# Patient Record
Sex: MALE | Race: WHITE | Employment: OTHER | URBAN - METROPOLITAN AREA
[De-identification: names, ages, dates, MRNs, and addresses within clinical notes are randomized per-mention and may not be internally consistent; named-entity substitution may affect disease eponyms.]

---

## 2017-01-04 DIAGNOSIS — M19.039 WRIST ARTHRITIS: Primary | ICD-10-CM

## 2017-01-04 DIAGNOSIS — F34.1 DYSTHYMIA: ICD-10-CM

## 2017-01-09 DIAGNOSIS — J30.1 NON-SEASONAL ALLERGIC RHINITIS DUE TO POLLEN: Primary | ICD-10-CM

## 2017-01-09 RX ORDER — MONTELUKAST SODIUM 10 MG/1
10 TABLET ORAL NIGHTLY
Qty: 90 TABLET | Refills: 1 | Status: SHIPPED | OUTPATIENT
Start: 2017-01-09 | End: 2017-07-08 | Stop reason: SDUPTHER

## 2017-01-09 RX ORDER — MELOXICAM 15 MG/1
15 TABLET ORAL DAILY
Qty: 90 TABLET | Refills: 1 | Status: SHIPPED | OUTPATIENT
Start: 2017-01-09 | End: 2017-02-08 | Stop reason: SDUPTHER

## 2017-01-09 RX ORDER — ESCITALOPRAM OXALATE 10 MG/1
10 TABLET ORAL DAILY
Qty: 30 TABLET | Refills: 3 | Status: SHIPPED | OUTPATIENT
Start: 2017-01-09 | End: 2018-01-15 | Stop reason: SDUPTHER

## 2017-02-01 DIAGNOSIS — Z12.5 SCREENING FOR PROSTATE CANCER: ICD-10-CM

## 2017-02-01 DIAGNOSIS — E78.2 MIXED HYPERLIPIDEMIA: ICD-10-CM

## 2017-02-01 LAB
A/G RATIO: 2 (ref 1.1–2.2)
ALBUMIN SERPL-MCNC: 4.7 G/DL (ref 3.4–5)
ALP BLD-CCNC: 61 U/L (ref 40–129)
ALT SERPL-CCNC: 25 U/L (ref 10–40)
ANION GAP SERPL CALCULATED.3IONS-SCNC: 15 MMOL/L (ref 3–16)
AST SERPL-CCNC: 22 U/L (ref 15–37)
BILIRUB SERPL-MCNC: 0.7 MG/DL (ref 0–1)
BUN BLDV-MCNC: 18 MG/DL (ref 7–20)
CALCIUM SERPL-MCNC: 9.5 MG/DL (ref 8.3–10.6)
CHLORIDE BLD-SCNC: 100 MMOL/L (ref 99–110)
CHOLESTEROL, TOTAL: 184 MG/DL (ref 0–199)
CO2: 24 MMOL/L (ref 21–32)
CREAT SERPL-MCNC: 0.9 MG/DL (ref 0.8–1.3)
GFR AFRICAN AMERICAN: >60
GFR NON-AFRICAN AMERICAN: >60
GLOBULIN: 2.4 G/DL
GLUCOSE BLD-MCNC: 94 MG/DL (ref 70–99)
HDLC SERPL-MCNC: 54 MG/DL (ref 40–60)
LDL CHOLESTEROL CALCULATED: 101 MG/DL
POTASSIUM SERPL-SCNC: 4.6 MMOL/L (ref 3.5–5.1)
PROSTATE SPECIFIC ANTIGEN: 1.32 NG/ML (ref 0–4)
SODIUM BLD-SCNC: 139 MMOL/L (ref 136–145)
TOTAL PROTEIN: 7.1 G/DL (ref 6.4–8.2)
TRIGL SERPL-MCNC: 146 MG/DL (ref 0–150)
VLDLC SERPL CALC-MCNC: 29 MG/DL

## 2017-02-02 ENCOUNTER — OFFICE VISIT (OUTPATIENT)
Dept: FAMILY MEDICINE CLINIC | Age: 67
End: 2017-02-02

## 2017-02-02 VITALS
SYSTOLIC BLOOD PRESSURE: 106 MMHG | DIASTOLIC BLOOD PRESSURE: 70 MMHG | TEMPERATURE: 97.2 F | WEIGHT: 255.6 LBS | BODY MASS INDEX: 32.8 KG/M2 | HEIGHT: 74 IN | OXYGEN SATURATION: 96 % | HEART RATE: 78 BPM

## 2017-02-02 DIAGNOSIS — F34.1 DYSTHYMIA: Primary | ICD-10-CM

## 2017-02-02 DIAGNOSIS — M19.041 PRIMARY OSTEOARTHRITIS OF BOTH HANDS: ICD-10-CM

## 2017-02-02 DIAGNOSIS — M19.042 PRIMARY OSTEOARTHRITIS OF BOTH HANDS: ICD-10-CM

## 2017-02-02 DIAGNOSIS — I10 ESSENTIAL HYPERTENSION: ICD-10-CM

## 2017-02-02 DIAGNOSIS — M47.816 OSTEOARTHRITIS OF LUMBAR SPINE, UNSPECIFIED SPINAL OSTEOARTHRITIS COMPLICATION STATUS: ICD-10-CM

## 2017-02-02 DIAGNOSIS — E78.2 MIXED HYPERLIPIDEMIA: ICD-10-CM

## 2017-02-02 PROCEDURE — 1036F TOBACCO NON-USER: CPT | Performed by: FAMILY MEDICINE

## 2017-02-02 PROCEDURE — G8427 DOCREV CUR MEDS BY ELIG CLIN: HCPCS | Performed by: FAMILY MEDICINE

## 2017-02-02 PROCEDURE — 99214 OFFICE O/P EST MOD 30 MIN: CPT | Performed by: FAMILY MEDICINE

## 2017-02-02 PROCEDURE — 3017F COLORECTAL CA SCREEN DOC REV: CPT | Performed by: FAMILY MEDICINE

## 2017-02-02 PROCEDURE — G8419 CALC BMI OUT NRM PARAM NOF/U: HCPCS | Performed by: FAMILY MEDICINE

## 2017-02-02 PROCEDURE — 1123F ACP DISCUSS/DSCN MKR DOCD: CPT | Performed by: FAMILY MEDICINE

## 2017-02-02 PROCEDURE — G8484 FLU IMMUNIZE NO ADMIN: HCPCS | Performed by: FAMILY MEDICINE

## 2017-02-02 PROCEDURE — 4040F PNEUMOC VAC/ADMIN/RCVD: CPT | Performed by: FAMILY MEDICINE

## 2017-02-02 ASSESSMENT — ENCOUNTER SYMPTOMS
EYES NEGATIVE: 1
GASTROINTESTINAL NEGATIVE: 1
RESPIRATORY NEGATIVE: 1

## 2017-02-08 DIAGNOSIS — M19.039 WRIST ARTHRITIS: ICD-10-CM

## 2017-02-09 RX ORDER — MELOXICAM 15 MG/1
15 TABLET ORAL DAILY
Qty: 90 TABLET | Refills: 1 | Status: SHIPPED | OUTPATIENT
Start: 2017-02-09 | End: 2017-07-29 | Stop reason: SDUPTHER

## 2017-02-21 DIAGNOSIS — E78.2 MIXED HYPERLIPIDEMIA: ICD-10-CM

## 2017-02-21 DIAGNOSIS — I10 ESSENTIAL HYPERTENSION: ICD-10-CM

## 2017-02-21 RX ORDER — TELMISARTAN 40 MG/1
40 TABLET ORAL DAILY
Qty: 90 TABLET | Refills: 1 | Status: SHIPPED | OUTPATIENT
Start: 2017-02-21 | End: 2017-08-20 | Stop reason: SDUPTHER

## 2017-02-21 RX ORDER — ATORVASTATIN CALCIUM 10 MG/1
10 TABLET, FILM COATED ORAL DAILY
Qty: 90 TABLET | Refills: 1 | Status: SHIPPED | OUTPATIENT
Start: 2017-02-21 | End: 2017-08-20 | Stop reason: SDUPTHER

## 2017-05-05 DIAGNOSIS — E78.2 MIXED HYPERLIPIDEMIA: ICD-10-CM

## 2017-05-05 DIAGNOSIS — I10 ESSENTIAL HYPERTENSION: ICD-10-CM

## 2017-05-05 LAB
A/G RATIO: 1.8 (ref 1.1–2.2)
ALBUMIN SERPL-MCNC: 4.4 G/DL (ref 3.4–5)
ALBUMIN SERPL-MCNC: 4.6 G/DL (ref 3.4–5)
ALP BLD-CCNC: 70 U/L (ref 40–129)
ALT SERPL-CCNC: 28 U/L (ref 10–40)
ANION GAP SERPL CALCULATED.3IONS-SCNC: 14 MMOL/L (ref 3–16)
ANION GAP SERPL CALCULATED.3IONS-SCNC: 17 MMOL/L (ref 3–16)
AST SERPL-CCNC: 24 U/L (ref 15–37)
BILIRUB SERPL-MCNC: 0.6 MG/DL (ref 0–1)
BUN BLDV-MCNC: 19 MG/DL (ref 7–20)
BUN BLDV-MCNC: 20 MG/DL (ref 7–20)
CALCIUM SERPL-MCNC: 9.4 MG/DL (ref 8.3–10.6)
CALCIUM SERPL-MCNC: 9.5 MG/DL (ref 8.3–10.6)
CHLORIDE BLD-SCNC: 100 MMOL/L (ref 99–110)
CHLORIDE BLD-SCNC: 101 MMOL/L (ref 99–110)
CHOLESTEROL, TOTAL: 183 MG/DL (ref 0–199)
CO2: 22 MMOL/L (ref 21–32)
CO2: 24 MMOL/L (ref 21–32)
CREAT SERPL-MCNC: 0.9 MG/DL (ref 0.8–1.3)
CREAT SERPL-MCNC: 0.9 MG/DL (ref 0.8–1.3)
GFR AFRICAN AMERICAN: >60
GFR AFRICAN AMERICAN: >60
GFR NON-AFRICAN AMERICAN: >60
GFR NON-AFRICAN AMERICAN: >60
GLOBULIN: 2.6 G/DL
GLUCOSE BLD-MCNC: 90 MG/DL (ref 70–99)
GLUCOSE BLD-MCNC: 93 MG/DL (ref 70–99)
HDLC SERPL-MCNC: 53 MG/DL (ref 40–60)
LDL CHOLESTEROL CALCULATED: 97 MG/DL
PHOSPHORUS: 3.5 MG/DL (ref 2.5–4.9)
POTASSIUM SERPL-SCNC: 4.6 MMOL/L (ref 3.5–5.1)
POTASSIUM SERPL-SCNC: 4.7 MMOL/L (ref 3.5–5.1)
SODIUM BLD-SCNC: 139 MMOL/L (ref 136–145)
SODIUM BLD-SCNC: 139 MMOL/L (ref 136–145)
TOTAL PROTEIN: 7.2 G/DL (ref 6.4–8.2)
TRIGL SERPL-MCNC: 165 MG/DL (ref 0–150)
VLDLC SERPL CALC-MCNC: 33 MG/DL

## 2017-05-08 ENCOUNTER — OFFICE VISIT (OUTPATIENT)
Dept: FAMILY MEDICINE CLINIC | Age: 67
End: 2017-05-08

## 2017-05-08 VITALS
OXYGEN SATURATION: 95 % | HEIGHT: 74 IN | BODY MASS INDEX: 33.8 KG/M2 | TEMPERATURE: 97.5 F | WEIGHT: 263.4 LBS | SYSTOLIC BLOOD PRESSURE: 110 MMHG | DIASTOLIC BLOOD PRESSURE: 78 MMHG | HEART RATE: 89 BPM

## 2017-05-08 DIAGNOSIS — E78.2 MIXED HYPERLIPIDEMIA: Primary | ICD-10-CM

## 2017-05-08 DIAGNOSIS — I10 ESSENTIAL HYPERTENSION: ICD-10-CM

## 2017-05-08 DIAGNOSIS — J30.1 NON-SEASONAL ALLERGIC RHINITIS DUE TO POLLEN: ICD-10-CM

## 2017-05-08 PROCEDURE — G8417 CALC BMI ABV UP PARAM F/U: HCPCS | Performed by: NURSE PRACTITIONER

## 2017-05-08 PROCEDURE — 99214 OFFICE O/P EST MOD 30 MIN: CPT | Performed by: NURSE PRACTITIONER

## 2017-05-08 PROCEDURE — 1036F TOBACCO NON-USER: CPT | Performed by: NURSE PRACTITIONER

## 2017-05-08 PROCEDURE — 3017F COLORECTAL CA SCREEN DOC REV: CPT | Performed by: NURSE PRACTITIONER

## 2017-05-08 PROCEDURE — 4040F PNEUMOC VAC/ADMIN/RCVD: CPT | Performed by: NURSE PRACTITIONER

## 2017-05-08 PROCEDURE — G8427 DOCREV CUR MEDS BY ELIG CLIN: HCPCS | Performed by: NURSE PRACTITIONER

## 2017-05-08 PROCEDURE — 1123F ACP DISCUSS/DSCN MKR DOCD: CPT | Performed by: NURSE PRACTITIONER

## 2017-05-08 ASSESSMENT — ENCOUNTER SYMPTOMS
RESPIRATORY NEGATIVE: 1
EYES NEGATIVE: 1
ORTHOPNEA: 0
SHORTNESS OF BREATH: 0
BLURRED VISION: 0
GASTROINTESTINAL NEGATIVE: 1
ALLERGIC/IMMUNOLOGIC NEGATIVE: 1

## 2017-05-12 ENCOUNTER — TELEPHONE (OUTPATIENT)
Dept: FAMILY MEDICINE CLINIC | Age: 67
End: 2017-05-12

## 2017-05-12 RX ORDER — AMOXICILLIN 500 MG/1
500 CAPSULE ORAL 3 TIMES DAILY
Qty: 30 CAPSULE | Refills: 0 | Status: SHIPPED | OUTPATIENT
Start: 2017-05-12 | End: 2017-05-22

## 2017-07-08 DIAGNOSIS — J30.1 NON-SEASONAL ALLERGIC RHINITIS DUE TO POLLEN: ICD-10-CM

## 2017-07-09 RX ORDER — MONTELUKAST SODIUM 10 MG/1
10 TABLET ORAL NIGHTLY
Qty: 90 TABLET | Refills: 1 | Status: SHIPPED | OUTPATIENT
Start: 2017-07-09 | End: 2018-01-05 | Stop reason: SDUPTHER

## 2017-07-29 DIAGNOSIS — M19.039 WRIST ARTHRITIS: ICD-10-CM

## 2017-07-29 RX ORDER — MELOXICAM 15 MG/1
15 TABLET ORAL DAILY PRN
Qty: 90 TABLET | Refills: 1 | Status: SHIPPED | OUTPATIENT
Start: 2017-07-29 | End: 2018-01-25 | Stop reason: SDUPTHER

## 2017-07-29 RX ORDER — CETIRIZINE HYDROCHLORIDE 10 MG/1
10 TABLET ORAL DAILY
Qty: 90 TABLET | Refills: 3 | Status: SHIPPED | OUTPATIENT
Start: 2017-07-29 | End: 2018-08-19 | Stop reason: SDUPTHER

## 2017-08-08 DIAGNOSIS — E78.2 MIXED HYPERLIPIDEMIA: ICD-10-CM

## 2017-08-08 LAB
A/G RATIO: 1.9 (ref 1.1–2.2)
ALBUMIN SERPL-MCNC: 4.6 G/DL (ref 3.4–5)
ALP BLD-CCNC: 62 U/L (ref 40–129)
ALT SERPL-CCNC: 21 U/L (ref 10–40)
ANION GAP SERPL CALCULATED.3IONS-SCNC: 17 MMOL/L (ref 3–16)
AST SERPL-CCNC: 20 U/L (ref 15–37)
BILIRUB SERPL-MCNC: 0.6 MG/DL (ref 0–1)
BUN BLDV-MCNC: 15 MG/DL (ref 7–20)
CALCIUM SERPL-MCNC: 9.5 MG/DL (ref 8.3–10.6)
CHLORIDE BLD-SCNC: 96 MMOL/L (ref 99–110)
CHOLESTEROL, TOTAL: 168 MG/DL (ref 0–199)
CO2: 22 MMOL/L (ref 21–32)
CREAT SERPL-MCNC: 0.9 MG/DL (ref 0.8–1.3)
GFR AFRICAN AMERICAN: >60
GFR NON-AFRICAN AMERICAN: >60
GLOBULIN: 2.4 G/DL
GLUCOSE BLD-MCNC: 94 MG/DL (ref 70–99)
HDLC SERPL-MCNC: 50 MG/DL (ref 40–60)
LDL CHOLESTEROL CALCULATED: 87 MG/DL
POTASSIUM SERPL-SCNC: 4.6 MMOL/L (ref 3.5–5.1)
SODIUM BLD-SCNC: 135 MMOL/L (ref 136–145)
TOTAL PROTEIN: 7 G/DL (ref 6.4–8.2)
TRIGL SERPL-MCNC: 155 MG/DL (ref 0–150)
VLDLC SERPL CALC-MCNC: 31 MG/DL

## 2017-08-09 ENCOUNTER — OFFICE VISIT (OUTPATIENT)
Dept: FAMILY MEDICINE CLINIC | Age: 67
End: 2017-08-09

## 2017-08-09 VITALS
DIASTOLIC BLOOD PRESSURE: 70 MMHG | HEIGHT: 74 IN | SYSTOLIC BLOOD PRESSURE: 104 MMHG | TEMPERATURE: 98.1 F | WEIGHT: 254.4 LBS | OXYGEN SATURATION: 95 % | HEART RATE: 88 BPM | BODY MASS INDEX: 32.65 KG/M2

## 2017-08-09 DIAGNOSIS — E78.2 MIXED HYPERLIPIDEMIA: ICD-10-CM

## 2017-08-09 DIAGNOSIS — R33.9 URINARY RETENTION: ICD-10-CM

## 2017-08-09 DIAGNOSIS — I10 ESSENTIAL HYPERTENSION: ICD-10-CM

## 2017-08-09 DIAGNOSIS — N40.1 BPH (BENIGN PROSTATIC HYPERTROPHY) WITH URINARY RETENTION: Primary | ICD-10-CM

## 2017-08-09 DIAGNOSIS — Z12.5 SCREENING FOR PROSTATE CANCER: ICD-10-CM

## 2017-08-09 DIAGNOSIS — Z87.891 FORMER SMOKER: ICD-10-CM

## 2017-08-09 DIAGNOSIS — Z23 NEED FOR PNEUMOCOCCAL VACCINATION: ICD-10-CM

## 2017-08-09 DIAGNOSIS — R33.8 BPH (BENIGN PROSTATIC HYPERTROPHY) WITH URINARY RETENTION: Primary | ICD-10-CM

## 2017-08-09 PROCEDURE — 1036F TOBACCO NON-USER: CPT | Performed by: NURSE PRACTITIONER

## 2017-08-09 PROCEDURE — G0009 ADMIN PNEUMOCOCCAL VACCINE: HCPCS | Performed by: NURSE PRACTITIONER

## 2017-08-09 PROCEDURE — G8427 DOCREV CUR MEDS BY ELIG CLIN: HCPCS | Performed by: NURSE PRACTITIONER

## 2017-08-09 PROCEDURE — 1123F ACP DISCUSS/DSCN MKR DOCD: CPT | Performed by: NURSE PRACTITIONER

## 2017-08-09 PROCEDURE — 90732 PPSV23 VACC 2 YRS+ SUBQ/IM: CPT | Performed by: NURSE PRACTITIONER

## 2017-08-09 PROCEDURE — G8417 CALC BMI ABV UP PARAM F/U: HCPCS | Performed by: NURSE PRACTITIONER

## 2017-08-09 PROCEDURE — 3017F COLORECTAL CA SCREEN DOC REV: CPT | Performed by: NURSE PRACTITIONER

## 2017-08-09 PROCEDURE — 99214 OFFICE O/P EST MOD 30 MIN: CPT | Performed by: NURSE PRACTITIONER

## 2017-08-09 PROCEDURE — 4040F PNEUMOC VAC/ADMIN/RCVD: CPT | Performed by: NURSE PRACTITIONER

## 2017-08-09 ASSESSMENT — ENCOUNTER SYMPTOMS
SHORTNESS OF BREATH: 0
BLURRED VISION: 0
RESPIRATORY NEGATIVE: 1
VOMITING: 1
ALLERGIC/IMMUNOLOGIC NEGATIVE: 1
EYES NEGATIVE: 1
ORTHOPNEA: 0
NAUSEA: 0

## 2017-08-20 DIAGNOSIS — I10 ESSENTIAL HYPERTENSION: ICD-10-CM

## 2017-08-20 DIAGNOSIS — E78.2 MIXED HYPERLIPIDEMIA: ICD-10-CM

## 2017-08-20 RX ORDER — TELMISARTAN 40 MG/1
40 TABLET ORAL DAILY
Qty: 90 TABLET | Refills: 1 | Status: SHIPPED | OUTPATIENT
Start: 2017-08-20 | End: 2018-02-16 | Stop reason: SDUPTHER

## 2017-08-20 RX ORDER — ATORVASTATIN CALCIUM 10 MG/1
10 TABLET, FILM COATED ORAL DAILY
Qty: 90 TABLET | Refills: 1 | Status: SHIPPED | OUTPATIENT
Start: 2017-08-20 | End: 2018-02-16 | Stop reason: SDUPTHER

## 2017-10-04 ENCOUNTER — TELEPHONE (OUTPATIENT)
Dept: FAMILY MEDICINE CLINIC | Age: 67
End: 2017-10-04

## 2017-10-04 ENCOUNTER — OFFICE VISIT (OUTPATIENT)
Dept: FAMILY MEDICINE CLINIC | Age: 67
End: 2017-10-04

## 2017-10-04 VITALS
TEMPERATURE: 97.6 F | BODY MASS INDEX: 32.66 KG/M2 | DIASTOLIC BLOOD PRESSURE: 76 MMHG | SYSTOLIC BLOOD PRESSURE: 124 MMHG | WEIGHT: 254.4 LBS

## 2017-10-04 DIAGNOSIS — T15.90XA EYE FOREIGN BODIES, UNSPECIFIED LATERALITY, INITIAL ENCOUNTER: Primary | ICD-10-CM

## 2017-10-04 DIAGNOSIS — Z23 NEED FOR INFLUENZA VACCINATION: ICD-10-CM

## 2017-10-04 DIAGNOSIS — G47.33 OBSTRUCTIVE SLEEP APNEA: ICD-10-CM

## 2017-10-04 DIAGNOSIS — F34.1 DYSTHYMIA: ICD-10-CM

## 2017-10-04 DIAGNOSIS — G31.84 MILD COGNITIVE IMPAIRMENT: Primary | ICD-10-CM

## 2017-10-04 DIAGNOSIS — F34.1 PERSISTENT DEPRESSIVE DISORDER: ICD-10-CM

## 2017-10-04 DIAGNOSIS — G31.84 MILD COGNITIVE IMPAIRMENT: ICD-10-CM

## 2017-10-04 LAB
A/G RATIO: 1.7 (ref 1.1–2.2)
ALBUMIN SERPL-MCNC: 4.5 G/DL (ref 3.4–5)
ALP BLD-CCNC: 66 U/L (ref 40–129)
ALT SERPL-CCNC: 22 U/L (ref 10–40)
ANION GAP SERPL CALCULATED.3IONS-SCNC: 12 MMOL/L (ref 3–16)
AST SERPL-CCNC: 21 U/L (ref 15–37)
BASOPHILS ABSOLUTE: 0 K/UL (ref 0–0.2)
BASOPHILS RELATIVE PERCENT: 0.6 %
BILIRUB SERPL-MCNC: 0.6 MG/DL (ref 0–1)
BUN BLDV-MCNC: 19 MG/DL (ref 7–20)
CALCIUM SERPL-MCNC: 9.6 MG/DL (ref 8.3–10.6)
CHLORIDE BLD-SCNC: 102 MMOL/L (ref 99–110)
CO2: 24 MMOL/L (ref 21–32)
CREAT SERPL-MCNC: 0.8 MG/DL (ref 0.8–1.3)
EOSINOPHILS ABSOLUTE: 0.3 K/UL (ref 0–0.6)
EOSINOPHILS RELATIVE PERCENT: 4.9 %
FOLATE: >20 NG/ML (ref 4.78–24.2)
GFR AFRICAN AMERICAN: >60
GFR NON-AFRICAN AMERICAN: >60
GLOBULIN: 2.6 G/DL
GLUCOSE BLD-MCNC: 79 MG/DL (ref 70–99)
HCT VFR BLD CALC: 41.3 % (ref 40.5–52.5)
HEMOGLOBIN: 13.9 G/DL (ref 13.5–17.5)
LYMPHOCYTES ABSOLUTE: 1.5 K/UL (ref 1–5.1)
LYMPHOCYTES RELATIVE PERCENT: 24.9 %
MCH RBC QN AUTO: 29.2 PG (ref 26–34)
MCHC RBC AUTO-ENTMCNC: 33.8 G/DL (ref 31–36)
MCV RBC AUTO: 86.4 FL (ref 80–100)
MONOCYTES ABSOLUTE: 0.7 K/UL (ref 0–1.3)
MONOCYTES RELATIVE PERCENT: 11.7 %
NEUTROPHILS ABSOLUTE: 3.5 K/UL (ref 1.7–7.7)
NEUTROPHILS RELATIVE PERCENT: 57.9 %
PDW BLD-RTO: 14.1 % (ref 12.4–15.4)
PLATELET # BLD: 224 K/UL (ref 135–450)
PMV BLD AUTO: 6.9 FL (ref 5–10.5)
POTASSIUM SERPL-SCNC: 4.5 MMOL/L (ref 3.5–5.1)
RBC # BLD: 4.78 M/UL (ref 4.2–5.9)
SODIUM BLD-SCNC: 138 MMOL/L (ref 136–145)
T4 FREE: 1.1 NG/DL (ref 0.9–1.8)
TOTAL PROTEIN: 7.1 G/DL (ref 6.4–8.2)
TSH SERPL DL<=0.05 MIU/L-ACNC: 3.24 UIU/ML (ref 0.27–4.2)
VITAMIN B-12: 469 PG/ML (ref 211–911)
WBC # BLD: 6 K/UL (ref 4–11)

## 2017-10-04 PROCEDURE — 1123F ACP DISCUSS/DSCN MKR DOCD: CPT | Performed by: FAMILY MEDICINE

## 2017-10-04 PROCEDURE — G8427 DOCREV CUR MEDS BY ELIG CLIN: HCPCS | Performed by: FAMILY MEDICINE

## 2017-10-04 PROCEDURE — G0008 ADMIN INFLUENZA VIRUS VAC: HCPCS | Performed by: FAMILY MEDICINE

## 2017-10-04 PROCEDURE — 1036F TOBACCO NON-USER: CPT | Performed by: FAMILY MEDICINE

## 2017-10-04 PROCEDURE — 99214 OFFICE O/P EST MOD 30 MIN: CPT | Performed by: FAMILY MEDICINE

## 2017-10-04 PROCEDURE — G8417 CALC BMI ABV UP PARAM F/U: HCPCS | Performed by: FAMILY MEDICINE

## 2017-10-04 PROCEDURE — 4040F PNEUMOC VAC/ADMIN/RCVD: CPT | Performed by: FAMILY MEDICINE

## 2017-10-04 PROCEDURE — G8484 FLU IMMUNIZE NO ADMIN: HCPCS | Performed by: FAMILY MEDICINE

## 2017-10-04 PROCEDURE — 3017F COLORECTAL CA SCREEN DOC REV: CPT | Performed by: FAMILY MEDICINE

## 2017-10-04 PROCEDURE — 90662 IIV NO PRSV INCREASED AG IM: CPT | Performed by: FAMILY MEDICINE

## 2017-10-04 ASSESSMENT — ENCOUNTER SYMPTOMS
EYES NEGATIVE: 1
GASTROINTESTINAL NEGATIVE: 1
RESPIRATORY NEGATIVE: 1

## 2017-10-04 NOTE — PROGRESS NOTES
Vaccine Information Sheet, \"Influenza - Inactivated\"  given to Nitza Silverman., or parent/legal guardian of  Nitza Silverman. and verbalized understanding. Patient responses:    Have you ever had a reaction to a flu vaccine? No  Are you able to eat eggs without adverse effects? No  Do you have any current illness? No  Have you ever had [place] Syndrome? No    Flu vaccine given per order. Please see immunization tab.
are normal.   Eyes: Conjunctivae and EOM are normal. Pupils are equal, round, and reactive to light. Right eye exhibits no discharge. Left eye exhibits no discharge. No scleral icterus. Neck: Trachea normal and normal range of motion. Neck supple. No JVD present. No tracheal deviation present. No thyromegaly present. Cardiovascular: Normal rate, regular rhythm, normal heart sounds and intact distal pulses. Exam reveals no gallop and no friction rub. No murmur heard. Pulses:       Dorsalis pedis pulses are 2+ on the right side, and 2+ on the left side. Posterior tibial pulses are 2+ on the right side, and 2+ on the left side. Pulmonary/Chest: Effort normal and breath sounds normal. No stridor. No respiratory distress. He has no decreased breath sounds. He has no wheezes. He has no rhonchi. He has no rales. He exhibits no tenderness. Abdominal: Soft. Bowel sounds are normal. He exhibits no distension and no mass. There is no hepatosplenomegaly. There is no tenderness. There is no rebound and no guarding. No hernia. Lymphadenopathy:     He has no cervical adenopathy. Skin: He is not diaphoretic. Psychiatric: He has a normal mood and affect. His behavior is normal. Judgment and thought content normal.   mmse missed one mild depression on testing          1. Mild cognitive impairment   The condition is deteriorating, will change treatment, investigate cause and make further recommendations when data back. CBC Auto Differential    Comprehensive Metabolic Panel    Folate    TSH without Reflex    T4, Free    Vitamin B12    RPR Reflex to Titer and TPPA    MRI Brain WO Contrast   2. Persistent depressive disorder  The condition is deteriorating, will change treatment, investigate cause and make further recommendations when data back. 3. Dysthymia     4.  Obstructive sleep apnea  The condition is deteriorating, will change treatment, investigate cause and make further recommendations when data

## 2017-10-04 NOTE — MR AVS SNAPSHOT
After Visit Summary             Jolene Chanel   10/4/2017 9:30 AM   Office Visit    Description:  Male : 1950   Provider:  Micheal Mercedes MD   Department:  [address] [facility] [address]              Your Follow-Up and Future Appointments         Below is a list of your follow-up and future appointments. This may not be a complete list as you may have made appointments directly with providers that we are not aware of or your providers may have made some for you. Please call your providers to confirm appointments. It is important to keep your appointments. Please bring your current insurance card, photo ID, co-pay, and all medication bottles to your appointment. If self-pay, payment is expected at the time of service. Your To-Do List     Future Orders Complete By Expires    CBC Auto Differential [OXT6633 Custom]  10/4/2017 10/4/2018    Comprehensive Metabolic Panel [IHT17 Custom]  10/4/2017 10/4/2018    Folate [LAB69 Custom]  10/4/2017 10/4/2018    MRI Brain WO Contrast [78412 Custom]  10/4/2017 10/4/2018    RPR Reflex to Titer and TPPA [XEI220 Custom]  10/4/2017 10/5/2018    T4, Free [NJO736 Custom]  10/4/2017 10/4/2018    TSH without Reflex [TNM590 Custom]  10/4/2017 10/4/2018    Vitamin B12 [LAB67 Custom]  10/4/2017 10/4/2018         Information from Your Visit        Department     Name Address Phone Fax    [address] [facility] [address] [facility] [phone]      You Were Seen for:         Comments    Mild cognitive impairment   [[id number]]         Vital Signs     Blood Pressure Temperature Weight Body Mass Index Smoking Status       124/76 97.6 °F (36.4 °C) (Temporal) 254 lb 6.4 oz (115.4 kg) 32.66 kg/m2 Former Smoker       Additional Information about your Body Mass Index (BMI)           Your BMI as listed above is considered obese (30 or more). BMI is an estimate of body fat, calculated from your height and weight.   The higher Essential hypertension    Vitamin D deficiency    Wrist arthritis    Sprain of lumbar region    Hypertrophy of prostate without urinary obstruction and other lower urinary tract symptoms (LUTS)    Cardiac dysrhythmia    Onychomycosis    Obstructive sleep apnea    Personal history of other diseases of digestive system    Erectile dysfunction      Your Goals as of 10/4/2017 at 10:49 AM                 Weight    Weight < 200 lb (90.719 kg)     Notes    The patient has a goal of losing five pounds in the next three months. He/she feels they have an 8/10 chance of doing this. They will do this through lifestyle changes including less caloric intake and increased exercise. Immunizations as of 10/4/2017     Name Date    Influenza, High Dose 10/20/2016    Pneumococcal 13-valent Conjugate (Mzcosbp12) 5/23/2016    Pneumococcal Polysaccharide (Wjxlvrnlu18) 8/9/2017    Tdap (Boostrix, Adacel) 9/9/2013    Zoster 3/5/2013      Preventive Care        Date Due    One-time abdominal aortic aneurism (AAA) screening is recommended for all men between the age of 73-68 who have ever smoked 1950    Yearly Flu Vaccine (1) 9/1/2017    Cholesterol Screening 8/8/2022    Tetanus Combination Vaccine (2 - Td) 9/9/2023    Colonoscopy 5/1/2024            [employer] Signup           Our records indicate that you have an active [employer] account. You can view your After Visit Summary by going to [email or web address] and logging in with your [employer] username and password. If you don't have a [employer] username and password but a parent or guardian has access to your record, the parent or guardian should login with their own [employer] username and password and access your record to view the After Visit Summary. Additional Information  If you have questions, please contact the physician practice where you receive care. Remember, [employer] is NOT to be used for urgent needs.  For medical emergencies, dial 911. For questions regarding your [employer] account call [phone]. If you have a clinical question, please call your doctor's office.

## 2017-10-04 NOTE — TELEPHONE ENCOUNTER
Scheduling called stating pt needs an order for an Orbital Xray of both eyes due to a previous eye injury  Please place order into Epic   Pts appt in tomorrow

## 2017-10-05 ENCOUNTER — HOSPITAL ENCOUNTER (OUTPATIENT)
Dept: GENERAL RADIOLOGY | Age: 67
Discharge: HOME | End: 2017-10-05

## 2017-10-05 DIAGNOSIS — G31.84 MILD COGNITIVE IMPAIRMENT: ICD-10-CM

## 2017-10-05 DIAGNOSIS — T15.90XA EYE FOREIGN BODIES, UNSPECIFIED LATERALITY, INITIAL ENCOUNTER: ICD-10-CM

## 2017-10-05 LAB — RPR: NORMAL

## 2017-10-06 ENCOUNTER — PATIENT MESSAGE (OUTPATIENT)
Dept: FAMILY MEDICINE CLINIC | Age: 67
End: 2017-10-06

## 2017-10-10 RX ORDER — BUPROPION HYDROCHLORIDE 150 MG/1
150 TABLET ORAL EVERY MORNING
Qty: 30 TABLET | Refills: 2 | Status: SHIPPED | OUTPATIENT
Start: 2017-10-10 | End: 2017-12-06 | Stop reason: SDUPTHER

## 2017-11-15 ENCOUNTER — TELEPHONE (OUTPATIENT)
Dept: OTHER | Facility: CLINIC | Age: 67
End: 2017-11-15

## 2017-11-20 LAB
ALBUMIN SERPL-MCNC: 4.7 G/DL (ref 3.4–5)
ANION GAP SERPL CALCULATED.3IONS-SCNC: 14 MMOL/L (ref 3–16)
BUN BLDV-MCNC: 18 MG/DL (ref 7–20)
CALCIUM SERPL-MCNC: 9.7 MG/DL (ref 8.3–10.6)
CHLORIDE BLD-SCNC: 101 MMOL/L (ref 99–110)
CHOLESTEROL, TOTAL: 170 MG/DL (ref 0–199)
CO2: 24 MMOL/L (ref 21–32)
CREAT SERPL-MCNC: 0.9 MG/DL (ref 0.8–1.3)
GFR AFRICAN AMERICAN: >60
GFR NON-AFRICAN AMERICAN: >60
GLUCOSE BLD-MCNC: 88 MG/DL (ref 70–99)
HDLC SERPL-MCNC: 53 MG/DL (ref 40–60)
LDL CHOLESTEROL CALCULATED: 85 MG/DL
PHOSPHORUS: 3.7 MG/DL (ref 2.5–4.9)
POTASSIUM SERPL-SCNC: 4.5 MMOL/L (ref 3.5–5.1)
SODIUM BLD-SCNC: 139 MMOL/L (ref 136–145)
TRIGL SERPL-MCNC: 162 MG/DL (ref 0–150)
VLDLC SERPL CALC-MCNC: 32 MG/DL

## 2017-12-06 RX ORDER — BUPROPION HYDROCHLORIDE 150 MG/1
150 TABLET ORAL EVERY MORNING
Qty: 90 TABLET | Refills: 1 | Status: SHIPPED | OUTPATIENT
Start: 2017-12-06 | End: 2018-03-22 | Stop reason: SDUPTHER

## 2018-01-05 DIAGNOSIS — J30.1 NON-SEASONAL ALLERGIC RHINITIS DUE TO POLLEN: ICD-10-CM

## 2018-01-05 RX ORDER — MONTELUKAST SODIUM 10 MG/1
TABLET ORAL
Qty: 90 TABLET | Refills: 1 | Status: SHIPPED | OUTPATIENT
Start: 2018-01-05 | End: 2018-03-22 | Stop reason: SDUPTHER

## 2018-01-15 DIAGNOSIS — F34.1 DYSTHYMIA: ICD-10-CM

## 2018-01-15 RX ORDER — ESCITALOPRAM OXALATE 10 MG/1
10 TABLET ORAL DAILY
Qty: 90 TABLET | Refills: 1 | Status: SHIPPED | OUTPATIENT
Start: 2018-01-15 | End: 2018-02-23 | Stop reason: ALTCHOICE

## 2018-01-25 DIAGNOSIS — M19.039 WRIST ARTHRITIS: ICD-10-CM

## 2018-01-25 RX ORDER — MELOXICAM 15 MG/1
15 TABLET ORAL DAILY PRN
Qty: 90 TABLET | Refills: 1 | Status: SHIPPED | OUTPATIENT
Start: 2018-01-25 | End: 2018-03-22 | Stop reason: SDUPTHER

## 2018-02-16 ENCOUNTER — TELEPHONE (OUTPATIENT)
Dept: FAMILY MEDICINE CLINIC | Age: 68
End: 2018-02-16

## 2018-02-16 DIAGNOSIS — E78.2 MIXED HYPERLIPIDEMIA: ICD-10-CM

## 2018-02-16 DIAGNOSIS — I10 ESSENTIAL HYPERTENSION: ICD-10-CM

## 2018-02-16 RX ORDER — TELMISARTAN 40 MG/1
40 TABLET ORAL DAILY
Qty: 90 TABLET | Refills: 1 | Status: SHIPPED | OUTPATIENT
Start: 2018-02-16 | End: 2018-03-22 | Stop reason: SDUPTHER

## 2018-02-16 RX ORDER — ATORVASTATIN CALCIUM 10 MG/1
10 TABLET, FILM COATED ORAL DAILY
Qty: 90 TABLET | Refills: 1 | Status: SHIPPED | OUTPATIENT
Start: 2018-02-16 | End: 2018-03-22 | Stop reason: SDUPTHER

## 2018-02-16 NOTE — TELEPHONE ENCOUNTER
I sent in THREE month supply of your medication. Please schedule a follow up appointment with me or Javed Foot.

## 2018-02-23 ENCOUNTER — OFFICE VISIT (OUTPATIENT)
Dept: FAMILY MEDICINE CLINIC | Age: 68
End: 2018-02-23

## 2018-02-23 VITALS
SYSTOLIC BLOOD PRESSURE: 114 MMHG | DIASTOLIC BLOOD PRESSURE: 80 MMHG | BODY MASS INDEX: 32.69 KG/M2 | WEIGHT: 254.6 LBS | TEMPERATURE: 97.8 F

## 2018-02-23 DIAGNOSIS — E78.2 MIXED HYPERLIPIDEMIA: ICD-10-CM

## 2018-02-23 DIAGNOSIS — M47.896 OTHER OSTEOARTHRITIS OF SPINE, LUMBAR REGION: Primary | ICD-10-CM

## 2018-02-23 DIAGNOSIS — I10 ESSENTIAL HYPERTENSION: ICD-10-CM

## 2018-02-23 DIAGNOSIS — R91.1 PULMONARY NODULE: ICD-10-CM

## 2018-02-23 PROCEDURE — 1036F TOBACCO NON-USER: CPT | Performed by: FAMILY MEDICINE

## 2018-02-23 PROCEDURE — G8484 FLU IMMUNIZE NO ADMIN: HCPCS | Performed by: FAMILY MEDICINE

## 2018-02-23 PROCEDURE — 1123F ACP DISCUSS/DSCN MKR DOCD: CPT | Performed by: FAMILY MEDICINE

## 2018-02-23 PROCEDURE — 3017F COLORECTAL CA SCREEN DOC REV: CPT | Performed by: FAMILY MEDICINE

## 2018-02-23 PROCEDURE — G8417 CALC BMI ABV UP PARAM F/U: HCPCS | Performed by: FAMILY MEDICINE

## 2018-02-23 PROCEDURE — 4040F PNEUMOC VAC/ADMIN/RCVD: CPT | Performed by: FAMILY MEDICINE

## 2018-02-23 PROCEDURE — 99214 OFFICE O/P EST MOD 30 MIN: CPT | Performed by: FAMILY MEDICINE

## 2018-02-23 PROCEDURE — G8427 DOCREV CUR MEDS BY ELIG CLIN: HCPCS | Performed by: FAMILY MEDICINE

## 2018-02-23 RX ORDER — TAMSULOSIN HYDROCHLORIDE 0.4 MG/1
0.8 CAPSULE ORAL DAILY
COMMUNITY
End: 2018-03-22 | Stop reason: SDUPTHER

## 2018-02-25 PROBLEM — R91.1 PULMONARY NODULE: Status: ACTIVE | Noted: 2018-02-25

## 2018-02-25 ASSESSMENT — ENCOUNTER SYMPTOMS
GASTROINTESTINAL NEGATIVE: 1
EYES NEGATIVE: 1
RESPIRATORY NEGATIVE: 1

## 2018-02-25 NOTE — PROGRESS NOTES
instructed them to bring it with them to the next appointment. Discussed use, benefit, and side effects of prescribed medications. Barriers to medication compliance addressed. All patient questions answered. Pt voiced understanding. Lipid Panel    Comprehensive Metabolic Panel   4. Mixed hyperlipidemia  Condition stable continue the medications, treatments, will check labs as appropriate       The patient received counseling on the following healthy behaviors: nutrition, exercise, medication adherence and decrease in alcohol consumption    Patient given educational materials on Nutrition, Exercise and Hypertension as appropriate. I have instructed the patient to complete a self tracking handout on Blood Pressures  and instructed them to bring it with them to the next appointment. Discussed use, benefit, and side effects of prescribed medications. Barriers to medication compliance addressed. All patient questions answered. Pt voiced understanding. Lipid Panel    Comprehensive Metabolic Panel       . olivier Martinez MD

## 2018-02-26 DIAGNOSIS — I10 ESSENTIAL HYPERTENSION: ICD-10-CM

## 2018-02-26 DIAGNOSIS — E78.2 MIXED HYPERLIPIDEMIA: ICD-10-CM

## 2018-02-26 LAB
A/G RATIO: 2 (ref 1.1–2.2)
ALBUMIN SERPL-MCNC: 4.9 G/DL (ref 3.4–5)
ALP BLD-CCNC: 63 U/L (ref 40–129)
ALT SERPL-CCNC: 24 U/L (ref 10–40)
ANION GAP SERPL CALCULATED.3IONS-SCNC: 13 MMOL/L (ref 3–16)
AST SERPL-CCNC: 21 U/L (ref 15–37)
BILIRUB SERPL-MCNC: 0.7 MG/DL (ref 0–1)
BUN BLDV-MCNC: 17 MG/DL (ref 7–20)
CALCIUM SERPL-MCNC: 9.5 MG/DL (ref 8.3–10.6)
CHLORIDE BLD-SCNC: 102 MMOL/L (ref 99–110)
CHOLESTEROL, TOTAL: 161 MG/DL (ref 0–199)
CO2: 24 MMOL/L (ref 21–32)
CREAT SERPL-MCNC: 0.9 MG/DL (ref 0.8–1.3)
GFR AFRICAN AMERICAN: >60
GFR NON-AFRICAN AMERICAN: >60
GLOBULIN: 2.5 G/DL
GLUCOSE BLD-MCNC: 100 MG/DL (ref 70–99)
HDLC SERPL-MCNC: 57 MG/DL (ref 40–60)
LDL CHOLESTEROL CALCULATED: 76 MG/DL
POTASSIUM SERPL-SCNC: 4.6 MMOL/L (ref 3.5–5.1)
SODIUM BLD-SCNC: 139 MMOL/L (ref 136–145)
TOTAL PROTEIN: 7.4 G/DL (ref 6.4–8.2)
TRIGL SERPL-MCNC: 142 MG/DL (ref 0–150)
VLDLC SERPL CALC-MCNC: 28 MG/DL

## 2018-03-16 ENCOUNTER — OFFICE VISIT (OUTPATIENT)
Dept: FAMILY MEDICINE CLINIC | Age: 68
End: 2018-03-16

## 2018-03-16 VITALS
DIASTOLIC BLOOD PRESSURE: 70 MMHG | TEMPERATURE: 98 F | SYSTOLIC BLOOD PRESSURE: 110 MMHG | BODY MASS INDEX: 32.26 KG/M2 | HEIGHT: 74 IN | OXYGEN SATURATION: 96 % | WEIGHT: 251.4 LBS | HEART RATE: 96 BPM

## 2018-03-16 DIAGNOSIS — J06.9 URI, ACUTE: Primary | ICD-10-CM

## 2018-03-16 RX ORDER — AMOXICILLIN AND CLAVULANATE POTASSIUM 875; 125 MG/1; MG/1
1 TABLET, FILM COATED ORAL 2 TIMES DAILY
Qty: 20 TABLET | Refills: 0 | Status: SHIPPED | OUTPATIENT
Start: 2018-03-16 | End: 2018-03-26

## 2018-03-16 RX ORDER — BROMPHENIRAMINE MALEATE, PSEUDOEPHEDRINE HYDROCHLORIDE, AND DEXTROMETHORPHAN HYDROBROMIDE 2; 30; 10 MG/5ML; MG/5ML; MG/5ML
5 SYRUP ORAL 4 TIMES DAILY PRN
Qty: 200 ML | Refills: 0 | Status: SHIPPED | OUTPATIENT
Start: 2018-03-16 | End: 2018-07-05 | Stop reason: ALTCHOICE

## 2018-03-16 RX ORDER — ALBUTEROL SULFATE 90 UG/1
2 AEROSOL, METERED RESPIRATORY (INHALATION) EVERY 6 HOURS PRN
Qty: 1 INHALER | Refills: 3 | Status: SHIPPED | OUTPATIENT
Start: 2018-03-16 | End: 2018-06-25

## 2018-03-19 ASSESSMENT — ENCOUNTER SYMPTOMS
EYES NEGATIVE: 1
ABDOMINAL PAIN: 0
ALLERGIC/IMMUNOLOGIC NEGATIVE: 1
VOMITING: 0
SINUS PAIN: 1
COUGH: 1
NAUSEA: 0
SINUS PRESSURE: 1
WHEEZING: 0
DIARRHEA: 0
SORE THROAT: 0
RHINORRHEA: 1
SWOLLEN GLANDS: 0
CHEST TIGHTNESS: 1
GASTROINTESTINAL NEGATIVE: 1

## 2018-03-19 NOTE — PROGRESS NOTES
tablet by mouth as needed for Erectile Dysfunction 5 tablet 5    Omega-3 Fatty Acids (FISH OIL) 1000 MG CAPS Take 1,000 mg by mouth daily.  Flaxseed, Linseed, (FLAX SEED OIL) 1000 MG CAPS Take 1,000 mg by mouth daily.  Cholecalciferol (VITAMIN D) 2000 UNITS CAPS capsule Take 1 capsule by mouth daily.  Ascorbic Acid (VITAMIN C) 1000 MG tablet Take 1,000 mg by mouth daily.  multivitamin (THERAGRAN) per tablet Take 1 tablet by mouth daily. Past Medical History:   Diagnosis Date    Allergic rhinitis     Erectile dysfunction     Hyperlipidemia     Hypertension     Unspecified sleep apnea      No past surgical history on file. Family History   Problem Relation Age of Onset    Cancer Father      prostate     Social History     Social History    Marital status:      Spouse name: N/A    Number of children: N/A    Years of education: N/A     Occupational History    Not on file. Social History Main Topics    Smoking status: Former Smoker    Smokeless tobacco: Former User    Alcohol use 2.4 oz/week     2 Shots of liquor, 2 Standard drinks or equivalent per week      Comment: once every couple of weeks     Drug use: No    Sexual activity: Not on file     Other Topics Concern    Not on file     Social History Narrative    No narrative on file         Any recent diagnostic tests, hospital reports, office notes or consultation letters were reviewed prior to and during the visit. URI    This is a new problem. The current episode started in the past 7 days. The problem has been gradually worsening. Associated symptoms include congestion, coughing, headaches, rhinorrhea and sinus pain. Pertinent negatives include no abdominal pain, chest pain, diarrhea, dysuria, ear pain, joint pain, joint swelling, nausea, neck pain, plugged ear sensation, rash, sneezing, sore throat, swollen glands, vomiting or wheezing. He has tried decongestant for the symptoms.  The treatment (VENTOLIN HFA) 108 (90 Base) MCG/ACT inhaler    brompheniramine-pseudoephedrine-DM 30-2-10 MG/5ML syrup             Dev Bassett NP

## 2018-03-22 DIAGNOSIS — M19.039 WRIST ARTHRITIS: ICD-10-CM

## 2018-03-22 DIAGNOSIS — I10 ESSENTIAL HYPERTENSION: ICD-10-CM

## 2018-03-22 DIAGNOSIS — J30.89 CHRONIC NONSEASONAL ALLERGIC RHINITIS DUE TO POLLEN: ICD-10-CM

## 2018-03-22 DIAGNOSIS — E78.2 MIXED HYPERLIPIDEMIA: ICD-10-CM

## 2018-03-22 RX ORDER — MONTELUKAST SODIUM 10 MG/1
10 TABLET ORAL NIGHTLY
Qty: 90 TABLET | Refills: 1 | Status: SHIPPED | OUTPATIENT
Start: 2018-03-22 | End: 2020-06-03 | Stop reason: SDUPTHER

## 2018-03-22 RX ORDER — TERBINAFINE HYDROCHLORIDE 250 MG/1
250 TABLET ORAL DAILY
Qty: 90 TABLET | Refills: 1 | Status: SHIPPED | OUTPATIENT
Start: 2018-03-22 | End: 2018-10-11 | Stop reason: ALTCHOICE

## 2018-03-22 RX ORDER — TAMSULOSIN HYDROCHLORIDE 0.4 MG/1
0.8 CAPSULE ORAL DAILY
Qty: 90 CAPSULE | Refills: 1 | Status: SHIPPED | OUTPATIENT
Start: 2018-03-22

## 2018-03-22 RX ORDER — TELMISARTAN 40 MG/1
40 TABLET ORAL DAILY
Qty: 90 TABLET | Refills: 1 | Status: ON HOLD | OUTPATIENT
Start: 2018-03-22 | End: 2018-07-26 | Stop reason: HOSPADM

## 2018-03-22 RX ORDER — ATORVASTATIN CALCIUM 10 MG/1
10 TABLET, FILM COATED ORAL DAILY
Qty: 90 TABLET | Refills: 1 | Status: ON HOLD | OUTPATIENT
Start: 2018-03-22 | End: 2018-07-05 | Stop reason: HOSPADM

## 2018-03-22 RX ORDER — MELOXICAM 15 MG/1
15 TABLET ORAL DAILY PRN
Qty: 90 TABLET | Refills: 1 | Status: ON HOLD | OUTPATIENT
Start: 2018-03-22 | End: 2018-07-05 | Stop reason: HOSPADM

## 2018-03-22 RX ORDER — BUPROPION HYDROCHLORIDE 150 MG/1
150 TABLET ORAL EVERY MORNING
Qty: 90 TABLET | Refills: 1 | Status: SHIPPED | OUTPATIENT
Start: 2018-03-22 | End: 2018-11-26

## 2018-05-17 ENCOUNTER — PATIENT MESSAGE (OUTPATIENT)
Dept: FAMILY MEDICINE CLINIC | Age: 68
End: 2018-05-17

## 2018-05-24 ENCOUNTER — TELEPHONE (OUTPATIENT)
Dept: FAMILY MEDICINE CLINIC | Age: 68
End: 2018-05-24

## 2018-05-24 DIAGNOSIS — E78.2 MIXED HYPERLIPIDEMIA: ICD-10-CM

## 2018-05-24 DIAGNOSIS — I10 ESSENTIAL HYPERTENSION: ICD-10-CM

## 2018-06-05 ENCOUNTER — PATIENT MESSAGE (OUTPATIENT)
Dept: FAMILY MEDICINE CLINIC | Age: 68
End: 2018-06-05

## 2018-06-05 RX ORDER — FINASTERIDE 5 MG/1
5 TABLET, FILM COATED ORAL DAILY
Qty: 90 TABLET | Refills: 1 | Status: SHIPPED | OUTPATIENT
Start: 2018-06-05 | End: 2019-01-21 | Stop reason: SDUPTHER

## 2018-06-25 ENCOUNTER — OFFICE VISIT (OUTPATIENT)
Dept: FAMILY MEDICINE CLINIC | Age: 68
End: 2018-06-25

## 2018-06-25 VITALS
SYSTOLIC BLOOD PRESSURE: 110 MMHG | HEIGHT: 74 IN | OXYGEN SATURATION: 97 % | WEIGHT: 252 LBS | TEMPERATURE: 98 F | DIASTOLIC BLOOD PRESSURE: 72 MMHG | BODY MASS INDEX: 32.34 KG/M2 | HEART RATE: 114 BPM

## 2018-06-25 DIAGNOSIS — E78.2 MIXED HYPERLIPIDEMIA: ICD-10-CM

## 2018-06-25 DIAGNOSIS — I10 ESSENTIAL HYPERTENSION: ICD-10-CM

## 2018-06-25 DIAGNOSIS — G62.9 PERIPHERAL POLYNEUROPATHY: Primary | ICD-10-CM

## 2018-06-25 LAB
A/G RATIO: 1.8 (ref 1.1–2.2)
ALBUMIN SERPL-MCNC: 4.8 G/DL (ref 3.4–5)
ALP BLD-CCNC: 72 U/L (ref 40–129)
ALT SERPL-CCNC: 35 U/L (ref 10–40)
ANION GAP SERPL CALCULATED.3IONS-SCNC: 17 MMOL/L (ref 3–16)
AST SERPL-CCNC: 24 U/L (ref 15–37)
BILIRUB SERPL-MCNC: 0.5 MG/DL (ref 0–1)
BUN BLDV-MCNC: 21 MG/DL (ref 7–20)
CALCIUM SERPL-MCNC: 9.7 MG/DL (ref 8.3–10.6)
CHLORIDE BLD-SCNC: 97 MMOL/L (ref 99–110)
CHOLESTEROL, TOTAL: 172 MG/DL (ref 0–199)
CO2: 25 MMOL/L (ref 21–32)
CREAT SERPL-MCNC: 0.9 MG/DL (ref 0.8–1.3)
GFR AFRICAN AMERICAN: >60
GFR NON-AFRICAN AMERICAN: >60
GLOBULIN: 2.6 G/DL
GLUCOSE BLD-MCNC: 89 MG/DL (ref 70–99)
HDLC SERPL-MCNC: 57 MG/DL (ref 40–60)
LDL CHOLESTEROL CALCULATED: 82 MG/DL
POTASSIUM SERPL-SCNC: 4.9 MMOL/L (ref 3.5–5.1)
SODIUM BLD-SCNC: 139 MMOL/L (ref 136–145)
TOTAL PROTEIN: 7.4 G/DL (ref 6.4–8.2)
TRIGL SERPL-MCNC: 164 MG/DL (ref 0–150)
VLDLC SERPL CALC-MCNC: 33 MG/DL

## 2018-06-25 RX ORDER — GABAPENTIN 100 MG/1
100 CAPSULE ORAL 3 TIMES DAILY
Qty: 90 CAPSULE | Refills: 3 | Status: ON HOLD | OUTPATIENT
Start: 2018-06-25 | End: 2018-07-26 | Stop reason: HOSPADM

## 2018-06-25 ASSESSMENT — ENCOUNTER SYMPTOMS
SHORTNESS OF BREATH: 0
BLURRED VISION: 0
ORTHOPNEA: 0
EYES NEGATIVE: 1
ALLERGIC/IMMUNOLOGIC NEGATIVE: 1
GASTROINTESTINAL NEGATIVE: 1
RESPIRATORY NEGATIVE: 1

## 2018-07-02 PROBLEM — M62.82 ACUTE RENAL FAILURE DUE TO RHABDOMYOLYSIS (HCC): Status: ACTIVE | Noted: 2018-07-02

## 2018-07-02 PROBLEM — N17.9 AKI (ACUTE KIDNEY INJURY) (HCC): Status: ACTIVE | Noted: 2018-07-02

## 2018-07-02 PROBLEM — T79.6XXA TRAUMATIC RHABDOMYOLYSIS (HCC): Status: ACTIVE | Noted: 2018-07-02

## 2018-07-02 PROBLEM — N17.9 ACUTE RENAL FAILURE DUE TO RHABDOMYOLYSIS (HCC): Status: ACTIVE | Noted: 2018-07-02

## 2018-07-05 ENCOUNTER — OFFICE VISIT (OUTPATIENT)
Dept: FAMILY MEDICINE CLINIC | Age: 68
End: 2018-07-05

## 2018-07-05 VITALS
BODY MASS INDEX: 30.4 KG/M2 | SYSTOLIC BLOOD PRESSURE: 128 MMHG | WEIGHT: 236.8 LBS | DIASTOLIC BLOOD PRESSURE: 86 MMHG | TEMPERATURE: 98.3 F

## 2018-07-05 DIAGNOSIS — T79.6XXA TRAUMATIC RHABDOMYOLYSIS, INITIAL ENCOUNTER (HCC): ICD-10-CM

## 2018-07-05 DIAGNOSIS — N17.9 AKI (ACUTE KIDNEY INJURY) (HCC): Primary | ICD-10-CM

## 2018-07-05 DIAGNOSIS — R29.6 FREQUENT FALLS: ICD-10-CM

## 2018-07-05 DIAGNOSIS — R26.89 FUNCTIONAL GAIT ABNORMALITY: ICD-10-CM

## 2018-07-05 DIAGNOSIS — S50.319A ABRASION, ELBOW W/O INFECTION: ICD-10-CM

## 2018-07-05 DIAGNOSIS — S80.219A ABRASION OF KNEE, UNSPECIFIED LATERALITY, INITIAL ENCOUNTER: ICD-10-CM

## 2018-07-05 PROCEDURE — 1111F DSCHRG MED/CURRENT MED MERGE: CPT | Performed by: FAMILY MEDICINE

## 2018-07-05 PROCEDURE — 99214 OFFICE O/P EST MOD 30 MIN: CPT | Performed by: FAMILY MEDICINE

## 2018-07-05 PROCEDURE — G8417 CALC BMI ABV UP PARAM F/U: HCPCS | Performed by: FAMILY MEDICINE

## 2018-07-05 PROCEDURE — 1036F TOBACCO NON-USER: CPT | Performed by: FAMILY MEDICINE

## 2018-07-05 PROCEDURE — 4040F PNEUMOC VAC/ADMIN/RCVD: CPT | Performed by: FAMILY MEDICINE

## 2018-07-05 PROCEDURE — 1123F ACP DISCUSS/DSCN MKR DOCD: CPT | Performed by: FAMILY MEDICINE

## 2018-07-05 PROCEDURE — G8427 DOCREV CUR MEDS BY ELIG CLIN: HCPCS | Performed by: FAMILY MEDICINE

## 2018-07-05 PROCEDURE — 3017F COLORECTAL CA SCREEN DOC REV: CPT | Performed by: FAMILY MEDICINE

## 2018-07-07 LAB
ALBUMIN SERPL-MCNC: 4.8 G/DL (ref 3.4–5)
ANION GAP SERPL CALCULATED.3IONS-SCNC: 15 MMOL/L (ref 3–16)
BASOPHILS ABSOLUTE: 0 K/UL (ref 0–0.2)
BASOPHILS RELATIVE PERCENT: 0.7 %
BUN BLDV-MCNC: 18 MG/DL (ref 7–20)
CALCIUM SERPL-MCNC: 9.9 MG/DL (ref 8.3–10.6)
CHLORIDE BLD-SCNC: 102 MMOL/L (ref 99–110)
CO2: 24 MMOL/L (ref 21–32)
CREAT SERPL-MCNC: 0.9 MG/DL (ref 0.8–1.3)
EOSINOPHILS ABSOLUTE: 0.3 K/UL (ref 0–0.6)
EOSINOPHILS RELATIVE PERCENT: 5.9 %
GFR AFRICAN AMERICAN: >60
GFR NON-AFRICAN AMERICAN: >60
GLUCOSE BLD-MCNC: 56 MG/DL (ref 70–99)
HCT VFR BLD CALC: 38.3 % (ref 40.5–52.5)
HEMOGLOBIN: 13.8 G/DL (ref 13.5–17.5)
LYMPHOCYTES ABSOLUTE: 1.2 K/UL (ref 1–5.1)
LYMPHOCYTES RELATIVE PERCENT: 20.7 %
MCH RBC QN AUTO: 30.5 PG (ref 26–34)
MCHC RBC AUTO-ENTMCNC: 36 G/DL (ref 31–36)
MCV RBC AUTO: 84.7 FL (ref 80–100)
MONOCYTES ABSOLUTE: 0.6 K/UL (ref 0–1.3)
MONOCYTES RELATIVE PERCENT: 11.4 %
NEUTROPHILS ABSOLUTE: 3.4 K/UL (ref 1.7–7.7)
NEUTROPHILS RELATIVE PERCENT: 61.3 %
PDW BLD-RTO: 14.1 % (ref 12.4–15.4)
PHOSPHORUS: 3.8 MG/DL (ref 2.5–4.9)
PLATELET # BLD: 253 K/UL (ref 135–450)
PMV BLD AUTO: 6.6 FL (ref 5–10.5)
POTASSIUM SERPL-SCNC: 4.3 MMOL/L (ref 3.5–5.1)
RBC # BLD: 4.52 M/UL (ref 4.2–5.9)
SODIUM BLD-SCNC: 141 MMOL/L (ref 136–145)
WBC # BLD: 5.6 K/UL (ref 4–11)

## 2018-07-08 ASSESSMENT — ENCOUNTER SYMPTOMS
RESPIRATORY NEGATIVE: 1
GASTROINTESTINAL NEGATIVE: 1
EYES NEGATIVE: 1

## 2018-07-08 NOTE — PROGRESS NOTES
not present. No tracheal deviation present. No thyromegaly present. Cardiovascular: Normal rate, regular rhythm, S2 normal, normal heart sounds and intact distal pulses. PMI is not displaced. Exam reveals no gallop and no friction rub. No murmur heard. Pulses:       Carotid pulses are 2+ on the right side, and 2+ on the left side. Dorsalis pedis pulses are 2+ on the right side, and 2+ on the left side. Posterior tibial pulses are 2+ on the right side, and 2+ on the left side. Pulmonary/Chest: Effort normal and breath sounds normal. No stridor. No respiratory distress. He has no wheezes. He has no rales. He exhibits no tenderness. Abdominal: Soft. Bowel sounds are normal. He exhibits no distension and no mass. There is no tenderness. There is no rebound and no guarding. Musculoskeletal:        Right ankle: He exhibits no swelling. Left ankle: He exhibits no swelling. Lymphadenopathy:     He has no cervical adenopathy. Neurological: Gait abnormal.   Skin: He is not diaphoretic. Psychiatric: He has a normal mood and affect. His behavior is normal. Judgment and thought content normal.          Diagnosis Orders   1. JULIOCESAR (acute kidney injury) ([address]   The condition is improving. The patient is tolerating the treatment and meds. Continue all these and check labs as appropriate      2. Traumatic rhabdomyolysis, initial encounter [facility])  The condition is improving. The patient is tolerating the treatment and meds. Continue all these and check labs as appropriate      3. Abrasion, elbow w/o infection  Condition stable continue the medications, treatments, will check labs as appropriate  Local care     4. Abrasion of knee, unspecified laterality, initial encounter Condition stable continue the medications, treatments, will check labs as appropriate       5.  Frequent falls The condition is deteriorating, will change treatment, investigate cause and make further recommendations when

## 2018-07-13 ENCOUNTER — HOSPITAL ENCOUNTER (INPATIENT)
Dept: INPATIENT UNIT | Age: 68
LOS: 7 days | Discharge: NURSING, REHAB OR LONG-TERM CARE | DRG: 031 | End: 2018-07-20
Attending: EMERGENCY MEDICINE | Admitting: FAMILY MEDICINE
Payer: MEDICARE

## 2018-07-13 DIAGNOSIS — G91.9 HYDROCEPHALUS (HCC): Primary | ICD-10-CM

## 2018-07-13 DIAGNOSIS — R27.0 ATAXIA: ICD-10-CM

## 2018-07-13 DIAGNOSIS — R32 URINARY INCONTINENCE, UNSPECIFIED TYPE: ICD-10-CM

## 2018-07-13 PROBLEM — M54.9 BACK PAIN: Status: ACTIVE | Noted: 2018-07-13

## 2018-07-13 LAB
ANION GAP SERPL CALCULATED.3IONS-SCNC: 15 MMOL/L (ref 3–16)
BASOPHILS ABSOLUTE: 0 K/UL (ref 0–0.2)
BASOPHILS RELATIVE PERCENT: 0.3 %
BILIRUBIN URINE: NEGATIVE MG/DL
BLOOD, URINE: NEGATIVE
BUN BLDV-MCNC: 17 MG/DL (ref 7–20)
CALCIUM SERPL-MCNC: 10.2 MG/DL (ref 8.3–10.6)
CHLORIDE BLD-SCNC: 97 MMOL/L (ref 99–110)
CLARITY: NORMAL
CO2: 23 MMOL/L (ref 21–32)
COLOR: NORMAL
CREAT SERPL-MCNC: 1 MG/DL (ref 0.8–1.3)
EOSINOPHILS ABSOLUTE: 0.1 K/UL (ref 0–0.6)
EOSINOPHILS RELATIVE PERCENT: 2.1 %
GFR AFRICAN AMERICAN: >60
GFR NON-AFRICAN AMERICAN: >60
GLUCOSE BLD-MCNC: 91 MG/DL (ref 70–99)
GLUCOSE URINE: NEGATIVE MG/DL
HCT VFR BLD CALC: 44.6 % (ref 40.5–52.5)
HEMOGLOBIN: 15 G/DL (ref 13.5–17.5)
INR BLD: 1.07 (ref 0.86–1.14)
KETONES, URINE: NEGATIVE MG/DL
LEUKOCYTE ESTERASE, URINE: NEGATIVE
LYMPHOCYTES ABSOLUTE: 0.6 K/UL (ref 1–5.1)
LYMPHOCYTES RELATIVE PERCENT: 10.5 %
MCH RBC QN AUTO: 29.9 PG (ref 26–34)
MCHC RBC AUTO-ENTMCNC: 33.7 G/DL (ref 31–36)
MCV RBC AUTO: 88.7 FL (ref 80–100)
MICROSCOPIC EXAMINATION: NORMAL
MONOCYTES ABSOLUTE: 0.5 K/UL (ref 0–1.3)
MONOCYTES RELATIVE PERCENT: 9.1 %
NEUTROPHILS ABSOLUTE: 4.4 K/UL (ref 1.7–7.7)
NEUTROPHILS RELATIVE PERCENT: 78 %
NITRITE, URINE: NEGATIVE
PDW BLD-RTO: 14 % (ref 12.4–15.4)
PH UA: 7
PLATELET # BLD: 220 K/UL (ref 135–450)
PMV BLD AUTO: 6.2 FL (ref 5–10.5)
POTASSIUM REFLEX MAGNESIUM: 5.5 MMOL/L (ref 3.5–5.1)
PROTEIN UA: NEGATIVE MG/DL
PROTHROMBIN TIME: 12.2 SEC (ref 9.8–13)
RBC # BLD: 5.03 M/UL (ref 4.2–5.9)
SEDIMENTATION RATE, ERYTHROCYTE: 35 MM/HR (ref 0–20)
SODIUM BLD-SCNC: 135 MMOL/L (ref 136–145)
SPECIFIC GRAVITY UA: 1.01
UROBILINOGEN, URINE: 0.2 E.U./DL
WBC # BLD: 5.6 K/UL (ref 4–11)

## 2018-07-13 PROCEDURE — 80048 BASIC METABOLIC PNL TOTAL CA: CPT

## 2018-07-13 PROCEDURE — 84443 ASSAY THYROID STIM HORMONE: CPT

## 2018-07-13 PROCEDURE — 82607 VITAMIN B-12: CPT

## 2018-07-13 PROCEDURE — 85025 COMPLETE CBC W/AUTO DIFF WBC: CPT

## 2018-07-13 PROCEDURE — 99285 EMERGENCY DEPT VISIT HI MDM: CPT

## 2018-07-13 PROCEDURE — 70450 CT HEAD/BRAIN W/O DYE: CPT

## 2018-07-13 PROCEDURE — 85610 PROTHROMBIN TIME: CPT

## 2018-07-13 PROCEDURE — 36592 COLLECT BLOOD FROM PICC: CPT

## 2018-07-13 PROCEDURE — 36591 DRAW BLOOD OFF VENOUS DEVICE: CPT

## 2018-07-13 PROCEDURE — 85652 RBC SED RATE AUTOMATED: CPT

## 2018-07-13 PROCEDURE — 36415 COLL VENOUS BLD VENIPUNCTURE: CPT

## 2018-07-13 PROCEDURE — 9990 CHARGE CONVERSION

## 2018-07-13 PROCEDURE — 81003 URINALYSIS AUTO W/O SCOPE: CPT

## 2018-07-13 RX ORDER — MONTELUKAST SODIUM 10 MG/1
10 TABLET ORAL NIGHTLY
Status: DISCONTINUED | OUTPATIENT
Start: 2018-07-13 | End: 2018-07-20 | Stop reason: HOSPADM

## 2018-07-13 RX ORDER — FINASTERIDE 5 MG/1
5 TABLET, FILM COATED ORAL DAILY
Status: DISCONTINUED | OUTPATIENT
Start: 2018-07-14 | End: 2018-07-20 | Stop reason: HOSPADM

## 2018-07-13 RX ORDER — BUPROPION HYDROCHLORIDE 150 MG/1
150 TABLET ORAL EVERY MORNING
Status: DISCONTINUED | OUTPATIENT
Start: 2018-07-14 | End: 2018-07-20 | Stop reason: HOSPADM

## 2018-07-13 RX ORDER — ASCORBIC ACID 500 MG
1000 TABLET ORAL DAILY
Status: DISCONTINUED | OUTPATIENT
Start: 2018-07-14 | End: 2018-07-20 | Stop reason: HOSPADM

## 2018-07-13 RX ORDER — LOSARTAN POTASSIUM 25 MG/1
50 TABLET ORAL DAILY
Status: DISCONTINUED | OUTPATIENT
Start: 2018-07-14 | End: 2018-07-15

## 2018-07-13 RX ORDER — DIMENHYDRINATE 50 MG
1000 TABLET ORAL DAILY
Status: DISCONTINUED | OUTPATIENT
Start: 2018-07-13 | End: 2018-07-13 | Stop reason: CLARIF

## 2018-07-13 RX ORDER — CHLORAL HYDRATE 500 MG
1000 CAPSULE ORAL DAILY
Status: DISCONTINUED | OUTPATIENT
Start: 2018-07-13 | End: 2018-07-13 | Stop reason: CLARIF

## 2018-07-13 RX ORDER — SODIUM CHLORIDE 0.9 % (FLUSH) 0.9 %
10 SYRINGE (ML) INJECTION PRN
Status: DISCONTINUED | OUTPATIENT
Start: 2018-07-13 | End: 2018-07-18 | Stop reason: SDUPTHER

## 2018-07-13 RX ORDER — CETIRIZINE HYDROCHLORIDE 10 MG/1
10 TABLET ORAL DAILY
Status: DISCONTINUED | OUTPATIENT
Start: 2018-07-14 | End: 2018-07-20 | Stop reason: HOSPADM

## 2018-07-13 RX ORDER — GABAPENTIN 100 MG/1
100 CAPSULE ORAL 3 TIMES DAILY
Status: DISCONTINUED | OUTPATIENT
Start: 2018-07-13 | End: 2018-07-20 | Stop reason: HOSPADM

## 2018-07-13 RX ORDER — SODIUM CHLORIDE 0.9 % (FLUSH) 0.9 %
10 SYRINGE (ML) INJECTION EVERY 12 HOURS SCHEDULED
Status: DISCONTINUED | OUTPATIENT
Start: 2018-07-13 | End: 2018-07-18 | Stop reason: SDUPTHER

## 2018-07-13 RX ORDER — ONDANSETRON 2 MG/ML
4 INJECTION INTRAMUSCULAR; INTRAVENOUS EVERY 6 HOURS PRN
Status: DISCONTINUED | OUTPATIENT
Start: 2018-07-13 | End: 2018-07-18 | Stop reason: SDUPTHER

## 2018-07-13 RX ORDER — MULTIVITAMIN WITH FOLIC ACID 400 MCG
1 TABLET ORAL DAILY
Status: DISCONTINUED | OUTPATIENT
Start: 2018-07-14 | End: 2018-07-20 | Stop reason: HOSPADM

## 2018-07-13 RX ORDER — TAMSULOSIN HYDROCHLORIDE 0.4 MG/1
0.8 CAPSULE ORAL DAILY
Status: DISCONTINUED | OUTPATIENT
Start: 2018-07-14 | End: 2018-07-20 | Stop reason: HOSPADM

## 2018-07-13 RX ORDER — ACETAMINOPHEN 325 MG/1
650 TABLET ORAL EVERY 4 HOURS PRN
Status: DISCONTINUED | OUTPATIENT
Start: 2018-07-13 | End: 2018-07-15

## 2018-07-13 RX ORDER — TERBINAFINE HYDROCHLORIDE 250 MG/1
250 TABLET ORAL DAILY
Status: DISCONTINUED | OUTPATIENT
Start: 2018-07-14 | End: 2018-07-20 | Stop reason: HOSPADM

## 2018-07-13 RX ORDER — ACETAMINOPHEN 160 MG/5ML
650 SOLUTION ORAL EVERY 6 HOURS PRN
Status: DISCONTINUED | OUTPATIENT
Start: 2018-07-13 | End: 2018-07-13

## 2018-07-13 RX ADMIN — Medication 10 ML: at 23:10

## 2018-07-13 RX ADMIN — ACETAMINOPHEN 650 MG: 325 TABLET ORAL at 22:57

## 2018-07-13 RX ADMIN — ENOXAPARIN SODIUM 40 MG: 40 INJECTION SUBCUTANEOUS at 22:12

## 2018-07-13 RX ADMIN — GABAPENTIN 100 MG: 100 CAPSULE ORAL at 22:12

## 2018-07-13 RX ADMIN — MONTELUKAST SODIUM 10 MG: 10 TABLET, FILM COATED ORAL at 22:12

## 2018-07-13 ASSESSMENT — PAIN DESCRIPTION - PAIN TYPE: TYPE: CHRONIC PAIN

## 2018-07-13 ASSESSMENT — ENCOUNTER SYMPTOMS
SHORTNESS OF BREATH: 0
GASTROINTESTINAL NEGATIVE: 1
DIARRHEA: 0
COUGH: 0
EYES NEGATIVE: 1
RESPIRATORY NEGATIVE: 1
ALLERGIC/IMMUNOLOGIC NEGATIVE: 1
CHEST TIGHTNESS: 0
CONSTIPATION: 0
NAUSEA: 0
ABDOMINAL PAIN: 0
VOMITING: 0

## 2018-07-13 ASSESSMENT — PAIN DESCRIPTION - DESCRIPTORS: DESCRIPTORS: ACHING

## 2018-07-13 ASSESSMENT — PAIN DESCRIPTION - LOCATION: LOCATION: BACK

## 2018-07-13 ASSESSMENT — PAIN SCALES - GENERAL
PAINLEVEL_OUTOF10: 1
PAINLEVEL_OUTOF10: 5

## 2018-07-13 ASSESSMENT — PAIN DESCRIPTION - FREQUENCY: FREQUENCY: CONTINUOUS

## 2018-07-13 ASSESSMENT — PAIN DESCRIPTION - ORIENTATION: ORIENTATION: LOWER

## 2018-07-13 NOTE — H&P
acute medical process is resolved       Page Quigley MD    Thank you Annabelle Martínez MD for the opportunity to be involved in this patient's care.  If you have any questions or concerns please feel free to contact me at [phone] (pager)

## 2018-07-13 NOTE — ED NOTES
Pt  Incontinent of urine pt was changed and dried. Skin intact. Call light in reach will continue to monitor.       James John RN  07/13/18 0876

## 2018-07-13 NOTE — ED TRIAGE NOTES
Pt to ED with c/o issues with chronic back pain. sts that he is usually able to get around with a cane but today sts increased weakness, has to use walker this morning. Also sts loss of bladder control. Has been incontinent of urine because he is unable to hold it.

## 2018-07-13 NOTE — PAYOR INFORMATION
Patient Kan Burgess:  [de-identified]  Primary AUTH/CERT:    [address] Name:   [address] [place] [address]  Primary Insurance Plan Name:  MEDICARE INPT/OUTPT  Primary Insurance Group Number:    Primary Insurance Plan Type: [facility]  Primary Insurance Policy Number:  [id number]    Secondary AUTH/CERT:    [address] [facility] Name:   Segun Bowen  Secondary Insurance Plan Name:   M/CARE COMP  Secondary Insurance Group Number:    Secondary Insurance Plan Type: S  Secondary Insurance Policy Number:  [id number]

## 2018-07-13 NOTE — ED NOTES
Pt given water. Pt and family updated on POC. Will continue to monitor.       Lo Bob RN  07/13/18 0161

## 2018-07-13 NOTE — ED PROVIDER NOTES
CREATININE 1.0 0.8 - 1.3 mg/dL    GFR Non-African American >60 >60    GFR African American >60 >60    Calcium 10.2 8.3 - 10.6 mg/dL   POC URINE with Microscopic   Result Value Ref Range    Color, UA Not Entered Straw/Yellow    Clarity, UA Not Entered Clear    Glucose, Ur Negative Negative mg/dL    Bilirubin Urine Negative Negative mg/dL    Ketones, Urine Negative Negative mg/dL    Specific Gravity, UA 1.010 1.005 - 1.030    Blood, Urine Negative Negative    pH, UA 7.0 5.0 - 8.0    Protein, UA Negative Negative mg/dL    Urobilinogen, Urine 0.2 <2.0 E.U./dL    Nitrite, Urine Negative Negative    Leukocyte Esterase, Urine Negative Negative    Microscopic Examination SEE BELOW          RECENT VITALS:  BP: (!) 139/94, Temp: 98.5 °F (36.9 °C), Pulse: 113, Resp: 18, SpO2: 97 %     Procedures       ED Course     Nursing Notes, Past Medical Hx, Past Surgical Hx, Social Hx, Allergies, and Family Hx were reviewed. The patient was given the following medications:  No orders of the defined types were placed in this encounter. CONSULTS:  Santiago Santiago TO HOSPITALIST  IP CONSULT TO [facility] / ASSESSMENT / Guy Donis. is a 80 y.o. male who presents with some subacute symptoms of memory difficulties and ataxia, much worse today, with notable urinary urge incontinence. It appears that the patient has had a spinal workup, although he does not have any decreased sensation or alen weakness in his lower extremities, no saddle anesthesia, and essentially no back pain. My concern is less for a spinal etiology of his symptoms then for a brain etiology, particularly as the patient has a fairly classic comminution of symptoms for normal pressure hydrocephalus. His head CT does show increasing prominence of the lateral ventricles, out of proportion for age, and together with his symptoms this is concerning for normal pressure hydrocephalus.   His workup in the emergency department is otherwise
16

## 2018-07-14 ENCOUNTER — APPOINTMENT (OUTPATIENT)
Dept: MRI IMAGING | Age: 68
DRG: 031 | End: 2018-07-14
Payer: MEDICARE

## 2018-07-14 LAB
A/G RATIO: 1.3 (ref 1.1–2.2)
ALBUMIN SERPL-MCNC: 4.5 G/DL (ref 3.4–5)
ALP BLD-CCNC: 71 U/L (ref 40–129)
ALT SERPL-CCNC: 59 U/L (ref 10–40)
ANION GAP SERPL CALCULATED.3IONS-SCNC: 17 MMOL/L (ref 3–16)
AST SERPL-CCNC: 43 U/L (ref 15–37)
BASOPHILS ABSOLUTE: 0 K/UL (ref 0–0.2)
BASOPHILS RELATIVE PERCENT: 0.3 %
BILIRUB SERPL-MCNC: 0.8 MG/DL (ref 0–1)
BILIRUBIN DIRECT: <0.2 MG/DL (ref 0–0.3)
BILIRUBIN, INDIRECT: ABNORMAL MG/DL (ref 0–1)
BUN BLDV-MCNC: 16 MG/DL (ref 7–20)
CALCIUM SERPL-MCNC: 9.9 MG/DL (ref 8.3–10.6)
CHLORIDE BLD-SCNC: 94 MMOL/L (ref 99–110)
CO2: 20 MMOL/L (ref 21–32)
CREAT SERPL-MCNC: 1 MG/DL (ref 0.8–1.3)
EOSINOPHILS ABSOLUTE: 0 K/UL (ref 0–0.6)
EOSINOPHILS RELATIVE PERCENT: 0.1 %
GFR AFRICAN AMERICAN: >60
GFR NON-AFRICAN AMERICAN: >60
GLOBULIN: 3.6 G/DL
GLUCOSE BLD-MCNC: 128 MG/DL (ref 70–99)
HCT VFR BLD CALC: 42.8 % (ref 40.5–52.5)
HEMOGLOBIN: 14.8 G/DL (ref 13.5–17.5)
INR BLD: 1.22 (ref 0.86–1.14)
LYMPHOCYTES ABSOLUTE: 0.5 K/UL (ref 1–5.1)
LYMPHOCYTES RELATIVE PERCENT: 5.7 %
MAGNESIUM: 1.8 MG/DL (ref 1.8–2.4)
MCH RBC QN AUTO: 29.6 PG (ref 26–34)
MCHC RBC AUTO-ENTMCNC: 34.4 G/DL (ref 31–36)
MCV RBC AUTO: 86 FL (ref 80–100)
MONOCYTES ABSOLUTE: 0.8 K/UL (ref 0–1.3)
MONOCYTES RELATIVE PERCENT: 8.7 %
NEUTROPHILS ABSOLUTE: 7.6 K/UL (ref 1.7–7.7)
NEUTROPHILS RELATIVE PERCENT: 85.2 %
PDW BLD-RTO: 14.5 % (ref 12.4–15.4)
PLATELET # BLD: 202 K/UL (ref 135–450)
PMV BLD AUTO: 6.5 FL (ref 5–10.5)
POTASSIUM REFLEX MAGNESIUM: 4.1 MMOL/L (ref 3.5–5.1)
PROTHROMBIN TIME: 13.9 SEC (ref 9.8–13)
RBC # BLD: 4.98 M/UL (ref 4.2–5.9)
SODIUM BLD-SCNC: 131 MMOL/L (ref 136–145)
TOTAL PROTEIN: 8.1 G/DL (ref 6.4–8.2)
TSH REFLEX: 2.69 UIU/ML (ref 0.27–4.2)
WBC # BLD: 8.9 K/UL (ref 4–11)

## 2018-07-14 PROCEDURE — 97530 THERAPEUTIC ACTIVITIES: CPT

## 2018-07-14 PROCEDURE — 97163 PT EVAL HIGH COMPLEX 45 MIN: CPT

## 2018-07-14 PROCEDURE — G8997 SWALLOW GOAL STATUS: HCPCS

## 2018-07-14 PROCEDURE — 97116 GAIT TRAINING THERAPY: CPT

## 2018-07-14 PROCEDURE — 6360000002 HC RX W HCPCS: Performed by: INTERNAL MEDICINE

## 2018-07-14 PROCEDURE — G8996 SWALLOW CURRENT STATUS: HCPCS

## 2018-07-14 PROCEDURE — 6370000000 HC RX 637 (ALT 250 FOR IP): Performed by: INTERNAL MEDICINE

## 2018-07-14 PROCEDURE — 94664 DEMO&/EVAL PT USE INHALER: CPT

## 2018-07-14 PROCEDURE — 70551 MRI BRAIN STEM W/O DYE: CPT

## 2018-07-14 PROCEDURE — 94761 N-INVAS EAR/PLS OXIMETRY MLT: CPT

## 2018-07-14 PROCEDURE — 85610 PROTHROMBIN TIME: CPT

## 2018-07-14 PROCEDURE — 85025 COMPLETE CBC W/AUTO DIFF WBC: CPT

## 2018-07-14 PROCEDURE — 2580000003 HC RX 258: Performed by: INTERNAL MEDICINE

## 2018-07-14 PROCEDURE — 92526 ORAL FUNCTION THERAPY: CPT

## 2018-07-14 PROCEDURE — 80053 COMPREHEN METABOLIC PANEL: CPT

## 2018-07-14 PROCEDURE — 9990 CHARGE CONVERSION

## 2018-07-14 PROCEDURE — 94660 CPAP INITIATION&MGMT: CPT

## 2018-07-14 PROCEDURE — 92610 EVALUATE SWALLOWING FUNCTION: CPT

## 2018-07-14 PROCEDURE — 1200000000 HC SEMI PRIVATE

## 2018-07-14 PROCEDURE — 83735 ASSAY OF MAGNESIUM: CPT

## 2018-07-14 PROCEDURE — 36415 COLL VENOUS BLD VENIPUNCTURE: CPT

## 2018-07-14 RX ADMIN — TERBINAFINE 250 MG: 250 TABLET ORAL at 11:46

## 2018-07-14 RX ADMIN — OXYCODONE HYDROCHLORIDE AND ACETAMINOPHEN 1000 MG: 500 TABLET ORAL at 08:53

## 2018-07-14 RX ADMIN — THERA TABS 1 TABLET: TAB at 08:53

## 2018-07-14 RX ADMIN — GABAPENTIN 100 MG: 100 CAPSULE ORAL at 21:00

## 2018-07-14 RX ADMIN — TAMSULOSIN HYDROCHLORIDE 0.8 MG: 0.4 CAPSULE ORAL at 08:53

## 2018-07-14 RX ADMIN — BUPROPION HYDROCHLORIDE 150 MG: 150 TABLET, FILM COATED, EXTENDED RELEASE ORAL at 08:53

## 2018-07-14 RX ADMIN — CETIRIZINE HYDROCHLORIDE 10 MG: 10 TABLET, FILM COATED ORAL at 08:54

## 2018-07-14 RX ADMIN — Medication 10 ML: at 08:55

## 2018-07-14 RX ADMIN — ACETAMINOPHEN 650 MG: 325 TABLET ORAL at 08:53

## 2018-07-14 RX ADMIN — ACETAMINOPHEN 650 MG: 325 TABLET ORAL at 18:45

## 2018-07-14 RX ADMIN — LOSARTAN POTASSIUM 50 MG: 25 TABLET ORAL at 08:53

## 2018-07-14 RX ADMIN — CHOLECALCIFEROL TAB 25 MCG (1000 UNIT) 2000 UNITS: 25 TAB at 08:54

## 2018-07-14 RX ADMIN — MONTELUKAST SODIUM 10 MG: 10 TABLET, FILM COATED ORAL at 21:00

## 2018-07-14 RX ADMIN — ACETAMINOPHEN 650 MG: 325 TABLET ORAL at 04:33

## 2018-07-14 RX ADMIN — ACETAMINOPHEN 650 MG: 325 TABLET ORAL at 14:15

## 2018-07-14 RX ADMIN — ENOXAPARIN SODIUM 40 MG: 40 INJECTION SUBCUTANEOUS at 21:00

## 2018-07-14 RX ADMIN — FINASTERIDE 5 MG: 5 TABLET, FILM COATED ORAL at 08:53

## 2018-07-14 RX ADMIN — GABAPENTIN 100 MG: 100 CAPSULE ORAL at 08:53

## 2018-07-14 RX ADMIN — GABAPENTIN 100 MG: 100 CAPSULE ORAL at 14:15

## 2018-07-14 RX ADMIN — Medication 10 ML: at 21:00

## 2018-07-14 ASSESSMENT — PAIN SCALES - GENERAL
PAINLEVEL_OUTOF10: 0
PAINLEVEL_OUTOF10: 4

## 2018-07-14 NOTE — PROGRESS NOTES
with rails (pt couldn't recall if there are rails or not by these stairs)  Entrance Stairs - Number of Steps: 3 NILE  Bathroom Shower/Tub: Tub/Shower unit  Bathroom Toilet: Standard  Bathroom Equipment: Shower chair (owns shower chair in the past for other sx's, could use again if needed)  Home Equipment: Cane, Rolling walker, Crutches  Receives Help From: Home health (began HHPT 2 weeks ago priot to admission)  ADL Assistance: [address]: Independent  Homemaking Responsibilities: No  Ambulation Assistance: Independent  Transfer Assistance: Independent  Active : Yes  Mode of Transportation: Car  Occupation: Retired  Type of occupation: Maintenance department worker   Leisure & Hobbies: fishing, camping   Additional Comments: pt struggling on several occassions with memory, wife arrived midway through subjective and aided in answers. PT came to home 2x in the past 2 weeks and recommended us of cane in home and walker for community amb. Pt reporting 2 falls in the past 6 months. One on 7/2 and 3 weeks prior to those with several near falls since. Objective          AROM RLE (degrees)  RLE General AROM: adequate for functional mobility   AROM LLE (degrees)  LLE General AROM: adequate for functional mobility   Strength RLE  Strength RLE: WFL  Comment: Globally 5/5  Strength LLE  Strength LLE: WFL  Comment: Globally 5/5  Sensation  Overall Sensation Status: Impaired (pt reporting numbness in feet )  Bed mobility  Comment: pt found up in chair upon arrival and left up in chair at end of session   Transfers  Sit to Stand: Moderate Assistance (to RW x3 trials)  Stand to sit: Moderate Assistance (to chair x 3 trials)  Comment: pt exhibiting 5/5 LE strength globally but difficulty utilizing it for functional mobility. sit<--> stand awkward, unsteady and effortful. Upon standing pt tending to lean right to the point of requiring min-mod A to prevent LOB.  This slightly improved with VC's and

## 2018-07-14 NOTE — PROGRESS NOTES
Neuro status remained unchanged throughout the day. Patient denies pain. Afebrile throughout the day, still tachycardic. Patient spent part of day up in the chair, now back in bed. Family at bedside. Patient has good appetite, ate about 50% of all meals. Will introduce incentive spirometer. Patient has no further needs at this time. Will continue to monitor and reassess.

## 2018-07-14 NOTE — CONSULTS
NEUROLOGY CONSULT  NOTE :    Chief Complaint :  Evaluate for NPH     History of Present Illness:  Bhakti Salazar is a 80 y.o. male who is being seen in consultation at the request of Dr Elbert Valeznuela MD for evaluation of abnormal CT head concerning for normal pressure hydrocephalus. History was obtained from his wife and children and according to them, he has short term memory problems for the past 2 years with occasional tremors in the upper extremity with balance issues and woke up yesterday morning with urinary incontinence and hesitancy. Denies any headaches, vision changes, weakness, tingling, numbness, dizziness, strokes or mini strokes in the past. Admitted  Recently after a fall with rhabdomyolysis. Medical History:  Past Medical History:   Diagnosis Date    Allergic rhinitis     Erectile dysfunction     Hyperlipidemia     Hypertension     Screening for abdominal aortic aneurysm (AAA) [facility] 03/02/2018    [facility]    Unspecified sleep apnea      History reviewed. No pertinent surgical history. Prescriptions Prior to Admission: gabapentin (NEURONTIN) 100 MG capsule, Take 1 capsule by mouth 3 times daily for 30 days. .  finasteride (PROSCAR) 5 MG tablet, Take 1 tablet by mouth daily  telmisartan (MICARDIS) 40 MG tablet, Take 1 tablet by mouth daily  tamsulosin (FLOMAX) 0.4 MG capsule, Take 2 capsules by mouth daily  montelukast (SINGULAIR) 10 MG tablet, Take 1 tablet by mouth nightly  buPROPion (WELLBUTRIN XL) 150 MG extended release tablet, Take 1 tablet by mouth every morning  terbinafine (LAMISIL) 250 MG tablet, Take 1 tablet by mouth daily  cetirizine (ZYRTEC) 10 MG tablet, Take 1 tablet by mouth daily  Omega-3 Fatty Acids (FISH OIL) 1000 MG CAPS, Take 1,000 mg by mouth daily. Flaxseed, Linseed, (FLAX SEED OIL) 1000 MG CAPS, Take 1,000 mg by mouth daily. Cholecalciferol (VITAMIN D) 2000 UNITS CAPS capsule, Take 1 capsule by mouth daily.   Ascorbic Acid

## 2018-07-14 NOTE — PROGRESS NOTES
Speech Language Pathology  Facility/Department: [facility] 5T ORTHO/NEURO   BEDSIDE SWALLOW EVALUATION / TREATMENT    NAME: Maira Esquivel : 1950  MRN: [id number]    ADMISSION DATE: 2018  ADMITTING DIAGNOSIS: has Wrist arthritis; Sprain of lumbar region; Hypertrophy of prostate without urinary obstruction and other lower urinary tract symptoms (LUTS); Cardiac dysrhythmia; Onychomycosis; Obstructive sleep apnea; Personal history of other diseases of digestive system; Erectile dysfunction; Vitamin D deficiency; Essential hypertension; Mixed hyperlipidemia; Persistent depressive disorder; Dysthymia; Non-seasonal allergic rhinitis due to pollen; Pulmonary nodule; JULIOCESAR (acute kidney injury) ([facility] [address] Traumatic rhabdomyolysis ([facility] [address] Acute renal failure due to rhabdomyolysis ([facility] [address] Frequent falls; Functional gait abnormality; and Back pain on his problem list.    ONSET DATE: 18    Recent Chest Xray: (18)  1. No acute disease. Date of Eval: 2018  Evaluating Therapist: Lucien Pineda    Current Diet level:  Current Diet : Regular  Current Liquid Diet : Thin    Primary Complaint  Patient Complaint: none    Pain:  Pain Assessment  Patient Currently in Pain: Denies  Pain Assessment: 0-10  Pain Level: 0  Pain Type: Chronic pain  Pain Location: Back  Pain Orientation: Lower  Pain Descriptors: Aching  Pain Frequency: Continuous    Reason for Referral  Maira Esquivel was admitted with alance issues, urinary difficulties, recent memory loss, suspected NPH. He was referred for a bedside swallow evaluation to assess the efficiency of his swallow function, identify signs and symptoms of aspiration and make recommendations regarding safe dietary consistencies, effective compensatory strategies, and safe eating environment. Impression  Dysphagia Diagnosis: Moderate pharyngeal stage dysphagia; Suspected needs further assessment  Dysphagia Impression : Oral phase appears adequate although .   Moderate pharyngeal stage dysphagia characterized by clinical s/s aspiration with thin liquids; however No clinical s/s aspiration noted with nectar conistency liquids. Clinical s/s aspiration also reported by RN. Given family reports of ongoing swallowing deficits, further clinical assessment is indicated, with instrumental assessment if indicated. Dysphagia Outcome Severity Scale: Level 4: Mild moderate dysphagia- Intermittent supervision/cueing. One - two diet consistencies restricted     Treatment Plan  Requires SLP Intervention: Yes  Duration/Frequency of Treatment: 3-5x/week  D/C Recommendations: To be determined    Recommended Diet and Intervention  Diet Solids Recommendation: Regular  Liquid Consistency Recommendation: Nectar  Recommended Form of Meds: PO  Therapeutic Interventions: Patient/Family education;Pharyngeal exercises; Laryngeal exercises;Diet tolerance monitoring    Compensatory Swallowing Strategies  Compensatory Swallowing Strategies:   Upright as possible for all oral intake  Small bites/sips  Eat/Feed slowly  Alternate solids and liquids (family reports that pt has likely been implementing this at home)    Treatment/Goals  Patient Goals:  Pt treatment goal: pt unable to state  Pt d/c goal: pt unable to state    Dysphagia Goals:   1. The patient will tolerate recommended diet with no clinical s/s aspiration. 2.  The patient will tolerate instrumental swallowing procedure, if indicated. 3.  The patient/caregiver will demonstrate understanding of recommendations for improved swallowing safety. 7/14/18:  SLP educated pt and son re: role of SLP, assessment rationale and results, anatomy and physiology of swallow, s/s aspiration, risks of aspiration, recommendation for nectar consistency liquids at this time, rationale and process for thickened liquids, recommendation for MBS procedure. Son verbalized comprehension; however pt unable to indicate comprehension.   Continue goal.       General  Chart Reviewed: Yes  Behavior/Cognition: Cooperative;Confused; Requires cueing  Temperature Spikes Noted: Yes (102.7, per flowsheet)  Respiratory Status: Room air  Breath Sounds: Clear (per flowsheet)  O2 Device: None (Room air)  Communication Observation: Functional  Follows Directions: Simple  Dentition: Adequate  Patient Positioning: Upright in chair  Baseline Vocal Quality: Normal  Volitional Cough: Strong  Prior Dysphagia History: family reports some dificulty swallow for a few years  Consistencies Administered: Ice Chips; Thin - cup;Puree; Soft solid; Nectar - cup    Vision/Hearing  Vision  Vision: Impaired  Vision Exceptions: Wears glasses at all times  Hearing  Hearing: Exceptions to [facility]  Hearing Exceptions: Bilateral hearing aid    Oral Motor Deficits  Oral/Motor  Oral Motor: Within functional limits    Oral Phase Dysfunction  Oral Phase  Oral Phase: WFL  Oral Phase  Oral Phase - Comment: . Indicators of Pharyngeal Phase Dysfunction   Pharyngeal Phase  Pharyngeal Phase: Exceptions  Indicators of Pharyngeal Phase Dysfunction  Cough - Immediate: Thin - cup  Pharyngeal Phase   Pharyngeal: Clinical s/s aspiration with thin liquids at this time. Clinical s/s aspiration also reported by RN. No clinical s/s aspiration with nectar conistency liquids. Prognosis  Prognosis  Prognosis for safe diet advancement: fair  Individuals consulted  Consulted and agree with results and recommendations: Patient; Family member  Family member consulted: Mercy Rodriguez    Education  Patient Education: SLP educated pt and son re: role of SLP, assessment rationale and results. Patient Education Response: Unable to indicate comprehension; Needs reinforcement    G-Code  SLP G-Codes  Functional Assessment Tool Used: NOMS  Score: 5  Functional Limitations: Swallowing  Swallow Current Status (): At least 20 percent but less than 40 percent impaired, limited or restricted  Swallow Goal Status ():  At least 1 percent but less than 20 percent

## 2018-07-14 NOTE — PROGRESS NOTES
On call neurology group was paged and ordered MRI, but later learned that pt can't have MRI because of a metal to his head. Neurologist was made aware and will see the pt in am. This nurse is trying to reach the neurologist on call to be aware of pt having temperature overnight. Awaiting a call back for now. Pt has more incontinence with bladder this shift. Pt is kept dry and ON special mattress and long bed is placed.

## 2018-07-15 ENCOUNTER — APPOINTMENT (OUTPATIENT)
Dept: GENERAL RADIOLOGY | Age: 68
DRG: 031 | End: 2018-07-15
Payer: MEDICARE

## 2018-07-15 LAB
ANION GAP SERPL CALCULATED.3IONS-SCNC: 14 MMOL/L (ref 3–16)
BACTERIA: ABNORMAL /HPF
BASOPHILS ABSOLUTE: 0 K/UL (ref 0–0.2)
BASOPHILS RELATIVE PERCENT: 0.3 %
BILIRUBIN URINE: ABNORMAL
BLOOD, URINE: NEGATIVE
BUN BLDV-MCNC: 37 MG/DL (ref 7–20)
CALCIUM SERPL-MCNC: 9.4 MG/DL (ref 8.3–10.6)
CASTS: ABNORMAL /LPF
CHLORIDE BLD-SCNC: 95 MMOL/L (ref 99–110)
CHLORIDE URINE RANDOM: <20 MMOL/L
CLARITY: CLEAR
CO2: 22 MMOL/L (ref 21–32)
COLOR: YELLOW
COMMENT UA: ABNORMAL
CREAT SERPL-MCNC: 2 MG/DL (ref 0.8–1.3)
CREATININE URINE: 162.5 MG/DL (ref 39–259)
EOSINOPHILS ABSOLUTE: 0 K/UL (ref 0–0.6)
EOSINOPHILS RELATIVE PERCENT: 0.2 %
EPITHELIAL CELLS, UA: ABNORMAL /HPF
GFR AFRICAN AMERICAN: 40
GFR NON-AFRICAN AMERICAN: 33
GLUCOSE BLD-MCNC: 137 MG/DL (ref 70–99)
GLUCOSE URINE: NEGATIVE MG/DL
HCT VFR BLD CALC: 39.4 % (ref 40.5–52.5)
HEMOGLOBIN: 13.7 G/DL (ref 13.5–17.5)
KETONES, URINE: NEGATIVE MG/DL
LACTIC ACID: 2.2 MMOL/L (ref 0.4–2)
LEUKOCYTE ESTERASE, URINE: NEGATIVE
LYMPHOCYTES ABSOLUTE: 0.5 K/UL (ref 1–5.1)
LYMPHOCYTES RELATIVE PERCENT: 5.6 %
MCH RBC QN AUTO: 29.7 PG (ref 26–34)
MCHC RBC AUTO-ENTMCNC: 34.7 G/DL (ref 31–36)
MCV RBC AUTO: 85.6 FL (ref 80–100)
MICROSCOPIC EXAMINATION: YES
MONOCYTES ABSOLUTE: 0.8 K/UL (ref 0–1.3)
MONOCYTES RELATIVE PERCENT: 8.9 %
MUCUS: ABNORMAL /LPF
NEUTROPHILS ABSOLUTE: 7.5 K/UL (ref 1.7–7.7)
NEUTROPHILS RELATIVE PERCENT: 85 %
NITRITE, URINE: NEGATIVE
PDW BLD-RTO: 14.2 % (ref 12.4–15.4)
PH UA: 5.5
PLATELET # BLD: 189 K/UL (ref 135–450)
PMV BLD AUTO: 6.6 FL (ref 5–10.5)
POTASSIUM SERPL-SCNC: 4.2 MMOL/L (ref 3.5–5.1)
PROTEIN PROTEIN: 20.5 MG/DL
PROTEIN UA: ABNORMAL MG/DL
RBC # BLD: 4.61 M/UL (ref 4.2–5.9)
RBC UA: ABNORMAL /HPF (ref 0–2)
SODIUM BLD-SCNC: 131 MMOL/L (ref 136–145)
SODIUM URINE: <20 MMOL/L
SPECIFIC GRAVITY UA: >=1.03
URINE REFLEX TO CULTURE: YES
URINE TYPE: ABNORMAL
UROBILINOGEN, URINE: 0.2 E.U./DL
VITAMIN B-12: 499 PG/ML (ref 211–911)
WBC # BLD: 8.9 K/UL (ref 4–11)
WBC UA: ABNORMAL /HPF (ref 0–5)

## 2018-07-15 PROCEDURE — 92526 ORAL FUNCTION THERAPY: CPT

## 2018-07-15 PROCEDURE — 2580000003 HC RX 258: Performed by: INTERNAL MEDICINE

## 2018-07-15 PROCEDURE — G8979 MOBILITY GOAL STATUS: HCPCS

## 2018-07-15 PROCEDURE — 97530 THERAPEUTIC ACTIVITIES: CPT

## 2018-07-15 PROCEDURE — 6370000000 HC RX 637 (ALT 250 FOR IP): Performed by: STUDENT IN AN ORGANIZED HEALTH CARE EDUCATION/TRAINING PROGRAM

## 2018-07-15 PROCEDURE — 83605 ASSAY OF LACTIC ACID: CPT

## 2018-07-15 PROCEDURE — 97166 OT EVAL MOD COMPLEX 45 MIN: CPT

## 2018-07-15 PROCEDURE — 82570 ASSAY OF URINE CREATININE: CPT

## 2018-07-15 PROCEDURE — 81001 URINALYSIS AUTO W/SCOPE: CPT

## 2018-07-15 PROCEDURE — 87040 BLOOD CULTURE FOR BACTERIA: CPT

## 2018-07-15 PROCEDURE — 84156 ASSAY OF PROTEIN URINE: CPT

## 2018-07-15 PROCEDURE — 94761 N-INVAS EAR/PLS OXIMETRY MLT: CPT

## 2018-07-15 PROCEDURE — G8987 SELF CARE CURRENT STATUS: HCPCS

## 2018-07-15 PROCEDURE — 97535 SELF CARE MNGMENT TRAINING: CPT

## 2018-07-15 PROCEDURE — 84300 ASSAY OF URINE SODIUM: CPT

## 2018-07-15 PROCEDURE — 85025 COMPLETE CBC W/AUTO DIFF WBC: CPT

## 2018-07-15 PROCEDURE — 6360000002 HC RX W HCPCS: Performed by: INTERNAL MEDICINE

## 2018-07-15 PROCEDURE — 36415 COLL VENOUS BLD VENIPUNCTURE: CPT

## 2018-07-15 PROCEDURE — 82436 ASSAY OF URINE CHLORIDE: CPT

## 2018-07-15 PROCEDURE — G8988 SELF CARE GOAL STATUS: HCPCS

## 2018-07-15 PROCEDURE — 80048 BASIC METABOLIC PNL TOTAL CA: CPT

## 2018-07-15 PROCEDURE — 71046 X-RAY EXAM CHEST 2 VIEWS: CPT

## 2018-07-15 PROCEDURE — 6370000000 HC RX 637 (ALT 250 FOR IP): Performed by: INTERNAL MEDICINE

## 2018-07-15 PROCEDURE — 51798 US URINE CAPACITY MEASURE: CPT

## 2018-07-15 PROCEDURE — G8978 MOBILITY CURRENT STATUS: HCPCS

## 2018-07-15 PROCEDURE — 87086 URINE CULTURE/COLONY COUNT: CPT

## 2018-07-15 PROCEDURE — 97116 GAIT TRAINING THERAPY: CPT

## 2018-07-15 PROCEDURE — 94660 CPAP INITIATION&MGMT: CPT

## 2018-07-15 PROCEDURE — 1200000000 HC SEMI PRIVATE

## 2018-07-15 RX ORDER — 0.9 % SODIUM CHLORIDE 0.9 %
1000 INTRAVENOUS SOLUTION INTRAVENOUS ONCE
Status: COMPLETED | OUTPATIENT
Start: 2018-07-15 | End: 2018-07-15

## 2018-07-15 RX ORDER — ACETAMINOPHEN 325 MG/1
325 TABLET ORAL ONCE
Status: COMPLETED | OUTPATIENT
Start: 2018-07-15 | End: 2018-07-15

## 2018-07-15 RX ORDER — 0.9 % SODIUM CHLORIDE 0.9 %
500 INTRAVENOUS SOLUTION INTRAVENOUS ONCE
Status: COMPLETED | OUTPATIENT
Start: 2018-07-15 | End: 2018-07-15

## 2018-07-15 RX ORDER — SODIUM CHLORIDE 9 MG/ML
INJECTION, SOLUTION INTRAVENOUS CONTINUOUS
Status: DISCONTINUED | OUTPATIENT
Start: 2018-07-15 | End: 2018-07-17

## 2018-07-15 RX ORDER — ACETAMINOPHEN 500 MG
1000 TABLET ORAL EVERY 6 HOURS PRN
Status: DISCONTINUED | OUTPATIENT
Start: 2018-07-15 | End: 2018-07-20 | Stop reason: HOSPADM

## 2018-07-15 RX ADMIN — TAMSULOSIN HYDROCHLORIDE 0.8 MG: 0.4 CAPSULE ORAL at 08:44

## 2018-07-15 RX ADMIN — OXYCODONE HYDROCHLORIDE AND ACETAMINOPHEN 1000 MG: 500 TABLET ORAL at 08:44

## 2018-07-15 RX ADMIN — LOSARTAN POTASSIUM 50 MG: 25 TABLET ORAL at 08:45

## 2018-07-15 RX ADMIN — THERA TABS 1 TABLET: TAB at 08:45

## 2018-07-15 RX ADMIN — MONTELUKAST SODIUM 10 MG: 10 TABLET, FILM COATED ORAL at 22:12

## 2018-07-15 RX ADMIN — GABAPENTIN 100 MG: 100 CAPSULE ORAL at 22:12

## 2018-07-15 RX ADMIN — GABAPENTIN 100 MG: 100 CAPSULE ORAL at 14:19

## 2018-07-15 RX ADMIN — ACETAMINOPHEN 650 MG: 325 TABLET ORAL at 02:37

## 2018-07-15 RX ADMIN — FINASTERIDE 5 MG: 5 TABLET, FILM COATED ORAL at 08:45

## 2018-07-15 RX ADMIN — CETIRIZINE HYDROCHLORIDE 10 MG: 10 TABLET, FILM COATED ORAL at 08:45

## 2018-07-15 RX ADMIN — VANCOMYCIN HYDROCHLORIDE 1500 MG: 10 INJECTION, POWDER, LYOPHILIZED, FOR SOLUTION INTRAVENOUS at 14:18

## 2018-07-15 RX ADMIN — ACETAMINOPHEN 650 MG: 325 TABLET ORAL at 18:28

## 2018-07-15 RX ADMIN — GABAPENTIN 100 MG: 100 CAPSULE ORAL at 08:44

## 2018-07-15 RX ADMIN — SODIUM CHLORIDE: 9 INJECTION, SOLUTION INTRAVENOUS at 22:12

## 2018-07-15 RX ADMIN — PIPERACILLIN SODIUM AND TAZOBACTAM SODIUM 3.38 G: 3; .375 INJECTION, POWDER, LYOPHILIZED, FOR SOLUTION INTRAVENOUS at 16:42

## 2018-07-15 RX ADMIN — CHOLECALCIFEROL TAB 25 MCG (1000 UNIT) 2000 UNITS: 25 TAB at 08:44

## 2018-07-15 RX ADMIN — Medication 10 ML: at 08:45

## 2018-07-15 RX ADMIN — TERBINAFINE 250 MG: 250 TABLET ORAL at 12:25

## 2018-07-15 RX ADMIN — ACETAMINOPHEN 325 MG: 325 TABLET ORAL at 19:05

## 2018-07-15 RX ADMIN — SODIUM CHLORIDE 1000 ML: 9 INJECTION, SOLUTION INTRAVENOUS at 14:45

## 2018-07-15 RX ADMIN — SODIUM CHLORIDE: 9 INJECTION, SOLUTION INTRAVENOUS at 16:49

## 2018-07-15 RX ADMIN — ENOXAPARIN SODIUM 40 MG: 40 INJECTION SUBCUTANEOUS at 22:12

## 2018-07-15 RX ADMIN — SODIUM CHLORIDE 1000 ML: 9 INJECTION, SOLUTION INTRAVENOUS at 12:25

## 2018-07-15 RX ADMIN — BUPROPION HYDROCHLORIDE 150 MG: 150 TABLET, FILM COATED, EXTENDED RELEASE ORAL at 08:45

## 2018-07-15 ASSESSMENT — PAIN SCALES - GENERAL
PAINLEVEL_OUTOF10: 0
PAINLEVEL_OUTOF10: 2

## 2018-07-15 ASSESSMENT — PAIN DESCRIPTION - FREQUENCY: FREQUENCY: INTERMITTENT

## 2018-07-15 ASSESSMENT — PAIN DESCRIPTION - PAIN TYPE: TYPE: ACUTE PAIN

## 2018-07-15 ASSESSMENT — PAIN DESCRIPTION - ONSET: ONSET: AWAKENED FROM SLEEP

## 2018-07-15 ASSESSMENT — PAIN DESCRIPTION - PROGRESSION: CLINICAL_PROGRESSION: GRADUALLY WORSENING

## 2018-07-15 ASSESSMENT — PAIN DESCRIPTION - DESCRIPTORS: DESCRIPTORS: ACHING

## 2018-07-15 ASSESSMENT — PAIN DESCRIPTION - LOCATION: LOCATION: GENERALIZED

## 2018-07-15 NOTE — PLAN OF CARE
Problem: Falls - Risk of:  Goal: Will remain free from falls  Will remain free from falls   Outcome: Ongoing  Patient will remain free of falls throughout the duration of the shift. Bed locked in lowest position. Non skid socks on. Bed and chair alarm activated. Call light and belongings within reach. Patient calls out approprietly. ADAM stedy x2 for transfers. Will continue to monitor. Problem: Pain:  Goal: Control of acute pain  Control of acute pain   Outcome: Ongoing  Patient denies being in any pain, declines the need for any pain medication. Patient states he is stiff from not moving around as frequently as usual. Will continue to monitor and reassess. Problem: Mobility - Impaired:  Goal: Able to ambulate with minimal assistance  Able to ambulate with minimal assistance   Patient was up in the chair for most of the morning and wanted to get back in bed in the early afternoon. Patient's mobility/strength has increasingly improved throughout the day. Patient did work with PT today. ADAM stedy x2 for transfers at this time, stand at bedside to urinate. Goal will be to not need the ADAM stedy by tomorrow if strength continues to improve. Ataxia also appears to be gradually improving as well. Will continue to monitor. Problem: Nutrition Deficit - Risk of:  Goal: Ability to achieve adequate nutritional intake will improve  Ability to achieve adequate nutritional intake will improve   Outcome: Ongoing  Patient has good appetite. Patient eats % of all meals. No nausea or vomiting. Will continue to monitor. Problem: Swallowing - Impaired:  Goal: Able to swallow without choking  Able to swallow without choking   Outcome: Ongoing  Recommendations of SLP continue to be followed for nectar thick liquids. Patient's family is compliant with this when helping feed the patient. No signs of aspiration with recommended diet.  Will continue to monitor.        Problem: Urinary Elimination - Impaired:  Goal:

## 2018-07-15 NOTE — CONSULTS
Reflexes are intact and symmetrical bilaterally  Gait deferred    DATA:  Radiology Review:  MRI brain with HCP - I have personally reviewed and agree with report      1. No acute intracranial abnormality. No evidence of acute   infarction       2. Mild burden of scattered T2 hyperintense white matter disease   which are nonspecific, may be attributed to chronic microvascular   ischemia.       3. Mild disproportionate dilatation of the lateral/third   ventricles in comparison to the cerebral sulci which relate to   normal pressure hydrocephalus or central volume loss. Recommended   clinical correlation. No significant change since 10/5/2017. IMPRESSION/RECOMMENDATIONS:      65year old male with increasing confusion, gait instabulity and urinary incontinence    Concern for NPH on MRI     Recommend  1. High volume LP - if obvious improvement - consider shunt  2. If LP non-diagnostic - recommend LD trial  3. Recent fevers - medical management and work up as no treatment for NPH if infection  4.  Discussed with family

## 2018-07-15 NOTE — PROGRESS NOTES
Hospitalist Progress Note      PCP: Rogelio Rosenbaum MD    Date of Admission: 7/13/2018    Chief Complaint: Difficulty with balance    Hospital Course:   Patient having fever since yesterday  Denies any chest pain shortness of breath  No abdominal pain nausea vomiting  Denies any back pain  Complains of issues with balance however denies any lower extremity weakness  Was able to pass urine however was incontinent previously  No oxygen requirement  No diarrhea  Had a good bowel movement        Medications:  Reviewed      Exam:    BP 93/61   Pulse 102   Temp 99.1 °F (37.3 °C) (Oral)   Resp 18   Ht 6' 2\" (1.88 m)   Wt 245 lb (111.1 kg)   SpO2 96%   BMI 31.46 kg/m²     General appearance: No apparent distress, appears stated age and cooperative. HEENT: Pupils equal, round, and reactive to light. Conjunctivae/corneas clear. Neck: Supple, with full range of motion. No jugular venous distention. Trachea midline. Respiratory:  Normal respiratory effort. Clear to auscultation, bilaterally without RALES/WHEEZES/Rhonchi. Cardiovascular: Regular rate and rhythm with normal S1/S2 without MURMURS, rubs or gallops. Abdomen: Soft, non-tender, non-distended with normal bowel sounds. Musculoskeletal: No clubbing, cyanosis or EDEMA bilaterally. Full range of motion without deformity. Skin: Skin color, texture, turgor normal.  No rashes or lesions. Neurologic:  Neurovascularly intact without any focal sensory/motor deficits.  Cranial nerves: II-XII intact, grossly non-focal.  Power in both upper and lower extremities is 5/5, plantars are both downgoing, touch and pinprick are normal both upper and lower extremities    Psychiatric: Alert and oriented, thought content appropriate, normal insight  Capillary Refill: Brisk,< 3 seconds   Peripheral Pulses: +2 palpable, equal bilaterally       Labs:   Recent Labs      07/13/18   1214  07/14/18   0450  07/15/18   1110   WBC  5.6  8.9  8.9   HGB  15.0  14.8  13.7   HCT  44.6

## 2018-07-15 NOTE — PROGRESS NOTES
Speech Language Pathology  Late Entry  Attempted to see pt this am.  Spoke w/ pt and wife but then pt was taken from room. Will attempt to see pt later as schedule allows.   Electronically signed by:  Jennifer Young MA, CCC/SLP Lic #: [id number]  [facility] Pathologist  Pager #[phone]

## 2018-07-15 NOTE — PROGRESS NOTES
Patients mentation has progressively been improving throughout the day. Patient is becoming more alert and receptive to questions and commands and beginning to spontaneously interact with others. Speech is not as delayed as before, patient starting to make jokes which is apparently more like himself. No extremity weakness is noted however patient is still exhibiting some gait impairment, ADAM glenn is being used for tranfers. Patient incontinence is improving but still present. Becoming more aware of the need to void. Patient given 2 fluid bolus today and started at IV antibiotics per orders. Patient's last temp 99.2F. HR has been dropping below 100. BP still on softer side. No chills present, RR 20 or below. Patient had one large formed BM this morning and is tolerating diet well, eats % of every meal. SLP recommendations followed, family compliant as well. Bed alarm is on and call light is within reach. Will continue to monitor.

## 2018-07-15 NOTE — PROGRESS NOTES
Occupational Therapy   Occupational Therapy Initial Assessment/treatment  Date: 7/15/2018   Patient Name: Tessa Albert. MRN: [id number]     : 1950    Date of Service: 7/15/2018    Discharge Recommendations:    Tessa Albert. scored a 20/24 on the AM-PAC ADL Inpatient form. Current research shows that an AM-PAC score of 18 or greater is typically associated with a discharge to the patient's home setting. Based on the patients AM-PAC score and their current ADL deficits, it is recommended that the patient have 2-3 sessions per week of Occupational Therapy at d/c to increase the patients independence. OT Equipment Recommendations  Equipment Needed: No  Other: defer      Treatment Diagnosis: Impaired ADL and functional mobility      Restrictions  Position Activity Restriction  Other position/activity restrictions: Up as tolerated    Subjective   General  Chart Reviewed: Yes  Patient assessed for rehabilitation services?: Yes  Additional Pertinent Hx: Pt admitted 18 with LE weakness, decreased balance and difficulty urinating. Head CT=No acute intracranial abnormality. MRI Brain= No acute intracranial abnormality. No evidence of acute infarction. PMH includes: HTN, sleep apnea  Family / Caregiver Present: Yes  Referring Practitioner: Arthur  Diagnosis: Back pain  Subjective  Subjective: Pt in bed upon entry. Pt agreeable to OOB activity, but wanted to go back to bed at end of session.   Pain Assessment  Patient Currently in Pain: Denies  Social/Functional History  Social/Functional History  Lives With: Spouse  Type of Home: House  Home Layout: One level, Laundry in basement, Able to Live on Main level with bedroom/bathroom (with basement)  Home Access: Stairs to enter with rails (pt couldn't recall if there are rails or not by these stairs)  Entrance Stairs - Number of Steps: 3 NILE  Bathroom Shower/Tub: Tub/Shower unit  Bathroom Toilet: Standard  Bathroom Equipment: Shower chair, Hand-held shower (owns shower chair in the past for other sx's, could use again if needed)  Home Equipment: Cane, Rolling walker, Long-handled shoehorn  Receives Help From: Home health (began HHPT 2 weeks ago priot to admission)  ADL Assistance: [address]: Independent  Homemaking Responsibilities: No  Ambulation Assistance: Independent  Transfer Assistance: Independent  Active : Yes  Mode of Transportation: Car  Occupation: Retired  Type of occupation: Maintenance department worker   Leisure & Hobbies: fishing, camping   Additional Comments: pt struggling on several occassions with memory, wife arrived midway through subjective and aided in answers. PT came to home 2x in the past 2 weeks and recommended us of cane in home and walker for community amb. Pt reporting 2 falls in the past 6 months. One on 7/2 and 3 weeks prior to those with several near falls since. Objective   Vision: Impaired  Vision Exceptions: Wears glasses at all times  Hearing: Exceptions to [facility]  Hearing Exceptions: Bilateral hearing aid          Standing Balance  Sit to stand: Dependent/Total (mod assist of 1-2)  Stand to sit: Moderate assistance  Toilet Transfers  Toilet - Technique: Ambulating  Equipment Used: Standard toilet (grab bar)  Toilet Transfer: Moderate assistance     Tone RUE  RUE Tone: Normotonic  Tone LUE  LUE Tone: Normotonic  Coordination  Movements Are Fluid And Coordinated: Yes     Bed mobility  Supine to Sit: Moderate assistance (HOB elevated and use of rail)  Sit to Supine: Minimal assistance  Transfers  Sit to stand: Dependent/Total (mod assist of 1-2)  Stand to sit: Moderate assistance     Cognition  Overall Cognitive Status: WFL                 LUE AROM (degrees)  LUE AROM : WFL  RUE AROM (degrees)  RUE AROM : WFL  LUE Strength  Gross LUE Strength: WFL  RUE Strength  Gross RUE Strength: WFL           Treatment included ADL and transfer training.        Assessment   Performance

## 2018-07-15 NOTE — PROGRESS NOTES
Urine samples sent. Patient was continent and able to void 175mL without difficulty, RN bladder scanned patient for post void residual and 193mL remained. Urine was lewis in color. Saline bolus started. Blood cultures being drawn currently. Dr. Trell Karimi notified of this information. Patient is sitting up in bed at this time comfortably, family at bedside. Will continue to monitor.

## 2018-07-15 NOTE — PROGRESS NOTES
MD    magnesium hydroxide (MILK OF MAGNESIA) 400 MG/5ML suspension 30 mL, 30 mL, Oral, Daily PRN, Ester Oneal MD    ondansetron [facility]) injection 4 mg, 4 mg, Intravenous, Q6H PRN, Ester Oneal MD    enoxaparin (LOVENOX) injection 40 mg, 40 mg, Subcutaneous, Nightly, Ester Oneal MD, 40 mg at 07/14/18 2100    acetaminophen (TYLENOL) tablet 650 mg, 650 mg, Oral, Q4H PRN, Pam Scanlon MD, 650 mg at 07/15/18 0237    ROS:  Constitutional- No weight loss or fevers  Eyes- No diplopia. No photophobia. Ears/nose/throat- No dysphagia. No Dysarthria  Cardiovascular- No palpitations. No chest pain  Respiratory- No dyspnea. No Cough  Gastrointestinal- No Abdominal pain. No Vomiting. Genitourinary- No incontinence. No urinary retention  Musculoskeletal- No myalgia. No arthralgia  Skin- No rash. No easy bruising. Psychiatric- No depression. No anxiety  Endocrine- No diabetes. No thyroid issues. Hematologic- No bleeding difficulty. No fatigue  Neurologic- No weakness. No Headache. General Examination :  Blood pressure 98/63, pulse 99, temperature 99.2 °F (37.3 °C), temperature source Oral, resp. rate 20, height 6' 2\" (1.88 m), weight 245 lb (111.1 kg), SpO2 97 %. Constitutional    Vital signs: BP, HR, and RR reviewed   General Alert, no distress, well-nourished  Eyes: anicteric  Cardiovascular: pulses symmetric in all 4 extremities. No peripheral edema. Psychiatric: cooperative with examination, no psychotic behavior noted. Neurologic Examination :  Mental status: Orientation to person, place and time. Comprehension, naming and repetition intact.  Attention intact as able to attend well to the exam.   Cranial nerves:   CN2: Visual Fields full w/o extinction on confrontational testing,   CN 3,4,6: Extraocular muscles intact,  CN5: Facial sensation symmetric   CN7: Face symmetric without dysarthria,   CN8: Hearing grossly intact   CN9: Palate elevated symmetrically  CN11: Full strength on shoulder volume. Correlate for symptoms of hydrocephalus. IR LUMBAR PUNCTURE FOR DIAGNOSIS    (Results Pending)   MRI LUMBAR SPINE WO CONTRAST    (Results Pending)       Assessment:  Tessa Parker is a 80 y.o. male who presented with acute urinary incontinence and hesitancy and memory issues and balance problems for the past 2 years and on exam, had subtle resting tremor with mild cogwheel rigidity in the left upper extremity. Plan :  1. 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 Appreciate Neurosurgery recommendations and planning for high volume LP tomorrow. 3. Awaiting bladder scan to be done and getting workup for fever and may consider MRI Lumbar spine in future for worsening of bladder issues and balance problems. 4. Will follow the patient during the hospitalization. Thank you for allowing me to participate in the care of the patient. Please feel free to call for any questions at your convenience.     Your's Sincerely,    Christina Boles M.D  Board Certified Neurologist  [address] [place] [address] [id number]  [phone]

## 2018-07-15 NOTE — PROGRESS NOTES
Speech Language Pathology  Facility/Department: [facility] 5T ORTHO/NEURO  Dysphagia Daily Treatment Note    NAME: Lynette Jerome. : 1950  MRN: [id number]    Patient Diagnosis(es):   Patient Active Problem List    Diagnosis Date Noted    Back pain 2018    Frequent falls 2018    Functional gait abnormality 2018    JULIOCESAR (acute kidney injury) ([facility] [address] 2018    Traumatic rhabdomyolysis ([facility] [address] 2018    Acute renal failure due to rhabdomyolysis ([facility] [address] 2018    Pulmonary nodule 2018    Non-seasonal allergic rhinitis due to pollen 2017    Dysthymia 2016    Persistent depressive disorder 2016    Mixed hyperlipidemia 2016    Essential hypertension 2015    Vitamin D deficiency 2011    Wrist arthritis 2010    Sprain of lumbar region 2010    Hypertrophy of prostate without urinary obstruction and other lower urinary tract symptoms (LUTS) 2010    Cardiac dysrhythmia 2010    Onychomycosis 2010    Obstructive sleep apnea 2010    Personal history of other diseases of digestive system 2010    Erectile dysfunction 2010     Allergies: Allergies   Allergen Reactions    Lisinopril Other (See Comments)     cough         CXR (date)-    Previous MBS (date)    Chart reviewed. Medical Diagnosis:Back Pain  Treatment Diagnosis:Dysphagia    BSE Impression (date)-7/15/18: Oral phase appears adequate although . Moderate pharyngeal stage dysphagia characterized by clinical s/s aspiration with thin liquids; however No clinical s/s aspiration noted with nectar conistency liquids. Clinical s/s aspiration also reported by RN. Given family reports of ongoing swallowing deficits, further clinical assessment is indicated, with instrumental assessment if indicated.     MBS results (date)- n/A    Pain:Denies     Current Diet : Regular, Nectar liquids    Treatment:  Pt seen bedside to address the

## 2018-07-15 NOTE — PROGRESS NOTES
Physical Therapy    Daily Treatment Note          Discharge Recommendations:  Fredyrad Diego. scored a 11/24 on the AM-PAC short mobility form. Current research shows that an AM-PAC score of 17 or less is typically not associated with a discharge to the patient's home setting. Based on the patients AM-PAC score and their current functional mobility deficits, it is recommended that the patient have 5-7 sessions per week of Physical Therapy at d/c to increase the patients independence. Equipment Needs:  Defer    Chart Reviewed: Yes   Other position/activity restrictions: up as tolerated   Additional Pertinent Hx: 76-year-old gentleman admitted on 7/13/18 with difficulty with balance and urge incontinence. CT Head: No acute intracranial abnormality. Diagnosis: Back Pain    Treatment Diagnosis: decreased balance, decreased cognition       Subjective:  Pt found supine in bed with family in room. Pt agreeable for PT. \"I still have my nights and days confused. \"    Pain:  Low back pain, 1/10. RN aware. Objective:    Bed mobility  Supine to sit:  Mod A with HOB raised and pt using rail  Sit to Supine:  Min A with directional cues, flat bed    Transfers  Sit to stand:  Dependent (Mod A x2 from EOB, cues for hand placement)  Mod A from toilet using grab bar  Stand to sit:  Mod A with cues for safety, assist for hand placement and for controlled descent    Ambulation  Assistance Level: Mod A   Assistive device:  Rolling walker  Distance:  10ft x2 and 15ft x1  Quality of gait:  Pushes walker out too far, decreased step length, cues to keep body within walker, shuffled steps, assist for safe walker management especially with turns, flexed posture  Comment:  Seated rest between each ambulation    Neuro/balance  Sitting balance:  Pt needing varying assist CGA-Mod A on side of bed. Posterior loss of balance, unable to correct self back to midline.    Static stance:  Min A at walker, cues for posture  Dynamic stance: Mod A with use of walker for ambulation/mobility    Patient Education  DC recommendations. Pt and family verbalized understanding. Safety  Pt left with needs in reach, supine in bed with alarm in place, RN aware. Family in room. Assessment:  Poor sitting balance. Increased gait distance today, but continues to need Mod A overall for mobility. Pt is at high risk for falls. Pt is well below functional baseline of independent. Pt appears very motivated for PT. Feel pt would be a good candidate for continued aggressive PT at d/c to increase transfers and gait back to independent. AM-PAC score  AM-PAC Inpatient Mobility Raw Score : 11  AM-PAC Inpatient T-Scale Score : 33.86  Mobility Inpatient CMS 0-100% Score: 72.57  Mobility Inpatient CMS G-Code Modifier : CL    Goals:  Goals not met this treatment, continue with current goals. Time Frame for Short term goals: d/c  Short term goal 1: pt will be able to participate in bed mobility assessment   Short term goal 2: pt will be able to perform transfers with CGA to RW   Short term goal 3: pt will be able to ambulate 13' with RW and CGA  Short term goal 4: pt will be able to ascend/descend 3 stairs with/without railing and Mod A    Plan:  Times per week: 5-7; Times per day: Daily  Current Treatment Recommendations: Strengthening, ROM, Balance Training, Transfer Training, Functional Mobility Training, Gait Training, Safety Education & Training, Neuromuscular Re-education, Home Exercise Program, Stair training, Endurance Training    Therapy Time    Individual  Concurrent  Group  Co-treatment    Time In  0847            Time Out  0925            Minutes  38              Timed Code Treatment Minutes:  38  Total Treatment Minutes:  38      If patient is discharged prior to next treatment, this note will serve as the discharge summary.     Gonzalo Ashley PT

## 2018-07-16 ENCOUNTER — APPOINTMENT (OUTPATIENT)
Dept: ULTRASOUND IMAGING | Age: 68
DRG: 031 | End: 2018-07-16
Payer: MEDICARE

## 2018-07-16 ENCOUNTER — APPOINTMENT (OUTPATIENT)
Dept: GENERAL RADIOLOGY | Age: 68
DRG: 031 | End: 2018-07-16
Payer: MEDICARE

## 2018-07-16 ENCOUNTER — APPOINTMENT (OUTPATIENT)
Dept: MRI IMAGING | Age: 68
DRG: 031 | End: 2018-07-16
Payer: MEDICARE

## 2018-07-16 PROBLEM — G91.2 NORMAL PRESSURE HYDROCEPHALUS (HCC): Status: ACTIVE | Noted: 2018-07-16

## 2018-07-16 PROBLEM — E87.1 HYPONATREMIA: Status: ACTIVE | Noted: 2018-07-16

## 2018-07-16 LAB
ANION GAP SERPL CALCULATED.3IONS-SCNC: 13 MMOL/L (ref 3–16)
APPEARANCE CSF: CLEAR
BASOPHILS ABSOLUTE: 0 K/UL (ref 0–0.2)
BASOPHILS RELATIVE PERCENT: 0.2 %
BUN BLDV-MCNC: 40 MG/DL (ref 7–20)
CALCIUM SERPL-MCNC: 8.4 MG/DL (ref 8.3–10.6)
CHLORIDE BLD-SCNC: 100 MMOL/L (ref 99–110)
CLOT EVALUATION CSF: NORMAL
CO2: 21 MMOL/L (ref 21–32)
COLOR CSF: COLORLESS
CREAT SERPL-MCNC: 1.9 MG/DL (ref 0.8–1.3)
EOSINOPHILS ABSOLUTE: 0 K/UL (ref 0–0.6)
EOSINOPHILS RELATIVE PERCENT: 0.6 %
GFR AFRICAN AMERICAN: 43
GFR NON-AFRICAN AMERICAN: 36
GLUCOSE BLD-MCNC: 137 MG/DL (ref 70–99)
GLUCOSE, CSF: 74 MG/DL (ref 40–80)
HCT VFR BLD CALC: 36.3 % (ref 40.5–52.5)
HEMOGLOBIN: 12.3 G/DL (ref 13.5–17.5)
LYMPHOCYTES ABSOLUTE: 0.6 K/UL (ref 1–5.1)
LYMPHOCYTES RELATIVE PERCENT: 8.4 %
MAGNESIUM: 2.2 MG/DL (ref 1.8–2.4)
MCH RBC QN AUTO: 29.3 PG (ref 26–34)
MCHC RBC AUTO-ENTMCNC: 33.8 G/DL (ref 31–36)
MCV RBC AUTO: 86.7 FL (ref 80–100)
MONOCYTES ABSOLUTE: 0.9 K/UL (ref 0–1.3)
MONOCYTES RELATIVE PERCENT: 11.9 %
NEUTROPHILS ABSOLUTE: 6.1 K/UL (ref 1.7–7.7)
NEUTROPHILS RELATIVE PERCENT: 78.9 %
NO DIFFERENTIAL CSF: NORMAL
PDW BLD-RTO: 14 % (ref 12.4–15.4)
PLATELET # BLD: 173 K/UL (ref 135–450)
PMV BLD AUTO: 6.7 FL (ref 5–10.5)
POTASSIUM SERPL-SCNC: 4.5 MMOL/L (ref 3.5–5.1)
PROTEIN CSF: 34 MG/DL (ref 15–45)
RBC # BLD: 4.19 M/UL (ref 4.2–5.9)
RBC CSF: 0 /CUMM
SODIUM BLD-SCNC: 134 MMOL/L (ref 136–145)
TUBE NUMBER CSF: NORMAL
VANCOMYCIN RANDOM: 7.4 UG/ML
WBC # BLD: 7.7 K/UL (ref 4–11)
WBC CSF: 1 /CUMM (ref 0–5)

## 2018-07-16 PROCEDURE — 82784 ASSAY IGA/IGD/IGG/IGM EACH: CPT

## 2018-07-16 PROCEDURE — 2580000003 HC RX 258: Performed by: INTERNAL MEDICINE

## 2018-07-16 PROCEDURE — 82040 ASSAY OF SERUM ALBUMIN: CPT

## 2018-07-16 PROCEDURE — 80048 BASIC METABOLIC PNL TOTAL CA: CPT

## 2018-07-16 PROCEDURE — 87205 SMEAR GRAM STAIN: CPT

## 2018-07-16 PROCEDURE — 89050 BODY FLUID CELL COUNT: CPT

## 2018-07-16 PROCEDURE — 94660 CPAP INITIATION&MGMT: CPT

## 2018-07-16 PROCEDURE — 83735 ASSAY OF MAGNESIUM: CPT

## 2018-07-16 PROCEDURE — 83916 OLIGOCLONAL BANDS: CPT

## 2018-07-16 PROCEDURE — 2709999900 FL LUMBAR PUNCTURE DIAG

## 2018-07-16 PROCEDURE — 82042 OTHER SOURCE ALBUMIN QUAN EA: CPT

## 2018-07-16 PROCEDURE — 76770 US EXAM ABDO BACK WALL COMP: CPT

## 2018-07-16 PROCEDURE — 009U3ZX DRAINAGE OF SPINAL CANAL, PERCUTANEOUS APPROACH, DIAGNOSTIC: ICD-10-PCS | Performed by: RADIOLOGY

## 2018-07-16 PROCEDURE — 77003 FLUOROGUIDE FOR SPINE INJECT: CPT | Performed by: INTERNAL MEDICINE

## 2018-07-16 PROCEDURE — 84157 ASSAY OF PROTEIN OTHER: CPT

## 2018-07-16 PROCEDURE — 36415 COLL VENOUS BLD VENIPUNCTURE: CPT

## 2018-07-16 PROCEDURE — 6360000002 HC RX W HCPCS: Performed by: INTERNAL MEDICINE

## 2018-07-16 PROCEDURE — 2500000003 HC RX 250 WO HCPCS: Performed by: NEUROLOGICAL SURGERY

## 2018-07-16 PROCEDURE — 87070 CULTURE OTHR SPECIMN AEROBIC: CPT

## 2018-07-16 PROCEDURE — 6370000000 HC RX 637 (ALT 250 FOR IP): Performed by: INTERNAL MEDICINE

## 2018-07-16 PROCEDURE — 80202 ASSAY OF VANCOMYCIN: CPT

## 2018-07-16 PROCEDURE — 72148 MRI LUMBAR SPINE W/O DYE: CPT

## 2018-07-16 PROCEDURE — 1200000000 HC SEMI PRIVATE

## 2018-07-16 PROCEDURE — 82945 GLUCOSE OTHER FLUID: CPT

## 2018-07-16 PROCEDURE — 94761 N-INVAS EAR/PLS OXIMETRY MLT: CPT

## 2018-07-16 PROCEDURE — 88112 CYTOPATH CELL ENHANCE TECH: CPT

## 2018-07-16 PROCEDURE — 85025 COMPLETE CBC W/AUTO DIFF WBC: CPT

## 2018-07-16 RX ORDER — LIDOCAINE HYDROCHLORIDE 10 MG/ML
5 INJECTION, SOLUTION INFILTRATION; PERINEURAL ONCE
Status: COMPLETED | OUTPATIENT
Start: 2018-07-16 | End: 2018-07-16

## 2018-07-16 RX ORDER — 0.9 % SODIUM CHLORIDE 0.9 %
1000 INTRAVENOUS SOLUTION INTRAVENOUS ONCE
Status: COMPLETED | OUTPATIENT
Start: 2018-07-16 | End: 2018-07-16

## 2018-07-16 RX ADMIN — SODIUM CHLORIDE: 9 INJECTION, SOLUTION INTRAVENOUS at 08:07

## 2018-07-16 RX ADMIN — CETIRIZINE HYDROCHLORIDE 10 MG: 10 TABLET, FILM COATED ORAL at 08:14

## 2018-07-16 RX ADMIN — GABAPENTIN 100 MG: 100 CAPSULE ORAL at 08:14

## 2018-07-16 RX ADMIN — PIPERACILLIN SODIUM AND TAZOBACTAM SODIUM 3.38 G: 3; .375 INJECTION, POWDER, LYOPHILIZED, FOR SOLUTION INTRAVENOUS at 01:00

## 2018-07-16 RX ADMIN — BUPROPION HYDROCHLORIDE 150 MG: 150 TABLET, FILM COATED, EXTENDED RELEASE ORAL at 08:13

## 2018-07-16 RX ADMIN — PIPERACILLIN SODIUM AND TAZOBACTAM SODIUM 3.38 G: 3; .375 INJECTION, POWDER, LYOPHILIZED, FOR SOLUTION INTRAVENOUS at 08:07

## 2018-07-16 RX ADMIN — MONTELUKAST SODIUM 10 MG: 10 TABLET, FILM COATED ORAL at 20:21

## 2018-07-16 RX ADMIN — FINASTERIDE 5 MG: 5 TABLET, FILM COATED ORAL at 08:14

## 2018-07-16 RX ADMIN — GABAPENTIN 100 MG: 100 CAPSULE ORAL at 15:58

## 2018-07-16 RX ADMIN — OXYCODONE HYDROCHLORIDE AND ACETAMINOPHEN 1000 MG: 500 TABLET ORAL at 08:13

## 2018-07-16 RX ADMIN — Medication 10 ML: at 20:21

## 2018-07-16 RX ADMIN — THERA TABS 1 TABLET: TAB at 08:14

## 2018-07-16 RX ADMIN — PIPERACILLIN SODIUM AND TAZOBACTAM SODIUM 3.38 G: 3; .375 INJECTION, POWDER, LYOPHILIZED, FOR SOLUTION INTRAVENOUS at 15:58

## 2018-07-16 RX ADMIN — SODIUM CHLORIDE 1000 ML: 9 INJECTION, SOLUTION INTRAVENOUS at 11:23

## 2018-07-16 RX ADMIN — TERBINAFINE 250 MG: 250 TABLET ORAL at 08:15

## 2018-07-16 RX ADMIN — TAMSULOSIN HYDROCHLORIDE 0.8 MG: 0.4 CAPSULE ORAL at 08:13

## 2018-07-16 RX ADMIN — CHOLECALCIFEROL TAB 25 MCG (1000 UNIT) 2000 UNITS: 25 TAB at 08:13

## 2018-07-16 RX ADMIN — GABAPENTIN 100 MG: 100 CAPSULE ORAL at 20:21

## 2018-07-16 RX ADMIN — LIDOCAINE HYDROCHLORIDE 5 ML: 10 INJECTION, SOLUTION EPIDURAL; INFILTRATION; INTRACAUDAL; PERINEURAL at 14:56

## 2018-07-16 ASSESSMENT — PAIN SCALES - GENERAL: PAINLEVEL_OUTOF10: 5

## 2018-07-16 NOTE — PROGRESS NOTES
128*  137*  137*     Hepatic: Recent Labs      07/14/18   0450   AST  43*   ALT  59*   BILITOT  0.8   ALKPHOS  71     UA neg LE, neg nit on 2 samples. Urine culture negative  Blood cultures pending    CXR no acute    MRI brain  1. No acute intracranial abnormality. No evidence of acute   infarction       2. Mild burden of scattered T2 hyperintense white matter disease   which are nonspecific, may be attributed to chronic microvascular   ischemia.       3. Mild disproportionate dilatation of the lateral/third   ventricles in comparison to the cerebral sulci which relate to   normal pressure hydrocephalus or central volume loss. Recommended   clinical correlation. No significant change since 10/5/2017. Assessment & Plan:   Principal Problem:    Suspected Normal pressure hydrocephalus  Active Problems:    JULIOCESAR (acute kidney injury) ([facility] [address]    Back pain    Hyponatremia  Fever    Discussed with Dr. Vipul Cota. Continue zosyn, although source of infection not identified. Await LP. Neurosurgery and Neurology input appreciated.     Diet: DIET GENERAL;  Code:Full Code  DVT PPX Lovenox    Selwyn Cope MD   7/16/2018 12:59 PM

## 2018-07-16 NOTE — PROGRESS NOTES
Occupational Therapy/Physical Therapy  Hold    Attempted to see pt this am. Per RN, transport is on way to take pt to get an LP. Will hold therapy at this time and follow up later this date as time permits and per plans of care. RN aware.      Collette Dingwall, OTR/L, [id number]    Dolores Ho PT, DPT, [id number]

## 2018-07-16 NOTE — PROGRESS NOTES
Speech Language Pathology  Dysphagia - hold    Chart reviewed, d/w RN. Pt off floor for LP at this time. Will follow up later this date if pt able and schedule allows. Tram Tolliver M.S./CCC-SLP #[id number]  Pg. # [id number]      Addendum:  SAVI/don RN, pt actually had not gone for LP yet, but is now in ultra sound. Therefore, will follow up next session, due to pt off floor and pending LP as well. Tram Tolliver M.S./CCC-SLP #[id number]  Pg.  # [phone] 2:00 PM

## 2018-07-16 NOTE — PROGRESS NOTES
Pt back from LP and ultrasound at 1530. Pt transferred back to bed with staff help. Vital signs checked, and WDL. Fluids back on. No complaints of pain at this time. Neuro checks WDL. Will cont to monitor.

## 2018-07-16 NOTE — PLAN OF CARE
Problem: Falls - Risk of:  Goal: Will remain free from falls  Will remain free from falls   Pt has been free of falls, using call light appropriately, not trying to get out of bed, call light within reach, bed alarm on. Problem: Pain:  Goal: Pain level will decrease  Pain level will decrease   No complaints of pain at this time. Will cont to monitor. Problem: Mobility - Impaired:  Goal: Able to ambulate independently  Able to ambulate independently   Decreased mobility d/t disease process. Pt has some weakness when getting out of bed. Using 2 assist and jennifer stedy. Follows command well. Problem: Swallowing - Impaired:  Goal: Able to swallow without choking  Able to swallow without choking   Pt has been taking pills in applesauce or pudding, tolerating well w/o difficulty. Pt states he normally takes all pills together, and whole with water. On nectar thick liquids, tolerating well w/o difficulties.

## 2018-07-17 ENCOUNTER — ANESTHESIA EVENT (OUTPATIENT)
Dept: OPERATING ROOM | Age: 68
DRG: 031 | End: 2018-07-17
Payer: MEDICARE

## 2018-07-17 LAB
A/G RATIO: 1.1 (ref 1.1–2.2)
ALBUMIN CSF: 22.4 MG/DL (ref 10–30)
ALBUMIN SERPL-MCNC: 3.9 G/DL (ref 3.4–5)
ALBUMIN SERPL-MCNC: 4279 MG/DL (ref 3400–5000)
ALP BLD-CCNC: 86 U/L (ref 40–129)
ALT SERPL-CCNC: 120 U/L (ref 10–40)
ANION GAP SERPL CALCULATED.3IONS-SCNC: 13 MMOL/L (ref 3–16)
AST SERPL-CCNC: 83 U/L (ref 15–37)
BASOPHILS ABSOLUTE: 0 K/UL (ref 0–0.2)
BASOPHILS RELATIVE PERCENT: 0.6 %
BILIRUB SERPL-MCNC: 0.5 MG/DL (ref 0–1)
BUN BLDV-MCNC: 22 MG/DL (ref 7–20)
CALCIUM SERPL-MCNC: 9.4 MG/DL (ref 8.3–10.6)
CHLORIDE BLD-SCNC: 104 MMOL/L (ref 99–110)
CO2: 23 MMOL/L (ref 21–32)
CREAT SERPL-MCNC: 1.1 MG/DL (ref 0.8–1.3)
EOSINOPHILS ABSOLUTE: 0.3 K/UL (ref 0–0.6)
EOSINOPHILS RELATIVE PERCENT: 8 %
GFR AFRICAN AMERICAN: >60
GFR NON-AFRICAN AMERICAN: >60
GLOBULIN: 3.4 G/DL
GLUCOSE BLD-MCNC: 120 MG/DL (ref 70–99)
HCT VFR BLD CALC: 35.9 % (ref 40.5–52.5)
HEMOGLOBIN: 12.3 G/DL (ref 13.5–17.5)
IGG CSF: 2.7 MG/DL (ref 0–6)
IGG INDEX: 0.54 (ref 0.2–0.7)
IGG SYNTHESIS RATE CSF: -1.2 MG/DAY (ref -9.9–3.3)
IGG: 950 MG/DL (ref 700–1600)
LYMPHOCYTES ABSOLUTE: 0.6 K/UL (ref 1–5.1)
LYMPHOCYTES RELATIVE PERCENT: 14.9 %
MCH RBC QN AUTO: 29.2 PG (ref 26–34)
MCHC RBC AUTO-ENTMCNC: 34.2 G/DL (ref 31–36)
MCV RBC AUTO: 85.5 FL (ref 80–100)
MONOCYTES ABSOLUTE: 0.6 K/UL (ref 0–1.3)
MONOCYTES RELATIVE PERCENT: 14.5 %
NEUTROPHILS ABSOLUTE: 2.7 K/UL (ref 1.7–7.7)
NEUTROPHILS RELATIVE PERCENT: 62 %
PDW BLD-RTO: 14.1 % (ref 12.4–15.4)
PLATELET # BLD: 220 K/UL (ref 135–450)
PMV BLD AUTO: 6.6 FL (ref 5–10.5)
POTASSIUM SERPL-SCNC: 4 MMOL/L (ref 3.5–5.1)
RBC # BLD: 4.2 M/UL (ref 4.2–5.9)
SODIUM BLD-SCNC: 140 MMOL/L (ref 136–145)
TOTAL PROTEIN: 7.3 G/DL (ref 6.4–8.2)
URINE CULTURE, ROUTINE: NORMAL
WBC # BLD: 4.3 K/UL (ref 4–11)

## 2018-07-17 PROCEDURE — 94150 VITAL CAPACITY TEST: CPT

## 2018-07-17 PROCEDURE — 97110 THERAPEUTIC EXERCISES: CPT

## 2018-07-17 PROCEDURE — 2580000003 HC RX 258: Performed by: INTERNAL MEDICINE

## 2018-07-17 PROCEDURE — 94761 N-INVAS EAR/PLS OXIMETRY MLT: CPT

## 2018-07-17 PROCEDURE — 85025 COMPLETE CBC W/AUTO DIFF WBC: CPT

## 2018-07-17 PROCEDURE — 6370000000 HC RX 637 (ALT 250 FOR IP): Performed by: INTERNAL MEDICINE

## 2018-07-17 PROCEDURE — 94660 CPAP INITIATION&MGMT: CPT

## 2018-07-17 PROCEDURE — 80053 COMPREHEN METABOLIC PANEL: CPT

## 2018-07-17 PROCEDURE — 97116 GAIT TRAINING THERAPY: CPT

## 2018-07-17 PROCEDURE — 99223 1ST HOSP IP/OBS HIGH 75: CPT | Performed by: SURGERY

## 2018-07-17 PROCEDURE — 94664 DEMO&/EVAL PT USE INHALER: CPT

## 2018-07-17 PROCEDURE — 1200000000 HC SEMI PRIVATE

## 2018-07-17 PROCEDURE — 92526 ORAL FUNCTION THERAPY: CPT

## 2018-07-17 PROCEDURE — 97535 SELF CARE MNGMENT TRAINING: CPT

## 2018-07-17 PROCEDURE — 97530 THERAPEUTIC ACTIVITIES: CPT

## 2018-07-17 PROCEDURE — 6360000002 HC RX W HCPCS: Performed by: INTERNAL MEDICINE

## 2018-07-17 PROCEDURE — 94799 UNLISTED PULMONARY SVC/PX: CPT

## 2018-07-17 RX ORDER — SODIUM CHLORIDE 9 MG/ML
INJECTION, SOLUTION INTRAVENOUS CONTINUOUS
Status: DISCONTINUED | OUTPATIENT
Start: 2018-07-18 | End: 2018-07-18

## 2018-07-17 RX ADMIN — CETIRIZINE HYDROCHLORIDE 10 MG: 10 TABLET, FILM COATED ORAL at 08:27

## 2018-07-17 RX ADMIN — Medication 10 ML: at 08:28

## 2018-07-17 RX ADMIN — GABAPENTIN 100 MG: 100 CAPSULE ORAL at 21:21

## 2018-07-17 RX ADMIN — PIPERACILLIN SODIUM AND TAZOBACTAM SODIUM 3.38 G: 3; .375 INJECTION, POWDER, LYOPHILIZED, FOR SOLUTION INTRAVENOUS at 02:52

## 2018-07-17 RX ADMIN — BUPROPION HYDROCHLORIDE 150 MG: 150 TABLET, FILM COATED, EXTENDED RELEASE ORAL at 08:27

## 2018-07-17 RX ADMIN — GABAPENTIN 100 MG: 100 CAPSULE ORAL at 08:27

## 2018-07-17 RX ADMIN — OXYCODONE HYDROCHLORIDE AND ACETAMINOPHEN 1000 MG: 500 TABLET ORAL at 08:27

## 2018-07-17 RX ADMIN — MONTELUKAST SODIUM 10 MG: 10 TABLET, FILM COATED ORAL at 21:21

## 2018-07-17 RX ADMIN — TERBINAFINE 250 MG: 250 TABLET ORAL at 08:27

## 2018-07-17 RX ADMIN — Medication 10 ML: at 21:21

## 2018-07-17 RX ADMIN — SODIUM CHLORIDE: 9 INJECTION, SOLUTION INTRAVENOUS at 09:48

## 2018-07-17 RX ADMIN — GABAPENTIN 100 MG: 100 CAPSULE ORAL at 14:25

## 2018-07-17 RX ADMIN — PIPERACILLIN SODIUM AND TAZOBACTAM SODIUM 3.38 G: 3; .375 INJECTION, POWDER, LYOPHILIZED, FOR SOLUTION INTRAVENOUS at 08:26

## 2018-07-17 RX ADMIN — THERA TABS 1 TABLET: TAB at 08:27

## 2018-07-17 RX ADMIN — PIPERACILLIN SODIUM AND TAZOBACTAM SODIUM 3.38 G: 3; .375 INJECTION, POWDER, LYOPHILIZED, FOR SOLUTION INTRAVENOUS at 16:48

## 2018-07-17 RX ADMIN — CHOLECALCIFEROL TAB 25 MCG (1000 UNIT) 2000 UNITS: 25 TAB at 08:27

## 2018-07-17 RX ADMIN — FINASTERIDE 5 MG: 5 TABLET, FILM COATED ORAL at 08:27

## 2018-07-17 RX ADMIN — TAMSULOSIN HYDROCHLORIDE 0.8 MG: 0.4 CAPSULE ORAL at 08:27

## 2018-07-17 NOTE — PROGRESS NOTES
Speech Language Pathology  Facility/Department: [facility] 5T ORTHO/NEURO  Dysphagia Daily Treatment Note    NAME: Govind Ralph : 1950  MRN: [id number]    Patient Diagnosis(es):   Patient Active Problem List    Diagnosis Date Noted    Normal pressure hydrocephalus 2018    Hyponatremia 2018    Back pain 2018    Frequent falls 2018    Functional gait abnormality 2018    JULIOCESAR (acute kidney injury) ([facility] [address] 2018    Traumatic rhabdomyolysis ([facility] [address] 2018    Acute renal failure due to rhabdomyolysis ([facility] [address] 2018    Pulmonary nodule 2018    Non-seasonal allergic rhinitis due to pollen 2017    Dysthymia 2016    Persistent depressive disorder 2016    Mixed hyperlipidemia 2016    Essential hypertension 2015    Vitamin D deficiency 2011    Wrist arthritis 2010    Sprain of lumbar region 2010    Hypertrophy of prostate without urinary obstruction and other lower urinary tract symptoms (LUTS) 2010    Cardiac dysrhythmia 2010    Onychomycosis 2010    Obstructive sleep apnea 2010    Personal history of other diseases of digestive system 2010    Erectile dysfunction 2010     Allergies: Allergies   Allergen Reactions    Lisinopril Other (See Comments)     cough         Recent Chest Xray: (18)  1. No acute disease. Previous MBS - n/a    Chart reviewed. Medical Diagnosis:Back Pain  Treatment Diagnosis:Dysphagia    BSE Impression (date)-7/15/18: Oral phase appears adequate although . Moderate pharyngeal stage dysphagia characterized by clinical s/s aspiration with thin liquids; however No clinical s/s aspiration noted with nectar conistency liquids. Clinical s/s aspiration also reported by RN. Given family reports of ongoing swallowing deficits, further clinical assessment is indicated, with instrumental assessment if indicated.     MBS results- not warranted gunjan swallowing mechanism w/ explanation. Pt and wife appreciated explanation and are agreeable. Cont goal.  7/17- discon't goal- not warranted at this time    New goal-   3- The pt/family will demonstrate understanding of swallowing recommendations and concerns. 7/17-  The pt and family were educated to purpose of the visit, swallowing strategies and was encouraged to alert MD and/or RN if s/s aspiration emerge. All stated comprehension. con't goal      Patient/Family/Caregiver Education:  . See above    Compensatory Strategies:  Compensatory Swallowing Strategies:   Upright as possible for all oral intake  Small bites/sips  Eat/Feed slowly  Alternate solids and liquids (family reports that pt has likely been implementing this at home)     Plan:  Continued daily Dysphagia treatment with goals per  plan of care. Diet recommendations:Regular (moist foods),recommend upgrading liquids to thin  DC recommendation: to be determined  Treatment: 15  D/W nursing: Pam   Needs met prior to leaving room, call button in reach.     Negin Samuel MA, Christiano Wasserman  Speech-Language Pathologist     Pager #[phone]      If patient is discharged prior to next treatment, this note will serve as the discharge summary

## 2018-07-17 NOTE — PROGRESS NOTES
Nephrology Progress Note     Doing better   Fever better     Past Medical History:   Diagnosis Date    Allergic rhinitis     Erectile dysfunction     Hyperlipidemia     Hypertension     Screening for abdominal aortic aneurysm (AAA) performed 03/02/2018    [facility]    Unspecified sleep apnea        History reviewed. No pertinent surgical history. Family History   Problem Relation Age of Onset    Cancer Father         prostate        reports that he has quit smoking. He has quit using smokeless tobacco. He reports that he drinks about 2.4 oz of alcohol per week . He reports that he does not use drugs.     Allergies:  Lisinopril    Current Medications:      enoxaparin (LOVENOX) injection 40 mg Nightly   piperacillin-tazobactam (ZOSYN) 3.375 g in dextrose 5 % 100 mL IVPB extended infusion (mini-bag) Q8H   acetaminophen (TYLENOL) tablet 1,000 mg Q6H PRN   vitamin C (ASCORBIC ACID) tablet 1,000 mg Daily   buPROPion (WELLBUTRIN XL) extended release tablet 150 mg QAM   cetirizine (ZYRTEC) tablet 10 mg Daily   vitamin D (CHOLECALCIFEROL) tablet 2,000 Units Daily   finasteride (PROSCAR) tablet 5 mg Daily   gabapentin (NEURONTIN) capsule 100 mg TID   montelukast (SINGULAIR) tablet 10 mg Nightly   multivitamin 1 tablet Daily   tamsulosin (FLOMAX) capsule 0.8 mg Daily   terbinafine (LAMISIL) tablet 250 mg Daily   sodium chloride flush 0.9 % injection 10 mL 2 times per day   sodium chloride flush 0.9 % injection 10 mL PRN   magnesium hydroxide (MILK OF MAGNESIA) 400 MG/5ML suspension 30 mL Daily PRN   ondansetron (ZOFRAN) injection 4 mg Q6H PRN         Physical exam:     Vitals:  /70   Pulse 87   Temp 97.8 °F (36.6 °C) (Oral)   Resp 18   Ht 6' 2\" (1.88 m)   Wt 245 lb (111.1 kg)   SpO2 95%   BMI 31.46 kg/m²   Constitutional:  OAA X3 NAD  Skin: no rash, turgor wnl  Heent:  eomi, mmm  Neck: no bruits or jvd noted  Cardiovascular:  S1, S2 without m/r/g  Respiratory: CTA B without w/r/r  Abdomen:  +bs, soft, nt, nd  Ext: no lower extremity edema  Psychiatric: mood and affect appropriate  Musculoskeletal:  Rom, muscular strength intact    Data:   Labs:  CBC:   Recent Labs      07/15/18   1110  07/16/18   0523  07/17/18   0920   WBC  8.9  7.7  4.3   HGB  13.7  12.3*  12.3*   PLT  189  173  220     BMP:    Recent Labs      07/15/18   1110  07/16/18   0523  07/17/18   0920   NA  131*  134*  140   K  4.2  4.5  4.0   CL  95*  100  104   CO2  22  21  23   BUN  37*  40*  22*   CREATININE  2.0*  1.9*  1.1   GLUCOSE  137*  137*  120*     Ca/Mg/Phos:   Recent Labs      07/15/18   1110  07/16/18   0523  07/17/18   0920   CALCIUM  9.4  8.4  9.4   MG   --   2.20   --      Hepatic:   Recent Labs      07/17/18   0920   AST  83*   ALT  120*   BILITOT  0.5   ALKPHOS  86     Troponin: No results for input(s): TROPONINI in the last 72 hours. BNP: No results for input(s): BNP in the last 72 hours. Lipids: No results for input(s): CHOL, TRIG, HDL, LDLCALC, LABVLDL in the last 72 hours. ABGs: No results for input(s): PHART, PO2ART, JQR4NQP in the last 72 hours. INR:   No results for input(s): INR in the last 72 hours. UA:  Recent Labs      07/15/18   1200   COLORU  Yellow   CLARITYU  Clear   GLUCOSEU  Negative   BILIRUBINUR  SMALL*   KETUA  Negative   SPECGRAV  >=1.030   BLOODU  Negative   PHUR  5.5   PROTEINU  TRACE*   UROBILINOGEN  0.2   NITRU  Negative   LEUKOCYTESUR  Negative   LABMICR  YES   URINETYPE  not specified      Urine Microscopic:   Recent Labs      07/15/18   1200   LABCAST  5-10 Hyaline*   BACTERIA  3+*   COMU  see below   WBCUA  6-10*   RBCUA  3-5*   EPIU  3-5     Urine Culture:   Recent Labs      07/15/18   1900   LABURIN  No growth at 18-36 hours     Urine Chemistry:   Recent Labs      07/15/18   1630   CLUR  <20   LABCREA  162.5   PROTEINUR  20.50*   NAUR  <20             IMAGING:  MRI LUMBAR SPINE WO CONTRAST   Final Result      1.  Lumbar spondylosis resulting in up to severe left L4-5 and   L5-S1 neural foraminal participate in care of Lukasz Pollack.        Arabella Sanabria  Feel free to contact me   [facility] [address]  Office : [phone]  Fax :[phone]

## 2018-07-17 NOTE — PROGRESS NOTES
PROGRESS NOTE    7/17/2018 12:44 PM                               Danelle Narvaez. LOS: 4 days     Subjective:  No acute events overnight. Patient has no specific complaints this am.         Physical Exam:    Vitals:    07/17/18 1100   BP: 108/70   Pulse: 87   Resp: 18   Temp: 97.8 °F (36.6 °C)   SpO2: 95%     Patient seen and examined   General: Well developed, awake and alert  HENT: atraumatic, neck supple  Eyes: Optic discs: Not tested  Pulmonary: unlabored respiratory effort  Cardiovascular:  Warm well perfused. No peripheral edema  Gastrointestinal: abdomen soft, NT, ND  Incision:CDI    Neurological:  Mental Status: Alert and oriented x 3  Attention: Intact  Language: No aphasia or dysarthria noted  Sensation: Intact to all extremities to light touch  Coordination: Intact  DTRs:    Right  Left    biceps  2 2   brachioradialis  2 2   Patella  2 2   ankle clonus  Neg Neg   toes (babinski)  Down Down     Cranial Nerves:  Cranial Nerves:  II: Visual acuity not tested, denies new visual changes / diplopia  III, IV, VI: PERRL, 3 mm bilaterally, EOMI, no nystagmus noted  V: Facial sensation intact bilaterally to touch  VII: Face symmetric  VIII: Hearing intact bilaterally to spoken voice  IX: Palate movement equal bilaterally  XI: Shoulder shrug equal bilaterally  XII: Tongue midline    Musculoskeletal:   Gait: Not tested   Assist devices: None   Tone: Normal  Motor strength:    Right  Left    Right  Left    Deltoid  5 5  Hip Flex  5 5   Biceps  5 5  Knee Extensors  5 5   Triceps  5 5  Knee Flexors  5 5   Wrist Ext  5 5  Ankle Dorsiflex. 5 5   Wrist Flex  5 5  Ankle Plantarflex. 5 5   Handgrip  5 5  Ext Domingo Longus  5 5   Thumb Ext  5 5         Radiological Findings:  Ct Head Wo Contrast  Result Date: 7/13/2018  1. No acute intracranial abnormality. 2. There is chronic prominence of the lateral ventricular system.  This is a little more than expected for a patient of this age, although it could be compatible with degree of generalized cerebral atrophy. The third and fourth ventricles are essentially normal in volume. Correlate for symptoms of hydrocephalus. Mri Brain Wo Contrast  Result Date: 7/14/2018  1. No acute intracranial abnormality. No evidence of acute infarction  2. Mild burden of scattered T2 hyperintense white matter disease which are nonspecific, may be attributed to chronic microvascular ischemia. 3. Mild disproportionate dilatation of the lateral/third ventricles in comparison to the cerebral sulci which relate to normal pressure hydrocephalus or central volume loss. Recommended clinical correlation. No significant change since 10/5/2017. Mri Lumbar Spine Wo Contrast  Result Date: 7/16/2018  1. Lumbar spondylosis resulting in up to severe left L4-5 and L5-S1 neural foraminal stenosis as described above. Fl Lumbar Puncture Diag  Result Date: 7/16/2018  Impression: Successful lumbar puncture utilizing fluoroscopic guidance. After 34 mL of clear CSF was removed, the pressure dropped from 24 cm H2O to 15 cm H2O. Labs:  Recent Labs      07/17/18   0920   WBC  4.3   HGB  12.3*   HCT  35.9*   PLT  220       Recent Labs      07/16/18   0523  07/17/18   0920   NA  134*  140   K  4.5  4.0   CL  100  104   CO2  21  23   BUN  40*  22*   CREATININE  1.9*  1.1   GLUCOSE  137*  120*   CALCIUM  8.4  9.4   MG  2.20   --        No results for input(s): PROTIME, INR, APTT in the last 72 hours.     Patient Active Problem List    Diagnosis Date Noted    Normal pressure hydrocephalus 07/16/2018    Hyponatremia 07/16/2018    Back pain 07/13/2018    Frequent falls 07/05/2018    Functional gait abnormality 07/05/2018    JULIOCESAR (acute kidney injury) ([address] 07/02/2018    Traumatic rhabdomyolysis ([address] 07/02/2018    Acute renal failure due to rhabdomyolysis ([address] 07/02/2018    Pulmonary nodule 02/25/2018    Non-seasonal allergic rhinitis due to pollen 01/09/2017    Dysthymia 07/27/2016    JAMIN Barnes, APRN-CNP, 7/17/2018 12:44 PM  [phone]

## 2018-07-17 NOTE — PROGRESS NOTES
[facility]    7/17/2018 9:03 AM        Name: Anum Razo. .              Admitted: 7/13/2018  Primary Care Provider: Paty Reed MD (Tel: [phone])      Subjective:    Patient admitted with progressive difficulty with his balance and with emptying his bladder. Noted to have concerns for Normal Pressure Hydrocephalus based on MRi brain. Having fevers to 100.1 l      No fevers last 24 hours, no chills. No cough, no sputum production. No diarrhea. Had LP yesterday. Benjamin like he stood and transferred better today, some headache when he first stood up. Patient states he was told at [facility] that he had a spot on his kidney in the past, unsure what other workup was done. Reviewed interval ancillary notes    Objective:  /78   Pulse 82   Temp 97.9 °F (36.6 °C) (Oral)   Resp 16   Ht 6' 2\" (1.88 m)   Wt 245 lb (111.1 kg)   SpO2 97%   BMI 31.46 kg/m²     Intake/Output Summary (Last 24 hours) at 07/17/18 0903  Last data filed at 07/17/18 0851   Gross per 24 hour   Intake              360 ml   Output             3245 ml   Net            -2885 ml           General appearance:  Appears comfortable, no distress  Head:  Atraumatic normocephalic  Neck: supple  Cardiovascular: Regular rhythm, normal S1, S2. No murmur. No edema in lower extremities  Respiratory: Not using accessory muscles. Good inspiratory effort. Clear to auscultation bilaterally, no wheeze or crackles.    GI: Abdomen soft, no tenderness, not distended, normal bowel sounds  Musculoskeletal: strength symmetric  Neurology: awake and alert, cooperative      Labs and Tests:  CBC:   Recent Labs      07/15/18   1110  07/16/18   0523   WBC  8.9  7.7   HGB  13.7  12.3*   PLT  189  173     BMP:    Recent Labs      07/15/18   1110  07/16/18   0523   NA  131*  134*   K  4.2  4.5   CL  95*  100   CO2  22  21   BUN  37*  40*   CREATININE  2.0*
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
## 2018-07-17 NOTE — CONSULTS
atrophy. The third and fourth ventricles are essentially   normal in volume. Correlate for symptoms of hydrocephalus. Assessment/Plan: This is a 80 y.o. male presented wit haMS and weakness, found to have NPH on MRI, resolution of his symptoms with lumbar puncture.  Neurosurgery consulting general surgery for placement of  shunt tomorrow.     - NPO at midngiht  - IVF at midnight  - no other labs needed, discussed with Dr. Chrissy Biggs  - plan for  shunt tomorrow at 3pm family and patient in accordance  - will discuss with staff     Ambika Cardona MD  07/17/18  4:37 PM  [phone]

## 2018-07-17 NOTE — PROGRESS NOTES
Occupational Therapy  Facility/Department: [facility] 5T ORTHO/NEURO  Daily Treatment Note-late entry from [id number]  NAME: Danelle Narvaez. : 1950  MRN: [id number]    Date of Service: 2018    Discharge Recommendations: Danelle Narvaez. scored a 21/24 on the AM-PAC ADL Inpatient form. Current research shows that an AM-PAC score of 18 or greater is typically associated with a discharge to the patient's home setting. Based on the patients AM-PAC score and their current ADL deficits, it is recommended that the patient have 2-3 sessions per week of Occupational Therapy at d/c to increase the patients independence. HOME HEALTH CARE: LEVEL 1 STANDARD    - Initial home health evaluation to occur within 24-48 hours, in patient home   - Therapy to evaluate with goal of regaining prior level of functioning   - Therapy to evaluate if patient has [address] needs for personal care       OT Equipment Recommendations  Equipment Needed: No    Patient Diagnosis(es): The primary encounter diagnosis was Hydrocephalus. Diagnoses of Ataxia and Urinary incontinence, unspecified type were also pertinent to this visit. has a past medical history of Allergic rhinitis; Erectile dysfunction; Hyperlipidemia; Hypertension; Screening for abdominal aortic aneurysm (AAA) performed; and Unspecified sleep apnea. has no past surgical history on file. Restrictions  Position Activity Restriction  Other position/activity restrictions: Up as tolerated  Subjective   General  Chart Reviewed: Yes  Patient assessed for rehabilitation services?: Yes  Additional Pertinent Hx: Pt admitted 18 with LE weakness, decreased balance and difficulty urinating. Head CT=No acute intracranial abnormality. MRI Brain= No acute intracranial abnormality. No evidence of acute infarction.      PMH includes: HTN, sleep apnea  Family / Caregiver Present: Yes (wife)  Referring Practitioner: Daren Fournier  Diagnosis: Back pain  Subjective  Subjective: Score: 21  AM-PAC Inpatient ADL T-Scale Score : 44.27  ADL Inpatient CMS 0-100% Score: 32.79  ADL Inpatient CMS G-Code Modifier : CJ    Goals  Short term goals  Time Frame for Short term goals: Discharge  Short term goal 1: Transfer to/from toilet with CG-7/17 goal not addressed  Short term goal 2: Perform lower body dressing with min assist-7/17 goal met, updated goal:  pt to retrieve clothes from closet and dress lower body indep  Short term goal 3: Stance wirh SBA x4 min while engaging in ADL/functional mobiity-7/17 goal not met  Patient Goals   Patient goals : Return home. Be independent.        Therapy Time   Individual Concurrent Group Co-treatment   Time In 1005         Time Out 1044         Minutes 39              Timed Code Treatment Minutes:  39    Total Treatment Minutes:  39    If patient is d/c prior to next treatment session, this note will serve as the discharge summary  Constantino Espino, [address] [facility]

## 2018-07-17 NOTE — PROGRESS NOTES
Physical Therapy  Daily Treatment Note    Discharge Recommendations: Niki Elam scored a 17/24 on the AM-PAC short mobility form. Current research shows that an AM-PAC score of 17 or less is typically not associated with a discharge to the patient's home setting. Based on the patients AM-PAC score and their current functional mobility deficits, it is recommended that the patient have 3-5 sessions per week of Physical Therapy at d/c to increase the patients independence. **Pt hopes to go home. Anticipate AM-PAC score will improve in the next 1-2 days. HOME HEALTH CARE: LEVEL 3 SAFETY  - Initial home health evaluation to occur within 24-48 hours, in patient home   - Therapy evaluations in home within 24-48 hours of discharge; including DME and home safety   - Frontload therapy 5 days, then 3x a week   - Therapy to evaluate if patient has [address] needs for personal care   -  evaluation within 24-48 hours, includes evaluation of resources and insurance to determine [place], [place], LTC, and Medicaid options     Equipment Needs: No new needs anticipated    Chart Reviewed: Yes     Other position/activity restrictions: Up as tolerated   Additional Pertinent Hx: 60-year-old gentleman admitted on 7/13/18 with difficulty with balance and urge incontinence. CT Head: No acute intracranial abnormality. Diagnosis: Back Pain    Treatment Diagnosis: decreased balance, decreased cognition     Subjective: Pt in chair initially. Wife present. Pt feels he is moving better. Hopes to go home at D/C. Has 3 steps into house with railing. Not sure when he's being D/Nael. \"I feel better walking when I have shoes on. \"    Pain: Denies    Objective:    Transfers  Sit to stand: Min assist x 1 for first trial from chair; CGA from chair for 2nd and 3rd trial. Min cues for hand placement. Stand to sit: CGA into chair; Decreased eccentric control noted. Min cues for hand placement.      Ambulation  Assistance Level: CGA  Assistive device: Wheeled walker  Distance: 25 ft in room, 65 ft, 75 ft in santos  Quality of gait: Slow, but fairly steady with walker. Cues for upright posture and forward gaze. Good placement of walker. Other: Seated rest breaks between walks. Exercises  15 reps B seated heel/toe raises, LAQ, hip abd/add, hip flexion in sitting independently. Occasional rest breaks between. Balance  Static stance with walker CGA  Ambulation with wheeled walker CGA    Patient Education  Calling for assist with needs. Expressed understanding. Safety Devices  Pt left with needs in reach. In chair with chair alarm on. Wife and OT present. RN updated. Assessment:  Mobility improved today. Needing CGA to min assist x 1 for transfers/ambulation. Pt hopeful he will be able to go home at D/C with assist of wife. Pt would benefit from continued skilled PT to maximize independence.      AM-PAC score  AM-PAC Inpatient Mobility Raw Score : 17  AM-PAC Inpatient T-Scale Score : 42.13  Mobility Inpatient CMS 0-100% Score: 50.57  Mobility Inpatient CMS G-Code Modifier : CK    Goals: (as determined and assessed by primary PT)  Time Frame for Short term goals: d/c  Short term goal 1: pt will be able to participate in bed mobility assessment   Short term goal 2: pt will be able to perform transfers with CGA to RW   Short term goal 3: pt will be able to ambulate 13' with RW and CGA  Short term goal 4: pt will be able to ascend/descend 3 stairs with/without railing and Mod A     Plan:  Times per week: 5-7; Times per day: Daily  Current Treatment Recommendations: Strengthening, ROM, Balance Training, Transfer Training, Functional Mobility Training, Gait Training, Safety Education & Training, Neuromuscular Re-education, Home Exercise Program, Stair training, Endurance Training    Therapy Time    Individual  Concurrent  Group  Co-treatment    Time In  1002            Time Out  1030            Minutes  28              Timed Code

## 2018-07-17 NOTE — PLAN OF CARE
Problem: Falls - Risk of:  Goal: Will remain free from falls  Will remain free from falls   Outcome: Ongoing  Patient calls out appropriately. Non-skid socks on. Bed locked in lowest position. Bed alarm on. Near RN station. Will continue to monitor. Problem: Pain:  Goal: Control of acute pain  Control of acute pain   Outcome: Ongoing  No complaints of pain. Will continue to monitor.

## 2018-07-17 NOTE — PROGRESS NOTES
Neurosurgery Update:    Plan for  shunt placement tomorrow at 3pm.   Will place pre-op orders. NPO after MN  Cleared for OR from Medicine standpoint per Dr. Lb Ferrara.      Yuliet Salas  4:37 PM  07/17/18

## 2018-07-18 ENCOUNTER — APPOINTMENT (OUTPATIENT)
Dept: CT IMAGING | Age: 68
DRG: 031 | End: 2018-07-18
Payer: MEDICARE

## 2018-07-18 ENCOUNTER — APPOINTMENT (OUTPATIENT)
Dept: GENERAL RADIOLOGY | Age: 68
DRG: 031 | End: 2018-07-18
Payer: MEDICARE

## 2018-07-18 ENCOUNTER — ANESTHESIA (OUTPATIENT)
Dept: OPERATING ROOM | Age: 68
DRG: 031 | End: 2018-07-18
Payer: MEDICARE

## 2018-07-18 VITALS — TEMPERATURE: 95.9 F | DIASTOLIC BLOOD PRESSURE: 74 MMHG | OXYGEN SATURATION: 98 % | SYSTOLIC BLOOD PRESSURE: 123 MMHG

## 2018-07-18 LAB
ABO/RH: NORMAL
ANION GAP SERPL CALCULATED.3IONS-SCNC: 14 MMOL/L (ref 3–16)
ANTIBODY SCREEN: NORMAL
BUN BLDV-MCNC: 22 MG/DL (ref 7–20)
CALCIUM SERPL-MCNC: 9.4 MG/DL (ref 8.3–10.6)
CHLORIDE BLD-SCNC: 102 MMOL/L (ref 99–110)
CO2: 21 MMOL/L (ref 21–32)
CREAT SERPL-MCNC: 1 MG/DL (ref 0.8–1.3)
GFR AFRICAN AMERICAN: >60
GFR NON-AFRICAN AMERICAN: >60
GLUCOSE BLD-MCNC: 102 MG/DL (ref 70–99)
HCT VFR BLD CALC: 35.8 % (ref 40.5–52.5)
HEMOGLOBIN: 12.3 G/DL (ref 13.5–17.5)
INR BLD: 1.02 (ref 0.86–1.14)
MCH RBC QN AUTO: 29.2 PG (ref 26–34)
MCHC RBC AUTO-ENTMCNC: 34.2 G/DL (ref 31–36)
MCV RBC AUTO: 85.4 FL (ref 80–100)
PDW BLD-RTO: 14 % (ref 12.4–15.4)
PLATELET # BLD: 254 K/UL (ref 135–450)
PMV BLD AUTO: 6.7 FL (ref 5–10.5)
POTASSIUM SERPL-SCNC: 4.2 MMOL/L (ref 3.5–5.1)
PROTHROMBIN TIME: 11.6 SEC (ref 9.8–13)
RBC # BLD: 4.2 M/UL (ref 4.2–5.9)
SODIUM BLD-SCNC: 137 MMOL/L (ref 136–145)
WBC # BLD: 5.2 K/UL (ref 4–11)

## 2018-07-18 PROCEDURE — 49653 PR LAP, VENTRAL HERNIA REPAIR,INCARCERATED: CPT | Performed by: SURGERY

## 2018-07-18 PROCEDURE — 88302 TISSUE EXAM BY PATHOLOGIST: CPT

## 2018-07-18 PROCEDURE — 3700000000 HC ANESTHESIA ATTENDED CARE: Performed by: NEUROLOGICAL SURGERY

## 2018-07-18 PROCEDURE — 85610 PROTHROMBIN TIME: CPT

## 2018-07-18 PROCEDURE — 86900 BLOOD TYPING SEROLOGIC ABO: CPT

## 2018-07-18 PROCEDURE — 86850 RBC ANTIBODY SCREEN: CPT

## 2018-07-18 PROCEDURE — 85027 COMPLETE CBC AUTOMATED: CPT

## 2018-07-18 PROCEDURE — 97116 GAIT TRAINING THERAPY: CPT

## 2018-07-18 PROCEDURE — C1768 GRAFT, VASCULAR: HCPCS | Performed by: NEUROLOGICAL SURGERY

## 2018-07-18 PROCEDURE — 73140 X-RAY EXAM OF FINGER(S): CPT

## 2018-07-18 PROCEDURE — 2580000003 HC RX 258: Performed by: INTERNAL MEDICINE

## 2018-07-18 PROCEDURE — 3600000004 HC SURGERY LEVEL 4 BASE: Performed by: NEUROLOGICAL SURGERY

## 2018-07-18 PROCEDURE — 97530 THERAPEUTIC ACTIVITIES: CPT

## 2018-07-18 PROCEDURE — 6360000002 HC RX W HCPCS: Performed by: NURSE ANESTHETIST, CERTIFIED REGISTERED

## 2018-07-18 PROCEDURE — 0WJG4ZZ INSPECTION OF PERITONEAL CAVITY, PERCUTANEOUS ENDOSCOPIC APPROACH: ICD-10-PCS | Performed by: NEUROLOGICAL SURGERY

## 2018-07-18 PROCEDURE — 80048 BASIC METABOLIC PNL TOTAL CA: CPT

## 2018-07-18 PROCEDURE — 2720000010 HC SURG SUPPLY STERILE: Performed by: NEUROLOGICAL SURGERY

## 2018-07-18 PROCEDURE — 6370000000 HC RX 637 (ALT 250 FOR IP): Performed by: NEUROLOGICAL SURGERY

## 2018-07-18 PROCEDURE — 94660 CPAP INITIATION&MGMT: CPT

## 2018-07-18 PROCEDURE — 70450 CT HEAD/BRAIN W/O DYE: CPT

## 2018-07-18 PROCEDURE — 6360000002 HC RX W HCPCS: Performed by: INTERNAL MEDICINE

## 2018-07-18 PROCEDURE — 6360000002 HC RX W HCPCS

## 2018-07-18 PROCEDURE — 0WQF4ZZ REPAIR ABDOMINAL WALL, PERCUTANEOUS ENDOSCOPIC APPROACH: ICD-10-PCS | Performed by: SURGERY

## 2018-07-18 PROCEDURE — 2500000003 HC RX 250 WO HCPCS: Performed by: NEUROLOGICAL SURGERY

## 2018-07-18 PROCEDURE — 3600000014 HC SURGERY LEVEL 4 ADDTL 15MIN: Performed by: NEUROLOGICAL SURGERY

## 2018-07-18 PROCEDURE — 3700000001 HC ADD 15 MINUTES (ANESTHESIA): Performed by: NEUROLOGICAL SURGERY

## 2018-07-18 PROCEDURE — 36415 COLL VENOUS BLD VENIPUNCTURE: CPT

## 2018-07-18 PROCEDURE — 2580000003 HC RX 258: Performed by: NURSE ANESTHETIST, CERTIFIED REGISTERED

## 2018-07-18 PROCEDURE — 2709999900 HC NON-CHARGEABLE SUPPLY: Performed by: NEUROLOGICAL SURGERY

## 2018-07-18 PROCEDURE — 97535 SELF CARE MNGMENT TRAINING: CPT

## 2018-07-18 PROCEDURE — 2780000010 HC IMPLANT OTHER: Performed by: NEUROLOGICAL SURGERY

## 2018-07-18 PROCEDURE — 94761 N-INVAS EAR/PLS OXIMETRY MLT: CPT

## 2018-07-18 PROCEDURE — 2580000003 HC RX 258: Performed by: NURSE PRACTITIONER

## 2018-07-18 PROCEDURE — 6370000000 HC RX 637 (ALT 250 FOR IP): Performed by: INTERNAL MEDICINE

## 2018-07-18 PROCEDURE — 1200000000 HC SEMI PRIVATE

## 2018-07-18 PROCEDURE — 97110 THERAPEUTIC EXERCISES: CPT

## 2018-07-18 PROCEDURE — 00164J6 BYPASS CEREBRAL VENTRICLE TO PERITONEAL CAVITY WITH SYNTHETIC SUBSTITUTE, PERCUTANEOUS ENDOSCOPIC APPROACH: ICD-10-PCS | Performed by: NEUROLOGICAL SURGERY

## 2018-07-18 PROCEDURE — 86901 BLOOD TYPING SEROLOGIC RH(D): CPT

## 2018-07-18 PROCEDURE — 2500000003 HC RX 250 WO HCPCS: Performed by: NURSE ANESTHETIST, CERTIFIED REGISTERED

## 2018-07-18 PROCEDURE — 2580000003 HC RX 258: Performed by: NEUROLOGICAL SURGERY

## 2018-07-18 PROCEDURE — 62223 ESTABLISH BRAIN CAVITY SHUNT: CPT | Performed by: SURGERY

## 2018-07-18 PROCEDURE — 7100000000 HC PACU RECOVERY - FIRST 15 MIN: Performed by: NEUROLOGICAL SURGERY

## 2018-07-18 PROCEDURE — 7100000001 HC PACU RECOVERY - ADDTL 15 MIN: Performed by: NEUROLOGICAL SURGERY

## 2018-07-18 RX ORDER — HYDROMORPHONE HCL 110MG/55ML
PATIENT CONTROLLED ANALGESIA SYRINGE INTRAVENOUS PRN
Status: DISCONTINUED | OUTPATIENT
Start: 2018-07-18 | End: 2018-07-18 | Stop reason: SDUPTHER

## 2018-07-18 RX ORDER — OXYCODONE HYDROCHLORIDE AND ACETAMINOPHEN 5; 325 MG/1; MG/1
1 TABLET ORAL ONCE
Status: DISCONTINUED | OUTPATIENT
Start: 2018-07-18 | End: 2018-07-18 | Stop reason: HOSPADM

## 2018-07-18 RX ORDER — MEPERIDINE HYDROCHLORIDE 25 MG/ML
12.5 INJECTION INTRAMUSCULAR; INTRAVENOUS; SUBCUTANEOUS EVERY 5 MIN PRN
Status: DISCONTINUED | OUTPATIENT
Start: 2018-07-18 | End: 2018-07-18 | Stop reason: HOSPADM

## 2018-07-18 RX ORDER — LABETALOL HYDROCHLORIDE 5 MG/ML
5 INJECTION, SOLUTION INTRAVENOUS EVERY 10 MIN PRN
Status: DISCONTINUED | OUTPATIENT
Start: 2018-07-18 | End: 2018-07-18 | Stop reason: HOSPADM

## 2018-07-18 RX ORDER — ROCURONIUM BROMIDE 10 MG/ML
INJECTION, SOLUTION INTRAVENOUS PRN
Status: DISCONTINUED | OUTPATIENT
Start: 2018-07-18 | End: 2018-07-18 | Stop reason: SDUPTHER

## 2018-07-18 RX ORDER — SODIUM CHLORIDE 9 MG/ML
INJECTION, SOLUTION INTRAVENOUS CONTINUOUS
Status: DISCONTINUED | OUTPATIENT
Start: 2018-07-18 | End: 2018-07-20 | Stop reason: HOSPADM

## 2018-07-18 RX ORDER — PROMETHAZINE HYDROCHLORIDE 25 MG/ML
6.25 INJECTION, SOLUTION INTRAMUSCULAR; INTRAVENOUS
Status: DISCONTINUED | OUTPATIENT
Start: 2018-07-18 | End: 2018-07-18 | Stop reason: HOSPADM

## 2018-07-18 RX ORDER — MIDAZOLAM HYDROCHLORIDE 1 MG/ML
INJECTION INTRAMUSCULAR; INTRAVENOUS PRN
Status: DISCONTINUED | OUTPATIENT
Start: 2018-07-18 | End: 2018-07-18 | Stop reason: SDUPTHER

## 2018-07-18 RX ORDER — SODIUM CHLORIDE 0.9 % (FLUSH) 0.9 %
10 SYRINGE (ML) INJECTION PRN
Status: DISCONTINUED | OUTPATIENT
Start: 2018-07-18 | End: 2018-07-20 | Stop reason: HOSPADM

## 2018-07-18 RX ORDER — METOPROLOL TARTRATE 5 MG/5ML
INJECTION INTRAVENOUS PRN
Status: DISCONTINUED | OUTPATIENT
Start: 2018-07-18 | End: 2018-07-18 | Stop reason: SDUPTHER

## 2018-07-18 RX ORDER — MORPHINE SULFATE 2 MG/ML
2 INJECTION, SOLUTION INTRAMUSCULAR; INTRAVENOUS EVERY 4 HOURS PRN
Status: DISCONTINUED | OUTPATIENT
Start: 2018-07-18 | End: 2018-07-19

## 2018-07-18 RX ORDER — HYDROCODONE BITARTRATE AND ACETAMINOPHEN 5; 325 MG/1; MG/1
1 TABLET ORAL EVERY 4 HOURS PRN
Status: DISCONTINUED | OUTPATIENT
Start: 2018-07-18 | End: 2018-07-20 | Stop reason: HOSPADM

## 2018-07-18 RX ORDER — ONDANSETRON 2 MG/ML
4 INJECTION INTRAMUSCULAR; INTRAVENOUS
Status: DISCONTINUED | OUTPATIENT
Start: 2018-07-18 | End: 2018-07-18 | Stop reason: HOSPADM

## 2018-07-18 RX ORDER — DEXAMETHASONE SODIUM PHOSPHATE 4 MG/ML
INJECTION, SOLUTION INTRA-ARTICULAR; INTRALESIONAL; INTRAMUSCULAR; INTRAVENOUS; SOFT TISSUE PRN
Status: DISCONTINUED | OUTPATIENT
Start: 2018-07-18 | End: 2018-07-18 | Stop reason: SDUPTHER

## 2018-07-18 RX ORDER — FENTANYL CITRATE 50 UG/ML
INJECTION, SOLUTION INTRAMUSCULAR; INTRAVENOUS PRN
Status: DISCONTINUED | OUTPATIENT
Start: 2018-07-18 | End: 2018-07-18 | Stop reason: SDUPTHER

## 2018-07-18 RX ORDER — SODIUM CHLORIDE, SODIUM LACTATE, POTASSIUM CHLORIDE, CALCIUM CHLORIDE 600; 310; 30; 20 MG/100ML; MG/100ML; MG/100ML; MG/100ML
INJECTION, SOLUTION INTRAVENOUS CONTINUOUS PRN
Status: DISCONTINUED | OUTPATIENT
Start: 2018-07-18 | End: 2018-07-18 | Stop reason: SDUPTHER

## 2018-07-18 RX ORDER — GLYCOPYRROLATE 0.2 MG/ML
INJECTION INTRAMUSCULAR; INTRAVENOUS PRN
Status: DISCONTINUED | OUTPATIENT
Start: 2018-07-18 | End: 2018-07-18 | Stop reason: SDUPTHER

## 2018-07-18 RX ORDER — DIPHENHYDRAMINE HYDROCHLORIDE 50 MG/ML
12.5 INJECTION INTRAMUSCULAR; INTRAVENOUS
Status: DISCONTINUED | OUTPATIENT
Start: 2018-07-18 | End: 2018-07-18 | Stop reason: HOSPADM

## 2018-07-18 RX ORDER — ONDANSETRON 2 MG/ML
INJECTION INTRAMUSCULAR; INTRAVENOUS PRN
Status: DISCONTINUED | OUTPATIENT
Start: 2018-07-18 | End: 2018-07-18 | Stop reason: SDUPTHER

## 2018-07-18 RX ORDER — HYDROCODONE BITARTRATE AND ACETAMINOPHEN 5; 325 MG/1; MG/1
2 TABLET ORAL EVERY 4 HOURS PRN
Status: DISCONTINUED | OUTPATIENT
Start: 2018-07-18 | End: 2018-07-20 | Stop reason: HOSPADM

## 2018-07-18 RX ORDER — BUPIVACAINE HYDROCHLORIDE AND EPINEPHRINE 5; 5 MG/ML; UG/ML
INJECTION, SOLUTION EPIDURAL; INTRACAUDAL; PERINEURAL PRN
Status: DISCONTINUED | OUTPATIENT
Start: 2018-07-18 | End: 2018-07-18 | Stop reason: HOSPADM

## 2018-07-18 RX ORDER — FENTANYL CITRATE 50 UG/ML
25 INJECTION, SOLUTION INTRAMUSCULAR; INTRAVENOUS EVERY 5 MIN PRN
Status: DISCONTINUED | OUTPATIENT
Start: 2018-07-18 | End: 2018-07-18 | Stop reason: HOSPADM

## 2018-07-18 RX ORDER — NALOXONE HYDROCHLORIDE 0.4 MG/ML
INJECTION, SOLUTION INTRAMUSCULAR; INTRAVENOUS; SUBCUTANEOUS PRN
Status: DISCONTINUED | OUTPATIENT
Start: 2018-07-18 | End: 2018-07-18 | Stop reason: SDUPTHER

## 2018-07-18 RX ORDER — SODIUM CHLORIDE 0.9 % (FLUSH) 0.9 %
10 SYRINGE (ML) INJECTION EVERY 12 HOURS SCHEDULED
Status: DISCONTINUED | OUTPATIENT
Start: 2018-07-18 | End: 2018-07-20 | Stop reason: HOSPADM

## 2018-07-18 RX ORDER — SODIUM CHLORIDE 9 MG/ML
INJECTION, SOLUTION INTRAVENOUS CONTINUOUS PRN
Status: COMPLETED | OUTPATIENT
Start: 2018-07-18 | End: 2018-07-18

## 2018-07-18 RX ORDER — HYDRALAZINE HYDROCHLORIDE 20 MG/ML
5 INJECTION INTRAMUSCULAR; INTRAVENOUS EVERY 10 MIN PRN
Status: DISCONTINUED | OUTPATIENT
Start: 2018-07-18 | End: 2018-07-18 | Stop reason: HOSPADM

## 2018-07-18 RX ORDER — ONDANSETRON 2 MG/ML
4 INJECTION INTRAMUSCULAR; INTRAVENOUS EVERY 6 HOURS PRN
Status: DISCONTINUED | OUTPATIENT
Start: 2018-07-18 | End: 2018-07-20 | Stop reason: HOSPADM

## 2018-07-18 RX ORDER — MAGNESIUM HYDROXIDE 1200 MG/15ML
LIQUID ORAL CONTINUOUS PRN
Status: DISCONTINUED | OUTPATIENT
Start: 2018-07-18 | End: 2018-07-18 | Stop reason: HOSPADM

## 2018-07-18 RX ORDER — PROPOFOL 10 MG/ML
INJECTION, EMULSION INTRAVENOUS PRN
Status: DISCONTINUED | OUTPATIENT
Start: 2018-07-18 | End: 2018-07-18 | Stop reason: SDUPTHER

## 2018-07-18 RX ADMIN — SODIUM CHLORIDE, SODIUM LACTATE, POTASSIUM CHLORIDE, AND CALCIUM CHLORIDE: 600; 310; 30; 20 INJECTION, SOLUTION INTRAVENOUS at 15:05

## 2018-07-18 RX ADMIN — MONTELUKAST SODIUM 10 MG: 10 TABLET, FILM COATED ORAL at 20:31

## 2018-07-18 RX ADMIN — HYDROMORPHONE HYDROCHLORIDE 1 MG: 2 INJECTION, SOLUTION INTRAMUSCULAR; INTRAVENOUS; SUBCUTANEOUS at 16:06

## 2018-07-18 RX ADMIN — FENTANYL CITRATE 50 MCG: 50 INJECTION INTRAMUSCULAR; INTRAVENOUS at 15:53

## 2018-07-18 RX ADMIN — SODIUM CHLORIDE: 9 INJECTION, SOLUTION INTRAVENOUS at 01:10

## 2018-07-18 RX ADMIN — HYDROMORPHONE HYDROCHLORIDE 1 MG: 2 INJECTION, SOLUTION INTRAMUSCULAR; INTRAVENOUS; SUBCUTANEOUS at 16:11

## 2018-07-18 RX ADMIN — FINASTERIDE 5 MG: 5 TABLET, FILM COATED ORAL at 08:50

## 2018-07-18 RX ADMIN — NALOXONE HYDROCHLORIDE 0.04 MG: 0.4 INJECTION, SOLUTION INTRAMUSCULAR; INTRAVENOUS; SUBCUTANEOUS at 17:20

## 2018-07-18 RX ADMIN — SODIUM CHLORIDE, SODIUM LACTATE, POTASSIUM CHLORIDE, AND CALCIUM CHLORIDE: 600; 310; 30; 20 INJECTION, SOLUTION INTRAVENOUS at 16:30

## 2018-07-18 RX ADMIN — CEFAZOLIN SODIUM 2 G: 2 SOLUTION INTRAVENOUS at 15:53

## 2018-07-18 RX ADMIN — CETIRIZINE HYDROCHLORIDE 10 MG: 10 TABLET, FILM COATED ORAL at 08:50

## 2018-07-18 RX ADMIN — THERA TABS 1 TABLET: TAB at 08:49

## 2018-07-18 RX ADMIN — METOPROLOL TARTRATE 1 MG: 5 INJECTION, SOLUTION INTRAVENOUS at 16:23

## 2018-07-18 RX ADMIN — ROCURONIUM BROMIDE 20 MG: 10 INJECTION, SOLUTION INTRAVENOUS at 16:15

## 2018-07-18 RX ADMIN — HYDROMORPHONE HYDROCHLORIDE 1 MG: 2 INJECTION, SOLUTION INTRAMUSCULAR; INTRAVENOUS; SUBCUTANEOUS at 16:21

## 2018-07-18 RX ADMIN — TERBINAFINE 250 MG: 250 TABLET ORAL at 08:50

## 2018-07-18 RX ADMIN — HYDROMORPHONE HYDROCHLORIDE 1 MG: 2 INJECTION, SOLUTION INTRAMUSCULAR; INTRAVENOUS; SUBCUTANEOUS at 16:15

## 2018-07-18 RX ADMIN — FENTANYL CITRATE 50 MCG: 50 INJECTION INTRAMUSCULAR; INTRAVENOUS at 15:35

## 2018-07-18 RX ADMIN — GLYCOPYRROLATE 0.2 MG: 0.2 INJECTION INTRAMUSCULAR; INTRAVENOUS at 15:28

## 2018-07-18 RX ADMIN — GLYCOPYRROLATE 0.4 MG: 0.2 INJECTION INTRAMUSCULAR; INTRAVENOUS at 17:02

## 2018-07-18 RX ADMIN — SUGAMMADEX 200 MG: 100 INJECTION, SOLUTION INTRAVENOUS at 17:14

## 2018-07-18 RX ADMIN — OXYCODONE HYDROCHLORIDE AND ACETAMINOPHEN 1000 MG: 500 TABLET ORAL at 08:49

## 2018-07-18 RX ADMIN — CHOLECALCIFEROL TAB 25 MCG (1000 UNIT) 2000 UNITS: 25 TAB at 08:49

## 2018-07-18 RX ADMIN — PIPERACILLIN SODIUM AND TAZOBACTAM SODIUM 3.38 G: 3; .375 INJECTION, POWDER, LYOPHILIZED, FOR SOLUTION INTRAVENOUS at 08:45

## 2018-07-18 RX ADMIN — MIDAZOLAM HYDROCHLORIDE 2 MG: 1 INJECTION INTRAMUSCULAR; INTRAVENOUS at 15:28

## 2018-07-18 RX ADMIN — TAMSULOSIN HYDROCHLORIDE 0.8 MG: 0.4 CAPSULE ORAL at 08:50

## 2018-07-18 RX ADMIN — PROPOFOL 150 MG: 10 INJECTION, EMULSION INTRAVENOUS at 15:35

## 2018-07-18 RX ADMIN — ONDANSETRON 4 MG: 2 INJECTION INTRAMUSCULAR; INTRAVENOUS at 16:51

## 2018-07-18 RX ADMIN — DEXAMETHASONE SODIUM PHOSPHATE 8 MG: 4 INJECTION, SOLUTION INTRAMUSCULAR; INTRAVENOUS at 15:34

## 2018-07-18 RX ADMIN — BUPROPION HYDROCHLORIDE 150 MG: 150 TABLET, FILM COATED, EXTENDED RELEASE ORAL at 08:50

## 2018-07-18 RX ADMIN — GABAPENTIN 100 MG: 100 CAPSULE ORAL at 20:31

## 2018-07-18 RX ADMIN — PIPERACILLIN SODIUM AND TAZOBACTAM SODIUM 3.38 G: 3; .375 INJECTION, POWDER, LYOPHILIZED, FOR SOLUTION INTRAVENOUS at 01:05

## 2018-07-18 RX ADMIN — GABAPENTIN 100 MG: 100 CAPSULE ORAL at 08:49

## 2018-07-18 RX ADMIN — ROCURONIUM BROMIDE 40 MG: 10 INJECTION, SOLUTION INTRAVENOUS at 15:35

## 2018-07-18 RX ADMIN — SODIUM CHLORIDE: 9 INJECTION, SOLUTION INTRAVENOUS at 20:31

## 2018-07-18 ASSESSMENT — PULMONARY FUNCTION TESTS
PIF_VALUE: 20
PIF_VALUE: 12
PIF_VALUE: 18
PIF_VALUE: 3
PIF_VALUE: 21
PIF_VALUE: 1
PIF_VALUE: 18
PIF_VALUE: 29
PIF_VALUE: 30
PIF_VALUE: 25
PIF_VALUE: 16
PIF_VALUE: 22
PIF_VALUE: 20
PIF_VALUE: 11
PIF_VALUE: 28
PIF_VALUE: 1
PIF_VALUE: 0
PIF_VALUE: 18
PIF_VALUE: 18
PIF_VALUE: 22
PIF_VALUE: 28
PIF_VALUE: 30
PIF_VALUE: 16
PIF_VALUE: 8
PIF_VALUE: 29
PIF_VALUE: 18
PIF_VALUE: 29
PIF_VALUE: 29
PIF_VALUE: 21
PIF_VALUE: 20
PIF_VALUE: 19
PIF_VALUE: 18
PIF_VALUE: 30
PIF_VALUE: 8
PIF_VALUE: 19
PIF_VALUE: 18
PIF_VALUE: 14
PIF_VALUE: 30
PIF_VALUE: 28
PIF_VALUE: 23
PIF_VALUE: 21
PIF_VALUE: 18
PIF_VALUE: 29
PIF_VALUE: 2
PIF_VALUE: 17
PIF_VALUE: 12
PIF_VALUE: 30
PIF_VALUE: 3
PIF_VALUE: 29
PIF_VALUE: 20
PIF_VALUE: 30
PIF_VALUE: 0
PIF_VALUE: 20
PIF_VALUE: 13
PIF_VALUE: 21
PIF_VALUE: 20
PIF_VALUE: 31
PIF_VALUE: 29
PIF_VALUE: 29
PIF_VALUE: 0
PIF_VALUE: 28
PIF_VALUE: 20
PIF_VALUE: 30
PIF_VALUE: 0
PIF_VALUE: 17
PIF_VALUE: 30
PIF_VALUE: 16
PIF_VALUE: 29
PIF_VALUE: 1
PIF_VALUE: 28
PIF_VALUE: 18
PIF_VALUE: 29
PIF_VALUE: 4
PIF_VALUE: 29
PIF_VALUE: 21
PIF_VALUE: 30
PIF_VALUE: 30
PIF_VALUE: 20
PIF_VALUE: 13
PIF_VALUE: 30
PIF_VALUE: 19
PIF_VALUE: 29
PIF_VALUE: 17
PIF_VALUE: 29
PIF_VALUE: 33
PIF_VALUE: 18
PIF_VALUE: 29
PIF_VALUE: 18
PIF_VALUE: 19
PIF_VALUE: 14
PIF_VALUE: 18
PIF_VALUE: 14
PIF_VALUE: 4
PIF_VALUE: 18
PIF_VALUE: 6
PIF_VALUE: 17
PIF_VALUE: 29
PIF_VALUE: 11
PIF_VALUE: 19
PIF_VALUE: 20
PIF_VALUE: 29
PIF_VALUE: 0
PIF_VALUE: 30
PIF_VALUE: 20
PIF_VALUE: 19
PIF_VALUE: 12
PIF_VALUE: 21
PIF_VALUE: 31
PIF_VALUE: 30
PIF_VALUE: 30
PIF_VALUE: 21
PIF_VALUE: 22

## 2018-07-18 ASSESSMENT — PAIN SCALES - GENERAL: PAINLEVEL_OUTOF10: 0

## 2018-07-18 NOTE — PROGRESS NOTES
Occupational Therapy  Facility/Department: [facility] 5T ORTHO/NEURO  Daily Treatment Note  NAME: Jos Robin. : 1950  MRN: [id number]    Date of Service: 2018    Discharge Recommendations: Jos Robin. scored a 21/24 on the AM-PAC ADL Inpatient form. Current research shows that an AM-PAC score of 18 or greater is typically associated with a discharge to the patient's home setting. Based on the patients AM-PAC score and their current ADL deficits, it is recommended that the patient have 2-3 sessions per week of Occupational Therapy at d/c to increase the patients independence. Patient Diagnosis(es): The primary encounter diagnosis was Hydrocephalus. Diagnoses of Ataxia and Urinary incontinence, unspecified type were also pertinent to this visit. has a past medical history of Allergic rhinitis; Erectile dysfunction; Hyperlipidemia; Hypertension; Screening for abdominal aortic aneurysm (AAA) performed; and Unspecified sleep apnea. has no past surgical history on file. Restrictions  Position Activity Restriction  Other position/activity restrictions: Up as tolerated  Subjective   General  Chart Reviewed: Yes  Patient assessed for rehabilitation services?: Yes  Additional Pertinent Hx: Pt admitted 18 with LE weakness, decreased balance and difficulty urinating. Head CT=No acute intracranial abnormality. MRI Brain= No acute intracranial abnormality. No evidence of acute infarction. PMH includes: HTN, sleep apnea  Family / Caregiver Present: Yes (family)  Referring Practitioner: Marcelo Leonard  Diagnosis: Back pain  Subjective  Subjective: Pt in chair, agreeable to work with OT.   Pain Assessment  Patient Currently in Pain: Denies  Vital Signs  Patient Currently in Pain: Denies   Orientation-WNL     Objective    ADL  Grooming: Stand by assistance (SBA while pt washed hands at sink)    Instrumental ADL's  Instrumental ADLs:  (Pt reached into upper shelf for item, straightened covers with SBA and cues for safe positioning)     Standing Balance  Time: ~3 minutes  Activity: functional tasks in room    Toilet Transfers  Toilet - Technique: Ambulating (with rolling walker to/from bathroom with SBA/CGA)  Equipment Used: Standard toilet (grab bar-has sink at home)  Toilet Transfer: Contact guard assistance (CGA/SBA)  Toilet Transfers Comments: practiced 2x     Transfers  Transfer Comments: pt transferred to sofa with rolling walker and SBA              Assessment   Performance deficits / Impairments: Decreased functional mobility ; Decreased ADL status; Decreased endurance;Decreased balance  Assessment: Pt demonstrated ability to do functional tasks in room with SBA, rolling walker and cues for positioining. Pt is motivated to increase functional indep and strength. Treatment Diagnosis: Impaired ADL and functional mobility  Prognosis: Good  Activity Tolerance  Activity Tolerance: Patient Tolerated treatment well  Safety Devices  Safety Devices in place: Yes  Type of devices: Left in chair;Nurse notified;Call light within reach; Chair alarm in place          Plan   Plan  Times per week: 5-7  Times per day: Daily  Current Treatment Recommendations: Balance Training, Functional Mobility Training, Endurance Training, Self-Care / ADL, Neuromuscular Re-education  G-Code     OutComes Score                                           AM-PAC Score        AM-[facility] Inpatient Daily Activity Raw Score: 21  AM-PAC Inpatient ADL T-Scale Score : 44.27  ADL Inpatient CMS 0-100% Score: 32.79  ADL Inpatient CMS G-Code Modifier : CJ    Goals  Short term goals  Time Frame for Short term goals: Discharge  Short term goal 1: Transfer to/from toilet with CG-7/18 goal met, updated goal:  pt to transfer to commode with SBA  Short term goal 2: Perform lower body dressing with min assist-7/17 goal met, updated goal:  pt to retrieve clothes from closet and dress lower body indep-7/18 goal not addressed  Short term goal 3: Stance wirh SBA x4 min while engaging in ADL/functional mobiity-7/18 goal not met  Patient Goals   Patient goals : Return home. Be independent.        Therapy Time   Individual Concurrent Group Co-treatment   Time In 1408         Time Out 6861         Minutes 34           Timed Code Treatment Minutes:  34    Total Treatment Minutes:  34       If patient is d/c prior to next treatment session, this note will serve as the discharge summary  Jamie Cason, [address] [facility]

## 2018-07-18 NOTE — PROGRESS NOTES
Pt left to surgery at 1506. Pt tried to void before leaving, but felt he couldn't go. No fluids hanging. Pants and glasses removed before leaving. Last food or drink at 0830 with meds.

## 2018-07-18 NOTE — ANESTHESIA POSTPROCEDURE EVALUATION
Department of Anesthesiology  Postprocedure Note    Patient: Timothy Briseno MRN: [id number]  YOB: 1950  Date of evaluation: 7/18/2018  Time:  6:10 PM     Procedure Summary     Date:  07/18/18 Room / Location:  [facility] OR [address] [place] [address] OR    Anesthesia Start:  0130 Anesthesia Stop:  6028    Procedures:       VENTRICULAR PERITONEAL SHUNT INSERTION RIGHT SIDE; (N/A )      OPENING AND CLOSING FOR VENTRICULAR PERITONEAL SHUNT (N/A ) Diagnosis:  (HYDRACEPHALUS)    Surgeon:  Soto Mai MD; Pam Chaves MD Responsible Provider:  Redd Galicia MD    Anesthesia Type:  general ASA Status:  3          Anesthesia Type: general    Kait Phase I: Kait Score: 8    Kait Phase II:      Last vitals: Reviewed and per EMR flowsheets. Anesthesia Post Evaluation    Patient location during evaluation: bedside  Patient participation: complete - patient participated  Level of consciousness: awake and alert  Pain scale: please refer to nursing notes.   Airway patency: patent  Nausea & Vomiting: no nausea and no vomiting  Complications: no  Cardiovascular status: hemodynamically unstable  Respiratory status: spontaneous ventilation  Hydration status: stable

## 2018-07-18 NOTE — ANESTHESIA PRE PROCEDURE
Oral Daily PRN Julia Ordaz MD        ondansetron [facility]) injection 4 mg  4 mg Intravenous Q6H PRN Julia Ordaz MD         Facility-Administered Medications Ordered in Other Encounters   Medication Dose Route Frequency Provider Last Rate Last Dose    lactated ringers infusion    Continuous PRN Slick Chaudhary APRN - CRNA           Allergies: Allergies   Allergen Reactions    Lisinopril Other (See Comments)     cough       Problem List:    Patient Active Problem List   Diagnosis Code    Wrist arthritis M19.039    Sprain of lumbar region S33. 5XXA    Hypertrophy of prostate without urinary obstruction and other lower urinary tract symptoms (LUTS) N40.0    Cardiac dysrhythmia I49.9    Onychomycosis B35.1    Obstructive sleep apnea G47.33    Personal history of other diseases of digestive system Z87.19    Erectile dysfunction N52.9    Vitamin D deficiency E55.9    Essential hypertension I10    Mixed hyperlipidemia E78.2    Persistent depressive disorder F34.1    Dysthymia F34.1    Non-seasonal allergic rhinitis due to pollen J30.1    Pulmonary nodule R91.1    JULIOCESAR (acute kidney injury) ([facility] [address] N17.9    Traumatic rhabdomyolysis (HCC) T79. 6XXA    Acute renal failure due to rhabdomyolysis (HCC) N17.9, D21.9    Frequent falls R29.6    Functional gait abnormality R26.89    Back pain M54.9    Normal pressure hydrocephalus G91.2    Hyponatremia E87.1    Hydrocephalus U78.8    Umbilical hernia, incarcerated K42.0       Past Medical History:        Diagnosis Date    Allergic rhinitis     Erectile dysfunction     Hyperlipidemia     Hypertension     [facility] abdominal aortic aneurysm [facility]02/[facility]    Unspecified sleep apnea        Past Surgical History:  History reviewed. No pertinent surgical history.     Social History:    Social History   Substance Use Topics    Smoking status: Former Smoker    Smokeless tobacco: Former User    Alcohol use 2.4 07/18/2018       HCG (If Applicable): No results found for: PREGTESTUR, PREGSERUM, HCG, HCGQUANT     ABGs: No results found for: PHART, PO2ART, XKA7VCT, FLK8SAW, BEART, P6TGIXFM     Type & Screen (If Applicable):  No results found for: LABABO, [address]    Anesthesia Evaluation  Patient summary reviewed no history of anesthetic complications:   Airway: Mallampati: II  TM distance: >3 FB   Neck ROM: full  Mouth opening: > = 3 FB Dental:          Pulmonary:normal exam    (+) sleep apnea:                             Cardiovascular:    (+) hypertension:, dysrhythmias:,                   Neuro/Psych:   (+) neuromuscular disease:, psychiatric history:            GI/Hepatic/Renal:             Endo/Other:                     Abdominal:           Vascular:                                        Anesthesia Plan      general     ASA 3       Induction: intravenous. Anesthetic plan and risks discussed with patient. Plan discussed with CRNA.     Attending anesthesiologist reviewed and agrees with Malcolm Rubalcava MD   7/18/2018

## 2018-07-18 NOTE — PROGRESS NOTES
Physical Therapy  Daily Treatment Note    Discharge Recommendations: Brandy Morton. scored a 18/24 on the AM-PAC short mobility form. Current research shows that an AM-PAC score of 18 or greater is typically associated with a discharge to the patient's home setting. Based on the patients AM-PAC score and their current functional mobility deficits, it is recommended that the patient have 2-3 sessions per week of Physical Therapy at d/c to increase the patients independence. HOME HEALTH CARE: LEVEL 3 SAFETY  - Initial home health evaluation to occur within 24-48 hours, in patient home   - Therapy evaluations in home within 24-48 hours of discharge; including DME and home safety   - Frontload therapy 5 days, then 3x a week   - Therapy to evaluate if patient has [address] needs for personal care   -  evaluation within 24-48 hours, includes evaluation of resources and insurance to determine AL, [place], LTC, and Medicaid options     Equipment Needs: No new needs (pt has a wheeled walker)    Chart Reviewed: Yes     Other position/activity restrictions: Up as tolerated   Additional Pertinent Hx: 79-year-old gentleman admitted on 7/13/18 with difficulty with balance and urge incontinence. CT Head: No acute intracranial abnormality. Diagnosis: Back Pain    Treatment Diagnosis: decreased balance, decreased cognition     Subjective: Pt in chair initially. Wife arrived at end of session. Pt hopes to be D/Nael home tomorrow. Agreeable to using walker at home until he is stronger. Has 3 steps into house. Rail on L, holds onto door frame on R. Feels he's getting stronger each day. Walked in halls with RN and family last night.     Pain: Denies (c/o joint stiffness, but no pain)    Objective:    Transfers  Sit to stand: SBA from chair (x 4 trials)  Stand to sit: SBA into erica  Other: Min cues only initially for hand placement with transfers    Ambulation  Assistance Level: CGA  Assistive device: Wheeled walker  Distance: 160 ft x 2. Seated rest  Between walks. Quality of gait: Decreased step height. Slow, steady with walker. Other: Pt reports being aware of decreased step height due to [facility] damage\" and is cautious because of this. Stairs  Up/down 2 steps x 2 (4 total) with B rails (to simulate rail and door frame) CGA    Exercises  20 reps B standing exercises with UE support of walker and CGA: heel raises/toe raises, marching; 10 reps B semi-squats. Balance  Static stance with walker SBA  Ambulation with wheeled walker CGA    Patient Education  Calling for assist with needs. Expressed understanding. Hand placement with transfers. Demonstrated safely after min cues only initially. Safety Devices  Pt left with needs in reach. In chair with chair alarm on. RN updated. Wife present. Assessment:  Pt with improved endurance today. SBA for transfers. CGA for ambulation and stairs. Good effort and participation. Pt seems to have good insight to his limitations and is slow and cautious with mobility. Would benefit from continued skilled PT to maximize independence.      -PAC score  AM-PAC Inpatient Mobility Raw Score : 18  -PAC Inpatient T-Scale Score : 43.63  Mobility Inpatient CMS 0-100% Score: 46.58  Mobility Inpatient CMS G-Code Modifier : CK    Goals: (as determined and assessed by primary PT)  Time Frame for Short term goals: d/c  Short term goal 1: pt will be able to participate in bed mobility assessment   Short term goal 2: pt will be able to perform transfers with CGA to RW   Short term goal 3: pt will be able to ambulate 13' with RW and CGA  Short term goal 4: pt will be able to ascend/descend 3 stairs with/without railing and Mod A     Plan:  Times per week: 5-7; Times per day: Daily  Current Treatment Recommendations: Strengthening, ROM, Balance Training, Transfer Training, Functional Mobility Training, Gait Training, Safety Education & Training, Neuromuscular Re-education, Home

## 2018-07-18 NOTE — PROGRESS NOTES
07/18/18   0753   PROTIME  11.6   INR  1.02       Patient Active Problem List    Diagnosis Date Noted    Hydrocephalus     Umbilical hernia, incarcerated     Normal pressure hydrocephalus 07/16/2018    Hyponatremia 07/16/2018    Back pain 07/13/2018    Frequent falls 07/05/2018    Functional gait abnormality 07/05/2018    JULIOCESAR (acute kidney injury) ([facility] [address] 07/02/2018    Traumatic rhabdomyolysis ([facility] [address] 07/02/2018    Acute renal failure due to rhabdomyolysis ([facility] [address] 07/02/2018    Pulmonary nodule 02/25/2018    Non-seasonal allergic rhinitis due to pollen 01/09/2017    Dysthymia 07/27/2016    Persistent depressive disorder 07/19/2016    Mixed hyperlipidemia 04/14/2016    Essential hypertension 08/17/2015    Vitamin D deficiency 05/09/2011    Wrist arthritis 07/22/2010    Sprain of lumbar region 07/22/2010    Hypertrophy of prostate without urinary obstruction and other lower urinary tract symptoms (LUTS) 07/22/2010    Cardiac dysrhythmia 07/22/2010    Onychomycosis 07/22/2010    Obstructive sleep apnea 07/22/2010    Personal history of other diseases of digestive system 07/22/2010    Erectile dysfunction 07/22/2010       Assessment:  Patient is a 80 y.o. y/o male with increasing confusion, gait instabulity and urinary incontinence. CT Head revealed possible NPH. Exam improved significantly s/p LP. Plan:  1. Neurologically stable  2. Neurologic exams frequency:  - Floor: Q4H  3. For change in exam MUST contact neurosurgery team along with critical care or primary team  4. Back Pain: MRI Lumbar- severe left L4-5 and L5-S1 neural foraminal stenosis  5. NPH:  - High volume LP- Opening pressure was 24 cm H2O and closing was 15 cm H2O   - Patient mental status back to baseline, mobility improved, and no longer having urinary incontinence  - Plan for  shunt today at 3pm  6. Fevers:  - Medical team w/u for infection  - Blood culture- NGTD  - Urine culture- NGTD  - CSF culture- pending  7.  Pain control: Managed by medical team  8. Mobility:  - Advance as tolerates  - PT/OT consulted, appreciate recs  9. Diet: Advance as tolerates  10. Will follow inpatient. Please call with any questions or decline in neurological status    DISPO:  shunt today. Plan for DC to home tomorrow. Patient was seen and examined with Dr. Lyle Cifuentes who agrees with above assessment and plan.      Electronically signed by: BRANDI Ingram, 7/18/2018 9:18 AM  [phone]

## 2018-07-18 NOTE — PLAN OF CARE
Problem: Falls - Risk of:  Goal: Will remain free from falls  Will remain free from falls   Outcome: Ongoing  Calls out appropriately. Bed/chair alarm on. Near RN station. Call light/belongings within reach. Will continue to monitor. Problem: Pain:  Goal: Pain level will decrease  Pain level will decrease   Outcome: Ongoing  Denies pain. Will continue to monitor. Problem: Mobility - Impaired:  Goal: Able to ambulate with minimal assistance  Able to ambulate with minimal assistance   Outcome: Ongoing  Tolerates ambulation well x1 assist with walker. Will continue to monitor.

## 2018-07-19 LAB
CSF INTERPRETATION: NORMAL
OLIGOCLONAL BANDS CSF: 0
OLIGOCLONAL BANDS: 0

## 2018-07-19 PROCEDURE — 94660 CPAP INITIATION&MGMT: CPT

## 2018-07-19 PROCEDURE — 6360000002 HC RX W HCPCS: Performed by: INTERNAL MEDICINE

## 2018-07-19 PROCEDURE — 94761 N-INVAS EAR/PLS OXIMETRY MLT: CPT

## 2018-07-19 PROCEDURE — 6360000002 HC RX W HCPCS: Performed by: NURSE PRACTITIONER

## 2018-07-19 PROCEDURE — 1200000000 HC SEMI PRIVATE

## 2018-07-19 PROCEDURE — 2580000003 HC RX 258: Performed by: NURSE PRACTITIONER

## 2018-07-19 PROCEDURE — 6370000000 HC RX 637 (ALT 250 FOR IP): Performed by: FAMILY MEDICINE

## 2018-07-19 PROCEDURE — 97110 THERAPEUTIC EXERCISES: CPT

## 2018-07-19 PROCEDURE — 6370000000 HC RX 637 (ALT 250 FOR IP): Performed by: STUDENT IN AN ORGANIZED HEALTH CARE EDUCATION/TRAINING PROGRAM

## 2018-07-19 PROCEDURE — 6370000000 HC RX 637 (ALT 250 FOR IP): Performed by: INTERNAL MEDICINE

## 2018-07-19 PROCEDURE — 92526 ORAL FUNCTION THERAPY: CPT

## 2018-07-19 PROCEDURE — 2580000003 HC RX 258: Performed by: INTERNAL MEDICINE

## 2018-07-19 RX ORDER — BISMUTH SUBSALICYLATE 262 MG/1
524 TABLET, CHEWABLE ORAL DAILY PRN
Status: DISCONTINUED | OUTPATIENT
Start: 2018-07-19 | End: 2018-07-20 | Stop reason: HOSPADM

## 2018-07-19 RX ADMIN — BUPROPION HYDROCHLORIDE 150 MG: 150 TABLET, FILM COATED, EXTENDED RELEASE ORAL at 08:29

## 2018-07-19 RX ADMIN — GABAPENTIN 100 MG: 100 CAPSULE ORAL at 20:42

## 2018-07-19 RX ADMIN — ACETAMINOPHEN 1000 MG: 500 TABLET, FILM COATED ORAL at 20:41

## 2018-07-19 RX ADMIN — BISMUTH SUBSALICYLATE 524 MG: 262 TABLET, CHEWABLE ORAL at 16:40

## 2018-07-19 RX ADMIN — PIPERACILLIN SODIUM AND TAZOBACTAM SODIUM 3.38 G: 3; .375 INJECTION, POWDER, LYOPHILIZED, FOR SOLUTION INTRAVENOUS at 08:28

## 2018-07-19 RX ADMIN — PIPERACILLIN SODIUM AND TAZOBACTAM SODIUM 3.38 G: 3; .375 INJECTION, POWDER, LYOPHILIZED, FOR SOLUTION INTRAVENOUS at 01:18

## 2018-07-19 RX ADMIN — TERBINAFINE 250 MG: 250 TABLET ORAL at 08:37

## 2018-07-19 RX ADMIN — Medication 10 ML: at 20:42

## 2018-07-19 RX ADMIN — CETIRIZINE HYDROCHLORIDE 10 MG: 10 TABLET, FILM COATED ORAL at 08:29

## 2018-07-19 RX ADMIN — THERA TABS 1 TABLET: TAB at 08:29

## 2018-07-19 RX ADMIN — ACETAMINOPHEN 1000 MG: 500 TABLET, FILM COATED ORAL at 10:58

## 2018-07-19 RX ADMIN — OXYCODONE HYDROCHLORIDE AND ACETAMINOPHEN 1000 MG: 500 TABLET ORAL at 08:29

## 2018-07-19 RX ADMIN — ENOXAPARIN SODIUM 40 MG: 40 INJECTION SUBCUTANEOUS at 18:22

## 2018-07-19 RX ADMIN — MONTELUKAST SODIUM 10 MG: 10 TABLET, FILM COATED ORAL at 20:42

## 2018-07-19 RX ADMIN — CHOLECALCIFEROL TAB 25 MCG (1000 UNIT) 2000 UNITS: 25 TAB at 08:29

## 2018-07-19 RX ADMIN — GABAPENTIN 100 MG: 100 CAPSULE ORAL at 08:29

## 2018-07-19 RX ADMIN — Medication 10 ML: at 08:30

## 2018-07-19 RX ADMIN — TAMSULOSIN HYDROCHLORIDE 0.8 MG: 0.4 CAPSULE ORAL at 08:29

## 2018-07-19 RX ADMIN — GABAPENTIN 100 MG: 100 CAPSULE ORAL at 14:14

## 2018-07-19 RX ADMIN — FINASTERIDE 5 MG: 5 TABLET, FILM COATED ORAL at 08:29

## 2018-07-19 ASSESSMENT — PAIN DESCRIPTION - PAIN TYPE
TYPE: ACUTE PAIN
TYPE: ACUTE PAIN;SURGICAL PAIN
TYPE: SURGICAL PAIN

## 2018-07-19 ASSESSMENT — PAIN DESCRIPTION - LOCATION
LOCATION: HEAD
LOCATION: HEAD
LOCATION: HEAD;ABDOMEN

## 2018-07-19 ASSESSMENT — PAIN DESCRIPTION - ORIENTATION: ORIENTATION: MID;LOWER;RIGHT

## 2018-07-19 ASSESSMENT — PAIN SCALES - GENERAL
PAINLEVEL_OUTOF10: 5
PAINLEVEL_OUTOF10: 0
PAINLEVEL_OUTOF10: 0
PAINLEVEL_OUTOF10: 3
PAINLEVEL_OUTOF10: 3
PAINLEVEL_OUTOF10: 0

## 2018-07-19 ASSESSMENT — PAIN DESCRIPTION - DESCRIPTORS
DESCRIPTORS: HEADACHE
DESCRIPTORS: ACHING;CONSTANT
DESCRIPTORS: ACHING;TENDER

## 2018-07-19 ASSESSMENT — PAIN DESCRIPTION - FREQUENCY
FREQUENCY: INTERMITTENT
FREQUENCY: CONTINUOUS

## 2018-07-19 ASSESSMENT — PAIN DESCRIPTION - ONSET
ONSET: GRADUAL
ONSET: ON-GOING

## 2018-07-19 ASSESSMENT — PAIN DESCRIPTION - PROGRESSION
CLINICAL_PROGRESSION: NOT CHANGED
CLINICAL_PROGRESSION: GRADUALLY WORSENING
CLINICAL_PROGRESSION: GRADUALLY WORSENING

## 2018-07-19 NOTE — PROGRESS NOTES
assessment if indicated. MBS results- not warranted at this time     Pain:Denies     Current Diet : regular with thin liquids     Treatment:  Pt seen bedside to address the following goals:  1. The patient will tolerate recommended diet with no clinical s/s aspiration. 7/15/18: Pt seen while fully upright in bed. Pt wife reports difficulty with cookier earlier (too dry?). Discussed strategies: avoid dry foods or take drink/swallow to wet mouth before taking a bite of a dry food. Pt took nectar via straw w/ no clinical s/s of aspiration. Trials with sips of water resulted in immediate throat clear on 2/2 swallows. Cont goal.  7/17- goal met-  Pt alert and oriented- no periods of confusion. Pt analyzed with lunch- pt consumed pot roast, potatoes, chocolate milk via straw and water (thin) by cup and straw with no s/s aspiration. Pt states some difficulty with dry items, but reports taking a drink to moisten and then has no problems swallowing. Pt noted to throat clear through out the meal, but pt and wife report this is his baseline. Lungs are clear per chart. RN has no concerns re: swallowing. Recommend upgrade liquids to thin- con't goal   7/19: RN with no concerns for swallowing. Pt throat clearing prior to PO trials. Pt/spouse state pt clears throat at baseline. Pt analyzed with liquids via cup and consecutive swallows by straw, occasional throat clear, no coughing, voice remained clear. Pt demonstrated adequate mastication of solids, no oral residue. Pt/spouse to monitor throat clearing more thoroughly this date and if more related to when taking PO, will notify staff or this SLP. Cont goal    2. The patient will tolerate instrumental swallowing procedure, if indicated. 7/15/18: Given history of dysphagia, recommend proceeding with MBS. Cont goal.  3.  The patient/caregiver will demonstrate understanding of recommendations for improved swallowing safety.   7/14/18:  SLP educated pt and son re:

## 2018-07-19 NOTE — PROGRESS NOTES
Physical Therapy  Facility/Department: [facility] 5T ORTHO/NEURO  Daily Treatment Note  NAME: Bhakti Lewis. : 1950  MRN: [id number]    Date of Service: 2018    Discharge Recommendations:  Patient would benefit from continued therapy after discharge   Bhakti Salazar scored a  on the AM-PAC short mobility form. Current research shows that an AM-PAC score of 18 or greater is typically associated with a discharge to the patient's home setting. Based on the patients AM-PAC score and their current functional mobility deficits, it is recommended that the patient have 2-3 sessions per week of Physical Therapy at d/c to increase the patients independence. Patient Diagnosis(es): The primary encounter diagnosis was Hydrocephalus. Diagnoses of Ataxia and Urinary incontinence, unspecified type were also pertinent to this visit. has a past medical history of Allergic rhinitis; Erectile dysfunction; Hyperlipidemia; Hypertension; Screening for abdominal aortic aneurysm (AAA) performed; and Unspecified sleep apnea. has a past surgical history that includes other surgical history (N/A, 2018). Restrictions  Position Activity Restriction  Other position/activity restrictions: Up as tolerated  Subjective   General  Chart Reviewed: Yes  Additional Pertinent Hx: 27-year-old gentleman admitted on 18 with difficulty with balance and urge incontinence. CT Head: No acute intracranial abnormality. Family / Caregiver Present: Yes (son )  Subjective  Subjective: Pt found sitting up in chair upon arrival, denying pain and agreebale to therapy.    Pain Screening  Patient Currently in Pain: Yes  Pain Assessment  Pain Assessment: 0-10  Pain Level: 5  Pain Type: Acute pain  Pain Location: Head  Pain Descriptors: Aching;Constant  Clinical Progression: Not changed  Vital Signs  Patient Currently in Pain: Yes       Orientation WFL     Objective   Bed mobility  Supine to Sit: Moderate assistance (of trunk)  Sit

## 2018-07-19 NOTE — PROGRESS NOTES
hydrocephalus  Active Problems:    JULIOCESAR (acute kidney injury) ([facility] [address] resolved    Back pain    Hyponatremia resolved    Umbilical hernia, incarcerated s/p repair     shunt placed last night. Still a little unsteady. Continue PT, OT. Home with [facility] tomorrow if continued improvement.     Diet: DIET GENERAL;  Code:Full Code  DVT PPX  Lovenox    Karthik Fortune MD   7/19/2018 12:35 PM

## 2018-07-19 NOTE — OP NOTE
incision was made and the catheter was retrieved. It was then tunneled the remainder of the distance to the cranial incision. The dura was coagulated and opened in a cruciate fashion. The underlying sherman was coagulated and incised. The ventricular catheter was then passed into the right frontal horn to the foramen of Triplett and the inner stylet was removed with immediate egress of CSF after using a guide to ensure placement. The catheter was then secured to the pericranium with 0 silk. The valve was attached to the ventricular catheter and then the peritoneal catheter, each attachment was secured with O silk ties. The valve was then placed in the subgaleal pocket and the excess catheter was pulled through the abdominal incision. The was steady CSF flow from the end of the peritoneal catheter. The peritoneal catheter was then placed in to the abdomen laparoscopically by the general surgery team, the details of this procedure will be dictated separately by Dr. Jose Caldwell. The wound was irrigated copiously with antibiotic impregnated solution. A peice of gelfoam was place epidurally over the dural opening. The galea was closed with 2-0 Vicryl sutures. The skin was closed with staples. The counter incision was closed with 3-0 Vicryl and staples. Telfa and a dry sterile dressing were then applied. The patient awoke in the operating room and was extubated. The patient was brought to the recovery room in good condition with stable vital signs. There were no complications. All needle, instrument, and sponge counts were correct. In accordance with CMS guidelines, I attest that I was present for the entire procedure from the creation of the skin incision to the closure. ESTIMATED BLOOD LOSS: minimal    COMPLICATIONS: No complications apparent. DISPOSITION: The patient was transferred to the PACU in stable condition, awake, alert, and moving all extremities on command.     Dictated by: Jelani Cornejo MD

## 2018-07-20 ENCOUNTER — HOSPITAL ENCOUNTER (INPATIENT)
Age: 68
LOS: 7 days | Discharge: HOME | DRG: 091 | End: 2018-07-27
Attending: PHYSICAL MEDICINE & REHABILITATION | Admitting: PHYSICAL MEDICINE & REHABILITATION
Payer: MEDICARE

## 2018-07-20 VITALS
OXYGEN SATURATION: 95 % | RESPIRATION RATE: 16 BRPM | WEIGHT: 245 LBS | TEMPERATURE: 99.4 F | HEIGHT: 74 IN | BODY MASS INDEX: 31.44 KG/M2 | HEART RATE: 115 BPM | SYSTOLIC BLOOD PRESSURE: 120 MMHG | DIASTOLIC BLOOD PRESSURE: 81 MMHG

## 2018-07-20 DIAGNOSIS — G91.2 NPH (NORMAL PRESSURE HYDROCEPHALUS) (HCC): Primary | ICD-10-CM

## 2018-07-20 PROBLEM — N17.9 AKI (ACUTE KIDNEY INJURY) (HCC): Status: RESOLVED | Noted: 2018-07-02 | Resolved: 2018-07-20

## 2018-07-20 LAB
BLOOD CULTURE, ROUTINE: NORMAL
CULTURE, BLOOD 2: NORMAL

## 2018-07-20 PROCEDURE — G8987 SELF CARE CURRENT STATUS: HCPCS

## 2018-07-20 PROCEDURE — 6370000000 HC RX 637 (ALT 250 FOR IP): Performed by: PHYSICAL MEDICINE & REHABILITATION

## 2018-07-20 PROCEDURE — 94010 BREATHING CAPACITY TEST: CPT

## 2018-07-20 PROCEDURE — 6370000000 HC RX 637 (ALT 250 FOR IP)

## 2018-07-20 PROCEDURE — 94761 N-INVAS EAR/PLS OXIMETRY MLT: CPT

## 2018-07-20 PROCEDURE — 6360000002 HC RX W HCPCS: Performed by: FAMILY MEDICINE

## 2018-07-20 PROCEDURE — 92526 ORAL FUNCTION THERAPY: CPT

## 2018-07-20 PROCEDURE — 94660 CPAP INITIATION&MGMT: CPT

## 2018-07-20 PROCEDURE — 6370000000 HC RX 637 (ALT 250 FOR IP): Performed by: STUDENT IN AN ORGANIZED HEALTH CARE EDUCATION/TRAINING PROGRAM

## 2018-07-20 PROCEDURE — 6370000000 HC RX 637 (ALT 250 FOR IP): Performed by: INTERNAL MEDICINE

## 2018-07-20 PROCEDURE — 2500000003 HC RX 250 WO HCPCS

## 2018-07-20 PROCEDURE — 2580000003 HC RX 258

## 2018-07-20 PROCEDURE — 6370000000 HC RX 637 (ALT 250 FOR IP): Performed by: FAMILY MEDICINE

## 2018-07-20 PROCEDURE — G8988 SELF CARE GOAL STATUS: HCPCS

## 2018-07-20 PROCEDURE — 2580000003 HC RX 258: Performed by: NURSE PRACTITIONER

## 2018-07-20 PROCEDURE — 97168 OT RE-EVAL EST PLAN CARE: CPT

## 2018-07-20 PROCEDURE — 97530 THERAPEUTIC ACTIVITIES: CPT

## 2018-07-20 PROCEDURE — 1280000000 HC REHAB R&B

## 2018-07-20 PROCEDURE — 97535 SELF CARE MNGMENT TRAINING: CPT

## 2018-07-20 RX ORDER — CETIRIZINE HYDROCHLORIDE 10 MG/1
10 TABLET ORAL DAILY
Status: DISCONTINUED | OUTPATIENT
Start: 2018-07-21 | End: 2018-07-27 | Stop reason: HOSPADM

## 2018-07-20 RX ORDER — MONTELUKAST SODIUM 10 MG/1
10 TABLET ORAL NIGHTLY
Status: CANCELLED | OUTPATIENT
Start: 2018-07-20

## 2018-07-20 RX ORDER — ASCORBIC ACID 500 MG
1000 TABLET ORAL DAILY
Status: CANCELLED | OUTPATIENT
Start: 2018-07-21

## 2018-07-20 RX ORDER — HYDROCODONE BITARTRATE AND ACETAMINOPHEN 5; 325 MG/1; MG/1
1 TABLET ORAL EVERY 4 HOURS PRN
Status: CANCELLED | OUTPATIENT
Start: 2018-07-20

## 2018-07-20 RX ORDER — SIMETHICONE 80 MG
80 TABLET,CHEWABLE ORAL 4 TIMES DAILY PRN
Status: DISCONTINUED | OUTPATIENT
Start: 2018-07-20 | End: 2018-07-20 | Stop reason: HOSPADM

## 2018-07-20 RX ORDER — TERBINAFINE HYDROCHLORIDE 250 MG/1
250 TABLET ORAL DAILY
Status: CANCELLED | OUTPATIENT
Start: 2018-07-21

## 2018-07-20 RX ORDER — MONTELUKAST SODIUM 10 MG/1
10 TABLET ORAL NIGHTLY
Status: DISCONTINUED | OUTPATIENT
Start: 2018-07-20 | End: 2018-07-27 | Stop reason: HOSPADM

## 2018-07-20 RX ORDER — FINASTERIDE 5 MG/1
5 TABLET, FILM COATED ORAL DAILY
Status: CANCELLED | OUTPATIENT
Start: 2018-07-21

## 2018-07-20 RX ORDER — BISMUTH SUBSALICYLATE 262 MG/1
524 TABLET, CHEWABLE ORAL DAILY PRN
Status: CANCELLED | OUTPATIENT
Start: 2018-07-20

## 2018-07-20 RX ORDER — HYDROCODONE BITARTRATE AND ACETAMINOPHEN 5; 325 MG/1; MG/1
2 TABLET ORAL EVERY 4 HOURS PRN
Status: CANCELLED | OUTPATIENT
Start: 2018-07-20

## 2018-07-20 RX ORDER — DOCUSATE SODIUM 100 MG/1
100 CAPSULE, LIQUID FILLED ORAL 2 TIMES DAILY
Status: DISCONTINUED | OUTPATIENT
Start: 2018-07-20 | End: 2018-07-20

## 2018-07-20 RX ORDER — ACETAMINOPHEN 500 MG
1000 TABLET ORAL EVERY 6 HOURS PRN
Status: CANCELLED | OUTPATIENT
Start: 2018-07-20

## 2018-07-20 RX ORDER — ACETAMINOPHEN 500 MG
1000 TABLET ORAL EVERY 6 HOURS PRN
Status: DISCONTINUED | OUTPATIENT
Start: 2018-07-20 | End: 2018-07-27 | Stop reason: HOSPADM

## 2018-07-20 RX ORDER — MULTIVITAMIN WITH FOLIC ACID 400 MCG
1 TABLET ORAL DAILY
Status: CANCELLED | OUTPATIENT
Start: 2018-07-21

## 2018-07-20 RX ORDER — ASCORBIC ACID 500 MG
1000 TABLET ORAL DAILY
Status: DISCONTINUED | OUTPATIENT
Start: 2018-07-21 | End: 2018-07-27 | Stop reason: HOSPADM

## 2018-07-20 RX ORDER — BUPROPION HYDROCHLORIDE 150 MG/1
150 TABLET ORAL EVERY MORNING
Status: DISCONTINUED | OUTPATIENT
Start: 2018-07-21 | End: 2018-07-27 | Stop reason: HOSPADM

## 2018-07-20 RX ORDER — ONDANSETRON 4 MG/1
8 TABLET, ORALLY DISINTEGRATING ORAL EVERY 8 HOURS PRN
Status: CANCELLED | OUTPATIENT
Start: 2018-07-20

## 2018-07-20 RX ORDER — HYDROCODONE BITARTRATE AND ACETAMINOPHEN 5; 325 MG/1; MG/1
1 TABLET ORAL EVERY 4 HOURS PRN
Status: DISCONTINUED | OUTPATIENT
Start: 2018-07-20 | End: 2018-07-27 | Stop reason: HOSPADM

## 2018-07-20 RX ORDER — SIMETHICONE 80 MG
80 TABLET,CHEWABLE ORAL 4 TIMES DAILY PRN
Status: DISCONTINUED | OUTPATIENT
Start: 2018-07-20 | End: 2018-07-27 | Stop reason: HOSPADM

## 2018-07-20 RX ORDER — FINASTERIDE 5 MG/1
5 TABLET, FILM COATED ORAL DAILY
Status: DISCONTINUED | OUTPATIENT
Start: 2018-07-21 | End: 2018-07-27 | Stop reason: HOSPADM

## 2018-07-20 RX ORDER — CETIRIZINE HYDROCHLORIDE 10 MG/1
10 TABLET ORAL DAILY
Status: CANCELLED | OUTPATIENT
Start: 2018-07-21

## 2018-07-20 RX ORDER — TAMSULOSIN HYDROCHLORIDE 0.4 MG/1
0.8 CAPSULE ORAL DAILY
Status: DISCONTINUED | OUTPATIENT
Start: 2018-07-21 | End: 2018-07-27 | Stop reason: HOSPADM

## 2018-07-20 RX ORDER — TERBINAFINE HYDROCHLORIDE 250 MG/1
250 TABLET ORAL DAILY
Status: DISCONTINUED | OUTPATIENT
Start: 2018-07-21 | End: 2018-07-27 | Stop reason: HOSPADM

## 2018-07-20 RX ORDER — MULTIVITAMIN WITH FOLIC ACID 400 MCG
1 TABLET ORAL DAILY
Status: DISCONTINUED | OUTPATIENT
Start: 2018-07-21 | End: 2018-07-27 | Stop reason: HOSPADM

## 2018-07-20 RX ORDER — GABAPENTIN 100 MG/1
100 CAPSULE ORAL 3 TIMES DAILY
Status: DISCONTINUED | OUTPATIENT
Start: 2018-07-20 | End: 2018-07-27 | Stop reason: HOSPADM

## 2018-07-20 RX ORDER — TRAZODONE HYDROCHLORIDE 50 MG/1
50 TABLET ORAL NIGHTLY PRN
Status: CANCELLED | OUTPATIENT
Start: 2018-07-20

## 2018-07-20 RX ORDER — GABAPENTIN 100 MG/1
100 CAPSULE ORAL 3 TIMES DAILY
Status: CANCELLED | OUTPATIENT
Start: 2018-07-20

## 2018-07-20 RX ORDER — SIMETHICONE 80 MG
80 TABLET,CHEWABLE ORAL 4 TIMES DAILY PRN
Status: CANCELLED | OUTPATIENT
Start: 2018-07-20

## 2018-07-20 RX ORDER — SENNA AND DOCUSATE SODIUM 50; 8.6 MG/1; MG/1
2 TABLET, FILM COATED ORAL 2 TIMES DAILY
Status: DISCONTINUED | OUTPATIENT
Start: 2018-07-20 | End: 2018-07-27 | Stop reason: HOSPADM

## 2018-07-20 RX ORDER — BUPROPION HYDROCHLORIDE 150 MG/1
150 TABLET ORAL EVERY MORNING
Status: CANCELLED | OUTPATIENT
Start: 2018-07-21

## 2018-07-20 RX ORDER — ACETAMINOPHEN 325 MG/1
650 TABLET ORAL EVERY 4 HOURS PRN
Status: DISCONTINUED | OUTPATIENT
Start: 2018-07-20 | End: 2018-07-27 | Stop reason: HOSPADM

## 2018-07-20 RX ORDER — BISMUTH SUBSALICYLATE 262 MG/1
524 TABLET, CHEWABLE ORAL DAILY PRN
Status: DISCONTINUED | OUTPATIENT
Start: 2018-07-20 | End: 2018-07-27 | Stop reason: HOSPADM

## 2018-07-20 RX ORDER — TRAZODONE HYDROCHLORIDE 50 MG/1
50 TABLET ORAL NIGHTLY PRN
Status: DISCONTINUED | OUTPATIENT
Start: 2018-07-20 | End: 2018-07-27 | Stop reason: HOSPADM

## 2018-07-20 RX ORDER — ONDANSETRON 8 MG/1
8 TABLET, ORALLY DISINTEGRATING ORAL EVERY 8 HOURS PRN
Status: DISCONTINUED | OUTPATIENT
Start: 2018-07-20 | End: 2018-07-27 | Stop reason: HOSPADM

## 2018-07-20 RX ORDER — TAMSULOSIN HYDROCHLORIDE 0.4 MG/1
0.8 CAPSULE ORAL DAILY
Status: CANCELLED | OUTPATIENT
Start: 2018-07-21

## 2018-07-20 RX ORDER — ACETAMINOPHEN 325 MG/1
650 TABLET ORAL EVERY 4 HOURS PRN
Status: CANCELLED | OUTPATIENT
Start: 2018-07-20

## 2018-07-20 RX ORDER — DOCUSATE SODIUM 100 MG/1
100 CAPSULE, LIQUID FILLED ORAL 2 TIMES DAILY
Status: CANCELLED | OUTPATIENT
Start: 2018-07-20

## 2018-07-20 RX ORDER — HYDROCODONE BITARTRATE AND ACETAMINOPHEN 5; 325 MG/1; MG/1
2 TABLET ORAL EVERY 4 HOURS PRN
Status: DISCONTINUED | OUTPATIENT
Start: 2018-07-20 | End: 2018-07-27 | Stop reason: HOSPADM

## 2018-07-20 RX ADMIN — TERBINAFINE 250 MG: 250 TABLET ORAL at 10:38

## 2018-07-20 RX ADMIN — MONTELUKAST SODIUM 10 MG: 10 TABLET, FILM COATED ORAL at 20:05

## 2018-07-20 RX ADMIN — DOCUSATE SODIUM AND SENNOSIDES 2 TABLET: 8.6; 5 TABLET, FILM COATED ORAL at 20:05

## 2018-07-20 RX ADMIN — GABAPENTIN 100 MG: 100 CAPSULE ORAL at 20:05

## 2018-07-20 RX ADMIN — ACETAMINOPHEN 1000 MG: 500 TABLET, FILM COATED ORAL at 02:52

## 2018-07-20 RX ADMIN — ENOXAPARIN SODIUM 40 MG: 40 INJECTION SUBCUTANEOUS at 18:49

## 2018-07-20 RX ADMIN — TAMSULOSIN HYDROCHLORIDE 0.8 MG: 0.4 CAPSULE ORAL at 10:30

## 2018-07-20 RX ADMIN — BUPROPION HYDROCHLORIDE 150 MG: 150 TABLET, FILM COATED, EXTENDED RELEASE ORAL at 10:30

## 2018-07-20 RX ADMIN — Medication 10 ML: at 10:32

## 2018-07-20 RX ADMIN — SIMETHICONE 80 MG: 80 TABLET, CHEWABLE ORAL at 10:30

## 2018-07-20 RX ADMIN — CETIRIZINE HYDROCHLORIDE 10 MG: 10 TABLET, FILM COATED ORAL at 10:31

## 2018-07-20 RX ADMIN — ACETAMINOPHEN 1000 MG: 500 TABLET, FILM COATED ORAL at 13:44

## 2018-07-20 RX ADMIN — CHOLECALCIFEROL TAB 25 MCG (1000 UNIT) 2000 UNITS: 25 TAB at 10:30

## 2018-07-20 RX ADMIN — FINASTERIDE 5 MG: 5 TABLET, FILM COATED ORAL at 10:30

## 2018-07-20 RX ADMIN — THERA TABS 1 TABLET: TAB at 10:30

## 2018-07-20 RX ADMIN — OXYCODONE HYDROCHLORIDE AND ACETAMINOPHEN 1000 MG: 500 TABLET ORAL at 10:30

## 2018-07-20 RX ADMIN — HYDROCODONE BITARTRATE AND ACETAMINOPHEN 1 TABLET: 5; 325 TABLET ORAL at 20:08

## 2018-07-20 RX ADMIN — GABAPENTIN 100 MG: 100 CAPSULE ORAL at 13:44

## 2018-07-20 RX ADMIN — GABAPENTIN 100 MG: 100 CAPSULE ORAL at 10:30

## 2018-07-20 ASSESSMENT — PAIN DESCRIPTION - LOCATION
LOCATION: HEAD
LOCATION: HEAD
LOCATION: ABDOMEN;HEAD

## 2018-07-20 ASSESSMENT — PAIN SCALES - GENERAL
PAINLEVEL_OUTOF10: 6
PAINLEVEL_OUTOF10: 0
PAINLEVEL_OUTOF10: 4
PAINLEVEL_OUTOF10: 3
PAINLEVEL_OUTOF10: 6
PAINLEVEL_OUTOF10: 3
PAINLEVEL_OUTOF10: 0
PAINLEVEL_OUTOF10: 3
PAINLEVEL_OUTOF10: 0

## 2018-07-20 ASSESSMENT — PAIN DESCRIPTION - DESCRIPTORS
DESCRIPTORS: ACHING
DESCRIPTORS: ACHING

## 2018-07-20 ASSESSMENT — PAIN DESCRIPTION - PROGRESSION
CLINICAL_PROGRESSION: NOT CHANGED
CLINICAL_PROGRESSION: GRADUALLY WORSENING
CLINICAL_PROGRESSION: NOT CHANGED

## 2018-07-20 ASSESSMENT — PAIN DESCRIPTION - PAIN TYPE
TYPE: ACUTE PAIN;SURGICAL PAIN
TYPE: ACUTE PAIN
TYPE: ACUTE PAIN;SURGICAL PAIN

## 2018-07-20 ASSESSMENT — PAIN DESCRIPTION - FREQUENCY
FREQUENCY: CONTINUOUS

## 2018-07-20 ASSESSMENT — PAIN DESCRIPTION - ONSET
ONSET: ON-GOING

## 2018-07-20 ASSESSMENT — PAIN DESCRIPTION - ORIENTATION
ORIENTATION: RIGHT;MID
ORIENTATION: RIGHT;MID

## 2018-07-20 NOTE — PROGRESS NOTES
Nephrology Progress Note     Doing better   Fever better     Past Medical History:   Diagnosis Date    Allergic rhinitis     Erectile dysfunction     Hyperlipidemia     Hypertension     Screening for abdominal aortic aneurysm (AAA) [facility]/02/2018    [facility]    Unspecified sleep apnea        Past Surgical History:   Procedure Laterality Date    LAPAROSCOPY N/A 7/18/2018    OPENING AND CLOSING FOR VENTRICULAR PERITONEAL SHUNT performed by Bethel Webb MD at [address] N/A 07/18/2018     VENTRICULAR PERITONEAL SHUNT INSERTION RIGHT SIDE; (N/A )    MS CREATE SHUNT:VENTRIC-PERITONEAL N/A 7/18/2018    VENTRICULAR PERITONEAL SHUNT INSERTION RIGHT SIDE; performed by Tyler Anderson MD at [facility] OR       Family History   Problem Relation Age of Onset    Cancer Father         prostate        reports that he has quit smoking. He has quit using smokeless tobacco. He reports that he drinks about 2.4 oz of alcohol per week . He reports that he does not use drugs.     Allergies:  Lisinopril    Current Medications:      bismuth subsalicylate (PEPTO BISMOL) chewable tablet 524 mg Daily PRN   sodium chloride flush 0.9 % injection 10 mL 2 times per day   sodium chloride flush 0.9 % injection 10 mL PRN   ondansetron (ZOFRAN) injection 4 mg Q6H PRN   0.9 % sodium chloride infusion Continuous   HYDROcodone-acetaminophen (NORCO) 5-325 MG per tablet 1 tablet Q4H PRN   Or    HYDROcodone-acetaminophen (NORCO) 5-325 MG per tablet 2 tablet Q4H PRN   enoxaparin (LOVENOX) injection 40 mg QPM   acetaminophen (TYLENOL) tablet 1,000 mg Q6H PRN   vitamin C (ASCORBIC ACID) tablet 1,000 mg Daily   buPROPion (WELLBUTRIN XL) extended release tablet 150 mg QAM   cetirizine (ZYRTEC) tablet 10 mg Daily   vitamin D (CHOLECALCIFEROL) tablet 2,000 Units Daily   finasteride (PROSCAR) tablet 5 mg Daily   gabapentin (NEURONTIN) capsule 100 mg TID   montelukast (SINGULAIR) tablet 10 mg Nightly   multivitamin 1 tablet Daily   tamsulosin (FLOMAX) capsule 0.8 mg Daily   terbinafine (LAMISIL) tablet 250 mg Daily   magnesium hydroxide (MILK OF MAGNESIA) 400 MG/5ML suspension 30 mL Daily PRN         Physical exam:     Vitals:  /83   Pulse 80   Temp 98.2 °F (36.8 °C) (Oral)   Resp 16   Ht 6' 2\" (1.88 m)   Wt 245 lb (111.1 kg)   SpO2 96%   BMI 31.46 kg/m²   Constitutional:  OAA X3 NAD  Skin: no rash, turgor wnl  Heent:  eomi, mmm  Neck: no bruits or jvd noted  Cardiovascular:  S1, S2 without m/r/g  Respiratory: CTA B without w/r/r  Abdomen:  +bs, soft, nt, nd  Ext: no lower extremity edema  Psychiatric: mood and affect appropriate  Musculoskeletal:  Rom, muscular strength intact    Data:   Labs:  CBC:   Recent Labs      07/17/18   0920  07/18/18   0753   WBC  4.3  5.2   HGB  12.3*  12.3*   PLT  220  254     BMP:    Recent Labs      07/17/18   0920  07/18/18   0753   NA  140  137   K  4.0  4.2   CL  104  102   CO2  23  21   BUN  22*  22*   CREATININE  1.1  1.0   GLUCOSE  120*  102*     Ca/Mg/Phos:   Recent Labs      07/17/18   0920  07/18/18   0753   CALCIUM  9.4  9.4     Hepatic:   Recent Labs      07/17/18   0920   AST  83*   ALT  120*   BILITOT  0.5   ALKPHOS  86     Troponin: No results for input(s): TROPONINI in the last 72 hours. BNP: No results for input(s): BNP in the last 72 hours. Lipids: No results for input(s): CHOL, TRIG, HDL, LDLCALC, LABVLDL in the last 72 hours. ABGs: No results for input(s): PHART, PO2ART, PJH2BCO in the last 72 hours. INR:   Recent Labs      07/18/18   0753   INR  1.02     UA:  No results for input(s): COLORU, CLARITYU, GLUCOSEU, BILIRUBINUR, KETUA, SPECGRAV, BLOODU, PHUR, PROTEINU, UROBILINOGEN, NITRU, LEUKOCYTESUR, Saida Jv in the last 72 hours. Urine Microscopic:   No results for input(s): LABCAST, BACTERIA, COMU, HYALCAST, WBCUA, RBCUA, EPIU in the last 72 hours. Urine Culture:   No results for input(s): LABURIN in the last 72 hours.   Urine Chemistry:   No results for fourth ventricles are essentially   normal in volume. Correlate for symptoms of hydrocephalus. XR FINGER LEFT (MIN 2 VIEWS)    (Results Pending)       Assessment/Plan   1. JULIOCESAR     2. Sepsis     3. UTI     4. Acid- base/ Electrolytes - hyponatremia     5. Suspected NPH   Plan     - JULIOCESAR sec to prerenal Azotemia   - cr better   - IVF d/c   - PVR normal   - TRUDI - 9mm mass - repeat scan in 1 year   - Monitor renal function   - Hold ACE/ARBS                     Thank you for allowing us to participate in care of Timothy Shin.        Jg Narayan  Feel free to contact me   [facility] [address]  Office : [phone]  Fax :[phone]

## 2018-07-20 NOTE — PROGRESS NOTES
NEUROSURGERY POST-OP PROGRESS NOTE    Patient Name: Negin Trujillo. YOB: 1950   Sex:  Male Age: 80 yrs     Medical Record Number: [id number] Acct Number: [de-identified]   Room Number: 0523/5683-37 Hospital Day: Hospital Day: 8     Interval History:  Post-operative Day#2 s/p  shunt    Subjective: No new complaints     Objective:    VITAL SIGNS   BP (!) 142/84   Pulse 85   Temp 98.9 °F (37.2 °C) (Oral)   Resp 16   Ht 6' 2\" (1.88 m)   Wt 245 lb (111.1 kg)   SpO2 97%   BMI 31.46 kg/m²    Height Height: 6' 2\" (188 cm)   Weight Weight: 245 lb (111.1 kg)        Allergies Allergies   Allergen Reactions    Lisinopril Other (See Comments)     cough      NPO Status DIET GENERAL;   Isolation No active isolations     LABS   Basic Metabolic Profile Recent Labs      07/18/18   0753   NA  137   CL  102   CO2  21   BUN  22*   CREATININE  1.0   GLUCOSE  102*      Complete Blood Count Recent Labs      07/18/18   0753   WBC  5.2   RBC  4.20      Coagulation Studies Recent Labs      07/18/18   0753   INR  1.02        MEDICATIONS   Inpatient Medications   sodium chloride flush, 10 mL, Intravenous, 2 times per day    enoxaparin, 40 mg, Subcutaneous, QPM    vitamin C, 1,000 mg, Oral, Daily    buPROPion, 150 mg, Oral, QAM    cetirizine, 10 mg, Oral, Daily    vitamin D, 2,000 Units, Oral, Daily    finasteride, 5 mg, Oral, Daily    gabapentin, 100 mg, Oral, TID    montelukast, 10 mg, Oral, Nightly    multivitamin, 1 tablet, Oral, Daily    tamsulosin, 0.8 mg, Oral, Daily    terbinafine, 250 mg, Oral, Daily   Infusions    sodium chloride 100 mL/hr at 07/18/18 2031      PRN     simethicone 80 mg Oral 4x Daily PRN   bismuth subsalicylate 530 mg Oral Daily PRN   sodium chloride flush 10 mL Intravenous PRN   ondansetron 4 mg Intravenous Q6H PRN   HYDROcodone 5 mg - acetaminophen 1 tablet Oral Q4H PRN   Or      HYDROcodone 5 mg - acetaminophen 2 tablet Oral Q4H PRN   acetaminophen 1,000 mg Oral Q6H PRN magnesium hydroxide 30 mL Oral Daily PRN      Antibiotics   Recent Abx Admin      No antibiotic orders with administrations found. Neurologic Exam:    Mental status: Awake, alert, oriented x 4, speech clear  Cranial Nerves: PERRL EOMI FS TML      Musculoskeletal:   Gait: Not tested   Tone: Normal   Sensory: Intact to light touch   Motor strength:    Right  Left    Right  Left    Deltoid  5 5  Hip Flex  5 5   Biceps  5 5  Knee Extensors  5 5   Triceps  5 5  Knee Flexors  5 5   Wrist Ext  5 5  Ankle Dorsiflex. 5 5   Wrist Flex  5 5  Ankle Plantarflex. 5 5   Handgrip  5 5  Ext Domingo Longus  5 5   Thumb Ext  5 5         Incision: Anterior / posterior right sided cranial incisions - staples, MARSHAL, clean, dry, intact   Abdominal incision x 4 w/ dermabond, MARSHAL, clean, dry, intact     Respiratory:  Unlabored respiratory pattern    Abdomen:   Soft, ND   Last BM 7/18    Cardiovascular:  Warm, well perfused    Assessment   Patient is a 79 y/o M w/ NPH, POD#2 s/p  shunt placement     Plan:  1. Neurologic exam frequency: Q4H  2. Mobility: PT/OT as tolerates  3. Diet: PO diet as tolerates   4. Antibiotics: per primary team  5. DVT Prophylaxis: SCDs & Lovenox   6. GI Prophylaxis: Not indicated   7. Pain control: PRN [place]   8. Incisional Care: Wash incisions daily w/ soap and water only. Okay for patient to shower. 9. Dispo Planning: Okay for dispo from neurosurgery standpoint. 10. Follow up w/ Dr. Soto Ayala in 2 weeks for wound check / staple removal     Patient was discussed with Dr. Soto Ayala who agrees with above assessment and plan.      Electronically signed by: Santa Gupta, 7/20/2018 9:50 AM   Neurosurgery Nurse Practitioner  [phone]

## 2018-07-20 NOTE — CONSULTS
Patient: Benjamin Medeiros  [id number]  Date: 7/20/2018      Chief Complaint: NPH    History of Present Illness/Hospital Course:  Mr. Arely Sanchez is a 80year old male admitted 7/13/2018 with difficulty ambulating; evaluation revealed NPH prompting neurosurgical consultation. On 7/18 he underwent  shunt placement with concomitant incarcerated umbilical hernia repair. He continued to have gait instability prompting referral for evaluation for inpatient rehab. He notes poor balance and would like to remain in house for more therapy. His hospital stay was also notable for JULIOCESAR and fever with negative workup.     has a past medical history of Allergic rhinitis; Erectile dysfunction; Hyperlipidemia; Hypertension; Screening for abdominal aortic aneurysm (AAA) performed; and Unspecified sleep apnea. has a past surgical history that includes other surgical history (N/A, 07/18/2018); pr create shunt:ventric-peritoneal (N/A, 7/18/2018); and laparoscopy (N/A, 7/18/2018). reports that he has quit smoking. He has quit using smokeless tobacco. He reports that he drinks about 2.4 oz of alcohol per week . He reports that he does not use drugs. family history includes Cancer in his father. REVIEW OF SYSTEMS:   CONSTITUTIONAL: negative for fevers, chills, diaphoresis, activity change, appetite change, fatigue, night sweats and unexpected weight change.    EYES: negative for blurred vision, eye discharge, visual disturbance and icterus  HEENT: negative for hearing loss, tinnitus, ear drainage, sinus pressure, nasal congestion, epistaxis and snoring  RESPIRATORY: Negative for hemoptysis, cough, sputum production  CARDIOVASCULAR: negative for chest pain, palpitations, exertional chest pressure/discomfort, edema, syncope  GASTROINTESTINAL: negative for nausea, vomiting, diarrhea, constipation, blood in stool and abdominal pain  GENITOURINARY: negative for frequency, dysuria, urinary incontinence, decreased urine volume, and hematuria  HEMATOLOGIC/LYMPHATIC: negative for easy bruising, bleeding and lymphadenopathy  ALLERGIC/IMMUNOLOGIC: negative for recurrent infections, angioedema, anaphylaxis and drug reactions  ENDOCRINE: negative for weight changes and diabetic symptoms including polyuria, polydipsia and polyphagia  MUSCULOSKELETAL: negative for pain, joint swelling, decreased range of motion and muscle weakness  NEUROLOGICAL: negative for headaches, slurred speech, unilateral weakness  PSYCHIATRIC/BEHAVIORAL: negative for hallucinations, behavioral problems, confusion and agitation. Physical Examination:  Vitals: Patient Vitals for the past 24 hrs:   BP Temp Temp src Pulse Resp SpO2   07/20/18 1025 (!) 130/90 99.4 °F (37.4 °C) Oral 106 16 96 %   07/20/18 0248 (!) 142/84 98.9 °F (37.2 °C) Oral 85 16 97 %   07/20/18 0046 - - - - 16 -   07/19/18 2242 131/83 98.2 °F (36.8 °C) Oral 80 16 96 %   07/19/18 1949 (!) 149/94 99.1 °F (37.3 °C) Oral 94 16 97 %   07/19/18 1356 121/77 97.7 °F (36.5 °C) Oral 83 16 97 %   07/19/18 1045 110/70 98.5 °F (36.9 °C) Oral 76 16 97 %     Mood: Stable  Const: No distress  ENT: Oral mucosa moist  Eyes: No discharge or injection  CV: Regular rate and rhythm, no murmur rub or gallop noted  Resp: Lungs clear to auscultation bilaterally, no rales wheezes or ronchi  GI: Soft, nontender, nondistended. Normal bowel sounds. Neuro: Alert, oriented, appropriate. No cranial nerve deficits appreciated. Skin: No lesions or rashes noted. MSK: No joint abnormalities noted.        Lab Results   Component Value Date    WBC 5.2 07/18/2018    HGB 12.3 (L) 07/18/2018    HCT 35.8 (L) 07/18/2018    MCV 85.4 07/18/2018     07/18/2018     Lab Results   Component Value Date    INR 1.02 07/18/2018    INR 1.22 (H) 07/14/2018    INR 1.07 07/13/2018    PROTIME 11.6 07/18/2018    PROTIME 13.9 (H) 07/14/2018    PROTIME 12.2 07/13/2018     Lab Results   Component Value Date    CREATININE 1.0 07/18/2018    BUN 22 (H) 07/18/2018     07/18/2018    K 4.2 07/18/2018     07/18/2018    CO2 21 07/18/2018     Lab Results   Component Value Date     (H) 07/17/2018    AST 83 (H) 07/17/2018    ALKPHOS 86 07/17/2018    BILITOT 0.5 07/17/2018     MRI LUMBAR SPINE WO CONTRAST   Final Result       1. Lumbar spondylosis resulting in up to severe left L4-5 and   L5-S1 neural foraminal stenosis as described above.       FL LUMBAR PUNCTURE DIAG   Final Result   Impression: Successful lumbar puncture utilizing fluoroscopic   guidance. After 34 mL of clear CSF was removed, the pressure   dropped from 24 cm H2O to 15 cm H2O.           US RENAL COMPLETE   Final Result       No hydronephrosis       9 mm hyperechoic area superior renal cortex on the right   indeterminate. Findings may relate to angiomyolipoma. Focal area   of fatty infiltration or benign or malignant neoplasm not   excluded. CT without contrast may be useful.       XR CHEST STANDARD (2 VW)   Final Result       No focal consolidation.                       MRI BRAIN WO CONTRAST   Final Result       1. No acute intracranial abnormality. No evidence of acute   infarction       2. Mild burden of scattered T2 hyperintense white matter disease   which are nonspecific, may be attributed to chronic microvascular   ischemia.       3. Mild disproportionate dilatation of the lateral/third   ventricles in comparison to the cerebral sulci which relate to   normal pressure hydrocephalus or central volume loss. Recommended   clinical correlation. No significant change since 10/5/2017.       CT Head WO Contrast   Final Result       1. No acute intracranial abnormality.       2. There is chronic prominence of the lateral ventricular system. This is a little more than expected for a patient of this age,   although it could be compatible with degree of generalized   cerebral atrophy. The third and fourth ventricles are essentially   normal in volume.  Correlate for symptoms of

## 2018-07-20 NOTE — PROGRESS NOTES
Physical Therapy  Facility/Department: [facility] 5T ORTHO/NEURO  Daily Treatment Note  NAME: Anum Razo. : 1950  MRN: [id number]    Date of Service: 2018    Discharge Recommendations:  Patient would benefit from continued therapy after discharge     Anum Razo. scored a  on the AM-PAC short mobility form. Current research shows that an AM-PAC score of 17 or less is typically not associated with a discharge to the patient's home setting. Based on the patients AM-PAC score and their current functional mobility deficits, it is recommended that the patient have 5-7 sessions per week of Physical Therapy at d/c to increase the patients independence. Patient Diagnosis(es): The primary encounter diagnosis was Hydrocephalus. Diagnoses of Ataxia and Urinary incontinence, unspecified type were also pertinent to this visit. has a past medical history of Allergic rhinitis; Erectile dysfunction; Hyperlipidemia; Hypertension; Screening for abdominal aortic aneurysm (AAA) performed; and Unspecified sleep apnea. has a past surgical history that includes other surgical history (N/A, 2018); pr create shunt:ventric-peritoneal (N/A, 2018); and laparoscopy (N/A, 2018). Restrictions  Position Activity Restriction  Other position/activity restrictions: Up as tolerated, 2 lateral head incisions, incontinence of bladder, slow to respond/initiate motor tasks and answer questions, at times impulsive  Subjective   General  Chart Reviewed: Yes  Additional Pertinent Hx: 72-year-old gentleman admitted on 18 with difficulty with balance and urge incontinence. CT Head: No acute intracranial abnormality. Family / Caregiver Present: Yes (Wife Hari Berger )  Subjective  Subjective: Pt and wife verbalizing concerns about going home. Pt wife not able to physically help . Open to rehab prior to returning home. Pt in bed resting upon arrival, not wearing CPAP and agreeable to therapy.  \"I was up alot last night. I wasn't able to control my bladder\"   Pain Screening  Patient Currently in Pain: Yes  Pain Assessment  Pain Assessment: 0-10  Pain Level: 6  Pain Type: Acute pain;Surgical pain  Pain Location: Head  Pain Frequency: Continuous  Pain Onset: On-going  Clinical Progression: Not changed  Vital Signs  Patient Currently in Pain: Yes       Orientation  Orientation  Overall Orientation Status:  (pleasantly confused oriented to self)  Objective   Bed mobility  Rolling to Left: Moderate assistance;Maximum assistance  Rolling to Right: Moderate assistance;Maximum assistance  Supine to Sit: Moderate assistance;Maximum assistance  Sit to Supine: Moderate assistance;Maximum assistance  Scooting: Moderate assistance;Maximal assistance  Transfers  Sit to Stand: Maximum Assistance; Moderate Assistance  Stand to sit: Moderate Assistance;Maximum Assistance  Bed to Chair: Moderate assistance;Maximum assistance  Comment: max cues for safety awareness, proper use of walker when navigating in narrow space and when transitioning from stand to sit, poor eccentric control   Ambulation  Ambulation?: Yes  Ambulation 1  Surface: level tile  Device: Rolling Walker  Assistance: Minimal assistance  Quality of Gait: shuffling gait, step to at times drags the RLE at times ( 50% of time), narrow base of support, posterior leaning and poor proprioception noted throughout the feet while walking and directional changes   Distance: 45 ft  Comments: limited by fatigue and poor balance. Stairs/Curb  Stairs?: No     Balance  Posture: Fair  Sitting - Static: Good;Fair;+  Sitting - Dynamic: Fair  Standing - Static: Poor  Standing - Dynamic: Poor     Assessment   Body structures, Functions, Activity limitations: Decreased functional mobility ; Decreased ADL status; Decreased ROM; Decreased strength;Decreased cognition;Decreased endurance;Decreased balance;Decreased safe awareness  Assessment: Patient is well below his baseline exhibiting balance

## 2018-07-20 NOTE — PROGRESS NOTES
evidence of respiratory decline per chart review. MD note 7/19: \" Respiratory: Not using accessory muscles. Good inspiratory effort. Clear to auscultation bilaterally, no wheeze or crackles. \"    Goal met      2. The patient will tolerate instrumental swallowing procedure, if indicated. 7/15/18: Given history of dysphagia, recommend proceeding with MBS. Cont goal.  3.  The patient/caregiver will demonstrate understanding of recommendations for improved swallowing safety. 7/14/18:  SLP educated pt and son re: role of SLP, assessment rationale and results, anatomy and physiology of swallow, s/s aspiration, risks of aspiration, recommendation for nectar consistency liquids at this time, rationale and process for thickened liquids, recommendation for MBS procedure. Son verbalized comprehension; however pt unable to indicate comprehension. Continue goal  7/15/18: Pt and wife verbalized understanding of recommendations re: dry foods. SLP escribed MBS procedure and gunjan swallowing mechanism w/ explanation. Pt and wife appreciated explanation and are agreeable. Cont goal.  7/17- discon't goal- not warranted at this time    New goal-   3- The pt/family will demonstrate understanding of swallowing recommendations and concerns. 7/17-  The pt and family were educated to purpose of the visit, swallowing strategies and was encouraged to alert MD and/or RN if s/s aspiration emerge. All stated comprehension. con't goal  7/19: pt/spouse educated to s/s of aspiration, concern if aspiration occurs, instruction to notify staff if s/s of aspiration emerge. They stated understanding  Cont goal  7/20: pt alert, cooperative, follows directions and conversing appropriately. pt and spouse report pt with throat clearing at baseline, monitored yesterday and state he did throat clear all day, not related to PO. Reviewed s/s of aspiration and instruction to notify staff if any signs emerge.   They stated good understanding  Goal

## 2018-07-20 NOTE — PLAN OF CARE
ARU PATIENT TREATMENT PLAN  [facility] [place]  [address] [facility], [address]  [address] [facility].     : 1950  Acct #: [de-identified]  MRN: [id number]   PHYSICIAN:  Paul Marinelli MD  Primary Problem    Patient Active Problem List   Diagnosis    Wrist arthritis    Sprain of lumbar region    Hypertrophy of prostate without urinary obstruction and other lower urinary tract symptoms (LUTS)    Cardiac dysrhythmia    Onychomycosis    Obstructive sleep apnea    Personal history of other diseases of digestive system    Erectile dysfunction    Vitamin D deficiency    Essential hypertension    Mixed hyperlipidemia    Persistent depressive disorder    Dysthymia    Non-seasonal allergic rhinitis due to pollen    Pulmonary nodule    Traumatic rhabdomyolysis (HCC)    Acute renal failure due to rhabdomyolysis (HCC)    Frequent falls    Functional gait abnormality    Back pain    Normal pressure hydrocephalus    Hyponatremia    Hydrocephalus    Umbilical hernia, incarcerated    NPH (normal pressure hydrocephalus)       Rehabilitation Diagnosis:  Brain, 2.1, Non-Traumatic - Normal Pressure Hydrocephalis  ADMIT DATE:2018    Patient Goals: \"to get back to normal;\" \"to be able to do things without being assisted\"  Admitting Impairments: NPH impacting motor function  Activity Restrictions: Reduced independence with ADL, transfers, mobility  Participation Limitations: Enjoys outdoors, fishing, camping, spouse   CARE PLAN     NURSING:  Janet Gonzalez. while on this unit will:   [x] Be continent of bowel and bladder      [] Have an adequate number of bowel movements   [x] Urinate with no urinary retention >300ml in bladder   [] Complete bladder protocol with george removal   [] Maintain O2 SATs at ___%   [x] Have pain managed while on ARU        [] Be pain free by discharge    [x] Have no skin breakdown while on ARU   [] Have improved skin integrity via GOAL: Problem Solvin   Memory: 5 - Patient requires prompting with stress/unfamiliar situations GOAL: Memory: [place]. will be seen a minimum of 3 hours of therapy per day, a minimum of 5 out of 7 days per week  (please see above for specific treatment plan per PT/OT/SLP). [] In this rare instance due to the nature of this patient's medical involvement, this patient will be seen 15 hours per week (900 minutes within a 7 day period). In addition, dietician/nutritionist may monitor calorie count as well as intake and collaboratively work with SLP on dietary upgrades. Neuropsychology/Psychology may evaluate and provide necessary support. Medical issues being managed closely and that require 24 hour availability of a physician:   [x] Swallowing Precautions  [x] Bowel/Bladder Fx  [] Weight bearing precautions   [x] Wound Care    [x] Pain Mgmt   [x] Infection Protection   [x] DVT Prophylaxis   [x] Fall Precautions  [x] Fluid/Electrolyte/Nutrition Balance   [] Voice Protection   [x] Respiratory  [] Other:    Medical Prognosis: [x] Good  [] Fair    [] Guarded   Total expected IRF days 10  Anticipated discharge destination:    [] Home Independently   [x] Home Modified Independent  [] Home with supervision    []SNF     [] Other  Mod I - I for ADL, transfers, mobility                                              Physician anticipated functional outcomes:  Home mod I    IPOC brief synthesis: 80year old male admitted to ARU s/p  shunt placement for NPH to address strengthening, endurance, balance, gait, ADLs, assistive/adaptive device needs, patient/family training, speech, language, cognition. I have reviewed this initial plan of care and agree with its contents:    Title   Name    Date    Time    Physician: Moriah Nguyen.  Kev Robert MD 2018, 2:26 PM     Case Mgmt: LEELA Darden RN-[facility]    1057    OT: [place], [place] #[id number] 2018 145    PT: Analisa Howell PT, DPT, 7/21/18, 1242    RN: Diamond Venegas RN 7/20/18 1553    ST: Annabelle Cam MA CCC-SLP; [id number] 7/21/18 @ [phone]    :  Alejandrina Dunlap 7/23/18 1213    Other:

## 2018-07-20 NOTE — PROGRESS NOTES
Nephrology Progress Note     Doing better   No fever     Past Medical History:   Diagnosis Date    Allergic rhinitis     Erectile dysfunction     Hyperlipidemia     Hypertension     Screening for abdominal aortic aneurysm (AAA) performed 03/02/2018    [facility]    Unspecified sleep apnea        Past Surgical History:   Procedure Laterality Date    LAPAROSCOPY N/A 7/18/2018    OPENING AND CLOSING FOR VENTRICULAR PERITONEAL SHUNT performed by Stormy Odell MD at [address] [place] [address] N/A 07/18/2018     VENTRICULAR PERITONEAL SHUNT INSERTION RIGHT SIDE; (N/A )    DE CREATE SHUNT:VENTRIC-PERITONEAL N/A 7/18/2018    VENTRICULAR PERITONEAL SHUNT INSERTION RIGHT SIDE; [facility]       Family History   Problem Relation Age of Onset    Cancer Father         prostate        reports that he has quit smoking. He has quit using smokeless tobacco. He reports that he drinks about 2.4 oz of alcohol per week . He reports that he does not use drugs.     Allergies:  Lisinopril    Current Medications:      simethicone (MYLICON) chewable tablet 80 mg 4x Daily PRN   bismuth subsalicylate (PEPTO BISMOL) chewable tablet 524 mg Daily PRN   sodium chloride flush 0.9 % injection 10 mL 2 times per day   sodium chloride flush 0.9 % injection 10 mL PRN   ondansetron (ZOFRAN) injection 4 mg Q6H PRN   0.9 % sodium chloride infusion Continuous   HYDROcodone-acetaminophen (NORCO) 5-325 MG per tablet 1 tablet Q4H PRN   Or    HYDROcodone-acetaminophen (NORCO) 5-325 MG per tablet 2 tablet Q4H PRN   enoxaparin (LOVENOX) injection 40 mg QPM   acetaminophen (TYLENOL) tablet 1,000 mg Q6H PRN   vitamin C (ASCORBIC ACID) tablet 1,000 mg Daily   buPROPion (WELLBUTRIN XL) extended release tablet 150 mg QAM   cetirizine (ZYRTEC) tablet 10 mg Daily   vitamin D (CHOLECALCIFEROL) tablet 2,000 Units Daily   finasteride (PROSCAR) tablet 5 mg Daily   gabapentin (NEURONTIN) capsule 100 mg TID   montelukast

## 2018-07-20 NOTE — PLAN OF CARE
and reassess. Problem: Self-Care Deficit:  Goal: Ability to perform activities of daily living will improve  Ability to perform activities of daily living will improve   Outcome: Ongoing  RN encouraging patient to be as independent as possible with ADL's such as sandra care and urinating. RN encouraging patient to use minimal amount of assist with standing and sitting. Will continue to monitor and reassess.

## 2018-07-20 NOTE — PROGRESS NOTES
4 Eyes Admission Assessment     I agree as the admission nurse that 2 RN's have performed a thorough Head to Toe Skin Assessment on the patient. ALL assessment sites listed below have been assessed on admission. Areas assessed by both nurses:   [x]   Head, Face, and Ears   [x]   Shoulders, Back, and Chest  [x]   Arms, Elbows, and Hands   [x]   Coccyx, Sacrum, and Ischum  [x]   Legs, Feet, and Heels        Does the Patient have Skin Breakdown?   Abrasion to R knee, scattered bruising, R elbow pink, surgical incisions to head and abdomen, redness (blanchable) B heels,          Ke Prevention initiated:  NA   Wound Care Orders initiated:  No      WOC nurse consulted for Pressure Injury (Stage 3,4, Unstageable, DTI, NWPT, and Complex wounds):  NA      Nurse 1 eSignature: Electronically signed by Paty Bower RN on 7/20/18 at 6:11 PM    **SHARE this note so that the co-signing nurse is able to place an eSignature**    Nurse 2 eSignature: Electronically signed by Vikash Juarez RN on 7/20/18 at 6:13 PM

## 2018-07-20 NOTE — DISCHARGE SUMMARY
Patient discharged to acute rehab unit. IV removed. Patient's belongings gathered. Patient transported down to room [id number] in wheelchair accompanied by 2 RN's and patient's wife.

## 2018-07-20 NOTE — PROGRESS NOTES
tamsulosin (FLOMAX) capsule 0.8 mg Daily   terbinafine (LAMISIL) tablet 250 mg Daily   magnesium hydroxide (MILK OF MAGNESIA) 400 MG/5ML suspension 30 mL Daily PRN         Physical exam:     Vitals:  /83   Pulse 80   Temp 98.2 °F (36.8 °C) (Oral)   Resp 16   Ht 6' 2\" (1.88 m)   Wt 245 lb (111.1 kg)   SpO2 96%   BMI 31.46 kg/m²   Constitutional:  OAA X3 NAD  Skin: no rash, turgor wnl  Heent:  eomi, mmm  Neck: no bruits or jvd noted  Cardiovascular:  S1, S2 without m/r/g  Respiratory: CTA B without w/r/r  Abdomen:  +bs, soft, nt, nd  Ext: no lower extremity edema  Psychiatric: mood and affect appropriate  Musculoskeletal:  Rom, muscular strength intact    Data:   Labs:  CBC:   Recent Labs      07/17/18   0920  07/18/18   0753   WBC  4.3  5.2   HGB  12.3*  12.3*   PLT  220  254     BMP:    Recent Labs      07/17/18   0920  07/18/18   0753   NA  140  137   K  4.0  4.2   CL  104  102   CO2  23  21   BUN  22*  22*   CREATININE  1.1  1.0   GLUCOSE  120*  102*     Ca/Mg/Phos:   Recent Labs      07/17/18   0920  07/18/18   0753   CALCIUM  9.4  9.4     Hepatic:   Recent Labs      07/17/18   0920   AST  83*   ALT  120*   BILITOT  0.5   ALKPHOS  86     Troponin: No results for input(s): TROPONINI in the last 72 hours. BNP: No results for input(s): BNP in the last 72 hours. Lipids: No results for input(s): CHOL, TRIG, HDL, LDLCALC, LABVLDL in the last 72 hours. ABGs: No results for input(s): PHART, PO2ART, FDT1XZO in the last 72 hours. INR:   Recent Labs      07/18/18   0753   INR  1.02     UA:  No results for input(s): COLORU, CLARITYU, GLUCOSEU, BILIRUBINUR, KETUA, SPECGRAV, BLOODU, PHUR, PROTEINU, UROBILINOGEN, NITRU, LEUKOCYTESUR, [employer] in the last 72 hours. Urine Microscopic:   No results for input(s): LABCAST, BACTERIA, COMU, HYALCAST, WBCUA, RBCUA, EPIU in the last 72 hours. Urine Culture:   No results for input(s): LABURIN in the last 72 hours.   Urine Chemistry:   No results for fourth ventricles are essentially   normal in volume. Correlate for symptoms of hydrocephalus. XR FINGER LEFT (MIN 2 VIEWS)    (Results Pending)       Assessment/Plan   1. JULIOCESAR     2. Sepsis     3. UTI     4. Acid- base/ Electrolytes - hyponatremia     5. Suspected NPH   Plan   -  shunt placement   - JULIOCESAR sec to prerenal Azotemia   - cr better   - IVF d/c   - PVR normal   - TRUDI - 9mm mass - repeat scan in 1 year   - Monitor renal function   - Hold ACE/ARBS                     Thank you for allowing us to participate in care of [employer].        Loc Rasmussen  Feel free to contact me   [facility] [address]  Office : [phone]  Fax :[phone]

## 2018-07-21 ENCOUNTER — CARE COORDINATION (OUTPATIENT)
Dept: CASE MANAGEMENT | Age: 68
End: 2018-07-21

## 2018-07-21 LAB
A/G RATIO: 1.2 (ref 1.1–2.2)
ALBUMIN SERPL-MCNC: 4.1 G/DL (ref 3.4–5)
ALP BLD-CCNC: 93 U/L (ref 40–129)
ALT SERPL-CCNC: 112 U/L (ref 10–40)
ANION GAP SERPL CALCULATED.3IONS-SCNC: 14 MMOL/L (ref 3–16)
AST SERPL-CCNC: 44 U/L (ref 15–37)
BILIRUB SERPL-MCNC: 0.5 MG/DL (ref 0–1)
BUN BLDV-MCNC: 18 MG/DL (ref 7–20)
CALCIUM SERPL-MCNC: 9.8 MG/DL (ref 8.3–10.6)
CHLORIDE BLD-SCNC: 98 MMOL/L (ref 99–110)
CO2: 23 MMOL/L (ref 21–32)
CREAT SERPL-MCNC: 0.9 MG/DL (ref 0.8–1.3)
GFR AFRICAN AMERICAN: >60
GFR NON-AFRICAN AMERICAN: >60
GLOBULIN: 3.5 G/DL
GLUCOSE BLD-MCNC: 114 MG/DL (ref 70–99)
HCT VFR BLD CALC: 36.2 % (ref 40.5–52.5)
HEMOGLOBIN: 12.4 G/DL (ref 13.5–17.5)
MCH RBC QN AUTO: 29.4 PG (ref 26–34)
MCHC RBC AUTO-ENTMCNC: 34.3 G/DL (ref 31–36)
MCV RBC AUTO: 85.8 FL (ref 80–100)
PDW BLD-RTO: 13.7 % (ref 12.4–15.4)
PLATELET # BLD: 415 K/UL (ref 135–450)
PMV BLD AUTO: 6.5 FL (ref 5–10.5)
POTASSIUM REFLEX MAGNESIUM: 4.4 MMOL/L (ref 3.5–5.1)
RBC # BLD: 4.22 M/UL (ref 4.2–5.9)
SODIUM BLD-SCNC: 135 MMOL/L (ref 136–145)
TOTAL PROTEIN: 7.6 G/DL (ref 6.4–8.2)
WBC # BLD: 8.8 K/UL (ref 4–11)

## 2018-07-21 PROCEDURE — 85027 COMPLETE CBC AUTOMATED: CPT

## 2018-07-21 PROCEDURE — 36415 COLL VENOUS BLD VENIPUNCTURE: CPT

## 2018-07-21 PROCEDURE — 97166 OT EVAL MOD COMPLEX 45 MIN: CPT

## 2018-07-21 PROCEDURE — 80053 COMPREHEN METABOLIC PANEL: CPT

## 2018-07-21 PROCEDURE — 6370000000 HC RX 637 (ALT 250 FOR IP): Performed by: FAMILY MEDICINE

## 2018-07-21 PROCEDURE — 97535 SELF CARE MNGMENT TRAINING: CPT

## 2018-07-21 PROCEDURE — 97161 PT EVAL LOW COMPLEX 20 MIN: CPT

## 2018-07-21 PROCEDURE — 92523 SPEECH SOUND LANG COMPREHEN: CPT

## 2018-07-21 PROCEDURE — 97116 GAIT TRAINING THERAPY: CPT

## 2018-07-21 PROCEDURE — 1280000000 HC REHAB R&B

## 2018-07-21 PROCEDURE — 6360000002 HC RX W HCPCS: Performed by: FAMILY MEDICINE

## 2018-07-21 PROCEDURE — 97530 THERAPEUTIC ACTIVITIES: CPT

## 2018-07-21 PROCEDURE — 6370000000 HC RX 637 (ALT 250 FOR IP): Performed by: PHYSICAL MEDICINE & REHABILITATION

## 2018-07-21 RX ADMIN — CHOLECALCIFEROL TAB 25 MCG (1000 UNIT) 2000 UNITS: 25 TAB at 08:51

## 2018-07-21 RX ADMIN — DOCUSATE SODIUM AND SENNOSIDES 2 TABLET: 8.6; 5 TABLET, FILM COATED ORAL at 20:09

## 2018-07-21 RX ADMIN — HYDROCODONE BITARTRATE AND ACETAMINOPHEN 1 TABLET: 5; 325 TABLET ORAL at 14:35

## 2018-07-21 RX ADMIN — BUPROPION HYDROCHLORIDE 150 MG: 150 TABLET, FILM COATED, EXTENDED RELEASE ORAL at 08:51

## 2018-07-21 RX ADMIN — ACETAMINOPHEN 650 MG: 325 TABLET, FILM COATED ORAL at 08:50

## 2018-07-21 RX ADMIN — DOCUSATE SODIUM AND SENNOSIDES 2 TABLET: 8.6; 5 TABLET, FILM COATED ORAL at 08:50

## 2018-07-21 RX ADMIN — OXYCODONE HYDROCHLORIDE AND ACETAMINOPHEN 1000 MG: 500 TABLET ORAL at 08:50

## 2018-07-21 RX ADMIN — GABAPENTIN 100 MG: 100 CAPSULE ORAL at 08:50

## 2018-07-21 RX ADMIN — HYDROCODONE BITARTRATE AND ACETAMINOPHEN 1 TABLET: 5; 325 TABLET ORAL at 20:09

## 2018-07-21 RX ADMIN — GABAPENTIN 100 MG: 100 CAPSULE ORAL at 14:35

## 2018-07-21 RX ADMIN — ENOXAPARIN SODIUM 40 MG: 40 INJECTION SUBCUTANEOUS at 18:22

## 2018-07-21 RX ADMIN — CETIRIZINE HYDROCHLORIDE 10 MG: 10 TABLET, FILM COATED ORAL at 08:50

## 2018-07-21 RX ADMIN — THERA TABS 1 TABLET: TAB at 08:50

## 2018-07-21 RX ADMIN — TERBINAFINE 250 MG: 250 TABLET ORAL at 08:52

## 2018-07-21 RX ADMIN — TAMSULOSIN HYDROCHLORIDE 0.8 MG: 0.4 CAPSULE ORAL at 08:50

## 2018-07-21 RX ADMIN — MONTELUKAST SODIUM 10 MG: 10 TABLET, FILM COATED ORAL at 20:09

## 2018-07-21 RX ADMIN — FINASTERIDE 5 MG: 5 TABLET, FILM COATED ORAL at 08:51

## 2018-07-21 RX ADMIN — GABAPENTIN 100 MG: 100 CAPSULE ORAL at 20:09

## 2018-07-21 ASSESSMENT — PAIN SCALES - GENERAL
PAINLEVEL_OUTOF10: 4
PAINLEVEL_OUTOF10: 2
PAINLEVEL_OUTOF10: 0
PAINLEVEL_OUTOF10: 2
PAINLEVEL_OUTOF10: 4
PAINLEVEL_OUTOF10: 3
PAINLEVEL_OUTOF10: 2
PAINLEVEL_OUTOF10: 0
PAINLEVEL_OUTOF10: 1
PAINLEVEL_OUTOF10: 4
PAINLEVEL_OUTOF10: 0

## 2018-07-21 ASSESSMENT — PAIN DESCRIPTION - PAIN TYPE
TYPE: ACUTE PAIN
TYPE: ACUTE PAIN

## 2018-07-21 ASSESSMENT — PAIN DESCRIPTION - FREQUENCY
FREQUENCY: CONTINUOUS
FREQUENCY: CONTINUOUS

## 2018-07-21 ASSESSMENT — PAIN DESCRIPTION - ORIENTATION
ORIENTATION: RIGHT;MID
ORIENTATION: RIGHT;MID

## 2018-07-21 ASSESSMENT — PAIN DESCRIPTION - LOCATION
LOCATION: HEAD
LOCATION: HEAD

## 2018-07-21 ASSESSMENT — PAIN DESCRIPTION - DESCRIPTORS
DESCRIPTORS: ACHING
DESCRIPTORS: ACHING

## 2018-07-21 ASSESSMENT — PAIN DESCRIPTION - PROGRESSION
CLINICAL_PROGRESSION: NOT CHANGED
CLINICAL_PROGRESSION: NOT CHANGED

## 2018-07-21 ASSESSMENT — PAIN DESCRIPTION - ONSET
ONSET: ON-GOING
ONSET: ON-GOING

## 2018-07-21 ASSESSMENT — 9 HOLE PEG TEST
TESTTIME_SECONDS: 30.1
TESTTIME_SECONDS: 28.5

## 2018-07-21 NOTE — CONSULTS
Nutrition Assessment    Type and Reason for Visit: Consult, Initial    Nutrition Recommendations:   1. PO Diet: Continue current diet general  2. Monitor, record and encourage PO intake at all meals through admission. Malnutrition Assessment:  · Malnutrition Status: No malnutrition  · Context: Acute illness or injury    Nutrition Diagnosis:   · Problem: No nutrition diagnosis at this time    Nutrition Assessment:  · Subjective Assessment: RD consult for new ARU nutr eval. admitted 7/13/2018 with difficulty ambulating; evaluation revealed NPH prompting neurosurgical consultation. On 7/18 he underwent  shunt placement with concomitant incarcerated umbilical hernia repair. Currently on general diet. PO intake noted 51-75;% of meals. RD attempted to visit pt- in restroom. Wife reports pt appetite decreased pt is consuming regular meals and eats good variety through admissions. No nutr concerns at this time. · Nutrition-Focused Physical Findings: +1 RUE pitting edema; lbm 7/18  · Wound Type: Surgical Wound  · Current Nutrition Therapies:  · Oral Diet Orders: General   · Oral Diet intake: %, 51-75%  · Oral Nutrition Supplement (ONS) Orders: None  · Anthropometric Measures:  · Ht: 6' 2\" (188 cm)   · Current Body Wt: 227 lb 1.2 oz (103 kg)  · BMI Classification: BMI 25.0 - 29.9 Overweight    Estimated Intake vs Estimated Needs: Intake Meets Needs    Nutrition Risk Level: Low    Nutrition Interventions:   Continue current diet       Nutrition Evaluation:   · Evaluation: Goals set   · Goals: pt will continue to consume greater than 75% of meals offered through admission    · Monitoring: Meal Intake    See Adult Nutrition Doc Flowsheet for more detail.      Electronically signed by Chuy Buckley RD, CLAUDIO on 7/21/18 at 2:28 PM    Contact Number: [phone]

## 2018-07-21 NOTE — PROGRESS NOTES
Physical Therapy    Facility/Department: [facility] AC[facility]  Initial Assessment/Treatment Note    NAME: Niki Elam : 1950  MRN: [id number]    Date of Service: 2018    Discharge Recommendations:  Home with assist PRN, Outpatient PT   PT Equipment Recommendations  Other: continue to assess, pt owns RW and cane    Patient Diagnosis(es): There were no encounter diagnoses. has a past medical history of Allergic rhinitis; Erectile dysfunction; Hyperlipidemia; Hypertension; Screening for abdominal aortic aneurysm (AAA) performed; and Unspecified sleep apnea. has a past surgical history that includes other surgical history (N/A, 2018); pr create shunt:ventric-peritoneal (N/A, 2018); and laparoscopy (N/A, 2018). Restrictions  Position Activity Restriction  Other position/activity restrictions: up as tolerated  Vision/Hearing  Vision: Impaired  Vision Exceptions: Wears glasses at all times  Hearing: Exceptions to [facility]  Hearing Exceptions: Bilateral hearing aid (at home)     Subjective  General  Chart Reviewed: Yes  Patient assessed for rehabilitation services?: Yes  Additional Pertinent Hx: Pt is a 80 y.o. male admitted to ARU on 18 from [facility]. Pt initially admitted to [facility] on 18 with confusion, poor balance, and urge incontinence. Found to have NPH. Shunt placed . PMH: HLD, HTN, unspecified sleep apnea. Family / Caregiver Present: Yes (Wife)  Referring Practitioner: Dr. Sonja Delcid  Diagnosis: brain dysfunction  Follows Commands: Within Functional Limits  General Comment  Comments: Pt found sitting in bedside chair upon PT arrival.  Pt agreeable to therapy session. Subjective  Subjective: \"I had a very big change overnight. \"  Pain Screening  Patient Currently in Pain: Yes  Vital Signs  Patient Currently in Pain: Yes (Pt reported 4/10 incisional/headache pain, pt stated that he does not want to ask RN for pain meds)       Orientation  Orientation  Overall Orientation Status: Within Functional Limits    Social/Functional History  Social/Functional History  Lives With: Spouse  Type of Home: House  Home Layout: One level, Laundry in basement, Able to Live on Main level with bedroom/bathroom (wife will do laundry)  Home Access: Stairs to enter with rails (half wall on L, door frame at top that pt holds on to)  Entrance Stairs - Number of Steps: 3 NILE  Entrance Stairs - Rails: Left  Bathroom Shower/Tub: Tub/Shower unit  Bathroom Toilet: Standard  Bathroom Equipment: Shower chair, Hand-held shower  Bathroom Accessibility: Walker accessible  Home Equipment: Cane, Rolling walker, Long-handled shoehorn  Receives Help From: Home health (had a home health therapy eval and was planning to continue prior to admission)  ADL Assistance: [address] [place] [address] Avenue: Independent  Homemaking Responsibilities: Yes  Bill Paying/Finance Responsibility: Secondary (wife manages bills; pt endorsed writing checks and handling cash)  Health Care Management: Primary (pt endorsed utilizing pillboxes and fills once weekly for himself and wife)  Ambulation Assistance: Independent  Transfer Assistance: Independent  Active : Yes  Mode of Transportation: Car  Education: Associate's degree in electromechanical engineering and associate's degree in general arts  Occupation: Retired (recently retired in December 2017)  Type of occupation: 20 yrs in the [employer]; worked for [employer] as  for 12 years; 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 Had PT/OT home therapy eval and approximately two weeks later returned to the hospital two weeks later for normal pressure hydrocephalus.   Pt has had one fall prior due to falling through fence taking a shortcut to his daughter's house.   Pt's wife states that she is able to provide supervision and limited physical assist at d/c.  Objective     Observation/Palpation  Observation: Incision with no drainage noted, tremors noted to BUE (R>L)    AROM RLE (degrees)  RLE AROM: WFL  AROM LLE (degrees)  LLE AROM : WFL  Strength RLE  Strength RLE: WFL  Comment: grossly >3+/5 based on functional mobility  Strength LLE  Strength LLE: WFL  Comment: grossly >3+/5 based on functional mobility        Bed mobility  Rolling to Left: Stand by assistance  Rolling to Right: Stand by assistance  Supine to Sit: Stand by assistance  Sit to Supine: Stand by assistance  Comment: HOB flat, no use of bedrails  Transfers  Sit to Stand: Contact guard assistance (from recliner, EOB, amd high chair to RW, occasional verbal cues for hand placement)  Stand to sit: Contact guard assistance (verbal cues for hand placement, poor eccentric control when sitting into lower surfaces)  Bed to Chair: Contact guard assistance (recliner to/from bed with RW)  Ambulation  Ambulation?: Yes  Ambulation 1  Surface: level tile  Device: Rolling Walker  Assistance: Contact guard assistance;Minimal assistance (Crystal for RW management during descend of ramp)  Quality of Gait: decreased anoop, forward trunk flexion with downward gaze, shuffling gait with decreased B foot clearance  Distance: 350' (including 79' ascend/70' descend ramp) + [de-identified]' (distance limited by destination)  Comments: verbal cues for upight posture and forward gaze, pt unable to increased step length/height despite verbal cues  Stairs/Curb  Stairs?: Yes  Stairs  # Steps : 12  Stairs Height: 6\"  Rails: Left ascending  Assistance: Contact guard assistance  Comment: alternating pattern to ascend, step to leading with alternating lead foot during descent, pt occasionally turning slightly sideways to clear foot off of step, occasional L foot catching on step with descend Exercises  Comments: Pt required SBA for sitting balance in recliner while doffing hospital socks and donning socks/shoes. Pt able to pick small ball off of floor with RW and CGA, verbal cues for safe technique and use of RW. Assessment   Body structures, Functions, Activity limitations: Decreased functional mobility ; Decreased balance  Assessment: Pt is a 80 y.o. male with diagnosis of brain dysfunction presenting with decreased functional mobility and balance. Pt is currently requiring CGA for all transfers, ambulation, and stair training, which is a decline from reported baseline. Pt will benefit from intensive therapy to maximize balance and functional mobility to return to previous function.   Treatment Diagnosis: decreased functional mobility due to brain dysfunction  Prognosis: Good  Decision Making: Low Complexity  Patient Education: Role of PT, goals, d/c recommendations, pt verbalized understanding  REQUIRES PT FOLLOW UP: Yes  Activity Tolerance  Activity Tolerance: Patient Tolerated treatment well         Plan   Plan  Times per week: 5x/week, 60 minutes a day  Current Treatment Recommendations: Strengthening, Balance Training, Functional Mobility Training, Transfer Training, Endurance Training, Gait Training, Stair training, Neuromuscular Re-education, Home Exercise Program, Safety Education & Training, Patient/Caregiver Education & Training  Safety Devices  Type of devices: Call light within reach, Chair alarm in place, Gait belt, Left in chair    G-Code     OutComes Score                                           AM-PAC Score             Goals  Short term goals  Time Frame for Short term goals: STG=LTG  Long term goals  Time Frame for Long term goals : approximately 1 week  Long term goal 1: Pt will perform all bed mobility with Rufus  Long term goal 2: Pt will perform sit to/from stand with or without LRAD and Rufus  Long term goal 3: Pt will ambulate 400' with or without LRAD and Rufus  Long term goal 4: Pt will ascend/descend 12 stairs with [facility] and supervision  Patient Goals   Patient goals : \"To be back on my feet as soon as possible. \"       Therapy Time   Individual Concurrent Group Co-treatment   Time In 0730         Time Out 0830         Minutes [address], PT, DPT

## 2018-07-21 NOTE — H&P
Patient: Jeyson Diane  [id number]  Date: 7/21/2018      Chief Complaint: NPH    History of Present Illness/Hospital Course:  Mr. Gordo Bell is a 80year old male admitted 7/13/2018 with difficulty ambulating; evaluation revealed NPH prompting neurosurgical consultation. On 7/18 he underwent  shunt placement with concomitant incarcerated umbilical hernia repair. He continued to have gait instability prompting referral for evaluation for inpatient rehab. His hospital stay was also notable for JULIOCESAR and fever with negative workup. He continues to endorse difficulties with balance. Prior Level of Function:  Independent    Current Level of Function:  Jag     has a past medical history of Allergic rhinitis; Erectile dysfunction; Hyperlipidemia; Hypertension; Screening for abdominal aortic aneurysm (AAA) performed; and Unspecified sleep apnea. has a past surgical history that includes other surgical history (N/A, 07/18/2018); pr create shunt:ventric-peritoneal (N/A, 7/18/2018); and laparoscopy (N/A, 7/18/2018). reports that he has quit smoking. He has quit using smokeless tobacco. He reports that he drinks about 2.4 oz of alcohol per week . He reports that he does not use drugs. family history includes Cancer in his father.     Current Facility-Administered Medications   Medication Dose Route Frequency Provider Last Rate Last Dose    acetaminophen (TYLENOL) tablet 650 mg  650 mg Oral Q4H PRN David Ureña MD        ondansetron (ZOFRAN-ODT) disintegrating tablet 8 mg  8 mg Oral Q8H PRN David Ureña MD        traZODone (DESYREL) tablet 50 mg  50 mg Oral Nightly PRN David Ureña MD        enoxaparin (LOVENOX) injection 40 mg  40 mg Subcutaneous QPM Paula Shay MD   40 mg at 07/20/18 1849    HYDROcodone-acetaminophen (NORCO) 5-325 MG per tablet 1 tablet  1 tablet Oral Q4H PRN Paula Shay MD   1 tablet at 07/20/18 2008    Or    HYDROcodone-acetaminophen (NORCO) 5-325 MG per tablet 2 tablet 2 tablet Oral Q4H PRN Randy Steen MD        magnesium hydroxide (MILK OF MAGNESIA) 400 MG/5ML suspension 30 mL  30 mL Oral Daily PRN Randy Steen MD        acetaminophen (TYLENOL) tablet 1,000 mg  1,000 mg Oral Q6H PRN Randy Steen MD        bismuth subsalicylate (PEPTO BISMOL) chewable tablet 524 mg  524 mg Oral Daily PRN Randy Steen MD        buPROPion (WELLBUTRIN XL) extended release tablet 150 mg  150 mg Oral QAM Randy Steen MD        cetirizine (ZYRTEC) tablet 10 mg  10 mg Oral Daily Randy Steen MD        finasteride (PROSCAR) tablet 5 mg  5 mg Oral Daily Randy Steen MD        gabapentin (NEURONTIN) capsule 100 mg  100 mg Oral TID Randy Steen MD   100 mg at 07/20/18 2005    montelukast (SINGULAIR) tablet 10 mg  10 mg Oral Nightly Randy Steen MD   10 mg at 07/20/18 2005    multivitamin 1 tablet  1 tablet Oral Daily Randy Steen MD        [facility]) chewable tablet 80 mg  80 mg Oral 4x Daily PRN Randy Steen MD        [facility]) capsule 0.8 mg  0.8 mg Oral Daily Randy Steen MD        terbinafine (LAMISIL) tablet 250 mg  250 mg Oral Daily Randy Steen MD        vitamin C (ASCORBIC ACID) tablet 1,000 mg  1,000 mg Oral Daily Randy Steen MD        vitamin D (CHOLECALCIFEROL) tablet 2,000 Units  2,000 Units Oral Daily Randy Steen MD        sennosides-docusate sodium (SENOKOT-S) 8.6-50 MG tablet 2 tablet  2 tablet Oral BID Isaias Alicea MD   2 tablet at 07/20/18 2005         REVIEW OF SYSTEMS:   CONSTITUTIONAL: negative for fevers, chills, diaphoresis, activity change, appetite change, fatigue, night sweats and unexpected weight change. EYES: negative for blurred vision, eye discharge, visual disturbance and icterus. HEENT: negative for hearing loss, tinnitus, ear drainage, sinus pressure, nasal congestion, epistaxis and snoring. RESPIRATORY: Negative for hemoptysis, cough, sputum production.    CARDIOVASCULAR: negative for chest pain, palpitations, exertional chest pressure/discomfort, edema, syncope. GASTROINTESTINAL: negative for nausea, vomiting, diarrhea, constipation, blood in stool and abdominal pain. GENITOURINARY: negative for frequency, dysuria, urinary incontinence, decreased urine volume, and hematuria. HEMATOLOGIC/LYMPHATIC: negative for easy bruising, bleeding and lymphadenopathy. ALLERGIC/IMMUNOLOGIC: negative for recurrent infections, angioedema, anaphylaxis and drug reactions. ENDOCRINE: negative for weight changes and diabetic symptoms including polyuria, polydipsia and polyphagia. MUSCULOSKELETAL: negative for pain, joint swelling, decreased range of motion and muscle weakness. NEUROLOGICAL: negative for headaches, slurred speech, unilateral weakness. PSYCHIATRIC/BEHAVIORAL: negative for hallucinations, behavioral problems, confusion and agitation. All pertinent positives are noted in the HPI. Physical Examination:  Vitals: Patient Vitals for the past 24 hrs:   BP Temp Temp src Pulse Resp SpO2 Height Weight   07/21/18 0430 - - - - - - 6' 2\" (1.88 m) 227 lb 1.2 oz (103 kg)   07/20/18 2308 - - - - 16 98 % - -   07/20/18 2100 - - - - 18 96 % - -   07/20/18 2000 118/83 98 °F (36.7 °C) Oral 97 18 97 % - -   07/20/18 1550 120/77 98 °F (36.7 °C) Oral 115 16 95 % - -     Psych: Stable mood, normal judgement, normal affect   Const: No distress  Eyes: Conjunctiva noninjected, no icterus noted; pupils equal, round, and reactive to light. HENT: Atraumatic, normocephalic; Oral mucosa moist  Neck: Trachea midline, neck supple. No thyromegaly noted. CV: Regular rate and rhythm, no murmur rub or gallop noted  Resp: Lungs clear to auscultation bilaterally, no rales wheezes or ronchi, no retractions. Respirations unlabored. GI: Soft, nontender, nondistended. Normal bowel sounds. No palpable masses. Neuro: Alert, oriented, appropriate. No cranial nerve deficits appreciated. Sensation intact to light touch.  Motor examination reveals normal strength in all four limbs diffusely. Ataxic gait. Skin: Normal temperature and turgor  MSK: No joint abnormalities noted. Ext: No significant edema appreciated. No varicosities. Lab Results   Component Value Date    WBC 8.8 07/21/2018    HGB 12.4 (L) 07/21/2018    HCT 36.2 (L) 07/21/2018    MCV 85.8 07/21/2018     07/21/2018     Lab Results   Component Value Date    INR 1.02 07/18/2018    INR 1.22 (H) 07/14/2018    INR 1.07 07/13/2018    PROTIME 11.6 07/18/2018    PROTIME 13.9 (H) 07/14/2018    PROTIME 12.2 07/13/2018     Lab Results   Component Value Date    CREATININE 0.9 07/21/2018    BUN 18 07/21/2018     (L) 07/21/2018    K 4.4 07/21/2018    CL 98 (L) 07/21/2018    CO2 23 07/21/2018     Lab Results   Component Value Date     (H) 07/21/2018    AST 44 (H) 07/21/2018    ALKPHOS 93 07/21/2018    BILITOT 0.5 07/21/2018     MRI LUMBAR SPINE WO CONTRAST   Final Result       1. Lumbar spondylosis resulting in up to severe left L4-5 and   L5-S1 neural foraminal stenosis as described above.       FL LUMBAR PUNCTURE DIAG   Final Result   Impression: Successful lumbar puncture utilizing fluoroscopic   guidance. After 34 mL of clear CSF was removed, the pressure   dropped from 24 cm H2O to 15 cm H2O.           US RENAL COMPLETE   Final Result       No hydronephrosis       9 mm hyperechoic area superior renal cortex on the right   indeterminate. Findings may relate to angiomyolipoma. Focal area   of fatty infiltration or benign or malignant neoplasm not   excluded. CT without contrast may be useful.       XR CHEST STANDARD (2 VW)   Final Result       No focal consolidation.                       MRI BRAIN WO CONTRAST   Final Result       1. No acute intracranial abnormality. No evidence of acute   infarction       2.  Mild burden of scattered T2 hyperintense white matter disease   which are nonspecific, may be attributed to chronic microvascular   ischemia.       3. Mild disproportionate dilatation of the lateral/third   ventricles in comparison to the cerebral sulci which relate to   normal pressure hydrocephalus or central volume loss. Recommended   clinical correlation. No significant change since 10/5/2017.       CT Head WO Contrast   Final Result       1. No acute intracranial abnormality.       2. There is chronic prominence of the lateral ventricular system. This is a little more than expected for a patient of this age,   although it could be compatible with degree of generalized   cerebral atrophy. The third and fourth ventricles are essentially   normal in volume. Correlate for symptoms of hydrocephalus.             The above laboratory data have been reviewed. The above imaging data have been reviewed. The above medical testing have been reviewed. Body mass index is 29.15 kg/m². Barriers to Discharge: Weakness, endurance, balance, gait    POST ADMISSION PHYSICIAN EVALUATION  The patient has agreed to being admitted to our comprehensive inpatient  rehabilitation facility consisting of at least 180 minutes of therapy a day,  5 out of 7 days a week. The patient/family has a good understanding of our discharge process. The  patient has potential to make improvement and is in need of at least two of  the following multidisciplinary therapies including but not limited to  physical, occupational, respiratory, and speech, nutritional services, wound care, and prosthetics and orthotics. Given the patients complex condition  and risk of further medical complications, rehabilitation services cannot be  safely provided at a lower level of care such as a skilled nursing facility. I have compared the patients medical and functional status at the time of the  preadmission screening and the same on this date, and there are no significant changes.     By signing this document, I acknowledge that I have personally performed a  full physical examination on this patient

## 2018-07-21 NOTE — PROGRESS NOTES
Intervention(s): Medication (see eMar)  Response to Pain Intervention: Patient Satisfied     Social/Functional History  Social/Functional History  Lives With: Spouse  Type of Home: House  Home Layout: One level, Laundry in basement, Able to Live on Main level with bedroom/bathroom (wife will do laundry)  Home Access: Stairs to enter with rails (half wall on L, door frame at top that pt holds on to)  Entrance Stairs - Number of Steps: 3 NILE  Entrance Stairs - Rails: Left  Bathroom Shower/Tub: Tub/Shower unit  Bathroom Toilet: Standard  Bathroom Equipment: Shower chair, Hand-held shower  Bathroom Accessibility: Walker accessible  Home Equipment: Cane, Rolling walker, Long-handled shoehorn  Receives Help From: Home health (had a home health therapy eval and was planning to continue prior to admission)  ADL Assistance: Independent (was not using shower chair at baseline)  [address]: Independent (Periodically grills/cooks but wife primary, patient typically makes the bed and light housekeeping but spouse completes other cleaning tasks, patient completes all yardwork/maintenance with push mower, periodically completes grocery shopping)  Homemaking Responsibilities: Yes  Bill Paying/Finance Responsibility: Secondary (wife manages bills; pt endorsed writing checks and handling cash)  Health Care Management: Primary (pt endorsed utilizing pillboxes and fills once weekly for himself and wife)  Ambulation Assistance: Independent (was not using an AD approx 1 month ago, but used RW when first d/c from hospital 2 weeks ago)  Transfer Assistance: Independent  Active : Yes  Mode of Transportation: Car  Education: Associate's degree in electromechanical engineering and associate's degree in general arts  Occupation: Retired  Type of occupation: 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 Had PT/OT home therapy eval and approximately two weeks later returned to the hospital two weeks later for normal pressure hydrocephalus. Pt has had one fall prior due to falling through fence taking a shortcut to his daughter's house. Pt's wife states that she is able to provide supervision and limited physical assist at d/c.       Objective   Vision Exceptions: Wears glasses at all times  Hearing Exceptions: Bilateral hearing aid (at home)    Orientation  Overall Orientation Status: Within Normal Limits  Observation/Palpation  Observation: Incision with no drainage noted, tremors noted to BUE (R>L)  Balance  Sitting Balance: Supervision  Standing Balance: Contact guard assistance  Standing Balance  Sit to stand: Minimal assistance (CGA initially, progressing to min assist as patient fatigued during session)  Stand to sit: Minimal assistance (for controlled descent with increased time)  Functional Mobility  Functional - Mobility Device: Rolling Walker  Activity: To/from bathroom (to/from gym)  Assist Level: Contact guard assistance  Functional Mobility Comments: cues for increased B foot clearance (patient reports shuffling gait at baseline particularly when fatigued)  Toilet Transfers  Toilet transfer:  Maximum assistance   Toilet Transfers Comments: completed without use of grab bar to simulate home environment, patient required min assist for both lifting and lowering from toilet  Tub Transfers  Tub - Transfer From: Walker  Tub - Transfer Type: To and From  Tub - Transfer To: Transfer tub bench  Tub - Technique: Ambulating  Tub Transfers: Moderate assistance  Tub Transfers Comments: min assist needed to enter tub in standing, CGA to exit from tub bench. Cues for safe hand placement and use of grab bar  ADL  Grooming: Supervision (with steadying assist in stance to complete oral care.  Assist required to id error demonstrated decreased hand strength in L compared to R beyond tyipcal deviation d/t hand dominance. No significant variance between coordination scores. Patient reporting minimal pain/discomfort in B hands d/t arthritis    Assessment   Performance deficits / Impairments: Decreased functional mobility ; Decreased ADL status; Decreased endurance;Decreased balance;Decreased cognition;Decreased high-level IADLs  Assessment: Patient currently demonstrating decline from functional baseline, requiring min assist for functional transfers and completion of ADL tasks. Limited by decreased endurance, delayed processing, and decreased balance in stance. Recommend d/c home with assist prn and OP OT. Treatment Diagnosis: Impaired ADL and functional mobility  Prognosis: Good  Patient Education: role of OT, rehab POC, safe technique for tub transfer - verb understanding  REQUIRES OT FOLLOW UP: Yes  Activity Tolerance  Activity Tolerance: Patient Tolerated treatment well  Safety Devices  Safety Devices in place: Yes  Type of devices: Chair alarm in place;Call light within reach; Left in chair;Nurse notified         Plan   Plan  Times per week: 5x/wk x60 min  Times per day: Daily  Current Treatment Recommendations: Balance Training, Functional Mobility Training, Endurance Training, Self-Care / ADL, Neuromuscular Re-education, Safety Education & Training, Patient/Caregiver Education & Training, Equipment Evaluation, Education, & procurement, Home Management Training      Goals  Long term goals  Time Frame for Long term goals : 7-10 days  Long term goal 1: Patient will demonstrate ability to complete tub transfer with MOD I  Long term goal 2: Patient will complete bathing with MOD I  Long term goal 3: Patient will complete grooming tasks in stance at independent level for both task completion and balance  Long term goal 4: Patient will complete UB/LB dressing in stance with MOD I  Long term goal 5: Patient will complete simple meal

## 2018-07-21 NOTE — PLAN OF CARE
Problem: Falls - Risk of:  Goal: Will remain free from falls  Will remain free from falls   Fall precautions are in place. Call light and bedside table are within reach. Patient is able to teach back to call for assistance to transfer. He has on non skid shoes.

## 2018-07-21 NOTE — PROGRESS NOTES
Assessment complete, VSS, medications taken without difficulty, Surgical sites clean dry and staples intact. Call light and over bed table placed within reach. Pt. Admitted pain. See MAR. No other care needed at this time. Will continue to monitor.

## 2018-07-21 NOTE — PROGRESS NOTES
Spouse    Education:  Patient Education: Educated pt to role of SLP, rationale for assessment, impact NPH and neurosx can have on cognition, results of session, and recommended goals and POC.   Patient Education Response: Verbalizes understanding;Needs reinforcement         Therapy Time:   Individual Concurrent Group Co-treatment   Time In 1439         Time Out 1148         Minutes 60            Timed Code Treatment Minutes: 0 Minutes  Total Treatment Time: 60     Vijaya Pickard MA CCC-SLP; [id number]  Speech-Language Pathologist

## 2018-07-21 NOTE — PLAN OF CARE
Problem: Falls - Risk of:  Goal: Will remain free from falls  Will remain free from falls   Outcome: Ongoing  Pt. Admitted to facility with impaired gait and weakness. Fall prevention measures in place to promote safety and reduce the risk of falls: Fall alarm activated, video surveillance ongoing, bed wheels locked and in lowest position, fall sign posted at door. Hourly rounding and frequent visual checks on-going. No falls reported. Will continue to monitor.

## 2018-07-22 PROCEDURE — 6370000000 HC RX 637 (ALT 250 FOR IP): Performed by: FAMILY MEDICINE

## 2018-07-22 PROCEDURE — 6370000000 HC RX 637 (ALT 250 FOR IP): Performed by: PHYSICAL MEDICINE & REHABILITATION

## 2018-07-22 PROCEDURE — 94761 N-INVAS EAR/PLS OXIMETRY MLT: CPT

## 2018-07-22 PROCEDURE — 1280000000 HC REHAB R&B

## 2018-07-22 PROCEDURE — 94660 CPAP INITIATION&MGMT: CPT

## 2018-07-22 PROCEDURE — 6360000002 HC RX W HCPCS: Performed by: FAMILY MEDICINE

## 2018-07-22 RX ADMIN — DOCUSATE SODIUM AND SENNOSIDES 2 TABLET: 8.6; 5 TABLET, FILM COATED ORAL at 20:35

## 2018-07-22 RX ADMIN — FINASTERIDE 5 MG: 5 TABLET, FILM COATED ORAL at 08:22

## 2018-07-22 RX ADMIN — THERA TABS 1 TABLET: TAB at 08:22

## 2018-07-22 RX ADMIN — TAMSULOSIN HYDROCHLORIDE 0.8 MG: 0.4 CAPSULE ORAL at 08:22

## 2018-07-22 RX ADMIN — GABAPENTIN 100 MG: 100 CAPSULE ORAL at 08:22

## 2018-07-22 RX ADMIN — HYDROCODONE BITARTRATE AND ACETAMINOPHEN 1 TABLET: 5; 325 TABLET ORAL at 12:55

## 2018-07-22 RX ADMIN — ENOXAPARIN SODIUM 40 MG: 40 INJECTION SUBCUTANEOUS at 17:39

## 2018-07-22 RX ADMIN — CHOLECALCIFEROL TAB 25 MCG (1000 UNIT) 2000 UNITS: 25 TAB at 08:21

## 2018-07-22 RX ADMIN — OXYCODONE HYDROCHLORIDE AND ACETAMINOPHEN 1000 MG: 500 TABLET ORAL at 08:21

## 2018-07-22 RX ADMIN — GABAPENTIN 100 MG: 100 CAPSULE ORAL at 13:55

## 2018-07-22 RX ADMIN — BUPROPION HYDROCHLORIDE 150 MG: 150 TABLET, FILM COATED, EXTENDED RELEASE ORAL at 08:21

## 2018-07-22 RX ADMIN — HYDROCODONE BITARTRATE AND ACETAMINOPHEN 1 TABLET: 5; 325 TABLET ORAL at 22:09

## 2018-07-22 RX ADMIN — GABAPENTIN 100 MG: 100 CAPSULE ORAL at 20:35

## 2018-07-22 RX ADMIN — TERBINAFINE 250 MG: 250 TABLET ORAL at 08:21

## 2018-07-22 RX ADMIN — MONTELUKAST SODIUM 10 MG: 10 TABLET, FILM COATED ORAL at 20:35

## 2018-07-22 RX ADMIN — CETIRIZINE HYDROCHLORIDE 10 MG: 10 TABLET, FILM COATED ORAL at 08:22

## 2018-07-22 RX ADMIN — DOCUSATE SODIUM AND SENNOSIDES 2 TABLET: 8.6; 5 TABLET, FILM COATED ORAL at 08:21

## 2018-07-22 ASSESSMENT — PAIN DESCRIPTION - PAIN TYPE
TYPE: ACUTE PAIN
TYPE: ACUTE PAIN

## 2018-07-22 ASSESSMENT — PAIN SCALES - GENERAL
PAINLEVEL_OUTOF10: 0
PAINLEVEL_OUTOF10: 0
PAINLEVEL_OUTOF10: 4
PAINLEVEL_OUTOF10: 0
PAINLEVEL_OUTOF10: 0

## 2018-07-22 ASSESSMENT — PAIN DESCRIPTION - FREQUENCY
FREQUENCY: CONTINUOUS
FREQUENCY: CONTINUOUS

## 2018-07-22 ASSESSMENT — PAIN DESCRIPTION - ONSET
ONSET: ON-GOING
ONSET: ON-GOING

## 2018-07-22 ASSESSMENT — PAIN DESCRIPTION - PROGRESSION
CLINICAL_PROGRESSION: NOT CHANGED

## 2018-07-22 ASSESSMENT — PAIN DESCRIPTION - DESCRIPTORS
DESCRIPTORS: ACHING
DESCRIPTORS: ACHING

## 2018-07-22 ASSESSMENT — ACTIVITIES OF DAILY LIVING (ADL)
EFFECT OF PAIN ON DAILY ACTIVITIES: COMFORT
EFFECT OF PAIN ON DAILY ACTIVITIES: COMFORT

## 2018-07-22 ASSESSMENT — PAIN DESCRIPTION - LOCATION
LOCATION: HEAD
LOCATION: HEAD

## 2018-07-22 ASSESSMENT — PAIN DESCRIPTION - ORIENTATION
ORIENTATION: RIGHT;MID
ORIENTATION: RIGHT;MID

## 2018-07-23 LAB
ANION GAP SERPL CALCULATED.3IONS-SCNC: 14 MMOL/L (ref 3–16)
BASOPHILS ABSOLUTE: 0.1 K/UL (ref 0–0.2)
BASOPHILS RELATIVE PERCENT: 0.8 %
BUN BLDV-MCNC: 21 MG/DL (ref 7–20)
CALCIUM SERPL-MCNC: 9.9 MG/DL (ref 8.3–10.6)
CHLORIDE BLD-SCNC: 100 MMOL/L (ref 99–110)
CO2: 24 MMOL/L (ref 21–32)
CREAT SERPL-MCNC: 0.9 MG/DL (ref 0.8–1.3)
CSF CULTURE: NORMAL
EOSINOPHILS ABSOLUTE: 0.4 K/UL (ref 0–0.6)
EOSINOPHILS RELATIVE PERCENT: 4.8 %
GFR AFRICAN AMERICAN: >60
GFR NON-AFRICAN AMERICAN: >60
GLUCOSE BLD-MCNC: 99 MG/DL (ref 70–99)
GRAM STAIN RESULT: NORMAL
HCT VFR BLD CALC: 36.9 % (ref 40.5–52.5)
HEMOGLOBIN: 12.8 G/DL (ref 13.5–17.5)
LYMPHOCYTES ABSOLUTE: 1.4 K/UL (ref 1–5.1)
LYMPHOCYTES RELATIVE PERCENT: 18.2 %
MCH RBC QN AUTO: 29.8 PG (ref 26–34)
MCHC RBC AUTO-ENTMCNC: 34.6 G/DL (ref 31–36)
MCV RBC AUTO: 86.1 FL (ref 80–100)
MONOCYTES ABSOLUTE: 0.9 K/UL (ref 0–1.3)
MONOCYTES RELATIVE PERCENT: 11.5 %
NEUTROPHILS ABSOLUTE: 5 K/UL (ref 1.7–7.7)
NEUTROPHILS RELATIVE PERCENT: 64.7 %
PDW BLD-RTO: 13.6 % (ref 12.4–15.4)
PLATELET # BLD: 447 K/UL (ref 135–450)
PMV BLD AUTO: 6.3 FL (ref 5–10.5)
POTASSIUM REFLEX MAGNESIUM: 4.6 MMOL/L (ref 3.5–5.1)
RBC # BLD: 4.29 M/UL (ref 4.2–5.9)
SODIUM BLD-SCNC: 138 MMOL/L (ref 136–145)
WBC # BLD: 7.8 K/UL (ref 4–11)

## 2018-07-23 PROCEDURE — 97116 GAIT TRAINING THERAPY: CPT | Performed by: PHYSICAL THERAPIST

## 2018-07-23 PROCEDURE — 6370000000 HC RX 637 (ALT 250 FOR IP): Performed by: FAMILY MEDICINE

## 2018-07-23 PROCEDURE — 97110 THERAPEUTIC EXERCISES: CPT | Performed by: PHYSICAL THERAPIST

## 2018-07-23 PROCEDURE — 94660 CPAP INITIATION&MGMT: CPT

## 2018-07-23 PROCEDURE — 80048 BASIC METABOLIC PNL TOTAL CA: CPT

## 2018-07-23 PROCEDURE — 97530 THERAPEUTIC ACTIVITIES: CPT | Performed by: PHYSICAL THERAPIST

## 2018-07-23 PROCEDURE — 94761 N-INVAS EAR/PLS OXIMETRY MLT: CPT

## 2018-07-23 PROCEDURE — 6370000000 HC RX 637 (ALT 250 FOR IP): Performed by: PHYSICAL MEDICINE & REHABILITATION

## 2018-07-23 PROCEDURE — 36415 COLL VENOUS BLD VENIPUNCTURE: CPT

## 2018-07-23 PROCEDURE — 97535 SELF CARE MNGMENT TRAINING: CPT

## 2018-07-23 PROCEDURE — 85025 COMPLETE CBC W/AUTO DIFF WBC: CPT

## 2018-07-23 PROCEDURE — 1280000000 HC REHAB R&B

## 2018-07-23 PROCEDURE — 97530 THERAPEUTIC ACTIVITIES: CPT

## 2018-07-23 PROCEDURE — 97110 THERAPEUTIC EXERCISES: CPT

## 2018-07-23 PROCEDURE — 6360000002 HC RX W HCPCS: Performed by: FAMILY MEDICINE

## 2018-07-23 PROCEDURE — 97127 HC SP THER IVNTJ W/FOCUS COG FUNCJ: CPT

## 2018-07-23 RX ORDER — LIDOCAINE 50 MG/G
1 PATCH TOPICAL DAILY
Status: DISCONTINUED | OUTPATIENT
Start: 2018-07-23 | End: 2018-07-27 | Stop reason: HOSPADM

## 2018-07-23 RX ADMIN — ENOXAPARIN SODIUM 40 MG: 40 INJECTION SUBCUTANEOUS at 17:06

## 2018-07-23 RX ADMIN — HYDROCODONE BITARTRATE AND ACETAMINOPHEN 1 TABLET: 5; 325 TABLET ORAL at 14:52

## 2018-07-23 RX ADMIN — GABAPENTIN 100 MG: 100 CAPSULE ORAL at 14:52

## 2018-07-23 RX ADMIN — MONTELUKAST SODIUM 10 MG: 10 TABLET, FILM COATED ORAL at 21:09

## 2018-07-23 RX ADMIN — CHOLECALCIFEROL TAB 25 MCG (1000 UNIT) 2000 UNITS: 25 TAB at 08:46

## 2018-07-23 RX ADMIN — CETIRIZINE HYDROCHLORIDE 10 MG: 10 TABLET, FILM COATED ORAL at 08:45

## 2018-07-23 RX ADMIN — GABAPENTIN 100 MG: 100 CAPSULE ORAL at 08:45

## 2018-07-23 RX ADMIN — BUPROPION HYDROCHLORIDE 150 MG: 150 TABLET, FILM COATED, EXTENDED RELEASE ORAL at 08:46

## 2018-07-23 RX ADMIN — TAMSULOSIN HYDROCHLORIDE 0.8 MG: 0.4 CAPSULE ORAL at 08:45

## 2018-07-23 RX ADMIN — TERBINAFINE 250 MG: 250 TABLET ORAL at 08:48

## 2018-07-23 RX ADMIN — FINASTERIDE 5 MG: 5 TABLET, FILM COATED ORAL at 08:45

## 2018-07-23 RX ADMIN — THERA TABS 1 TABLET: TAB at 08:45

## 2018-07-23 RX ADMIN — GABAPENTIN 100 MG: 100 CAPSULE ORAL at 21:08

## 2018-07-23 RX ADMIN — OXYCODONE HYDROCHLORIDE AND ACETAMINOPHEN 1000 MG: 500 TABLET ORAL at 08:45

## 2018-07-23 RX ADMIN — ACETAMINOPHEN 1000 MG: 500 TABLET, FILM COATED ORAL at 08:45

## 2018-07-23 RX ADMIN — ACETAMINOPHEN 650 MG: 325 TABLET, FILM COATED ORAL at 21:08

## 2018-07-23 ASSESSMENT — PAIN DESCRIPTION - LOCATION
LOCATION: BACK
LOCATION: HEAD

## 2018-07-23 ASSESSMENT — PAIN SCALES - GENERAL
PAINLEVEL_OUTOF10: 4
PAINLEVEL_OUTOF10: 3
PAINLEVEL_OUTOF10: 0
PAINLEVEL_OUTOF10: 5
PAINLEVEL_OUTOF10: 2
PAINLEVEL_OUTOF10: 3
PAINLEVEL_OUTOF10: 2
PAINLEVEL_OUTOF10: 3

## 2018-07-23 ASSESSMENT — PAIN DESCRIPTION - DESCRIPTORS
DESCRIPTORS: ACHING

## 2018-07-23 ASSESSMENT — PAIN DESCRIPTION - PAIN TYPE
TYPE: CHRONIC PAIN
TYPE: ACUTE PAIN;SURGICAL PAIN

## 2018-07-23 ASSESSMENT — PAIN DESCRIPTION - ONSET
ONSET: ON-GOING

## 2018-07-23 ASSESSMENT — PAIN DESCRIPTION - PROGRESSION
CLINICAL_PROGRESSION: NOT CHANGED

## 2018-07-23 ASSESSMENT — PAIN DESCRIPTION - FREQUENCY
FREQUENCY: INTERMITTENT
FREQUENCY: CONTINUOUS
FREQUENCY: INTERMITTENT
FREQUENCY: INTERMITTENT

## 2018-07-23 ASSESSMENT — PAIN DESCRIPTION - ORIENTATION
ORIENTATION: LOWER
ORIENTATION: RIGHT

## 2018-07-23 ASSESSMENT — ACTIVITIES OF DAILY LIVING (ADL): EFFECT OF PAIN ON DAILY ACTIVITIES: COMFORT

## 2018-07-23 NOTE — PROGRESS NOTES
Pt up in chair at this time. Reports pain to R side of head, prn tylenol given at this time. All meds taken without difficulty. Call light within reach and chair alarm engaged. For patient safety, Visual Surveillance is in place, reviewed with patient/family, verbalized understanding.

## 2018-07-23 NOTE — PLAN OF CARE
Problem: Falls - Risk of:  Goal: Will remain free from falls  Will remain free from falls   Outcome: Ongoing  Pt remains free of falls thus far this shift. All fall precautions in place and functioning properly.

## 2018-07-23 NOTE — PROGRESS NOTES
tamsulosin (FLOMAX) capsule 0.8 mg Daily   terbinafine (LAMISIL) tablet 250 mg Daily   vitamin C (ASCORBIC ACID) tablet 1,000 mg Daily   vitamin D (CHOLECALCIFEROL) tablet 2,000 Units Daily   sennosides-docusate sodium (SENOKOT-S) 8.6-50 MG tablet 2 tablet BID         Physical exam:     Vitals:  /79   Pulse 90   Temp 98.1 °F (36.7 °C) (Oral)   Resp 18   Ht 6' 2\" (1.88 m)   Wt 226 lb 10.1 oz (102.8 kg)   SpO2 97%   BMI 29.10 kg/m²   Constitutional:  OAA X3 NAD  Skin: no rash, turgor wnl  Heent:  eomi, mmm  Neck: no bruits or jvd noted  Cardiovascular:  S1, S2 without m/r/g  Respiratory: CTA B without w/r/r  Abdomen:  +bs, soft, nt, nd  Ext: no lower extremity edema  Psychiatric: mood and affect appropriate  Musculoskeletal:  Rom, muscular strength intact    Data:   Labs:  CBC:   Recent Labs      07/21/18   0623   WBC  8.8   HGB  12.4*   PLT  415     BMP:    Recent Labs      07/21/18   0623   NA  135*   K  4.4   CL  98*   CO2  23   BUN  18   CREATININE  0.9   GLUCOSE  114*     Ca/Mg/Phos:   Recent Labs      07/21/18   0623   CALCIUM  9.8     Hepatic:   Recent Labs      07/21/18   0623   AST  44*   ALT  112*   BILITOT  0.5   ALKPHOS  93     Troponin: No results for input(s): TROPONINI in the last 72 hours. BNP: No results for input(s): BNP in the last 72 hours. Lipids: No results for input(s): CHOL, TRIG, HDL, LDLCALC, LABVLDL in the last 72 hours. ABGs: No results for input(s): PHART, PO2ART, MCV3XRO in the last 72 hours. INR:   No results for input(s): INR in the last 72 hours. UA:  No results for input(s): April Smart, GLUCOSEU, BILIRUBINUR, KETUA, SPECGRAV, BLOODU, PHUR, PROTEINU, UROBILINOGEN, NITRU, LEUKOCYTESUR, Magdaline Rocher in the last 72 hours. Urine Microscopic:   No results for input(s): LABCAST, BACTERIA, COMU, HYALCAST, WBCUA, RBCUA, EPIU in the last 72 hours. Urine Culture:   No results for input(s): LABURIN in the last 72 hours.   Urine Chemistry:   No results for input(s): CLUR, LABCREA, PROTEINUR, NAUR in the last 72 hours. IMAGING:  No orders to display       Assessment/Plan   1. JULIOCESAR     2. Sepsis     3. UTI     4. Acid- base/ Electrolytes - hyponatremia     5. Suspected NPH   Plan   -  shunt placed  - JULIOCESAR sec to prerenal Azotemia   - cr better   - off IVC  - PVR normal   - TRUDI - 9mm mass - repeat scan in 1 year   - Monitor renal function   - Hold ACE/ARBS                     Thank you for allowing us to participate in care of [employer].        [place]  Feel free to contact me   [facility] [address]  Office : [phone]  Fax :[phone]

## 2018-07-23 NOTE — PLAN OF CARE
Problem: Falls - Risk of:  Goal: Will remain free from falls  Will remain free from falls   Outcome: Ongoing   Pt is a High fall risk. See Helene Flank Fall Score and ABCDS Injury Risk assessments. High Fall Risk per RIVERS/ABCDS: Explained fall risk precautions to pt and family and rationale behind their use to keep the patient safe. Pt bed is in low position, side rails up, call light and belongings are in reach. Fall wristband applied and present on pts wrist.  Bed alarm on. Pt encouraged to call for assistance. Will continue with hourly rounds for PO intake, pain needs, toileting and repositioning as needed. Problem: Skin Integrity:  Goal: Will show no infection signs and symptoms  Will show no infection signs and symptoms   Outcome: Ongoing  No s/s of infection at surgical incision sites. Will continue to monitor.

## 2018-07-23 NOTE — PROGRESS NOTES
Patient assessment is complete. Patient is A/Ox4, VSS and afebrile. Patient denies any needs at this time. Call light is within reach, bed is in the lowest position with alarm on. Will continue to monitor.

## 2018-07-23 NOTE — PROGRESS NOTES
Patient weighed in this morning at 241lbs on the standing scale. Patients weight yesterday was documented as 226 on the  standing scale. Weight was rechecked and was 243lbs with shoes on. Patients lung are clear and no complaints of shortness of breath. Edema is unchanged from previous shift.

## 2018-07-23 NOTE — PROGRESS NOTES
[facility] Acute Rehab Unit  Occupational Therapy  Daily Treatment Note  Patient Name: Lynette Jerome. MRN: [id number]    Chart Reviewed: Yes     Other position/activity restrictions: up as tolerated   Additional Pertinent Hx: Pt is a 80 y.o. male admitted to ARU on 7/20/18 from [facility]. Pt initially admitted to [facility] on 7/13/18 with confusion, poor balance, and urge incontinence. Found to have NPH. Shunt placed 7/18. PMH: HLD, HTN, unspecified sleep apnea. Diagnosis: brain dysfunction  Treatment Diagnosis: Impaired ADL and functional mobility    Subjective: Pt in chair on entry. Wife present. Pleasant and cooperative. Pain: Yes, 4/10 head and low back pain, RN student present and to inform RN    Objective:    Cognition/Orientation: WFL    Functional Mobility   Sit to Stand: SBA  Stand to Sit: SBA  Chair Transfer: SBA, walker  Other: Functional mobility to and from gym with CGA with walker, mod cues for step clearance (4 min + 4 min). Stance for ball rebounding (4 min) and basketball (4 min) activity to improve dynamic balance - CGA, no LOB. Stance at sink CGA to wash hands (1 min). ADLs   Grooming: Independent (hand hygiene)  LB dressing: Donned socks spvn (increased time and effort), donned shoes min assist using shoe horn (assist with R heel, tying both shoes)    UE Exercises - seated  Elbow flex: 4#, 2 x 12 reps  Scapular retraction: 2 x 12 reps  Alternating punches: 3#, 2 x 10 reps    Activity Tolerance: Fair, improving    Patient Education: Activity promotion, transfers, purpose of activities - verb understanding    Safety Devices in Place: left in chair in room, alarm activated, needs in reach    Assessment: Pt demonstrates good participation. He requires CGA for mobility with walker and is prone to falls due to impaired gait quality and balance. He requires min assist for lower body ADLs. Will continue per OT POC.      Discharge Recommendations: prn assist, OP OT  Equipment Needs:  No - continue to assess Goals:     Long term goals  Time Frame for Long term goals : 7-10 days  Long term goal 1: Patient will demonstrate ability to complete tub transfer with MOD I - Not met  Long term goal 2: Patient will complete bathing with MOD I - Not met  Long term goal 3: Patient will complete grooming tasks in stance at independent level for both task completion and balance - Not met  Long term goal 4: Patient will complete UB/LB dressing in stance with MOD I - Not met  Long term goal 5: Patient will complete simple meal prep with spvn - Not met      Plan:      Times per week: 5x/wk x60 min   Times per day: Daily    Therapy Time   Individual Concurrent Group Co-treatment   Time In 0730         Time Out 0830         Minutes 60           Timed Code Treatment Minutes: 60  Total Treatment Time: 60       Laura Vasquez, OT

## 2018-07-23 NOTE — PROGRESS NOTES
tamsulosin (FLOMAX) capsule 0.8 mg Daily   terbinafine (LAMISIL) tablet 250 mg Daily   vitamin C (ASCORBIC ACID) tablet 1,000 mg Daily   vitamin D (CHOLECALCIFEROL) tablet 2,000 Units Daily   sennosides-docusate sodium (SENOKOT-S) 8.6-50 MG tablet 2 tablet BID         Physical exam:     Vitals:  /80   Pulse 99   Temp 98 °F (36.7 °C) (Oral)   Resp 18   Ht 6' 2\" (1.88 m)   Wt 241 lb 13.5 oz (109.7 kg) Comment: pt weight was rechecked  SpO2 95%   BMI 31.05 kg/m²   Constitutional:  OAA X3 NAD  Skin: no rash, turgor wnl  Heent:  eomi, mmm  Neck: no bruits or jvd noted  Cardiovascular:  S1, S2 without m/r/g  Respiratory: CTA B without w/r/r  Abdomen:  +bs, soft, nt, nd  Ext: no lower extremity edema  Psychiatric: mood and affect appropriate  Musculoskeletal:  Rom, muscular strength intact    Data:   Labs:  CBC:   Recent Labs      07/21/18   0623  07/23/18   0640   WBC  8.8  7.8   HGB  12.4*  12.8*   PLT  415  447     BMP:    Recent Labs      07/21/18   0623  07/23/18   0641   NA  135*  138   K  4.4  4.6   CL  98*  100   CO2  23  24   BUN  18  21*   CREATININE  0.9  0.9   GLUCOSE  114*  99     Ca/Mg/Phos:   Recent Labs      07/21/18   0623  07/23/18   0641   CALCIUM  9.8  9.9     Hepatic:   Recent Labs      07/21/18   0623   AST  44*   ALT  112*   BILITOT  0.5   ALKPHOS  93     Troponin: No results for input(s): TROPONINI in the last 72 hours. BNP: No results for input(s): BNP in the last 72 hours. Lipids: No results for input(s): CHOL, TRIG, HDL, LDLCALC, LABVLDL in the last 72 hours. ABGs: No results for input(s): PHART, PO2ART, KIB5TVS in the last 72 hours. INR:   No results for input(s): INR in the last 72 hours. UA:  No results for input(s): Javon Alt, GLUCOSEU, BILIRUBINUR, KETUA, SPECGRAV, BLOODU, PHUR, PROTEINU, UROBILINOGEN, NITRU, LEUKOCYTESUR, Carren Dine in the last 72 hours.    Urine Microscopic:   No results for input(s): LABCAST, BACTERIA, COMU, HYALCAST, WBCUA, RBCUA, EPIU

## 2018-07-23 NOTE — PROGRESS NOTES
connected to nurse call light system  [] Bed alarm activated  [x] Other: wife and friend at side   Assessment: Cognitive-linguistic impairment. Pt with reduced cognitive flexibility and poor attention to detail at times. Some level of assistance may be required with money management tasks; pt with reduced insight into deficits. Plan: Continue as per plan of care. NEW GOAL:  Patient will complete graded executive function tasks with adequate attention to detail and self-correction of errors with >85% accuracy or min-no cues.      Interventions used this date:  [] Speech/Language Treatment  [] Instruction in HEP  [] Dysphagia Treatment [x] Cognitive Treatment   [] Other:    Discharge recommendations:  [] Home independently  [x] Home with assistance []  24 hour supervision  [] ECF [] Other  Continued Tx Upon Discharge: ? [] Yes    [] No    [x] TBD based on progress while on ARU     [] Vital Stim indicated     [] Other:   Estimated discharge date: Not yet established      Electronically signed by  Angeli Fitzgerald MA CCC-SLP; [id number]  Speech-Language Pathologist

## 2018-07-23 NOTE — PROGRESS NOTES
direction. Pt also instructed with isometrics in standing with resistance given in all directions. Assessment   Body structures, Functions, Activity limitations: Decreased functional mobility ; Decreased balance  Assessment: Pt demo improvement with balance, gt and endurance but is still below baseline. Pt would benefit from continued therapy to maximize potential and increase functional mobility towards Ind for a safer return to home. Treatment Diagnosis: decreased functional mobility due to brain dysfunction  Prognosis: Good  Patient Education: Gt training, transf training, bed mobility skills and balance retraining  REQUIRES PT FOLLOW UP: Yes     G-Code     OutComes Score                                                    AM-PAC Score             Goals  Short term goals  Time Frame for Short term goals: STG=LTG  Long term goals  Time Frame for Long term goals : approximately 1 week  Long term goal 1: Pt will perform all bed mobility with Rufus. Goal not yet achieved  Long term goal 2: Pt will perform sit to/from stand with or without LRAD and Rufus. Goal not yet achieved  Long term goal 3: Pt will ambulate 400' with or without LRAD and Rufus. Goal not yet achieved  Long term goal 4: Pt will ascend/descend 12 stairs with LHR and supervision. Goal not yet addressed on this date  Patient Goals   Patient goals : \"To be back on my feet as soon as possible. \"    Plan    Plan  Times per week: 5x/week, 60 minutes a day  Current Treatment Recommendations: Strengthening, Balance Training, Functional Mobility Training, Transfer Training, Endurance Training, Gait Training, Stair training, Neuromuscular Re-education, Home Exercise Program, Safety Education & Training, Patient/Caregiver Education & Training  Safety Devices  Type of devices: Call light within reach, Chair alarm in place, Gait belt, Left in chair     Therapy Time   Individual Concurrent Group Co-treatment   Time In 1045         Time Out 1145

## 2018-07-24 PROCEDURE — 97110 THERAPEUTIC EXERCISES: CPT

## 2018-07-24 PROCEDURE — 94761 N-INVAS EAR/PLS OXIMETRY MLT: CPT

## 2018-07-24 PROCEDURE — 6370000000 HC RX 637 (ALT 250 FOR IP): Performed by: PHYSICAL MEDICINE & REHABILITATION

## 2018-07-24 PROCEDURE — 97110 THERAPEUTIC EXERCISES: CPT | Performed by: PHYSICAL THERAPIST

## 2018-07-24 PROCEDURE — 1280000000 HC REHAB R&B

## 2018-07-24 PROCEDURE — 97530 THERAPEUTIC ACTIVITIES: CPT

## 2018-07-24 PROCEDURE — 97127 HC SP THER IVNTJ W/FOCUS COG FUNCJ: CPT

## 2018-07-24 PROCEDURE — 97116 GAIT TRAINING THERAPY: CPT | Performed by: PHYSICAL THERAPIST

## 2018-07-24 PROCEDURE — 94660 CPAP INITIATION&MGMT: CPT

## 2018-07-24 PROCEDURE — 6370000000 HC RX 637 (ALT 250 FOR IP): Performed by: FAMILY MEDICINE

## 2018-07-24 PROCEDURE — 6360000002 HC RX W HCPCS: Performed by: FAMILY MEDICINE

## 2018-07-24 RX ADMIN — DOCUSATE SODIUM AND SENNOSIDES 2 TABLET: 8.6; 5 TABLET, FILM COATED ORAL at 08:42

## 2018-07-24 RX ADMIN — HYDROCODONE BITARTRATE AND ACETAMINOPHEN 1 TABLET: 5; 325 TABLET ORAL at 16:13

## 2018-07-24 RX ADMIN — MONTELUKAST SODIUM 10 MG: 10 TABLET, FILM COATED ORAL at 21:17

## 2018-07-24 RX ADMIN — TAMSULOSIN HYDROCHLORIDE 0.8 MG: 0.4 CAPSULE ORAL at 08:42

## 2018-07-24 RX ADMIN — CETIRIZINE HYDROCHLORIDE 10 MG: 10 TABLET, FILM COATED ORAL at 08:43

## 2018-07-24 RX ADMIN — BUPROPION HYDROCHLORIDE 150 MG: 150 TABLET, FILM COATED, EXTENDED RELEASE ORAL at 08:42

## 2018-07-24 RX ADMIN — GABAPENTIN 100 MG: 100 CAPSULE ORAL at 08:42

## 2018-07-24 RX ADMIN — GABAPENTIN 100 MG: 100 CAPSULE ORAL at 21:17

## 2018-07-24 RX ADMIN — DOCUSATE SODIUM AND SENNOSIDES 2 TABLET: 8.6; 5 TABLET, FILM COATED ORAL at 00:12

## 2018-07-24 RX ADMIN — TERBINAFINE 250 MG: 250 TABLET ORAL at 08:46

## 2018-07-24 RX ADMIN — FINASTERIDE 5 MG: 5 TABLET, FILM COATED ORAL at 08:43

## 2018-07-24 RX ADMIN — THERA TABS 1 TABLET: TAB at 08:42

## 2018-07-24 RX ADMIN — CHOLECALCIFEROL TAB 25 MCG (1000 UNIT) 2000 UNITS: 25 TAB at 08:42

## 2018-07-24 RX ADMIN — OXYCODONE HYDROCHLORIDE AND ACETAMINOPHEN 1000 MG: 500 TABLET ORAL at 08:42

## 2018-07-24 RX ADMIN — HYDROCODONE BITARTRATE AND ACETAMINOPHEN 2 TABLET: 5; 325 TABLET ORAL at 21:17

## 2018-07-24 RX ADMIN — ENOXAPARIN SODIUM 40 MG: 40 INJECTION SUBCUTANEOUS at 17:57

## 2018-07-24 RX ADMIN — GABAPENTIN 100 MG: 100 CAPSULE ORAL at 14:10

## 2018-07-24 RX ADMIN — DOCUSATE SODIUM AND SENNOSIDES 2 TABLET: 8.6; 5 TABLET, FILM COATED ORAL at 21:17

## 2018-07-24 ASSESSMENT — PAIN DESCRIPTION - ORIENTATION
ORIENTATION: ANTERIOR;OTHER (COMMENT)
ORIENTATION: LOWER
ORIENTATION: LOWER

## 2018-07-24 ASSESSMENT — PAIN DESCRIPTION - PAIN TYPE
TYPE: ACUTE PAIN
TYPE: ACUTE PAIN
TYPE: ACUTE PAIN;SURGICAL PAIN
TYPE: ACUTE PAIN

## 2018-07-24 ASSESSMENT — PAIN DESCRIPTION - ONSET
ONSET: ON-GOING
ONSET: GRADUAL

## 2018-07-24 ASSESSMENT — PAIN DESCRIPTION - PROGRESSION
CLINICAL_PROGRESSION: NOT CHANGED

## 2018-07-24 ASSESSMENT — PAIN SCALES - GENERAL
PAINLEVEL_OUTOF10: 5
PAINLEVEL_OUTOF10: 0
PAINLEVEL_OUTOF10: 2
PAINLEVEL_OUTOF10: 0
PAINLEVEL_OUTOF10: 5
PAINLEVEL_OUTOF10: 4
PAINLEVEL_OUTOF10: 7

## 2018-07-24 ASSESSMENT — PAIN DESCRIPTION - DESCRIPTORS
DESCRIPTORS: ACHING

## 2018-07-24 ASSESSMENT — PAIN DESCRIPTION - FREQUENCY
FREQUENCY: INTERMITTENT

## 2018-07-24 ASSESSMENT — PAIN DESCRIPTION - LOCATION
LOCATION: HEAD
LOCATION: BACK
LOCATION: HEAD

## 2018-07-24 NOTE — PROGRESS NOTES
Physical Therapy  Facility/Department: [facility] ACUTE REHAB UNIT  Daily Treatment Note  NAME: Brittany Childers. : 1950  MRN: [id number]    Date of Service: 2018    Discharge Recommendations:  Home with assist PRN, Outpatient PT   PT Equipment Recommendations  Other: continue to assess, pt owns RW and cane    Patient Diagnosis(es): There were no encounter diagnoses. has a past medical history of Allergic rhinitis; Erectile dysfunction; Hyperlipidemia; Hypertension; Screening for abdominal aortic aneurysm (AAA) performed; and Unspecified sleep apnea. has a past surgical history that includes other surgical history (N/A, 2018); pr create shunt:ventric-peritoneal (N/A, 2018); and laparoscopy (N/A, 2018). Restrictions  Position Activity Restriction  Other position/activity restrictions: up as tolerated  Subjective   General  Chart Reviewed: Yes  Additional Pertinent Hx: Pt is a 80 y.o. male admitted to ARU on 18 from [facility]. Pt initially admitted to [facility] on 18 with confusion, poor balance, and urge incontinence. Found to have NPH. Shunt placed . PMH: HLD, HTN, unspecified sleep apnea. Family / Caregiver Present: Yes (Wife)  Referring Practitioner: Dr. Alexei Chen: Pt reports that he woke up last night at midnight and had some difficulty getting back to sleep. Pt states that he eventually was able to sleep. General Comment  Comments: Pt found sitting in bedside chair upon PT arrival.  Pt agreeable to therapy session. Pain Screening  Patient Currently in Pain: Yes (LBP)  Pain Assessment  Pain Level: 5  Pain Type: Acute pain  Pain Orientation: Lower  Pain Descriptors: Aching  Pain Frequency: Intermittent  Pain Onset: On-going  Clinical Progression: Not changed  Patient's Stated Pain Goal: No pain  Pain Intervention(s): Repositioned; Ambulation/Increased activity; Therapeutic presence; Emotional support  Vital Signs  Patient Currently in Pain: Yes (LBP) Orientation  Orientation  Overall Orientation Status: Within Normal Limits  Objective   Bed mobility  Rolling to Left: Independent  Rolling to Right: Independent  Supine to Sit: Modified independent (VC for energy efficient technique)  Sit to Supine: Modified independent (VC for energy efficient technique)  Scooting: Stand by assistance   Bed mobility skills performed on mat table and not in the bed  Transfers  Sit to Stand: Stand by assistance (VC for hand placement from recliner, EOM, and high chair. Leans posterior but is able to correct)  Stand to sit: Stand by assistance (VC for hand placement and  decreased  eccentric control when descending )  Ambulation  Ambulation?: Yes  WB Status: No WB restrictions  More Ambulation?: Yes  Ambulation 1  Surface: level tile;ramp  Device: No Device  Assistance: Contact guard assistance  Quality of Gait: decreased anoop, forward trunk flexion with downward gaze, decreased B step length  with decreased R foot clearance which increases with fatigue  Distance: 350'x2,( this included up and down a ramp 75' up and 75' down 150' total),  120' x3.  PT also instructed pt to fold UES across chest to change COG. Ptpresented with significant increased rotation when UES were limited in this manner. Comments: PT instructed pt with stops , starts and quick reversals when walking on level surfaces. Pt leans minimally to the L and tends to veer to the L when walking. However, pt 's R foot does not consistently clear the surface which increases with fatigue  Stairs/Curb  Stairs?: Yes  Stairs  # Steps : 17 (8+9)  Stairs Height: 6\"  Rails: Left ascending  Assistance: Contact guard assistance  Comment: Reciprocal pattern when asscending and non reciprocal pattern when descending   PT instructed pt with the following ex performed on the mat:    In R sidelying: pt performed Modified L UE PNF pattern x 10 reps and \"firehydrants\" with LLE with goal of pt maintaining increased sensation throughout the R side   In L sidelying pt performed Modified R UE PNF pattern x 10 reps and \"firehydrants\" with LLE  X 10 reps   In supine: PT instructed pt with positioning of all 4 extrem in ext, abd and ER position and then performing combination isometrics with hold of 3 x 10 reps                    Assessment   Body structures, Functions, Activity limitations: Decreased functional mobility ; Decreased balance;Decreased endurance;Decreased vision/visual deficit  Assessment: Pt demo improvement with gt, and endurance. Pt cont to be well motivated to return to PLOF. Pt would benefit from continued therapy to maximize potential and increase functional mobility towards Ind for a safer return to home. Treatment Diagnosis: decreased functional mobility due to brain dysfunction  Prognosis: Good  Patient Education: Gt training, transf training, bed mobility skills and balance retraining  REQUIRES PT FOLLOW UP: Yes  Activity Tolerance  Activity Tolerance: Patient Tolerated treatment well;Patient limited by fatigue;Patient limited by endurance; Patient limited by pain     G-Code     OutComes Score                                                    AM-PAC Score             Goals  Short term goals  Time Frame for Short term goals: STG=LTG  Long term goals  Time Frame for Long term goals : approximately 1 week  Long term goal 1: Pt will perform all bed mobility with Rufus. Goal achieved on a mat table but not on bed assessed  Long term goal 2: Pt will perform sit to/from stand with or without LRAD and Rufus. Goal not yet achieved  Long term goal 3: Pt will ambulate 400' with or without LRAD and Rufus. Goal not yet achieved  Long term goal 4: Pt will ascend/descend 12 stairs with LHR and supervision. Goal not yet achieved  Patient Goals   Patient goals : \"To be back on my feet as soon as possible. \"    Plan    Plan  Times per week: 5x/week, 60 minutes a day  Current Treatment Recommendations: Strengthening, Balance Training,

## 2018-07-24 NOTE — PROGRESS NOTES
use of grab bar to simulate home environment, patient required min assist for both lifting and lowering from toilet  Assessment        SLP                Body mass index is 31.05 kg/m². Rehabilitation Diagnosis:  Brain, 2.1, Non-Traumatic     Assessment and Plan:  NPH s/p  shunt placement. PT/OT/SLP evals. Plan for OP f/u with neurosurgery.      JULIOCESAR. Secondary to prerenal azotemia. Improving. Follow serial Cr.      Incarcerated umbilical hernia. S/p repair. Pain control. Tolerating diet.        Bowels: Schedule colace + senna. Follow bowel movements. Enema or suppository if needed.      Bladder: Check PVR x 3. [address] if PVR > 200ml or if any volume is > 500 ml.      Sleep: Trazodone provided prn.        Anselmo Bello MD 7/24/2018, 3:30 PM

## 2018-07-24 NOTE — PROGRESS NOTES
Occupational Therapy  Facility/Department: [facility] ACUTE REHAB UNIT  Daily Treatment Note  NAME: Santosh Dietrich. : 1950  MRN: [id number]    Date of Service: 2018    Discharge Recommendations:  Home with assist PRN, Outpatient OT  OT Equipment Recommendations  Other: has shower chair and long handled shoe horn at baseline. Patient Diagnosis(es): There were no encounter diagnoses. has a past medical history of Allergic rhinitis; Erectile dysfunction; Hyperlipidemia; Hypertension; Screening for abdominal aortic aneurysm (AAA) performed; and Unspecified sleep apnea. has a past surgical history that includes other surgical history (N/A, 2018); pr create shunt:ventric-peritoneal (N/A, 2018); and laparoscopy (N/A, 2018). Restrictions  Position Activity Restriction  Other position/activity restrictions: up as tolerated  Subjective   General  Chart Reviewed: Yes  Patient assessed for rehabilitation services?: Yes  Additional Pertinent Hx: 80 y.o. male admit to ARU . Hospital course: admitted 18 with LE weakness, decreased balance and difficulty urinating. Head CT=No acute intracranial abnormality. MRI Brain= No acute intracranial abnormality. No evidence of acute infarction.  shunt and lap umbilical hernia repair on 18 PMH includes: HTN, sleep apnea  Family / Caregiver Present: Yes (wife)  Referring Practitioner: Yulisa Mortensen   Diagnosis: brain dysfunction  Subjective  Subjective: Patient seated in chair upon arrival, agreeable to therapy. Pain Assessment  Patient Currently in Pain: No  Vital Signs  Patient Currently in Pain: No   Orientation  Orientation  Overall Orientation Status: Within Normal Limits  Objective    Standing Balance  Sit to stand: Stand by assistance  Stand to sit: Stand by assistance  Comment: Utilized 3# weight to complete x5 reps BLE alternating ipsilateral forward step/flexion with CGA/SBA.  Completed contralateral UE/LE x5 each side reps In 1030         Time Out 1100         Minutes 30            Time Coded Minutes: 30 Minutes    Treated Minutes: [address], R Katya Bentley [phone] (Treating and documentation for AM session)     NOA KENDALL [facility], OTR/L #[id number]

## 2018-07-24 NOTE — PLAN OF CARE
Problem: Falls - Risk of:  Goal: Will remain free from falls  Will remain free from falls   Outcome: Ongoing   Pt is a High fall risk. See [place] Fall Score and ABCDS Injury Risk assessments. High Fall Risk per RIVERS/ABCDS: Explained fall risk precautions to pt and family and rationale behind their use to keep the patient safe. Pt bed is in low position, side rails up, call light and belongings are in reach. Fall wristband applied and present on pts wrist.  Bed alarm on. Pt encouraged to call for assistance. Will continue with hourly rounds for PO intake, pain needs, toileting and repositioning as needed. Problem: Skin Integrity:  Goal: Will show no infection signs and symptoms  Will show no infection signs and symptoms   Outcome: Ongoing  No s/s of infection at surgical incision sites. Will continue to monitor.

## 2018-07-24 NOTE — PROGRESS NOTES
ACUTE REHAB UNIT  SPEECH/LANGUAGE PATHOLOGY      [x] Daily  [] Weekly Care Conference Note  [] Discharge    Patient:Jared Mancera. VWQ:7/95/5852  QUL:[id number]  Rehab Dx/Hx: NPH (normal pressure hydrocephalus) [G91.2]    Precautions: [] Aspiration  [x] Fall risk  [] Sternal  [] Seizure [] Hip  [] Weight Bearing [] Other  Lives With: Spouse  ST Dx: [] Aphasia  [] Dysarthria  [] Apraxia   [] Oropharyngeal dysphagia [x] Cognitive Impairment  [] Other:   Date of Admit: 7/20/2018  Room #: 3104/3104-01  Date: 7/24/2018          Current Diet Order:DIET GENERAL;   Vision  Vision: Impaired  Vision Exceptions: Wears glasses at all times  Hearing  Hearing: Exceptions to [facility]  Hearing Exceptions: Bilateral hearing aid (at home)  Barriers toward progress: reduced insight into deficits      Date: 7/24/2018      Tx session 1 Tx session 2   Total Timed Code Min 30 30   Total Treatment Minutes 30 30   Individual Treatment Minutes 30 30   Group Treatment Minutes 0 0   Co-Treat Minutes 0 0   Brief Exception: N/A N/A   Pain Reported pain in back earlier but \"ok now\" \"a little better\"   Pain Intervention: [] RN notified  [] Repositioned  [] Intervention offered and patient declined  [x] N/A  [] Other: [] RN notified  [] Repositioned  [] Intervention offered and patient declined  [x] N/A  [] Other:   Subjective:     Pt up in chair with wife and student RN at side. Pt endorsed fatigue s/p PT session but agreeable to tx. Cooperative and motivated throughout. Pt with wife and sister at side. Sister on phone call and door open, providing auditory and visual distractions for pt during session. Pt pleasant and appeared in good spirits throughout, joking and laughing with all. Student RN arrived at end of session to assist pt with t/f to dining room for lunch meal.     Objective / Goals:     Patient will complete graded recall tasks of varying length and complexity with 90% accuracy or min-no cues. Goal not targeted directly.  Goal

## 2018-07-25 PROCEDURE — 6370000000 HC RX 637 (ALT 250 FOR IP): Performed by: PHYSICAL MEDICINE & REHABILITATION

## 2018-07-25 PROCEDURE — 1280000000 HC REHAB R&B

## 2018-07-25 PROCEDURE — 97530 THERAPEUTIC ACTIVITIES: CPT

## 2018-07-25 PROCEDURE — 97530 THERAPEUTIC ACTIVITIES: CPT | Performed by: PHYSICAL THERAPIST

## 2018-07-25 PROCEDURE — 6360000002 HC RX W HCPCS: Performed by: FAMILY MEDICINE

## 2018-07-25 PROCEDURE — 97116 GAIT TRAINING THERAPY: CPT | Performed by: PHYSICAL THERAPIST

## 2018-07-25 PROCEDURE — 97127 HC SP THER IVNTJ W/FOCUS COG FUNCJ: CPT

## 2018-07-25 PROCEDURE — 97110 THERAPEUTIC EXERCISES: CPT

## 2018-07-25 PROCEDURE — 6370000000 HC RX 637 (ALT 250 FOR IP): Performed by: FAMILY MEDICINE

## 2018-07-25 RX ADMIN — HYDROCODONE BITARTRATE AND ACETAMINOPHEN 1 TABLET: 5; 325 TABLET ORAL at 09:49

## 2018-07-25 RX ADMIN — GABAPENTIN 100 MG: 100 CAPSULE ORAL at 13:49

## 2018-07-25 RX ADMIN — GABAPENTIN 100 MG: 100 CAPSULE ORAL at 07:47

## 2018-07-25 RX ADMIN — TERBINAFINE 250 MG: 250 TABLET ORAL at 09:09

## 2018-07-25 RX ADMIN — ENOXAPARIN SODIUM 40 MG: 40 INJECTION SUBCUTANEOUS at 17:47

## 2018-07-25 RX ADMIN — DOCUSATE SODIUM AND SENNOSIDES 2 TABLET: 8.6; 5 TABLET, FILM COATED ORAL at 20:30

## 2018-07-25 RX ADMIN — OXYCODONE HYDROCHLORIDE AND ACETAMINOPHEN 1000 MG: 500 TABLET ORAL at 07:46

## 2018-07-25 RX ADMIN — BUPROPION HYDROCHLORIDE 150 MG: 150 TABLET, FILM COATED, EXTENDED RELEASE ORAL at 09:09

## 2018-07-25 RX ADMIN — HYDROCODONE BITARTRATE AND ACETAMINOPHEN 2 TABLET: 5; 325 TABLET ORAL at 20:30

## 2018-07-25 RX ADMIN — ACETAMINOPHEN 650 MG: 325 TABLET, FILM COATED ORAL at 13:05

## 2018-07-25 RX ADMIN — DOCUSATE SODIUM AND SENNOSIDES 2 TABLET: 8.6; 5 TABLET, FILM COATED ORAL at 07:46

## 2018-07-25 RX ADMIN — MONTELUKAST SODIUM 10 MG: 10 TABLET, FILM COATED ORAL at 20:30

## 2018-07-25 RX ADMIN — GABAPENTIN 100 MG: 100 CAPSULE ORAL at 20:30

## 2018-07-25 RX ADMIN — FINASTERIDE 5 MG: 5 TABLET, FILM COATED ORAL at 07:46

## 2018-07-25 RX ADMIN — TAMSULOSIN HYDROCHLORIDE 0.8 MG: 0.4 CAPSULE ORAL at 07:46

## 2018-07-25 RX ADMIN — CETIRIZINE HYDROCHLORIDE 10 MG: 10 TABLET, FILM COATED ORAL at 07:47

## 2018-07-25 RX ADMIN — THERA TABS 1 TABLET: TAB at 07:46

## 2018-07-25 RX ADMIN — CHOLECALCIFEROL TAB 25 MCG (1000 UNIT) 2000 UNITS: 25 TAB at 09:09

## 2018-07-25 ASSESSMENT — PAIN DESCRIPTION - ORIENTATION
ORIENTATION: ANTERIOR
ORIENTATION: ANTERIOR

## 2018-07-25 ASSESSMENT — PAIN SCALES - GENERAL
PAINLEVEL_OUTOF10: 2
PAINLEVEL_OUTOF10: 3
PAINLEVEL_OUTOF10: 0
PAINLEVEL_OUTOF10: 7
PAINLEVEL_OUTOF10: 3
PAINLEVEL_OUTOF10: 4

## 2018-07-25 ASSESSMENT — PAIN DESCRIPTION - DESCRIPTORS
DESCRIPTORS: ACHING

## 2018-07-25 ASSESSMENT — PAIN DESCRIPTION - PROGRESSION
CLINICAL_PROGRESSION: NOT CHANGED
CLINICAL_PROGRESSION: NOT CHANGED

## 2018-07-25 ASSESSMENT — PAIN DESCRIPTION - LOCATION
LOCATION: HEAD

## 2018-07-25 ASSESSMENT — PAIN DESCRIPTION - FREQUENCY
FREQUENCY: INTERMITTENT

## 2018-07-25 ASSESSMENT — PAIN DESCRIPTION - PAIN TYPE
TYPE: ACUTE PAIN
TYPE: ACUTE PAIN;SURGICAL PAIN
TYPE: ACUTE PAIN

## 2018-07-25 ASSESSMENT — PAIN DESCRIPTION - ONSET
ONSET: ON-GOING

## 2018-07-25 NOTE — PATIENT CARE CONFERENCE
The [address] [place] [address] Rehabilitation  Weekly Team Conference Note    Patient Name: Lynette Jerome. MRN: [id number]    : 1950  (79 y.o.)  Gender: male   Referring Practitioner: Dr. Madalyn Pinedo  Diagnosis: brain dysfunction    The team conference for this patient was held on 2018 at 1:30pm by:  Maureen Dickens. Madalyn Pinedo MD.    PHYSICAL THERAPY:  Bed Mobility: Scooting: Stand by assistance    Transfers:  Sit to Stand: Stand by assistance (VC for hand placement from recliner, EOM, and high chair. Leans posterior but is able to correct)  Stand to sit: Stand by assistance (VC for hand placement and  decreased  eccentric control when descending )  Bed to Chair: Contact guard assistance (recliner to/from bed with RW)  Comment: PT instructed pt with stepping in all directions with only one LE moving at a time . Elia 10 reps to BLES  in a forwards, backwards and sideways direction. Pt also instructed with isometrics in standing with resistance given in all directions. WB Status: No WB restrictions  Ambulation 1  Surface: level tile  Device: Rolling Walker  Assistance: Supervision  Quality of Gait: decreased anoop, forward trunk flexion with downward gaze, decreased B step length  with decreased R foot clearance which increases with fatigue  Distance: 105'x1  Comments: PT instructed pt with stops , starts and quick reversals when walking on level surfaces. Pt leans minimally to the L and tends to veer to the L when walking.  However, pt 's R foot does not consistently clear the surface which increases with fatigue    Stairs  # Steps : 17 (8+9)  Stairs Height: 6\"  Rails: Left ascending  Assistance: Contact guard assistance  Comment: Reciprocal pattern when asscending and non reciprocal pattern when descending    FIMS:  Bed, Chair, Wheel Chair: 5 - Requires setup/supervision/cues  Walk: 4 - Contact Guard/Minimal Assistance Requires up to Contact Guard or Minimal Assistance to walk/operate

## 2018-07-25 NOTE — PLAN OF CARE
Problem: Falls - Risk of:  Goal: Will remain free from falls  Will remain free from falls   Fall precautions remain in place. Call light and bedside table are within reach. Patient is aware to call for assistance to transfer. He has on non skid foot wear.

## 2018-07-25 NOTE — PROGRESS NOTES
Occupational Therapy  Facility/Department: [facility] ACUTE REHAB UNIT  Daily Treatment Note  NAME: Deedee Mills. : 1950  MRN: [id number]    Date of Service: 2018    Discharge Recommendations:  Home with assist PRN, Outpatient OT  OT Equipment Recommendations  Equipment Needed: No  Other: has shower chair and long handled shoe horn at baseline. Patient Diagnosis(es): There were no encounter diagnoses. has a past medical history of Allergic rhinitis; Erectile dysfunction; Hyperlipidemia; Hypertension; Screening for abdominal aortic aneurysm (AAA) performed; and Unspecified sleep apnea. has a past surgical history that includes other surgical history (N/A, 2018); pr create shunt:ventric-peritoneal (N/A, 2018); and laparoscopy (N/A, 2018). Restrictions  Position Activity Restriction  Other position/activity restrictions: up as tolerated  Subjective   General  Chart Reviewed: Yes  Patient assessed for rehabilitation services?: Yes  Additional Pertinent Hx: 80 y.o. male admit to ARU . Hospital course: admitted 18 with LE weakness, decreased balance and difficulty urinating. Head CT=No acute intracranial abnormality. MRI Brain= No acute intracranial abnormality. No evidence of acute infarction.  shunt and lap umbilical hernia repair on 18 PMH includes: HTN, sleep apnea  Family / Caregiver Present: Yes (wife and sister)  Referring Practitioner: Chaya Caceres   Diagnosis: brain dysfunction  Subjective  Subjective: Patient seated in chair upon arrival, agreeable to therapy.    Pain Assessment  Patient Currently in Pain: No  Vital Signs  Patient Currently in Pain: No   Orientation  Orientation  Overall Orientation Status: Within Normal Limits  Objective    ADL  Toileting: Supervision (urinate in stance)  Standing Balance  Sit to stand: Supervision  Stand to sit: Supervision  Functional Mobility  Functional - Mobility Device: Rolling Walker  Assist Level: Contact guard

## 2018-07-25 NOTE — PROGRESS NOTES
Physical Therapy  Facility/Department: [facility] ACUTE REHAB UNIT  Daily Treatment Note  NAME: Bhakti Lewis. : 1950  MRN: [id number]    Date of Service: 2018    Discharge Recommendations:  Home with assist PRN, Outpatient PT   PT Equipment Recommendations  Other: continue to assess, pt owns RW and cane    Patient Diagnosis(es): There were no encounter diagnoses. has a past medical history of Allergic rhinitis; Erectile dysfunction; Hyperlipidemia; Hypertension; Screening for abdominal aortic aneurysm (AAA) performed; and Unspecified sleep apnea. has a past surgical history that includes other surgical history (N/A, 2018); pr create shunt:ventric-peritoneal (N/A, 2018); and laparoscopy (N/A, 2018). Restrictions  Position Activity Restriction  Other position/activity restrictions: up as tolerated  Subjective   General  Chart Reviewed: Yes  Additional Pertinent Hx: Pt is a 80 y.o. male admitted to ARU on 18 from [facility]. Pt initially admitted to [facility] on 18 with confusion, poor balance, and urge incontinence. Found to have NPH. Shunt placed . PMH: HLD, HTN, unspecified sleep apnea. Family / Caregiver Present: Yes (Wife and sister)  Referring Practitioner: Dr. Dejah Velasquez: Pt reports that he is tired because he took a very long walk with OT during their session. General Comment  Comments: Pt in the gym finishing up with OT when PT arrived. Pt agreeable to therapy session. Pain Screening  Patient Currently in Pain: No  Pain Assessment  Pain Level: 3  Pain Type: Acute pain  Pain Location: Head  Pain Orientation: Anterior  Pain Descriptors: Aching  Pain Frequency: Intermittent  Pain Onset: On-going  Patient's Stated Pain Goal: No pain  Pain Intervention(s): Repositioned; Ambulation/Increased activity; Therapeutic presence  Vital Signs  Patient Currently in Pain: No       Orientation  Orientation  Overall Orientation Status: Within Normal Limits  Objective Transfers  Sit to Stand: Stand by assistance (VC for hand placement from recliner, EOM, and high chair. Leans posterior but is able to correct)  Stand to sit: Stand by assistance (VC for hand placement and  decreased  eccentric control when descending )  Ambulation  Ambulation?: Yes  WB Status: No WB restrictions  More Ambulation?: Yes  Ambulation 1  Surface: level tile  Device: Rolling Walker  Assistance: Supervision  Quality of Gait: decreased anoop, forward trunk flexion with downward gaze, decreased B step length  with decreased R foot clearance which increases with fatigue  Distance: 105'x1  Ambulation 2  Surface - 2: level tile  Device 2: No device  Other Apparatus 2:  (1/4 heel lift in the L shoe)  Quality of Gait 2: More symmetrical posture with decreased lat sway  Distance: 185' x2   Comments: Note: PT was assessing for functional leg length discrepancy. Pt started with a 1/4 \" lift and then to a 3/8 inch lift and then back to a 1/4 inch lift which appeared most effective  Stairs/Curb  Stairs?: Yes  Stairs  # Steps : 17 (8+9)  Stairs Height: 6\"  Rails: Left ascending  Assistance: Contact guard assistance  Comment: Reciprocal pattern when asscending and non reciprocal pattern when descending     Balance  Comments: PT instructed pt with the wall ex standing against the wall and aligning bony landmarks. Pt then instructed pt with modified PNF patterns with BUES to facilitate increased trunk/ thoracic ext. Pt performed 2 patterns x 10 reps of each. Pt instructed with using the bottom step of a flight of steps and performing the following: alternating toe tapping in a forwards direction x 10 reps, sideways( hip abd and adduction)  direction x 10 reps  and backwards direction x 10 reps to BLES , 4 step ex alternating using the bottom 2 steps x10 reps, and alternating heel tapping  X 10 reps. Pt req rests between. 1-2 finger support via pt on railing.  S from therapist    PT instructed pt with a dynamic standiing balance activity using 4 squares. The letters A-D have been assigned to the squares. PT instructed pt to step in the assigned square to spell out as many words as possible using the letter of A, B, C , D. This facilitates WS in all directions including diagonals. Pt req VC and additional time to complete this task . Assessment   Body structures, Functions, Activity limitations: Decreased functional mobility ; Decreased balance;Decreased endurance;Decreased vision/visual deficit  Assessment: Pt appeared very fatigued on this date and had an episode of dizziness. PT assessed BP which was 125/82 with a HR of 118( following seated postion x 2 minutes) PT instructed pt with diaphragmatic breathing ex and HR decreased to 114. Pt cont to be well motivated to return to OF. Pt would benefit from continued therapy to maximize potential and increase functional mobility towards Ind for a safer return to home. Treatment Diagnosis: decreased functional mobility due to brain dysfunction  Prognosis: Good  Patient Education: Gt training, transf training, bed mobility skills and balance retraining  REQUIRES PT FOLLOW UP: Yes  Activity Tolerance  Activity Tolerance: Patient Tolerated treatment well;Patient limited by fatigue;Patient limited by endurance; Patient limited by pain     G-Code     OutComes Score                                                    AM-PAC Score             Goals  Short term goals  Time Frame for Short term goals: STG=LTG  Long term goals  Time Frame for Long term goals : approximately 1 week  Long term goal 1: Pt will perform all bed mobility with Rufus. Goal achieved on a mat table but not on bed assessed  Long term goal 2: Pt will perform sit to/from stand with or without LRAD and Rufus. Goal not yet achieved  Long term goal 3: Pt will ambulate 400' with or without LRAD and Rufus.  Goal not yet achieved  Long term goal 4: Pt will ascend/descend 12 stairs with LHR and

## 2018-07-25 NOTE — PROGRESS NOTES
ACUTE REHAB UNIT  SPEECH/LANGUAGE PATHOLOGY      [x] Daily  [] Weekly Care Conference Note  [] Discharge    Patient:Jared Coles. VPR:8/53/2135  BNN:[id number]  Rehab Dx/Hx: NPH (normal pressure hydrocephalus) [G91.2]    Precautions: [] Aspiration  [x] Fall risk  [] Sternal  [] Seizure [] Hip  [] Weight Bearing [] Other  Lives With: Spouse  ST Dx: [] Aphasia  [] Dysarthria  [] Apraxia   [] Oropharyngeal dysphagia [x] Cognitive Impairment  [] Other:   Date of Admit: 7/20/2018  Room #: 3104/3104-01  Date: 7/25/2018          Current Diet Order:DIET GENERAL;   Vision  Vision: Impaired  Vision Exceptions: Wears glasses at all times  Hearing  Hearing: Exceptions to [facility]  Hearing Exceptions: Bilateral hearing aid (at home)  Barriers toward progress: reduced insight into deficits      Date: 7/25/2018      Tx session 1 Tx session 2   Total Timed Code Min 35 30   Total Treatment Minutes 35 30   Individual Treatment Minutes 35 30   Group Treatment Minutes 0 0   Co-Treat Minutes 0 0   Brief Exception: N/A N/A   Pain Denied Endorsed headach   Pain Intervention: [] RN notified  [] Repositioned  [] Intervention offered and patient declined  [x] N/A  [] Other: [] RN notified  [] Repositioned  [] Intervention offered and patient declined  [] N/A  [x] Other: RN provided intervention at beginning of session     Subjective: Wife and sister present for session. Pt endorsed sleeping well overnight and having \"filling\" breakfast. Pleasant as always. Pt up in chair upon entry and endorsed fatigue s/p PT / OT sessions this date. Wife and sister at side. Frequent, loud construction noises coming from below pt's room - pt requesting to relocate session d/t headache and difficulty concentrating with noise. Session took place in dining room; pt returned to room at end of session. Objective / Goals:     Patient will complete graded recall tasks of varying length and complexity with 90% accuracy or min-no cues.     Recall details Assessment: Progressing towards goals. Pt with increased success with use of visualization strategy vs categorization to assist with recall. Plan: Continue as per plan of care.        Interventions used this date:  [] Speech/Language Treatment  [] Instruction in HEP  [] Dysphagia Treatment [x] Cognitive Treatment   [] Other:    Discharge recommendations:  [] Home independently  [x] Home with assistance []  24 hour supervision  [] ECF [] Other  Continued Tx Upon Discharge: ? [] Yes    [] No    [x] TBD based on progress while on ARU     [] Vital Stim indicated     [] Other:   Estimated discharge date: Not yet established      Electronically signed by  Ray Mcintyre MA CCC-SLP; [id number]  Speech-Language Pathologist

## 2018-07-25 NOTE — PROGRESS NOTES
Nephrology Progress Note     Doing well   No fever   Good UO    Past Medical History:   Diagnosis Date    Allergic rhinitis     Erectile dysfunction     Hyperlipidemia     Hypertension     Screening for abdominal aortic aneurysm (AAA) performed 03/02/2018    [address]    Unspecified sleep apnea        Past Surgical History:   Procedure Laterality Date    LAPAROSCOPY N/A 7/18/2018    OPENING AND CLOSING FOR VENTRICULAR PERITONEAL SHUNT performed by Pam Chaves MD at [address] [place] [address] Drive,[address] N/A 07/18/2018     VENTRICULAR PERITONEAL SHUNT INSERTION RIGHT SIDE; (N/A )    SD CREATE SHUNT:VENTRIC-PERITONEAL N/A 7/18/2018    VENTRICULAR PERITONEAL SHUNT INSERTION RIGHT SIDE; performed by Soto Mai MD at [facility] OR       Family History   Problem Relation Age of Onset    Cancer Father         prostate        reports that he has quit smoking. He has quit using smokeless tobacco. He reports that he drinks about 2.4 oz of alcohol per week . He reports that he does not use drugs.     Allergies:  Lisinopril    Current Medications:      lidocaine (LIDODERM) 5 % 1 patch Daily   acetaminophen (TYLENOL) tablet 650 mg Q4H PRN   ondansetron (ZOFRAN-ODT) disintegrating tablet 8 mg Q8H PRN   traZODone (DESYREL) tablet 50 mg Nightly PRN   enoxaparin (LOVENOX) injection 40 mg QPM   HYDROcodone-acetaminophen (NORCO) 5-325 MG per tablet 1 tablet Q4H PRN   Or    HYDROcodone-acetaminophen (NORCO) 5-325 MG per tablet 2 tablet Q4H PRN   magnesium hydroxide (MILK OF MAGNESIA) 400 MG/5ML suspension 30 mL Daily PRN   acetaminophen (TYLENOL) tablet 1,000 mg Q6H PRN   bismuth subsalicylate (PEPTO BISMOL) chewable tablet 524 mg Daily PRN   buPROPion (WELLBUTRIN XL) extended release tablet 150 mg QAM   cetirizine (ZYRTEC) tablet 10 mg Daily   finasteride (PROSCAR) tablet 5 mg Daily   gabapentin (NEURONTIN) capsule 100 mg TID   montelukast (SINGULAIR) tablet 10 mg Nightly   multivitamin 1 tablet Daily   simethicone (MYLICON)

## 2018-07-25 NOTE — PROGRESS NOTES
Assessment completed, medications given. Fall precautions are in place patient is aware to call for assistance to transfer. Denies c/o pain.

## 2018-07-26 LAB
BASOPHILS ABSOLUTE: 0.1 K/UL (ref 0–0.2)
BASOPHILS RELATIVE PERCENT: 0.7 %
EOSINOPHILS ABSOLUTE: 0.3 K/UL (ref 0–0.6)
EOSINOPHILS RELATIVE PERCENT: 4.2 %
HCT VFR BLD CALC: 35.9 % (ref 40.5–52.5)
HEMOGLOBIN: 12.6 G/DL (ref 13.5–17.5)
LYMPHOCYTES ABSOLUTE: 1.3 K/UL (ref 1–5.1)
LYMPHOCYTES RELATIVE PERCENT: 15.6 %
MCH RBC QN AUTO: 29.6 PG (ref 26–34)
MCHC RBC AUTO-ENTMCNC: 35 G/DL (ref 31–36)
MCV RBC AUTO: 84.6 FL (ref 80–100)
MONOCYTES ABSOLUTE: 0.7 K/UL (ref 0–1.3)
MONOCYTES RELATIVE PERCENT: 8.1 %
NEUTROPHILS ABSOLUTE: 5.8 K/UL (ref 1.7–7.7)
NEUTROPHILS RELATIVE PERCENT: 71.4 %
PDW BLD-RTO: 13.7 % (ref 12.4–15.4)
PLATELET # BLD: 471 K/UL (ref 135–450)
PMV BLD AUTO: 6.2 FL (ref 5–10.5)
RBC # BLD: 4.24 M/UL (ref 4.2–5.9)
WBC # BLD: 8.1 K/UL (ref 4–11)

## 2018-07-26 PROCEDURE — 97530 THERAPEUTIC ACTIVITIES: CPT | Performed by: PHYSICAL THERAPIST

## 2018-07-26 PROCEDURE — 1280000000 HC REHAB R&B

## 2018-07-26 PROCEDURE — 97530 THERAPEUTIC ACTIVITIES: CPT

## 2018-07-26 PROCEDURE — 36415 COLL VENOUS BLD VENIPUNCTURE: CPT

## 2018-07-26 PROCEDURE — 6360000002 HC RX W HCPCS: Performed by: FAMILY MEDICINE

## 2018-07-26 PROCEDURE — 85025 COMPLETE CBC W/AUTO DIFF WBC: CPT

## 2018-07-26 PROCEDURE — 97116 GAIT TRAINING THERAPY: CPT | Performed by: PHYSICAL THERAPIST

## 2018-07-26 PROCEDURE — 97110 THERAPEUTIC EXERCISES: CPT

## 2018-07-26 PROCEDURE — 97127 HC SP THER IVNTJ W/FOCUS COG FUNCJ: CPT

## 2018-07-26 PROCEDURE — 6370000000 HC RX 637 (ALT 250 FOR IP): Performed by: PHYSICAL MEDICINE & REHABILITATION

## 2018-07-26 PROCEDURE — 6370000000 HC RX 637 (ALT 250 FOR IP): Performed by: FAMILY MEDICINE

## 2018-07-26 PROCEDURE — 97110 THERAPEUTIC EXERCISES: CPT | Performed by: PHYSICAL THERAPIST

## 2018-07-26 PROCEDURE — 97535 SELF CARE MNGMENT TRAINING: CPT

## 2018-07-26 RX ORDER — GABAPENTIN 100 MG/1
100 CAPSULE ORAL 3 TIMES DAILY
Qty: 90 CAPSULE | Refills: 3 | Status: SHIPPED | OUTPATIENT
Start: 2018-07-26 | End: 2018-11-10 | Stop reason: SDUPTHER

## 2018-07-26 RX ORDER — HYDROCODONE BITARTRATE AND ACETAMINOPHEN 5; 325 MG/1; MG/1
1 TABLET ORAL EVERY 4 HOURS PRN
Qty: 28 TABLET | Refills: 0 | Status: SHIPPED | OUTPATIENT
Start: 2018-07-26 | End: 2018-08-02

## 2018-07-26 RX ORDER — HYDROCODONE BITARTRATE AND ACETAMINOPHEN 5; 325 MG/1; MG/1
1 TABLET ORAL EVERY 4 HOURS PRN
Qty: 28 TABLET | Refills: 0 | Status: SHIPPED | OUTPATIENT
Start: 2018-07-26 | End: 2018-07-26

## 2018-07-26 RX ADMIN — BUPROPION HYDROCHLORIDE 150 MG: 150 TABLET, FILM COATED, EXTENDED RELEASE ORAL at 10:26

## 2018-07-26 RX ADMIN — GABAPENTIN 100 MG: 100 CAPSULE ORAL at 15:42

## 2018-07-26 RX ADMIN — DOCUSATE SODIUM AND SENNOSIDES 2 TABLET: 8.6; 5 TABLET, FILM COATED ORAL at 10:27

## 2018-07-26 RX ADMIN — GABAPENTIN 100 MG: 100 CAPSULE ORAL at 10:27

## 2018-07-26 RX ADMIN — DOCUSATE SODIUM AND SENNOSIDES 2 TABLET: 8.6; 5 TABLET, FILM COATED ORAL at 20:53

## 2018-07-26 RX ADMIN — THERA TABS 1 TABLET: TAB at 10:26

## 2018-07-26 RX ADMIN — GABAPENTIN 100 MG: 100 CAPSULE ORAL at 21:09

## 2018-07-26 RX ADMIN — CETIRIZINE HYDROCHLORIDE 10 MG: 10 TABLET, FILM COATED ORAL at 10:26

## 2018-07-26 RX ADMIN — TERBINAFINE 250 MG: 250 TABLET ORAL at 10:26

## 2018-07-26 RX ADMIN — MONTELUKAST SODIUM 10 MG: 10 TABLET, FILM COATED ORAL at 21:09

## 2018-07-26 RX ADMIN — TAMSULOSIN HYDROCHLORIDE 0.8 MG: 0.4 CAPSULE ORAL at 10:27

## 2018-07-26 RX ADMIN — CHOLECALCIFEROL TAB 25 MCG (1000 UNIT) 2000 UNITS: 25 TAB at 10:26

## 2018-07-26 RX ADMIN — OXYCODONE HYDROCHLORIDE AND ACETAMINOPHEN 1000 MG: 500 TABLET ORAL at 10:27

## 2018-07-26 RX ADMIN — FINASTERIDE 5 MG: 5 TABLET, FILM COATED ORAL at 10:27

## 2018-07-26 RX ADMIN — ENOXAPARIN SODIUM 40 MG: 40 INJECTION SUBCUTANEOUS at 17:01

## 2018-07-26 ASSESSMENT — PAIN SCALES - GENERAL
PAINLEVEL_OUTOF10: 0

## 2018-07-26 NOTE — CARE COORDINATION
2018  [facility])  Clinical Case Management Department    Patient: Janet Gonzalez. MRN: [id number] / : 1950  ACCT: [de-identified]    Emergency Contacts  Contact 1: Name: Smitha Kim  Contact 1: Number: [phone] (home) [phone] (cell)  Contact 1: Relationship: wife  Contact 2: Name: Jovita Polk  Contact 2: Number: [phone]  Contact 2: Relationship: step son    Admission Documentation  Attending Provider: Paul Marinelli MD  Admit date/time: 2018  3:44 PM  Status: Inpatient  Diagnosis: NPH (normal pressure hydrocephalus)     Readmission within last 30 days:  No     Living Situation  Discharge Planning  Living Arrangements: Spouse/Significant Other  Support Systems: Family Members, Spouse/Significant Other, Children  Potential Assistance Needed: Home Care  Type of [address] OT, PT  Patient expects to be discharged to[de-identified] home   Expected Discharge Date: 18   Pt plans to return home on  at 8pm and wife will provide transport. Pt denies need for DME and states has shower chair and walker at home. Pt signed IMM ltr and also stated he will be getting oupt therapy and [facility] not [facility]. Pt's wife in agreement with solo douglas and will transport pt home at 8pm by car.       Service Assessment:       Values / Beliefs  Do you have any ethnic, cultural, sacramental, or spiritual Orthodoxy needs you would like us to be aware of while you are in the hospital?: No    Advance Directives (For Healthcare)  Pre-existing DNR Comfort Care/DNR Arrest/DNI Order: No  Healthcare Directive: Yes, patient has an advance directive for healthcare treatment  Type of Healthcare Directive: Durable power of  for health care, Living will  Copy in Chart: No, copy requested from family  Information on Healthcare Directives Requested: No  Patient Requests Assistance: No  Advance Directives: Pt. not interested at this time                        Pharmacy: [address] [place] [address] Drive : [phone]    Potential Assistance Purchasing Medications:  No  Does patient want to participate in local refill/meds to beds program?: No    Notification completed in HENS/PAS? NO}     Discharge disposition: Home       Ancillary: None, DME and [facility] services declined by pt. Brennan Prieto and his family were provided with choice of provider; he and his family are in agreement with the discharge plan.       Care Transitions patient: Yes    Gwendolynn Lesch, [facility]  Case Management Department  Ph: [phone] Fax: [phone]

## 2018-07-26 NOTE — PROGRESS NOTES
Body mass index is 31.05 kg/m². Rehabilitation Diagnosis:  Brain, 2.1, Non-Traumatic     Assessment and Plan:  NPH s/p  shunt placement. PT/OT/SLP evals. Plan for OP f/u with neurosurgery.      JULIOCESAR. Secondary to prerenal azotemia. Improving. Follow serial Cr.      Incarcerated umbilical hernia. S/p repair. Pain control. Tolerating diet.        Bowels: Schedule colace + senna. Follow bowel movements. Enema or suppository if needed.      Bladder: Check PVR x 3. [address] if PVR > 200ml or if any volume is > 500 ml.      Sleep: Trazodone provided prn.      Interdisciplinary team conference was held today with entire rehab treatment team including PT, OT, SLP, Dietician, RN, and SW. Discussion focused on progress toward rehab goals and discharge planning. Barriers: Back pain, balance. Total treatment time >35 min with greater than 50% spent in care coordination. Anselmo Pike MD 7/26/2018, 1:33 PM

## 2018-07-26 NOTE — PLAN OF CARE
Problem: Falls - Risk of:  Goal: Will remain free from falls  Will remain free from falls   Outcome: Ongoing  Pt remains free from falls during this stay on the ARU. 1:1 and education provided on the importance of using call light to ask for assistance prior to transfers and ambulation. Pt voices understanding. Will continue to monitor and re-educate as needed.

## 2018-07-26 NOTE — PROGRESS NOTES
with 90% accuracy or min-no cues. Min cues to recall tasks completed in previous ST session. Goal not directly targeted. GOAL MET x 1 session this week - continue to target   Patient will complete problem solving tasks with >85% accuracy or min cues. Functional problem solving - identify deficits and impact on functioning at home: min-no cues required to identify current limitations and potential areas of need at home. Listing 4 item sequences: 100% accuracy with mod cues for appropriate sequencing / organization. Slowed response time noted. GOAL NOT MET - continue to target   Patient will complete graded executive function tasks with adequate attention to detail and self-correction of errors with >85% accuracy or min-no cues. Deduction puzzle - 4 x 3: mod cues overall for attention to detail. Pt independently recalled and implemented strategies from previous session. Goal not directly targeted. GOAL NOT MET - continue to target   Patient will tolerate ongoing cognitive-linguistic assessment. Goal not directly targeted. Goal not directly targeted. GOAL MET   Other areas targeted:      Education:   Educated pt and wife to skills targeted with tx, rationale for tasks completed, and d/c recommendations in addition to POC. Reviewed d/c recommendations, options for ST s/p d/c, skills targeted with tasks during session, and POC. Safety Devices: [x] Call light within reach  [x] Chair alarm activated and connected to nurse call light system  [] Bed alarm activated   [x] Other: family at side [x] Call light within reach  [x] Chair alarm activated and connected to nurse call light system  [] Bed alarm activated  [x] Other: family at side    Assessment: Cognitive-linguistic impairment. Progressing towards goals. Plan: Continue as per plan of care.         Interventions used this date:  [] Speech/Language Treatment  [] Instruction in HEP  [] Dysphagia Treatment [x] Cognitive Treatment   [] Other:    Discharge recommendations:  [] Home independently  [x] Home with assistance []  24 hour supervision  [] ECF [] Other  Continued Tx Upon Discharge: ? [x] Yes    [] No    [] TBD based on progress while on ARU     [] Vital Stim indicated     [] Other:   Estimated discharge date: 7/27/18      Electronically signed by  Willis Bard, [address] [place] CCC-SLP; [id number]  Speech-Language Pathologist

## 2018-07-26 NOTE — PROGRESS NOTES
Patient up in chair; no signs of distress; denies pain at this time; denies further needs at this time; call light and bedside table within reach; chair alarm activated.  Will continue to monitor

## 2018-07-26 NOTE — PROGRESS NOTES
Occupational Therapy  Facility/Department: [facility] ACUTE REHAB UNIT  Daily Treatment Note  NAME: Ewa Sabillon : 1950  MRN: [id number]    Date of Service: 2018    Discharge Recommendations:  Home with assist PRN, Outpatient OT  OT Equipment Recommendations  Other: has shower chair and long handled shoe horn at baseline. Patient Diagnosis(es): The encounter diagnosis was NPH (normal pressure hydrocephalus). has a past medical history of Allergic rhinitis; Erectile dysfunction; Hyperlipidemia; Hypertension; Screening for abdominal aortic aneurysm (AAA) performed; and Unspecified sleep apnea. has a past surgical history that includes other surgical history (N/A, 2018); pr create shunt:ventric-peritoneal (N/A, 2018); and laparoscopy (N/A, 2018). Restrictions  Position Activity Restriction  Other position/activity restrictions: up as tolerated  Subjective   General  Chart Reviewed: Yes  Patient assessed for rehabilitation services?: Yes  Additional Pertinent Hx: 80 y.o. male admit to ARU . Hospital course: admitted 19 with LE weakness, decreased balance and difficulty urinating. Head CT=No acute intracranial abnormality. MRI Brain= No acute intracranial abnormality. No evidence of acute infarction.  shunt and lap umbilical hernia repair on 18 PMH includes: HTN, sleep apnea  Family / Caregiver Present: Yes (wife)  Referring Practitioner: Luda Art   Diagnosis: brain dysfunction  Subjective  Subjective: Patient seated in chair upon arrival, agreeable to therapy.    Pain Assessment  Patient Currently in Pain: No  Vital Signs  Patient Currently in Pain: No   Orientation  Orientation  Overall Orientation Status: Within Normal Limits  Objective    ADL  Grooming:  (declined to complete at this time, reported completion earlier this date)  UE Bathing: Modified independent  (seated on shower bench)  LE Bathing: Supervision (in stance to complete )  UE Dressing: Independent  LE Dressing: Supervision (assist needed for balance when threading in stance, no LOB)  Toileting:  (declined need)  Standing Balance  Sit to stand: Modified independent  Stand to sit: Modified independent  Balance activity: Patient completed the following AROM exercises while seated in swiss ball in order to promote increased core strength and dynamic balance as needed for ADL tasks in stance: Sh flexion x10, march x10, contralateral march+flexion x10 with guarded movements away from center of mass despite cues, sequential movements of each limb (RUE,LUE, LLE, RLE) with increased difficulty problem-solving reverse in directions without one-step v/c and assist to problem solve. 1 LOB during exercises requiring min assist to correct, otherwise completed with CGA. Completed bilateral tabletop task (Mobile Roadie 8) while seated on swiss ball with CGA-SBA to complete. Functional Mobility  Functional - Mobility Device: Rolling Walker  Assist Level: Supervision (MOD I)  Functional Mobility Comments: with slight increase in time  Shower Transfers  Shower - Transfer From: Carol Prior - Transfer Type: To and From  Shower - Transfer To: Transfer tub bench  Shower - Technique: Ambulating  Shower Transfers: Supervision  Transfers  Sit to stand: Modified independent  Stand to sit: Modified independent  Cognition  Arousal/Alertness: Appropriate responses to stimuli  Following Commands: Follows multistep commands with repitition  Problem Solving: Assistance required to identify errors made;Assistance required to generate solutions;Assistance required to correct errors made  Initiation: Does not require cues  Sequencing: Does not require cues       Assessment   Performance deficits / Impairments: Decreased functional mobility ; Decreased ADL status; Decreased endurance;Decreased balance;Decreased cognition;Decreased high-level IADLs  Assessment: Patient demonstrate increased safety to complete dressing/bathing tasks in stance, continues to require spvn for safety with threading BLE in stance. Progressing to MOD I with completion of functional transfers. Continues to demonstrate difficulty with higher-level problem solving and coordination tasks. Would continue to benefit from OT services, cont POC. Treatment Diagnosis: Impaired ADL and functional mobility  Prognosis: Good  Patient Education: technique for LB dressing in stance - verb understanding  REQUIRES OT FOLLOW UP: Yes  Activity Tolerance  Activity Tolerance: Patient Tolerated treatment well  Safety Devices  Safety Devices in place: Yes  Type of devices: Call light within reach; Chair alarm in place; Left in chair;Nurse notified          Plan   Plan  Times per week: 5x/wk x60 min  Times per day: Daily  Current Treatment Recommendations: Balance Training, Functional Mobility Training, Endurance Training, Self-Care / ADL, Neuromuscular Re-education, Safety Education & Training, Patient/Caregiver Education & Training, Equipment Evaluation, Education, & procurement, Home Management Training       Goals  Long term goals  Time Frame for Long term goals : 7-10 days  Long term goal 1: Patient will demonstrate ability to complete tub transfer with MOD I - Not met  Long term goal 2: Patient will complete bathing with MOD I - Not met  Long term goal 3: Patient will complete grooming tasks in stance at independent level for both task completion and balance - Not met  Long term goal 4: Patient will complete UB/LB dressing in stance with MOD I - goal met for UB, ongoing for LB   Long term goal 5: Patient will complete simple meal prep with spvn - Not met  Patient Goals   Patient goals : \"get back to doing what I was before\" - with prompting, patient identify goal of being able to drive       Therapy Time   Individual Concurrent Group Co-treatment   Time In 0830         Time Out 0930         Minutes [address], OTR/L #[id number]

## 2018-07-26 NOTE — PROGRESS NOTES
for an average of 11.54 secs  The results w/o the RW indicate that pt is safe to be up ad harrison. PT is recommending for pt to still use the RW for safety. PT collaborated with OT and nsg. All in agreeament                 Assessment   Body structures, Functions, Activity limitations: Decreased functional mobility ; Decreased balance;Decreased endurance;Decreased vision/visual deficit  Assessment: Pt is progressing well. LBP elicited following use of the vestibular board ( only level 2-3)For this reason, pt instructed with LB ex and reported immmediate relief. The second session was an assessment to determine pt's safety with getting  up and walking. Pt daphnie well. Pt cont to be well motivated to return to [facility]. Pt would benefit from continued therapy to maximize potential and increase functional mobility towards Ind for a safer return to home. Treatment Diagnosis: decreased functional mobility due to brain dysfunction  Prognosis: Good  Patient Education: Gt training, transf training, bed mobility skills and balance retraining  REQUIRES PT FOLLOW UP: Yes  Activity Tolerance  Activity Tolerance: Patient Tolerated treatment well;Patient limited by fatigue;Patient limited by endurance; Patient limited by pain     G-Code     OutComes Score                                                    AM-PAC Score             Goals  Short term goals  Time Frame for Short term goals: STG=LTG  Long term goals  Time Frame for Long term goals : approximately 1 week  Long term goal 1: Pt will perform all bed mobility with Rufus. Goal achieved on a mat table but not on bed assessed  Long term goal 2: Pt will perform sit to/from stand with or without LRAD and Rufus. Goal not yet achieved 2/2 to hand placement  Long term goal 3: Pt will ambulate 400' with or without LRAD and Rufus. Goal not yet achieved  Long term goal 4: Pt will ascend/descend 12 stairs with LHR and supervision. Goal not yet achieved  Patient Goals   Patient goals :  \"To be back on my feet as soon as possible. \"    Plan    Plan  Times per week: 5x/week, 60 minutes a day  Current Treatment Recommendations: Strengthening, Balance Training, Functional Mobility Training, Transfer Training, Endurance Training, Gait Training, Stair training, Neuromuscular Re-education, Home Exercise Program, Safety Education & Training, Patient/Caregiver Education & Training  Safety Devices  Type of devices: Call light within reach, Chair alarm in place, Gait belt, Left in chair     Therapy Time   Individual Concurrent Group Co-treatment   Time In 1030         Time Out 1130         Minutes 60         Timed Code Treatment Minutes: [address] S Time:   [address] [facility]   Time In 1500         Time Out 1515         Minutes 15           Timed Code Treatment Minutes: 60+15     Total Treatment Minutes:  [facility], PT

## 2018-07-27 VITALS
DIASTOLIC BLOOD PRESSURE: 89 MMHG | BODY MASS INDEX: 30.87 KG/M2 | RESPIRATION RATE: 18 BRPM | HEART RATE: 101 BPM | HEIGHT: 74 IN | OXYGEN SATURATION: 96 % | WEIGHT: 240.5 LBS | TEMPERATURE: 98.1 F | SYSTOLIC BLOOD PRESSURE: 124 MMHG

## 2018-07-27 PROCEDURE — 97116 GAIT TRAINING THERAPY: CPT | Performed by: PHYSICAL THERAPIST

## 2018-07-27 PROCEDURE — 97530 THERAPEUTIC ACTIVITIES: CPT | Performed by: PHYSICAL THERAPIST

## 2018-07-27 PROCEDURE — 97535 SELF CARE MNGMENT TRAINING: CPT

## 2018-07-27 PROCEDURE — 97530 THERAPEUTIC ACTIVITIES: CPT

## 2018-07-27 PROCEDURE — 6370000000 HC RX 637 (ALT 250 FOR IP): Performed by: FAMILY MEDICINE

## 2018-07-27 PROCEDURE — 97127 HC SP THER IVNTJ W/FOCUS COG FUNCJ: CPT

## 2018-07-27 PROCEDURE — 94660 CPAP INITIATION&MGMT: CPT

## 2018-07-27 PROCEDURE — 6370000000 HC RX 637 (ALT 250 FOR IP): Performed by: PHYSICAL MEDICINE & REHABILITATION

## 2018-07-27 PROCEDURE — 94761 N-INVAS EAR/PLS OXIMETRY MLT: CPT

## 2018-07-27 PROCEDURE — 97110 THERAPEUTIC EXERCISES: CPT | Performed by: PHYSICAL THERAPIST

## 2018-07-27 RX ADMIN — TERBINAFINE 250 MG: 250 TABLET ORAL at 11:15

## 2018-07-27 RX ADMIN — CETIRIZINE HYDROCHLORIDE 10 MG: 10 TABLET, FILM COATED ORAL at 08:48

## 2018-07-27 RX ADMIN — DOCUSATE SODIUM AND SENNOSIDES 2 TABLET: 8.6; 5 TABLET, FILM COATED ORAL at 08:47

## 2018-07-27 RX ADMIN — OXYCODONE HYDROCHLORIDE AND ACETAMINOPHEN 1000 MG: 500 TABLET ORAL at 08:47

## 2018-07-27 RX ADMIN — TAMSULOSIN HYDROCHLORIDE 0.8 MG: 0.4 CAPSULE ORAL at 08:48

## 2018-07-27 RX ADMIN — FINASTERIDE 5 MG: 5 TABLET, FILM COATED ORAL at 08:48

## 2018-07-27 RX ADMIN — GABAPENTIN 100 MG: 100 CAPSULE ORAL at 08:47

## 2018-07-27 RX ADMIN — GABAPENTIN 100 MG: 100 CAPSULE ORAL at 13:21

## 2018-07-27 RX ADMIN — CHOLECALCIFEROL TAB 25 MCG (1000 UNIT) 2000 UNITS: 25 TAB at 11:15

## 2018-07-27 RX ADMIN — BUPROPION HYDROCHLORIDE 150 MG: 150 TABLET, FILM COATED, EXTENDED RELEASE ORAL at 11:16

## 2018-07-27 RX ADMIN — THERA TABS 1 TABLET: TAB at 08:48

## 2018-07-27 ASSESSMENT — 9 HOLE PEG TEST
TESTTIME_SECONDS: 24
TESTTIME_SECONDS: 29

## 2018-07-27 ASSESSMENT — PAIN SCALES - GENERAL: PAINLEVEL_OUTOF10: 0

## 2018-07-27 NOTE — DISCHARGE SUMMARY
Physical Medicine & Rehabilitation  Discharge Summary     Patient Identification:  [employer]. : 1950  Admit date: 2018  Discharge date:  2018   Attending provider: Toñito Aleman MD        Primary care provider: Lc Leong MD     Discharge Diagnoses:   Patient Active Problem List   Diagnosis    Wrist arthritis    Sprain of lumbar region    Hypertrophy of prostate without urinary obstruction and other lower urinary tract symptoms (LUTS)    Cardiac dysrhythmia    Onychomycosis    Obstructive sleep apnea    Personal history of other diseases of digestive system    Erectile dysfunction    Vitamin D deficiency    Essential hypertension    Mixed hyperlipidemia    Persistent depressive disorder    Dysthymia    Non-seasonal allergic rhinitis due to pollen    Pulmonary nodule    [address]umatic rhabdomyolysis ([address]    Acute renal failure due to rhabdomyolysis ([address]    Frequent falls    Functional gait abnormality    Back pain    Normal pressure hydrocephalus    Hyponatremia    Hydrocephalus    Umbilical hernia, incarcerated    NPH (normal pressure hydrocephalus)       Discharge Functional Status:    Physical therapy:  Bed Mobility: Scooting: Independent  Transfers: Sit to Stand: Independent  Stand to sit: Independent  Bed to Chair: Independent  Comment: PT instructed pt with stepping in all directions with only one LE moving at a time . Elia 10 reps to BLES  in a forwards, backwards and sideways direction.  Pt also instructed with isometrics in standing with resistance given in all directions.  , WB Status: No WB restrictions  Ambulation 1  Surface: level tile, ramp  Device: Rolling Walker  Assistance: Modified Independent (Sup progressing to MI when walking with walker)  Quality of Gait: decreased anoop, forward trunk flexion with downward gaze, decreased B step length  with decreased R foot clearance which increases with fatigue  Distance: 450'x2(this includes amb up and down a ramp x 75'   Comments: PT instructed pt with stops , starts and quick reversals when walking on level surfaces. Pt leans minimally to the L and tends to veer to the L when walking. However, pt 's R foot does not consistently clear the surface which increases with fatigue, Stairs  # Steps : 17 (8+9)  Stairs Height: 6\"  Rails: Left ascending  Assistance: Contact guard assistance  Comment: Reciprocal pattern when asscending and non reciprocal pattern when descending  Mobility: Bed, Chair, Wheel Chair: 6 - Requires assistive device (slide rail)  Walk: 6 - Modified [place]  Walks/operates wheelchair at least 150 feet with an ambulatory device, orthosis or prosthesis OR requires extra amount of time OR there is concern for safety  Distance Walked: 350'   Stairs: 5- Supervision Requires supervision(e.g., standing by, cuing, or coaxing) to go up and down one flight of stairs, PT Equipment Recommendations  Equipment Needed: No  Other: Pt owns RW and cane, Assessment: Pt is progressing well. LBP elicited following use of the vestibular board ( only level 2-3)For this reason, pt instructed with LB ex and reported immmediate relief. The second session was an assessment to determine pt's safety with getting  up and walking. Pt daphnie well. Pt cont to be well motivated to return to [facility]. Pt would benefit from continued therapy to maximize potential and increase functional mobility towards Ind for a safer return to home.      Occupational therapy: Eatin - Patient feeds self  Groomin - Requires setup/cues to do all tasks  Bathin - Able to bathe all 10 areas with setup/sup/cues  Dressing-Upper: 7 - Patient independently dresses upper body  Dressing-Lower: 5 - Requires setup/supervision/cues and/or staff applies TEDS/prosthesis/brace only  Toiletin - Requires device (grab bar/walker/etc.)  Toilet Transfer: 6 - Independent with device (grab bar/walker/slide bar)  Tub Transfer: 3 - Moderate assistance, pt. (L) 07/26/2018    MCV 84.6 07/26/2018     (H) 07/26/2018       Disposition:  Home in stable condition    Follow-up:  See after visit summary from hospitalization    Discharge Medications:     Medication List      START taking these medications    HYDROcodone-acetaminophen 5-325 MG per tablet  Commonly known as:  Charly Side  Take 1 tablet by mouth every 4 hours as needed for Pain for up to 7 days. .  Notes to patient:  Hydrocodone and Acetaminophen (Norco, Vicodin)  USE- treat moderate to severe pain (contains an opiate and tylenol)  SIDE EFFECTS--Drowsiness, Dizziness, constipation. CAUTION driving or operating heavy machinery        CONTINUE taking these medications    buPROPion 150 MG extended release tablet  Commonly known as:  WELLBUTRIN XL  Take 1 tablet by mouth every morning     cetirizine 10 MG tablet  Commonly known as:  ZYRTEC  Take 1 tablet by mouth daily     finasteride 5 MG tablet  Commonly known as:  PROSCAR  Take 1 tablet by mouth daily     fish oil 1000 MG Caps     Flax Seed Oil 1000 MG Caps     gabapentin 100 MG capsule  Commonly known as:  NEURONTIN  Take 1 capsule by mouth 3 times daily for 30 days. .     montelukast 10 MG tablet  Commonly known as:  SINGULAIR  Take 1 tablet by mouth nightly     multivitamin per tablet     tadalafil 20 MG tablet  Commonly known as:  CIALIS  Take 1 tablet by mouth as needed for Erectile Dysfunction     tamsulosin 0.4 MG capsule  Commonly known as:  FLOMAX  Take 2 capsules by mouth daily     terbinafine 250 MG tablet  Commonly known as:  LAMISIL  Take 1 tablet by mouth daily     vitamin C 1000 MG tablet     vitamin D 2000 units Caps capsule        STOP taking these medications    telmisartan 40 MG tablet  Commonly known as:  MICARDIS     zoster recombinant adjuvanted vaccine 50 MCG Susr injection  Commonly known as:  SHINGRIX           Where to Get Your Medications      These medications were sent to [place], [address]  [address] - P [phone] - F [phone]  [address] [facility] [address]    Phone:  [phone]   · gabapentin 100 MG capsule     These medications were sent to [address] [place] [address], [address]  [address] [place] [address]    Phone:  [phone]   · HYDROcodone-acetaminophen 5-325 MG per tablet         I spent over 35 minutes on this discharge encounter between counseling, coordination of care, and medication reconciliation.   To comply with [facility] freda R.II.4.1: Discharge order placed in advance to facilitate discharge planning with rehab team.     Derek Reyes MD

## 2018-07-27 NOTE — FLOWSHEET NOTE
07/27/18 1532   Encounter Summary   Services provided to: Patient   Volunteer Visit (7/27 fr rodriguez)   Routine   Intervention Prayer;[place]

## 2018-07-27 NOTE — PROGRESS NOTES
tablet 80 mg 4x Daily PRN   tamsulosin (FLOMAX) capsule 0.8 mg Daily   terbinafine (LAMISIL) tablet 250 mg Daily   vitamin C (ASCORBIC ACID) tablet 1,000 mg Daily   vitamin D (CHOLECALCIFEROL) tablet 2,000 Units Daily   sennosides-docusate sodium (SENOKOT-S) 8.6-50 MG tablet 2 tablet BID         Physical exam:     Vitals:  /89   Pulse 101   Temp 98.1 °F (36.7 °C) (Oral)   Resp 18   Ht 6' 2\" (1.88 m)   Wt 240 lb 8 oz (109.1 kg)   SpO2 96%   BMI 30.88 kg/m²   Constitutional:  OAA X3 NAD  Skin: no rash, turgor wnl  Heent:  eomi, mmm  Neck: no bruits or jvd noted  Cardiovascular:  S1, S2 without m/r/g  Respiratory: CTA B without w/r/r  Abdomen:  +bs, soft, nt, nd  Ext: no lower extremity edema  Psychiatric: mood and affect appropriate  Musculoskeletal:  Rom, muscular strength intact    Data:   Labs:  CBC:   Recent Labs      07/26/18   0648   WBC  8.1   HGB  12.6*   PLT  471*     BMP:    No results for input(s): NA, K, CL, CO2, BUN, CREATININE, GLUCOSE in the last 72 hours. Ca/Mg/Phos:   No results for input(s): CALCIUM, MG, PHOS in the last 72 hours. Hepatic:   No results for input(s): AST, ALT, ALB, BILITOT, ALKPHOS in the last 72 hours. Troponin: No results for input(s): TROPONINI in the last 72 hours. BNP: No results for input(s): BNP in the last 72 hours. Lipids: No results for input(s): CHOL, TRIG, HDL, LDLCALC, LABVLDL in the last 72 hours. ABGs: No results for input(s): PHART, PO2ART, PJT9SPC in the last 72 hours. INR:   No results for input(s): INR in the last 72 hours. UA:  No results for input(s): Olivia Chatters, GLUCOSEU, BILIRUBINUR, KETUA, SPECGRAV, BLOODU, PHUR, PROTEINU, UROBILINOGEN, NITRU, LEUKOCYTESUR, Dorene Radon in the last 72 hours. Urine Microscopic:   No results for input(s): LABCAST, BACTERIA, COMU, HYALCAST, WBCUA, RBCUA, EPIU in the last 72 hours. Urine Culture:   No results for input(s): LABURIN in the last 72 hours.   Urine Chemistry:   No results for input(s): RICHAR Oakes NAUR in the last 72 hours. IMAGING:  No orders to display       Assessment/Plan   1. JULIOCESAR     2. Sepsis     3. UTI     4. Acid- base/ Electrolytes - hyponatremia     5. Suspected NPH   Plan   -  shunt placed  - JULIOCESAR sec to prerenal Azotemia   - cr better   - off IVC  - PVR normal   - TRUDI - 9mm mass - repeat scan in 1 year   - Monitor renal function   - Hold ACE/ARBS                     Thank you for allowing us to participate in care of [employer].        Edward Ambrose  Feel free to contact me   [facility] [address]  Office : [phone]  Fax :[phone]

## 2018-07-27 NOTE — PROGRESS NOTES
Nephrology Progress Note     Doing well    Good UO    Past Medical History:   Diagnosis Date    Allergic rhinitis     Erectile dysfunction     Hyperlipidemia     Hypertension     Scre[facility]    Unspecified sleep apnea        Past Surgical History:   Procedure Laterality Date    LAPAROSCOPY N/A 7/18/2018    OPENING AND CLOSING FOR VENTRICULAR PERITONEAL SHUNT performed by Nani Bueno MD at [address] [place] [address] Drive,[address] N/A 07/18/2018     VENTRICULAR PERITONEAL SHUNT INSERTION RIGHT SIDE; (N/A )    TX CREATE SHUNT:VENTRIC-PERITONEAL N/A 7/18/2018    VENTRICULAR PERITONEAL SHUNT INSERTION RIGHT SIDE; performed by Denis Bourgeois MD at [facility] OR       Family History   Problem Relation Age of Onset    Cancer Father         prostate        reports that he has quit smoking. He has quit using smokeless tobacco. He reports that he drinks about 2.4 oz of alcohol per week . He reports that he does not use drugs.     Allergies:  Lisinopril    Current Medications:      lidocaine (LIDODERM) 5 % 1 patch Daily   acetaminophen (TYLENOL) tablet 650 mg Q4H PRN   ondansetron (ZOFRAN-ODT) disintegrating tablet 8 mg Q8H PRN   traZODone (DESYREL) tablet 50 mg Nightly PRN   enoxaparin (LOVENOX) injection 40 mg QPM   HYDROcodone-acetaminophen (NORCO) 5-325 MG per tablet 1 tablet Q4H PRN   Or    HYDROcodone-acetaminophen (NORCO) 5-325 MG per tablet 2 tablet Q4H PRN   magnesium hydroxide (MILK OF MAGNESIA) 400 MG/5ML suspension 30 mL Daily PRN   acetaminophen (TYLENOL) tablet 1,000 mg Q6H PRN   bismuth subsalicylate (PEPTO BISMOL) chewable tablet 524 mg Daily PRN   buPROPion (WELLBUTRIN XL) extended release tablet 150 mg QAM   cetirizine (ZYRTEC) tablet 10 mg Daily   finasteride (PROSCAR) tablet 5 mg Daily   gabapentin (NEURONTIN) capsule 100 mg TID   montelukast (SINGULAIR) tablet 10 mg Nightly   multivitamin 1 tablet Daily   simethicone (MYLICON) chewable RICHAR Suarez NAUR in the last 72 hours. IMAGING:  No orders to display       Assessment/Plan   1. JULIOCESAR     2. Sepsis     3. UTI     4. Acid- base/ Electrolytes - hyponatremia     5. Suspected NPH   Plan   -  shunt placed  - JULIOCESAR sec to prerenal Azotemia   - cr better   - off IVC  - PVR normal   - TRUDI - 9mm mass - repeat scan in 1 year   - Monitor renal function   - Hold ACE/ARBS                     Thank you for allowing us to participate in care of [employer].        Carlin Dsouza  Feel free to contact me   [facility] [address]  Office : [phone]  Fax :[phone]

## 2018-07-27 NOTE — PLAN OF CARE
Problem: Falls - Risk of:  Goal: Will remain free from falls  Will remain free from falls   Outcome: Ongoing  Remains free from falls. Up ad harrison in room with RW. Pt to discharge today. Problem: Pain:  Goal: Control of acute pain  Control of acute pain   Outcome: Ongoing  Pt states he has occasional headache and back pain. No complaints at this time.

## 2018-07-27 NOTE — PROGRESS NOTES
All medications reviewed with pt. Discharge and follow up instructions reviewed with pt. Care plan/pt education complete. All questions answered. Pt discharged with all belongings in no apparent signs of distress.

## 2018-07-27 NOTE — PROGRESS NOTES
ACUTE REHAB UNIT  SPEECH/LANGUAGE PATHOLOGY      [x] Daily  [] Weekly Care Conference Note  [] Discharge    Patient:Jared Baum. WXO:3[birth year]  IXA:[id number]  Rehab Dx/Hx: NPH (normal pressure hydrocephalus) [G91.2]    Precautions: [] Aspiration  [x] Fall risk  [] Sternal  [] Seizure [] Hip  [] Weight Bearing [] Other  Lives With: Spouse  ST Dx: [] Aphasia  [] Dysarthria  [] Apraxia   [] Oropharyngeal dysphagia [x] Cognitive Impairment  [] Other:   Date of Admit: 7/20/2018  Room #: 3104/3104-01  Date: 7/27/2018          Current Diet Order:DIET GENERAL;   Vision  Vision: Impaired  Vision Exceptions: Wears glasses at all times  Hearing  Hearing: Exceptions to [facility]  Hearing Exceptions: Bilateral hearing aid (at home)  Barriers toward progress: reduced insight into deficits      Date: 7/27/2018     Tx session 1   Total Timed Code Min 30   Total Treatment Minutes 30   Individual Treatment Minutes 30   Group Treatment Minutes 0   Co-Treat Minutes 0   Brief Exception: N/A   Pain None reported   Pain Intervention: [] RN notified  [] Repositioned  [] Intervention offered and patient declined  [x] N/A  [] Other:   Subjective:     Pleasant and cooperative. Objective / Goals:    Patient will complete graded recall tasks of varying length and complexity with 90% accuracy or min-no cues. Goal not directly targeted during this session; pt was able to recall treatment task completed in prior ST visit. Patient will complete problem solving tasks with >85% accuracy or min cues. Pt was able to complete map-reading exercise with 90% accuracy. Patient will complete graded executive function tasks with adequate attention to detail and self-correction of errors with >85% accuracy or min-no cues. Pt was able to complete a 2-parameter card-sorting exercise with 88% accuracy with increased time for completion. SLP educated pt re: strategies to facilitate accurate task completion; pt verbalized comprehension.    Patient will tolerate ongoing cognitive-linguistic assessment. GOAL MET   Other areas targeted: N/A   Education:   SLP educated pt re: role of SLP and rationale for treatment tasks. SLP educated pt and wife re: home practice activities and process/location of outpatient department. They verbalized comprehension. Safety Devices: [x] Call light within reach  [x] Chair alarm activated and connected to nurse call light system  [] Bed alarm activated   [x] Other: family in room     Assessment: Progressing towards cognitive-linguistic goals. Plan: Pt to d/c home this date. Recommend ongoing ST.      Interventions used this date:  [] Speech/Language Treatment  [] Instruction in HEP  [] Dysphagia Treatment [x] Cognitive Treatment   [] Other:    Discharge recommendations:  [] Home independently  [x] Home with assistance []  24 hour supervision  [] ECF [] Other  Continued Tx Upon Discharge: ? [x] Yes    [] No    [] TBD based on progress while on ARU     [] Vital Stim indicated     [] Other:   Estimated discharge date: 7/27/18      Electronically signed by  Lupillo Aguilar MA CCC-SLP; [id number]  Speech-Language Pathologist

## 2018-07-27 NOTE — PROGRESS NOTES
Physical Therapy  Facility/Department: [facility] ACUTE REHAB UNIT  Daily Treatment Note  NAME: Cayla Glynn. : 1950  MRN: [id number]    Date of Service: 2018    Discharge Recommendations:  Home with assist PRN, Outpatient PT   PT Equipment Recommendations  Equipment Needed: Yes  Other: Pt's walker was borrowed so a RW was recommended for use for safety in the home. Patient Diagnosis(es): The encounter diagnosis was NPH (normal pressure hydrocephalus). has a past medical history of Allergic rhinitis; Erectile dysfunction; Hyperlipidemia; Hypertension; Screening for abdominal aortic aneurysm (AAA) performed; and Unspecified sleep apnea. has a past surgical history that includes other surgical history (N/A, 2018); pr create shunt:ventric-peritoneal (N/A, 2018); and laparoscopy (N/A, 2018). Restrictions  Position Activity Restriction  Other position/activity restrictions: up ad harrison as of 2018  Subjective   General  Chart Reviewed: Yes  Additional Pertinent Hx: Pt is a 80 y.o. male admitted to ARU on 18 from [facility]. Pt initially admitted to [facility] on 18 with confusion, poor balance, and urge incontinence. Found to have NPH. Shunt placed . PMH: HLD, HTN, unspecified sleep apnea. Family / Caregiver Present: Yes (Sister)  Referring Practitioner: Dr. Snow Owens: Pt states that \"he feels that he is ready to go home. \"  General Comment  Comments: Pt sitting in BS chair when PT arrived. Pt agreeable to therapy. Pain Screening  Patient Currently in Pain: Denies  Vital Signs  Patient Currently in Pain: Denies       Orientation  Orientation  Overall Orientation Status: Within Normal Limits  Objective   Bed mobility  Rolling to Left: Independent  Rolling to Right: Independent  Supine to Sit: Independent  Sit to Supine: Independent  Scooting: Independent  Transfers  Sit to Stand: Independent  Stand to sit:  Independent  Bed to Chair: Gt training, transf training, bed mobility skills and balance retraining  REQUIRES PT FOLLOW UP: Yes  Activity Tolerance  Activity Tolerance: Patient Tolerated treatment well     G-Code     OutComes Score                                                    AM-PAC Score             Goals  Short term goals  Time Frame for Short term goals: STG=LTG  Short term goal 1: updated 7/19/18: Pt to perform bed mobility skills with CGA to SBA- Not met 7/20/18  Short term goal 2: pt will be able to perform transfers with CGA to SBA with RW- Not met7/20/18  Short term goal 3: pt will be able to ambulate 15' with RW and CGA to SBA- Met for distance not met for assist,7/19/18 updated goal distance to 150ft to improve ease of houshold distances- Not met 7/20/18  Short term goal 4: pt will be able to ascend/descend 3 stairs with/without railing and Mod A-Not met due to poor balance and fatigue unsafe to attempt 7/20/18  Long term goals  Time Frame for Long term goals : approximately 1 week  Long term goal 1: Pt will perform all bed mobility with Rufus. Goal achieved   Long term goal 2: Pt will perform sit to/from stand with or without LRAD and Rufus. Goal achieved . Long term goal 3: Pt will ambulate 400' with or without LRAD and Rufus. Goal  achieved  Long term goal 4: Pt will ascend/descend 12 stairs with LHR and supervision. Goal  achieved  Patient Goals   Patient goals : \"To be back on my feet as soon as possible. \"    Plan    Plan  Times per week: 5x/week, 60 minutes a day  Current Treatment Recommendations: Strengthening, Balance Training, Functional Mobility Training, Transfer Training, Endurance Training, Gait Training, Stair training, Neuromuscular Re-education, Home Exercise Program, Safety Education & Training, Patient/Caregiver Education & Training  Plan Comment: d/c today to home after therapies .  OP therapy recommended  Safety Devices  Type of devices: Call light within reach, Chair alarm in place, Gait belt, Left in chair Therapy Time   Individual Concurrent Group Co-treatment   Time In 1120         Time Out 1220         Minutes 60         Timed Code Treatment Minutes: 10 Bertin Saleh, PT

## 2018-07-27 NOTE — PROGRESS NOTES
cues for identifying, correcting errors, and recalling constraints given. GOAL NOT MET   Patient will tolerate ongoing cognitive-linguistic assessment. GOAL MET GOAL MET   Other areas targeted:     Education:   Educated pt and family to rationale for tx tasks, skills targeted, progress with cognitive skills, and d/c recommendations. Safety Devices: [x] Call light within reach  [x] Chair alarm activated and connected to nurse call light system  [] Bed alarm activated   [x] Other: family at side      Assessment: Cognitive-linguistic impairment. Progressing towards goals. Pt with improved recall skills since admission but still exhibits difficulty with executive function and divergent reasoning tasks. Processing speed not at baseline for pt. Plan: Pt to d/c home this date. Recommend ongoing ST.       Interventions used this date:  [] Speech/Language Treatment  [] Instruction in HEP  [] Dysphagia Treatment [x] Cognitive Treatment   [] Other:    Discharge recommendations:  [] Home independently  [x] Home with assistance []  24 hour supervision  [] ECF [] Other  Continued Tx Upon Discharge: ? [x] Yes    [] No    [] TBD based on progress while on ARU     [] Vital Stim indicated     [] Other:   Estimated discharge date: 7/27/18      Electronically signed by  Javier Mathis MA CCC-SLP; [id number]  Speech-Language Pathologist

## 2018-07-27 NOTE — PLAN OF CARE
Problem: Falls - Risk of:  Goal: Will remain free from falls  Will remain free from falls   Outcome: Ongoing  Client remains free from falls, pt up ad harrison, door open, encouraged to use call light for needs, call light is within reach, bed locked in lowest position,  Will continue to monitor. Problem: Pain:  Goal: Pain level will decrease  Pain level will decrease   Outcome: Ongoing  Pt rates pain on 0-10 scale, medication available PRN, respirations even and unlabored, will continue to monitor.

## 2018-07-27 NOTE — PROGRESS NOTES
Occupational Therapy  Facility/Department: [facility] ACUTE REHAB UNIT  Daily Treatment Note  NAME: Deborah Jasso : 1950  MRN: [id number]    Date of Service: 2018    Discharge Recommendations:  Home with assist PRN, Outpatient OT  OT Equipment Recommendations  Other: has shower chair and long handled shoe horn at baseline. Patient Diagnosis(es): The encounter diagnosis was NPH (normal pressure hydrocephalus). has a past medical history of Allergic rhinitis; Erectile dysfunction; Hyperlipidemia; Hypertension; Screening for abdominal aortic aneurysm (AAA) performed; and Unspecified sleep apnea. has a past surgical history that includes other surgical history (N/A, 2018); pr create shunt:ventric-peritoneal (N/A, 2018); and laparoscopy (N/A, 2018). Restrictions  Position Activity Restriction  Other position/activity restrictions: up as tolerated  Subjective   General  Chart Reviewed: Yes  Patient assessed for rehabilitation services?: Yes  Additional Pertinent Hx: 80 y.o. male admit to ARU . Hospital course: admitted 18 with LE weakness, decreased balance and difficulty urinating. Head CT=No acute intracranial abnormality. MRI Brain= No acute intracranial abnormality. No evidence of acute infarction.  shunt and lap umbilical hernia repair on 18 PMH includes: HTN, sleep apnea  Family / Caregiver Present: Yes (wife and sister)  Referring Practitioner: Yasmani Cleveland   Diagnosis: brain dysfunction  Subjective  Subjective: Patient seated in chair upon arrival, agreeable to therapy. Voicing excitement regarding planned d/c home later this date.    Pain Assessment  Patient Currently in Pain: No  Vital Signs  Patient Currently in Pain: No   Orientation  Orientation  Overall Orientation Status: Within Normal Limits  Objective    ADL  Grooming: Independent (per patient report; completed earlier this morning while up ad harrison)  UE Bathing: Modified independent  (seated on shower Strength -  (lbs)  Handle Setting 2: 45  Left Hand Strength - Pinch (lbs)  Lateral: 10  Right Hand Strength -  (lbs)  Handle Setting 2: 48  Right Hand Strength - Pinch (lbs)  Lateral: 18  Fine Motor Skills  Left 9 Hole Peg Test Time (secs): 29 (improved from 30.1)  Right 9 Hole Peg Test Time (secs): 24 (improved from 28.5)           Assessment   Performance deficits / Impairments: Decreased functional mobility ; Decreased ADL status; Decreased endurance;Decreased balance;Decreased cognition;Decreased high-level IADLs  Assessment: Patient met 4/5 OT goals during stay on ARU. Continue to recommend spvn for tub transfer at this time. Recommend home with assist prn. Recommend OP OT to address ongoing problem-solving, coordination and higher-level IADL tasks.   Treatment Diagnosis: Impaired ADL and functional mobility  Prognosis: Good  Patient Education: technique for tub transfer, d/c recommendations - verb understanding  REQUIRES OT FOLLOW UP: Yes  Activity Tolerance  Activity Tolerance: Patient Tolerated treatment well  Safety Devices  Safety Devices in place: Yes  Type of devices: Left in chair (cleared per team to be up ad harrison in room)          Plan   Plan  Times per week: 5x/wk x60 min  Times per day: Daily  Current Treatment Recommendations: Balance Training, Functional Mobility Training, Endurance Training, Self-Care / ADL, Neuromuscular Re-education, Safety Education & Training, Patient/Caregiver Education & Training, Equipment Evaluation, Education, & procurement, Home Management Training    Goals  Long term goals  Time Frame for Long term goals : 7-10 days  Long term goal 1: Patient will demonstrate ability to complete tub transfer with MOD I - not met, spvn assist 7/27  Long term goal 2: Patient will complete bathing with MOD I - goal met 7/27  Long term goal 3: Patient will complete grooming tasks in stance at independent level for both task completion and balance - goal met per patient report

## 2018-07-28 ENCOUNTER — CARE COORDINATION (OUTPATIENT)
Dept: CASE MANAGEMENT | Age: 68
End: 2018-07-28

## 2018-07-28 NOTE — CARE COORDINATION
Gwendolyn [facility] Transitions Initial Follow Up Call    Call within 2 business days of discharge: Yes    Patient: Lynette Jerome. Patient : 1950   MRN: [id number]   Reason for Admission: ataxia / hydrocephalus / back pain   Discharge Date: 18 RARS: Readmission Risk Score: 17     Spoke with: VIDAL Ricardo. Facility: [facility]      Non-face-to-face services provided:  Obtained and reviewed discharge summary and/or continuity of care documents  Education of patient/family/caregiver/guardian to support self-management-   Assessment and support for treatment adherence and medication management-     Care Transitions 24 Hour Call    Do you have any ongoing symptoms?:  Yes  Patient-reported symptoms:  Pain  Interventions for patient-reported symptoms:  Other (Comment: Continue taking meds as prescribed, keep / schedule appts, s/s that require med attn)  Do you have a copy of your discharge instructions?:  Yes  Do you have all of your prescriptions and are they filled?:  Yes  Have you been contacted by a [facility] Pharmacist?:  No  Have you scheduled your follow up appointment?:  No  Were you discharged with any Home Care or Post Acute Services:  Yes  Post Acute Services:  Home Health (Comment: alternate solutions )  Patient DME:  Shower chair, Straight cane, Walker, Other  Other Patient DME:  shower chair, handheld shower head, long handle shoe horn, cane (inside home), rolling walker (outside home)   Do you have support at home?:  Partner/Spouse/SO  Do you feel like you have everything you need to keep you well at home?:  Yes  Are you an active caregiver in your home?:  No  Care Transitions Interventions  No Identified Needs       Summary  CTC spoke to the Pt who denies fever, chills, nausea, vomiting, chest pain, SOB, and cough. Pt reports he is not having any difficulty urinating and that his urine is pale yellow in color.   Pt reports having low back pain and rates it a 3 or 4 out of 10 and he took Norco last night. Pt reports LBM was today. Pt reports that three wounds have healed. Pt declined to review meds and assistance with scheduling with PCP. Pt states he was with Dr Shazia Meek and is now with Dr Diana Alegre. Pt states he will let his wife know his appt needs to be scheduled. CTC advised Pt of use urgent care or physicians 24 hr access line if assistance is needed after hours or on the weekend. Pt denies any additional needs and is agreeable with additional calls.     Follow Up  Future Appointments  Date Time Provider Cedric Morillo   9/20/2018 8:30 AM [facility] [address], [place]

## 2018-07-31 ENCOUNTER — OFFICE VISIT (OUTPATIENT)
Dept: INTERNAL MEDICINE CLINIC | Age: 68
End: 2018-07-31

## 2018-07-31 VITALS
TEMPERATURE: 98.4 F | BODY MASS INDEX: 30.8 KG/M2 | WEIGHT: 240 LBS | HEIGHT: 74 IN | SYSTOLIC BLOOD PRESSURE: 110 MMHG | HEART RATE: 91 BPM | DIASTOLIC BLOOD PRESSURE: 70 MMHG

## 2018-07-31 DIAGNOSIS — G91.2 NPH (NORMAL PRESSURE HYDROCEPHALUS) (HCC): Primary | ICD-10-CM

## 2018-07-31 DIAGNOSIS — Z98.2 S/P VP SHUNT: ICD-10-CM

## 2018-07-31 DIAGNOSIS — I10 ESSENTIAL HYPERTENSION: ICD-10-CM

## 2018-07-31 DIAGNOSIS — E78.2 MIXED HYPERLIPIDEMIA: ICD-10-CM

## 2018-07-31 DIAGNOSIS — R74.8 ELEVATED LIVER ENZYMES: ICD-10-CM

## 2018-07-31 LAB
A/G RATIO: 1.5 (ref 1.1–2.2)
ALBUMIN SERPL-MCNC: 4.4 G/DL (ref 3.4–5)
ALP BLD-CCNC: 89 U/L (ref 40–129)
ALT SERPL-CCNC: 44 U/L (ref 10–40)
ANION GAP SERPL CALCULATED.3IONS-SCNC: 17 MMOL/L (ref 3–16)
AST SERPL-CCNC: 31 U/L (ref 15–37)
BASOPHILS ABSOLUTE: 0.1 K/UL (ref 0–0.2)
BASOPHILS RELATIVE PERCENT: 1 %
BILIRUB SERPL-MCNC: 0.4 MG/DL (ref 0–1)
BUN BLDV-MCNC: 21 MG/DL (ref 7–20)
CALCIUM SERPL-MCNC: 10.2 MG/DL (ref 8.3–10.6)
CHLORIDE BLD-SCNC: 99 MMOL/L (ref 99–110)
CO2: 23 MMOL/L (ref 21–32)
CREAT SERPL-MCNC: 1 MG/DL (ref 0.8–1.3)
EOSINOPHILS ABSOLUTE: 0.2 K/UL (ref 0–0.6)
EOSINOPHILS RELATIVE PERCENT: 3.8 %
GFR AFRICAN AMERICAN: >60
GFR NON-AFRICAN AMERICAN: >60
GLOBULIN: 3 G/DL
GLUCOSE BLD-MCNC: 90 MG/DL (ref 70–99)
HCT VFR BLD CALC: 37.9 % (ref 40.5–52.5)
HEMOGLOBIN: 12.8 G/DL (ref 13.5–17.5)
LYMPHOCYTES ABSOLUTE: 1.1 K/UL (ref 1–5.1)
LYMPHOCYTES RELATIVE PERCENT: 18.2 %
MCH RBC QN AUTO: 29.3 PG (ref 26–34)
MCHC RBC AUTO-ENTMCNC: 33.9 G/DL (ref 31–36)
MCV RBC AUTO: 86.5 FL (ref 80–100)
MONOCYTES ABSOLUTE: 0.6 K/UL (ref 0–1.3)
MONOCYTES RELATIVE PERCENT: 10.1 %
NEUTROPHILS ABSOLUTE: 4.1 K/UL (ref 1.7–7.7)
NEUTROPHILS RELATIVE PERCENT: 66.9 %
PDW BLD-RTO: 13.7 % (ref 12.4–15.4)
PLATELET # BLD: 396 K/UL (ref 135–450)
PMV BLD AUTO: 6.4 FL (ref 5–10.5)
POTASSIUM SERPL-SCNC: 4.8 MMOL/L (ref 3.5–5.1)
RBC # BLD: 4.38 M/UL (ref 4.2–5.9)
SODIUM BLD-SCNC: 139 MMOL/L (ref 136–145)
TOTAL PROTEIN: 7.4 G/DL (ref 6.4–8.2)
WBC # BLD: 6.1 K/UL (ref 4–11)

## 2018-07-31 PROCEDURE — 99496 TRANSJ CARE MGMT HIGH F2F 7D: CPT | Performed by: NURSE PRACTITIONER

## 2018-07-31 RX ORDER — ATORVASTATIN CALCIUM 10 MG/1
10 TABLET, FILM COATED ORAL DAILY
Qty: 90 TABLET | Refills: 1
Start: 2018-07-31 | End: 2018-09-20

## 2018-07-31 RX ORDER — TELMISARTAN 40 MG/1
40 TABLET ORAL DAILY
Qty: 90 TABLET | Refills: 1
Start: 2018-07-31 | End: 2018-09-20

## 2018-07-31 NOTE — PROGRESS NOTES
mg by mouth daily. Yes Historical Provider, MD   Flaxseed, Linseed, (FLAX SEED OIL) 1000 MG CAPS Take 1,000 mg by mouth daily. Yes Historical Provider, MD   Cholecalciferol (VITAMIN D) 2000 UNITS CAPS capsule Take 1 capsule by mouth daily. Yes Historical Provider, MD   Ascorbic Acid (VITAMIN C) 1000 MG tablet Take 1,000 mg by mouth daily. Yes Historical Provider, MD   multivitamin [facility]) per tablet Take 1 tablet by mouth daily. Yes Historical Provider, MD         Review of Systems   Constitutional: Negative. Genitourinary: Negative. Neurological: Negative for dizziness and headaches. All other systems reviewed and are negative. Objective:   Physical Exam   Constitutional: He is oriented to person, place, and time. He appears well-developed and well-nourished. HENT:   Head:       Right Ear: Hearing, tympanic membrane, external ear and ear canal normal.   Left Ear: Hearing, tympanic membrane, external ear and ear canal normal.    shunt   Eyes: EOM and lids are normal. Pupils are equal, round, and reactive to light. Cardiovascular: Normal rate, regular rhythm and normal heart sounds. Pulmonary/Chest: Effort normal and breath sounds normal.   Lymphadenopathy:     He has no cervical adenopathy. Neurological: He is alert and oriented to person, place, and time. He has normal strength and normal reflexes. No cranial nerve deficit or sensory deficit. Skin:   Staples in place at  shunt site, right side        Assessment:       Diagnosis Orders   1. NPH (normal pressure hydrocephalus)  CBC Auto Differential   2. S/P  shunt     3. Elevated liver enzymes  COMPREHENSIVE METABOLIC PANEL   4. Essential hypertension  telmisartan (MICARDIS) 40 MG tablet   5.  Mixed hyperlipidemia  atorvastatin (LIPITOR) 10 MG tablet           Plan:      Doing well, much improved  Check CBC and CMP  Hold on lipitor, appears likely D/C due to elevated liver enzymes  Resume bp therapy  Resume baby aspirin  Will be

## 2018-08-02 ENCOUNTER — HOSPITAL ENCOUNTER (OUTPATIENT)
Dept: OCCUPATIONAL THERAPY | Age: 68
Setting detail: THERAPIES SERIES
Discharge: HOME | End: 2018-08-02
Payer: MEDICARE

## 2018-08-02 ENCOUNTER — HOSPITAL ENCOUNTER (OUTPATIENT)
Dept: PHYSICAL THERAPY | Age: 68
Setting detail: THERAPIES SERIES
Discharge: HOME | End: 2018-08-02
Payer: MEDICARE

## 2018-08-02 PROCEDURE — 97530 THERAPEUTIC ACTIVITIES: CPT

## 2018-08-02 PROCEDURE — G8984 CARRY CURRENT STATUS: HCPCS

## 2018-08-02 PROCEDURE — 97162 PT EVAL MOD COMPLEX 30 MIN: CPT

## 2018-08-02 PROCEDURE — 97116 GAIT TRAINING THERAPY: CPT

## 2018-08-02 PROCEDURE — G8979 MOBILITY GOAL STATUS: HCPCS

## 2018-08-02 PROCEDURE — 97165 OT EVAL LOW COMPLEX 30 MIN: CPT

## 2018-08-02 PROCEDURE — G8985 CARRY GOAL STATUS: HCPCS

## 2018-08-02 PROCEDURE — G8978 MOBILITY CURRENT STATUS: HCPCS

## 2018-08-02 ASSESSMENT — PAIN DESCRIPTION - LOCATION: LOCATION: BACK

## 2018-08-02 ASSESSMENT — PAIN DESCRIPTION - PAIN TYPE: TYPE: CHRONIC PAIN

## 2018-08-02 ASSESSMENT — PAIN SCALES - GENERAL: PAINLEVEL_OUTOF10: 3

## 2018-08-02 ASSESSMENT — PAIN DESCRIPTION - ORIENTATION: ORIENTATION: LOWER

## 2018-08-02 NOTE — PROGRESS NOTES
Quick DASH      Patient: Raiza El. : 1950  MRN: [id number]  Date: 2018  Electronically Signed by: Raj Wright    Instructions:   · This Questionnaire asks about your symptoms as well as your ability to perform certain activities. · Please answer every question, based on your condition in the last week, by selecting the appropriate number. · If you did not have the opportunity to perform the activity in the past week, please make your best estimate of which response would be the most accurate. · It doesn't matter which hand or arm you use to perform the activity; please answer based on your ability regardless of how you perform the task. Please rate your ability to do the following activities in the last week by selecting the number below the appropriate response      No Difficulty Mild Difficulty Moderate Difficulty Severe Difficulty Unable   1. Open a tight or new jar    [] 1  [x] 2  [] 3  [] 4  [] 5   2. Do heavy household chores (e.g., wash walls, floors)  [] 1  [] 2  [x] 3  [] 4  [] 5   3. [place] a shopping bag or briefcase  [x] 1  [] 2  [] 3  [] 4  [] 5   4. Wash your back    [x] 1  [] 2  [] 3  [] 4  [] 5   5. Use a knife to cut food    [x] 1  [] 2  [] 3  [] 4  [] 5   6. Recreational activities in which you take some force or impact through your arm, shoulder, or hand (e.g., golf, hammering, tennis, etc.)  [] 1  [x] 2   3  [] 4  [] 5      Not At All  Slightly Moderately Quite A Bit  Extremely   7. During the past week, to what extent has your arm, shoulder or hand problem interfered with your normal social activities with family, friends, neighbors or groups? [] 1  [x] 2  [] 3  [] 4  [] 5      Not Limited At All Slightly Limited  Moderately Limited Very Limited  Unable   8. During the past week, were you limited in your work or other regular daily activities as a result of your arm, shoulder or hand problem?   [] 1  [x] 2  [] 3  [] 4  [] 5     Please rate the severity of

## 2018-08-02 NOTE — PROGRESS NOTES
Occupational Therapy  Occupational Therapy Initial Assessment/Treatment Note  Date:  2018    Patient Name: Bailey Jeffers. MRN: [id number]     :  1950     Treatment Diagnosis: BUE weakness, impaired coordination bilaterally    Restrictions: none     Subjective   General  Chart Reviewed: Yes  Patient assessed for rehabilitation services?: Yes  Additional Pertinent History: Pt is a 80year old male, R hand dominant, who presented to the ED at [facility] on 18 with LE weakness, decreased balance and difficulty urinating. Head CT showed no acute intracranial abnormality. MRI Brain showed no acute intracranial abnormality. No evidence of acute infarction. Pt found to have NPH (normal pressure hydrocephalus). Pt went to surgery for  shunt and lap umbilical hernia repair on 2018. Pt completed intense therapy program in ARU at [facility]. Pt was DC on  to home with wife and no reported issues. PMHx includes:  HTN and sleep apnea. 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   Family / Caregiver Present: Yes (wife)  Referring Practitioner: Dr. Kev Robert  Diagnosis: NPH (G91.2)  Insurance Information: Medicare  Pain Assessment  Patient Currently in Pain: Yes  Pain Assessment: 0-10  Pain Level: 3  Pain Type: Chronic pain  Pain Location: Back  Pain Orientation: Lower  Vital Signs  Patient Currently in Pain: Yes  Home Living  Social/Functional History  Lives With: Spouse  Type of Home: House  Home Layout: Able to Live on Main level with bedroom/bathroom, One level  Home Access: Stairs to enter without rails  Entrance Stairs - Number of Steps: 2  Bathroom Shower/Tub: Tub/Shower unit  Home Equipment: Rolling walker, Cane  ADL Assistance: Independent  Homemaking Assistance: Independent  Homemaking Responsibilities: Yes  Ambulation Assistance: Independent  Transfer Assistance: Independent  Active : No (since admission to hospital, pt wifes drives. Initiated education regarding recommendations for physician approval and possible OT eval driving/training. Spouse very receptive)  Occupation: Retired  Type of occupation: [employer] in [employer],      Objective       Functional Mobility: decrease dynamic standing balance, pt requires AD (cane versus RW) for functional mobility SBA-MOD I    ADL  Additional Comments: Independent  Tone RUE  RUE Tone: Normotonic  Tone LUE  LUE Tone: Normotonic  Coordination  Movements Are Fluid And Coordinated: No  Coordination and Movement description: Fine motor impairments; Intention tremors;Decreased speed;Decreased accuracy; Left UE;Right UE       Typing Test:  --pt preferred method of typing \"hunt and suarez\"  --16 WPM (83 characters) with 2 errors for 87% accuracy for an adjusted speed of 14 WPM.   --Pt stated this is baseline speed    Management of Every Day Containers:  --pt in unsupported stance to manipulate lids off/on of 5/5 containers. Pt completed task in 28 seconds demonstrating effective movement patterns    Box and Blocks Test (BBT): unsupported stance  --R hand: 48  --L hand: 44    Nine Hole Peg Test (NHPT): unsupported stance  --R hand: 22.61 seconds which is below average for normal population for patient's age range  --L hand: 29.36 seconds which is below average for normal population for patient's age range    Handwriting:  --pt wrote name and months of the year  --pt and spouse reported penmanship to be at baseline    Therapeutic Activities: completed to address various coordination skills   --Finger<->Palm translation activities completed with both hands x2 sets each hand. First set, pt instructed to gather 5 medium sized blocks of various shapes and then stack. Pt required 3 attempts to be successful with R hand and 1 attempt to be successful with L hand. Mild tremors noted. For second set, pt instructed to gather 5 small blocks of various shapes and then stack.  Pt completed stack with facilitate independence with lifting heavy objects    Long term goals  Time Frame for Long term goals : 8 weeks  Long term goal 1: Pt improve R hand  strength to 52# to demonstrate improved  strength to facilitate independence with IADLs  Long term goal 2: Pt will improve time on NHPT to no more than 20 seconds using R hand to demonstrate improved timing and speed with fine motor coordination.   Long term goal 3: Pt will improve tip to tip pinch strength of R hand to 15# to facilitate independence with functional activities   Long term goal 4: Pt will improve BUE shoulder flexion and abduction strength to 4/5 to demonstrate improved strength to facilitate independence with lifting heavy objects    Patient Goals   Patient goals : return to driving      Timed Code Treatment Minutes: 45 minutes    Total Treatment Minutes: 35 minutes  10 (eval low complex), 25 (TA)       Miguel Hansen, OT

## 2018-08-02 NOTE — PROGRESS NOTES
Outpatient Occupational Therapy  Phone: [phone] Fax: [phone]     To:  Dr. Jez Martinez    From: Francois Jordan    Patient: Lukasz Loza : 1950  Diagnosis: NPH (G91.2)     Treatment Diagnosis:  BUE weakness, impaired coordination bilaterally  MRN: [id number]    Date: 2018     Occupational Therapy Certification/Re-Certification Form  Dear Dr. Jez Martinez  The following patient has been evaluated for occupational therapy services and for therapy to continue, Medicare requires monthly physician review of the treatment plan. Please review the attached evaluation and/or summary of the patient's plan of care, and verify that you agree therapy should continue by signing the attached document and sending it back to our office. Plan of Care/Treatment to date:  [x] Therapeutic Exercise  [] Modalities:  [x] Therapeutic Activity   [] Ultrasound  [] Electrical Stimulation   [] Total Motion Release   [] Fluidotherapy [] Kinesiotaping  [x] Neuromuscular Re-education  [] Iontophoresis [] Coldpack/hotpack   [x] Instruction in HEP    Other:  [x] Manual Therapy     []   [] Aquatic Therapy     [] ? Frequency/Duration:  # Days per week: [] 1 day # Weeks: [] 1 week [] 5 weeks      [x] 2 days? [] 2 weeks [] 6 weeks     [] 3 days   [] 3 weeks [] 7 weeks     [] 4 days   [] 4 weeks [x] 8 weeks    Rehab Potential: [x] good [] fair  [] poor       Electronically signed by:  Francois Jordan          If you have any questions or concerns, please don't hesitate to call.   Thank you for your referral.      Physician Signature:________________________________Date:__________________  By signing above, therapists plan is approved by physician

## 2018-08-06 ENCOUNTER — HOSPITAL ENCOUNTER (OUTPATIENT)
Dept: OCCUPATIONAL THERAPY | Age: 68
Setting detail: THERAPIES SERIES
Discharge: HOME | End: 2018-08-06
Payer: MEDICARE

## 2018-08-06 ENCOUNTER — HOSPITAL ENCOUNTER (OUTPATIENT)
Dept: PHYSICAL THERAPY | Age: 68
Setting detail: THERAPIES SERIES
Discharge: HOME | End: 2018-08-06
Payer: MEDICARE

## 2018-08-06 PROCEDURE — 97116 GAIT TRAINING THERAPY: CPT

## 2018-08-06 PROCEDURE — 97112 NEUROMUSCULAR REEDUCATION: CPT

## 2018-08-06 PROCEDURE — 97110 THERAPEUTIC EXERCISES: CPT

## 2018-08-06 NOTE — FLOWSHEET NOTE
Occupational Therapy Daily Treatment Note    Date:  2018    Patient Name:  Raiza El. :  1950  MRN: [id number]  Restrictions/Precautions:  none  Medical/Treatment Diagnosis Information:  ·  NPH (G91.2)  ·  BUE weakness, impaired coordination bilaterally  Insurance/Certification information: Medicare    Physician Information: Dr. Doug Foster of care signed (Y/N):  Y  Visit# / total visits:    Pain level: 0/10     G-Code  OT G-codes (2018)  Functional Assessment Tool Used: Rolm Sandy  Score: 18  Functional Limitation: Carrying, moving and handling objects  Carrying, Moving and Handling Objects Current Status (): At least 1 percent but less than 20 percent impaired, limited or restricted  Carrying, Moving and Handling Objects Goal Status (): 0 percent impaired, limited or restricted    Progress Note: []  Yes  [x]  No  Next due by: Visit #10      Subjective: no new issues/complaints since last visit    Therapeutic Exercise:   Exercise/Equipment  Resistance/Repetitions   Other comments   Unsupported sit BUE shoulder flexion   2 sets to fatigue First set attempted prior to stretching; significant tightness noted in BUEs resulting in elbow flexion and posterior lean    Second set attempted following stretching and within limited ROM to inhibit ineffective movement patterns. Pt demo'd slight improvement requiring VCs and facilitation. Mirror used on both sets for visual feedback     Unsupported sit BUE shoulder abduction   2 sets to fatigue First set attempted prior to stretching; significant tightness noted in BUEs resulting in elbow flexion and posterior lean    Second set attempted following stretching and within limited ROM to inhibit ineffective movement patterns. Pt demo'd slight improvement requiring VCs and facilitation.     Mirror used on both sets for visual feedback   Supine RUE Stretches   2 sets x1-2 minute hold Flexion  Abduction    Limited ROM d/t muscle tightness     Supine LUE Stretches   2 sets x1-2 minute hold Flexion  Abduction    Limited ROM d/t premorbid injury     Theraputty Exercises   Green theraputty Completed the following exercises for each hand:  --grasp  --roll out  --tip to tip pinch/3-jaw jaime     Hand Gripper   35#, 20 reps Each hand, reported good challenge     Home Exercise Program:    · Hand gripper 3 sets x20 reps each hand  · Theraputty exercises (3), handout issued    NMR: completed to work on spinal alignment, lumbar extension, scapular mobility to facilitate improved body mechanics during unsupported sit exercises. Prone: static initially for stretch, progressed to elbow prop  --worked on cervical flexion with scapular retraction without success despite multiple attempts. Pt successfully activated 1x after multiple attempts  --LUE/RUE extension requiring VCs to inhibit hamstring with limited success, completed to fatigue    Modified child's pose stretch: 2 sets x2 minutes    Quadruped/Modified quadruped: anterior/posterior WS 3x    Timed Code Treatment Minutes:  60 minutes    Total Treatment Minutes:  60 minutes  25 (NMR), 35 (TE)    Assessment   Performance deficits / Impairments: Decreased strength;Decreased fine motor control;Decreased high-level IADLs;Decreased balance;Decreased coordination    Assessment: Pt demo'd significant tightness and weakness UEs bilaterally resulting in verbal cues and facilitation to work within a limited ROM for strengthening exercises. Pt benefit from prone/quadruped positions to work on lengthening muscles and focusing on targeted muscle groups versus when in unsupported sit. Pt's decrease muscle lengthening and weakness significantly impacts BUE functioning and participation in daily life activities that require patient to reach overhead (i.e. Putting groceries away). Pt demo'd good understanding of exercises to be completed at home to facilitate strengthening of hands bilaterally.  Plan to follow up in improve BUE shoulder flexion and abduction strength to 4/5 to demonstrate improved strength to facilitate independence with lifting heavy objects     Patient Goals   Patient goals : return to driving    Plan for Next Session:  BUE stretching and AROM exercises with facilitation and verbal cues for effective posturing and movement patterns. Prone and modified quadruped positions to assist with postural alignment and strengthening. R/L hand functional activities to work on fine motor coordination and strengthening.     Electronically signed by:  Nawaf Guillen

## 2018-08-09 ENCOUNTER — HOSPITAL ENCOUNTER (OUTPATIENT)
Dept: PHYSICAL THERAPY | Age: 68
Setting detail: THERAPIES SERIES
Discharge: HOME | End: 2018-08-09
Payer: MEDICARE

## 2018-08-09 PROCEDURE — 97110 THERAPEUTIC EXERCISES: CPT

## 2018-08-09 PROCEDURE — 97112 NEUROMUSCULAR REEDUCATION: CPT

## 2018-08-09 PROCEDURE — 97116 GAIT TRAINING THERAPY: CPT

## 2018-08-10 ENCOUNTER — HOSPITAL ENCOUNTER (OUTPATIENT)
Dept: OCCUPATIONAL THERAPY | Age: 68
Setting detail: THERAPIES SERIES
Discharge: HOME | End: 2018-08-10
Payer: MEDICARE

## 2018-08-10 ENCOUNTER — CARE COORDINATION (OUTPATIENT)
Dept: CASE MANAGEMENT | Age: 68
End: 2018-08-10

## 2018-08-10 PROCEDURE — 97530 THERAPEUTIC ACTIVITIES: CPT

## 2018-08-10 PROCEDURE — 97110 THERAPEUTIC EXERCISES: CPT

## 2018-08-10 NOTE — FLOWSHEET NOTE
facilitate independence with IADLs  Short term goal 2: Pt will improve time on NHPT to no more than 21 seconds using R hand to demonstrate improved timing and speed with fine motor coordination. Short term goal 3: Pt will improve tip to tip pinch strength of R hand to 13# to facilitate independence with functional activities   Short term goal 4: Pt will improve BUE shoulder flexion and abduction strength to 4/5 to demonstrate improved strength to facilitate independence with lifting heavy objects     Long term goals  Time Frame for Long term goals : 8 weeks  Long term goal 1: Pt improve R hand  strength to 52# to demonstrate improved  strength to facilitate independence with IADLs  Long term goal 2: Pt will improve time on NHPT to no more than 20 seconds using R hand to demonstrate improved timing and speed with fine motor coordination. Long term goal 3: Pt will improve tip to tip pinch strength of R hand to 15# to facilitate independence with functional activities   Long term goal 4: Pt will improve BUE shoulder flexion and abduction strength to 4/5 to demonstrate improved strength to facilitate independence with lifting heavy objects     Patient Goals   Patient goals : return to driving    Plan for Next Session:  BUE stretching and AROM exercises with facilitation and verbal cues for effective posturing and movement patterns. Prone and modified quadruped positions to assist with postural alignment and strengthening. R/L hand functional activities to work on fine motor coordination and strengthening.     Electronically signed by:  Chris Devlin

## 2018-08-13 ENCOUNTER — HOSPITAL ENCOUNTER (OUTPATIENT)
Dept: PHYSICAL THERAPY | Age: 68
Setting detail: THERAPIES SERIES
Discharge: HOME | End: 2018-08-13
Payer: MEDICARE

## 2018-08-13 ENCOUNTER — HOSPITAL ENCOUNTER (OUTPATIENT)
Dept: OCCUPATIONAL THERAPY | Age: 68
Setting detail: THERAPIES SERIES
Discharge: HOME | End: 2018-08-13
Payer: MEDICARE

## 2018-08-13 ENCOUNTER — HOSPITAL ENCOUNTER (OUTPATIENT)
Dept: SPEECH THERAPY | Age: 68
Setting detail: THERAPIES SERIES
Discharge: HOME | End: 2018-08-13
Payer: MEDICARE

## 2018-08-13 PROCEDURE — 92523 SPEECH SOUND LANG COMPREHEN: CPT

## 2018-08-13 PROCEDURE — 97112 NEUROMUSCULAR REEDUCATION: CPT

## 2018-08-13 PROCEDURE — G9168 MEMORY CURRENT STATUS: HCPCS

## 2018-08-13 PROCEDURE — G9169 MEMORY GOAL STATUS: HCPCS

## 2018-08-13 PROCEDURE — 97127 HC SP THER IVNTJ W/FOCUS COG FUNCJ: CPT

## 2018-08-13 PROCEDURE — 97530 THERAPEUTIC ACTIVITIES: CPT

## 2018-08-13 PROCEDURE — 97116 GAIT TRAINING THERAPY: CPT

## 2018-08-13 PROCEDURE — 97110 THERAPEUTIC EXERCISES: CPT

## 2018-08-13 NOTE — PROGRESS NOTES
Outpatient Speech Therapy  Phone: [phone] Fax: [phone]  To: Dr. Melita Potter      From: Bradley Abreu MA, CCC-SLP   Date: 2018     Patient: Nicolette Arango. : 1950   MRN: [id number]  Medical Diagnosis:  G91.2 (ICD-10-CM) - (Idiopathic) normal pressure hydrocephalus     Treatment Diagnosis:   Cognitive communication impairment    Speech Therapy Certification Form  Dear Dr. Dacosta Comes  The following patient has been evaluated for speech therapy services and for therapy to continue, Medicare requires monthly physician review of the treatment plan. Please review the attached evaluation and/or summary of the patient's plan of care, and verify that you agree therapy should continue by signing the attached document and sending it back to our office. Plan of Care/Treatment to date:  [x] Speech-Language Evaluation/Treatment    [] Dysphagia Evaluation/Treatment        [] Dysphagia Treatment via Neuromuscular Electrical Stimulation (NMES)   [] Modified Barium Swallowing Study   [x] Cognitive-Linguistic Skills Development  [] Voice evaluation and Treatment      [] Evaluation, modification, and Training of Voice Prosthetic     [] Evaluation for Speech-Generating Augmentative and Alternative Communication Device   [] Therapeutic Services for the use of Speech-Generating Device. [] Other:          Goals:  Goal 1: Patient will demonstrate comprehension of compensatory memory strategies with SBA. Goal 2: Patient will implement compensatory memory strategies as needed to facilitate short term recall of information with SBA. Goal 3: Patient will complete reasoning exercises with SBA. Goal 4: Patient will complete functional problem solving exercises with SBA. Goal 5: Pt will participate in ongoing assessment of cognitive communication with goals added as indicated. Frequency/Duration:  # Days per week: [x] 1 day # Weeks: [] 1 week [] 5 weeks      [x] 2 days?    [] 2 weeks [x] 6 weeks     [] 3 days   [] 3 weeks [] 7 weeks     [] 4 days   [] 4 weeks [] 8 weeks    Rehab Potential: [] Excellent [x] Good [] Fair  [] Poor       Electronically signed by:  Julio Patricia M.A., [id number] [facility]  Speech-Language Pathologist  [address] [facility]  Phone: [phone]  Fax: [phone]        If you have any questions or concerns, please don't hesitate to call.   Thank you for your referral.      Physician Signature:________________________________Date:__________________  By signing above, therapists plan is approved by physician

## 2018-08-13 NOTE — PROGRESS NOTES
Speech Language Pathology  Facility/Department: [facility] OP SPEECH THERAPY  Initial Assessment / Treatment    NAME: Rosanna Jacinto : 1950  MRN: [id number]    Date of Eval: 2018  Evaluating Therapist: Mari Nissen    Past Medical History: has a past medical history of Allergic rhinitis; Erectile dysfunction; Hyperlipidemia; Hypertension; Screening for abdominal aortic aneurysm (AAA) performed; and Unspecified sleep apnea. Past Surgical History:  has a past surgical history that includes other surgical history (N/A, 2018); pr create shunt:ventric-peritoneal (N/A, 2018); and laparoscopy (N/A, 2018). Primary Complaint: recall of conversations. directions    Pain:  Pain Assessment  Patient Currently in Pain: No    Assessment:  Cognitive Diagnosis: cognitive communication impairment  Diagnosis: cognitive communication impairment characterized by short term memory deficits and reasoning    Subjective:  Previous level of function and limitations:   General  Chart Reviewed: Yes  Additional Pertinent Hx: G91.2 (ICD-10-CM) - (Idiopathic) normal pressure hydrocephalus  Family / Caregiver Present: Yes  General Comment  Comments: Attends evaluation session with his wife. Visit Information  SLP Insurance Information: Med[employer] (primary)  Total # of Visits Approved:  (bmn)  Subjective  Subjective: Pleasant and cooperative. Pain Assessment  Patient Currently in Pain: No  Vital Signs  Patient Currently in Pain: No  Social/Functional History  Lives With: Spouse David Padilla  Occupation: Retired  Type of occupation: former Attender , retired electrical  IADL History  Occupation: Retired  Type of occupation: former Attender , retired electrical  Vision  Vision: Impaired  Vision Exceptions: Wears glasses at all times  Hearing  Hearing: Exceptions to [facility]  Hearing Exceptions: Bilateral hearing aid    Objective:  Oral/Motor  Oral Motor:  Within functional

## 2018-08-13 NOTE — FLOWSHEET NOTE
UE with reaching easily fatigues, PTwill further work further strengthening gait training and balance on to regain prior level which is why pt continues to benefit from further skilled PT/OT. COnt with POC    Prognosis: [x] Good [] Fair  [] Poor    Patient Requires Follow-up: [x] Yes  [] No    Goals:    Short term goals  Time Frame for Short term goals: 3 weeks  Short term goal 1: pt to improve ABC without assistive device for all listed activities to less than 10% impairment  Short term goal 2: Pt to dec impairment per DGI to less than 30% impairment  Short term goal 3: Pt to improve laura hamstring length for joint protection of spine and LE from 15/16 inches in forward sitting reach to less than 10 inches   Short term goal 4: Pt to improve laura piriformis length to mild tightness for the ability to complete don/doff LE clothing and for joint integrity of spine and hips  Short term goal 5: Pt to improve TUG from 16 seconds to less than 12 seconds, sit to stand reps in 30 seconds from 7 reps to >/=12reps to lower fall risk and improve functional mobility   Long term goals  Time Frame for Long term goals : 6 weeks  Long term goal 1: Pt to report with 100% confidence return of prior level of functioning indep   Long term goal 2: Pt to dec impairment per DGI to 0% for max indep with functional mobility and assisting to return to functional baseline  Long term goal 3: Pt to illustrate good to normal balance statically and dynamically in standing for ease of community outtings and IADLs/hobbies   Patient Goals   Patient goals :  To improve walking wean from walker get back to prior level of functioning and driving       Plan:   [x] Continue per plan of care [] Alter current plan (see comments)  [] Plan of care initiated [] Hold pending MD visit [] Discharge    Plan for Next Session:      Electronically signed by:  Vivienne Michael DPT

## 2018-08-13 NOTE — PROGRESS NOTES
information and planning complex future events most of the time. When there is a breakdown in the use of recall/memory strategies/external memory aids, the individual occasionally requires minimal cues. These breakdowns may occasionally interfere with the individual's functioning in vocational, avocational, and social activities. []  Level 7 The individual is successful and independent in recalling or using external aids/memory strategies for complex information and planning future events in all vocational, avocational, and social activities.

## 2018-08-15 ENCOUNTER — APPOINTMENT (OUTPATIENT)
Dept: PHYSICAL THERAPY | Age: 68
End: 2018-08-15
Payer: MEDICARE

## 2018-08-16 ENCOUNTER — HOSPITAL ENCOUNTER (OUTPATIENT)
Dept: PHYSICAL THERAPY | Age: 68
Setting detail: THERAPIES SERIES
Discharge: HOME | End: 2018-08-16
Payer: MEDICARE

## 2018-08-16 ENCOUNTER — HOSPITAL ENCOUNTER (OUTPATIENT)
Dept: OCCUPATIONAL THERAPY | Age: 68
Setting detail: THERAPIES SERIES
Discharge: HOME | End: 2018-08-16
Payer: MEDICARE

## 2018-08-16 ENCOUNTER — HOSPITAL ENCOUNTER (OUTPATIENT)
Dept: SPEECH THERAPY | Age: 68
Setting detail: THERAPIES SERIES
Discharge: HOME | End: 2018-08-16
Payer: MEDICARE

## 2018-08-16 PROCEDURE — 97110 THERAPEUTIC EXERCISES: CPT

## 2018-08-16 PROCEDURE — 97530 THERAPEUTIC ACTIVITIES: CPT

## 2018-08-16 PROCEDURE — 97116 GAIT TRAINING THERAPY: CPT

## 2018-08-16 PROCEDURE — 97112 NEUROMUSCULAR REEDUCATION: CPT

## 2018-08-16 PROCEDURE — 97127 HC SP THER IVNTJ W/FOCUS COG FUNCJ: CPT

## 2018-08-16 NOTE — FLOWSHEET NOTE
Occupational Therapy Daily Treatment Note    Date:  2018    Patient Name:  Bhakti Lewis. :  1950  MRN: [id number]  Restrictions/Precautions:  none  Medical/Treatment Diagnosis Information:  ·  NPH (G91.2)  ·  BUE weakness, impaired coordination bilaterally  Insurance/Certification information: Medicare    Physician Information: Dr. Idania Buitrago of care signed (Y/N):  Y  Visit# / total visits:    Pain level: 0/10     G-Code  OT G-codes (2018)  Functional Assessment Tool Used: Marianna Zuluaga  Score: 18  Functional Limitation: Carrying, moving and handling objects  Carrying, Moving and Handling Objects Current Status (): At least 1 percent but less than 20 percent impaired, limited or restricted  Carrying, Moving and Handling Objects Goal Status (): 0 percent impaired, limited or restricted    Progress Note: []  Yes  [x]  No  Next due by: Visit #10      Subjective: pt denied new issues/complaints    Therapeutic Activities:    Activity #1: Grooved peg board to work on R hand in hand manipulation to improve fine motor skills. Pt completed 2 sets (x23 pegs) requiring ~3 minutes to complete     Therapeutic Exercise:   Exercise/Equipment  Resistance/Repetitions   Other comments   Supine RUE Stretches   2 sets Flexion x1-2 minute hold  Abduction x 1-2 minute hold  IR/ER x30 second hold    Improved ROM compared to previous session     Supine LUE Stretches   2 sets  Flexion x1-2 minute hold  Abduction x 1-2 minute hold  IR/ER x30 second hold    Improved ROM compared to previous session despite reported premorbid injury     AROM BUE exercises with free weight (supine)   2#, 15 reps each direction noted Shoulder flexion, shoulder abduction and horizontal ab/adduction.  Pt required VCs and tactile cues to inhibit ineffective movement patterns and facilitate optimal BUE ROM while maintaining elbow extension     AROM BUE exercises with free weight (unsupported sit)   2#, 10 reps each direction

## 2018-08-16 NOTE — FLOWSHEET NOTE
Patient limited by other medical complications  [] Other:     Assessment: Reviewed form on lunges, requires cues for proper form and technique for joint protection, close supervision for balance, PT progressed dynamic standign balance activities,tolerated well and pt has ongoing deficits with balance and continues to be highly challenged with exs, which does carryover to gait, making him unsteady at times, especialy with faituge, PT continue to rec use of [facility] for safety and balance. Pt is compliant with HEP at home, gait to improve indep with mobility and assist with return to prior level of functioing. Pt deficits with balance continues, as he is challenged dynamically, statically improving, transfers improving with less prioprioception deficit noted today and gait ipmroved, weaning from walker in community, Pt UE with reaching easily fatigues, PTwill further work further strengthening gait training and balance on to regain prior level which is why pt continues to benefit from further skilled PT/OT.  COnt with POC    Prognosis: [x] Good [] Fair  [] Poor    Patient Requires Follow-up: [x] Yes  [] No    Goals:    Short term goals  Time Frame for Short term goals: 3 weeks  Short term goal 1: pt to improve ABC without assistive device for all listed activities to less than 10% impairment  Short term goal 2: Pt to dec impairment per DGI to less than 30% impairment  Short term goal 3: Pt to improve laura hamstring length for joint protection of spine and LE from 15/16 inches in forward sitting reach to less than 10 inches   Short term goal 4: Pt to improve laura piriformis length to mild tightness for the ability to complete don/doff LE clothing and for joint integrity of spine and hips  Short term goal 5: Pt to improve TUG from 16 seconds to less than 12 seconds, sit to stand reps in 30 seconds from 7 reps to >/=12reps to lower fall risk and improve functional mobility   Long term goals  Time Frame for Long term goals : 6 weeks  Long term goal 1: Pt to report with 100% confidence return of prior level of functioning indep   Long term goal 2: Pt to dec impairment per DGI to 0% for max indep with functional mobility and assisting to return to functional baseline  Long term goal 3: Pt to illustrate good to normal balance statically and dynamically in standing for ease of community outtings and IADLs/hobbies   Patient Goals   Patient goals :  To improve walking wean from walker get back to prior level of functioning and driving       Plan:   [x] Continue per plan of care [] Alter current plan (see comments)  [] Plan of care initiated [] Hold pending MD visit [] Discharge    Plan for Next Session:      Electronically signed by:  Page Tafoya DPT

## 2018-08-20 ENCOUNTER — HOSPITAL ENCOUNTER (OUTPATIENT)
Dept: OCCUPATIONAL THERAPY | Age: 68
Setting detail: THERAPIES SERIES
Discharge: HOME | End: 2018-08-20
Payer: MEDICARE

## 2018-08-20 ENCOUNTER — HOSPITAL ENCOUNTER (OUTPATIENT)
Dept: PHYSICAL THERAPY | Age: 68
Setting detail: THERAPIES SERIES
Discharge: HOME | End: 2018-08-20
Payer: MEDICARE

## 2018-08-20 PROCEDURE — 97110 THERAPEUTIC EXERCISES: CPT

## 2018-08-20 PROCEDURE — 97530 THERAPEUTIC ACTIVITIES: CPT

## 2018-08-20 RX ORDER — CETIRIZINE HYDROCHLORIDE 10 MG/1
TABLET ORAL
Qty: 90 TABLET | Refills: 3 | Status: SHIPPED | OUTPATIENT
Start: 2018-08-20 | End: 2019-07-24 | Stop reason: SDUPTHER

## 2018-08-20 NOTE — FLOWSHEET NOTE
Occupational Therapy Daily Treatment Note    Date:  2018    Patient Name:  Cayla Glynn. :  1950  MRN: [id number]  Restrictions/Precautions:  none  Medical/Treatment Diagnosis Information:  ·  NPH (G91.2)  ·  BUE weakness, impaired coordination bilaterally  Insurance/Certification information: Medicare    Physician Information: Dr. Rickey Gates of care signed (Y/N):  Y  Visit# / total visits:    Pain level: 0/10     G-Code  OT G-codes (2018)  Functional Assessment Tool Used: Ethlyn Kayser  Score: 18  Functional Limitation: Carrying, moving and handling objects  Carrying, Moving and Handling Objects Current Status (): At least 1 percent but less than 20 percent impaired, limited or restricted  Carrying, Moving and Handling Objects Goal Status (): 0 percent impaired, limited or restricted    Progress Note: []  Yes  [x]  No  Next due by: Visit #10      Subjective: pt denied new issues/complaints    Therapeutic Activities:    Activity #1:  R hand underhand deal of deck of cards to work on finger dexterity of digits 3-5. Pt completed 2 sets with time of 2nd set to be 1 minute and 45 seconds    Therapeutic Exercise:   Exercise/Equipment  Resistance/Repetitions   Other comments   Supine RUE Stretches   2 sets Flexion x1-2 minute hold  Abduction x 1-2 minute hold  IR/ER <30 second hold     Supine LUE Stretches   2 sets  Flexion x1-2 minute hold  Abduction x 1-2 minute hold  IR/ER <30 second hold     AROM BUE exercises with free weight (supine)   2#, 15 reps horizontal ab/adduction (d/t increase pain with attempts for 2nd set)    2#, 2 sets x 15 reps shoulder flexion, shoulder ab/adduction Pt required VCs and tactile cues to inhibit ineffective movement patterns and facilitate optimal BUE ROM while maintaining elbow extension     STM to LUE upper arm d/t increase pain with attempts for second set of horizontal ab/adduction.  <5 minutes, pt reported shoulder to feel better following

## 2018-08-20 NOTE — FLOWSHEET NOTE
Physical Therapy Daily Treatment Note  Date:  2018    Patient Name:  Nicolette Arango. :  1950  MRN: [id number]  Restrictions/Precautions:    Medical/Treatment Diagnosis Information:    Medical Diagnosis:   Diagnosis: G91.2 (ICD-10-CM) - (Idiopathic) normal pressure hydrocephalus             · Treatment Diagnosis: Balance problems and gait difficulty 2/2 hydrocephalus and back pain  · Insurance/Certification information:   Medicare  Physician Information:   PCP Dr. Balaji Omer  does f/u with neuro and neurosurgeon ( Dr. Milagro Gonzales) . MD Follow-up:  Does not f/u with [facility]  Plan of care signed (Y/N):    Visit# / total visits:    Pain level: 2/10 low back and right hip \"discomfort and sore\"     G-Code 18  PT G-Codes  Functional Assessment Tool Used: Dynamic gait Index  Score: DGI: 10/24   Functional Limitation: Mobility: Walking and moving around  Mobility: Walking and Moving Around Current Status (): At least 40 percent but less than 60 percent impaired, limited or restricted  Mobility: Walking and Moving Around Goal Status (): 0 percent impaired, limited or restricted     Progress Note: []  Yes  []  No  Next due by: Visit #10      Subjective:  Pt reports weaning from [facility] in home, does use when out in community and reports feeling more confident with this. Objective:   Observation: modified indep with SPC, comes to therapy with wife and SPC, verbalizing more confidence with the cane.  x1 RLE dragging at end of session during directional change  Test measurements:      Exercises:  Exercise/Equipment Resistance/Repetitions Other comments   Nustep  Workload 1 3 minutes  Workload 4 5 minutes  Workload 1 2 minutes 2/10 seat 14  \"that was pretty easy today\"    Gait training // bars over cones reciprocal pattern side stepping  Sit to stand from various heights indep   SPC 100ft modified indep    Tandem walking on soft mat    Lateral walking on soft mat  Lateral walking on impairment per DGI to 0% for max indep with functional mobility and assisting to return to functional baseline  Long term goal 3: Pt to illustrate good to normal balance statically and dynamically in standing for ease of community outtings and IADLs/hobbies   Patient Goals   Patient goals :  To improve walking wean from walker get back to prior level of functioning and driving       Plan:   [x] Continue per plan of care [] Alter current plan (see comments)  [] Plan of care initiated [] Hold pending MD visit [] Discharge    Plan for Next Session:      Electronically signed by:  Maya Brewer DPT

## 2018-08-22 ENCOUNTER — HOSPITAL ENCOUNTER (OUTPATIENT)
Dept: OCCUPATIONAL THERAPY | Age: 68
Setting detail: THERAPIES SERIES
Discharge: HOME | End: 2018-08-22
Payer: MEDICARE

## 2018-08-22 ENCOUNTER — HOSPITAL ENCOUNTER (OUTPATIENT)
Dept: SPEECH THERAPY | Age: 68
Setting detail: THERAPIES SERIES
Discharge: HOME | End: 2018-08-22
Payer: MEDICARE

## 2018-08-22 ENCOUNTER — HOSPITAL ENCOUNTER (OUTPATIENT)
Dept: PHYSICAL THERAPY | Age: 68
Setting detail: THERAPIES SERIES
Discharge: HOME | End: 2018-08-22
Payer: MEDICARE

## 2018-08-22 PROCEDURE — 97530 THERAPEUTIC ACTIVITIES: CPT

## 2018-08-22 PROCEDURE — 97127 HC SP THER IVNTJ W/FOCUS COG FUNCJ: CPT

## 2018-08-22 PROCEDURE — 97110 THERAPEUTIC EXERCISES: CPT

## 2018-08-22 PROCEDURE — 97116 GAIT TRAINING THERAPY: CPT

## 2018-08-22 NOTE — FLOWSHEET NOTE
Speech-Language Pathology  Daily Treatment Note    Date:  2018  Patient Name:  Cem Banuelos. :  1950    MRN: [id number]  Restrictions/Precautions:    Medical Diagnosis:   G91.2 (ICD-10-CM) - (Idiopathic) normal pressure hydrocephalus                       Treatment Diagnosis:   Cognitive communication impairment (Q86.793)  Insurance/Certification information:     Physician Information:  [facility]    Plan of care signed (Y/N):  Y  Visit# / total visits: 2  Pain level: none reported   G-Code:   Memory Current Status (): At least 40 percent but less than 60 percent impaired, limited or restricted  Memory Goal Status (): At least 20 percent but less than 40 percent impaired, limited or restricted    Progress Note: []  Yes  [x]  No  Next due by:      Subjective:  Pleasant and cooperative. Objective:   Goal 1: Patient will demonstrate comprehension of compensatory memory strategies with SBA. SLP educated pt re: consideration of calendar/journal as memory tool; pt verbalized comprehension. Continue goal.       Goal 2: Patient will implement compensatory memory strategies as needed to facilitate short term recall of information with SBA. Using compensatory memory strategies, pt was able to recall 4 pictured items with 100% accuracy at 2-minute, 5-minute, and 10-minute intervals. Informally, pt demonstrates some difficulty with name-recall for children, step-children, and grandchildren. Continue goal.      Goal 3: Patient will complete reasoning exercises with SBA. SLP educated pt re: verbal deductive reasoning exercise (Perplexor); pt was able to complete with minimal assistance. SLP provided Perplexor and 4x4 KenKen exercises for home practice. Pt verbalized comprehension and plan to complete for home practice. Continue goal.    Goal 4: Patient will complete functional problem solving exercises with SBA. Pt requires moderate cues to complete functional math reasoning task. Continue goal.    Goal 5: Pt will participate in ongoing assessment of cognitive communication with goals added as indicated. Pt was able to complete 2-parameter card-sorting exercise with 100% accuracy, with independent application of demand-reduction strategies.   Continue goal.      Assessment:   cognitive communication impairment    Plan:   continue per POC    Timed Code Treatment: 60 minutes    Total Treatment Time: 60 minutes    Signature:  Mumtaz Alex M.A., [address] [facility], [place]  Speech-Language Pathologist  [address] [facility]  Phone: [phone]  Fax: [phone]

## 2018-08-22 NOTE — FLOWSHEET NOTE
Physical Therapy Daily Treatment Note  Date:  2018    Patient Name:  Bailey Jeffers. :  1950  MRN: [id number]  Restrictions/Precautions:    Medical/Treatment Diagnosis Information:    Medical Diagnosis:   Diagnosis: G91.2 (ICD-10-CM) - (Idiopathic) normal pressure hydrocephalus             · Treatment Diagnosis: Balance problems and gait difficulty 2/2 hydrocephalus and back pain  · Insurance/Certification information:   Medicare  Physician Information:   PCP Dr. Dany Mayer  does f/u with neuro and neurosurgeon ( Dr. Chuyita London) . MD Follow-up:  Does not f/u with [facility]  Plan of care signed (Y/N):    Visit# / total visits:    Pain level: 2/10 low back \"discomfort and sore\" denies     G-Code 18  PT G-Codes  Functional Assessment Tool Used: Dynamic gait Index  Score: DGI: 10/24   Functional Limitation: Mobility: Walking and moving around  Mobility: Walking and Moving Around Current Status (): At least 40 percent but less than 60 percent impaired, limited or restricted  Mobility: Walking and Moving Around Goal Status (): 0 percent impaired, limited or restricted     Progress Note: []  Yes  []  No  Next due by: Visit #10      Subjective:  Pt reports weaning from [facility] in home, does use when out in community and reports feeling more confident with this. \"I have some difficulty with walking on grass, sometimes up hills and down hills when I walk with my wife. I've been using my cane though\" Pt reports walking daily 20 minutes BID outside with wife. Pt reports 80% improvement in condition and less symptoms since starting PT. Pt wishing to return to the [employer] work out routine (leg press/hamstring curls/knee extension, walked on treadmill 15-30 minutes and UE weight lifting as well at baseline).      Objective:   Observation: modified indep with SPC  Test measurements:      Exercises:  Exercise/Equipment Resistance/Repetitions Other comments   Gait: 50x 2 +100ft SPC modified indep transfers modified indep with SPC various surfaces and heights, gait with dual tasks/ change direction- good balance reactions noted without LOB. Progression for inc indep with community ambulation and return to baseline ( without SPC). Gait training outside:   Without cane, SBA to supervision initially then to indep  Curbs ramps, grass sidewalks across parking lot >1000ft 30 minutes+10 minutes    In grass side stepping tandem with UE reaching all planes BLE  Tandem on grass with eyes closed BLE holds 10 seconds with challenge  Sit to from stand from various heights surfaces with and without UE modified indep for balance safety concerns to indep    Hamstring stretching  30 seconds x 3 BLE  Alternating LE HEP    Piriformis stretch   Alternative to avoid twisting back 30 seconds x 3 BLE  Alt LE HEP   PT demo'd stretching good carryover by pt   HEP given with updated form   Knee to shoulder hip stretching  30 seconds 3 BLE  Alternating LE  HEP               Other Therapeutic Activities:      Home Exercise Program:      Manual Treatments:      Modalities:      Timed Code Treatment Minutes: 40 minutes gait, 15 minutes therapeutic exercise    Total Treatment Minutes:  55    Treatment/Activity Tolerance:  [x] Patient tolerated treatment well [] Patient limited by fatigue  [] Patient limited by pain  [] Patient limited by other medical complications  [] Other:     Assessment: Pt satisfied with care and progress, progressing towards indep transfers and mobility in home and community, needs further PT to assist with balance and gait to return to indep community ambulation situations and distances and normal work out routines at gym. PTwill further work further strengthening gait training and balance on to regain prior level which is why pt continues to benefit from further skilled PT/OT.  COnt with POC    Prognosis: [x] Good [] Fair  [] Poor    Patient Requires Follow-up: [x] Yes  [] No    Goals:    Short term goals  Time Frame for Short term goals: 3 weeks  Short term goal 1: pt to improve ABC without assistive device for all listed activities to less than 10% impairment  Short term goal 2: Pt to dec impairment per DGI to less than 30% impairment  Short term goal 3: Pt to improve laura hamstring length for joint protection of spine and LE from 15/16 inches in forward sitting reach to less than 10 inches   Short term goal 4: Pt to improve laura piriformis length to mild tightness for the ability to complete don/doff LE clothing and for joint integrity of spine and hips  Short term goal 5: Pt to improve TUG from 16 seconds to less than 12 seconds, sit to stand reps in 30 seconds from 7 reps to >/=12reps to lower fall risk and improve functional mobility   Long term goals  Time Frame for Long term goals : 6 weeks  Long term goal 1: Pt to report with 100% confidence return of prior level of functioning indep   Long term goal 2: Pt to dec impairment per DGI to 0% for max indep with functional mobility and assisting to return to functional baseline  Long term goal 3: Pt to illustrate good to normal balance statically and dynamically in standing for ease of community outtings and IADLs/hobbies   Patient Goals   Patient goals :  To improve walking wean from walker get back to prior level of functioning and driving       Plan:   [x] Continue per plan of care [] Alter current plan (see comments)  [] Plan of care initiated [] Hold pending MD visit [] Discharge    Plan for Next Session:  Floor/stand transfers, incline on treadmill    Electronically signed by:  Cynthia Fiore DPT

## 2018-08-24 ENCOUNTER — HOSPITAL ENCOUNTER (OUTPATIENT)
Dept: SPEECH THERAPY | Age: 68
Setting detail: THERAPIES SERIES
Discharge: HOME | End: 2018-08-24
Payer: MEDICARE

## 2018-08-24 PROCEDURE — 97127 HC SP THER IVNTJ W/FOCUS COG FUNCJ: CPT

## 2018-08-24 NOTE — FLOWSHEET NOTE
comprehension with mod-max verbal + written cues. Continue goal.    Goal 5: Pt will participate in ongoing assessment of cognitive communication with goals added as indicated. Goal not directly targeted during this session.   Continue goal.      Assessment:   cognitive communication impairment    Plan:   continue per POC    Timed Code Treatment: 55minutes    Total Treatment Time: 55 minutes    Signature:  Valentina Nash M.A., [address] [facility]  Phone: [phone]  Fax: [phone]

## 2018-08-27 ENCOUNTER — HOSPITAL ENCOUNTER (OUTPATIENT)
Dept: SPEECH THERAPY | Age: 68
Setting detail: THERAPIES SERIES
Discharge: HOME | End: 2018-08-27
Payer: MEDICARE

## 2018-08-27 ENCOUNTER — HOSPITAL ENCOUNTER (OUTPATIENT)
Dept: PHYSICAL THERAPY | Age: 68
Setting detail: THERAPIES SERIES
Discharge: HOME | End: 2018-08-27
Payer: MEDICARE

## 2018-08-27 ENCOUNTER — HOSPITAL ENCOUNTER (OUTPATIENT)
Dept: OCCUPATIONAL THERAPY | Age: 68
Setting detail: THERAPIES SERIES
Discharge: HOME | End: 2018-08-27
Payer: MEDICARE

## 2018-08-27 PROCEDURE — 97110 THERAPEUTIC EXERCISES: CPT

## 2018-08-27 PROCEDURE — 97530 THERAPEUTIC ACTIVITIES: CPT

## 2018-08-27 PROCEDURE — 97127 HC SP THER IVNTJ W/FOCUS COG FUNCJ: CPT

## 2018-08-27 PROCEDURE — 97116 GAIT TRAINING THERAPY: CPT

## 2018-08-27 PROCEDURE — 97112 NEUROMUSCULAR REEDUCATION: CPT

## 2018-08-27 NOTE — FLOWSHEET NOTE
Occupational Therapy Daily Treatment Note    Date:  2018    Patient Name:  Deborah Jasso :  1950  MRN: [id number]  Restrictions/Precautions:  none  Medical/Treatment Diagnosis Information:  ·  NPH (G91.2)  ·  BUE weakness, impaired coordination bilaterally  Insurance/Certification information: Medicare    Physician Information: Dr. Banerjee Check of care signed (Y/N):  Y  Visit# / total visits:    Pain level: 0/10     G-Code  OT G-codes (2018)  Functional Assessment Tool Used: Sherlyn Rey  Score: 18  Functional Limitation: Carrying, moving and handling objects  Carrying, Moving and Handling Objects Current Status (): At least 1 percent but less than 20 percent impaired, limited or restricted  Carrying, Moving and Handling Objects Goal Status (): 0 percent impaired, limited or restricted    Progress Note: []  Yes  [x]  No  Next due by: Visit #10      Subjective: pt reported \"slept funny\"  Resulting in discomfort with upper back/scapular area on R side    Therapeutic Activities:    Activity #1:  Grooved peg board to work on R hand in hand manipulation to improve fine motor skills. Pt completed 1 set x23 pegs. Pt then completed 2 sets x10 pegs \"competing\" against OT to encourage increase speed to improve timing. Pt with more errors when attempting task at a fast pace.      Therapeutic Exercise:   Exercise/Equipment  Resistance/Repetitions   Other comments   Supine RUE Stretches   2 sets Flexion x2 minute hold  Abduction x2 minute hold  IR/ER 1 minute hold     Supine LUE Stretches   2 sets  Flexion x2 minute hold  Abduction x2 minute hold  IR <30 second hold  ER 1-2 minute hold d/t subjective report of increase tightness     AROM BUE exercises with free weight (supine)   2#, 15 reps each direction   Pt required VCs and tactile cues to inhibit ineffective movement patterns and facilitate optimal BUE ROM while maintaining elbow extension     Pt completed exercises for shoulder flexion/extension, shoulder ab/adduction and horizontal ab/adduction     R hand pincer grasp strengthening   6# resistant clothespin, ~80 pieces  --one set with 3-jaw jaime grasp  --one set with tip to tip grasp Retrieved foam pieces 2 at a time to increase challenge. Increase time needed to complete d/t fatigue with reps. Home Exercise Program:    · Hand gripper 3 sets x20 reps each hand, pt compliant  · Theraputty exercises (3), handout issued, pt compliant    Timed Code Treatment Minutes:   55 minutes    Total Treatment Minutes: 55 minutes  8 (TA), 47 (TE)    Assessment   Performance deficits / Impairments: Decreased strength;Decreased fine motor control;Decreased high-level IADLs;Decreased balance;Decreased coordination    Assessment: Pt demo'd increase tightness in BUE this date compared to previous session requiring increase time to stretch prior to AROM with free weight exercises. Pt required more cues and facilitation for bilateral elbow extension while maximizing ROM in BUEs during exercises. Pt demo'd increase awareness of inhibiting over-activation of pelvic girdle during exercises which is an improvement from previous session. Pt demo'd good tolerance for R hand strengthening exercises. Pt demo'd impaired fine motor coordination of R hand when challenged with timing and speed which impacts efficiency with performing everyday activities. Pt cont to benefit from skilled OT, cont per POC.     Treatment Diagnosis: BUE weakness, impaired coordination bilaterally    Patient Education: Increasing awareness of ineffective movement patterns during activity at home, verb understanding     Treatment/Activity Tolerance:     [x]  Patient tolerated treatment well []  Patient limited by fatigue    []  Patient limited by pain []  Patient limited by other medical complications   []  Other:     Prognosis: [x]  Good []  Fair  []  Poor    Patient Requires Follow-up:  [x]  Yes  []  No    Plan: [x]  Continue per plan of

## 2018-08-27 NOTE — FLOWSHEET NOTE
benefit from further skilled PT/OT. COnt with POC    Prognosis: [x] Good [] Fair  [] Poor    Patient Requires Follow-up: [x] Yes  [] No    Goals:    Short term goals  Time Frame for Short term goals: 3 weeks  Short term goal 1: pt to improve ABC without assistive device for all listed activities to less than 10% impairment  Short term goal 2: Pt to dec impairment per DGI to less than 30% impairment  Short term goal 3: Pt to improve laura hamstring length for joint protection of spine and LE from 15/16 inches in forward sitting reach to less than 10 inches   Short term goal 4: Pt to improve laura piriformis length to mild tightness for the ability to complete don/doff LE clothing and for joint integrity of spine and hips  Short term goal 5: Pt to improve TUG from 16 seconds to less than 12 seconds, sit to stand reps in 30 seconds from 7 reps to >/=12reps to lower fall risk and improve functional mobility   Long term goals  Time Frame for Long term goals : 6 weeks  Long term goal 1: Pt to report with 100% confidence return of prior level of functioning indep   Long term goal 2: Pt to dec impairment per DGI to 0% for max indep with functional mobility and assisting to return to functional baseline  Long term goal 3: Pt to illustrate good to normal balance statically and dynamically in standing for ease of community outtings and IADLs/hobbies   Patient Goals   Patient goals :  To improve walking wean from walker get back to prior level of functioning and driving       Plan:   [x] Continue per plan of care [] Alter current plan (see comments)  [] Plan of care initiated [] Hold pending MD visit [] Discharge    Plan for Next Session:  Floor/stand transfers, incline on treadmill    Electronically signed by:  Harrison Lane DPT

## 2018-08-29 ENCOUNTER — HOSPITAL ENCOUNTER (OUTPATIENT)
Dept: SPEECH THERAPY | Age: 68
Setting detail: THERAPIES SERIES
Discharge: HOME | End: 2018-08-29
Payer: MEDICARE

## 2018-08-29 ENCOUNTER — HOSPITAL ENCOUNTER (OUTPATIENT)
Dept: OCCUPATIONAL THERAPY | Age: 68
Setting detail: THERAPIES SERIES
Discharge: HOME | End: 2018-08-29
Payer: MEDICARE

## 2018-08-29 ENCOUNTER — HOSPITAL ENCOUNTER (OUTPATIENT)
Dept: PHYSICAL THERAPY | Age: 68
Setting detail: THERAPIES SERIES
Discharge: HOME | End: 2018-08-29
Payer: MEDICARE

## 2018-08-29 PROCEDURE — G8979 MOBILITY GOAL STATUS: HCPCS

## 2018-08-29 PROCEDURE — 97116 GAIT TRAINING THERAPY: CPT

## 2018-08-29 PROCEDURE — G8984 CARRY CURRENT STATUS: HCPCS

## 2018-08-29 PROCEDURE — 97110 THERAPEUTIC EXERCISES: CPT

## 2018-08-29 PROCEDURE — G8985 CARRY GOAL STATUS: HCPCS

## 2018-08-29 PROCEDURE — G8978 MOBILITY CURRENT STATUS: HCPCS

## 2018-08-29 PROCEDURE — 97530 THERAPEUTIC ACTIVITIES: CPT

## 2018-08-29 PROCEDURE — 97127 HC SP THER IVNTJ W/FOCUS COG FUNCJ: CPT

## 2018-08-29 NOTE — PROGRESS NOTES
Physical Therapy Daily Treatment Note- Progress Note  Date:  2018  Patient Name:  Tessa Albert. :  1950  MRN: [id number]  Restrictions/Precautions:    Medical/Treatment Diagnosis Information:    Medical Diagnosis:   Diagnosis: G91.2 (ICD-10-CM) - (Idiopathic) normal pressure hydrocephalus             Treatment Diagnosis: Balance problems and gait difficulty 2/2 hydrocephalus and back pain  Insurance/Certification information:   Medicare  Physician Information:   PCP Dr. Tennille Figueredo  does f/u with neuro and neurosurgeon (Dr. Jong Zhang) . MD Follow-up:  Does not f/u with [facility]  Plan of care signed (Y/N):    Visit# / total visits:    Pain level: 2-3/10 low back \"discomfort and sore\" no change post session   Reporting period: 18- today. Treatments have included therapeutic exercise, activity, NMR/Balance, gait training, pt and caregiver education and training, HEP instruction modalities as needed and appropriate. G-Code 18  PT G-Codes  Functional Assessment Tool Used: Dynamic gait Index  Score: DGI: 10   Functional Limitation: Mobility: Walking and moving around  Mobility: Walking and Moving Around Current Status (): At least 40 percent but less than 60 percent impaired, limited or restricted  Mobility: Walking and Moving Around Goal Status (): 0 percent impaired, limited or restricted     G-Code noted on 2018:   PT G-Codes  Functional Assessment Tool Used: DGI score 21/24  Functional Limitation: Mobility: Walking and moving around  Mobility: Walking and Moving Around Current Status (): At least 1 percent but less than 20 percent impaired, limited or restricted  Mobility: Walking and Moving Around Goal Status (): 0 percent impaired, limited or restricted    Progress Note: [x]  Yes  []  No  Next due by: Visit #10      Subjective: Pt having nerve block to low back surgical procedure next week, cancelled PT already due to ongoing back pain.  Pt improve balance 3 resistance  20 reps x 3 sets  Thumbs up positioning  Cues for form and avoid over use of UT   Gait: DGI Per above    Gait: stop/go on command Good balance reactions no balance concerns    HELD [facility] for reassessment. Hamstring stretching  30 seconds x 3 BLE  Alternating LE HEP    Piriformis stretch   Alternative to avoid twisting back 30 seconds x 3 BLE  Alt LE HEP   PT demo'd stretching good carryover by pt   HEP given with updated form  HELD stretches   TUG indep   10 seconds  Good balance noted      Modalities:    Pleasantly refused. Timed Code Treatment Minutes:  30 minutes therapeutic exercise, 30 minutes gait  Total Treatment Minutes:  60    Treatment/Activity Tolerance:  [x] Patient tolerated treatment well [] Patient limited by fatigue  [] Patient limited by pain  [] Patient limited by other medical complications  [] Other:     Assessment:  Pt satisfied with care and progress, goals are being met and pt is progressing towards his prior level, weaning from [facility] in community. Pt is becoming more consistent with indep transfers and mobility in home and community. PT rec 2 days a week for another 2 weeks as pt would benefit from further PT to assist with balance and gait to return to indep community ambulation situations and distances and normal work out routines at gym. PT will further work further strengthening gait training and balance on to regain prior level which is why pt continues to benefit from further skilled PT/OT.  Cont with POC    Prognosis: [x] Good [] Fair  [] Poor    Patient Requires Follow-up: [x] Yes  [] No    Goals:    Short term goals  Time Frame for Short term goals: 3 weeks  Short term goal 1: pt to improve ABC without assistive device for all listed activities to less than/= 20 % impairment- MET  Short term goal 2: Pt to dec impairment per DGI to less than 30% impairment- MET  Short term goal 3: Pt to improve laura hamstring length for joint protection of spine and LE from 15/16 inches in forward sitting reach to less than 10 inches - Need to reassess verbalizing feeling less tightness with stretches 8/29/18  Short term goal 4: Pt to improve laura piriformis length to mild tightness for the ability to complete don/doff LE clothing and for joint integrity of spine and hips- needs to reassess- verbalizing feeling less tightness with stretches 8/29/18. Short term goal 5: Pt to improve TUG from 16 seconds to less than 12 seconds, sit to stand reps in 30 seconds from 7 reps to >/=12reps to lower fall risk and improve functional mobility - MET for TUG, need to reassess sit/stand. Long term goals  Time Frame for Long term goals : 6 weeks  Long term goal 1: Pt to report with 100% confidence return of prior level of functioning indep- reports walking up hills down hills is still challenging about 85% back to normal and confidence since started weaning from [facility].  8/29/18  Long term goal 2: Pt to dec impairment per DGI to 0% for max indep with functional mobility and assisting to return to functional baseline- not met  Long term goal 3: Pt to illustrate good to normal balance statically and dynamically in standing for ease of community outtings and IADLs/hobbies - not met  Patient Goals   Patient goals : To improve walking wean from walker get back to prior level of functioning and driving- progressing, satisfied       Plan:   [x] Continue per plan of care [] Alter current plan (see comments)  [] Plan of care initiated [] Hold pending MD visit [] Discharge    Plan for Next Session:  Floor/stand transfers, incline on treadmill further gait and balance, and instruction on work out gym equipment as needed.      Electronically signed by:  Miguel Ángel Givens DPT

## 2018-08-29 NOTE — PROGRESS NOTES
Occupational Therapy Daily Treatment Note    Date:  2018    Patient Name:  Brittany Childers. :  1950  MRN: [id number]  Restrictions/Precautions:  none  Medical/Treatment Diagnosis Information:  ·  NPH (G91.2)  ·  BUE weakness, impaired coordination bilaterally  Insurance/Certification information: Medicare    Physician Information: Dr. Joshua Rebollar of care signed (Y/N):  Y  Visit# / total visits:    Pain level: 0/10     G-Code  OT G-codes (2018)  Functional Assessment Tool Used: Neri Kemps  Score: 18  Functional Limitation: Carrying, moving and handling objects  Carrying, Moving and Handling Objects Current Status (): At least 1 percent but less than 20 percent impaired, limited or restricted  Carrying, Moving and Handling Objects Goal Status (): 0 percent impaired, limited or restricted    G-Code  OT G-codes (2018)  Functional Assessment Tool Used: Neri Kemps  Score: 12  Functional Limitation: Carrying, moving and handling objects  Carrying, Moving and Handling Objects Current Status ():  At least 1 percent but less than 20 percent impaired, limited or restricted  Carrying, Moving and Handling Objects Goal Status (): 0 percent impaired, limited or restricted      Progress Note: [x]  Yes  []  No  Next due by: Visit #19      Subjective: pt denied new issues/complaints    Objective Measures:    R hand  strength: 50#  R hand tip to tip pincer grasp strength: 10.33#    Box and Blocks Test (best of 2 trials): 55 blocks  Nine Hole Peg Test: 18.96 seconds    RUE shoulder flexion strength: 4/5  LUE shoulder flexion strength: 4/5    RUE shoulder abduction strength: 5/5  LUE shoulder abduction strength: 4/5    Therapeutic Exercise:   Exercise/Equipment  Resistance/Repetitions   Other comments   Supine RUE Stretches   2 sets Flexion x2 minute hold  Abduction x2 minute hold     Supine LUE Stretches   2 sets  Flexion x2 minute hold  Abduction x2 minute hold     AROM BUE
demonstrate improved strength to facilitate independence with lifting heavy objects- goal met 8/29     Long term goals  Time Frame for Long term goals : 8 weeks  Long term goal 1: Pt improve R hand  strength to 52# to demonstrate improved  strength to facilitate independence with IADLs -progressing, continue  Long term goal 2: Pt will improve time on NHPT to no more than 20 seconds using R hand to demonstrate improved timing and speed with fine motor coordination.- goal met 8/29  Long term goal 3: Pt will improve tip to tip pinch strength of R hand to 15# to facilitate independence with functional activities -progressing, continue   Long term goal 4: Pt will improve BUE shoulder flexion and abduction strength to 5/5 to demonstrate improved strength to facilitate independence with lifting heavy objects -revised d/t writer error     Patient Goals   Patient goals : return to driving    Frequency/Duration:  # Days per week: [] 1 day # Weeks: [] 1 week [] 4 weeks      [x] 2 days? [] 2 weeks [] 5 weeks     [] 3 days   [] 3 weeks [x] 8 weeks     Rehab Potential: [] Excellent [x] Good [] Fair  [] Poor     Goal Status:  [] Achieved [x] Partially Achieved  [] Not Achieved     Patient Status: [x] Continue per initial plan of Care     [] Patient now discharged     [] Additional visits requested, Please re-certify for additional visits:      Requested additional frequency/duration: X/week for weeks    Electronically signed by:  CHATO Victoria/L    If you have any questions or concerns, please don't hesitate to call.   Thank you for your referral.    Physician Signature:________________________________Date:__________________  By signing above, therapists plan is approved by physician
of the following symptoms in the last week. None Mild Moderate Severe Extreme   9. Arm, shoulder or hand pain    [x] 1  [] 2  [] 3  [] 4  [] 5   10. Tingling (pins and needles) in your arm, shoulder or hand    [x] 1  [] 2  [] 3  [] 4  [] 5      No Difficulty Mild Difficulty Moderate Difficulty Severe Difficulty So Much Difficulty That I Can't Sleep    11. During the past week, how much difficulty have you had sleeping because of the pain in your arm, shoulder or hand? [x] 1  [] 2  [] 3  [] 4  [] 5     Sum of Scores: __12__    QuickDASH Disability/Symptom Score (as found below): _CI___    Since the beginning of therapy my condition has improved :100%  During the past 24 hours, my maximum pain rating was: 0    QuickDASH Disability/Symptom Score =     A QuickDASH score may not be calculated if there is a greater than 1 missing item.     G-Code [place]:  Quick DASH Total Score Disability Index CMS Modifier   11 or less 0% []CH   12-19 1-19% [x]CI   20-28 20-39% []CJ   29-37 40-59% []CK   38-46 60-79% []CL   47-54 80-99% []CM   55 100% []CN

## 2018-08-29 NOTE — FLOWSHEET NOTE
comprehension with mod-max verbal + written cues. Continue goal.    Goal 5: Pt will participate in ongoing assessment of cognitive communication with goals added as indicated. Goal not directly targeted during this session.   Continue goal.      Assessment:   cognitive communication impairment    Plan:   continue per POC    Timed Code Treatment: 55minutes    Total Treatment Time: 55 minutes    Signature:  Adwoa Diaz M.A., [address] [facility]  Phone: [phone]  Fax: [phone]

## 2018-08-31 NOTE — FLOWSHEET NOTE
Speech-Language Pathology  Daily Treatment Note    Date: 18  Patient Name:  Catie Diaz. :  1950    MRN: [id number]  Restrictions/Precautions:    Medical Diagnosis:   G91.2 (ICD-10-CM) - (Idiopathic) normal pressure hydrocephalus                       Treatment Diagnosis:   Cognitive communication impairment (U23.583)  Insurance/Certification information:     Physician Information:  [facility]    Plan of care signed (Y/N):  Y  Visit# / total visits: 5  Pain level: none reported   G-Code:   Memory Current Status (): At least 40 percent but less than 60 percent impaired, limited or restricted  Memory Goal Status (): At least 20 percent but less than 40 percent impaired, limited or restricted    Progress Note: []  Yes  [x]  No  Next due by: 18    Subjective:  Pleasant and cooperative; attends session with his wife. Objective:   Goal 1: Patient will demonstrate comprehension of compensatory memory strategies with SBA. Pt reports that he is pleased with using the journal as he is; however wife indicates that pt continues to make numerous requests for repetition of conversational information. Continue goal.       Goal 2: Patient will implement compensatory memory strategies as needed to facilitate short term recall of information with SBA. Pt was able to participate in a card-based memory game (matching 12 pairs of overturned cards) with good recall of card placement. SLP educated pt and wife re: implementation for home practice; they verbalized comprehension. Pt was able to recall leisure activity introduced at last session with minimal cues; he was subsequently able to recall rules with 80% accuracy. Continue goal.      Goal 3: Patient will complete reasoning exercises with SBA. Pt reports that he required significantly increased time to complete reasoning exercises assigned for home practice.    Continue goal.    Goal 4: Patient will complete functional problem solving

## 2018-09-04 ENCOUNTER — APPOINTMENT (OUTPATIENT)
Dept: OCCUPATIONAL THERAPY | Age: 68
End: 2018-09-04
Payer: MEDICARE

## 2018-09-04 ENCOUNTER — APPOINTMENT (OUTPATIENT)
Dept: PHYSICAL THERAPY | Age: 68
End: 2018-09-04
Payer: MEDICARE

## 2018-09-05 ENCOUNTER — APPOINTMENT (OUTPATIENT)
Dept: SPEECH THERAPY | Age: 68
End: 2018-09-05
Payer: MEDICARE

## 2018-09-05 ENCOUNTER — APPOINTMENT (OUTPATIENT)
Dept: PHYSICAL THERAPY | Age: 68
End: 2018-09-05
Payer: MEDICARE

## 2018-09-05 ENCOUNTER — APPOINTMENT (OUTPATIENT)
Dept: OCCUPATIONAL THERAPY | Age: 68
End: 2018-09-05
Payer: MEDICARE

## 2018-09-06 ENCOUNTER — APPOINTMENT (OUTPATIENT)
Dept: PHYSICAL THERAPY | Age: 68
End: 2018-09-06
Payer: MEDICARE

## 2018-09-06 ENCOUNTER — APPOINTMENT (OUTPATIENT)
Dept: OCCUPATIONAL THERAPY | Age: 68
End: 2018-09-06
Payer: MEDICARE

## 2018-09-06 ENCOUNTER — HOSPITAL ENCOUNTER (OUTPATIENT)
Dept: CT IMAGING | Age: 68
Discharge: HOME | End: 2018-09-06
Payer: MEDICARE

## 2018-09-06 DIAGNOSIS — Z48.811 AFTERCARE FOLLOWING SURGERY OF THE NERVOUS SYSTEM: ICD-10-CM

## 2018-09-06 PROCEDURE — 70450 CT HEAD/BRAIN W/O DYE: CPT

## 2018-09-07 ENCOUNTER — HOSPITAL ENCOUNTER (OUTPATIENT)
Dept: SPEECH THERAPY | Age: 68
Setting detail: THERAPIES SERIES
Discharge: HOME | End: 2018-09-07
Payer: MEDICARE

## 2018-09-07 ENCOUNTER — HOSPITAL ENCOUNTER (OUTPATIENT)
Dept: OCCUPATIONAL THERAPY | Age: 68
Setting detail: THERAPIES SERIES
Discharge: HOME | End: 2018-09-07
Payer: MEDICARE

## 2018-09-07 ENCOUNTER — HOSPITAL ENCOUNTER (OUTPATIENT)
Dept: PHYSICAL THERAPY | Age: 68
Setting detail: THERAPIES SERIES
Discharge: HOME | End: 2018-09-07
Payer: MEDICARE

## 2018-09-07 PROCEDURE — 97110 THERAPEUTIC EXERCISES: CPT

## 2018-09-07 PROCEDURE — 97530 THERAPEUTIC ACTIVITIES: CPT

## 2018-09-07 PROCEDURE — 97127 HC SP THER IVNTJ W/FOCUS COG FUNCJ: CPT

## 2018-09-07 PROCEDURE — 97116 GAIT TRAINING THERAPY: CPT

## 2018-09-07 NOTE — PROGRESS NOTES
Continue per plan of care []  Alter current plan (see comments)   []  Plan of care initiated []  Hold pending MD visit []  Discharge    Goals  Short term goals  Time Frame for Short term goals: 6 weeks  Short term goal 1: Pt improve R hand  strength to 47# to demonstrate improved  strength to facilitate independence with IADLs - goal met 8/29  Short term goal 2: Pt will improve time on NHPT to no more than 21 seconds using R hand to demonstrate improved timing and speed with fine motor coordination. - goal met 8/29  Short term goal 3: Pt will improve tip to tip pinch strength of R hand to 13# to facilitate independence with functional activities -progressing, continue - progressing, continue  Short term goal 4: Pt will improve BUE shoulder flexion and abduction strength to 4/5 to demonstrate improved strength to facilitate independence with lifting heavy objects- goal met 8/29     Long term goals  Time Frame for Long term goals : 8 weeks  Long term goal 1: Pt improve R hand  strength to 52# to demonstrate improved  strength to facilitate independence with IADLs -progressing, continue  Long term goal 2: Pt will improve time on NHPT to no more than 20 seconds using R hand to demonstrate improved timing and speed with fine motor coordination.- goal met 8/29  Long term goal 3: Pt will improve tip to tip pinch strength of R hand to 15# to facilitate independence with functional activities -progressing, continue   Long term goal 4: Pt will improve BUE shoulder flexion and abduction strength to 5/5 to demonstrate improved strength to facilitate independence with lifting heavy objects -revised d/t writer error     Patient Goals   Patient goals : return to driving    Plan for Next Session:  BUE stretching and AROM exercises with facilitation and verbal cues for effective posturing and movement patterns. Prone and modified quadruped positions to assist with postural alignment and strengthening.  R/L hand functional activities to work on fine motor coordination and strengthening.     Electronically signed by:  Judy Shay

## 2018-09-07 NOTE — PROGRESS NOTES
go back to gym, had questions on  Pt reports no new compliant, satisfied with care and progress and weaning from [facility] in community and reports gaining confidence with this. Pt feels he is ready to start back to normal work out routines he typically works out religiously at [employer] but has not since his medical conditions and being released from hospital. Pt reports eagerness to return to driving as well but has not been medically cleared to do so. Pt c/c balance and deconditioning balance lack of confidence with mobility on hills in community, inability to participation in normal work out routine, continued intermittent need for the [facility]. Objective:   Observation: indep with mobility walking into therapy gym and outside, supervision and participation from his wife   Test measurements:    Dynamic gait index: 21/24, 1-19% impairment CI. Improved score. Activities specific balance confidence scale- ABC- 1300/1600 81% confidence, 19% impairment. ( 0% confidence with standing on tip toes reaching for something overhead, standing on chair to reach for something, 80% confidence walking on icy sidewalks- discussed modification in home to dec reaching or standing on chairs for safety by moving frequently used items to waist level).      Exercises:  Exercise/Equipment Resistance/Repetitions Other comments   Nustep  Workload 1 3 minutes  Workload 5 5 minutes  Workload 2 2 minutes 2/10 seat 14  \"that was pretty easy today\"   Inc resistance today to 5      All surfaces, inside carpet tile vinyl   indep gait  Outside  500ft  Without LOB or noticeable deviations from paths    Hamstring curl  50 lbs   10 reps x 3 sets  addeed 9/7/18 to assist with weaning back to gym   Leg extension  40lbs 10 reps 3 stes  Added 9/7/18  Challenged and easily fatigued    Therapeutic exercise  dyno Leg press   all resistance With PF  Then DF  20 reps x 3 sets Good muscular challenge and fatigue without inc pain in low back   Therapeutic exercise  dyno rows for core strengthening to improve balance 3 resistance  20 reps x 3 sets  Thumbs up positioning  Cues for form and avoid over use of UT         HELD NMR for reassessment. Hamstring stretching  30 seconds x 3 BLE  Alternating LE HEP    Piriformis stretch   Alternative to avoid twisting back 30 seconds x 3 BLE  Alt LE HEP   PT demo'd stretching good carryover by pt   HEP given with updated form  HELD stretches   Piriformis stretching seated 30 seconds 3 BLE  Alternating LE  HEP     Modalities:    Pleasantly refused. Timed Code Treatment Minutes:  45 minutes therapeutic exercise, 15 minutes gait  Total Treatment Minutes:  60    Treatment/Activity Tolerance:  [x] Patient tolerated treatment well [] Patient limited by fatigue  [] Patient limited by pain  [] Patient limited by other medical complications  [] Other:     Assessment:  Weaning from [facility] now, good balance noted today on multiple surfaces and distances without deviations or LOB. Progressed HEP to assist return to physical work outines using LE strengthening. PT will further work further strengthening gait training and balance on to regain prior level which is why pt continues to benefit from further skilled PT/OT. Cont with POC Tentatively D/C next week.      Prognosis: [x] Good [] Fair  [] Poor    Patient Requires Follow-up: [x] Yes  [] No    Goals:    Short term goals  Time Frame for Short term goals: 3 weeks  Short term goal 1: pt to improve ABC without assistive device for all listed activities to less than/= 20 % impairment- MET  Short term goal 2: Pt to dec impairment per DGI to less than 30% impairment- MET  Short term goal 3: Pt to improve laura hamstring length for joint protection of spine and LE from 15/16 inches in forward sitting reach to less than 10 inches - Need to reassess verbalizing feeling less tightness with stretches 8/29/18  Short term goal 4: Pt to improve laura piriformis length to mild tightness for the ability to

## 2018-09-12 ENCOUNTER — HOSPITAL ENCOUNTER (OUTPATIENT)
Dept: SPEECH THERAPY | Age: 68
Setting detail: THERAPIES SERIES
Discharge: HOME | End: 2018-09-12
Payer: MEDICARE

## 2018-09-12 ENCOUNTER — APPOINTMENT (OUTPATIENT)
Dept: OCCUPATIONAL THERAPY | Age: 68
End: 2018-09-12
Payer: MEDICARE

## 2018-09-12 ENCOUNTER — HOSPITAL ENCOUNTER (OUTPATIENT)
Dept: PHYSICAL THERAPY | Age: 68
Setting detail: THERAPIES SERIES
Discharge: HOME | End: 2018-09-12
Payer: MEDICARE

## 2018-09-12 PROCEDURE — 97127 HC SP THER IVNTJ W/FOCUS COG FUNCJ: CPT

## 2018-09-12 PROCEDURE — 97110 THERAPEUTIC EXERCISES: CPT

## 2018-09-12 PROCEDURE — 97116 GAIT TRAINING THERAPY: CPT

## 2018-09-12 NOTE — PROGRESS NOTES
Speech-Language Pathology  Daily Treatment Note / PROGRESS NOTE    Date: 18  Patient Name:  Gen Ramos. :  1950    MRN: [id number]  Restrictions/Precautions:    Medical Diagnosis:   G91.2 (ICD-10-CM) - (Idiopathic) normal pressure hydrocephalus                       Treatment Diagnosis:   Cognitive communication impairment (E16.301)  Insurance/Certification information:     Physician Information:  [facility]    Plan of care signed (Y/N):  Y  Visit# / total visits: 8  Pain level: none reported   G-Code:   Memory Current Status (): At least 40 percent but less than 60 percent impaired, limited or restricted  Memory Goal Status (): At least 20 percent but less than 40 percent impaired, limited or restricted    Progress Note: []  Yes  [x]  No  Next due by:  18    Subjective:  Has completed additional logic exercises for home practice. Attends session with his spouse, Devin Garcia. Objective:   Goal 1: Patient will demonstrate comprehension of compensatory memory strategies with SBA.  18:  Pt was able to recall and use previously-practiced strategy to recall associated pictures with min-SBA.    18:  SLP educated pt re: use of memory notebook to facilitate recall of past events, in addition to his tendency to use if to facilitate prospective memory; pt and wife verbalized comprehension. 18:  18:  Pt reports that he has been using a journal to record daily events and conversations. 18:  SLP educated pt re: consideration of calendar/journal as memory tool; pt verbalized comprehension. Continue goal.        Goal 2: Patient will implement compensatory memory strategies as needed to facilitate short term recall of information with SBA.  18:  Pt was able to use previously-practiced strategy to recall associated pictures with min-SBA. Pt is able to complete card matching memory game with minimally-increased time.   He was able to recall upcoming schedule

## 2018-09-12 NOTE — FLOWSHEET NOTE
mobility - MET    Long term goals  Time Frame for Long term goals : 6 weeks  Long term goal 1: Pt to report with 100% confidence return of prior level of functioning indep- reports walking up hills down hills is still challenging about 85% back to normal and confidence since started weaning from [facility]. MET  Long term goal 2: Pt to dec impairment per DGI to 0% for max indep with functional mobility and assisting to return to functional baseline- not met- NEED TO REASSESS  Long term goal 3: Pt to illustrate good to normal balance statically and dynamically in standing for ease of community outtings and IADLs/hobbies - MET  Patient Goals   Patient goals :  To improve walking wean from walker get back to prior level of functioning and driving- progressing, satisfied     Plan:   [x] Continue per plan of care [] Alter current plan (see comments)  [] Plan of care initiated [] Hold pending MD visit [] Discharge    Plan for Next Session: D/C    Electronically signed by:  Maya Brewer DPT

## 2018-09-14 ENCOUNTER — HOSPITAL ENCOUNTER (OUTPATIENT)
Dept: PHYSICAL THERAPY | Age: 68
Setting detail: THERAPIES SERIES
Discharge: HOME | End: 2018-09-14
Payer: MEDICARE

## 2018-09-14 ENCOUNTER — HOSPITAL ENCOUNTER (OUTPATIENT)
Dept: OCCUPATIONAL THERAPY | Age: 68
Setting detail: THERAPIES SERIES
Discharge: HOME | End: 2018-09-14
Payer: MEDICARE

## 2018-09-14 ENCOUNTER — HOSPITAL ENCOUNTER (OUTPATIENT)
Dept: SPEECH THERAPY | Age: 68
Setting detail: THERAPIES SERIES
Discharge: HOME | End: 2018-09-14
Payer: MEDICARE

## 2018-09-14 PROCEDURE — 97530 THERAPEUTIC ACTIVITIES: CPT

## 2018-09-14 PROCEDURE — 97110 THERAPEUTIC EXERCISES: CPT

## 2018-09-14 PROCEDURE — 97127 HC SP THER IVNTJ W/FOCUS COG FUNCJ: CPT

## 2018-09-14 NOTE — FLOWSHEET NOTE
Occupational Therapy Daily Treatment Note    Date:  2018    Patient Name:  Akin Dennis :  1950  MRN: [id number]  Restrictions/Precautions:  none  Medical/Treatment Diagnosis Information:  ·  NPH (G91.2)  ·  BUE weakness, impaired coordination bilaterally  Insurance/Certification information: Medicare    Physician Information: Dr. Banerjee Check of care signed (Y/N):  Y  Visit# / total visits:    Pain level: 0/10     G-Code  OT G-codes (2018)  Functional Assessment Tool Used: Iline Rey  Score: 18  Functional Limitation: Carrying, moving and handling objects  Carrying, Moving and Handling Objects Current Status (): At least 1 percent but less than 20 percent impaired, limited or restricted  Carrying, Moving and Handling Objects Goal Status (): 0 percent impaired, limited or restricted    G-Code  OT G-codes (2018)  Functional Assessment Tool Used: Iline Rey  Score: 12  Functional Limitation: Carrying, moving and handling objects  Carrying, Moving and Handling Objects Current Status (): At least 1 percent but less than 20 percent impaired, limited or restricted  Carrying, Moving and Handling Objects Goal Status (): 0 percent impaired, limited or restricted      Progress Note: []  Yes  [x]  No  Next due by: Visit #19      Subjective: pt denied new issues/complaints    Therapeutic Activities:    Activity #1: R hand underhand deal of deck of cards to work on finger dexterity of digits 3-5. Pt completed 1 set x52 cards: 1 minute and 14 seconds. Pt then competed against OT 3x with 52 cards to increase timing, speed and coordination to improve fine motor coordination skills of R hand. Activity #2:  Grooved peg board completed using R hand 2x (23 pegs). Pt completed 3 sets with the following times: 1 minute and 36 seconds, 1 minute and 39 seconds, and 1 minute and 33 seconds.     Therapeutic Exercise:   Exercise/Equipment  Resistance/Repetitions   Other comments AROM BUE exercises with free weight (supine)   2#, 3 sets x 15 reps each direction   Pt required VCs and tactile cues to inhibit ineffective movement patterns and to facilitate optimal BUE ROM while maintaining elbow extension     Pt completed exercises for shoulder flexion/extension, shoulder ab/adduction and horizontal ab/adduction. R hand pincer grasp strengthening   6# resistant clothespin, ~80 pieces   Retrieved foam pieces 2 at a time to increase challenge. Pt retrieved ~40 pieces with tip to tip pincer grasp and ~40 pieces with 3-jaw jaime pincer grasp       Home Exercise Program:    · Hand gripper 3 sets x20 reps each hand, pt compliant  · Theraputty exercises (3), handout issued, pt compliant  · BUE stretches as tolerated    Timed Code Treatment Minutes:   55 minutes    Total Treatment Minutes:  55 [id number] (TA), 40 (TE)    Assessment   Performance deficits / Impairments: Decreased strength;Decreased fine motor control;Decreased high-level IADLs;Decreased balance;Decreased coordination    Assessment: Pt progressed to 3 sets of exercises demo'ing improved strength and functional activity tolerance of RUE. During next session, plan to attempt same set of exercises in unsupported sit for functional task practice. Pt demo'd good tolerance for R hand strengthening and coordination activities in which he cont to show improvement with timing, speed and coordination. Pt cont to benefit from skilled OT, cont per POC. Treatment Diagnosis: BUE weakness, impaired coordination bilaterally    Patient Education: plans to complete exercises in unsupported sit versus supine to progress patient to functional task practice.      Treatment/Activity Tolerance:     [x]  Patient tolerated treatment well []  Patient limited by fatigue    []  Patient limited by pain []  Patient limited by other medical complications   []  Other:     Prognosis: [x]  Good []  Fair  []  Poor    Patient Requires Follow-up:  [x]  Yes  [] No    Plan: [x]  Continue per plan of care []  Alter current plan (see comments)   []  Plan of care initiated []  Hold pending MD visit []  Discharge    Goals  Short term goals  Time Frame for Short term goals: 6 weeks  Short term goal 1: Pt improve R hand  strength to 47# to demonstrate improved  strength to facilitate independence with IADLs - goal met 8/29  Short term goal 2: Pt will improve time on NHPT to no more than 21 seconds using R hand to demonstrate improved timing and speed with fine motor coordination. - goal met 8/29  Short term goal 3: Pt will improve tip to tip pinch strength of R hand to 13# to facilitate independence with functional activities -progressing, continue - progressing, continue  Short term goal 4: Pt will improve BUE shoulder flexion and abduction strength to 4/5 to demonstrate improved strength to facilitate independence with lifting heavy objects- goal met 8/29     Long term goals  Time Frame for Long term goals : 8 weeks  Long term goal 1: Pt improve R hand  strength to 52# to demonstrate improved  strength to facilitate independence with IADLs -progressing, continue  Long term goal 2: Pt will improve time on NHPT to no more than 20 seconds using R hand to demonstrate improved timing and speed with fine motor coordination.- goal met 8/29  Long term goal 3: Pt will improve tip to tip pinch strength of R hand to 15# to facilitate independence with functional activities -progressing, continue   Long term goal 4: Pt will improve BUE shoulder flexion and abduction strength to 5/5 to demonstrate improved strength to facilitate independence with lifting heavy objects -revised d/t writer error     Patient Goals   Patient goals : return to driving    Plan for Next Session:  BUE stretching and AROM exercises with facilitation and verbal cues for effective posturing and movement patterns.  Prone and modified quadruped positions to assist with postural alignment and strengthening. R/L hand functional activities to work on fine motor coordination and strengthening.     Electronically signed by:  Francois Jordan

## 2018-09-14 NOTE — FLOWSHEET NOTE
Physical Therapy Daily Treatment Note-Extension of POC for 1 visit for formal D/C  Date:  2018  Patient Name:  Pablo Can. :  1950  MRN: [id number]  Restrictions/Precautions:    Medical/Treatment Diagnosis Information:  Medical Diagnosis:   Diagnosis: G91.2 (ICD-10-CM) - (Idiopathic) normal pressure hydrocephalus             Treatment Diagnosis: Balance problems and gait difficulty 2/2 hydrocephalus and back pain  Insurance/Certification information:   Medicare  Physician Information:   PCP Dr. Apurva Day  does f/u with neuro and neurosurgeon (Dr. Batool De Los Santos) . MD Follow-up:  Does not f/u with [facility]  Plan of care signed (Y/N):    Visit# / total visits:    Pain level: 0/10 low back \"discomfort and sore\" no change post session   Reporting period: 18- today. Treatments have included therapeutic exercise, activity, NMR/Balance, gait training, pt and caregiver education and training, HEP instruction modalities as needed and appropriate. G-Code 18  PT G-Codes  Functional Assessment Tool Used: Dynamic gait Index  Score: DGI: 10/24   Functional Limitation: Mobility: Walking and moving around  Mobility: Walking and Moving Around Current Status (): At least 40 percent but less than 60 percent impaired, limited or restricted  Mobility: Walking and Moving Around Goal Status (): 0 percent impaired, limited or restricted    G-Code noted on 2018:   PT G-Codes  Functional Assessment Tool Used: DGI score /24  Functional Limitation: Mobility: Walking and moving around  Mobility: Walking and Moving Around Current Status ():  At least 1 percent but less than 20 percent impaired, limited or restricted  Mobility: Walking and Moving Around Goal Status (): 0 percent impaired, limited or restricted    Progress Note: []  Yes  []  No  Next due by: Visit #10      Subjective: Pt reports he went to the gym yesterday, did 15 minutes on the treadmill with inclines, pt also did 8-10 minutes on the bike, and LE strengthening with machines, felt sore but has resolved now. Denies pain. rec messages which is offered for free with his gym membership. \"after the shot, I have less pain in my back and I can feel my feet\". \"I definitely feel more confident in getting back to my normal routines and work out routines. Therapy really helped you\" Pt reports goal \"I have met all my goals\" \"I used to hip pain too when I got out of the hospital but the stretches have helped. Its easier to get dressed. \"    Objective:   Observation: indep with mobility walking into therapy gym and outside, supervision and participation from his wife   Test measurements:    9/12/18: hamstring length 90/90 laura normal length. ABC 1580/1600, without walking device. Left hip supine position ER normal, IR 30 passively with tight end feel 38 degrees. Right ER 40 degrees passively to 45 degrees with tight end feel, hip IR to 40 passively to 42 with tight end feel. Prior session:  Dynamic gait index: 21/24, 1-19% impairment CI. Improved score. Activities specific balance confidence scale- ABC- 1300/1600 81% confidence, 19% impairment. ( 0% confidence with standing on tip toes reaching for something overhead, standing on chair to reach for something, 80% confidence walking on icy sidewalks- discussed modification in home to dec reaching or standing on chairs for safety by moving frequently used items to waist level). Exercises:  Exercise/Equipment Resistance/Repetitions Other comments   Nustep  Workload 1 3 minutes  Workload 5 5 minutes  Workload 2 2 minutes 2/10 seat 14  \"that was pretty easy today\"   Inc resistance today to 5   Gait: observed 150ft x2 level surfaces   500ft multiple surfaces  indep    Directional changes stop go   Around obstacles- good balanceTreadmill hill mode   Varied 10 incline 1.8-2. 5mph  8 minutes  BUE needed intermittently postural cues to avoid bending over needed moderately.    Good

## 2018-09-17 ENCOUNTER — APPOINTMENT (OUTPATIENT)
Dept: PHYSICAL THERAPY | Age: 68
End: 2018-09-17
Payer: MEDICARE

## 2018-09-17 ENCOUNTER — HOSPITAL ENCOUNTER (OUTPATIENT)
Dept: PHYSICAL THERAPY | Age: 68
Setting detail: THERAPIES SERIES
Discharge: HOME | End: 2018-09-17
Payer: MEDICARE

## 2018-09-17 ENCOUNTER — HOSPITAL ENCOUNTER (OUTPATIENT)
Dept: SPEECH THERAPY | Age: 68
Setting detail: THERAPIES SERIES
Discharge: HOME | End: 2018-09-17
Payer: MEDICARE

## 2018-09-17 ENCOUNTER — HOSPITAL ENCOUNTER (OUTPATIENT)
Dept: OCCUPATIONAL THERAPY | Age: 68
Setting detail: THERAPIES SERIES
Discharge: HOME | End: 2018-09-17
Payer: MEDICARE

## 2018-09-17 PROCEDURE — 97116 GAIT TRAINING THERAPY: CPT

## 2018-09-17 PROCEDURE — G8978 MOBILITY CURRENT STATUS: HCPCS

## 2018-09-17 PROCEDURE — 97110 THERAPEUTIC EXERCISES: CPT

## 2018-09-17 PROCEDURE — G8979 MOBILITY GOAL STATUS: HCPCS

## 2018-09-17 PROCEDURE — 97127 HC SP THER IVNTJ W/FOCUS COG FUNCJ: CPT

## 2018-09-17 PROCEDURE — G8980 MOBILITY D/C STATUS: HCPCS

## 2018-09-17 NOTE — DISCHARGE SUMMARY
up and walking. Pt daphnie well. Pt cont to be well motivated to return to PLOF.  Pt would benefit from continued therapy to maximize potential and increase functional mobility towards Ind for a safer return to home. Exercises:  Exercise/Equipment Resistance/Repetitions Other comments   Sit to from stand  8 reps in 30 seconds. x5  Without UE   Various heights    Sit to stand test from evaluation to compare:    functional strength completes 7 sit to  30 seconds. Retrieving items from floor  x10 reps  Pt uses legs following PT instruction to avoid strain on back. Good balanceDGI per above SL stance  RLE 15 seconds   LLE 30 seconds 0-3 seconds at evaluation BLE. MEGHANN activiies test did not complete SL eyes closed Pt does indep without UE support  Mild sway on airex  At evaluation -Feet together statically holds 30 seconds with mod to mild inc sway, feet together eyes closed holds 30 seconds with mod inc sway, feet together on foam reequires UE to get in and out of position and holds 10 seconds with mod inc sway unable to close eyes and hold for longer than 3 seconds. Functional reach  Normal  Gait: [id number] steps without handrail, reciprocal pattern indepAT evaluation required supervision and use of handrails. Gait: TUG9/17/18: 8 seconds, 7 seconds    9/14/18: 9 seconds, 7 secondsindep     TUG from eval: Pt using walker TUG 23 seconds, 16 seconds  Modalities:    Pleasantly refused. Timed Code Treatment Minutes:  45 minutes gait, 10 minutes NMR   Total Treatment Minutes:  55    Treatment/Activity Tolerance:  [x] Patient tolerated treatment well [] Patient limited by fatigue  [] Patient limited by pain  [] Patient limited by other medical complications  [] Other:     Assessment:  Therapy and patient goals met, pt satisfied with care, low fall risk, back to baseline. Pt now D/C'd with progressed HEP.  Thank you for this referral.     Prognosis: [x] Good [] Fair  [] Poor      Goals:  Short term goals  Time Frame for Short term goals: 3 weeks  Short term goal 1: pt to improve ABC without assistive device for all listed activities to less than/= 20 % impairment- MET  Short term goal 2: Pt to dec impairment per DGI to less than 30% impairment- MET  Short term goal 3: Pt to improve laura hamstring length for joint protection of spine and LE from 15/16 inches in forward sitting reach to less than 10 inches - MET  Short term goal 4: Pt to improve laura piriformis length to mild tightness for the ability to complete don/doff LE clothing and for joint integrity of spine and hips- MET   Short term goal 5: Pt to improve TUG from 16 seconds to less than 12 seconds, sit to stand reps in 30 seconds from 7 reps to >/=12reps to lower fall risk and improve functional mobility - MET    Long term goals  Time Frame for Long term goals : 6 weeks  Long term goal 1: Pt to report with 100% confidence return of prior level of functioning indep-  MET  Long term goal 2: Pt to dec impairment per DGI to 0% for max indep with functional mobility and assisting to return to functional baseline- MET  Long term goal 3: Pt to illustrate good to normal balance statically and dynamically in standing for ease of community outtings and IADLs/hobbies - MET  Patient Goals   Patient goals :  To improve walking wean from walker get back to prior level of functioning and driving- progressing, satisfied     Plan:  [x] Discharge  Electronically signed by:  Jenna Hung DPT

## 2018-09-19 ENCOUNTER — APPOINTMENT (OUTPATIENT)
Dept: PHYSICAL THERAPY | Age: 68
End: 2018-09-19
Payer: MEDICARE

## 2018-09-19 ENCOUNTER — HOSPITAL ENCOUNTER (OUTPATIENT)
Dept: SPEECH THERAPY | Age: 68
Setting detail: THERAPIES SERIES
Discharge: HOME | End: 2018-09-19
Payer: MEDICARE

## 2018-09-19 ENCOUNTER — HOSPITAL ENCOUNTER (OUTPATIENT)
Dept: OCCUPATIONAL THERAPY | Age: 68
Setting detail: THERAPIES SERIES
Discharge: HOME | End: 2018-09-19
Payer: MEDICARE

## 2018-09-19 PROCEDURE — 97110 THERAPEUTIC EXERCISES: CPT

## 2018-09-19 PROCEDURE — 97127 HC SP THER IVNTJ W/FOCUS COG FUNCJ: CPT

## 2018-09-19 PROCEDURE — 97530 THERAPEUTIC ACTIVITIES: CPT

## 2018-09-19 NOTE — FLOWSHEET NOTE
Occupational Therapy Daily Treatment Note    Date:  2018    Patient Name:  Pablo Can. :  1950  MRN: [id number]  Restrictions/Precautions:  none  Medical/Treatment Diagnosis Information:  ·  NPH (G91.2)  ·  BUE weakness, impaired coordination bilaterally  Insurance/Certification information: Medicare    Physician Information: Dr. Nimco Greenfield of care signed (Y/N):  Y  Visit# / total visits:    Pain level: 0/10     G-Code  OT G-codes (2018)  Functional Assessment Tool Used: Irais Cane  Score: 18  Functional Limitation: Carrying, moving and handling objects  Carrying, Moving and Handling Objects Current Status (): At least 1 percent but less than 20 percent impaired, limited or restricted  Carrying, Moving and Handling Objects Goal Status (): 0 percent impaired, limited or restricted    G-Code  OT G-codes (2018)  Functional Assessment Tool Used: Irais Cane  Score: 12  Functional Limitation: Carrying, moving and handling objects  Carrying, Moving and Handling Objects Current Status (): At least 1 percent but less than 20 percent impaired, limited or restricted  Carrying, Moving and Handling Objects Goal Status (): 0 percent impaired, limited or restricted      Progress Note: []  Yes  [x]  No  Next due by: Visit #16      Subjective: pt denied new issues/complaints. Pt reported compliance with shoulder flexion and abduction stretches. Therapeutic Activities:    Activity #1: Grooved peg board to work on R hand finger dexterity, timing and speed. Pt completed 2 sets (1 minute and 30 seconds, 1 minute and 26 seconds)    Activity #2: Pt instructed to unfasten tightly secured nuts to bolts to work on functional hand strengthening of R hand. Pt able to unfasten / nuts.      Therapeutic Exercise:   Exercise/Equipment  Resistance/Repetitions   Other comments   AROM BUE exercises with free weight (supine)   2#, 15 reps each direction   Pt demo'd effective movement patterns with min cues for maintaining elbow extension     AROM BUE exercises  (unsupported sit)   15 reps each direction noted Used mirror for visual feedback. Pt required min tactile cues for effective posturing during exercises:  --shoulder flexion (2# free weight)   --shoulder abduction, no weight  --shoulder horizontal ab/adduction (2# free weight)     R hand pincer grasp strengthening   6# resistant clothespin, ~80 pieces  --1 set = tip to tip pincer grasp   Retrieved foam pieces 2 at a time to increase challenge. Improved tolerance noted       Home Exercise Program:    · Hand gripper 3 sets x20 reps each hand, pt compliant  · Theraputty exercises (3), handout issued, pt compliant  · BUE stretches as tolerated (noted above)    Timed Code Treatment Minutes:   53 minutes    Total Treatment Minutes:  53 minutes  15 (TA), 38 (TE)    Assessment   Performance deficits / Impairments: Decreased strength;Decreased fine motor control;Decreased high-level IADLs;Decreased balance;Decreased coordination    Assessment: Pt demo'd good tolerance for BUE supine and edge of mat exercises. Pt required verbal and tactile cues for exercises completed in unsupported sit d/t increase challenge and use of free weights versus AROM only. Pt demo'd improved timing and speed with R hand fine motor coordination activities. Pt progressing well towards returning to baseline. Pt cont to benefit from skilled OT, cont per POC.     Treatment Diagnosis: BUE weakness, impaired coordination bilaterally    Patient Education: issued handout regarding return to driving and explained process - spouse and pt verb understanding     Treatment/Activity Tolerance:     [x]  Patient tolerated treatment well []  Patient limited by fatigue    []  Patient limited by pain []  Patient limited by other medical complications   []  Other:     Prognosis: [x]  Good []  Fair  []  Poor    Patient Requires Follow-up:  [x]  Yes  []  No    Plan: [x]  Continue per plan of care []  Alter current plan (see comments)   []  Plan of care initiated []  Hold pending MD visit []  Discharge    Plan for Next Session:  BUE stretching and AROM exercises with facilitation and verbal cues for effective posturing and movement patterns. Prone and modified quadruped positions to assist with postural alignment and strengthening. R/L hand functional activities to work on fine motor coordination and strengthening.     Goals  Short term goals  Time Frame for Short term goals: 6 weeks  Short term goal 1: Pt improve R hand  strength to 47# to demonstrate improved  strength to facilitate independence with IADLs - goal met 8/29  Short term goal 2: Pt will improve time on NHPT to no more than 21 seconds using R hand to demonstrate improved timing and speed with fine motor coordination. - goal met 8/29  Short term goal 3: Pt will improve tip to tip pinch strength of R hand to 13# to facilitate independence with functional activities -progressing, continue - progressing, continue  Short term goal 4: Pt will improve BUE shoulder flexion and abduction strength to 4/5 to demonstrate improved strength to facilitate independence with lifting heavy objects- goal met 8/29     Long term goals  Time Frame for Long term goals : 8 weeks  Long term goal 1: Pt improve R hand  strength to 52# to demonstrate improved  strength to facilitate independence with IADLs -progressing, continue  Long term goal 2: Pt will improve time on NHPT to no more than 20 seconds using R hand to demonstrate improved timing and speed with fine motor coordination.- goal met 8/29  Long term goal 3: Pt will improve tip to tip pinch strength of R hand to 15# to facilitate independence with functional activities -progressing, continue   Long term goal 4: Pt will improve BUE shoulder flexion and abduction strength to 5/5 to demonstrate improved strength to facilitate independence with lifting heavy objects -revised d/t writer error     Patient Goals   Patient goals : return to driving    Electronically signed by:  Nawaf Guillen

## 2018-09-20 ENCOUNTER — OFFICE VISIT (OUTPATIENT)
Dept: INTERNAL MEDICINE | Age: 68
End: 2018-09-20

## 2018-09-20 VITALS
HEIGHT: 74 IN | BODY MASS INDEX: 32.85 KG/M2 | SYSTOLIC BLOOD PRESSURE: 122 MMHG | DIASTOLIC BLOOD PRESSURE: 66 MMHG | WEIGHT: 256 LBS

## 2018-09-20 DIAGNOSIS — E78.2 MIXED HYPERLIPIDEMIA: ICD-10-CM

## 2018-09-20 DIAGNOSIS — Z23 NEED FOR INFLUENZA VACCINATION: ICD-10-CM

## 2018-09-20 DIAGNOSIS — G91.9 HYDROCEPHALUS (HCC): ICD-10-CM

## 2018-09-20 DIAGNOSIS — I10 ESSENTIAL HYPERTENSION: ICD-10-CM

## 2018-09-20 DIAGNOSIS — R91.1 PULMONARY NODULE: ICD-10-CM

## 2018-09-20 DIAGNOSIS — G91.2 NPH (NORMAL PRESSURE HYDROCEPHALUS) (HCC): Primary | ICD-10-CM

## 2018-09-20 LAB
A/G RATIO: 1.8 (ref 1.1–2.2)
ALBUMIN SERPL-MCNC: 4.5 G/DL (ref 3.4–5)
ALP BLD-CCNC: 67 U/L (ref 40–129)
ALT SERPL-CCNC: 22 U/L (ref 10–40)
ANION GAP SERPL CALCULATED.3IONS-SCNC: 13 MMOL/L (ref 3–16)
AST SERPL-CCNC: 20 U/L (ref 15–37)
BASOPHILS ABSOLUTE: 0 K/UL (ref 0–0.2)
BASOPHILS RELATIVE PERCENT: 1 %
BILIRUB SERPL-MCNC: 0.3 MG/DL (ref 0–1)
BUN BLDV-MCNC: 14 MG/DL (ref 7–20)
CALCIUM SERPL-MCNC: 9.7 MG/DL (ref 8.3–10.6)
CHLORIDE BLD-SCNC: 105 MMOL/L (ref 99–110)
CHOLESTEROL, TOTAL: 207 MG/DL (ref 0–199)
CO2: 24 MMOL/L (ref 21–32)
CREAT SERPL-MCNC: 1 MG/DL (ref 0.8–1.3)
EOSINOPHILS ABSOLUTE: 0.4 K/UL (ref 0–0.6)
EOSINOPHILS RELATIVE PERCENT: 9.2 %
GFR AFRICAN AMERICAN: >60
GFR NON-AFRICAN AMERICAN: >60
GLOBULIN: 2.5 G/DL
GLUCOSE BLD-MCNC: 113 MG/DL (ref 70–99)
HCT VFR BLD CALC: 37.6 % (ref 40.5–52.5)
HDLC SERPL-MCNC: 51 MG/DL (ref 40–60)
HEMOGLOBIN: 12.6 G/DL (ref 13.5–17.5)
LDL CHOLESTEROL CALCULATED: 120 MG/DL
LYMPHOCYTES ABSOLUTE: 1.2 K/UL (ref 1–5.1)
LYMPHOCYTES RELATIVE PERCENT: 30 %
MCH RBC QN AUTO: 28.4 PG (ref 26–34)
MCHC RBC AUTO-ENTMCNC: 33.7 G/DL (ref 31–36)
MCV RBC AUTO: 84.5 FL (ref 80–100)
MONOCYTES ABSOLUTE: 0.5 K/UL (ref 0–1.3)
MONOCYTES RELATIVE PERCENT: 14 %
NEUTROPHILS ABSOLUTE: 1.8 K/UL (ref 1.7–7.7)
NEUTROPHILS RELATIVE PERCENT: 45.8 %
PDW BLD-RTO: 14.8 % (ref 12.4–15.4)
PLATELET # BLD: 260 K/UL (ref 135–450)
PMV BLD AUTO: 6.7 FL (ref 5–10.5)
POTASSIUM SERPL-SCNC: 4.2 MMOL/L (ref 3.5–5.1)
RBC # BLD: 4.44 M/UL (ref 4.2–5.9)
SODIUM BLD-SCNC: 142 MMOL/L (ref 136–145)
TOTAL PROTEIN: 7 G/DL (ref 6.4–8.2)
TRIGL SERPL-MCNC: 182 MG/DL (ref 0–150)
VLDLC SERPL CALC-MCNC: 36 MG/DL
WBC # BLD: 3.9 K/UL (ref 4–11)

## 2018-09-20 PROCEDURE — 90662 IIV NO PRSV INCREASED AG IM: CPT | Performed by: INTERNAL MEDICINE

## 2018-09-20 PROCEDURE — 3017F COLORECTAL CA SCREEN DOC REV: CPT | Performed by: INTERNAL MEDICINE

## 2018-09-20 PROCEDURE — G8427 DOCREV CUR MEDS BY ELIG CLIN: HCPCS | Performed by: INTERNAL MEDICINE

## 2018-09-20 PROCEDURE — 4040F PNEUMOC VAC/ADMIN/RCVD: CPT | Performed by: INTERNAL MEDICINE

## 2018-09-20 PROCEDURE — 1123F ACP DISCUSS/DSCN MKR DOCD: CPT | Performed by: INTERNAL MEDICINE

## 2018-09-20 PROCEDURE — G0008 ADMIN INFLUENZA VIRUS VAC: HCPCS | Performed by: INTERNAL MEDICINE

## 2018-09-20 PROCEDURE — 1101F PT FALLS ASSESS-DOCD LE1/YR: CPT | Performed by: INTERNAL MEDICINE

## 2018-09-20 PROCEDURE — G8417 CALC BMI ABV UP PARAM F/U: HCPCS | Performed by: INTERNAL MEDICINE

## 2018-09-20 PROCEDURE — 1036F TOBACCO NON-USER: CPT | Performed by: INTERNAL MEDICINE

## 2018-09-20 PROCEDURE — 99214 OFFICE O/P EST MOD 30 MIN: CPT | Performed by: INTERNAL MEDICINE

## 2018-09-20 ASSESSMENT — ENCOUNTER SYMPTOMS
VOMITING: 0
SHORTNESS OF BREATH: 0
NAUSEA: 0
ABDOMINAL PAIN: 0
CHEST TIGHTNESS: 0
BLOOD IN STOOL: 0
COUGH: 0
DIARRHEA: 0

## 2018-09-20 ASSESSMENT — PATIENT HEALTH QUESTIONNAIRE - PHQ9
1. LITTLE INTEREST OR PLEASURE IN DOING THINGS: 0
SUM OF ALL RESPONSES TO PHQ QUESTIONS 1-9: 0
SUM OF ALL RESPONSES TO PHQ QUESTIONS 1-9: 0
2. FEELING DOWN, DEPRESSED OR HOPELESS: 0
SUM OF ALL RESPONSES TO PHQ9 QUESTIONS 1 & 2: 0

## 2018-09-20 NOTE — PROGRESS NOTES
and headaches. Hematological: Does not bruise/bleed easily. Psychiatric/Behavioral: Negative for dysphoric mood, self-injury and suicidal ideas. The patient is not nervous/anxious. Physical Exam:      Vitals: /66 (Site: Left Upper Arm)   Ht 6' 2\" (1.88 m)   Wt 256 lb (116.1 kg)   BMI 32.87 kg/m²     Body mass index is 32.87 kg/m². Wt Readings from Last 3 Encounters:   09/20/18 256 lb (116.1 kg)   07/31/18 240 lb (108.9 kg)   07/27/18 240 lb 8 oz (109.1 kg)     Physical Exam   Constitutional: He is oriented to person, place, and time. He appears well-developed and well-nourished. No distress. HENT:   Head: Normocephalic and atraumatic. Mouth/Throat: Oropharynx is clear and moist. No oropharyngeal exudate. Eyes: Conjunctivae and EOM are normal. Right eye exhibits no discharge. Left eye exhibits no discharge. No scleral icterus. Neck: Normal range of motion. Neck supple. Cardiovascular: Normal rate and normal heart sounds. No murmur heard. Pulmonary/Chest: Effort normal and breath sounds normal. No respiratory distress. He has no wheezes. He has no rales. Abdominal: Soft. He exhibits no distension. There is no tenderness. There is no rebound. Musculoskeletal: He exhibits no deformity. Lymphadenopathy:        Head (right side): No submental and no submandibular adenopathy present. Head (left side): No submental and no submandibular adenopathy present. He has no cervical adenopathy. Right cervical: No superficial cervical and no deep cervical adenopathy present. Left cervical: No superficial cervical and no deep cervical adenopathy present. Neurological: He is alert and oriented to person, place, and time. He has normal reflexes. He displays tremor. He displays no atrophy. No cranial nerve deficit or sensory deficit. He exhibits normal muscle tone. Gait (mild hesitant giat but ovelall stable ) abnormal. Coordination normal. GCS eye subscore is 4.  GCS verbal subscore is 5. GCS motor subscore is 6. No dysdiadochokinesis  Mild rest and intention tremor   SLR neg b/l    Skin: No rash noted. He is not diaphoretic. Psychiatric: He has a normal mood and affect. His behavior is normal. Thought content normal.          Assessment/Plan:   Kathleen Elliott was seen today for establish care. Diagnoses and all orders for this visit:    NPH (normal pressure hydrocephalus)    Mixed hyperlipidemia  -     CBC Auto Differential; Future  -     Comprehensive Metabolic Panel; Future  -     Lipid Panel; Future    Hydrocephalus  S/p  shunt   Gait is better now. Essential hypertension  Well controlled- no changes in medication today. -     CBC Auto Differential; Future  -     Comprehensive Metabolic Panel; Future  -     Lipid Panel; Future    Pulmonary nodule      - Patient was encouraged to call the office (and ask to see me) or be seen by other provider for any worsening or lack    of improvement in his symptoms.       - Pt was asked to schedule an appointment in 4 months, and to let me know of any scheduling difficulties. Additional patients instructions (if given): There are no Patient Instructions on file for this visit.     Warren Whitehead M.D.   9/20/2018, 9:17 AM

## 2018-09-21 DIAGNOSIS — D64.9 NORMOCYTIC ANEMIA: ICD-10-CM

## 2018-09-21 DIAGNOSIS — D53.9 ANEMIA ASSOCIATED WITH NUTRITIONAL DEFICIENCY: ICD-10-CM

## 2018-09-21 DIAGNOSIS — Z12.11 SCREEN FOR COLON CANCER: Primary | ICD-10-CM

## 2018-09-24 ENCOUNTER — APPOINTMENT (OUTPATIENT)
Dept: SPEECH THERAPY | Age: 68
End: 2018-09-24
Payer: MEDICARE

## 2018-09-24 ENCOUNTER — HOSPITAL ENCOUNTER (OUTPATIENT)
Dept: OCCUPATIONAL THERAPY | Age: 68
Setting detail: THERAPIES SERIES
Discharge: HOME | End: 2018-09-24
Payer: MEDICARE

## 2018-09-24 ENCOUNTER — APPOINTMENT (OUTPATIENT)
Dept: PHYSICAL THERAPY | Age: 68
End: 2018-09-24
Payer: MEDICARE

## 2018-09-24 PROCEDURE — 97530 THERAPEUTIC ACTIVITIES: CPT

## 2018-09-24 PROCEDURE — 97110 THERAPEUTIC EXERCISES: CPT

## 2018-09-24 NOTE — FLOWSHEET NOTE
direction   Pt demo'd effective movement patterns with min tactile/verbal cues for maintaining elbow extension    --shoulder flexion  --horizontal ab/adduction (1st set 3#)  --shoulder abduction (attempted 3# w/ increase pain, reduced to 2# for remainder of exercises)     AROM BUE exercises  (unsupported sit)   2 sets x 15 reps each direction noted Used mirror for visual feedback. Pt required min tactile cues for effective posturing during exercises:  --shoulder flexion   --shoulder abduction  --shoulder horizontal ab/adduction      R hand pincer grasp strengthening   6# resistant clothespin, ~80 pieces  --1 set = tip to tip pincer grasp   Retrieved foam pieces 2 at a time to increase challenge. Improved tolerance noted       Home Exercise Program:    · Hand gripper 3 sets x20 reps each hand, pt compliant  · Theraputty exercises (3), handout issued, pt compliant  · BUE stretches as tolerated, pt compliant    Timed Code Treatment Minutes:   55 minutes    Total Treatment Minutes: 55 minutes  12 (TA), 43 (TE)    Assessment   Performance deficits / Impairments: Decreased strength;Decreased fine motor control;Decreased high-level IADLs;Decreased balance;Decreased coordination    Assessment: Pt with significant RUE tightness with supine and unsupported sit BUE exercises which seemed to have improved following exercises. Pt demo'd good tolerance for therapeutic activities noted above demo'ing improved timing, speed and coordination with gross coordination activities of the R hand versus fine motor. Pt progressing well towards baseline functioning. Pt cont to benefit from skilled OT, cont per POC. Treatment Diagnosis: BUE weakness, impaired coordination bilaterally    Patient Education: progress made this session; goals for BUE HEP in upcoming visits to transition to home program with OT discharging from outpatient services. Verb understanding.      Treatment/Activity Tolerance:     [x]  Patient tolerated treatment well []  Patient limited by fatigue    []  Patient limited by pain []  Patient limited by other medical complications   []  Other:     Prognosis: [x]  Good []  Fair  []  Poor    Patient Requires Follow-up:  [x]  Yes  []  No    Plan: [x]  Continue per plan of care []  Alter current plan (see comments)   []  Plan of care initiated []  Hold pending MD visit []  Discharge    Plan for Next Session:  BUE stretching and AROM exercises with facilitation and verbal cues for effective posturing and movement patterns. Prone and modified quadruped positions to assist with postural alignment and strengthening. R/L hand functional activities to work on fine motor coordination and strengthening.     Goals  Short term goals  Time Frame for Short term goals: 6 weeks  Short term goal 1: Pt improve R hand  strength to 47# to demonstrate improved  strength to facilitate independence with IADLs - goal met 8/29  Short term goal 2: Pt will improve time on NHPT to no more than 21 seconds using R hand to demonstrate improved timing and speed with fine motor coordination. - goal met 8/29  Short term goal 3: Pt will improve tip to tip pinch strength of R hand to 13# to facilitate independence with functional activities -progressing, continue - progressing, continue  Short term goal 4: Pt will improve BUE shoulder flexion and abduction strength to 4/5 to demonstrate improved strength to facilitate independence with lifting heavy objects- goal met 8/29     Long term goals  Time Frame for Long term goals : 8 weeks  Long term goal 1: Pt improve R hand  strength to 52# to demonstrate improved  strength to facilitate independence with IADLs -progressing, continue  Long term goal 2: Pt will improve time on NHPT to no more than 20 seconds using R hand to demonstrate improved timing and speed with fine motor coordination.- goal met 8/29  Long term goal 3: Pt will improve tip to tip pinch strength of R hand to 15# to facilitate

## 2018-09-26 ENCOUNTER — APPOINTMENT (OUTPATIENT)
Dept: PHYSICAL THERAPY | Age: 68
End: 2018-09-26
Payer: MEDICARE

## 2018-09-26 ENCOUNTER — HOSPITAL ENCOUNTER (OUTPATIENT)
Dept: OCCUPATIONAL THERAPY | Age: 68
Setting detail: THERAPIES SERIES
Discharge: HOME | End: 2018-09-26
Payer: MEDICARE

## 2018-09-26 ENCOUNTER — HOSPITAL ENCOUNTER (OUTPATIENT)
Dept: OCCUPATIONAL THERAPY | Age: 68
Setting detail: THERAPIES SERIES
End: 2018-09-26
Payer: MEDICARE

## 2018-09-26 ENCOUNTER — APPOINTMENT (OUTPATIENT)
Dept: SPEECH THERAPY | Age: 68
End: 2018-09-26
Payer: MEDICARE

## 2018-09-26 PROCEDURE — 97110 THERAPEUTIC EXERCISES: CPT

## 2018-09-26 PROCEDURE — 97530 THERAPEUTIC ACTIVITIES: CPT

## 2018-09-26 NOTE — FLOWSHEET NOTE
Speech-Language Pathology  Daily Treatment Note    Date: 18  Patient Name:  Joce Shafer. :  1950    MRN: [id number]  Restrictions/Precautions:    Medical Diagnosis:   G91.2 (ICD-10-CM) - (Idiopathic) normal pressure hydrocephalus                       Treatment Diagnosis:   Cognitive communication impairment (K81.531)  Insurance/Certification information:     Physician Information:  [facility]    Plan of care signed (Y/N):  Y  Visit# / total visits: 5  Pain level: none reported   G-Code:   Memory Current Status (): At least 40 percent but less than 60 percent impaired, limited or restricted  Memory Goal Status (): At least 20 percent but less than 40 percent impaired, limited or restricted    Progress Note: []  Yes  [x]  No  Next due by: 18    Subjective:  Pleasant and cooperative; attends session with his wife. Objective:   Goal 1: Patient will demonstrate comprehension of compensatory memory strategies with SBA. Pt reports that he is pleased with using the journal as he is; however wife indicates that pt continues to make numerous requests for repetition of conversational information. Continue goal.       Goal 2: Patient will implement compensatory memory strategies as needed to facilitate short term recall of information with SBA. Pt was able to participate in a card-based memory game (matching 12 pairs of overturned cards) with good recall of card placement and minimal errored selections. SLP educated pt and wife re: implementation for home practice; they verbalized comprehension. Pt was able to recall leisure activity introduced at last session with minimal cues; he was subsequently able to recall rules with 80% accuracy. Continue goal.      Goal 3: Patient will complete reasoning exercises with SBA. Pt was able to complete a 4x4 deductive reasoning grid with increased time and min-mod assistance.   Continue goal.    Goal 4: Patient will complete functional
minutes    Signature:  Hoa Wild M.A., [address] [facility]  Speech-Language Pathologist  [address] [facility]  Phone: [phone]  Fax: [phone]

## 2018-09-26 NOTE — PROGRESS NOTES
calendar/journal as memory tool; pt verbalized comprehension.    Continue goal.        Goal 2: Patient will implement compensatory memory strategies as needed to facilitate short term recall of information with SBA.  9/12/18:  Pt was able to use previously-practiced strategy to recall associated pictures with min-SBA. Pt is able to complete card matching memory game with minimally-increased time. He was able to recall upcoming schedule (for today and for the weekend) with moderate prompt from his wife. Continue goal.   9/7/18: Pt was able to utilize memory strategy involving generating connections within a narrative at 7, 15 and 20 minute intervals to recall 4/4 pictured items. Pt continues to demonstrate difficulty with recall for previously discussed scheduled activities, relying on his spouse. 8/27/18:  Pt reports that he is pleased with using the journal as he is; however wife indicates that pt continues to make numerous requests for repetition of conversational information.    8/24/18: Pt was able to participate in a familiar card game with good recall of cards played by opponent in previous cycles.  SLP educated pt re: strategies of prioritization and organization; pt verbalized comprehension. Pt was able to recall visually-presented picture scene with 90% accuracy following a distracting 8-minute delay.    Continue goal.       Goal 3: Patient will complete reasoning exercises with SBA.  9/12/18:  SLP educated pt re: code-breaking exercise and deductive reasoning application. Pt required max verbal cues on initial trial.    9/7/18:  Pt demonstrates moderate impulsivity when completing math reasoning exercises (resulting in 50% accuracy for completion of multi-step word problems).   SLP educated pt re: self-awareness; pt verbalized comprehension but will need reinforcement.    8/29/18:  Pt is able to complete Level A Perplexor (deductive reasoning) exercises with minimal assistance.  Pt requires some increased time due to disorganization.     8/27/18: Pt was able to participate in a card-based memory game (matching 12 pairs of overturned cards) with good recall of card placement and minimal errored selections.  SLP educated pt and wife re: implementation for home practice; they verbalized comprehension.    Pt was able to recall leisure activity introduced at last session with minimal cues; he was subsequently able to recall rules with 80% accuracy. 8/24/18:  Pt reports that he required significantly increased time to complete reasoning exercises assigned for home practice. 8/22/18:Pt arrived with completed \"homework\" as follows: verbal deductive reasoning [id number]=662% accuracy, numeric deductive reasoning exercises=75% accuracy.  Pt required minimal assistance to complete next-level verbal deductive reasoning exercises (basic Perplexor 4x3) during session; pt demonstrated recall and application of strategies taught with session.    8/16/18:  SLP educated pt re: verbal deductive reasoning exercise (Perplexor); pt was able to complete with minimal assistance.  SLP provided Perplexor and 4x4 KenKen exercises for home practice.  Pt verbalized comprehension and plan to complete for home practice.    Continue goal.     Goal 4: Patient will complete functional problem solving exercises with SBA.  9/12/18:  Pt arrived with completed deductive logic puzzles provided at previous treatment session. He report minimal difficulty but they required increased time. 8/27/18: Pt was able to complete complex problem-solving game with mod-max assistance.    8/24/18:  SLP educated pt re: novel complex problem-solving game; pt was able to demonstrate comprehension with mod-max verbal + written cues. 8/16/18:  Pt requires moderate cues to complete functional math reasoning task.    Continue goal.     Goal 5: Pt will participate in ongoing assessment of cognitive communication with goals added as indicated.   8/22/18:  Pt

## 2018-09-26 NOTE — FLOWSHEET NOTE
strengthening   6# resistant clothespin, ~80 pieces  --1 set = tip to tip pincer grasp   Retrieved foam pieces 2 at a time to increase challenge. Improved tolerance noted       Home Exercise Program:    · Hand gripper 3 sets x20 reps each hand, pt compliant  · Theraputty exercises (3), handout issued, pt compliant  · BUE stretches as tolerated, pt compliant    Timed Code Treatment Minutes:   53 minutes    Total Treatment Minutes: 53 minutes  8 (TA), 45 (TE)    Assessment   Performance deficits / Impairments: Decreased strength;Decreased fine motor control;Decreased high-level IADLs;Decreased balance;Decreased coordination    Assessment: Pt progressing well towards goals demo'ing improvements with RUE strengthening and R hand fine motor coordination. Plan to transition patient to HEP/home activities program during next visit. Pt cont to benefit from skilled OT, cont per POC. Treatment Diagnosis: BUE weakness, impaired coordination bilaterally    Patient Education: progress made and readiness to transition to home program. Plan for next session to be last session. Verb understanding. Treatment/Activity Tolerance:     [x]  Patient tolerated treatment well []  Patient limited by fatigue    []  Patient limited by pain []  Patient limited by other medical complications   []  Other:     Prognosis: [x]  Good []  Fair  []  Poor    Patient Requires Follow-up:  [x]  Yes  []  No    Plan: [x]  Continue per plan of care []  Alter current plan (see comments)   []  Plan of care initiated []  Hold pending MD visit []  Discharge    Plan for Next Session:  BUE stretching and AROM exercises with facilitation and verbal cues for effective posturing and movement patterns. Prone and modified quadruped positions to assist with postural alignment and strengthening. R/L hand functional activities to work on fine motor coordination and strengthening.     Goals  Short term goals  Time Frame for Short term goals: 6 weeks  Short term goal

## 2018-10-01 ENCOUNTER — HOSPITAL ENCOUNTER (OUTPATIENT)
Dept: OCCUPATIONAL THERAPY | Age: 68
Setting detail: THERAPIES SERIES
Discharge: HOME | End: 2018-10-01
Payer: MEDICARE

## 2018-10-01 ENCOUNTER — APPOINTMENT (OUTPATIENT)
Dept: OCCUPATIONAL THERAPY | Age: 68
End: 2018-10-01
Payer: MEDICARE

## 2018-10-01 ENCOUNTER — TELEPHONE (OUTPATIENT)
Dept: INTERNAL MEDICINE CLINIC | Age: 68
End: 2018-10-01

## 2018-10-01 PROCEDURE — 97110 THERAPEUTIC EXERCISES: CPT

## 2018-10-01 PROCEDURE — 97530 THERAPEUTIC ACTIVITIES: CPT

## 2018-10-01 PROCEDURE — G8986 CARRY D/C STATUS: HCPCS

## 2018-10-01 PROCEDURE — G8985 CARRY GOAL STATUS: HCPCS

## 2018-10-01 PROCEDURE — G8984 CARRY CURRENT STATUS: HCPCS

## 2018-10-01 NOTE — PROGRESS NOTES
of the following symptoms in the last week. None Mild Moderate Severe Extreme   9. Arm, shoulder or hand pain    [] 1  [x] 2  [] 3  [] 4  [] 5   10. Tingling (pins and needles) in your arm, shoulder or hand    [x] 1  [] 2  [] 3  [] 4  [] 5      No Difficulty Mild Difficulty Moderate Difficulty Severe Difficulty So Much Difficulty That I Can't Sleep    11. During the past week, how much difficulty have you had sleeping because of the pain in your arm, shoulder or hand? [x] 1  [] 2  [] 3  [] 4  [] 5     Sum of Scores: _13___    QuickDASH Disability/Symptom Score (as found below): __CI__     Since the beginning of therapy, my condition as improved: 100%  During the past 24 hours, my maximum pain rating was: 2    QuickDASH Disability/Symptom Score =     A QuickDASH score may not be calculated if there is a greater than 1 missing item.     G-Code [place]:  Quick DASH Total Score Disability Index CMS Modifier   11 or less 0% []CH   12-19 1-19% [x]CI   20-28 20-39% []CJ   29-37 40-59% []CK   38-46 60-79% []CL   47-54 80-99% []CM   55 100% []CN

## 2018-10-01 NOTE — DISCHARGE SUMMARY
Occupational Therapy Daily Treatment Note/Discharge Summary    Date:  10/1/2018    Patient Name:  Brianna Ramirez. 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 Sapna Morales of care signed (Y/N):  Y  Visit# / total visits:    Pain level: 0/10     G-Code  OT G-codes (2018)  Functional Assessment Tool Used: Herve [place]  Score: 18  Functional Limitation: Carrying, moving and handling objects  Carrying, Moving and Handling Objects Current Status (): At least 1 percent but less than 20 percent impaired, limited or restricted  Carrying, Moving and Handling Objects Goal Status (): 0 percent impaired, limited or restricted    G-Code  OT G-codes (2018)  Functional Assessment Tool Used: Herve [place]  Score: 12  Functional Limitation: Carrying, moving and handling objects  Carrying, Moving and Handling Objects Current Status (): At least 1 percent but less than 20 percent impaired, limited or restricted  Carrying, Moving and Handling Objects Goal Status (): 0 percent impaired, limited or restricted    G-Code  OT G-codes (10/1/2018)  Functional Assessment Tool Used: Herve Columbus  Score: 13  Functional Limitation: Carrying, moving and handling objects  Carrying, Moving and Handling Objects Discharge Status (): At least 1 percent but less than 20 percent impaired, limited or restricted        Progress Note: [x]  Yes  []  No  Next due by: Visit #16      Subjective: \"I did my stretches this morning\"    Objective Measures:     R hand  strength: 57. 33#  R hand tip to tip pincer grasp strength: 9#     Box and Blocks Test (best of 2 trials): 60 blocks  Nine Hole Peg Test (best of 3 trials): 18.77 seconds     RUE shoulder flexion strength: 5/5  LUE shoulder flexion strength:

## 2018-10-01 NOTE — TELEPHONE ENCOUNTER
Pt calling in to get lab results, he was notified that there are more orders that have been placed  He is just wondering what the results are from [employer] pt the results where sent in today and MD has to review.    pls call to advise

## 2018-10-02 DIAGNOSIS — Z12.11 SCREEN FOR COLON CANCER: ICD-10-CM

## 2018-10-02 DIAGNOSIS — D53.9 ANEMIA ASSOCIATED WITH NUTRITIONAL DEFICIENCY: ICD-10-CM

## 2018-10-02 LAB
FERRITIN: 196.5 NG/ML (ref 30–400)
FOLATE: >20 NG/ML (ref 4.78–24.2)
IRON SATURATION: 15 % (ref 20–50)
IRON: 57 UG/DL (ref 59–158)
TOTAL IRON BINDING CAPACITY: 371 UG/DL (ref 260–445)
VITAMIN B-12: 495 PG/ML (ref 211–911)

## 2018-10-04 ENCOUNTER — TELEPHONE (OUTPATIENT)
Dept: INTERNAL MEDICINE CLINIC | Age: 68
End: 2018-10-04

## 2018-10-09 RX ORDER — AMOXICILLIN 500 MG/1
CAPSULE ORAL
Qty: 4 CAPSULE | Refills: 0 | Status: SHIPPED | OUTPATIENT
Start: 2018-10-09 | End: 2018-11-26

## 2018-10-11 ENCOUNTER — PATIENT MESSAGE (OUTPATIENT)
Dept: INTERNAL MEDICINE | Age: 68
End: 2018-10-11

## 2018-10-11 ENCOUNTER — HOSPITAL ENCOUNTER (EMERGENCY)
Age: 68
Discharge: HOME | End: 2018-10-11
Attending: EMERGENCY MEDICINE
Payer: MEDICARE

## 2018-10-11 VITALS
DIASTOLIC BLOOD PRESSURE: 90 MMHG | OXYGEN SATURATION: 100 % | HEART RATE: 82 BPM | BODY MASS INDEX: 34.01 KG/M2 | TEMPERATURE: 97.7 F | HEIGHT: 74 IN | RESPIRATION RATE: 18 BRPM | SYSTOLIC BLOOD PRESSURE: 144 MMHG | WEIGHT: 265 LBS

## 2018-10-11 DIAGNOSIS — H11.442 CONJUNCTIVAL CYST OF LEFT EYE: Primary | ICD-10-CM

## 2018-10-11 PROCEDURE — 99283 EMERGENCY DEPT VISIT LOW MDM: CPT

## 2018-10-11 RX ORDER — ATORVASTATIN CALCIUM 10 MG/1
10 TABLET, FILM COATED ORAL DAILY
COMMUNITY
Start: 2018-10-03 | End: 2018-10-12 | Stop reason: SDUPTHER

## 2018-10-11 RX ORDER — DONEPEZIL HYDROCHLORIDE 10 MG/1
10 TABLET, FILM COATED ORAL DAILY
COMMUNITY
Start: 2018-10-07 | End: 2022-02-09

## 2018-10-11 ASSESSMENT — ENCOUNTER SYMPTOMS
SHORTNESS OF BREATH: 0
EYE PAIN: 0
VOMITING: 0
SORE THROAT: 0
SINUS PRESSURE: 0
ABDOMINAL PAIN: 0
CHEST TIGHTNESS: 0
EYE REDNESS: 0
PHOTOPHOBIA: 0
NAUSEA: 0
COUGH: 0

## 2018-10-12 RX ORDER — ATORVASTATIN CALCIUM 10 MG/1
10 TABLET, FILM COATED ORAL DAILY
Qty: 30 TABLET | Refills: 0 | Status: SHIPPED | OUTPATIENT
Start: 2018-10-12 | End: 2018-11-26

## 2018-10-12 RX ORDER — ATORVASTATIN CALCIUM 10 MG/1
10 TABLET, FILM COATED ORAL DAILY
Qty: 90 TABLET | Refills: 3 | Status: SHIPPED | OUTPATIENT
Start: 2018-10-12 | End: 2019-04-25 | Stop reason: SDUPTHER

## 2018-10-12 NOTE — ED PROVIDER NOTES
[address] ENCOUNTER          PHYSICIAN ASSISTANT NOTE       Date of evaluation: 10/11/2018    Chief Complaint     Abscess (Left eye \"discoloration\" and bulging in corner of eye)      History of Present Illness     Venu Spears is a 80 y.o. male with a history nor pressure hydrocephalus presents today with atraumatic painless bulging along his left lateral eye border. Patient states he is eating dinner, looked towards his right, and his daughter noticed a yellow bulging mass from his left lateral eye border. He denies any pain. He states it goes away when he looks to the left. He denies any changes in vision. The bulge does not obscure his vision. No fevers or surrounding redness of the orbit. Review of Systems     Review of Systems   Constitutional: Negative for chills and fever. HENT: Negative for sinus pressure and sore throat. Eyelid bulge   Eyes: Negative for photophobia, pain, redness and visual disturbance. Respiratory: Negative for cough, chest tightness and shortness of breath. Cardiovascular: Negative for chest pain and palpitations. Gastrointestinal: Negative for abdominal pain, nausea and vomiting. Genitourinary: Negative for dysuria, flank pain and hematuria. Musculoskeletal: Negative for myalgias and neck pain. Skin: Negative for rash. Neurological: Negative for dizziness and light-headedness. Psychiatric/Behavioral: The patient is not nervous/anxious. Past Medical, Surgical, Family, and Social History     He has a past medical history of Allergic rhinitis; Erectile dysfunction; Hyperlipidemia; Hypertension; Screening for abdominal aortic aneurysm (AAA) performed; and Unspecified sleep apnea. He has a past surgical history that includes other surgical history (N/A, 07/18/2018); pr create shunt:ventric-peritoneal (N/A, 7/18/2018); and laparoscopy (N/A, 7/18/2018). His family history includes Cancer in his father.   He tachypnea. Cardiac: Chest symmetrical and non-tender on palpation of chest wall. Abdomen:  Non-distended. Musculoskeletal:  Ambulates under own control. Neuro:  Alert and oriented x 3. CN II - XII grossly intact. Moves all extremities spontaneously. Vascular:  2+ peripheral pulses in bilateral upper and lower extremities      Skin:  Warm and well perfused without rashes or lesions    Psych:  Appropriate mood and affect              Diagnostic Results       RADIOLOGY:  No orders to display       LABS:   No results found for this visit on 10/11/18. RECENT VITALS:  BP: (!) 144/90, Temp: 97.7 °F (36.5 °C), Pulse: 82, Resp: 18, SpO2: 100 %       ED Course     Nursing Notes, Past Medical Hx, Past Surgical Hx, Social Hx, Allergies, and Family Hx were reviewed. The patient was given the following medications:  No orders of the defined types were placed in this encounter. CONSULTS:  None    MEDICAL DECISION MAKING / ASSESSMENT / Raj Michaels. is a 80 y.o. male with a history of normal pressure hydrocephalus presents with atraumatic painless conjunctival bulging over his left lateral eyelid border. Images as noted above. No surrounding erythema or infectious findings. No change in vision. No signs of orbital cellulitis. Could be consistent with a conjunctival cyst, however this is not definitive. Patient has an ophthalmologist and will follow up regarding this finding. This patient was also evaluated by the attending physician. All care plans were discussed and agreed upon. Clinical Impression     1.  Conjunctival cyst of left eye        Disposition     PATIENT REFERRED TO:  [facility] Emergency Department  [address] [place] [id number]  [phone]  Go to   If symptoms worsen      DISCHARGE MEDICATIONS:  New Prescriptions    No medications on file       DISPOSITION Decision To Discharge 10/11/2018 09:18:48 PM        Winthrop Romberg,

## 2018-10-12 NOTE — ED PROVIDER NOTES
ED Attending Attestation Note     Date of evaluation: 10/11/2018    This patient was seen by the advance practice provider. I have seen and examined the patient, agree with the workup, evaluation, management and diagnosis. The care plan has been discussed. My assessment reveals here For a abnormal area on his left eye. Patient just happened notices tonight. He denies any visual change. Denies any pain. Denies any trauma to his eye. On exam patient has this conjunctival-appearing cyst left lateral eye. No evidence for infection. His eye is not injected and there is no discharge.      Chiquita Carnes MD  10/11/18 2046

## 2018-10-12 NOTE — ED TRIAGE NOTES
PT states him and his family were playing phase 10 in the house and he looked to the right and his wife noticed some discoloration to the outer part of his eye. Pt states no pain or injury to eye.

## 2018-11-10 ENCOUNTER — TELEPHONE (OUTPATIENT)
Dept: INTERNAL MEDICINE CLINIC | Age: 68
End: 2018-11-10

## 2018-11-10 RX ORDER — GABAPENTIN 100 MG/1
100 CAPSULE ORAL 3 TIMES DAILY
Qty: 90 CAPSULE | Refills: 1 | Status: SHIPPED | OUTPATIENT
Start: 2018-11-10 | End: 2019-01-10 | Stop reason: SDUPTHER

## 2018-11-19 ENCOUNTER — TELEPHONE (OUTPATIENT)
Dept: INTERNAL MEDICINE CLINIC | Age: 68
End: 2018-11-19

## 2018-11-19 DIAGNOSIS — D64.9 NORMOCYTIC ANEMIA: ICD-10-CM

## 2018-11-19 DIAGNOSIS — R19.5 POSITIVE FIT (FECAL IMMUNOCHEMICAL TEST): Primary | ICD-10-CM

## 2018-11-26 ENCOUNTER — OFFICE VISIT (OUTPATIENT)
Dept: INTERNAL MEDICINE CLINIC | Age: 68
End: 2018-11-26
Payer: MEDICARE

## 2018-11-26 VITALS
HEART RATE: 70 BPM | BODY MASS INDEX: 33.9 KG/M2 | OXYGEN SATURATION: 97 % | SYSTOLIC BLOOD PRESSURE: 132 MMHG | DIASTOLIC BLOOD PRESSURE: 84 MMHG | WEIGHT: 264 LBS

## 2018-11-26 DIAGNOSIS — R60.0 BILATERAL LEG EDEMA: ICD-10-CM

## 2018-11-26 DIAGNOSIS — Z01.818 PRE-OP EXAM: Primary | ICD-10-CM

## 2018-11-26 DIAGNOSIS — E78.2 MIXED HYPERLIPIDEMIA: ICD-10-CM

## 2018-11-26 DIAGNOSIS — G47.33 OBSTRUCTIVE SLEEP APNEA: ICD-10-CM

## 2018-11-26 DIAGNOSIS — Z01.818 PRE-OP EXAM: ICD-10-CM

## 2018-11-26 LAB
A/G RATIO: 2.1 (ref 1.1–2.2)
ALBUMIN SERPL-MCNC: 5.1 G/DL (ref 3.4–5)
ALP BLD-CCNC: 68 U/L (ref 40–129)
ALT SERPL-CCNC: 21 U/L (ref 10–40)
ANION GAP SERPL CALCULATED.3IONS-SCNC: 14 MMOL/L (ref 3–16)
AST SERPL-CCNC: 22 U/L (ref 15–37)
BILIRUB SERPL-MCNC: 0.4 MG/DL (ref 0–1)
BUN BLDV-MCNC: 17 MG/DL (ref 7–20)
CALCIUM SERPL-MCNC: 9.7 MG/DL (ref 8.3–10.6)
CHLORIDE BLD-SCNC: 102 MMOL/L (ref 99–110)
CO2: 23 MMOL/L (ref 21–32)
CREAT SERPL-MCNC: 0.9 MG/DL (ref 0.8–1.3)
GFR AFRICAN AMERICAN: >60
GFR NON-AFRICAN AMERICAN: >60
GLOBULIN: 2.4 G/DL
GLUCOSE BLD-MCNC: 81 MG/DL (ref 70–99)
POTASSIUM SERPL-SCNC: 4.6 MMOL/L (ref 3.5–5.1)
SODIUM BLD-SCNC: 139 MMOL/L (ref 136–145)
TOTAL PROTEIN: 7.5 G/DL (ref 6.4–8.2)

## 2018-11-26 PROCEDURE — G8482 FLU IMMUNIZE ORDER/ADMIN: HCPCS | Performed by: INTERNAL MEDICINE

## 2018-11-26 PROCEDURE — 93000 ELECTROCARDIOGRAM COMPLETE: CPT | Performed by: INTERNAL MEDICINE

## 2018-11-26 PROCEDURE — G8417 CALC BMI ABV UP PARAM F/U: HCPCS | Performed by: INTERNAL MEDICINE

## 2018-11-26 PROCEDURE — G8427 DOCREV CUR MEDS BY ELIG CLIN: HCPCS | Performed by: INTERNAL MEDICINE

## 2018-11-26 PROCEDURE — 99213 OFFICE O/P EST LOW 20 MIN: CPT | Performed by: INTERNAL MEDICINE

## 2018-11-26 PROCEDURE — 3017F COLORECTAL CA SCREEN DOC REV: CPT | Performed by: INTERNAL MEDICINE

## 2018-11-26 PROCEDURE — 1101F PT FALLS ASSESS-DOCD LE1/YR: CPT | Performed by: INTERNAL MEDICINE

## 2018-11-29 ENCOUNTER — TELEPHONE (OUTPATIENT)
Dept: INTERNAL MEDICINE CLINIC | Age: 68
End: 2018-11-29

## 2018-12-11 ENCOUNTER — HOSPITAL ENCOUNTER (OUTPATIENT)
Dept: CT IMAGING | Age: 68
Discharge: HOME | End: 2018-12-11
Payer: MEDICARE

## 2018-12-11 DIAGNOSIS — N28.89 KIDNEY MASS: ICD-10-CM

## 2018-12-11 PROCEDURE — 74176 CT ABD & PELVIS W/O CONTRAST: CPT

## 2018-12-14 ENCOUNTER — ANESTHESIA EVENT (OUTPATIENT)
Dept: ENDOSCOPY | Age: 68
End: 2018-12-14
Payer: MEDICARE

## 2018-12-14 ENCOUNTER — ANESTHESIA (OUTPATIENT)
Dept: ENDOSCOPY | Age: 68
End: 2018-12-14
Payer: MEDICARE

## 2018-12-14 ENCOUNTER — HOSPITAL ENCOUNTER (OUTPATIENT)
Age: 68
Setting detail: OUTPATIENT SURGERY
Discharge: HOME | End: 2018-12-14
Attending: INTERNAL MEDICINE | Admitting: INTERNAL MEDICINE
Payer: MEDICARE

## 2018-12-14 VITALS
TEMPERATURE: 98.9 F | HEIGHT: 74 IN | WEIGHT: 262 LBS | BODY MASS INDEX: 33.62 KG/M2 | OXYGEN SATURATION: 98 % | SYSTOLIC BLOOD PRESSURE: 107 MMHG | HEART RATE: 68 BPM | RESPIRATION RATE: 17 BRPM | DIASTOLIC BLOOD PRESSURE: 72 MMHG

## 2018-12-14 VITALS
SYSTOLIC BLOOD PRESSURE: 123 MMHG | RESPIRATION RATE: 12 BRPM | OXYGEN SATURATION: 97 % | DIASTOLIC BLOOD PRESSURE: 87 MMHG

## 2018-12-14 PROCEDURE — 7100000010 HC PHASE II RECOVERY - FIRST 15 MIN: Performed by: INTERNAL MEDICINE

## 2018-12-14 PROCEDURE — 2500000003 HC RX 250 WO HCPCS: Performed by: ANESTHESIOLOGY

## 2018-12-14 PROCEDURE — 6360000002 HC RX W HCPCS: Performed by: ANESTHESIOLOGY

## 2018-12-14 PROCEDURE — 2709999900 HC NON-CHARGEABLE SUPPLY: Performed by: INTERNAL MEDICINE

## 2018-12-14 PROCEDURE — 3700000001 HC ADD 15 MINUTES (ANESTHESIA): Performed by: INTERNAL MEDICINE

## 2018-12-14 PROCEDURE — 2500000003 HC RX 250 WO HCPCS: Performed by: INTERNAL MEDICINE

## 2018-12-14 PROCEDURE — 2580000003 HC RX 258: Performed by: ANESTHESIOLOGY

## 2018-12-14 PROCEDURE — 7100000011 HC PHASE II RECOVERY - ADDTL 15 MIN: Performed by: INTERNAL MEDICINE

## 2018-12-14 PROCEDURE — 88305 TISSUE EXAM BY PATHOLOGIST: CPT

## 2018-12-14 PROCEDURE — 3700000000 HC ANESTHESIA ATTENDED CARE: Performed by: INTERNAL MEDICINE

## 2018-12-14 PROCEDURE — 3609012400 HC EGD TRANSORAL BIOPSY SINGLE/MULTIPLE: Performed by: INTERNAL MEDICINE

## 2018-12-14 PROCEDURE — 3609010300 HC COLONOSCOPY W/BIOPSY SINGLE/MULTIPLE: Performed by: INTERNAL MEDICINE

## 2018-12-14 RX ORDER — SODIUM CHLORIDE 9 MG/ML
INJECTION, SOLUTION INTRAVENOUS CONTINUOUS PRN
Status: DISCONTINUED | OUTPATIENT
Start: 2018-12-14 | End: 2018-12-14 | Stop reason: SDUPTHER

## 2018-12-14 RX ORDER — MORPHINE SULFATE 4 MG/ML
1 INJECTION, SOLUTION INTRAMUSCULAR; INTRAVENOUS EVERY 5 MIN PRN
Status: DISCONTINUED | OUTPATIENT
Start: 2018-12-14 | End: 2018-12-14 | Stop reason: HOSPADM

## 2018-12-14 RX ORDER — HYDRALAZINE HYDROCHLORIDE 20 MG/ML
5 INJECTION INTRAMUSCULAR; INTRAVENOUS EVERY 10 MIN PRN
Status: DISCONTINUED | OUTPATIENT
Start: 2018-12-14 | End: 2018-12-14 | Stop reason: HOSPADM

## 2018-12-14 RX ORDER — LABETALOL HYDROCHLORIDE 5 MG/ML
5 INJECTION, SOLUTION INTRAVENOUS EVERY 10 MIN PRN
Status: DISCONTINUED | OUTPATIENT
Start: 2018-12-14 | End: 2018-12-14 | Stop reason: HOSPADM

## 2018-12-14 RX ORDER — PROMETHAZINE HYDROCHLORIDE 25 MG/ML
6.25 INJECTION, SOLUTION INTRAMUSCULAR; INTRAVENOUS
Status: DISCONTINUED | OUTPATIENT
Start: 2018-12-14 | End: 2018-12-14 | Stop reason: HOSPADM

## 2018-12-14 RX ORDER — OXYCODONE HYDROCHLORIDE 5 MG/1
10 TABLET ORAL PRN
Status: DISCONTINUED | OUTPATIENT
Start: 2018-12-14 | End: 2018-12-14 | Stop reason: HOSPADM

## 2018-12-14 RX ORDER — METOCLOPRAMIDE HYDROCHLORIDE 5 MG/ML
10 INJECTION INTRAMUSCULAR; INTRAVENOUS
Status: DISCONTINUED | OUTPATIENT
Start: 2018-12-14 | End: 2018-12-14 | Stop reason: HOSPADM

## 2018-12-14 RX ORDER — MEPERIDINE HYDROCHLORIDE 25 MG/ML
12.5 INJECTION INTRAMUSCULAR; INTRAVENOUS; SUBCUTANEOUS EVERY 5 MIN PRN
Status: DISCONTINUED | OUTPATIENT
Start: 2018-12-14 | End: 2018-12-14 | Stop reason: HOSPADM

## 2018-12-14 RX ORDER — GLYCOPYRROLATE 0.2 MG/ML
INJECTION INTRAMUSCULAR; INTRAVENOUS PRN
Status: DISCONTINUED | OUTPATIENT
Start: 2018-12-14 | End: 2018-12-14 | Stop reason: SDUPTHER

## 2018-12-14 RX ORDER — PROPOFOL 10 MG/ML
INJECTION, EMULSION INTRAVENOUS PRN
Status: DISCONTINUED | OUTPATIENT
Start: 2018-12-14 | End: 2018-12-14 | Stop reason: SDUPTHER

## 2018-12-14 RX ORDER — OXYCODONE HYDROCHLORIDE 5 MG/1
5 TABLET ORAL PRN
Status: DISCONTINUED | OUTPATIENT
Start: 2018-12-14 | End: 2018-12-14 | Stop reason: HOSPADM

## 2018-12-14 RX ORDER — MIDAZOLAM HYDROCHLORIDE 1 MG/ML
INJECTION INTRAMUSCULAR; INTRAVENOUS PRN
Status: DISCONTINUED | OUTPATIENT
Start: 2018-12-14 | End: 2018-12-14 | Stop reason: SDUPTHER

## 2018-12-14 RX ORDER — DIPHENHYDRAMINE HYDROCHLORIDE 50 MG/ML
12.5 INJECTION INTRAMUSCULAR; INTRAVENOUS
Status: DISCONTINUED | OUTPATIENT
Start: 2018-12-14 | End: 2018-12-14 | Stop reason: HOSPADM

## 2018-12-14 RX ADMIN — PROPOFOL 50 MG: 10 INJECTION, EMULSION INTRAVENOUS at 12:29

## 2018-12-14 RX ADMIN — GLYCOPYRROLATE 0.2 MG: 0.2 INJECTION INTRAMUSCULAR; INTRAVENOUS at 12:29

## 2018-12-14 RX ADMIN — PROPOFOL 50 MG: 10 INJECTION, EMULSION INTRAVENOUS at 12:40

## 2018-12-14 RX ADMIN — PROPOFOL 50 MG: 10 INJECTION, EMULSION INTRAVENOUS at 12:35

## 2018-12-14 RX ADMIN — MIDAZOLAM HYDROCHLORIDE 2 MG: 1 INJECTION INTRAMUSCULAR; INTRAVENOUS at 12:29

## 2018-12-14 RX ADMIN — SODIUM CHLORIDE: 900 INJECTION, SOLUTION INTRAVENOUS at 12:27

## 2018-12-14 RX ADMIN — PROPOFOL 50 MG: 10 INJECTION, EMULSION INTRAVENOUS at 12:33

## 2018-12-14 ASSESSMENT — PAIN SCALES - GENERAL
PAINLEVEL_OUTOF10: 0
PAINLEVEL_OUTOF10: 0

## 2018-12-14 ASSESSMENT — PAIN - FUNCTIONAL ASSESSMENT: PAIN_FUNCTIONAL_ASSESSMENT: 0-10

## 2018-12-14 NOTE — ANESTHESIA POSTPROCEDURE EVALUATION
Department of Anesthesiology  Postprocedure Note    Patient: Nereida Boucher. MRN: [id number]  YOB: 1950  Date of evaluation: 12/14/2018  Time:  1:01 PM     Procedure Summary     Date:  12/14/18 Room / Location:  [facility] ENDO 03 / [facility] ENDOSCOPY    Anesthesia Start:  1227 Anesthesia Stop:  1301    Procedures:       EGD BIOPSY (N/A )      COLONOSCOPY WITH BIOPSY Diagnosis:  (ANEMIA D64.9/ SCREENING FOR COLON CANCER Z12.11)    Surgeon:  Luigi Adamson MD Responsible Provider:  Toyin Quigley MD    Anesthesia Type:  general ASA Status:  2          Anesthesia Type: general    Kait Phase I: Kait Score: 10    Kait Phase II: Kait Score: 10    Last vitals: Reviewed and per EMR flowsheets.        Anesthesia Post Evaluation    Patient location during evaluation: PACU  Patient participation: complete - patient participated  Level of consciousness: awake and alert  Pain score: 0  Airway patency: patent  Nausea & Vomiting: no nausea and no vomiting  Complications: no  Cardiovascular status: hemodynamically stable  Respiratory status: acceptable  Hydration status: euvolemic

## 2018-12-27 ENCOUNTER — HOSPITAL ENCOUNTER (OUTPATIENT)
Dept: NON INVASIVE DIAGNOSTICS | Age: 68
Discharge: HOME | End: 2018-12-27
Payer: MEDICARE

## 2018-12-27 DIAGNOSIS — E78.2 MIXED HYPERLIPIDEMIA: ICD-10-CM

## 2018-12-27 DIAGNOSIS — R60.0 BILATERAL LEG EDEMA: ICD-10-CM

## 2018-12-27 DIAGNOSIS — G47.33 OBSTRUCTIVE SLEEP APNEA: ICD-10-CM

## 2018-12-27 LAB
LV EF: 58 %
LVEF MODALITY: NORMAL

## 2018-12-27 PROCEDURE — C8929 TTE W OR WO FOL WCON,DOPPLER: HCPCS

## 2018-12-27 PROCEDURE — 6360000004 HC RX CONTRAST MEDICATION: Performed by: INTERNAL MEDICINE

## 2018-12-27 RX ADMIN — PERFLUTREN 2.2 MG: 6.52 INJECTION, SUSPENSION INTRAVENOUS at 11:08

## 2019-01-04 ENCOUNTER — HOSPITAL ENCOUNTER (OUTPATIENT)
Age: 69
Setting detail: OUTPATIENT SURGERY
Discharge: HOME | End: 2019-01-04
Attending: INTERNAL MEDICINE | Admitting: INTERNAL MEDICINE
Payer: MEDICARE

## 2019-01-04 VITALS
DIASTOLIC BLOOD PRESSURE: 73 MMHG | BODY MASS INDEX: 34.01 KG/M2 | HEART RATE: 85 BPM | WEIGHT: 265 LBS | SYSTOLIC BLOOD PRESSURE: 106 MMHG | HEIGHT: 74 IN | RESPIRATION RATE: 16 BRPM | TEMPERATURE: 98.4 F | OXYGEN SATURATION: 95 %

## 2019-01-04 PROCEDURE — 2709999900 HC NON-CHARGEABLE SUPPLY: Performed by: INTERNAL MEDICINE

## 2019-01-04 PROCEDURE — 88305 TISSUE EXAM BY PATHOLOGIST: CPT

## 2019-01-04 PROCEDURE — 7100000010 HC PHASE II RECOVERY - FIRST 15 MIN: Performed by: INTERNAL MEDICINE

## 2019-01-04 PROCEDURE — 7100000011 HC PHASE II RECOVERY - ADDTL 15 MIN: Performed by: INTERNAL MEDICINE

## 2019-01-04 PROCEDURE — 6360000002 HC RX W HCPCS: Performed by: INTERNAL MEDICINE

## 2019-01-04 PROCEDURE — 99152 MOD SED SAME PHYS/QHP 5/>YRS: CPT | Performed by: INTERNAL MEDICINE

## 2019-01-04 PROCEDURE — 6370000000 HC RX 637 (ALT 250 FOR IP): Performed by: INTERNAL MEDICINE

## 2019-01-04 PROCEDURE — 3609014000 HC ENTEROSCOPY BIOPSY: Performed by: INTERNAL MEDICINE

## 2019-01-04 RX ORDER — MEPERIDINE HYDROCHLORIDE 50 MG/ML
INJECTION INTRAMUSCULAR; INTRAVENOUS; SUBCUTANEOUS PRN
Status: DISCONTINUED | OUTPATIENT
Start: 2019-01-04 | End: 2019-01-04 | Stop reason: HOSPADM

## 2019-01-04 RX ORDER — MIDAZOLAM HYDROCHLORIDE 5 MG/ML
INJECTION INTRAMUSCULAR; INTRAVENOUS PRN
Status: DISCONTINUED | OUTPATIENT
Start: 2019-01-04 | End: 2019-01-04 | Stop reason: HOSPADM

## 2019-01-04 RX ORDER — MELOXICAM 15 MG/1
15 TABLET ORAL
Status: ON HOLD | COMMUNITY
End: 2019-03-01 | Stop reason: HOSPADM

## 2019-01-04 ASSESSMENT — PAIN SCALES - GENERAL
PAINLEVEL_OUTOF10: 0

## 2019-01-04 ASSESSMENT — PAIN - FUNCTIONAL ASSESSMENT
PAIN_FUNCTIONAL_ASSESSMENT: 0-10

## 2019-01-10 ENCOUNTER — TELEPHONE (OUTPATIENT)
Dept: INTERNAL MEDICINE CLINIC | Age: 69
End: 2019-01-10

## 2019-01-10 RX ORDER — GABAPENTIN 100 MG/1
100 CAPSULE ORAL 3 TIMES DAILY
Qty: 90 CAPSULE | Refills: 2 | Status: SHIPPED | OUTPATIENT
Start: 2019-01-10 | End: 2019-05-16 | Stop reason: SDUPTHER

## 2019-01-21 ENCOUNTER — OFFICE VISIT (OUTPATIENT)
Dept: INTERNAL MEDICINE CLINIC | Age: 69
End: 2019-01-21
Payer: MEDICARE

## 2019-01-21 VITALS — BODY MASS INDEX: 34.28 KG/M2 | SYSTOLIC BLOOD PRESSURE: 116 MMHG | WEIGHT: 267 LBS | DIASTOLIC BLOOD PRESSURE: 72 MMHG

## 2019-01-21 DIAGNOSIS — S43.422S SPRAIN OF LEFT ROTATOR CUFF CAPSULE, SEQUELA: Primary | ICD-10-CM

## 2019-01-21 DIAGNOSIS — E78.2 MIXED HYPERLIPIDEMIA: ICD-10-CM

## 2019-01-21 DIAGNOSIS — R91.1 PULMONARY NODULE: ICD-10-CM

## 2019-01-21 DIAGNOSIS — K57.90 DIVERTICULOSIS OF INTESTINE WITHOUT BLEEDING, UNSPECIFIED INTESTINAL TRACT LOCATION: ICD-10-CM

## 2019-01-21 DIAGNOSIS — G91.9 HYDROCEPHALUS (HCC): ICD-10-CM

## 2019-01-21 DIAGNOSIS — K22.719 BARRETT'S ESOPHAGUS WITH DYSPLASIA: ICD-10-CM

## 2019-01-21 DIAGNOSIS — G91.2 NPH (NORMAL PRESSURE HYDROCEPHALUS) (HCC): ICD-10-CM

## 2019-01-21 PROCEDURE — G8427 DOCREV CUR MEDS BY ELIG CLIN: HCPCS | Performed by: INTERNAL MEDICINE

## 2019-01-21 PROCEDURE — G8482 FLU IMMUNIZE ORDER/ADMIN: HCPCS | Performed by: INTERNAL MEDICINE

## 2019-01-21 PROCEDURE — 4040F PNEUMOC VAC/ADMIN/RCVD: CPT | Performed by: INTERNAL MEDICINE

## 2019-01-21 PROCEDURE — 1036F TOBACCO NON-USER: CPT | Performed by: INTERNAL MEDICINE

## 2019-01-21 PROCEDURE — 3017F COLORECTAL CA SCREEN DOC REV: CPT | Performed by: INTERNAL MEDICINE

## 2019-01-21 PROCEDURE — 1101F PT FALLS ASSESS-DOCD LE1/YR: CPT | Performed by: INTERNAL MEDICINE

## 2019-01-21 PROCEDURE — 99213 OFFICE O/P EST LOW 20 MIN: CPT | Performed by: INTERNAL MEDICINE

## 2019-01-21 PROCEDURE — G8417 CALC BMI ABV UP PARAM F/U: HCPCS | Performed by: INTERNAL MEDICINE

## 2019-01-21 PROCEDURE — 1123F ACP DISCUSS/DSCN MKR DOCD: CPT | Performed by: INTERNAL MEDICINE

## 2019-01-21 RX ORDER — FINASTERIDE 5 MG/1
5 TABLET, FILM COATED ORAL DAILY
Qty: 90 TABLET | Refills: 3 | Status: SHIPPED | OUTPATIENT
Start: 2019-01-21 | End: 2021-12-01 | Stop reason: SDUPTHER

## 2019-01-21 ASSESSMENT — ENCOUNTER SYMPTOMS
NAUSEA: 0
VOMITING: 0
ABDOMINAL PAIN: 0
CHEST TIGHTNESS: 0
SHORTNESS OF BREATH: 0

## 2019-03-01 ENCOUNTER — HOSPITAL ENCOUNTER (OUTPATIENT)
Age: 69
Setting detail: OUTPATIENT SURGERY
Discharge: HOME | End: 2019-03-01
Attending: INTERNAL MEDICINE | Admitting: INTERNAL MEDICINE
Payer: MEDICARE

## 2019-03-01 VITALS
TEMPERATURE: 97.5 F | HEART RATE: 64 BPM | WEIGHT: 262 LBS | BODY MASS INDEX: 33.62 KG/M2 | SYSTOLIC BLOOD PRESSURE: 115 MMHG | OXYGEN SATURATION: 99 % | HEIGHT: 74 IN | RESPIRATION RATE: 16 BRPM | DIASTOLIC BLOOD PRESSURE: 77 MMHG

## 2019-03-01 PROCEDURE — 99152 MOD SED SAME PHYS/QHP 5/>YRS: CPT | Performed by: INTERNAL MEDICINE

## 2019-03-01 PROCEDURE — 3609017300 HC EGD ESOPHAGOGASTRODUODENOSCOPY BALLOON ABLATION: Performed by: INTERNAL MEDICINE

## 2019-03-01 PROCEDURE — 7100000011 HC PHASE II RECOVERY - ADDTL 15 MIN: Performed by: INTERNAL MEDICINE

## 2019-03-01 PROCEDURE — 2709999900 HC NON-CHARGEABLE SUPPLY: Performed by: INTERNAL MEDICINE

## 2019-03-01 PROCEDURE — 7100000010 HC PHASE II RECOVERY - FIRST 15 MIN: Performed by: INTERNAL MEDICINE

## 2019-03-01 PROCEDURE — 6370000000 HC RX 637 (ALT 250 FOR IP): Performed by: INTERNAL MEDICINE

## 2019-03-01 PROCEDURE — 88305 TISSUE EXAM BY PATHOLOGIST: CPT

## 2019-03-01 PROCEDURE — 6360000002 HC RX W HCPCS: Performed by: INTERNAL MEDICINE

## 2019-03-01 RX ORDER — MEPERIDINE HYDROCHLORIDE 50 MG/ML
INJECTION INTRAMUSCULAR; INTRAVENOUS; SUBCUTANEOUS PRN
Status: DISCONTINUED | OUTPATIENT
Start: 2019-03-01 | End: 2019-03-01 | Stop reason: ALTCHOICE

## 2019-03-01 RX ORDER — MIDAZOLAM HYDROCHLORIDE 1 MG/ML
INJECTION INTRAMUSCULAR; INTRAVENOUS PRN
Status: DISCONTINUED | OUTPATIENT
Start: 2019-03-01 | End: 2019-03-01 | Stop reason: ALTCHOICE

## 2019-03-01 RX ORDER — PANTOPRAZOLE SODIUM 40 MG/1
40 TABLET, DELAYED RELEASE ORAL
Qty: 60 TABLET | Refills: 2 | Status: SHIPPED | OUTPATIENT
Start: 2019-03-01 | End: 2020-06-08 | Stop reason: ALTCHOICE

## 2019-03-01 RX ORDER — SUCRALFATE 1 G/1
1 TABLET ORAL
Qty: 40 TABLET | Refills: 0 | Status: ON HOLD | OUTPATIENT
Start: 2019-03-01 | End: 2019-08-02 | Stop reason: ALTCHOICE

## 2019-03-01 RX ORDER — PANTOPRAZOLE SODIUM 40 MG/1
40 TABLET, DELAYED RELEASE ORAL
Qty: 60 TABLET | Refills: 1 | Status: SHIPPED | OUTPATIENT
Start: 2019-03-01 | End: 2019-10-07

## 2019-03-01 ASSESSMENT — PAIN SCALES - GENERAL
PAINLEVEL_OUTOF10: 0

## 2019-03-01 ASSESSMENT — PAIN - FUNCTIONAL ASSESSMENT: PAIN_FUNCTIONAL_ASSESSMENT: 0-10

## 2019-04-24 ENCOUNTER — HOSPITAL ENCOUNTER (OUTPATIENT)
Age: 69
Setting detail: OUTPATIENT SURGERY
Discharge: HOME | End: 2019-04-24
Attending: INTERNAL MEDICINE | Admitting: INTERNAL MEDICINE
Payer: MEDICARE

## 2019-04-24 VITALS
BODY MASS INDEX: 33.62 KG/M2 | RESPIRATION RATE: 16 BRPM | WEIGHT: 262 LBS | OXYGEN SATURATION: 94 % | TEMPERATURE: 96.7 F | DIASTOLIC BLOOD PRESSURE: 64 MMHG | HEIGHT: 74 IN | HEART RATE: 88 BPM | SYSTOLIC BLOOD PRESSURE: 100 MMHG

## 2019-04-24 PROCEDURE — 3609012400 HC EGD TRANSORAL BIOPSY SINGLE/MULTIPLE: Performed by: INTERNAL MEDICINE

## 2019-04-24 PROCEDURE — 2709999900 HC NON-CHARGEABLE SUPPLY: Performed by: INTERNAL MEDICINE

## 2019-04-24 PROCEDURE — 7100000011 HC PHASE II RECOVERY - ADDTL 15 MIN: Performed by: INTERNAL MEDICINE

## 2019-04-24 PROCEDURE — 88305 TISSUE EXAM BY PATHOLOGIST: CPT

## 2019-04-24 PROCEDURE — 7100000010 HC PHASE II RECOVERY - FIRST 15 MIN: Performed by: INTERNAL MEDICINE

## 2019-04-24 PROCEDURE — 99152 MOD SED SAME PHYS/QHP 5/>YRS: CPT | Performed by: INTERNAL MEDICINE

## 2019-04-24 PROCEDURE — 6360000002 HC RX W HCPCS: Performed by: INTERNAL MEDICINE

## 2019-04-24 PROCEDURE — 6370000000 HC RX 637 (ALT 250 FOR IP): Performed by: INTERNAL MEDICINE

## 2019-04-24 PROCEDURE — 3609017300 HC EGD ESOPHAGOGASTRODUODENOSCOPY BALLOON ABLATION: Performed by: INTERNAL MEDICINE

## 2019-04-24 RX ORDER — MIDAZOLAM HYDROCHLORIDE 1 MG/ML
INJECTION INTRAMUSCULAR; INTRAVENOUS PRN
Status: DISCONTINUED | OUTPATIENT
Start: 2019-04-24 | End: 2019-04-24 | Stop reason: ALTCHOICE

## 2019-04-24 RX ORDER — SUCRALFATE 1 G/1
1 TABLET ORAL 4 TIMES DAILY
Qty: 60 TABLET | Refills: 0 | Status: ON HOLD | OUTPATIENT
Start: 2019-04-24 | End: 2019-08-02 | Stop reason: ALTCHOICE

## 2019-04-24 RX ORDER — MEPERIDINE HYDROCHLORIDE 50 MG/ML
INJECTION INTRAMUSCULAR; INTRAVENOUS; SUBCUTANEOUS PRN
Status: DISCONTINUED | OUTPATIENT
Start: 2019-04-24 | End: 2019-04-24 | Stop reason: ALTCHOICE

## 2019-04-24 RX ORDER — PANTOPRAZOLE SODIUM 40 MG/1
40 TABLET, DELAYED RELEASE ORAL
Qty: 60 TABLET | Refills: 1 | Status: SHIPPED | OUTPATIENT
Start: 2019-04-24 | End: 2019-05-22 | Stop reason: SDUPTHER

## 2019-04-24 ASSESSMENT — PAIN - FUNCTIONAL ASSESSMENT
PAIN_FUNCTIONAL_ASSESSMENT: 0-10
PAIN_FUNCTIONAL_ASSESSMENT: FACES
PAIN_FUNCTIONAL_ASSESSMENT: 0-10
PAIN_FUNCTIONAL_ASSESSMENT: FACES
PAIN_FUNCTIONAL_ASSESSMENT: FACES

## 2019-04-24 NOTE — H&P
History and Physical / Pre-Sedation Assessment    Patient:  Carlyle Velez. :   1950     Intended Procedure:  egd    HPI: FU gastric ulcer. rfa of [place]    Past Medical History:   has a past medical history of Allergic rhinitis, Erectile dysfunction, Hyperlipidemia, Hypertension, Kidney failure, Normal pressure hydrocephalus, Screening for abdominal aortic aneurysm (AAA) performed, and Unspecified sleep apnea. Past Surgical History:   has a past surgical history that includes other surgical history (N/A, 2018); pr create shunt:ventric-peritoneal (N/A, 2018); laparoscopy (N/A, 2018); Eye surgery (Left); joint replacement (Left, ); Upper gastrointestinal endoscopy (N/A, 2018); Colonoscopy (2018); Enteroscopy (N/A, 2019); and Upper gastrointestinal endoscopy (N/A, 3/1/2019). Medications:  Prior to Admission medications    Medication Sig Start Date End Date Taking? Authorizing Provider   pantoprazole (PROTONIX) 40 MG tablet Take 1 tablet by mouth 2 times daily (before meals) 3/1/19  Yes Marilynn Carcamo MD   pantoprazole (PROTONIX) 40 MG tablet Take 1 tablet by mouth 2 times daily (before meals) 3/1/19  Yes Marilynn Carcamo MD   sucralfate (CARAFATE) 1 GM tablet Take 1 tablet by mouth 4 times daily (before meals and nightly) 3/1/19  Yes Marilynn Carcamo MD   finasteride (PROSCAR) 5 MG tablet Take 1 tablet by mouth daily 19  Yes Bryson Rebollar MD   gabapentin (NEURONTIN) 100 MG capsule Take 1 capsule by mouth 3 times daily for 90 days. . 1/10/19 4/24/19 Yes Bryson Rebollar MD   atorvastatin (LIPITOR) 10 MG tablet Take 1 tablet by mouth daily 10/12/18  Yes Bryson Rebollar MD   donepezil (ARICEPT) 10 MG tablet Take 10 mg by mouth daily bedtime 10/7/18  Yes Historical Provider, MD   cetirizine (ZYRTEC) 10 MG tablet TAKE 1 TABLET DAILY 18  Yes Barry Laurent MD   tamsulosin (FLOMAX) 0.4 MG capsule Take 2 capsules by mouth daily 3/22/18  Yes Andrea Serge MD Michael   montelukast (SINGULAIR) 10 MG tablet Take 1 tablet by mouth nightly 3/22/18  Yes Shreyas Mccall MD   Omega-3 Fatty Acids (FISH OIL) 1000 MG CAPS Take 1,000 mg by mouth daily. Yes Historical Provider, MD   Flaxseed, Linseed, (FLAX SEED OIL) 1000 MG CAPS Take 1,000 mg by mouth daily. Yes Historical Provider, MD   Cholecalciferol (VITAMIN D) 2000 UNITS CAPS capsule Take 1 capsule by mouth daily. Yes Historical Provider, MD   multivitamin [facility]) per tablet Take 1 tablet by mouth daily. Yes Historical Provider, MD       Family History:  family history includes Cancer in his father. Social History:   reports that he quit smoking about 17 years ago. His smoking use included cigarettes. He has a 5.00 pack-year smoking history. He has quit using smokeless tobacco. He reports that he drinks about 2.4 oz of alcohol per week. He reports that he does not use drugs. Allergies:  Lisinopril    Nurses notes reviewed and agreed. Medications reviewed    Physical Exam:  Vital Signs: /83   Pulse 64   Temp 96.7 °F (35.9 °C) (Oral)   Resp (!) 1   Ht 6' 2\" (1.88 m)   Wt 262 lb (118.8 kg)   SpO2 97%   BMI 33.64 kg/m²    Pulmonary:Normal  Cardiac:Normal  Abdomen:Normal    Pre-Procedure Assessment / Plan:  ASA 2 - Patient with mild systemic disease with no functional limitations  Mallampati Airway Assessment:  Mallampati Class II - (soft palate, fauces & uvula are visible)  Level of Sedation Plan: Moderate sedation  Post Procedure plan: Return to same level of care    I assessed the patient and find that the patient is in satisfactory condition to proceed with the planned procedure and sedation plan. I have explained the risk, benefits, and alternatives to the procedure. The patient understands and agrees to proceed.   Jeri Rojas  10:39 AM 4/24/2019

## 2019-04-24 NOTE — PROGRESS NOTES
Rosie Watson. is a 77 y.o. male patient. No current facility-administered medications for this encounter. Allergies   Allergen Reactions    Lisinopril Other (See Comments)     cough     Active Problems:    * No active hospital problems. *  Resolved Problems:    * No resolved hospital problems. *    Blood pressure 136/79, pulse 68, temperature 96.7 °F (35.9 °C), temperature source Oral, resp. rate 16, height 6' 2\" (1.88 m), weight 262 lb (118.8 kg), SpO2 100 %. Subjective:  Symptoms:  Stable. Diet:  NPO. Activity level: Normal.    Pain:  He reports no pain. Objective:  General Appearance:  Comfortable. Vital signs: (most recent): Blood pressure 136/79, pulse 68, temperature 96.7 °F (35.9 °C), temperature source Oral, resp. rate 16, height 6' 2\" (1.88 m), weight 262 lb (118.8 kg), SpO2 100 %. Vital signs are normal.    Output: Producing urine and producing stool. Lungs:  Normal effort. Abdomen: Abdomen is soft. There is no abdominal tenderness. Neurological: Patient is alert and oriented to person, place and time. Skin:  Warm and dry.       Assessment & Plan    Melissa Martinez RN  4/24/2019

## 2019-04-25 ENCOUNTER — TELEPHONE (OUTPATIENT)
Dept: INTERNAL MEDICINE CLINIC | Age: 69
End: 2019-04-25

## 2019-04-25 RX ORDER — ATORVASTATIN CALCIUM 10 MG/1
10 TABLET, FILM COATED ORAL DAILY
Qty: 90 TABLET | Refills: 3 | Status: SHIPPED | OUTPATIENT
Start: 2019-04-25 | End: 2019-11-20 | Stop reason: SDUPTHER

## 2019-04-25 NOTE — TELEPHONE ENCOUNTER
Refill request:     atorvastatin (LIPITOR) 10 MG tablet [[id number]        [facility] - [address], [address] [phone] - F [phone]

## 2019-05-14 ENCOUNTER — TELEPHONE (OUTPATIENT)
Dept: INTERNAL MEDICINE CLINIC | Age: 69
End: 2019-05-14

## 2019-05-16 RX ORDER — GABAPENTIN 100 MG/1
100 CAPSULE ORAL 3 TIMES DAILY
Qty: 90 CAPSULE | Refills: 2 | Status: SHIPPED | OUTPATIENT
Start: 2019-05-16 | End: 2019-08-19 | Stop reason: SDUPTHER

## 2019-05-22 ENCOUNTER — OFFICE VISIT (OUTPATIENT)
Dept: INTERNAL MEDICINE CLINIC | Age: 69
End: 2019-05-22
Payer: MEDICARE

## 2019-05-22 VITALS
WEIGHT: 263.2 LBS | HEART RATE: 72 BPM | HEIGHT: 74 IN | BODY MASS INDEX: 33.78 KG/M2 | DIASTOLIC BLOOD PRESSURE: 74 MMHG | OXYGEN SATURATION: 98 % | SYSTOLIC BLOOD PRESSURE: 138 MMHG

## 2019-05-22 DIAGNOSIS — Z12.5 SCREENING FOR PROSTATE CANCER: ICD-10-CM

## 2019-05-22 DIAGNOSIS — K22.70 BARRETT'S ESOPHAGUS WITHOUT DYSPLASIA: ICD-10-CM

## 2019-05-22 DIAGNOSIS — E78.2 MIXED HYPERLIPIDEMIA: ICD-10-CM

## 2019-05-22 DIAGNOSIS — R91.1 PULMONARY NODULE: ICD-10-CM

## 2019-05-22 DIAGNOSIS — Z00.00 MEDICARE ANNUAL WELLNESS VISIT, SUBSEQUENT: Primary | ICD-10-CM

## 2019-05-22 DIAGNOSIS — Z00.00 ROUTINE GENERAL MEDICAL EXAMINATION AT A HEALTH CARE FACILITY: ICD-10-CM

## 2019-05-22 DIAGNOSIS — G91.2 NPH (NORMAL PRESSURE HYDROCEPHALUS) (HCC): ICD-10-CM

## 2019-05-22 DIAGNOSIS — R35.1 NOCTURIA: ICD-10-CM

## 2019-05-22 PROCEDURE — 3017F COLORECTAL CA SCREEN DOC REV: CPT | Performed by: INTERNAL MEDICINE

## 2019-05-22 PROCEDURE — G0439 PPPS, SUBSEQ VISIT: HCPCS | Performed by: INTERNAL MEDICINE

## 2019-05-22 PROCEDURE — 4040F PNEUMOC VAC/ADMIN/RCVD: CPT | Performed by: INTERNAL MEDICINE

## 2019-05-22 PROCEDURE — 1123F ACP DISCUSS/DSCN MKR DOCD: CPT | Performed by: INTERNAL MEDICINE

## 2019-05-22 ASSESSMENT — ANXIETY QUESTIONNAIRES: GAD7 TOTAL SCORE: 0

## 2019-05-22 ASSESSMENT — ENCOUNTER SYMPTOMS
NAUSEA: 0
DIARRHEA: 0
BLOOD IN STOOL: 0
CONSTIPATION: 0
ABDOMINAL PAIN: 0
CHEST TIGHTNESS: 0
VOMITING: 0
SHORTNESS OF BREATH: 0

## 2019-05-22 ASSESSMENT — PATIENT HEALTH QUESTIONNAIRE - PHQ9
SUM OF ALL RESPONSES TO PHQ QUESTIONS 1-9: 0
SUM OF ALL RESPONSES TO PHQ QUESTIONS 1-9: 0

## 2019-05-22 ASSESSMENT — LIFESTYLE VARIABLES
HOW OFTEN DO YOU HAVE A DRINK CONTAINING ALCOHOL: 1
AUDIT-C TOTAL SCORE: 1
HOW MANY STANDARD DRINKS CONTAINING ALCOHOL DO YOU HAVE ON A TYPICAL DAY: 0
HOW OFTEN DO YOU HAVE SIX OR MORE DRINKS ON ONE OCCASION: 0

## 2019-05-22 NOTE — PROGRESS NOTES
Outpatient Note - Annual wellness visit    Patient:  Brooke Husain. : 1950  Age: 77 y.o. MRN: [id number]  Date : 2019      HistoryObtained From:  patient, electronic medical record    CHIEF COMPLAINT:    Chief Complaint   Patient presents with    Medicare AWV       HISTORY OF PRESENT ILLNESS:   The patient is a 77 y.o. male who presents to be established and for his annual physical exam.     Pt denies CP/SOB/palpitations/abdominal pain/N/V.     1) HLD  Lab Results   Component Value Date    CHOL 207 (H) 2018    CHOL 172 2018    CHOL 161 2018     Lab Results   Component Value Date    TRIG 182 (H) 2018    TRIG 164 (H) 2018    TRIG 142 2018     Lab Results   Component Value Date    HDL 51 2018    HDL 57 2018    HDL 57 2018     Lab Results   Component Value Date    LDLCALC 120 (H) 2018    LDLCALC 82 2018    LDLCALC 76 2018     Lab Results   Component Value Date    LABVLDL 36 2018    LABVLDL 33 2018    LABVLDL 28 2018   he is on Lipitor 10 mg   No reported side effects     3) NPH  Status post right frontal ventriculostomy catheter placement. Shunt placed . He is followed by NS Dr Iain Cullen and Dr Tyrone Palma.    4) lung nodule   Was seen at the [place]. He quit smoking  (he used to smoke 1 pack / day for 10 years. Per pt no concerning findings. last checked 1 year ago.      6) Depression  He is on Wellbutrin. He  Feels ok. No suicidal ideation.  [facility]     Barrets esophagus, no dysplaisa, last endoscopy- for helaing ulcer and monitoring by Dr Mark Shah   He is on Protonix 40 mg daily     Past Medical History:        Diagnosis Date    Allergic rhinitis     Erectile dysfunction     Hyperlipidemia     Hypertension     Kidney failure 2018    Normal pressure hydrocephalus 2018    shunt placement    Screening for abdominal aortic aneurysm (AAA) [facility] 03/02/2018    [facility]    Unspecified sleep apnea        Past Surgical History:        Procedure Laterality Date    COLONOSCOPY  12/14/2018    COLONOSCOPY WITH BIOPSY performed by Antoinette Root MD at [facility] ENTEROSCOPY N/A 1/4/2019    ENTEROSCOPY PUSH BIOPSY [facility] Antoinette Root MD [facility] Left     lipoma    JOINT REPLACEMENT Left 2015    PARTIAL KNEE REPLACMENT    LAPAROSCOPY N/A 7/18/2018    OPENING AND CLOSING FOR VENTRICULAR PERITONEAL SHUNT performed by Sridevi Crowe MD at [address] [place] [address] [address] 07/18/2018    [address]; (N/A )    [place] CREATE SHUNT:VENTRIC-PERITONEAL N/A 7/18/2018    VENTRICULAR PERITONEAL SHUNT INSERTION RIGHT SIDE; performed by Ric Taylor MD at [place] [address] 12/14/2018    EGD BIOPSY performed by Antoinette Root MD at [address] [place] [address] N/A 3/1/2019    ESOPHAGOGASTRODUODENOSCOPY WITH RADIOFREQUENCY  ABLATION/ ABLATIONS X19 performed by Antoinette Root MD at [address] [place] [address] N/A 4/24/2019    ESOPHAGOGASTRODUODENOSCOPY WITH RADIOFREQUENCY ABLATION performed by Antoinette Root MD at [address] [place] [address] N/A 4/24/2019    EGD BIOPSY performed by Antoinette Root MD at [facility] ENDOSCOPY       Family History:       Problem Relation Age of Onset    Cancer Father         prostate       Social History:   TOBACCO:   reports that he quit smoking about 17 years ago. His smoking use included cigarettes. He has a 5.00 pack-year smoking history. He has quit using smokeless tobacco.    ETOH:   reports that he drinks about 2.4 oz of alcohol per week. Allergies:  Lisinopril    Current Medications:    Prior to Admission medications    Medication Sig Start Date End Date Taking?  Authorizing Provider   gabapentin (NEURONTIN) 100 MG capsule Take 1 capsule by mouth 3 times daily for 90 days. 5/16/19 8/14/19 Yes Garth Bell MD   atorvastatin (LIPITOR) 10 MG tablet Take 1 tablet by mouth daily 4/25/19  Yes Garth Bell MD   sucralfate (CARAFATE) 1 GM tablet Take 1 tablet by mouth 4 times daily 30 min before each meal and at bed time 4/24/19  Yes Allan Gentile MD   pantoprazole (PROTONIX) 40 MG tablet Take 1 tablet by mouth 2 times daily (before meals) 3/1/19  Yes Allan Gentile MD   pantoprazole (PROTONIX) 40 MG tablet Take 1 tablet by mouth 2 times daily (before meals) 3/1/19  Yes Allan Gentile MD   sucralfate (CARAFATE) 1 GM tablet Take 1 tablet by mouth 4 times daily (before meals and nightly) 3/1/19  Yes Allan Gentile MD   finasteride (PROSCAR) 5 MG tablet Take 1 tablet by mouth daily 1/21/19  Yes Garth Bell MD   donepezil (ARICEPT) 10 MG tablet Take 10 mg by mouth daily bedtime 10/7/18  Yes Historical Provider, MD   cetirizine (ZYRTEC) 10 MG tablet TAKE 1 TABLET DAILY 8/20/18  Yes Dami Xiao MD   tamsulosin (FLOMAX) 0.4 MG capsule Take 2 capsules by mouth daily 3/22/18  Yes Avani Pringle MD   montelukast (SINGULAIR) 10 MG tablet Take 1 tablet by mouth nightly 3/22/18  Yes Avani Pringle MD   Omega-3 Fatty Acids (FISH OIL) 1000 MG CAPS Take 1,000 mg by mouth daily. Yes Historical Provider, MD   Flaxseed, Linseed, (FLAX SEED OIL) 1000 MG CAPS Take 1,000 mg by mouth daily. Yes Historical Provider, MD   Cholecalciferol (VITAMIN D) 2000 UNITS CAPS capsule Take 1 capsule by mouth daily. Yes Historical Provider, MD   multivitamin [facility]) per tablet Take 1 tablet by mouth daily. Yes Historical Provider, MD       REVIEW OFSYSTEMS:  Review of Systems   Constitutional: Negative for activity change, appetite change, chills, fever and unexpected weight change. Eyes: Negative for visual disturbance. Respiratory: Negative for chest tightness and shortness of breath.          No hemoptysis   Mild throat congestion which he relates to allergies    Cardiovascular: Negative for chest pain. Gastrointestinal: Negative for abdominal pain, blood in stool, constipation, diarrhea, nausea and vomiting. Genitourinary: Negative for difficulty urinating and hematuria. Skin: Negative for rash. Allergic/Immunologic: Negative for immunocompromised state. Neurological: Positive for numbness (chronic b/l feet ). Negative for dizziness, weakness and headaches. Psychiatric/Behavioral: Negative for dysphoric mood and suicidal ideas. The patient is not nervous/anxious. Screenin) colon cancer screening completed? yes, 2018- one sigmoid polyp   2) Prostate cancer screening completed (or not needed) ? Last PSA 2017 1.5    Need screening for lung cancer? He will send us the [place] reports     AAA screening needed:  yes - at the [place]      Last eye exam: a few days ago- eyeglsaes    Annual wellness visit:  1) Patient reports > 2 fall in the past year ?he had one fall event last year   2) Patient has safety precautious to prevent falls at home? Yes      safety precautions tips were given  3) patient reports anxiety/ depression?controlled   4) patientreport limiting vision difficulties? No   5) patient report limiting hearing difficulties? Yes- he has chronic hearing aids   6) patient report eating well and satisfactory diet? Yes   7) patient reports physical activity? Yes recommended walking 30 min/day for 5 day a week. 8) patient has a living well? Yes  9) Patient lives at: house with hsi wife       Immunization:    Immunization History   Administered Date(s) Administered    Influenza, High Dose (Fluzone 65 yrs and older) 10/20/2016, 10/04/2017, 2018    Pneumococcal 13-valent Conjugate (Rcutrij40) 2016    Pneumococcal Polysaccharide (Hfitshwyc27) 2017    Tdap (Boostrix, Adacel) 2013    Zoster Live (Zostavax) 2013    Zoster Subunit (Shingrix) 2018, 10/19/2018       Physical Exam:      Vitals: BP 138/74   Pulse 72   Ht 6' 2\" (1.88 m)   Wt 263 lb 3.2 oz (119.4 kg)   SpO2 98%   BMI 33.79 kg/m²     Body mass index is 33.79 kg/m². Wt Readings from Last 3 Encounters:   05/22/19 263 lb 3.2 oz (119.4 kg)   04/24/19 262 lb (118.8 kg)   03/01/19 262 lb (118.8 kg)     Physical Exam   Constitutional: He is oriented to person, place, and time. He appears well-developed and well-nourished. No distress. HENT:   Head: Normocephalic and atraumatic. Mouth/Throat: Oropharynx is clear and moist. No oropharyngeal exudate. Eyes: Conjunctivae and EOM are normal. Right eye exhibits no discharge. Left eye exhibits no discharge. No scleral icterus. Neck: Normal range of motion. Neck supple. Cardiovascular: Normal rate and normal heart sounds. No murmur heard. Pulmonary/Chest: Effort normal and breath sounds normal. No respiratory distress. He has no wheezes. He has no rales. Abdominal: Soft. He exhibits no distension. There is no tenderness. Musculoskeletal: He exhibits no deformity. Lymphadenopathy:        Head (right side): No submental and no submandibular adenopathy present. Head (left side): No submental and no submandibular adenopathy present. He has no cervical adenopathy. Right cervical: No superficial cervical and no deep cervical adenopathy present. Left cervical: No superficial cervical and no deep cervical adenopathy present. Neurological: He is alert and oriented to person, place, and time. He has normal reflexes. He exhibits normal muscle tone. GCS eye subscore is 4. GCS verbal subscore is 5. GCS motor subscore is 6. Skin: No rash noted. He is not diaphoretic. Psychiatric: He has a normal mood and affect. His behavior is normal. Thought content normal.        Assessment/Plan:   Conor Thomas was seen today for medicare awv.     Diagnoses and all orders for this visit:    Medicare annual wellness visit, subsequent  He is doing well  Discussed home safety   - Comprehensive Metabolic Panel; Future  -     CBC Auto Differential; Future  -     Lipid Panel; Future  -     PSA, Total and Free; Future    Mixed hyperlipidemia  LDL TG were a little higher   Recheck lipids  Keep Lipitor   -     Comprehensive Metabolic Panel; Future  -     CBC Auto Differential; Future  -     Lipid Panel; Future    Pulmonary nodule  No resp or contituinal complains  He will send us scanning results from the [facility]   -     CBC Auto Differential; Future    NPH (normal pressure hydrocephalus)  Well controlled- no changes in medication today. Keep neurology fu    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  Had ablation with Dr Edwin Matthews  He will keep PPI and routine f/u   -     CBC Auto Differential; Future    Screening for prostate cancer  -     PSA, Total and Free; Future    Nocturia  -     PSA, Total and Free; Future    - Patient was encouraged to call the office or be seen with MD for anyworsening or lack    of improvement in his symptoms.       - Pt was asked to schedule an appointment in 6 months, and to let me know of any scheduling difficulties. Additional patientsinstructions (if given):   Patient Instructions   Please ask the [facility] to provide the reports of the lung scanning (chets XR and CT scans)    Patient Education        Learning About How to Make a Home Safe  Making your home safe: Overview  You can help protect the person in your care by making the home safe. Here are some general tips for how to lower the chance of getting injured in the home. · Pad sharp corners on furniture and counter tops. · Keep objects that are used often within easy reach. · Install handrails around the toilet and in the shower. Use a tub mat to prevent slipping. · Use a shower chair or bath bench when the person bathes. · Provide good lighting inside and outside the home. Put night-lights in bedrooms, hallways, and bathrooms. Have light at the top and bottom of stairways.   · Have a first aid kit.  It is also important to be aware of safe temperatures in the home. When helping someone bathe, use the back of your hand to test the water to make sure it's not too hot. Lower the temperature setting in the hot water heater to 120°F or lower to avoid burns. And make sure other liquids (such as coffee, tea, or soup) are not too hot. How can you protect from fire and carbon monoxide? Home safety alarms are very important. A smoke alarm can detect small amounts of smoke. This can allow time to escape from a fire. And a carbon monoxide alarm can let people know about this deadly gas before it starts to make them sick. · Put smoke alarms:  ? On each level of the home, in the hallway outside sleeping areas, and inside each bedroom. ? In the center of a ceiling, or on a wall 6 to 12 inches from the ceiling. This is where smoke goes first. Avoid places near doors, windows, or air ducts. · Put a carbon monoxide alarm in the hallway outside of the bedrooms in each sleeping area of the house. The alarm should be placed high on the wall. Make sure that the alarm can't be covered up by furniture or drapes. · Test alarms regularly by pressing the test button. · Replace non-lithium batteries in alarms twice a year. · Have a plan for getting out of the home if there is a fire. Practice by having a fire drill. · Keep a fire extinguisher in the kitchen. What can you do to prevent falls? You can help prevent falls by keeping rooms uncluttered, with clear walkways around furniture. Keep electrical cords off the floor, and remove throw rugs to prevent tripping. If there are steps in the home, make sure they all have handrails, and always use the handrails. Don't leave items on the steps, and be sure to fix any that are loose, broken, or uneven. How can you increase safety for people with dementia? If you are caring for someone who has dementia, you may need to make some extra changes to create a safe home.  People with dementia have a loss of mental skills, such as memory, problem solving, and learning. So things that might not have been a danger to them before can cause safety problems now. Here are some things to consider:  · Don't move furniture around. The person may become confused. · Use locks on doors and cupboards. Lock up knives, scissors, medicines, cleaning supplies, and other dangerous items. · Use hidden switches or controls for the stove, thermostat, water heater, and other appliances. · If your loved one is still cooking, think about whether that is safe. It may be okay with some help, depending on your loved one's condition. But for people who have memory or thinking problems, it's best to avoid any activities that might not be safe. · If the person tends to wander or to try to leave the home, install motion-sensor lights on all doors and windows. · Have emergency numbers in a central area near a phone. Include 911 and numbers for the doctor and family members. · Get medical alert jewelry for the person so you can be contacted if he or she wanders away. If possible, provide a safe place for wandering, such as an enclosed yard or garden. Where can you learn more? Go to https://[employer].Semadic. org and sign in to your [employer] account. Enter R391 in the Farm[employer] box to learn more about \"Learning About How to Make a Home Safe. \"     If you do not have an account, please click on the \"Sign Up Now\" link. Current as of: April 18, 2018  Content Version: 12.0  © 2650-8514 Healthwise, Incorporated. Care instructions adapted under license by [facility] ([facility]). If you have questions about a medical condition or this instruction, always ask your healthcare professional. [place] [address] any warranty or liability for your use of this information. Medicare Annual Wellness Visit  Name: Merlyn Valdez. MDMSCB Date: 5/22/2019   MRN: [id number] Sex: Male   Age: 77 y.o. Ethnicity: Non-/Non    : 1950 Race: Kimmie Simpson. is here for Medicare AWV    Screenings for behavioral, psychosocial and functional/safety risks, and cognitive dysfunction are all negative except as indicated below. These results, as well as other patient data from the [address] form, are documented in Flowsheets linked to this Encounter. Allergies   Allergen Reactions    Lisinopril Other (See Comments)     cough     Prior to Visit Medications    Medication Sig Taking? Authorizing Provider   gabapentin (NEURONTIN) 100 MG capsule Take 1 capsule by mouth 3 times daily for 90 days. Yes Garth Bell MD   atorvastatin (LIPITOR) 10 MG tablet Take 1 tablet by mouth daily Yes Garth Bell MD   sucralfate (CARAFATE) 1 GM tablet Take 1 tablet by mouth 4 times daily 30 min before each meal and at bed time Yes Allan Gentile MD   pantoprazole (PROTONIX) 40 MG tablet Take 1 tablet by mouth 2 times daily (before meals) Yes Allan Gentile MD   pantoprazole (PROTONIX) 40 MG tablet Take 1 tablet by mouth 2 times daily (before meals) Yes Allan Gentile MD   sucralfate (CARAFATE) 1 GM tablet Take 1 tablet by mouth 4 times daily (before meals and nightly) Yes Allan Gentile MD   finasteride (PROSCAR) 5 MG tablet Take 1 tablet by mouth daily Yes Garth Bell MD   donepezil (ARICEPT) 10 MG tablet Take 10 mg by mouth daily bedtime Yes Historical Provider, MD   cetirizine (ZYRTEC) 10 MG tablet TAKE 1 TABLET DAILY Yes Dami Xiao MD   tamsulosin (FLOMAX) 0.4 MG capsule Take 2 capsules by mouth daily Yes Avani Pringle MD   montelukast (SINGULAIR) 10 MG tablet Take 1 tablet by mouth nightly Yes Avani Pringle MD   Omega-3 Fatty Acids (FISH OIL) 1000 MG CAPS Take 1,000 mg by mouth daily. Yes Historical Provider, MD   Flaxseed, Linseed, (FLAX SEED OIL) 1000 MG CAPS Take 1,000 mg by mouth daily.  Yes Historical Provider, MD   Cholecalciferol (VITAMIN D) 2000 UNITS CAPS capsule Take 1 capsule by mouth daily. Yes Historical Provider, MD   multivitamin [facility]) per tablet Take 1 tablet by mouth daily.  Yes Historical Provider, MD     Past Medical History:   Diagnosis Date    Allergic rhinitis     Erectile dysfunction     Hyperlipidemia     Hypertension     Kidney failure 07/2018    Normal pressure hydrocephalus 07/2018    shunt placement    Scre[facility]    Unspecified sleep apnea      Past Surgical History:   Procedure Laterality Date    COLONOSCOPY  12/14/2018    COLONOSCOPY WITH BIOPSY performed by Latia Cordon MD at [facility] ENTEROSCOPY N/A 1/4/2019    ENTEROSCOPY PUSH BIOPSY performed by Latia Cordon MD at [facility] Left     lipoma    JOINT REPLACEMENT Left 2015    PARTIAL KNEE REPLACMENT    LAPAROSCOPY N/A 7/18/2018    OPENING AND CLOSING FOR VENTRICULAR PERITONEAL SHUNT performed by Kirti Chew MD at [address] [place] [address] 20 07/18/2018    [address]; (N/A )    [facility] CREATE SHUNT:VENTRIC-PERITONEAL N/A 7/18/2018    VENTRICULAR PERITONEAL SHUNT INSERTION RIGHT SIDE; performed by Tyree Lilly MD at [address] [place] [address] 12/14/2018    EGD BIOPSY performed by Latia Cordon MD at [address] N/A 3/1/2019    ESOPHAGOGASTRODUODENOSCOPY WITH RADIOFREQUENCY  ABLATION/ ABLATIONS X19 performed by Latia Cordon MD at [address] N/A 4/24/2019    ESOPHAGOGASTRODUODENOSCOPY WITH RADIOFREQUENCY ABLATION performed by Latia Cordon MD at [address] N/A 4/24/2019    EGD BIOPSY performed by Latia Cordon MD at [facility] ENDOSCOPY     Family History   Problem Relation Age of Onset    Cancer Father         prostate       CareTeam (Including outside providers/suppliers regularly involved in providing care):   Patient Care Team:  Allison Rodrigues MD as PCP - General (Internal Medicine)  Allison Rodrigues MD as PCP - [facility] Attributed Provider    Wt Readings from Last 3 Encounters:   05/22/19 263 lb 3.2 oz (119.4 kg)   04/24/19 262 lb (118.8 kg)   03/01/19 262 lb (118.8 kg)     Vitals:    05/22/19 0902   BP: 138/74   Pulse: 72   SpO2: 98%   Weight: 263 lb 3.2 oz (119.4 kg)   Height: 6' 2\" (1.88 m)     Body mass index is 33.79 kg/m². Based upon direct observation of the patient, evaluation of cognition reveals no gross memory or cognitive issues. {OPTIONAL FOR THIS VISIT TYPE - GENERAL PHYSICAL EXAM- see above  Patient's complete Health Risk Assessment and screening values have been reviewed and are found in Flowsheets. The following problems were reviewed today and where indicated follow up appointments were made and/or referrals ordered. Positive Risk Factor Screenings with Interventions:     Health Habits/Nutrition:     Body mass index is 33.79 kg/m².   Health Habits/Nutrition Interventions:  · pt reports good diet     Personalized Preventive Plan   Current Health Maintenance Status  Immunization History   Administered Date(s) Administered    Influenza, High Dose (Fluzone 65 yrs and older) 10/20/2016, 10/04/2017, 09/20/2018    Pneumococcal 13-valent Conjugate (Uwwcaqu26) 05/23/2016    Pneumococcal Polysaccharide ([id number]) 08/09/2017    Tdap (Boostrix, Adacel) 09/09/2013    Zoster Live (Zostavax) 03/05/2013    Zoster Subunit (Shingrix) 05/25/2018, 10/19/2018        Health Maintenance   Topic Date Due    DTaP/Tdap/Td vaccine (2 - Td) 09/09/2023    Lipid screen  09/20/2023    Colon cancer screen colonoscopy  12/14/2028    Flu vaccine  Completed    Shingles Vaccine  Completed    Pneumococcal 65+ years Vaccine  Completed    AAA screen  Completed    Hepatitis C screen  Completed   Annual wellness visit:  1) Patient reports > 2 fall in the past year ?he had one fall event last year   2) Patient has safety precautious to prevent falls at home? Yes      safety precautions tips were given  3) patient reports anxiety/ depression?controlled   4) patientreport limiting vision difficulties? No   5) patient report limiting hearing difficulties? Yes- he has chronic hearing aids   6) patient report eating well and satisfactory diet? Yes   7) patient reports physical activity? Yes recommended walking 30 min/day for 5 day a week. 8) patient has a living well? Yes  9) Patient lives at: house with hsi wife     Recommendations for Preventive Services Due: see orders and patient instructions/AVS.  .   Recommended screening schedule for the next 5-10 years is provided to the patient in written form: see Patient Instructions/AVS.

## 2019-05-22 NOTE — PATIENT INSTRUCTIONS
Please ask the [facility] to provide the reports of the lung scanning (chets XR and CT scans)    Patient Education        Learning About How to Make a Home Safe  Making your home safe: Overview  You can help protect the person in your care by making the home safe. Here are some general tips for how to lower the chance of getting injured in the home. · Pad sharp corners on furniture and counter tops. · Keep objects that are used often within easy reach. · Install handrails around the toilet and in the shower. Use a tub mat to prevent slipping. · Use a shower chair or bath bench when the person bathes. · Provide good lighting inside and outside the home. Put night-lights in bedrooms, hallways, and bathrooms. Have light at the top and bottom of stairways. · Have a first aid kit. It is also important to be aware of safe temperatures in the home. When helping someone bathe, use the back of your hand to test the water to make sure it's not too hot. Lower the temperature setting in the hot water heater to 120°F or lower to avoid burns. And make sure other liquids (such as coffee, tea, or soup) are not too hot. How can you protect from fire and carbon monoxide? Home safety alarms are very important. A smoke alarm can detect small amounts of smoke. This can allow time to escape from a fire. And a carbon monoxide alarm can let people know about this deadly gas before it starts to make them sick. · Put smoke alarms:  ? On each level of the home, in the hallway outside sleeping areas, and inside each bedroom. ? In the center of a ceiling, or on a wall 6 to 12 inches from the ceiling. This is where smoke goes first. Avoid places near doors, windows, or air ducts. · Put a carbon monoxide alarm in the hallway outside of the bedrooms in each sleeping area of the house. The alarm should be placed high on the wall. Make sure that the alarm can't be covered up by furniture or drapes.   · Test alarms regularly by pressing the test button. · Replace non-lithium batteries in alarms twice a year. · Have a plan for getting out of the home if there is a fire. Practice by having a fire drill. · Keep a fire extinguisher in the kitchen. What can you do to prevent falls? You can help prevent falls by keeping rooms uncluttered, with clear walkways around furniture. Keep electrical cords off the floor, and remove throw rugs to prevent tripping. If there are steps in the home, make sure they all have handrails, and always use the handrails. Don't leave items on the steps, and be sure to fix any that are loose, broken, or uneven. How can you increase safety for people with dementia? If you are caring for someone who has dementia, you may need to make some extra changes to create a safe home. People with dementia have a loss of mental skills, such as memory, problem solving, and learning. So things that might not have been a danger to them before can cause safety problems now. Here are some things to consider:  · Don't move furniture around. The person may become confused. · Use locks on doors and cupboards. Lock up knives, scissors, medicines, cleaning supplies, and other dangerous items. · Use hidden switches or controls for the stove, thermostat, water heater, and other appliances. · If your loved one is still cooking, think about whether that is safe. It may be okay with some help, depending on your loved one's condition. But for people who have memory or thinking problems, it's best to avoid any activities that might not be safe. · If the person tends to wander or to try to leave the home, install motion-sensor lights on all doors and windows. · Have emergency numbers in a central area near a phone. Include 911 and numbers for the doctor and family members. · Get medical alert jewelry for the person so you can be contacted if he or she wanders away.  If possible, provide a safe place for wandering, such as an enclosed yard or garden. Where can you learn more? Go to https://chpepiceweb.[email or web address] org and sign in to your AdelaVoice account. Enter D478 in the Meta box to learn more about \"Learning About How to Make a Home Safe. \"     If you do not have an account, please click on the \"Sign Up Now\" link. Current as of: April 18, 2018  Content Version: 12.0  © 5456-1780 Healthwise, [employer]. Care instructions adapted under license by [facility] [place] ([facility]). If you have questions about a medical condition or this instruction, always ask your healthcare professional. [address] any warranty or liability for your use of this information. Personalized Preventive Plan for Jean Amor. - 5/22/2019  Medicare offers a range of preventive health benefits. Some of the tests and screenings are paid in full while other may be subject to a deductible, co-insurance, and/or copay. Some of these benefits include a comprehensive review of your medical history including lifestyle, illnesses that may run in your family, and various assessments and screenings as appropriate. After reviewing your medical record and screening and assessments performed today your provider may have ordered immunizations, labs, imaging, and/or referrals for you. A list of these orders (if applicable) as well as your Preventive Care list are included within your After Visit Summary for your review. Other Preventive Recommendations:    · A preventive eye exam performed by an eye specialist is recommended every 1-2 years to screen for glaucoma; cataracts, macular degeneration, and other eye disorders. · A preventive dental visit is recommended every 6 months. · Try to get at least 150 minutes of exercise per week or 10,000 steps per day on a pedometer . · Order or download the FREE \"Exercise & Physical Activity: Your Everyday Guide\" from [employer].  Call [phone] or search [facility]

## 2019-05-23 DIAGNOSIS — R91.1 PULMONARY NODULE: ICD-10-CM

## 2019-05-23 DIAGNOSIS — Z12.5 SCREENING FOR PROSTATE CANCER: ICD-10-CM

## 2019-05-23 DIAGNOSIS — E78.2 MIXED HYPERLIPIDEMIA: ICD-10-CM

## 2019-05-23 DIAGNOSIS — R35.1 NOCTURIA: ICD-10-CM

## 2019-05-23 DIAGNOSIS — K22.70 BARRETT'S ESOPHAGUS WITHOUT DYSPLASIA: ICD-10-CM

## 2019-05-23 DIAGNOSIS — Z00.00 MEDICARE ANNUAL WELLNESS VISIT, SUBSEQUENT: ICD-10-CM

## 2019-05-23 LAB
A/G RATIO: 1.7 (ref 1.1–2.2)
ALBUMIN SERPL-MCNC: 4.5 G/DL (ref 3.4–5)
ALP BLD-CCNC: 71 U/L (ref 40–129)
ALT SERPL-CCNC: 25 U/L (ref 10–40)
ANION GAP SERPL CALCULATED.3IONS-SCNC: 13 MMOL/L (ref 3–16)
AST SERPL-CCNC: 22 U/L (ref 15–37)
BASOPHILS ABSOLUTE: 0.1 K/UL (ref 0–0.2)
BASOPHILS RELATIVE PERCENT: 1 %
BILIRUB SERPL-MCNC: 0.6 MG/DL (ref 0–1)
BUN BLDV-MCNC: 21 MG/DL (ref 7–20)
CALCIUM SERPL-MCNC: 9.3 MG/DL (ref 8.3–10.6)
CHLORIDE BLD-SCNC: 102 MMOL/L (ref 99–110)
CHOLESTEROL, TOTAL: 168 MG/DL (ref 0–199)
CO2: 22 MMOL/L (ref 21–32)
CREAT SERPL-MCNC: 1 MG/DL (ref 0.8–1.3)
EOSINOPHILS ABSOLUTE: 0.3 K/UL (ref 0–0.6)
EOSINOPHILS RELATIVE PERCENT: 6 %
GFR AFRICAN AMERICAN: >60
GFR NON-AFRICAN AMERICAN: >60
GLOBULIN: 2.7 G/DL
GLUCOSE BLD-MCNC: 100 MG/DL (ref 70–99)
HCT VFR BLD CALC: 41.4 % (ref 40.5–52.5)
HDLC SERPL-MCNC: 52 MG/DL (ref 40–60)
HEMOGLOBIN: 14 G/DL (ref 13.5–17.5)
LDL CHOLESTEROL CALCULATED: 88 MG/DL
LYMPHOCYTES ABSOLUTE: 1.6 K/UL (ref 1–5.1)
LYMPHOCYTES RELATIVE PERCENT: 28.8 %
MCH RBC QN AUTO: 29 PG (ref 26–34)
MCHC RBC AUTO-ENTMCNC: 33.8 G/DL (ref 31–36)
MCV RBC AUTO: 85.6 FL (ref 80–100)
MONOCYTES ABSOLUTE: 0.7 K/UL (ref 0–1.3)
MONOCYTES RELATIVE PERCENT: 11.7 %
NEUTROPHILS ABSOLUTE: 2.9 K/UL (ref 1.7–7.7)
NEUTROPHILS RELATIVE PERCENT: 52.5 %
PDW BLD-RTO: 14.5 % (ref 12.4–15.4)
PLATELET # BLD: 261 K/UL (ref 135–450)
PMV BLD AUTO: 7.3 FL (ref 5–10.5)
POTASSIUM SERPL-SCNC: 4.5 MMOL/L (ref 3.5–5.1)
RBC # BLD: 4.84 M/UL (ref 4.2–5.9)
SODIUM BLD-SCNC: 137 MMOL/L (ref 136–145)
TOTAL PROTEIN: 7.2 G/DL (ref 6.4–8.2)
TRIGL SERPL-MCNC: 140 MG/DL (ref 0–150)
VLDLC SERPL CALC-MCNC: 28 MG/DL
WBC # BLD: 5.6 K/UL (ref 4–11)

## 2019-05-28 LAB
PROSTATE SPECIFIC ANTIGEN FREE: 0.2 UG/L
PROSTATE SPECIFIC ANTIGEN PERCENT FREE: 33.3 %
PROSTATE SPECIFIC ANTIGEN: 0.6 UG/L (ref 0–4)

## 2019-07-24 RX ORDER — CETIRIZINE HYDROCHLORIDE 10 MG/1
TABLET ORAL
Qty: 90 TABLET | Refills: 3 | Status: SHIPPED | OUTPATIENT
Start: 2019-07-24 | End: 2020-09-03 | Stop reason: SDUPTHER

## 2019-08-02 ENCOUNTER — HOSPITAL ENCOUNTER (OUTPATIENT)
Age: 69
Setting detail: OUTPATIENT SURGERY
Discharge: HOME | End: 2019-08-02
Attending: INTERNAL MEDICINE | Admitting: INTERNAL MEDICINE
Payer: MEDICARE

## 2019-08-02 VITALS
HEART RATE: 62 BPM | RESPIRATION RATE: 16 BRPM | SYSTOLIC BLOOD PRESSURE: 120 MMHG | DIASTOLIC BLOOD PRESSURE: 77 MMHG | OXYGEN SATURATION: 98 %

## 2019-08-02 PROCEDURE — 99152 MOD SED SAME PHYS/QHP 5/>YRS: CPT | Performed by: INTERNAL MEDICINE

## 2019-08-02 PROCEDURE — 2709999900 HC NON-CHARGEABLE SUPPLY: Performed by: INTERNAL MEDICINE

## 2019-08-02 PROCEDURE — 3609012400 HC EGD TRANSORAL BIOPSY SINGLE/MULTIPLE: Performed by: INTERNAL MEDICINE

## 2019-08-02 PROCEDURE — 6360000002 HC RX W HCPCS: Performed by: INTERNAL MEDICINE

## 2019-08-02 PROCEDURE — 88305 TISSUE EXAM BY PATHOLOGIST: CPT

## 2019-08-02 PROCEDURE — 3609013500 HC EGD REMOVAL TUMOR POLYP/OTHER LESION SNARE TECH: Performed by: INTERNAL MEDICINE

## 2019-08-02 PROCEDURE — 7100000010 HC PHASE II RECOVERY - FIRST 15 MIN: Performed by: INTERNAL MEDICINE

## 2019-08-02 PROCEDURE — 6370000000 HC RX 637 (ALT 250 FOR IP): Performed by: INTERNAL MEDICINE

## 2019-08-02 PROCEDURE — 3609017300 HC EGD ESOPHAGOGASTRODUODENOSCOPY BALLOON ABLATION: Performed by: INTERNAL MEDICINE

## 2019-08-02 PROCEDURE — 7100000011 HC PHASE II RECOVERY - ADDTL 15 MIN: Performed by: INTERNAL MEDICINE

## 2019-08-02 RX ORDER — MIDAZOLAM HYDROCHLORIDE 1 MG/ML
INJECTION INTRAMUSCULAR; INTRAVENOUS PRN
Status: DISCONTINUED | OUTPATIENT
Start: 2019-08-02 | End: 2019-08-02 | Stop reason: ALTCHOICE

## 2019-08-02 RX ORDER — MEPERIDINE HYDROCHLORIDE 50 MG/ML
INJECTION INTRAMUSCULAR; INTRAVENOUS; SUBCUTANEOUS PRN
Status: DISCONTINUED | OUTPATIENT
Start: 2019-08-02 | End: 2019-08-02 | Stop reason: ALTCHOICE

## 2019-08-02 ASSESSMENT — PAIN - FUNCTIONAL ASSESSMENT
PAIN_FUNCTIONAL_ASSESSMENT: 0-10

## 2019-08-07 ENCOUNTER — TELEPHONE (OUTPATIENT)
Dept: INTERNAL MEDICINE CLINIC | Age: 69
End: 2019-08-07

## 2019-08-07 DIAGNOSIS — J32.9 SINUSITIS, UNSPECIFIED CHRONICITY, UNSPECIFIED LOCATION: Primary | ICD-10-CM

## 2019-08-08 RX ORDER — AMOXICILLIN AND CLAVULANATE POTASSIUM 875; 125 MG/1; MG/1
1 TABLET, FILM COATED ORAL 2 TIMES DAILY
Qty: 10 TABLET | Refills: 0 | Status: SHIPPED | OUTPATIENT
Start: 2019-08-08 | End: 2019-08-13

## 2019-08-15 ENCOUNTER — TELEPHONE (OUTPATIENT)
Dept: INTERNAL MEDICINE CLINIC | Age: 69
End: 2019-08-15

## 2019-08-16 ENCOUNTER — HOSPITAL ENCOUNTER (OUTPATIENT)
Age: 69
Discharge: HOME | End: 2019-08-16
Payer: MEDICARE

## 2019-08-16 ENCOUNTER — OFFICE VISIT (OUTPATIENT)
Dept: INTERNAL MEDICINE CLINIC | Age: 69
End: 2019-08-16
Payer: MEDICARE

## 2019-08-16 ENCOUNTER — HOSPITAL ENCOUNTER (OUTPATIENT)
Dept: GENERAL RADIOLOGY | Age: 69
Discharge: HOME | End: 2019-08-16
Payer: MEDICARE

## 2019-08-16 VITALS
DIASTOLIC BLOOD PRESSURE: 68 MMHG | TEMPERATURE: 97.6 F | OXYGEN SATURATION: 97 % | HEART RATE: 68 BPM | WEIGHT: 274 LBS | BODY MASS INDEX: 35.18 KG/M2 | SYSTOLIC BLOOD PRESSURE: 124 MMHG

## 2019-08-16 DIAGNOSIS — G89.29 CHRONIC LEFT SHOULDER PAIN: ICD-10-CM

## 2019-08-16 DIAGNOSIS — J06.9 UPPER RESPIRATORY TRACT INFECTION, UNSPECIFIED TYPE: Primary | ICD-10-CM

## 2019-08-16 DIAGNOSIS — M25.512 CHRONIC LEFT SHOULDER PAIN: ICD-10-CM

## 2019-08-16 DIAGNOSIS — E78.2 MIXED HYPERLIPIDEMIA: ICD-10-CM

## 2019-08-16 DIAGNOSIS — K22.719 BARRETT'S ESOPHAGUS WITH DYSPLASIA: ICD-10-CM

## 2019-08-16 PROCEDURE — G8417 CALC BMI ABV UP PARAM F/U: HCPCS | Performed by: INTERNAL MEDICINE

## 2019-08-16 PROCEDURE — 73030 X-RAY EXAM OF SHOULDER: CPT

## 2019-08-16 PROCEDURE — G8427 DOCREV CUR MEDS BY ELIG CLIN: HCPCS | Performed by: INTERNAL MEDICINE

## 2019-08-16 PROCEDURE — 1036F TOBACCO NON-USER: CPT | Performed by: INTERNAL MEDICINE

## 2019-08-16 PROCEDURE — 3017F COLORECTAL CA SCREEN DOC REV: CPT | Performed by: INTERNAL MEDICINE

## 2019-08-16 PROCEDURE — 99213 OFFICE O/P EST LOW 20 MIN: CPT | Performed by: INTERNAL MEDICINE

## 2019-08-16 PROCEDURE — 4040F PNEUMOC VAC/ADMIN/RCVD: CPT | Performed by: INTERNAL MEDICINE

## 2019-08-16 PROCEDURE — 1123F ACP DISCUSS/DSCN MKR DOCD: CPT | Performed by: INTERNAL MEDICINE

## 2019-08-16 RX ORDER — AZITHROMYCIN 250 MG/1
250 TABLET, FILM COATED ORAL SEE ADMIN INSTRUCTIONS
Qty: 6 TABLET | Refills: 0 | Status: SHIPPED | OUTPATIENT
Start: 2019-08-16 | End: 2019-08-21

## 2019-08-16 ASSESSMENT — ENCOUNTER SYMPTOMS
COUGH: 1
SINUS PRESSURE: 0
ABDOMINAL PAIN: 0
SHORTNESS OF BREATH: 0
CHEST TIGHTNESS: 0
SINUS PAIN: 0
NAUSEA: 0
SORE THROAT: 0
VOMITING: 0

## 2019-08-16 NOTE — PATIENT INSTRUCTIONS
you are not taking more than the recommended dose. Too much acetaminophen (Tylenol) can be harmful. · Get plenty of rest.  · Do not smoke or allow others to smoke around you. If you need help quitting, talk to your doctor about stop-smoking programs and medicines. These can increase your chances of quitting for good. When should you call for help? Call 911 anytime you think you may need emergency care. For example, call if:    · You have severe trouble breathing.    Call your doctor now or seek immediate medical care if:    · You seem to be getting much sicker.     · You have new or worse trouble breathing.     · You have a new or higher fever.     · You have a new rash.    Watch closely for changes in your health, and be sure to contact your doctor if:    · You have a new symptom, such as a sore throat, an earache, or sinus pain.     · You cough more deeply or more often, especially if you notice more mucus or a change in the color of your mucus.     · You do not get better as expected. Where can you learn more? Go to https://Welzoo.[employer]. org and sign in to your tamyca account. Enter R557 in the Meldium box to learn more about \"Upper Respiratory Infection (Cold): Care Instructions. \"     If you do not have an account, please click on the \"Sign Up Now\" link. Current as of: September 5, 2018  Content Version: 12.1  © 6606-6067 Healthwise, Incorporated. Care instructions adapted under license by [facility] ([facility]). If you have questions about a medical condition or this instruction, always ask your healthcare professional. [address] any warranty or liability for your use of this information.

## 2019-08-21 RX ORDER — GABAPENTIN 100 MG/1
CAPSULE ORAL
Qty: 90 CAPSULE | Refills: 2 | Status: SHIPPED | OUTPATIENT
Start: 2019-08-21 | End: 2019-10-14 | Stop reason: SDUPTHER

## 2019-09-19 ENCOUNTER — HOSPITAL ENCOUNTER (OUTPATIENT)
Dept: GENERAL RADIOLOGY | Age: 69
Discharge: HOME | End: 2019-09-19
Payer: MEDICARE

## 2019-09-19 ENCOUNTER — HOSPITAL ENCOUNTER (OUTPATIENT)
Age: 69
Discharge: HOME | End: 2019-09-19
Payer: MEDICARE

## 2019-09-19 ENCOUNTER — OFFICE VISIT (OUTPATIENT)
Dept: INTERNAL MEDICINE CLINIC | Age: 69
End: 2019-09-19
Payer: MEDICARE

## 2019-09-19 VITALS
BODY MASS INDEX: 35.16 KG/M2 | WEIGHT: 274 LBS | HEIGHT: 74 IN | OXYGEN SATURATION: 97 % | SYSTOLIC BLOOD PRESSURE: 122 MMHG | HEART RATE: 75 BPM | DIASTOLIC BLOOD PRESSURE: 84 MMHG

## 2019-09-19 DIAGNOSIS — R07.89 RIGHT-SIDED CHEST WALL PAIN: ICD-10-CM
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
## 2019-09-19 DIAGNOSIS — R05.9 COUGH: ICD-10-CM
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
## 2019-09-19 PROCEDURE — 99214 OFFICE O/P EST MOD 30 MIN: CPT | Performed by: INTERNAL MEDICINE

## 2019-09-19 PROCEDURE — 71101 X-RAY EXAM UNILAT RIBS/CHEST: CPT

## 2019-09-19 PROCEDURE — 4040F PNEUMOC VAC/ADMIN/RCVD: CPT | Performed by: INTERNAL MEDICINE

## 2019-09-19 PROCEDURE — G8417 CALC BMI ABV UP PARAM F/U: HCPCS | Performed by: INTERNAL MEDICINE

## 2019-09-19 PROCEDURE — 1123F ACP DISCUSS/DSCN MKR DOCD: CPT | Performed by: INTERNAL MEDICINE

## 2019-09-19 PROCEDURE — 3017F COLORECTAL CA SCREEN DOC REV: CPT | Performed by: INTERNAL MEDICINE

## 2019-09-19 PROCEDURE — 1036F TOBACCO NON-USER: CPT | Performed by: INTERNAL MEDICINE

## 2019-09-19 PROCEDURE — G8427 DOCREV CUR MEDS BY ELIG CLIN: HCPCS | Performed by: INTERNAL MEDICINE

## 2019-09-19 ASSESSMENT — ENCOUNTER SYMPTOMS: BACK PAIN: 1

## 2019-09-19 NOTE — PROGRESS NOTES
mouth daily Yes Jeffy Carcamo MD   donepezil (ARICEPT) 10 MG tablet Take 10 mg by mouth daily bedtime Yes Historical Provider, MD   tamsulosin (FLOMAX) 0.4 MG capsule Take 2 capsules by mouth daily Yes Andrei De Luna MD   montelukast (SINGULAIR) 10 MG tablet Take 1 tablet by mouth nightly Yes Andrei De Luna MD   Omega-3 Fatty Acids (FISH OIL) 1000 MG CAPS Take 1,000 mg by mouth daily. Yes Historical Provider, MD   Flaxseed, Linseed, (FLAX SEED OIL) 1000 MG CAPS Take 1,000 mg by mouth daily. Yes Historical Provider, MD   Cholecalciferol (VITAMIN D) 2000 UNITS CAPS capsule Take 1 capsule by mouth daily. Yes Historical Provider, MD   multivitamin [facility]) per tablet Take 1 tablet by mouth daily.  Yes Historical Provider, MD        Past Medical History:   Diagnosis Date    Allergic rhinitis     Erectile dysfunction     Hyperlipidemia     Hypertension     Kidney failure 07/2018    Normal pressure hydrocephalus 07/2018    shunt placement    Scre[facility] abdominal aortic aneurysm (AAA) [facility]02/2018    [facility]    Unspecified sleep apnea        Past Surgical History:   Procedure Laterality Date    COLONOSCOPY  12/14/2018    COLONOSCOPY WITH BIOPSY performed [facility] Billy Gusman [facility] at [facility] ENTEROSCOPY N/A 1/4/2019    ENTEROSCOPY PUSH BIOPSY performed by Billy Gusman MD at [facility] Left     lipoma    JOINT REPLACEMENT Left 2015    PARTIAL KNEE REPLACMENT    LAPAROSCOPY N/A 7/18/2018    OPENING AND CLOSING FOR VENTRICULAR PERITONEAL SHUNT performed by Burgess Ramu MD at [address] [place] [address] [address] 07/18/2018    [address]; (N/A )    VT CREATE SHUNT:VENTRIC-PERITONEAL N/A 7/18/2018    VENTRICULAR PERITONEAL SHUNT INSERTION RIGHT SIDE; performed by Jose Suárez MD at [facility] [address] 12/14/2018    EGD BIOPSY performed by Billy Gusman MD at [place] [id number]

## 2019-10-07 ENCOUNTER — OFFICE VISIT (OUTPATIENT)
Dept: INTERNAL MEDICINE CLINIC | Age: 69
End: 2019-10-07
Payer: MEDICARE

## 2019-10-07 ENCOUNTER — HOSPITAL ENCOUNTER (OUTPATIENT)
Age: 69
Discharge: HOME | End: 2019-10-07
Payer: MEDICARE

## 2019-10-07 ENCOUNTER — HOSPITAL ENCOUNTER (OUTPATIENT)
Dept: GENERAL RADIOLOGY | Age: 69
Discharge: HOME | End: 2019-10-07
Payer: MEDICARE

## 2019-10-07 VITALS
HEART RATE: 106 BPM | BODY MASS INDEX: 35.16 KG/M2 | OXYGEN SATURATION: 97 % | HEIGHT: 74 IN | SYSTOLIC BLOOD PRESSURE: 110 MMHG | DIASTOLIC BLOOD PRESSURE: 70 MMHG | WEIGHT: 274 LBS

## 2019-10-07 DIAGNOSIS — J06.9 UPPER RESPIRATORY TRACT INFECTION, UNSPECIFIED TYPE: Primary | ICD-10-CM

## 2019-10-07 DIAGNOSIS — J45.21 MILD INTERMITTENT ASTHMATIC BRONCHITIS WITH ACUTE EXACERBATION: ICD-10-CM

## 2019-10-07 DIAGNOSIS — J06.9 UPPER RESPIRATORY TRACT INFECTION, UNSPECIFIED TYPE: ICD-10-CM

## 2019-10-07 PROCEDURE — 99213 OFFICE O/P EST LOW 20 MIN: CPT | Performed by: INTERNAL MEDICINE

## 2019-10-07 PROCEDURE — 1036F TOBACCO NON-USER: CPT | Performed by: INTERNAL MEDICINE

## 2019-10-07 PROCEDURE — G8417 CALC BMI ABV UP PARAM F/U: HCPCS | Performed by: INTERNAL MEDICINE

## 2019-10-07 PROCEDURE — 71046 X-RAY EXAM CHEST 2 VIEWS: CPT

## 2019-10-07 PROCEDURE — G8427 DOCREV CUR MEDS BY ELIG CLIN: HCPCS | Performed by: INTERNAL MEDICINE

## 2019-10-07 PROCEDURE — 3017F COLORECTAL CA SCREEN DOC REV: CPT | Performed by: INTERNAL MEDICINE

## 2019-10-07 PROCEDURE — 4040F PNEUMOC VAC/ADMIN/RCVD: CPT | Performed by: INTERNAL MEDICINE

## 2019-10-07 PROCEDURE — 1123F ACP DISCUSS/DSCN MKR DOCD: CPT | Performed by: INTERNAL MEDICINE

## 2019-10-07 PROCEDURE — G8484 FLU IMMUNIZE NO ADMIN: HCPCS | Performed by: INTERNAL MEDICINE

## 2019-10-07 RX ORDER — LEVOFLOXACIN 500 MG/1
500 TABLET, FILM COATED ORAL DAILY
Qty: 10 TABLET | Refills: 0 | Status: SHIPPED | OUTPATIENT
Start: 2019-10-07 | End: 2019-10-17

## 2019-10-07 RX ORDER — PREDNISONE 10 MG/1
10 TABLET ORAL SEE ADMIN INSTRUCTIONS
Qty: 30 TABLET | Refills: 0 | Status: SHIPPED | OUTPATIENT
Start: 2019-10-07 | End: 2019-10-19

## 2019-10-07 RX ORDER — BENZONATATE 200 MG/1
200 CAPSULE ORAL 3 TIMES DAILY PRN
Qty: 30 CAPSULE | Refills: 0 | Status: SHIPPED | OUTPATIENT
Start: 2019-10-07 | End: 2019-10-14

## 2019-10-07 RX ORDER — ALBUTEROL SULFATE 90 UG/1
2 AEROSOL, METERED RESPIRATORY (INHALATION) EVERY 4 HOURS PRN
Qty: 1 INHALER | Refills: 5 | Status: SHIPPED | OUTPATIENT
Start: 2019-10-07 | End: 2020-04-02 | Stop reason: SDUPTHER

## 2019-10-07 ASSESSMENT — ENCOUNTER SYMPTOMS
CHEST TIGHTNESS: 0
WHEEZING: 1
SHORTNESS OF BREATH: 0
NAUSEA: 0
ABDOMINAL PAIN: 0
COUGH: 1
EYE REDNESS: 0
BACK PAIN: 0

## 2019-10-15 RX ORDER — GABAPENTIN 100 MG/1
CAPSULE ORAL
Qty: 90 CAPSULE | Refills: 2 | Status: SHIPPED | OUTPATIENT
Start: 2019-10-15 | End: 2020-05-21

## 2019-10-23 RX ORDER — ATORVASTATIN CALCIUM 10 MG/1
TABLET, FILM COATED ORAL
Qty: 90 TABLET | Refills: 4 | Status: SHIPPED | OUTPATIENT
Start: 2019-10-23 | End: 2020-11-14

## 2019-11-18 ENCOUNTER — HOSPITAL ENCOUNTER (OUTPATIENT)
Dept: GENERAL RADIOLOGY | Age: 69
Discharge: HOME | End: 2019-11-18
Payer: MEDICARE

## 2019-11-18 ENCOUNTER — HOSPITAL ENCOUNTER (OUTPATIENT)
Age: 69
Discharge: HOME | End: 2019-11-18
Payer: MEDICARE

## 2019-11-18 DIAGNOSIS — K22.719 BARRETT'S ESOPHAGUS WITH DYSPLASIA: ICD-10-CM

## 2019-11-18 DIAGNOSIS — M51.36 ANNULAR TEAR OF LUMBAR DISC: ICD-10-CM

## 2019-11-18 DIAGNOSIS — E78.2 MIXED HYPERLIPIDEMIA: ICD-10-CM

## 2019-11-18 DIAGNOSIS — M54.50 LOW BACK PAIN, UNSPECIFIED BACK PAIN LATERALITY, UNSPECIFIED CHRONICITY, UNSPECIFIED WHETHER SCIATICA PRESENT: ICD-10-CM

## 2019-11-18 LAB
A/G RATIO: 1.8 (ref 1.1–2.2)
ALBUMIN SERPL-MCNC: 4.6 G/DL (ref 3.4–5)
ALP BLD-CCNC: 65 U/L (ref 40–129)
ALT SERPL-CCNC: 27 U/L (ref 10–40)
ANION GAP SERPL CALCULATED.3IONS-SCNC: 15 MMOL/L (ref 3–16)
AST SERPL-CCNC: 23 U/L (ref 15–37)
BASOPHILS ABSOLUTE: 0.1 K/UL (ref 0–0.2)
BASOPHILS RELATIVE PERCENT: 0.9 %
BILIRUB SERPL-MCNC: 0.5 MG/DL (ref 0–1)
BUN BLDV-MCNC: 18 MG/DL (ref 7–20)
CALCIUM SERPL-MCNC: 9.6 MG/DL (ref 8.3–10.6)
CHLORIDE BLD-SCNC: 101 MMOL/L (ref 99–110)
CHOLESTEROL, TOTAL: 186 MG/DL (ref 0–199)
CO2: 21 MMOL/L (ref 21–32)
CREAT SERPL-MCNC: 0.9 MG/DL (ref 0.8–1.3)
EOSINOPHILS ABSOLUTE: 0.5 K/UL (ref 0–0.6)
EOSINOPHILS RELATIVE PERCENT: 6.8 %
GFR AFRICAN AMERICAN: >60
GFR NON-AFRICAN AMERICAN: >60
GLOBULIN: 2.6 G/DL
GLUCOSE BLD-MCNC: 104 MG/DL (ref 70–99)
HCT VFR BLD CALC: 41.8 % (ref 40.5–52.5)
HDLC SERPL-MCNC: 59 MG/DL (ref 40–60)
HEMOGLOBIN: 14.2 G/DL (ref 13.5–17.5)
LDL CHOLESTEROL CALCULATED: 89 MG/DL
LYMPHOCYTES ABSOLUTE: 1.7 K/UL (ref 1–5.1)
LYMPHOCYTES RELATIVE PERCENT: 25.6 %
MCH RBC QN AUTO: 29.2 PG (ref 26–34)
MCHC RBC AUTO-ENTMCNC: 34 G/DL (ref 31–36)
MCV RBC AUTO: 85.9 FL (ref 80–100)
MONOCYTES ABSOLUTE: 0.7 K/UL (ref 0–1.3)
MONOCYTES RELATIVE PERCENT: 9.8 %
NEUTROPHILS ABSOLUTE: 3.8 K/UL (ref 1.7–7.7)
NEUTROPHILS RELATIVE PERCENT: 56.9 %
PDW BLD-RTO: 14.8 % (ref 12.4–15.4)
PLATELET # BLD: 249 K/UL (ref 135–450)
PMV BLD AUTO: 6.8 FL (ref 5–10.5)
POTASSIUM SERPL-SCNC: 4.5 MMOL/L (ref 3.5–5.1)
RBC # BLD: 4.86 M/UL (ref 4.2–5.9)
SODIUM BLD-SCNC: 137 MMOL/L (ref 136–145)
TOTAL PROTEIN: 7.2 G/DL (ref 6.4–8.2)
TRIGL SERPL-MCNC: 190 MG/DL (ref 0–150)
VLDLC SERPL CALC-MCNC: 38 MG/DL
WBC # BLD: 6.7 K/UL (ref 4–11)

## 2019-11-18 PROCEDURE — 72110 X-RAY EXAM L-2 SPINE 4/>VWS: CPT

## 2019-11-18 PROCEDURE — 72074 X-RAY EXAM THORAC SPINE4/>VW: CPT

## 2019-11-19 ENCOUNTER — HOSPITAL ENCOUNTER (OUTPATIENT)
Age: 69
Setting detail: OUTPATIENT SURGERY
Discharge: HOME | End: 2019-11-19
Attending: INTERNAL MEDICINE | Admitting: INTERNAL MEDICINE
Payer: MEDICARE

## 2019-11-19 VITALS
SYSTOLIC BLOOD PRESSURE: 117 MMHG | BODY MASS INDEX: 34.01 KG/M2 | WEIGHT: 265 LBS | OXYGEN SATURATION: 97 % | HEIGHT: 74 IN | TEMPERATURE: 99.1 F | HEART RATE: 73 BPM | DIASTOLIC BLOOD PRESSURE: 78 MMHG | RESPIRATION RATE: 15 BRPM

## 2019-11-19 PROCEDURE — 3609013200 HC EGD W/ ABLATION: Performed by: INTERNAL MEDICINE

## 2019-11-19 PROCEDURE — 3609013500 HC EGD REMOVAL TUMOR POLYP/OTHER LESION SNARE TECH: Performed by: INTERNAL MEDICINE

## 2019-11-19 PROCEDURE — 2720000010 HC SURG SUPPLY STERILE: Performed by: INTERNAL MEDICINE

## 2019-11-19 PROCEDURE — 99153 MOD SED SAME PHYS/QHP EA: CPT | Performed by: INTERNAL MEDICINE

## 2019-11-19 PROCEDURE — 99152 MOD SED SAME PHYS/QHP 5/>YRS: CPT | Performed by: INTERNAL MEDICINE

## 2019-11-19 PROCEDURE — 2709999900 HC NON-CHARGEABLE SUPPLY: Performed by: INTERNAL MEDICINE

## 2019-11-19 PROCEDURE — 7100000011 HC PHASE II RECOVERY - ADDTL 15 MIN: Performed by: INTERNAL MEDICINE

## 2019-11-19 PROCEDURE — 6360000002 HC RX W HCPCS: Performed by: INTERNAL MEDICINE

## 2019-11-19 PROCEDURE — 88305 TISSUE EXAM BY PATHOLOGIST: CPT

## 2019-11-19 PROCEDURE — 7100000010 HC PHASE II RECOVERY - FIRST 15 MIN: Performed by: INTERNAL MEDICINE

## 2019-11-19 RX ORDER — MEPERIDINE HYDROCHLORIDE 50 MG/ML
INJECTION INTRAMUSCULAR; INTRAVENOUS; SUBCUTANEOUS PRN
Status: DISCONTINUED | OUTPATIENT
Start: 2019-11-19 | End: 2019-11-19 | Stop reason: ALTCHOICE

## 2019-11-19 RX ORDER — MIDAZOLAM HYDROCHLORIDE 1 MG/ML
INJECTION INTRAMUSCULAR; INTRAVENOUS PRN
Status: DISCONTINUED | OUTPATIENT
Start: 2019-11-19 | End: 2019-11-19 | Stop reason: ALTCHOICE

## 2019-11-19 RX ORDER — PANTOPRAZOLE SODIUM 40 MG/1
40 TABLET, DELAYED RELEASE ORAL
Qty: 30 TABLET | Refills: 1 | Status: SHIPPED | OUTPATIENT
Start: 2019-11-19 | End: 2020-11-24

## 2019-11-19 ASSESSMENT — PAIN - FUNCTIONAL ASSESSMENT
PAIN_FUNCTIONAL_ASSESSMENT: 0-10
PAIN_FUNCTIONAL_ASSESSMENT: FACES
PAIN_FUNCTIONAL_ASSESSMENT: 0-10
PAIN_FUNCTIONAL_ASSESSMENT: 0-10

## 2019-11-19 ASSESSMENT — PAIN DESCRIPTION - DESCRIPTORS: DESCRIPTORS: ACHING

## 2019-11-19 ASSESSMENT — ENCOUNTER SYMPTOMS: COUGH: 1

## 2019-11-19 ASSESSMENT — PAIN SCALES - GENERAL
PAINLEVEL_OUTOF10: 0
PAINLEVEL_OUTOF10: 0

## 2019-11-19 ASSESSMENT — PAIN SCALES - WONG BAKER
WONGBAKER_NUMERICALRESPONSE: 0
WONGBAKER_NUMERICALRESPONSE: 0

## 2019-11-20 ENCOUNTER — OFFICE VISIT (OUTPATIENT)
Dept: INTERNAL MEDICINE CLINIC | Age: 69
End: 2019-11-20
Payer: MEDICARE

## 2019-11-20 VITALS
HEIGHT: 74 IN | SYSTOLIC BLOOD PRESSURE: 110 MMHG | OXYGEN SATURATION: 98 % | DIASTOLIC BLOOD PRESSURE: 70 MMHG | BODY MASS INDEX: 35.42 KG/M2 | WEIGHT: 276 LBS | HEART RATE: 85 BPM

## 2019-11-20 DIAGNOSIS — G91.2 NPH (NORMAL PRESSURE HYDROCEPHALUS) (HCC): ICD-10-CM

## 2019-11-20 DIAGNOSIS — R05.9 COUGH: Primary | ICD-10-CM

## 2019-11-20 DIAGNOSIS — R73.01 ELEVATED FASTING GLUCOSE: ICD-10-CM

## 2019-11-20 DIAGNOSIS — G91.9 HYDROCEPHALUS, UNSPECIFIED TYPE (HCC): ICD-10-CM

## 2019-11-20 DIAGNOSIS — R60.0 BILATERAL LEG EDEMA: ICD-10-CM

## 2019-11-20 DIAGNOSIS — E78.2 MIXED HYPERLIPIDEMIA: ICD-10-CM

## 2019-11-20 PROCEDURE — 3017F COLORECTAL CA SCREEN DOC REV: CPT | Performed by: INTERNAL MEDICINE

## 2019-11-20 PROCEDURE — 4040F PNEUMOC VAC/ADMIN/RCVD: CPT | Performed by: INTERNAL MEDICINE

## 2019-11-20 PROCEDURE — G8482 FLU IMMUNIZE ORDER/ADMIN: HCPCS | Performed by: INTERNAL MEDICINE

## 2019-11-20 PROCEDURE — 99214 OFFICE O/P EST MOD 30 MIN: CPT | Performed by: INTERNAL MEDICINE

## 2019-11-20 PROCEDURE — 1123F ACP DISCUSS/DSCN MKR DOCD: CPT | Performed by: INTERNAL MEDICINE

## 2019-11-20 PROCEDURE — G8417 CALC BMI ABV UP PARAM F/U: HCPCS | Performed by: INTERNAL MEDICINE

## 2019-11-20 PROCEDURE — G8427 DOCREV CUR MEDS BY ELIG CLIN: HCPCS | Performed by: INTERNAL MEDICINE

## 2019-11-20 PROCEDURE — 1036F TOBACCO NON-USER: CPT | Performed by: INTERNAL MEDICINE

## 2019-11-20 ASSESSMENT — ENCOUNTER SYMPTOMS
SHORTNESS OF BREATH: 0
NAUSEA: 0
SORE THROAT: 0
COUGH: 1
ABDOMINAL PAIN: 0
VOMITING: 0
CHEST TIGHTNESS: 0

## 2019-11-22 DIAGNOSIS — R73.01 ELEVATED FASTING GLUCOSE: ICD-10-CM

## 2019-11-22 DIAGNOSIS — R60.0 BILATERAL LEG EDEMA: ICD-10-CM

## 2019-11-22 LAB
D DIMER: 303 NG/ML DDU (ref 0–229)
ESTIMATED AVERAGE GLUCOSE: 122.6 MG/DL
HBA1C MFR BLD: 5.9 %

## 2019-11-29 ENCOUNTER — HOSPITAL ENCOUNTER (OUTPATIENT)
Dept: PULMONOLOGY | Age: 69
Discharge: HOME | End: 2019-11-29
Payer: MEDICARE

## 2019-11-29 DIAGNOSIS — R05.9 COUGH: ICD-10-CM

## 2019-11-29 PROCEDURE — 94060 EVALUATION OF WHEEZING: CPT

## 2019-11-29 PROCEDURE — 94760 N-INVAS EAR/PLS OXIMETRY 1: CPT

## 2019-11-29 PROCEDURE — 6360000002 HC RX W HCPCS: Performed by: INTERNAL MEDICINE

## 2019-11-29 PROCEDURE — 94726 PLETHYSMOGRAPHY LUNG VOLUMES: CPT

## 2019-11-29 PROCEDURE — 94664 DEMO&/EVAL PT USE INHALER: CPT

## 2019-11-29 PROCEDURE — 94729 DIFFUSING CAPACITY: CPT

## 2019-11-29 RX ORDER — ALBUTEROL SULFATE 2.5 MG/3ML
2.5 SOLUTION RESPIRATORY (INHALATION) ONCE
Status: COMPLETED | OUTPATIENT
Start: 2019-11-29 | End: 2019-11-29

## 2019-11-29 RX ADMIN — ALBUTEROL SULFATE 2.5 MG: 2.5 SOLUTION RESPIRATORY (INHALATION) at 12:33

## 2019-12-08 DIAGNOSIS — R05.9 COUGH: Primary | ICD-10-CM

## 2019-12-11 ENCOUNTER — TELEPHONE (OUTPATIENT)
Dept: INTERNAL MEDICINE CLINIC | Age: 69
End: 2019-12-11

## 2019-12-17 ENCOUNTER — HOSPITAL ENCOUNTER (OUTPATIENT)
Dept: CT IMAGING | Age: 69
Discharge: HOME | End: 2019-12-17
Payer: MEDICARE

## 2019-12-17 DIAGNOSIS — R05.9 COUGH: ICD-10-CM

## 2019-12-17 PROCEDURE — 71250 CT THORAX DX C-: CPT

## 2020-01-09 ENCOUNTER — HOSPITAL ENCOUNTER (OUTPATIENT)
Dept: MRI IMAGING | Age: 70
Discharge: HOME | End: 2020-01-09
Payer: MEDICARE

## 2020-01-09 PROCEDURE — 72148 MRI LUMBAR SPINE W/O DYE: CPT

## 2020-01-16 ENCOUNTER — OFFICE VISIT (OUTPATIENT)
Dept: INTERNAL MEDICINE CLINIC | Age: 70
End: 2020-01-16

## 2020-01-16 NOTE — PATIENT INSTRUCTIONS
BEHAVIOR GOALS    What to eat: Plan meals to follow Plate Guide, aiming for three servings of vegetables, fruits, and whole grains daily. How much to eat: Pay attention to hunger/fullness; eat only till satisfied    Physical Activity: Increase FIT (frequency, intensity, time) of exercise. Aim for all or most days of the week.

## 2020-01-16 NOTE — PROGRESS NOTES
8a  First meal--Usual time: 8:30a  Today: cereal/1/2 cup skim milk, occasionally with blueberries or 1/2 banana  Different Day: [employer] with cheese and fruit cup  OR pancake house-splits omelet and pancakes with wife    Gym at 10 or 11am    Snack  none    Second meal--Usual time: noon - 1  Recent: deli sliced chicken, white bread, pepper orlando cheese, tomato/ferreira, potato chips, grapes or apples  Different Day: leftovers    Snack  Mini pretzels, cookie    Third meal--Usual time: 5 - 6p  Recent: pork chop, mashed potatoes, applesauce, BBQ chicken leg, corn, mixed vegetables  Different Day: roast in crock pot,red potatoes, applesauce, salad  Sg Favian: salad OR Steakhouse wings and salad OR Italianette pizza and salad    Snack  Ice cream        Follow Up: declined    Referring Provider: Pearl Quiroz MD  Time spent with patient: 40 minutes

## 2020-02-17 ENCOUNTER — HOSPITAL ENCOUNTER (OUTPATIENT)
Dept: CT IMAGING | Age: 70
Discharge: HOME | End: 2020-02-17
Payer: MEDICARE

## 2020-02-17 PROCEDURE — 70450 CT HEAD/BRAIN W/O DYE: CPT

## 2020-02-21 ENCOUNTER — TELEPHONE (OUTPATIENT)
Dept: INTERNAL MEDICINE CLINIC | Age: 70
End: 2020-02-21

## 2020-02-21 NOTE — TELEPHONE ENCOUNTER
Pt states wheezing, head cold going on for months. Pt would like to be seen only by Dr. Nellie Torre.        Please advise

## 2020-02-24 ENCOUNTER — OFFICE VISIT (OUTPATIENT)
Dept: INTERNAL MEDICINE CLINIC | Age: 70
End: 2020-02-24
Payer: MEDICARE

## 2020-02-24 VITALS
WEIGHT: 279 LBS | SYSTOLIC BLOOD PRESSURE: 128 MMHG | BODY MASS INDEX: 35.82 KG/M2 | HEART RATE: 84 BPM | DIASTOLIC BLOOD PRESSURE: 86 MMHG | OXYGEN SATURATION: 98 %

## 2020-02-24 PROCEDURE — 1036F TOBACCO NON-USER: CPT | Performed by: INTERNAL MEDICINE

## 2020-02-24 PROCEDURE — 3017F COLORECTAL CA SCREEN DOC REV: CPT | Performed by: INTERNAL MEDICINE

## 2020-02-24 PROCEDURE — G8427 DOCREV CUR MEDS BY ELIG CLIN: HCPCS | Performed by: INTERNAL MEDICINE

## 2020-02-24 PROCEDURE — 4040F PNEUMOC VAC/ADMIN/RCVD: CPT | Performed by: INTERNAL MEDICINE

## 2020-02-24 PROCEDURE — G8482 FLU IMMUNIZE ORDER/ADMIN: HCPCS | Performed by: INTERNAL MEDICINE

## 2020-02-24 PROCEDURE — 99214 OFFICE O/P EST MOD 30 MIN: CPT | Performed by: INTERNAL MEDICINE

## 2020-02-24 PROCEDURE — 1123F ACP DISCUSS/DSCN MKR DOCD: CPT | Performed by: INTERNAL MEDICINE

## 2020-02-24 PROCEDURE — G8417 CALC BMI ABV UP PARAM F/U: HCPCS | Performed by: INTERNAL MEDICINE

## 2020-02-24 RX ORDER — FLUTICASONE PROPIONATE 50 MCG
2 SPRAY, SUSPENSION (ML) NASAL DAILY
Qty: 1 BOTTLE | Refills: 2 | Status: SHIPPED | OUTPATIENT
Start: 2020-02-24 | End: 2021-08-25

## 2020-02-24 RX ORDER — AMOXICILLIN AND CLAVULANATE POTASSIUM 875; 125 MG/1; MG/1
1 TABLET, FILM COATED ORAL 2 TIMES DAILY
Qty: 10 TABLET | Refills: 0 | Status: SHIPPED | OUTPATIENT
Start: 2020-02-24 | End: 2020-02-29

## 2020-02-24 ASSESSMENT — ENCOUNTER SYMPTOMS
WHEEZING: 1
VOMITING: 0
COUGH: 1
SHORTNESS OF BREATH: 0
CONSTIPATION: 0
ABDOMINAL PAIN: 0
NAUSEA: 0
BLOOD IN STOOL: 0
CHEST TIGHTNESS: 0
DIARRHEA: 0

## 2020-02-24 NOTE — PROGRESS NOTES
MD Tyrell   amoxicillin-clavulanate (AUGMENTIN) 875-125 MG per tablet Take 1 tablet by mouth 2 times daily for 5 days Take with food and maintain good hydration 2/24/20 2/29/20 Yes Pearl Quiroz MD   pantoprazole (PROTONIX) 40 MG tablet Take 1 tablet by mouth every morning (before breakfast) 11/19/19  Yes Bibi Menezes MD   atorvastatin (LIPITOR) 10 MG tablet TAKE 1 TABLET DAILY 10/23/19  Yes Pearl Quiroz MD   albuterol sulfate HFA (PROAIR HFA) 108 (90 Base) MCG/ACT inhaler Inhale 2 puffs into the lungs every 4 hours as needed for Wheezing or Shortness of Breath 10/7/19  Yes Douglas Collier MD   cetirizine (ZYRTEC) 10 MG tablet TAKE 1 TABLET DAILY 7/24/19  Yes Pearl Quiroz MD   pantoprazole (PROTONIX) 40 MG tablet Take 1 tablet by mouth 2 times daily (before meals) 3/1/19  Yes Bibi Menezes MD   finasteride (PROSCAR) 5 MG tablet Take 1 tablet by mouth daily 1/21/19  Yes Pearl Quiroz MD   donepezil (ARICEPT) 10 MG tablet Take 10 mg by mouth daily bedtime 10/7/18  Yes Historical Provider, MD   tamsulosin (FLOMAX) 0.4 MG capsule Take 2 capsules by mouth daily 3/22/18  Yes Maureen Ching MD   montelukast (SINGULAIR) 10 MG tablet Take 1 tablet by mouth nightly 3/22/18  Yes Maureen Ching MD   Omega-3 Fatty Acids (FISH OIL) 1000 MG CAPS Take 1,000 mg by mouth daily. Yes Historical Provider, MD   Flaxseed, Linseed, (FLAX SEED OIL) 1000 MG CAPS Take 1,000 mg by mouth daily. Yes Historical Provider, MD   Cholecalciferol (VITAMIN D) 2000 UNITS CAPS capsule Take 1 capsule by mouth daily. Yes Historical Provider, MD   multivitamin [facility]) per tablet Take 1 tablet by mouth daily. Yes Historical Provider, MD   gabapentin (NEURONTIN) 100 MG capsule TAKE ONE CAPSULE BY MOUTH THREE TIMES A DAY 10/15/19 11/19/19  Pearl Quiroz MD       REVIEW OF SYSTEMS:  Review of Systems   Constitutional: Negative for activity change, appetite change, chills, fever and unexpected weight change. Eyes: Negative for visual disturbance. Respiratory: Positive for cough and wheezing. Negative for chest tightness and shortness of breath. Cardiovascular: Negative for chest pain and palpitations. Gastrointestinal: Negative for abdominal pain, blood in stool, constipation, diarrhea, nausea and vomiting. Genitourinary: Negative for hematuria. Skin: Negative for rash. Allergic/Immunologic: Negative for immunocompromised state. Neurological: Negative for dizziness and headaches. Psychiatric/Behavioral: Negative for dysphoric mood and suicidal ideas. Physical Exam:      Vitals: /86   Pulse 84   Wt 279 lb (126.6 kg)   SpO2 98%   BMI 35.82 kg/m²     Body mass index is 35.82 kg/m². Wt Readings from Last 3 Encounters:   02/24/20 279 lb (126.6 kg)   11/20/19 276 lb (125.2 kg)   11/19/19 265 lb (120.2 kg)     Physical Exam  Constitutional:       General: He is not in acute distress. Appearance: He is well-developed. He is not diaphoretic. HENT:      Head: Normocephalic and atraumatic. Nose:      Comments: Bilateral hypertrophied turbinate     Mouth/Throat:      Mouth: Mucous membranes are moist.      Pharynx: Oropharynx is clear. No oropharyngeal exudate. Eyes:      General: No scleral icterus. Right eye: No discharge. Left eye: No discharge. Conjunctiva/sclera: Conjunctivae normal.   Neck:      Musculoskeletal: Normal range of motion and neck supple. Cardiovascular:      Rate and Rhythm: Normal rate and regular rhythm. Pulses: Normal pulses. Heart sounds: Normal heart sounds. No murmur. Pulmonary:      Effort: Pulmonary effort is normal. No respiratory distress. Breath sounds: Normal breath sounds. No wheezing or rales. Abdominal:      General: There is no distension. Palpations: Abdomen is soft. There is no mass. Tenderness: There is no abdominal tenderness. There is no guarding.    Musculoskeletal:         General: No

## 2020-02-24 NOTE — PATIENT INSTRUCTIONS
Please call the office (and ask to see me) or be seen by other provider for any worsening or lack of improvement of the symptoms within a few days.

## 2020-02-25 DIAGNOSIS — R05.9 COUGH: ICD-10-CM

## 2020-02-25 DIAGNOSIS — J01.90 ACUTE SINUSITIS, RECURRENCE NOT SPECIFIED, UNSPECIFIED LOCATION: ICD-10-CM

## 2020-02-25 DIAGNOSIS — R06.09 EXERTIONAL DYSPNEA: ICD-10-CM

## 2020-02-25 LAB
A/G RATIO: 1.9 (ref 1.1–2.2)
ALBUMIN SERPL-MCNC: 4.6 G/DL (ref 3.4–5)
ALP BLD-CCNC: 68 U/L (ref 40–129)
ALT SERPL-CCNC: 27 U/L (ref 10–40)
ANION GAP SERPL CALCULATED.3IONS-SCNC: 15 MMOL/L (ref 3–16)
AST SERPL-CCNC: 23 U/L (ref 15–37)
BASOPHILS ABSOLUTE: 0.1 K/UL (ref 0–0.2)
BASOPHILS RELATIVE PERCENT: 0.9 %
BILIRUB SERPL-MCNC: 0.4 MG/DL (ref 0–1)
BUN BLDV-MCNC: 15 MG/DL (ref 7–20)
CALCIUM SERPL-MCNC: 9.6 MG/DL (ref 8.3–10.6)
CHLORIDE BLD-SCNC: 101 MMOL/L (ref 99–110)
CO2: 23 MMOL/L (ref 21–32)
CREAT SERPL-MCNC: 0.9 MG/DL (ref 0.8–1.3)
EOSINOPHILS ABSOLUTE: 0.8 K/UL (ref 0–0.6)
EOSINOPHILS RELATIVE PERCENT: 14.4 %
GFR AFRICAN AMERICAN: >60
GFR NON-AFRICAN AMERICAN: >60
GLOBULIN: 2.4 G/DL
GLUCOSE BLD-MCNC: 189 MG/DL (ref 70–99)
HCT VFR BLD CALC: 40.1 % (ref 40.5–52.5)
HEMOGLOBIN: 13.6 G/DL (ref 13.5–17.5)
LYMPHOCYTES ABSOLUTE: 1.4 K/UL (ref 1–5.1)
LYMPHOCYTES RELATIVE PERCENT: 24.9 %
MCH RBC QN AUTO: 29.3 PG (ref 26–34)
MCHC RBC AUTO-ENTMCNC: 34 G/DL (ref 31–36)
MCV RBC AUTO: 86.1 FL (ref 80–100)
MONOCYTES ABSOLUTE: 0.4 K/UL (ref 0–1.3)
MONOCYTES RELATIVE PERCENT: 6.4 %
NEUTROPHILS ABSOLUTE: 3.1 K/UL (ref 1.7–7.7)
NEUTROPHILS RELATIVE PERCENT: 53.4 %
PDW BLD-RTO: 14.7 % (ref 12.4–15.4)
PLATELET # BLD: 227 K/UL (ref 135–450)
PMV BLD AUTO: 6.9 FL (ref 5–10.5)
POTASSIUM SERPL-SCNC: 4.6 MMOL/L (ref 3.5–5.1)
PRO-BNP: 36 PG/ML (ref 0–124)
RBC # BLD: 4.66 M/UL (ref 4.2–5.9)
SODIUM BLD-SCNC: 139 MMOL/L (ref 136–145)
TOTAL PROTEIN: 7 G/DL (ref 6.4–8.2)
TSH REFLEX: 3.83 UIU/ML (ref 0.27–4.2)
WBC # BLD: 5.8 K/UL (ref 4–11)

## 2020-03-06 ENCOUNTER — PATIENT MESSAGE (OUTPATIENT)
Dept: INTERNAL MEDICINE CLINIC | Age: 70
End: 2020-03-06

## 2020-03-06 ENCOUNTER — TELEPHONE (OUTPATIENT)
Dept: INTERNAL MEDICINE CLINIC | Age: 70
End: 2020-03-06

## 2020-03-18 ENCOUNTER — TELEPHONE (OUTPATIENT)
Dept: INTERNAL MEDICINE CLINIC | Age: 70
End: 2020-03-18

## 2020-03-18 RX ORDER — METHYLPREDNISOLONE 4 MG/1
TABLET ORAL
Qty: 1 KIT | Refills: 0 | Status: SHIPPED | OUTPATIENT
Start: 2020-03-18 | End: 2020-03-24

## 2020-03-18 NOTE — TELEPHONE ENCOUNTER
No fever   Sometimes SOB if he coughs a lot or exercises hard. Still having wheezing which has been there for months.

## 2020-04-02 RX ORDER — ALBUTEROL SULFATE 90 UG/1
2 AEROSOL, METERED RESPIRATORY (INHALATION) EVERY 4 HOURS PRN
Qty: 1 INHALER | Refills: 5 | Status: SHIPPED | OUTPATIENT
Start: 2020-04-02 | End: 2021-08-25

## 2020-05-08 ENCOUNTER — TELEPHONE (OUTPATIENT)
Dept: INTERNAL MEDICINE CLINIC | Age: 70
End: 2020-05-08

## 2020-05-08 ENCOUNTER — OFFICE VISIT (OUTPATIENT)
Dept: PRIMARY CARE CLINIC | Age: 70
End: 2020-05-08
Payer: MEDICARE

## 2020-05-08 VITALS — TEMPERATURE: 96.4 F | HEART RATE: 84 BPM | OXYGEN SATURATION: 96 %

## 2020-05-08 PROCEDURE — G8417 CALC BMI ABV UP PARAM F/U: HCPCS | Performed by: NURSE PRACTITIONER

## 2020-05-08 PROCEDURE — G8428 CUR MEDS NOT DOCUMENT: HCPCS | Performed by: NURSE PRACTITIONER

## 2020-05-08 PROCEDURE — 3017F COLORECTAL CA SCREEN DOC REV: CPT | Performed by: NURSE PRACTITIONER

## 2020-05-08 PROCEDURE — 4040F PNEUMOC VAC/ADMIN/RCVD: CPT | Performed by: NURSE PRACTITIONER

## 2020-05-08 PROCEDURE — 1123F ACP DISCUSS/DSCN MKR DOCD: CPT | Performed by: NURSE PRACTITIONER

## 2020-05-08 PROCEDURE — 1036F TOBACCO NON-USER: CPT | Performed by: NURSE PRACTITIONER

## 2020-05-08 PROCEDURE — 99213 OFFICE O/P EST LOW 20 MIN: CPT | Performed by: NURSE PRACTITIONER

## 2020-05-08 NOTE — TELEPHONE ENCOUNTER
The patient is advising that he is still has a productive cough with wheezing.  Advised of [facility]      albuterol sulfate HFA (PROAIR HFA) 108 (90 Base) MCG/ACT inhaler     And he is also asking for steroid        [facility] [place], [facility] - [facility] [phone]

## 2020-05-09 LAB
SARS-COV-2: NOT DETECTED
SOURCE: NORMAL

## 2020-05-14 ENCOUNTER — TELEPHONE (OUTPATIENT)
Dept: INTERNAL MEDICINE CLINIC | Age: 70
End: 2020-05-14

## 2020-05-21 RX ORDER — GABAPENTIN 100 MG/1
CAPSULE ORAL
Qty: 270 CAPSULE | Refills: 3 | Status: SHIPPED | OUTPATIENT
Start: 2020-05-21 | End: 2021-05-24 | Stop reason: SDUPTHER

## 2020-05-22 ENCOUNTER — OFFICE VISIT (OUTPATIENT)
Dept: INTERNAL MEDICINE CLINIC | Age: 70
End: 2020-05-22
Payer: MEDICARE

## 2020-05-22 VITALS
BODY MASS INDEX: 35.29 KG/M2 | WEIGHT: 275 LBS | HEIGHT: 74 IN | TEMPERATURE: 97.3 F | DIASTOLIC BLOOD PRESSURE: 80 MMHG | SYSTOLIC BLOOD PRESSURE: 136 MMHG

## 2020-05-22 PROCEDURE — 1036F TOBACCO NON-USER: CPT | Performed by: INTERNAL MEDICINE

## 2020-05-22 PROCEDURE — 4040F PNEUMOC VAC/ADMIN/RCVD: CPT | Performed by: INTERNAL MEDICINE

## 2020-05-22 PROCEDURE — G8417 CALC BMI ABV UP PARAM F/U: HCPCS | Performed by: INTERNAL MEDICINE

## 2020-05-22 PROCEDURE — 3017F COLORECTAL CA SCREEN DOC REV: CPT | Performed by: INTERNAL MEDICINE

## 2020-05-22 PROCEDURE — G8427 DOCREV CUR MEDS BY ELIG CLIN: HCPCS | Performed by: INTERNAL MEDICINE

## 2020-05-22 PROCEDURE — 99214 OFFICE O/P EST MOD 30 MIN: CPT | Performed by: INTERNAL MEDICINE

## 2020-05-22 PROCEDURE — 1123F ACP DISCUSS/DSCN MKR DOCD: CPT | Performed by: INTERNAL MEDICINE

## 2020-05-22 SDOH — ECONOMIC STABILITY: TRANSPORTATION INSECURITY
IN THE PAST 12 MONTHS, HAS THE LACK OF TRANSPORTATION KEPT YOU FROM MEDICAL APPOINTMENTS OR FROM GETTING MEDICATIONS?: NO

## 2020-05-22 SDOH — ECONOMIC STABILITY: FOOD INSECURITY: WITHIN THE PAST 12 MONTHS, YOU WORRIED THAT YOUR FOOD WOULD RUN OUT BEFORE YOU GOT MONEY TO BUY MORE.: NEVER TRUE

## 2020-05-22 SDOH — ECONOMIC STABILITY: TRANSPORTATION INSECURITY
IN THE PAST 12 MONTHS, HAS LACK OF TRANSPORTATION KEPT YOU FROM MEETINGS, WORK, OR FROM GETTING THINGS NEEDED FOR DAILY LIVING?: NO

## 2020-05-22 SDOH — ECONOMIC STABILITY: FOOD INSECURITY: WITHIN THE PAST 12 MONTHS, THE FOOD YOU BOUGHT JUST DIDN'T LAST AND YOU DIDN'T HAVE MONEY TO GET MORE.: NEVER TRUE

## 2020-05-22 ASSESSMENT — PATIENT HEALTH QUESTIONNAIRE - PHQ9
2. FEELING DOWN, DEPRESSED OR HOPELESS: 0
1. LITTLE INTEREST OR PLEASURE IN DOING THINGS: 0
SUM OF ALL RESPONSES TO PHQ9 QUESTIONS 1 & 2: 0
SUM OF ALL RESPONSES TO PHQ QUESTIONS 1-9: 0
SUM OF ALL RESPONSES TO PHQ QUESTIONS 1-9: 0

## 2020-05-22 ASSESSMENT — ENCOUNTER SYMPTOMS
CHEST TIGHTNESS: 0
VOMITING: 0
ABDOMINAL PAIN: 0
SHORTNESS OF BREATH: 0
NAUSEA: 0

## 2020-05-22 NOTE — PROGRESS NOTES
disturbance. Respiratory: Negative for chest tightness and shortness of breath. Cardiovascular: Negative for chest pain. Gastrointestinal: Negative for abdominal pain, nausea and vomiting. Genitourinary: Negative for hematuria. Skin: Negative for rash. Allergic/Immunologic: Negative for immunocompromised state. Neurological: Negative for dizziness and headaches. Psychiatric/Behavioral: Negative for dysphoric mood and suicidal ideas. Physical Exam:      Vitals: /80   Temp 97.3 °F (36.3 °C)   Ht 6' 2\" (1.88 m)   Wt 275 lb (124.7 kg)   BMI 35.31 kg/m²     Body mass index is 35.31 kg/m². Wt Readings from Last 3 Encounters:   05/22/20 275 lb (124.7 kg)   02/24/20 279 lb (126.6 kg)   11/20/19 276 lb (125.2 kg)     Physical Exam  Constitutional:       General: He is not in acute distress. Appearance: He is well-developed. He is not diaphoretic. HENT:      Head: Normocephalic and atraumatic. Mouth/Throat:      Pharynx: No oropharyngeal exudate. Eyes:      General: No scleral icterus. Right eye: No discharge. Left eye: No discharge. Conjunctiva/sclera: Conjunctivae normal.   Neck:      Musculoskeletal: Normal range of motion and neck supple. Cardiovascular:      Rate and Rhythm: Normal rate. Heart sounds: Normal heart sounds. No murmur. Pulmonary:      Effort: Pulmonary effort is normal. No respiratory distress. Breath sounds: Normal breath sounds. No wheezing or rales. Abdominal:      General: There is no distension. Palpations: Abdomen is soft. Tenderness: There is no abdominal tenderness. Musculoskeletal:         General: No deformity. Lymphadenopathy:      Head:      Right side of head: No submental or submandibular adenopathy. Left side of head: No submental or submandibular adenopathy. Cervical: No cervical adenopathy. Right cervical: No superficial or deep cervical adenopathy.      Left cervical: No

## 2020-05-22 NOTE — PATIENT INSTRUCTIONS
Try Mucinex for the cough   Add Zyrtec    Please call the office (and ask to see me) or be seen by other provider for any worsening or lack of improvement of the symptoms within a few weeks.

## 2020-06-01 DIAGNOSIS — Z12.5 SCREENING FOR PROSTATE CANCER: ICD-10-CM

## 2020-06-01 DIAGNOSIS — R35.1 NOCTURIA: ICD-10-CM

## 2020-06-01 DIAGNOSIS — R91.1 PULMONARY NODULE: ICD-10-CM

## 2020-06-01 DIAGNOSIS — R35.89 POLYURIA: ICD-10-CM

## 2020-06-01 DIAGNOSIS — R05.9 COUGH: ICD-10-CM

## 2020-06-01 DIAGNOSIS — E78.2 MIXED HYPERLIPIDEMIA: ICD-10-CM

## 2020-06-01 DIAGNOSIS — G91.9 HYDROCEPHALUS, UNSPECIFIED TYPE (HCC): ICD-10-CM

## 2020-06-01 LAB
BILIRUBIN URINE: NEGATIVE
BLOOD, URINE: NEGATIVE
CLARITY: CLEAR
COLOR: YELLOW
EPITHELIAL CELLS, UA: 0 /HPF (ref 0–5)
GLUCOSE URINE: NEGATIVE MG/DL
HYALINE CASTS: 0 /LPF (ref 0–8)
KETONES, URINE: NEGATIVE MG/DL
LEUKOCYTE ESTERASE, URINE: NEGATIVE
MICROSCOPIC EXAMINATION: NORMAL
NITRITE, URINE: NEGATIVE
PH UA: 6 (ref 5–8)
PROSTATE SPECIFIC ANTIGEN: 0.76 NG/ML (ref 0–4)
PROTEIN UA: NEGATIVE MG/DL
RBC UA: 0 /HPF (ref 0–4)
SODIUM URINE: 83 MMOL/L
SPECIFIC GRAVITY UA: 1.02 (ref 1–1.03)
URINE TYPE: NORMAL
UROBILINOGEN, URINE: 0.2 E.U./DL
WBC UA: 0 /HPF (ref 0–5)

## 2020-06-02 LAB — OSMOLALITY URINE: 564 MOSM/KG (ref 390–1070)

## 2020-06-03 RX ORDER — MONTELUKAST SODIUM 10 MG/1
10 TABLET ORAL NIGHTLY
Qty: 90 TABLET | Refills: 1 | Status: SHIPPED | OUTPATIENT
Start: 2020-06-03 | End: 2020-08-24 | Stop reason: SDUPTHER

## 2020-06-08 ENCOUNTER — OFFICE VISIT (OUTPATIENT)
Dept: ENT CLINIC | Age: 70
End: 2020-06-08
Payer: MEDICARE

## 2020-06-08 VITALS
BODY MASS INDEX: 34.93 KG/M2 | TEMPERATURE: 98.1 F | HEART RATE: 73 BPM | WEIGHT: 272.2 LBS | DIASTOLIC BLOOD PRESSURE: 69 MMHG | HEIGHT: 74 IN | SYSTOLIC BLOOD PRESSURE: 123 MMHG

## 2020-06-08 PROCEDURE — 3017F COLORECTAL CA SCREEN DOC REV: CPT | Performed by: OTOLARYNGOLOGY

## 2020-06-08 PROCEDURE — 1036F TOBACCO NON-USER: CPT | Performed by: OTOLARYNGOLOGY

## 2020-06-08 PROCEDURE — G8427 DOCREV CUR MEDS BY ELIG CLIN: HCPCS | Performed by: OTOLARYNGOLOGY

## 2020-06-08 PROCEDURE — 4040F PNEUMOC VAC/ADMIN/RCVD: CPT | Performed by: OTOLARYNGOLOGY

## 2020-06-08 PROCEDURE — 1123F ACP DISCUSS/DSCN MKR DOCD: CPT | Performed by: OTOLARYNGOLOGY

## 2020-06-08 PROCEDURE — 99203 OFFICE O/P NEW LOW 30 MIN: CPT | Performed by: OTOLARYNGOLOGY

## 2020-06-08 PROCEDURE — 31575 DIAGNOSTIC LARYNGOSCOPY: CPT | Performed by: OTOLARYNGOLOGY

## 2020-06-08 PROCEDURE — G8417 CALC BMI ABV UP PARAM F/U: HCPCS | Performed by: OTOLARYNGOLOGY

## 2020-06-08 PROCEDURE — G0268 REMOVAL OF IMPACTED WAX MD: HCPCS | Performed by: OTOLARYNGOLOGY

## 2020-06-22 ENCOUNTER — OFFICE VISIT (OUTPATIENT)
Dept: PULMONOLOGY | Age: 70
End: 2020-06-22
Payer: MEDICARE

## 2020-06-22 VITALS — HEIGHT: 74 IN | WEIGHT: 265 LBS | TEMPERATURE: 97.3 F | BODY MASS INDEX: 34.01 KG/M2

## 2020-06-22 DIAGNOSIS — R06.02 SHORTNESS OF BREATH: ICD-10-CM

## 2020-06-22 DIAGNOSIS — R60.0 LEG EDEMA: ICD-10-CM

## 2020-06-22 PROCEDURE — 3017F COLORECTAL CA SCREEN DOC REV: CPT | Performed by: INTERNAL MEDICINE

## 2020-06-22 PROCEDURE — 1036F TOBACCO NON-USER: CPT | Performed by: INTERNAL MEDICINE

## 2020-06-22 PROCEDURE — 99204 OFFICE O/P NEW MOD 45 MIN: CPT | Performed by: INTERNAL MEDICINE

## 2020-06-22 PROCEDURE — G8417 CALC BMI ABV UP PARAM F/U: HCPCS | Performed by: INTERNAL MEDICINE

## 2020-06-22 PROCEDURE — G8427 DOCREV CUR MEDS BY ELIG CLIN: HCPCS | Performed by: INTERNAL MEDICINE

## 2020-06-22 PROCEDURE — 4040F PNEUMOC VAC/ADMIN/RCVD: CPT | Performed by: INTERNAL MEDICINE

## 2020-06-22 PROCEDURE — 1123F ACP DISCUSS/DSCN MKR DOCD: CPT | Performed by: INTERNAL MEDICINE

## 2020-06-22 ASSESSMENT — ENCOUNTER SYMPTOMS
SHORTNESS OF BREATH: 0
COUGH: 1
WHEEZING: 1
CHEST TIGHTNESS: 0

## 2020-06-22 NOTE — PROGRESS NOTES
[facility]    Outpatient Note    Subjective:   CHIEF COMPLAINT:   Chief Complaint   Patient presents with    Cough       HPI:     The patient is 72 y.o. male who presents today for a new patient visit for evaluation of cough going on for over 6 months. He was investigated extensively and was seen by ENT    Cough is mainly in the morning and night. He sleep sitting at night over the past month. There is SOB with coughing fits for which he use albuterol inhaler. He had GERD and salas's esophagus. He is on PPI BID    No prior hx of asthma. Sister has asthma. Quit smoking in . Used to smoke 1 PPD for 10 years. No functional limitation. However he wheeze with exercise. No prior hx of heart disease. He is on CPAP at night for CAM since . Last sleep study in .  Last time CP[facility]    Past Medical History:    Past Medical History:   Diagnosis Date    Allergic rhinitis     Arthritis     Asthma     Erectile dysfunction     Hearing loss     Hyperlipidemia     Hypertension     Kidney failure 2018    Normal pressure hydrocephalus ([address] 2018    shunt placement    [facility] (AAA[facility] 2018    [facility]    Tinnitus     Unspecified sleep apnea        Social History:    Social History     Tobacco Use   Smoking Status Former Smoker    Packs/day: 0.50    Years: 10.00    Pack years: 5.00    Types: Cigarettes    Last attempt to quit: 2002    Years since quittin.4   Smokeless Tobacco Never Used       Family History:  Family History   Problem Relation Age of Onset    Cancer Father         prostate       Current Medications:  Current Outpatient Medications on File Prior to Visit   Medication Sig Dispense Refill    montelukast (SINGULAIR) 10 MG tablet Take 1 tablet by mouth nightly 90 tablet 1    albuterol sulfate HFA (PROAIR HFA) 108 (90 Base) MCG/ACT inhaler Inhale 2 puffs into the lungs every 4 hours as needed for Wheezing or Shortness of Breath 1 Inhaler 5    pantoprazole (PROTONIX) 40 MG tablet Take 1 tablet by mouth every morning (before breakfast) 30 tablet 1    atorvastatin (LIPITOR) 10 MG tablet TAKE 1 TABLET DAILY 90 tablet 4    cetirizine (ZYRTEC) 10 MG tablet TAKE 1 TABLET DAILY 90 tablet 3    finasteride (PROSCAR) 5 MG tablet Take 1 tablet by mouth daily 90 tablet 3    donepezil (ARICEPT) 10 MG tablet Take 10 mg by mouth daily bedtime      tamsulosin (FLOMAX) 0.4 MG capsule Take 2 capsules by mouth daily 90 capsule 1    Omega-3 Fatty Acids (FISH OIL) 1000 MG CAPS Take 1,000 mg by mouth daily.  Flaxseed, Linseed, (FLAX SEED OIL) 1000 MG CAPS Take 1,000 mg by mouth daily.  Cholecalciferol (VITAMIN D) 2000 UNITS CAPS capsule Take 1 capsule by mouth daily.  multivitamin (THERAGRAN) per tablet Take 1 tablet by mouth daily.  gabapentin (NEURONTIN) 100 MG capsule TAKE 1 CAPSULE THREE TIMES A  capsule 3    fluticasone (FLONASE) 50 MCG/ACT nasal spray 2 sprays by Nasal route daily (Patient not taking: Reported on 6/22/2020) 1 Bottle 2     No current facility-administered medications on file prior to visit. REVIEW OF SYSTEMS:    Review of Systems   Constitutional: Negative for chills, fatigue, fever and unexpected weight change. HENT: Negative for congestion. Respiratory: Positive for cough and wheezing. Negative for chest tightness and shortness of breath. Cardiovascular: Negative for chest pain, palpitations and leg swelling. Neurological: Negative for weakness. Psychiatric/Behavioral: The patient is not nervous/anxious. All other systems reviewed and are negative. Objective:   PHYSICAL EXAM:        VITALS:  Temp 97.3 °F (36.3 °C)   Ht 6' 2\" (1.88 m)   Wt 265 lb (120.2 kg)   BMI 34.02 kg/m²     Physical Exam  Vitals signs reviewed. Constitutional:       Appearance: He is well-developed.    HENT: Head: Normocephalic and atraumatic. Eyes:      Pupils: Pupils are equal, round, and reactive to light. Neck:      Musculoskeletal: Neck supple. Vascular: No JVD. Cardiovascular:      Rate and Rhythm: Normal rate and regular rhythm. Heart sounds: No murmur. Pulmonary:      Effort: Pulmonary effort is normal. No respiratory distress. Breath sounds: No stridor. Wheezing present. No rales. Abdominal:      General: Bowel sounds are normal.      Palpations: Abdomen is soft. Musculoskeletal:         General: No deformity. Right lower leg: Edema present. Left lower leg: Edema present. Skin:     General: Skin is warm and dry. Neurological:      Mental Status: He is alert and oriented to person, place, and time. Psychiatric:         Behavior: Behavior normal.         DATA:    PFT 11/29/2019  FINDINGS:  Spirometry on this patient shows an FEV1 of 2.74 which is 70%  of predicted and a forced vital capacity of 3.54 which is 67% of  predicted, giving a ratio of 77. There was borderline response to  bronchodilators. Lung volumes show a decreased total lung capacity at  79% of predicted and decreased diffusion prior to correction for  alveolar lung volumes to come back at 91% of predicted.     CONCLUSION:  Moderate restrictive defect with borderline bronchodilator  response and decreased diffusion prior to correction for alveolar lung  volumes. Findings could be consistent with the patient's BMI of 35.5,  but could not also rule out interstitial lung disease or pulmonary  vascular disease. Clinical correlation is recommended.     CT chest   DATE: 12/17/2019       EXAM: CT CHEST WO CONTRAST       INDICATION: Cough, short of breath, abnormal pulmonary function tests       COMPARISON: None       TECHNIQUE: Axial CT imaging of the chest was performed.  Axial images, multiplanar reformatted images and axial maximum intensity projection were reviewed.   Individualized dose optimization

## 2020-06-23 LAB — PRO-BNP: 30 PG/ML (ref 0–124)

## 2020-06-24 ENCOUNTER — HOSPITAL ENCOUNTER (OUTPATIENT)
Dept: NON INVASIVE DIAGNOSTICS | Age: 70
Discharge: HOME | End: 2020-06-24
Payer: MEDICARE

## 2020-06-24 LAB
LV EF: 60 %
LVEF MODALITY: NORMAL

## 2020-06-24 PROCEDURE — 6360000004 HC RX CONTRAST MEDICATION: Performed by: INTERNAL MEDICINE

## 2020-06-24 PROCEDURE — C8929 TTE W OR WO FOL WCON,DOPPLER: HCPCS

## 2020-06-24 RX ADMIN — PERFLUTREN 2.2 MG: 6.52 INJECTION, SUSPENSION INTRAVENOUS at 14:39

## 2020-07-09 ENCOUNTER — NURSE ONLY (OUTPATIENT)
Dept: PRIMARY CARE CLINIC | Age: 70
End: 2020-07-09
Payer: MEDICARE

## 2020-07-09 PROCEDURE — 99211 OFF/OP EST MAY X REQ PHY/QHP: CPT | Performed by: NURSE PRACTITIONER

## 2020-07-09 NOTE — PROGRESS NOTES
Jose Carlos Cabrera. received a viral test for COVID-19. They were educated on isolation and quarantine as appropriate. For any symptoms, they were directed to seek care from their PCP, given contact information to establish with a doctor, directed to an urgent care or the emergency room. Swab and go Covid testing for preop.

## 2020-07-10 ENCOUNTER — TELEPHONE (OUTPATIENT)
Dept: PULMONOLOGY | Age: 70
End: 2020-07-10

## 2020-07-10 NOTE — TELEPHONE ENCOUNTER
DR ONEIL'S PATIENT    Patient c/o off and on hoarseness & loss of voice. He said that it has slowly been going on since starting [facility], patient is wondering if this is a side effect from [facility]. [place] HAS stopped his wheezing.    Thank you

## 2020-07-13 LAB
SARS-COV-2: NOT DETECTED
SOURCE: NORMAL

## 2020-07-14 ENCOUNTER — ANESTHESIA EVENT (OUTPATIENT)
Dept: ENDOSCOPY | Age: 70
End: 2020-07-14
Payer: MEDICARE

## 2020-07-15 ENCOUNTER — ANESTHESIA (OUTPATIENT)
Dept: ENDOSCOPY | Age: 70
End: 2020-07-15
Payer: MEDICARE

## 2020-07-15 ENCOUNTER — HOSPITAL ENCOUNTER (OUTPATIENT)
Age: 70
Setting detail: OUTPATIENT SURGERY
Discharge: HOME | End: 2020-07-15
Attending: INTERNAL MEDICINE | Admitting: INTERNAL MEDICINE
Payer: MEDICARE

## 2020-07-15 VITALS
DIASTOLIC BLOOD PRESSURE: 81 MMHG | HEIGHT: 74 IN | RESPIRATION RATE: 16 BRPM | TEMPERATURE: 97.3 F | BODY MASS INDEX: 34.01 KG/M2 | HEART RATE: 66 BPM | OXYGEN SATURATION: 98 % | SYSTOLIC BLOOD PRESSURE: 121 MMHG | WEIGHT: 265 LBS

## 2020-07-15 VITALS — SYSTOLIC BLOOD PRESSURE: 112 MMHG | OXYGEN SATURATION: 96 % | DIASTOLIC BLOOD PRESSURE: 77 MMHG

## 2020-07-15 PROCEDURE — 6360000002 HC RX W HCPCS: Performed by: NURSE ANESTHETIST, CERTIFIED REGISTERED

## 2020-07-15 PROCEDURE — 7100000011 HC PHASE II RECOVERY - ADDTL 15 MIN: Performed by: INTERNAL MEDICINE

## 2020-07-15 PROCEDURE — 7100000010 HC PHASE II RECOVERY - FIRST 15 MIN: Performed by: INTERNAL MEDICINE

## 2020-07-15 PROCEDURE — 3609012400 HC EGD TRANSORAL BIOPSY SINGLE/MULTIPLE: Performed by: INTERNAL MEDICINE

## 2020-07-15 PROCEDURE — 3700000000 HC ANESTHESIA ATTENDED CARE: Performed by: INTERNAL MEDICINE

## 2020-07-15 PROCEDURE — 3609013200 HC EGD W/ ABLATION: Performed by: INTERNAL MEDICINE

## 2020-07-15 PROCEDURE — C1888 ENDOVAS NON-CARDIAC ABL CATH: HCPCS | Performed by: INTERNAL MEDICINE

## 2020-07-15 PROCEDURE — 2580000003 HC RX 258: Performed by: ANESTHESIOLOGY

## 2020-07-15 PROCEDURE — 2709999900 HC NON-CHARGEABLE SUPPLY: Performed by: INTERNAL MEDICINE

## 2020-07-15 PROCEDURE — 3700000001 HC ADD 15 MINUTES (ANESTHESIA): Performed by: INTERNAL MEDICINE

## 2020-07-15 PROCEDURE — 88305 TISSUE EXAM BY PATHOLOGIST: CPT

## 2020-07-15 PROCEDURE — 88342 IMHCHEM/IMCYTCHM 1ST ANTB: CPT

## 2020-07-15 RX ORDER — ONDANSETRON 2 MG/ML
4 INJECTION INTRAMUSCULAR; INTRAVENOUS
Status: DISCONTINUED | OUTPATIENT
Start: 2020-07-15 | End: 2020-07-15 | Stop reason: HOSPADM

## 2020-07-15 RX ORDER — SODIUM CHLORIDE 0.9 % (FLUSH) 0.9 %
10 SYRINGE (ML) INJECTION PRN
Status: DISCONTINUED | OUTPATIENT
Start: 2020-07-15 | End: 2020-07-15 | Stop reason: HOSPADM

## 2020-07-15 RX ORDER — SODIUM CHLORIDE, SODIUM LACTATE, POTASSIUM CHLORIDE, CALCIUM CHLORIDE 600; 310; 30; 20 MG/100ML; MG/100ML; MG/100ML; MG/100ML
INJECTION, SOLUTION INTRAVENOUS CONTINUOUS
Status: DISCONTINUED | OUTPATIENT
Start: 2020-07-15 | End: 2020-07-15 | Stop reason: HOSPADM

## 2020-07-15 RX ORDER — PROPOFOL 10 MG/ML
INJECTION, EMULSION INTRAVENOUS PRN
Status: DISCONTINUED | OUTPATIENT
Start: 2020-07-15 | End: 2020-07-15 | Stop reason: SDUPTHER

## 2020-07-15 RX ORDER — LIDOCAINE HYDROCHLORIDE 20 MG/ML
INJECTION, SOLUTION INTRAVENOUS PRN
Status: DISCONTINUED | OUTPATIENT
Start: 2020-07-15 | End: 2020-07-15 | Stop reason: SDUPTHER

## 2020-07-15 RX ORDER — SODIUM CHLORIDE 0.9 % (FLUSH) 0.9 %
10 SYRINGE (ML) INJECTION EVERY 12 HOURS SCHEDULED
Status: DISCONTINUED | OUTPATIENT
Start: 2020-07-15 | End: 2020-07-15 | Stop reason: HOSPADM

## 2020-07-15 RX ORDER — ASPIRIN 81 MG/1
81 TABLET, CHEWABLE ORAL DAILY
COMMUNITY

## 2020-07-15 RX ORDER — LIDOCAINE HYDROCHLORIDE 10 MG/ML
1 INJECTION, SOLUTION EPIDURAL; INFILTRATION; INTRACAUDAL; PERINEURAL
Status: DISCONTINUED | OUTPATIENT
Start: 2020-07-15 | End: 2020-07-15 | Stop reason: HOSPADM

## 2020-07-15 RX ADMIN — LIDOCAINE HYDROCHLORIDE 50 MG: 20 INJECTION, SOLUTION INTRAVENOUS at 07:54

## 2020-07-15 RX ADMIN — PROPOFOL 100 MG: 10 INJECTION, EMULSION INTRAVENOUS at 07:54

## 2020-07-15 RX ADMIN — PROPOFOL 100 MG: 10 INJECTION, EMULSION INTRAVENOUS at 07:55

## 2020-07-15 RX ADMIN — SODIUM CHLORIDE, POTASSIUM CHLORIDE, SODIUM LACTATE AND CALCIUM CHLORIDE: 600; 310; 30; 20 INJECTION, SOLUTION INTRAVENOUS at 06:40

## 2020-07-15 RX ADMIN — PROPOFOL 80 MG: 10 INJECTION, EMULSION INTRAVENOUS at 08:00

## 2020-07-15 ASSESSMENT — PULMONARY FUNCTION TESTS
PIF_VALUE: 0
PIF_VALUE: 1
PIF_VALUE: 0
PIF_VALUE: 1
PIF_VALUE: 0
PIF_VALUE: 0

## 2020-07-15 ASSESSMENT — PAIN SCALES - GENERAL
PAINLEVEL_OUTOF10: 0

## 2020-07-15 ASSESSMENT — PAIN - FUNCTIONAL ASSESSMENT: PAIN_FUNCTIONAL_ASSESSMENT: 0-10

## 2020-07-15 NOTE — H&P
History and Physical / Pre-Sedation Assessment    Patient:  Antonette Kearns :   1950     Intended Procedure:  egd and rfa    HPI: salas    Past Medical History:   has a past medical history of Allergic rhinitis, Arthritis, Asthma, Erectile dysfunction, Hearing loss, Hyperlipidemia, Hypertension, Kidney failure, Normal pressure hydrocephalus ([facility] [address] Screening for abdominal aortic aneurysm (AAA) performed, Tinnitus, and Unspecified sleep apnea. Past Surgical History:   has a past surgical history that includes other surgical history (N/A, 2018); pr create shunt:ventric-peritoneal (N/A, 2018); laparoscopy (N/A, 2018); Eye surgery (Left); joint replacement (Left, 2015); Upper gastrointestinal endoscopy (N/A, 2018); Colonoscopy (2018); Enteroscopy (N/A, 2019); Upper gastrointestinal endoscopy (N/A, 3/1/2019); Upper gastrointestinal endoscopy (N/A, 2019); Upper gastrointestinal endoscopy (N/A, 2019); Upper gastrointestinal endoscopy (N/A, 2019); Upper gastrointestinal endoscopy (N/A, 2019); Upper gastrointestinal endoscopy (N/A, 2019); Upper gastrointestinal endoscopy (N/A, 2019); and Upper gastrointestinal endoscopy (N/A, 2019). Medications:  Prior to Admission medications    Medication Sig Start Date End Date Taking?  Authorizing Provider   aspirin 81 MG chewable tablet Take 81 mg by mouth daily   Yes Historical Provider, MD   mometasone-formoterol (DULERA) 200-5 MCG/ACT inhaler Inhale 2 puffs into the lungs every 12 hours 20  Yes Christiano Oliveira MD   montelukast (SINGULAIR) 10 MG tablet Take 1 tablet by mouth nightly 6/3/20  Yes Ric Harper MD   gabapentin (NEURONTIN) 100 MG capsule TAKE 1 CAPSULE THREE TIMES A DAY 5/21/20 7/15/20 Yes Ric Harper MD   albuterol sulfate HFA (PROAIR HFA) 108 (90 Base) MCG/ACT inhaler Inhale 2 puffs into the lungs every 4 hours as needed for Wheezing or Shortness of Breath kg/m²    Skin: normal  HEENT: normal  Neck: supple. No adenopathy. No thyromegaly. No JVD. Pulmonary:Normal  Cardiac:Normal  Abdomen:Normal  MS: normal  Neuro: normal  Ext: no edema. Pulses normal    Pre-Procedure Assessment / Plan:  ASA 2 - Patient with mild systemic disease with no functional limitations  Mallampati Airway Assessment:  Mallampati Class II - (soft palate, fauces & uvula are visible)  Level of Sedation Plan:Mac sedation  Post Procedure plan: Return to same level of care    I assessed the patient and find that the patient is in satisfactory condition to proceed with the planned procedure and sedation plan. I have explained the risk, benefits, and alternatives to the procedure. The patient understands and agrees to proceed.   Garrett Wiggins  7:49 AM 7/15/2020

## 2020-07-15 NOTE — PROGRESS NOTES
8607 pt to Endo post procedure recovery room. Pt sleepy easily aroused then drifts back to sleep, denies nausea, abd soft, pt denies pain. Pt resting with O2 in place. 0830 pt more awake, resting quietly. 0900 Pt up and dressed, sitting in chair waiting on Dr Chen Lewis. [facility] in to see pt.   0925 pt d/c'd to car, home per wife, d/c instructions given at car side. Wife questioning diet. 0930 Instructions given by Dr Chen Lewis for a full liquid diet today and soft diet x 2 days, wife called and given diet instructions. Ambulatory Surgery/Procedure Discharge Note    Vitals:    07/15/20 0834   BP: 121/81   Pulse: 66   Resp: 16   Temp: 97.3 °F (36.3 °C)   SpO2: 98%       In: 370 [P.O.:120; I.V.:250]  Out: -     Restroom use offered before discharge. Yes    Pain assessment:  none  Pain Level: 0        Patient discharged to home/self care.  Patient discharged via wheel chair by transporter to waiting family/S.O.       7/15/2020 9:38 AM

## 2020-07-15 NOTE — OP NOTE
[address]               [address] [place], [address]                                OPERATIVE REPORT    PATIENT NAME: Christofer Marina                      :        1950  MED REC NO:   [id number]                          ROOM:  ACCOUNT NO:   [de-identified]                           ADMIT DATE: 07/15/2020  PROVIDER:     Owen Whitehead MD    DATE OF PROCEDURE:  07/15/2020    SURGEON:  Owen Whitehead MD    INDICATIONS FOR THE PROCEDURE:  Extensive Child's esophagus - followup  radiofrequency ablation. DESCRIPTION OF PROCEDURE:  With the patient in the left lateral position  and after IV Diprivan, the Olympus video endoscope was introduced into  the esophagus and carefully advanced to the gastroesophageal junction. Excellent ablation was noted and residual was noted at the GE junction,  for which radiofrequency ablation was performed. Hiatus hernia was  identified. The stomach showed mild gastritis and biopsies were  obtained for Helicobacter pylori. The duodenum was normal.  The scope  was then removed without complication. IMPRESSION:  1. Hiatus hernia with residual Child's. Radiofrequency ablation was  completed. 2.  Minor antral gastritis. EBL none    Thank you Dr. Jessica Heath for the privilege to assist you in the care of the  patient. Murray Payton MD    D: 07/15/2020 8:28:33       T: 07/15/2020 9:08:33     [email or web address]  Job#: [id number]     Doc#: [id number]    CC:   MD Kyra Jackson MD

## 2020-07-15 NOTE — ANESTHESIA POSTPROCEDURE EVALUATION
Department of Anesthesiology  Postprocedure Note    Patient: Eben Hooper. MRN: [id number]  YOB: 1950  Date of evaluation: 7/15/2020  Time:  9:02 AM     Procedure Summary     Date:  07/15/20 Room / Location:  [address] [place] [address] Surendra Byrne  / [facility]    Anesthesia Start:  9599 Anesthesia Stop:  0862    Procedures:       ESOPHAGOGASTRODUODENOSCOPY RADIOFREQUENCY ABLATION ON STANDBY (N/A )      EGD BIOPSY (N/A ) Diagnosis:       Child's esophagus with dysplasia      (Child's esophagus with dysplasia [K22.719])    Surgeon:  Nelly Zaman MD Responsible Provider:  Conor Samano DO    Anesthesia Type:  MAC ASA Status:  3          Anesthesia Type: MAC    Kait Phase I: Kait Score: 10    Kait Phase II: Kait Score: 10    Last vitals: Reviewed and per EMR flowsheets.        Anesthesia Post Evaluation    Patient location during evaluation: bedside  Patient participation: complete - patient participated  Level of consciousness: awake  Pain score: 0  Airway patency: patent  Nausea & Vomiting: no nausea and no vomiting  Complications: no  Cardiovascular status: blood pressure returned to baseline  Respiratory status: acceptable  Hydration status: euvolemic

## 2020-07-15 NOTE — ANESTHESIA PRE PROCEDURE
2/24/20   Vikash , MD   Omega-3 Fatty Acids (FISH OIL) 1000 MG CAPS Take 1,000 mg by mouth daily. Historical Provider, MD   Flaxseed, Linseed, (FLAX SEED OIL) 1000 MG CAPS Take 1,000 mg by mouth daily. Historical Provider, MD   Cholecalciferol (VITAMIN D) 2000 UNITS CAPS capsule Take 1 capsule by mouth daily. Historical Provider, MD       Current medications:    Current Facility-Administered Medications   Medication Dose Route Frequency Provider Last Rate Last Dose    lactated ringers infusion   Intravenous Continuous Shayla Alfaro  mL/hr at 07/15/20 0640      sodium chloride flush 0.9 % injection 10 mL  10 mL Intravenous 2 times per day Shayla Alfaro MD        sodium chloride flush 0.9 % injection 10 mL  10 mL Intravenous PRN Shayla Alfaro MD        lidocaine PF 1 % injection 1 mL  1 mL Intradermal Once PRN Shayla Alfaro MD           Allergies: Allergies   Allergen Reactions    Lisinopril Other (See Comments)     cough       Problem List:    Patient Active Problem List   Diagnosis Code    Wrist arthritis M19.039    Sprain of lumbar region S33. 5XXA    Hypertrophy of prostate without urinary obstruction and other lower urinary tract symptoms (LUTS) N40.0    Cardiac dysrhythmia I49.9    Onychomycosis B35.1    Obstructive sleep apnea G47.33    Personal history of other diseases of digestive system Z87.19    Erectile dysfunction N52.9    Vitamin D deficiency E55.9    Mixed hyperlipidemia E78.2    Persistent depressive disorder F34.1    Dysthymia F34.1    Non-seasonal allergic rhinitis due to pollen J30.1    Pulmonary nodule R91.1    Traumatic rhabdomyolysis (HCC) T79. 6XXA    Acute renal failure due to rhabdomyolysis (HCC) N17.9, M62.82    Frequent falls R29.6    Functional gait abnormality R26.89    Back pain M54.9    Normal pressure hydrocephalus (HCC) G91.2    Hyponatremia E87.1    Hydrocephalus (HCC) N38.2    Umbilical hernia, incarcerated K42.0  NPH (normal pressure hydrocephalus) (HCC) G91.2    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 K22.70       Past Medical History:        Diagnosis Date    Allergic rhinitis     Arthritis     Asthma     Erectile dysfunction     Hearing loss     Hyperlipidemia     Hypertension     Kidney failure 07/2018    Normal pressure hydrocephalus ([address] 07/2018    shunt placement    Screening for abdominal aortic aneurysm (AAA) [facility]02/[facility]    [facility]    Tinnitus     Unspecified sleep apnea        Past Surgical History:        Procedure Laterality Date    COLONOSCOPY  12/14/2018    COLONOSCOPY WITH BIOPSY performed by Flavio Allen MD at [facility] ENTEROSCOPY N/A 1/4/2019    ENTEROSCOPY PUSH BIOPSY performed by Flavio Allen MD at [address] Left     lipoma    JOINT REPLACEMENT Left 2015    PARTIAL KNEE REPLACMENT    LAPAROSCOPY N/A 7/18/2018    OPENING AND CLOSING FOR VENTRICULAR PERITONEAL SHUNT performed by Mae Menezes MD at [address] [place] [address] [address] 07/18/2018    [address]; (N/A )    SC CREATE SHUNT:VENTRIC-PERITONEAL N/A 7/18/2018    VENTRICULAR PERITONEAL SHUNT INSERTION RIGHT SIDE; performed by Retia Ganser, MD at [facility] 12/14/2018    EGD BIOPSY performed by Flavio Allen MD at [address] [facility] N/A 3/1/2019    ESOPHAGOGASTRODUODENOSCOPY WITH RADIOFREQUENCY  ABLATION/ ABLATIONS X19 performed by Flavio Allen MD at [address] [facility] N/A 4/24/2019    ESOPHAGOGASTRODUODENOSCOPY WITH RADIOFREQUENCY ABLATION performed by Flavio Allen MD at [address] [facility] 4/24/2019    EGD BIOPSY performed by Flavio Allen MD at [address] [facility] N/A 8/2/2019    ESOPHAGOGASTRODUODENOSCOPY RADIOFREQUENCY ABLATION performed by Flavio Allen MD at [facility] 02/25/2020    RDW 14.7 02/25/2020     02/25/2020       CMP:   Lab Results   Component Value Date     02/25/2020    K 4.6 02/25/2020    K 4.6 07/23/2018     02/25/2020    CO2 23 02/25/2020    BUN 15 02/25/2020    CREATININE 0.9 02/25/2020    GFRAA >60 02/25/2020    GFRAA >60 04/30/2013    AGRATIO 1.9 02/25/2020    LABGLOM >60 02/25/2020    GLUCOSE 189 02/25/2020    PROT 7.0 02/25/2020    PROT 7.0 07/17/2012    CALCIUM 9.6 02/25/2020    BILITOT 0.4 02/25/2020    ALKPHOS 68 02/25/2020    AST 23 02/25/2020    ALT 27 02/25/2020       POC Tests: No results for input(s): POCGLU, POCNA, POCK, POCCL, POCBUN, POCHEMO, POCHCT in the last 72 hours. Coags:   Lab Results   Component Value Date    PROTIME 11.6 07/18/2018    INR 1.02 07/18/2018       HCG (If Applicable): No results found for: PREGTESTUR, PREGSERUM, HCG, HCGQUANT     ABGs: No results found for: PHART, PO2ART, LUI7UUD, FTP9AAP, BEART, W5LILVJH     Type & Screen (If Applicable):  No results found for: LABABO, LABRH    Drug/Infectious Status (If Applicable):  No results found for: HIV, HEPCAB    COVID-19 Screening (If Applicable):   Lab Results   Component Value Date    COVID19 Not Detected 07/09/2020         Anesthesia Evaluation  Patient summary reviewed and Nursing notes reviewed  Airway: Mallampati: II  TM distance: >3 FB   Neck ROM: full  Mouth opening: > = 3 FB Dental: normal exam         Pulmonary:normal exam  breath sounds clear to auscultation  (+) sleep apnea:  asthma: seasonal asthma,           Patient did not smoke on day of surgery.                  Cardiovascular:    (+) hypertension: mild, dysrhythmias:,       ECG reviewed  Rhythm: regular  Rate: normal  Echocardiogram reviewed         Beta Blocker:  Not on Beta Blocker         Neuro/Psych:   (+) neuromuscular disease:, psychiatric history: stable with treatment            GI/Hepatic/Renal: Neg GI/Hepatic/Renal ROS            Endo/Other: Negative Endo/Other ROS Abdominal:       Abdomen: soft. Vascular: negative vascular ROS. Anesthesia Plan      MAC     ASA 3       Induction: intravenous. MIPS: Postoperative opioids intended and Prophylactic antiemetics administered. Anesthetic plan and risks discussed with patient. Use of blood products discussed with patient whom consented to blood products. Plan discussed with attending and CRNA.     Attending anesthesiologist reviewed and agrees with Pre Eval content              Rita Way DO   7/15/2020

## 2020-07-20 ENCOUNTER — OFFICE VISIT (OUTPATIENT)
Dept: PULMONOLOGY | Age: 70
End: 2020-07-20
Payer: MEDICARE

## 2020-07-20 VITALS — TEMPERATURE: 98.5 F | HEART RATE: 88 BPM | OXYGEN SATURATION: 98 %

## 2020-07-20 PROCEDURE — 99214 OFFICE O/P EST MOD 30 MIN: CPT | Performed by: INTERNAL MEDICINE

## 2020-07-20 PROCEDURE — G8417 CALC BMI ABV UP PARAM F/U: HCPCS | Performed by: INTERNAL MEDICINE

## 2020-07-20 PROCEDURE — G8428 CUR MEDS NOT DOCUMENT: HCPCS | Performed by: INTERNAL MEDICINE

## 2020-07-20 PROCEDURE — 1123F ACP DISCUSS/DSCN MKR DOCD: CPT | Performed by: INTERNAL MEDICINE

## 2020-07-20 PROCEDURE — 4040F PNEUMOC VAC/ADMIN/RCVD: CPT | Performed by: INTERNAL MEDICINE

## 2020-07-20 PROCEDURE — 3017F COLORECTAL CA SCREEN DOC REV: CPT | Performed by: INTERNAL MEDICINE

## 2020-07-20 PROCEDURE — 1036F TOBACCO NON-USER: CPT | Performed by: INTERNAL MEDICINE

## 2020-07-20 ASSESSMENT — ENCOUNTER SYMPTOMS
WHEEZING: 1
SHORTNESS OF BREATH: 0
COUGH: 1
CHEST TIGHTNESS: 0

## 2020-07-20 NOTE — PROGRESS NOTES
[facility]monary [facility] Critical [facility]    Outpatient Note    Subjective:   CHIEF COMPLAINT:   Chief Complaint   Patient presents with    Cough     Interval history on 20  Cough and wheezing is much better with symbicort. He is using it twice a day. He did not have to use the rescue inhaler. His main complain is hoarseness of voice. He is rinsing his month most of the time. There is no oral thrush. No sore throat. Of note he has salas's esophagus and for which he had ablation recently. HPI:  20   The patient is 72 y.o. male who presents today for a new patient visit for evaluation of cough going on for over 6 months. He was investigated extensively and was seen by ENT    Cough is mainly in the morning and night. He sleep sitting at night over the past month. There is SOB with coughing fits for which he use albuterol inhaler. He had GERD and salas's esophagus. He is on PPI BID    No prior hx of asthma. Sister has asthma. Quit smoking in . Used to smoke 1 PPD for 10 years. No functional limitation. However he wheeze with exercise. No prior hx of heart disease. He is on CPAP at night for CAM since . Last sleep study in .  Last time CP[facility]    Past Medical History:    Past Medical History:   Diagnosis Date    Allergic rhinitis     Arthritis     Asthma     Erectile dysfunction     Hearing loss     Hyperlipidemia     Hypertension     Kidney failure 2018    Normal pressure hydrocephalus ([address] 2018    shunt placement    Screening for abdominal aortic aneurysm (AAA) performed 2018    [facility]    Tinnitus     Unspecified sleep apnea        Social History:    Social History     Tobacco Use   Smoking Status Former Smoker    Packs/day: 0.50    Years: 10.00    Pack years: 5.00    Types: Cigarettes    Last attempt to quit: 2002    Years since quittin.5   Smokeless Tobacco Never Used with the patient's BMI of 35.5,  but could not also rule out interstitial lung disease or pulmonary  vascular disease. Clinical correlation is recommended.     CT chest   DATE: 12/17/2019       EXAM: CT CHEST WO CONTRAST       INDICATION: Cough, short of breath, abnormal pulmonary function tests       COMPARISON: None       TECHNIQUE: Axial CT imaging of the chest was performed. Axial images, multiplanar reformatted images and axial maximum intensity projection were reviewed.   Individualized dose optimization technique was used in order to meet ALARA standards for    radiation dose reduction.  In addition to vendor specific dose reduction algorithms, the dose reduction techniques vary based on the specific scanner utilized but frequently include automated exposure control, adjustment of the mA and/or kV according to    patient size, and use of iterative reconstruction technique. Additional high-resolution inspiratory and expiratory imaging obtained.       CONTRAST: None       FINDINGS:       LUNGS AND AIRWAYS: Airways are patent.  No focal pulmonary consolidation. No significant interstitial thickening to suggest fibrosis.       PLEURA: No pleural effusions or significant pleural thickening.       HEART / GREAT VESSELS: Heart size is normal.  Minimal arterial calcification.       ADENOPATHY/MEDIASTINUM: No adenopathy.       CHEST WALL / LOWER NECK: No significant abnormality.       UPPER ABDOMEN: Liver is decreased in attenuation. Left lobe contour prominence unchanged. Peritoneal catheter entering right upper quadrant.       BONES: No acute abnormality.           Impression       1. Clear lungs. No evidence of interstitial fibrosis.         Echo on 12/27/18  Summary   Technically difficult examination with Definity. Normal left ventricular size and wall thickness. Normal left ventricular systolic function with an estimate ejection fraction   of 55-60%. There are no regional wall motion abnormalities.

## 2020-08-24 ENCOUNTER — OFFICE VISIT (OUTPATIENT)
Dept: INTERNAL MEDICINE CLINIC | Age: 70
End: 2020-08-24
Payer: MEDICARE

## 2020-08-24 VITALS
DIASTOLIC BLOOD PRESSURE: 70 MMHG | WEIGHT: 278 LBS | HEIGHT: 74 IN | TEMPERATURE: 98.2 F | BODY MASS INDEX: 35.68 KG/M2 | SYSTOLIC BLOOD PRESSURE: 114 MMHG

## 2020-08-24 PROBLEM — G30.0 EARLY ONSET ALZHEIMER'S DEMENTIA WITHOUT BEHAVIORAL DISTURBANCE (HCC): Status: ACTIVE | Noted: 2020-08-24

## 2020-08-24 PROBLEM — F02.80 EARLY ONSET ALZHEIMER'S DEMENTIA WITHOUT BEHAVIORAL DISTURBANCE (HCC): Status: ACTIVE | Noted: 2020-08-24

## 2020-08-24 PROCEDURE — 1036F TOBACCO NON-USER: CPT | Performed by: INTERNAL MEDICINE

## 2020-08-24 PROCEDURE — 99213 OFFICE O/P EST LOW 20 MIN: CPT | Performed by: INTERNAL MEDICINE

## 2020-08-24 PROCEDURE — 4040F PNEUMOC VAC/ADMIN/RCVD: CPT | Performed by: INTERNAL MEDICINE

## 2020-08-24 PROCEDURE — 1123F ACP DISCUSS/DSCN MKR DOCD: CPT | Performed by: INTERNAL MEDICINE

## 2020-08-24 PROCEDURE — 3017F COLORECTAL CA SCREEN DOC REV: CPT | Performed by: INTERNAL MEDICINE

## 2020-08-24 PROCEDURE — G8428 CUR MEDS NOT DOCUMENT: HCPCS | Performed by: INTERNAL MEDICINE

## 2020-08-24 PROCEDURE — G8417 CALC BMI ABV UP PARAM F/U: HCPCS | Performed by: INTERNAL MEDICINE

## 2020-08-24 RX ORDER — ATORVASTATIN CALCIUM 20 MG/1
TABLET, FILM COATED ORAL
COMMUNITY
End: 2020-11-14

## 2020-08-24 RX ORDER — FINASTERIDE 5 MG/1
TABLET, FILM COATED ORAL
COMMUNITY
End: 2020-08-24 | Stop reason: SDUPTHER

## 2020-08-24 RX ORDER — MEMANTINE HYDROCHLORIDE 10 MG/1
10 TABLET ORAL 2 TIMES DAILY
Qty: 180 TABLET | Refills: 3 | Status: SHIPPED | OUTPATIENT
Start: 2020-08-24 | End: 2021-12-06 | Stop reason: SDUPTHER

## 2020-08-24 RX ORDER — TELMISARTAN 40 MG/1
TABLET ORAL
COMMUNITY
End: 2021-08-25

## 2020-08-24 RX ORDER — MELOXICAM 15 MG/1
TABLET ORAL
COMMUNITY
End: 2021-12-01 | Stop reason: ALTCHOICE

## 2020-08-24 RX ORDER — MONTELUKAST SODIUM 10 MG/1
10 TABLET ORAL NIGHTLY
Qty: 90 TABLET | Refills: 3 | Status: SHIPPED | OUTPATIENT
Start: 2020-08-24 | End: 2022-02-03 | Stop reason: SDUPTHER

## 2020-08-24 RX ORDER — MEMANTINE HYDROCHLORIDE 10 MG/1
TABLET ORAL
COMMUNITY
Start: 2020-08-03 | End: 2020-08-24 | Stop reason: SDUPTHER

## 2020-08-24 ASSESSMENT — ENCOUNTER SYMPTOMS
COUGH: 1
SHORTNESS OF BREATH: 0
BLOOD IN STOOL: 0
ABDOMINAL PAIN: 0
NAUSEA: 0
VOMITING: 0
CHEST TIGHTNESS: 0
WHEEZING: 0

## 2020-08-24 NOTE — PROGRESS NOTES
Outpatient Note for established Patient - regular Visit     History Obtained From:  patient, electronic medical record  Is the patient new to me ? - No    HISTORY OF PRESENT ILLNESS:   The patient is a 72 y.o. male who is here today for :  Varsha March was seen today for breathing problem. Diagnoses and all orders for this visit:    NPH (normal pressure hydrocephalus) (HCC)    Chronic nonseasonal allergic rhinitis due to pollen  -     montelukast (SINGULAIR) 10 MG tablet; Take 1 tablet by mouth nightly    Early onset Alzheimer's dementia without behavioral disturbance ([address]    Mixed hyperlipidemia    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    Other orders  -     memantine (NAMENDA) 10 MG tablet; Take 1 tablet by mouth 2 times daily    pt saw the neurologist: Dr Uma Rasmussen: he was started on Memantin, he was diagnosed with early alzheimer. Memory is worse I the past 6 month. He just started the memantin. No falls, pt does not feel confusions but wife reorts that he may get episodes. No new tingling/ numbness in extremites. No headache. He had endoscopy w/ Dr Aric Ballesteros for his Luisa Brunette - ablation performed. Pt denies CP/SOB/palpitations/abdominal pain/N/V. He still has mild cough - improved (no more wheezing)  No LOW/ SANDY.    Echo : good EF  questionble vegetations   Lab Results   Component Value Date    CHOL 186 11/18/2019    CHOL 168 05/23/2019    CHOL 207 (H) 09/20/2018     Lab Results   Component Value Date    TRIG 190 (H) 11/18/2019    TRIG 140 05/23/2019    TRIG 182 (H) 09/20/2018     Lab Results   Component Value Date    HDL 59 11/18/2019    HDL 52 05/23/2019    HDL 51 09/20/2018     Lab Results   Component Value Date    LDLCALC 89 11/18/2019    LDLCALC 88 05/23/2019    LDLCALC 120 (H) 09/20/2018     Lab Results   Component Value Date    LABVLDL 38 11/18/2019    LABVLDL 28 05/23/2019    LABVLDL 36 09/20/2018     No results found for: CHOLHDLRATIO    Preventive:  1) colon cancer screening completed? yes,   2) Prostate  cancer screening completed (or not needed) ? 6/2020 ok     Past Medical History:        Diagnosis Date    Allergic rhinitis     Arthritis     Asthma     Erectile dysfunction     Hearing loss     Hyperlipidemia     Hypertension     Kidney failure 07/2018    Normal pressure hydrocephalus ([address] 07/2018    shunt placement    Screening for abdominal aortic aneurysm (AAA) performed 03/02/2018    [facility]    Tinnitus     Unspecified sleep apnea        Social History:   TOBACCO:   reports that he quit smoking about 18 years ago. His smoking use included cigarettes. He has a 5.00 pack-year smoking history. He has never used smokeless tobacco.      ETOH:   reports current alcohol use of about 4.0 standard drinks of alcohol per week. Current Medications:    Prior to Admission medications    Medication Sig Start Date End Date Taking?  Authorizing Provider   meloxicam (MOBIC) 15 MG tablet TAKE ONE TABLET BY MOUTH ONCE DAILY   Yes Historical Provider, MD   atorvastatin (LIPITOR) 20 MG tablet TAKE ONE-HALF TABLET BY MOUTH AT BEDTIME   Yes Historical Provider, MD   telmisartan (MICARDIS) 40 MG tablet TAKE ONE TABLET BY MOUTH ONCE DAILY   Yes Historical Provider, MD   montelukast (SINGULAIR) 10 MG tablet Take 1 tablet by mouth nightly 8/24/20  Yes Joseph David MD   memantine (NAMENDA) 10 MG tablet Take 1 tablet by mouth 2 times daily 8/24/20  Yes Joseph David MD   aspirin 81 MG chewable tablet Take 81 mg by mouth daily   Yes Historical Provider, MD   albuterol sulfate HFA (PROAIR HFA) 108 (90 Base) MCG/ACT inhaler Inhale 2 puffs into the lungs every 4 hours as needed for Wheezing or Shortness of Breath 4/2/20  Yes Joseph David MD   pantoprazole (PROTONIX) 40 MG tablet Take 1 tablet by mouth every morning (before breakfast) 11/19/19  Yes John Thompson MD   atorvastatin (LIPITOR) 10 MG tablet TAKE 1 TABLET DAILY 10/23/19  Yes Joseph David MD   cetirizine (ZYRTEC) 10 MG kg)   BMI 35.69 kg/m²     Body mass index is 35.69 kg/m². Wt Readings from Last 3 Encounters:   08/24/20 278 lb (126.1 kg)   07/15/20 265 lb (120.2 kg)   06/22/20 265 lb (120.2 kg)     Physical Exam  Constitutional:       General: He is not in acute distress. Appearance: He is well-developed. He is not diaphoretic. HENT:      Head: Normocephalic and atraumatic. Mouth/Throat:      Pharynx: No oropharyngeal exudate. Eyes:      General: No scleral icterus. Right eye: No discharge. Left eye: No discharge. Conjunctiva/sclera: Conjunctivae normal.   Neck:      Musculoskeletal: Normal range of motion and neck supple. Cardiovascular:      Rate and Rhythm: Normal rate. Heart sounds: Normal heart sounds. No murmur. Pulmonary:      Effort: Pulmonary effort is normal. No respiratory distress. Breath sounds: Normal breath sounds. No wheezing or rales. Abdominal:      General: There is no distension. Palpations: Abdomen is soft. Tenderness: There is no abdominal tenderness. There is no guarding. Musculoskeletal:         General: No deformity. Right lower leg: Edema (+1 bilateral pitting edema symmetric negative Homans sign or cord sign. Looks the same or better than last time) present. Left lower leg: Edema present. Lymphadenopathy:      Head:      Right side of head: No submental or submandibular adenopathy. Left side of head: No submental or submandibular adenopathy. Cervical: No cervical adenopathy. Right cervical: No superficial or deep cervical adenopathy. Left cervical: No superficial or deep cervical adenopathy. Skin:     Findings: No rash. Neurological:      Mental Status: He is alert and oriented to person, place, and time. GCS: GCS eye subscore is 4. GCS verbal subscore is 5. GCS motor subscore is 6. Motor: No abnormal muscle tone. Deep Tendon Reflexes: Reflexes are normal and symmetric.    Psychiatric: Behavior: Behavior normal.         Thought Content: Thought content normal.            Assessment/Plan:   Victor Hugo Duarte was seen today for breathing problem. Diagnoses and all orders for this visit:    NPH (normal pressure hydrocephalus) ([facility]  Patient is stable. He is followed by neurologist.  He did diagnose him with early Alzheimer's and currently treated with memantine which he tolerates he will let me know soon I feel    Chronic nonseasonal allergic rhinitis due to pollen  -     montelukast (SINGULAIR) 10 MG tablet; Take 1 tablet by mouth nightly    Early onset Alzheimer's dementia without behavioral disturbance ([facility]    Mixed hyperlipidemia  Well controlled- no changes in medication today. Letter lipids    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  Patient followed by GI status post ablation, monitor clinically    Other orders  -     memantine (NAMENDA) 10 MG tablet; Take 1 tablet by mouth 2 times daily      Echo showed vegetations versus artifact  Patient has no fever chills chest pain or short of breath. He feels fine. Very likely to endocarditis and agree probably artifact      - Patient was encouraged to call the office (and ask to see me) or be seen by other provider for any worsening or lack of improvement of the symptoms within a few days / weeks. - Pt was asked toschedule an appointment in 3 months, and to let me know of any scheduling difficulties. Additional patients instructions (if given):   Patient Instructions   Please call the office (and ask to see me) or be seen by other provider for any worsening or lack of improvement of the symptoms within a few days / weeks. Aaron Chadwick M.D.   8/24/2020, 12:57 PM      NOTE: This report was transcribed using voice recognition software. Every effort was made to ensure accuracy, however, inadvertent computerized transcription errors may be present.

## 2020-09-01 ENCOUNTER — TELEPHONE (OUTPATIENT)
Dept: INTERNAL MEDICINE CLINIC | Age: 70
End: 2020-09-01

## 2020-09-01 NOTE — TELEPHONE ENCOUNTER
Patient called to request prescription for   cetirizine (ZYRTEC) 10 MG tablet be resent to [address] [place] [place] [address]     Please advise.

## 2020-09-03 RX ORDER — CETIRIZINE HYDROCHLORIDE 10 MG/1
TABLET ORAL
Qty: 90 TABLET | Refills: 3 | Status: SHIPPED | OUTPATIENT
Start: 2020-09-03 | End: 2021-09-08 | Stop reason: SDUPTHER

## 2020-11-09 ENCOUNTER — HOSPITAL ENCOUNTER (OUTPATIENT)
Dept: GENERAL RADIOLOGY | Age: 70
Discharge: HOME | End: 2020-11-09
Payer: MEDICARE

## 2020-11-09 PROCEDURE — 72110 X-RAY EXAM L-2 SPINE 4/>VWS: CPT

## 2020-11-10 ENCOUNTER — TELEPHONE (OUTPATIENT)
Dept: FAMILY MEDICINE CLINIC | Age: 70
End: 2020-11-10

## 2020-11-10 NOTE — TELEPHONE ENCOUNTER
Has an appt 11-24   Would like to get labs done before. My Chart says he is due for cholesterol and A1C - anything else. Please put in order. Thanks.

## 2020-11-11 DIAGNOSIS — G91.2 NPH (NORMAL PRESSURE HYDROCEPHALUS) (HCC): ICD-10-CM

## 2020-11-11 DIAGNOSIS — E78.2 MIXED HYPERLIPIDEMIA: ICD-10-CM

## 2020-11-11 DIAGNOSIS — K22.70 BARRETT'S ESOPHAGUS WITHOUT DYSPLASIA: ICD-10-CM

## 2020-11-11 DIAGNOSIS — R73.01 IFG (IMPAIRED FASTING GLUCOSE): ICD-10-CM

## 2020-11-11 LAB
A/G RATIO: 1.7 (ref 1.1–2.2)
ALBUMIN SERPL-MCNC: 4.3 G/DL (ref 3.4–5)
ALP BLD-CCNC: 78 U/L (ref 40–129)
ALT SERPL-CCNC: 26 U/L (ref 10–40)
ANION GAP SERPL CALCULATED.3IONS-SCNC: 12 MMOL/L (ref 3–16)
AST SERPL-CCNC: 20 U/L (ref 15–37)
BASOPHILS ABSOLUTE: 0.1 K/UL (ref 0–0.2)
BASOPHILS RELATIVE PERCENT: 0.9 %
BILIRUB SERPL-MCNC: 0.5 MG/DL (ref 0–1)
BUN BLDV-MCNC: 18 MG/DL (ref 7–20)
CALCIUM SERPL-MCNC: 9.2 MG/DL (ref 8.3–10.6)
CHLORIDE BLD-SCNC: 101 MMOL/L (ref 99–110)
CHOLESTEROL, TOTAL: 173 MG/DL (ref 0–199)
CO2: 23 MMOL/L (ref 21–32)
CREAT SERPL-MCNC: 0.9 MG/DL (ref 0.8–1.3)
EOSINOPHILS ABSOLUTE: 0.6 K/UL (ref 0–0.6)
EOSINOPHILS RELATIVE PERCENT: 8.5 %
GFR AFRICAN AMERICAN: >60
GFR NON-AFRICAN AMERICAN: >60
GLOBULIN: 2.6 G/DL
GLUCOSE BLD-MCNC: 94 MG/DL (ref 70–99)
HCT VFR BLD CALC: 40.5 % (ref 40.5–52.5)
HDLC SERPL-MCNC: 49 MG/DL (ref 40–60)
HEMOGLOBIN: 13.8 G/DL (ref 13.5–17.5)
LDL CHOLESTEROL CALCULATED: 82 MG/DL
LYMPHOCYTES ABSOLUTE: 1.8 K/UL (ref 1–5.1)
LYMPHOCYTES RELATIVE PERCENT: 27.3 %
MCH RBC QN AUTO: 29.1 PG (ref 26–34)
MCHC RBC AUTO-ENTMCNC: 34.1 G/DL (ref 31–36)
MCV RBC AUTO: 85.1 FL (ref 80–100)
MONOCYTES ABSOLUTE: 0.5 K/UL (ref 0–1.3)
MONOCYTES RELATIVE PERCENT: 8.3 %
NEUTROPHILS ABSOLUTE: 3.6 K/UL (ref 1.7–7.7)
NEUTROPHILS RELATIVE PERCENT: 55 %
PDW BLD-RTO: 14.9 % (ref 12.4–15.4)
PLATELET # BLD: 239 K/UL (ref 135–450)
PMV BLD AUTO: 6.8 FL (ref 5–10.5)
POTASSIUM SERPL-SCNC: 4.4 MMOL/L (ref 3.5–5.1)
RBC # BLD: 4.76 M/UL (ref 4.2–5.9)
SODIUM BLD-SCNC: 136 MMOL/L (ref 136–145)
TOTAL PROTEIN: 6.9 G/DL (ref 6.4–8.2)
TRIGL SERPL-MCNC: 210 MG/DL (ref 0–150)
VLDLC SERPL CALC-MCNC: 42 MG/DL
WBC # BLD: 6.6 K/UL (ref 4–11)

## 2020-11-12 LAB
ESTIMATED AVERAGE GLUCOSE: 125.5 MG/DL
HBA1C MFR BLD: 6 %

## 2020-11-14 RX ORDER — ATORVASTATIN CALCIUM 10 MG/1
TABLET, FILM COATED ORAL
Qty: 90 TABLET | Refills: 3 | Status: SHIPPED | OUTPATIENT
Start: 2020-11-14 | End: 2021-11-12 | Stop reason: SDUPTHER

## 2020-11-24 ENCOUNTER — VIRTUAL VISIT (OUTPATIENT)
Dept: FAMILY MEDICINE CLINIC | Age: 70
End: 2020-11-24
Payer: MEDICARE

## 2020-11-24 PROBLEM — E66.01 MORBIDLY OBESE (HCC): Status: ACTIVE | Noted: 2020-11-24

## 2020-11-24 PROBLEM — R05.3 CHRONIC COUGH: Status: ACTIVE | Noted: 2020-11-24

## 2020-11-24 PROCEDURE — 99442 PR PHYS/QHP TELEPHONE EVALUATION 11-20 MIN: CPT | Performed by: INTERNAL MEDICINE

## 2020-11-24 RX ORDER — PANTOPRAZOLE SODIUM 20 MG/1
20 TABLET, DELAYED RELEASE ORAL 2 TIMES DAILY
Qty: 180 TABLET | Refills: 2 | Status: SHIPPED | OUTPATIENT
Start: 2020-11-24

## 2020-11-24 RX ORDER — OXYBUTYNIN CHLORIDE 5 MG/1
TABLET ORAL
COMMUNITY
Start: 2020-09-15 | End: 2022-02-09

## 2020-11-24 ASSESSMENT — ENCOUNTER SYMPTOMS
NAUSEA: 0
ABDOMINAL PAIN: 0
SHORTNESS OF BREATH: 0
VOMITING: 0
CHEST TIGHTNESS: 0

## 2020-11-24 NOTE — PROGRESS NOTES
2020    TELEHEALTH EVALUATION -- Audio (During KHANQ-45 public health emergency). Unable to connect to video    HPI:    Nayeli Farrar. (:  1950) has requested an audio/video evaluation for the following concern(s):    Chronic cough  He still c/o cough - diffuse throughout the day   CT was normal 2020  PFT:   Moderate restrictive defect with borderline bronchodilator  response and decreased diffusion prior to correction for alveolar lung  volumes. Findings could be consistent with the patient's BMI of 35.5,  but could not also rule out interstitial lung disease or pulmonary  vascular disease. Clinical correlation is recommended. He does take Protonix once daily 40 mg   He takes Claritin and also tried Flonase which did not help   He quit smoking 2002 after 10 years (1 pack daily ). He saw ENT- no abnormalities. HLD  Lab Results   Component Value Date    CHOL 173 2020    CHOL 186 2019    CHOL 168 2019     Lab Results   Component Value Date    TRIG 210 (H) 2020    TRIG 190 (H) 2019    TRIG 140 2019     Lab Results   Component Value Date    HDL 49 2020    HDL 59 2019    HDL 52 2019     Lab Results   Component Value Date    LDLCALC 82 2020    LDLCALC 89 2019    LDLCALC 88 2019     Lab Results   Component Value Date    LABVLDL 42 2020    LABVLDL 38 2019    LABVLDL 28 2019   diet- less amount  Exercise- he walks [place] in the mall- less than before   Weight - same   He will work on better diet and exercise     NPH    early Alzheimer  Memory is stable  Mild improvement  No  Depression/ anxiety    Juanpablo's esophagus     Pt got flu shot 2020     Review of Systems   Constitutional: Negative for activity change, appetite change, chills, fever and unexpected weight change. HENT: Negative for hearing loss. Eyes: Negative for visual disturbance.    Respiratory: Negative for chest tightness and shortness of breath. Cardiovascular: Negative for chest pain. Gastrointestinal: Negative for abdominal pain, nausea and vomiting. Genitourinary: Negative for hematuria. Skin: Negative for rash. Allergic/Immunologic: Negative for immunocompromised state. Neurological: Negative for dizziness and headaches. Psychiatric/Behavioral: Negative for dysphoric mood and suicidal ideas. Prior to Visit Medications    Medication Sig Taking? Authorizing Provider   oxybutynin (DITROPAN) 5 MG tablet  Yes Historical Provider, MD   pantoprazole (PROTONIX) 20 MG tablet Take 1 tablet by mouth 2 times daily Yes Katya Massey MD   atorvastatin (LIPITOR) 10 MG tablet TAKE 1 TABLET DAILY Yes Katya Massey MD   cetirizine (ZYRTEC) 10 MG tablet TAKE 1 TABLET DAILY Yes Katya Massey MD   telmisartan (MICARDIS) 40 MG tablet TAKE ONE TABLET BY MOUTH ONCE DAILY Yes Historical Provider, MD   montelukast (SINGULAIR) 10 MG tablet Take 1 tablet by mouth nightly Yes Katya Massey MD   memantine (NAMENDA) 10 MG tablet Take 1 tablet by mouth 2 times daily Yes Katya Massey MD   aspirin 81 MG chewable tablet Take 81 mg by mouth daily Yes Historical Provider, MD   albuterol sulfate HFA (PROAIR HFA) 108 (90 Base) MCG/ACT inhaler Inhale 2 puffs into the lungs every 4 hours as needed for Wheezing or Shortness of Breath Yes Katya Massey MD   fluticasone (FLONASE) 50 MCG/ACT nasal spray 2 sprays by Nasal route daily Yes Katya Massey MD   finasteride (PROSCAR) 5 MG tablet Take 1 tablet by mouth daily Yes Katya Massey MD   donepezil (ARICEPT) 10 MG tablet Take 10 mg by mouth daily bedtime Yes Historical Provider, MD   tamsulosin (FLOMAX) 0.4 MG capsule Take 2 capsules by mouth daily Yes Vidya Whittington MD   Omega-3 Fatty Acids (FISH OIL) 1000 MG CAPS Take 1,000 mg by mouth daily. Yes Historical Provider, MD   Flaxseed, Linseed, (FLAX SEED OIL) 1000 MG CAPS Take 1,000 mg by mouth daily.  Yes Historical Provider, MD   Cholecalciferol (VITAMIN D) 2000 UNITS CAPS capsule Take 1 capsule by mouth daily. Yes Historical Provider, MD   multivitamin [facility]) per tablet Take 1 tablet by mouth daily. Yes Historical Provider, MD   meloxicam (MOBIC) 15 MG tablet TAKE ONE TABLET BY MOUTH ONCE DAILY  Historical Provider, MD   gabapentin (NEURONTIN) 100 MG capsule TAKE 1 CAPSULE THREE TIMES A DAY  Suha Blake MD       Social History     Tobacco Use    Smoking status: Former Smoker     Packs/day: 0.50     Years: 10.00     Pack years: 5.00     Types: Cigarettes     Last attempt to quit: 2002     Years since quittin.9    Smokeless tobacco: Never Used   Substance Use Topics    Alcohol use:  Yes     Alcohol/week: 4.0 standard drinks     Types: 2 Shots of liquor, 2 Standard drinks or equivalent per week     Comment: rarely    Drug use: No        Past Medical History:   Diagnosis Date    Allergic rhinitis     Arthritis     Asthma     Erectile dysfunction     Hearing loss     Hyperlipidemia     Hypertension     Kidney failure 2018    Normal pressure hydrocephalus ([address] 2018    shunt placement    Screening for abdominal aortic aneurysm (AAA) [facility] 2018    [facility]    Tinnitus     Unspecified sleep apnea        PHYSICAL EXAMINATION:  [ INSTRUCTIONS:  \"[x]\" Indicates a positive item  \"[]\" Indicates a negative item  -- DELETE ALL ITEMS NOT EXAMINED]  Vital Signs: (As obtained by patient/caregiver or practitioner observation)      Mental status  [x] Alert and awake  [x] Oriented to person/place/time []Able to follow commands        Pulmonary/Chest: [x] Respiratory effort normal.  [x] No visualized signs of difficulty breathing or respiratory distress        [x] Abnormal- one cough event           Psychiatric:       [] Normal Affect [x] No Hallucinations        [] Abnormal-     Other pertinent observable physical exam findings-     ASSESSMENT/PLAN:  1. NPH (normal pressure hydrocephalus) ([facility]  No new complains   Monitor Sx    2. Morbidly obese ([facility]  Discussed better diet and exercise  Will work on weight loss    3. Cough  Last CT was negative no pulmonary nodule  CT scan did not reveal cause of cough and point function test was showing restrictive changes likely secondary to weight. He failed antiallergic medication so we will try to split the dose of the Protonix work on weight loss and hopefully that will help in case the cough is related to GERD. If negative-after he reported to me in 3 to 4 weeks-consider other options    4. Mixed hyperlipidemia  Again discussed better diet and exercise and weight loss    5. 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 Early onset Alzheimer's disease with behavioral disturbance ([facility]  Reports feeling better keep follow-up with neurology and then using memantine      Return in about 6 months (around 5/24/2021). Gabe Palmer is a 72 y.o. male being evaluated by a Virtual Visit (office phone visit) encounter to address concerns as mentioned above. A caregiver was present when appropriate. Due to this being a TeleHealth encounter (During IYQVE-91 public health emergency), evaluation of the following organ systems was limited: Vitals/Constitutional/EENT/Resp/CV/GI//MS/Neuro/Skin/Heme-Lymph-Imm. Pursuant to the emergency declaration under the [address] [place] [address], [address] [place] [address] authority and the [employer] and [employer] Act, this Virtual Visit was conducted with patient's (and/or legal guardian's) consent, to reduce the patient's risk of exposure to COVID-19 and provide necessary medical care. The patient (and/or legal guardian) has also been advised to contact this office for worsening conditions or problems, and seek emergency medical treatment and/or call 911 if deemed necessary.      Patient identification was verified at the start of the visit: Yes    Total time spent on this encounter: 10 :32 min excluding documentation    Services were provided through a video synchronous discussion virtually to substitute for in-person clinic visit. Patient and provider were located at their individual homes. --Romell Cabot, MD on 11/24/2020 at 11:46 AM    An electronic signature was used to authenticate this note.

## 2020-11-24 NOTE — PATIENT INSTRUCTIONS
Please call the office (and ask to see me) or be seen by other provider for any worsening or lack of improvement of the symptoms or for any new symptoms as soon as possible. Please make sure to schedule your follow appointment as discussed. Please let me know if ay further questions. Please let me know if there are any symptoms or concerns that you feel that needs to be further addressed.

## 2021-01-15 ENCOUNTER — NURSE TRIAGE (OUTPATIENT)
Dept: OTHER | Facility: CLINIC | Age: 71
End: 2021-01-15

## 2021-01-15 NOTE — TELEPHONE ENCOUNTER
Head cold and cough for months. Reason for Disposition   Cough has been present for > 3 weeks    Answer Assessment - Initial Assessment Questions  1. ONSET: \"When did the cough begin? \"       Months ago    2. SEVERITY: \"How bad is the cough today? \"       Not too bad    3. RESPIRATORY DISTRESS: \"Describe your breathing. \"       Breathing is fine    4. FEVER: \"Do you have a fever? \" If so, ask: \"What is your temperature, how was it measured, and when did it start? \"      Denies    5. HEMOPTYSIS: \"Are you coughing up any blood? \" If so ask: \"How much? \" (flecks, streaks, tablespoons, etc.)      Denies    6. TREATMENT: \"What have you done so far to treat the cough? \" (e.g., meds, fluids, humidifier)      Inhaler, mucinex    7. CARDIAC HISTORY: \"Do you have any history of heart disease? \" (e.g., heart attack, congestive heart failure)       Denies    8. LUNG HISTORY: \"Do you have any history of lung disease? \"  (e.g., pulmonary embolus, asthma, emphysema)      Denies    9. PE RISK FACTORS: \"Do you have a history of blood clots? \" (or: recent major surgery, recent prolonged travel, bedridden)      Denies    10. OTHER SYMPTOMS: \"Do you have any other symptoms? (e.g., runny nose, wheezing, chest pain)        Nasal congestion    11. PREGNANCY: \"Is there any chance you are pregnant? \" \"When was your last menstrual period? \"        Na    12. TRAVEL: \"Have you traveled out of the country in the last month? \" (e.g., travel history, exposures)        Denies    Protocols used: COUGH-ADULT-[place]    Patient called pre-service center [facility]) to schedule appointment, with red flag complaint, transferred to RN access for triage. See above questions and answers. Caller talking full sentences without any distress on phone. Discussed disposition and patient agreeable. Discussed potential consequences for not following disposition recommendation. Aware to call back with any concerns or persistent, worsening, or new symptoms develop.       Warm transfer to [facility] scheduling for appointment. Attention Provider: Thank you for allowing me to participate in the care of your patient. The  patient was connected to triage in response to information provided to the ECC. Please do not respond through this encounter as the response is not directed to a shared pool.

## 2021-01-18 ENCOUNTER — VIRTUAL VISIT (OUTPATIENT)
Dept: FAMILY MEDICINE CLINIC | Age: 71
End: 2021-01-18
Payer: MEDICARE

## 2021-01-18 DIAGNOSIS — J01.90 ACUTE SINUSITIS, RECURRENCE NOT SPECIFIED, UNSPECIFIED LOCATION: Primary | ICD-10-CM

## 2021-01-18 PROCEDURE — 1123F ACP DISCUSS/DSCN MKR DOCD: CPT | Performed by: NURSE PRACTITIONER

## 2021-01-18 PROCEDURE — 3017F COLORECTAL CA SCREEN DOC REV: CPT | Performed by: NURSE PRACTITIONER

## 2021-01-18 PROCEDURE — 4040F PNEUMOC VAC/ADMIN/RCVD: CPT | Performed by: NURSE PRACTITIONER

## 2021-01-18 PROCEDURE — G8427 DOCREV CUR MEDS BY ELIG CLIN: HCPCS | Performed by: NURSE PRACTITIONER

## 2021-01-18 PROCEDURE — G8417 CALC BMI ABV UP PARAM F/U: HCPCS | Performed by: NURSE PRACTITIONER

## 2021-01-18 PROCEDURE — 99214 OFFICE O/P EST MOD 30 MIN: CPT | Performed by: NURSE PRACTITIONER

## 2021-01-18 PROCEDURE — 3288F FALL RISK ASSESSMENT DOCD: CPT | Performed by: NURSE PRACTITIONER

## 2021-01-18 PROCEDURE — G8484 FLU IMMUNIZE NO ADMIN: HCPCS | Performed by: NURSE PRACTITIONER

## 2021-01-18 PROCEDURE — 1036F TOBACCO NON-USER: CPT | Performed by: NURSE PRACTITIONER

## 2021-01-18 RX ORDER — AMOXICILLIN AND CLAVULANATE POTASSIUM 875; 125 MG/1; MG/1
1 TABLET, FILM COATED ORAL 2 TIMES DAILY
Qty: 20 TABLET | Refills: 0 | Status: SHIPPED | OUTPATIENT
Start: 2021-01-18 | End: 2021-01-28

## 2021-01-18 NOTE — PROGRESS NOTES
2021    TELEHEALTH EVALUATION -- Audio/Visual (During SVVHV-47 public health emergency)    HPI:    Dalton Paz. (:  1950) has requested an audio/video evaluation for the following concern(s):  Fighting a head cold for months  Slimey residue in the ams until throat clears  Sinuses get plugged up, can't clear sinuses  Smells like soil/earthworms    Review of Systems    Prior to Visit Medications    Medication Sig Taking?  Authorizing Provider   amoxicillin-clavulanate (AUGMENTIN) 875-125 MG per tablet Take 1 tablet by mouth 2 times daily for 10 days Yes Rema , APRN - CNP   oxybutynin (DITROPAN) 5 MG tablet  Yes Historical Provider, MD   pantoprazole (PROTONIX) 20 MG tablet Take 1 tablet by mouth 2 times daily Yes Alejandro Warner MD   atorvastatin (LIPITOR) 10 MG tablet TAKE 1 TABLET DAILY Yes Alejandro Warner MD   cetirizine (ZYRTEC) 10 MG tablet TAKE 1 TABLET DAILY Yes Alejandro Warner MD   meloxicam (MOBIC) 15 MG tablet TAKE ONE TABLET BY MOUTH ONCE DAILY Yes Historical Provider, MD   telmisartan (MICARDIS) 40 MG tablet TAKE ONE TABLET BY MOUTH ONCE DAILY Yes Historical Provider, MD   montelukast (SINGULAIR) 10 MG tablet Take 1 tablet by mouth nightly Yes Alejandro Warner MD   memantine (NAMENDA) 10 MG tablet Take 1 tablet by mouth 2 times daily Yes Alejandro Warner MD   aspirin 81 MG chewable tablet Take 81 mg by mouth daily Yes Historical Provider, MD   albuterol sulfate HFA (PROAIR HFA) 108 (90 Base) MCG/ACT inhaler Inhale 2 puffs into the lungs every 4 hours as needed for Wheezing or Shortness of Breath Yes Alejandro Warner MD   fluticasone (FLONASE) 50 MCG/ACT nasal spray 2 sprays by Nasal route daily Yes Alejandro Warner MD   finasteride (PROSCAR) 5 MG tablet Take 1 tablet by mouth daily Yes Alejandro Warner MD   donepezil (ARICEPT) 10 MG tablet Take 10 mg by mouth daily bedtime Yes Historical Provider, MD OPENING AND CLOSING FOR VENTRICULAR PERITONEAL SHUNT performed by Jose F Walters MD at [address] [facility] [address] 07/18/2018     VENTRICULAR PERITONEAL SHUNT INSERTION RIGHT SIDE; (N/A )    [place] CREATE SHUNT:VENTRIC-PERITONEAL N/A 7/18/2018    VENTRICULAR PERITONEAL SHUNT INSERTION RIGHT SIDE; performed by Gera Loomis MD at [facility] [address] 12/14/2018    EGD BIOPSY performed by Chino Nelson MD at [address] [facility] N/A 3/1/2019    ESOPHAGOGASTRODUODENOSCOPY WITH RADIOFREQUENCY  ABLATION/ ABLATIONS X19 performed by Chino Nelson MD at [address] N/A 4/24/2019    ESOPHAGOGASTRODUODENOSCOPY WITH RADIOFREQUENCY ABLATION performed by Chino Nelson MD at [address] 4/24/2019    EGD BIOPSY performed by Chino Nelson MD at [address] N/A 8/2/2019    ESOPHAGOGASTRODUODENOSCOPY RADIOFREQUENCY ABLATION performed by Chino Nelson MD at [address] N/A 8/2/2019    EGD GASTRIC BIOPSY performed by Chino Nelson MD at [address] N/A 8/2/2019    EGD POLYP COLD SNARE performed by Chino Nelson MD at [address] N/A 11/19/2019    ESOPHAGOGASTRODUODENOSCOPY WITH RADIOFREQUENCY ABLATION performed by Chino Nelson MD at [address] N/A 11/19/2019    EGD POLYP SNARE performed by Chino Nelson MD at [address] N/A 7/15/2020    ESOPHAGOGASTRODUODENOSCOPY RADIOFREQUENCY ABLATION ON STANDBY performed by Chino Nelson MD at [address] 7/15/2020    EGD BIOPSY performed by Chino Nelson MD at [address] [place] [address] to establish stable video connection [ INSTRUCTIONS:  \"[x]\" Indicates a positive item  \"[]\" Indicates a negative item  -- DELETE ALL ITEMS NOT EXAMINED]  Vital Signs: (As obtained by patient/caregiver or practitioner observation)    Blood pressure-  Heart rate-    Respiratory rate-    Temperature-  Pulse oximetry-     Constitutional: [x] Appears well-developed and well-nourished [x] No apparent distress      [] Abnormal-   Mental status  [x] Alert and awake  [x] Oriented to person/place/time [x]Able to follow commands      Eyes:  EOM    []  Normal  [] Abnormal-  Sclera  []  Normal  [] Abnormal -         Discharge []  None visible  [] Abnormal -    HENT:   [] Normocephalic, atraumatic. [] Abnormal - sounds nasal congested  [] Mouth/Throat: Mucous membranes are moist.     External Ears [] Normal  [] Abnormal-     Neck: [x] No visualized mass     Pulmonary/Chest: [x] Respiratory effort normal.  [x] No audible signs of difficulty breathing or respiratory distress        [] Abnormal-      Musculoskeletal:   [] Normal gait with no signs of ataxia         [] Normal range of motion of neck        [] Abnormal-       Neurological:        [] No Facial Asymmetry (Cranial nerve 7 motor function) (limited exam to video visit)          [] No gaze palsy        [] Abnormal-         Skin:        [] No significant exanthematous lesions or discoloration noted on facial skin         [] Abnormal-            Psychiatric:       [x] Normal Affect [x] No Hallucinations        [] Abnormal-     Other pertinent observable physical exam findings-     ASSESSMENT/PLAN:   Diagnosis Orders   1.  Acute sinusitis, recurrence not specified, unspecified location  amoxicillin-clavulanate (AUGMENTIN) 875-125 MG per tablet     Acute sinusitis  To take antibiotic course through completion  Antihistamine of choice- Allegra, Zyrtec, Claritin  Decongestants may provide symptom relief  Sinus Flushing 2x day as able followed by nasal steroid inhalation as prescribed  Push fluids Tylenol/Motrin for discomfort and fever  Sudafed 120mg twice daily if not hypertensive        No follow-ups on file. Mikey Lang. is a 72 y.o. male being evaluated by a Virtual Visit (video visit) encounter to address concerns as mentioned above. A caregiver was present when appropriate. Due to this being a TeleHealth encounter (During CGTIY-76 public [facility] emergency), evaluation of the following organ systems was limited: Vitals/Constitutional/EENT/Resp/CV/GI//MS/Neuro/Skin/Heme-Lymph-Imm. Pursuant to the emergency declaration under the [address] [place] [address], [address] [place] [address] authority and the [employer] and [employer] Act, this Virtual Visit was conducted with patient's (and/or legal guardian's) consent, to reduce the patient's risk of exposure to COVID-19 and provide necessary medical care. The patient (and/or legal guardian) has also been advised to contact this office for worsening conditions or problems, and seek emergency medical treatment and/or call 911 if deemed necessary. Patient identification was verified at the start of the visit: yes    Total time spent on this encounter: not billed by time    Services were provided through a video synchronous discussion virtually to substitute for in-person clinic visit. Patient and provider were located at their individual homes. --JEREMI White CNP on 1/18/2021 at 10:07 AM    An electronic signature was used to authenticate this note.

## 2021-01-18 NOTE — ASSESSMENT & PLAN NOTE
To take antibiotic course through completion  Antihistamine of choice- Allegra, Zyrtec, Claritin  Decongestants may provide symptom relief  Sinus Flushing 2x day as able followed by nasal steroid inhalation as prescribed  Push fluids  Tylenol/Motrin for discomfort and fever  Sudafed 120mg twice daily if not hypertensive

## 2021-01-29 ENCOUNTER — TELEPHONE (OUTPATIENT)
Dept: INTERNAL MEDICINE CLINIC | Age: 71
End: 2021-01-29

## 2021-01-29 NOTE — TELEPHONE ENCOUNTER
----- Message from [facility] sent at 1/29/2021  9:35 AM EST -----  Subject: Medication Problem    QUESTIONS  Name of Medication? amoxicillin-clavulanate (AUGMENTIN) 875-125 MG per   tablet  Patient-reported dosage and instructions? Twice a day for 10 days  What question or problem do you have with the medication? The medication   does not seem to be helping with pts cough   sinus congestion and coughing up phlegm. Pt requesting a different   medication. Preferred Pharmacy? [address]  Pharmacy phone number (if available)? [phone]  Additional Information for Provider?   ---------------------------------------------------------------------------  --------------  CALL BACK INFO  What is the best way for the office to contact you? OK to leave message on   voicemail  Preferred Call Back Phone Number? [phone]  ---------------------------------------------------------------------------  --------------  SCRIPT ANSWERS  Relationship to Patient?  Self

## 2021-01-29 NOTE — TELEPHONE ENCOUNTER
----- Message from [facility] sent at 1/29/2021  9:37 AM EST -----  Subject: Message to Provider    QUESTIONS  Information for Provider? Pt called in about Augmentin not working for his   symptoms. Pt decided last second to have call back go to home phone   instead. Please call [phone].  ---------------------------------------------------------------------------  --------------  Zev COHN  What is the best way for the office to contact you? OK to leave message on   voicemail  Preferred Call Back Phone Number? [phone]  ---------------------------------------------------------------------------  --------------  SCRIPT ANSWERS  Relationship to Patient?  Self

## 2021-01-30 NOTE — TELEPHONE ENCOUNTER
I have not seen the pt. Does he have fever/ headache? Any shortness of breath / chest pain? I would like to see the patient. If he feels bad- go to urgent care.  If he can wait- I am happy to see him the coming week

## 2021-02-01 ENCOUNTER — TELEPHONE (OUTPATIENT)
Dept: FAMILY MEDICINE CLINIC | Age: 71
End: 2021-02-01

## 2021-02-02 ENCOUNTER — OFFICE VISIT (OUTPATIENT)
Dept: FAMILY MEDICINE CLINIC | Age: 71
End: 2021-02-02
Payer: MEDICARE

## 2021-02-02 VITALS
DIASTOLIC BLOOD PRESSURE: 68 MMHG | OXYGEN SATURATION: 97 % | HEART RATE: 100 BPM | TEMPERATURE: 97.5 F | WEIGHT: 275 LBS | SYSTOLIC BLOOD PRESSURE: 100 MMHG | BODY MASS INDEX: 35.31 KG/M2

## 2021-02-02 DIAGNOSIS — R05.3 CHRONIC COUGH: Primary | ICD-10-CM

## 2021-02-02 PROCEDURE — 1036F TOBACCO NON-USER: CPT | Performed by: INTERNAL MEDICINE

## 2021-02-02 PROCEDURE — 3017F COLORECTAL CA SCREEN DOC REV: CPT | Performed by: INTERNAL MEDICINE

## 2021-02-02 PROCEDURE — G8484 FLU IMMUNIZE NO ADMIN: HCPCS | Performed by: INTERNAL MEDICINE

## 2021-02-02 PROCEDURE — G8417 CALC BMI ABV UP PARAM F/U: HCPCS | Performed by: INTERNAL MEDICINE

## 2021-02-02 PROCEDURE — 1123F ACP DISCUSS/DSCN MKR DOCD: CPT | Performed by: INTERNAL MEDICINE

## 2021-02-02 PROCEDURE — G8427 DOCREV CUR MEDS BY ELIG CLIN: HCPCS | Performed by: INTERNAL MEDICINE

## 2021-02-02 PROCEDURE — 99213 OFFICE O/P EST LOW 20 MIN: CPT | Performed by: INTERNAL MEDICINE

## 2021-02-02 PROCEDURE — 4040F PNEUMOC VAC/ADMIN/RCVD: CPT | Performed by: INTERNAL MEDICINE

## 2021-02-02 RX ORDER — METHYLPREDNISOLONE 4 MG/1
TABLET ORAL
Qty: 1 KIT | Refills: 0 | Status: SHIPPED | OUTPATIENT
Start: 2021-02-02 | End: 2021-02-08

## 2021-02-02 ASSESSMENT — ENCOUNTER SYMPTOMS
NAUSEA: 0
VOMITING: 0
ABDOMINAL PAIN: 0
COUGH: 1
CHEST TIGHTNESS: 0
SHORTNESS OF BREATH: 0

## 2021-02-02 NOTE — PATIENT INSTRUCTIONS
Please yuliya me in a few days and let me know if you feel better or not. Please call the office (and ask to see me) or be seen by other provider for any worsening or lack of improvement of the symptoms or for any new symptoms as soon as possible. Please make sure to schedule your follow appointment as discussed. Please let me know if ay further questions. Please let me know if there are any symptoms or concerns that you feel that needs to be further addressed.

## 2021-02-02 NOTE — PROGRESS NOTES
Outpatient Note for established Patient - regular Visit     History Obtained From:  patient, electronic medical record  Is the patient new to me ? - No    HISTORY OF PRESENT ILLNESS:   The patient is a 72 y.o. male who is here today for :  Bushra Garcia was seen today for cough. Diagnoses and all orders for this visit:    Chronic cough  Comments:  cody chronic sinusitis    Other orders  -     methylPREDNISolone (MEDROL DOSEPACK) 4 MG tablet; Take by mouth. Pt has cough for > 1 year   It was not worsened. He saw CNP and was treated with Amoxacillin with some improvement. Pt has acute on chronic sinusitis and was seeing ENT - Dr Jose Ricardo (Dr Aristeo Baker): inhalers did not help    He does feels that he has sinus drainage. No headaches  No fever/ chills  Pt denies CP/SOB/palpitations/abdominal pain/N/V. Past Medical History:        Diagnosis Date    Acute sinusitis 1/18/2021    Allergic rhinitis     Arthritis     Asthma     Erectile dysfunction     Hearing loss     Hyperlipidemia     Hypertension     Kidney failure 07/2018    Normal pressure hydrocephalus ([address] 07/2018    shunt placement    Scre[facility] abdominal aortic [facility] (AAA[facility]    Tinnitus     Unspecified sleep apnea        Social History:   TOBACCO:   reports that he quit smoking about 19 years ago. His smoking use included cigarettes. He has a 5.00 pack-year smoking history. He has never used smokeless tobacco.    ETOH:   reports current alcohol use of about 4.0 standard drinks of alcohol per week. Current Medications:    Prior to Admission medications    Medication Sig Start Date End Date Taking? Authorizing Provider   methylPREDNISolone (MEDROL DOSEPACK) 4 MG tablet Take by mouth.  2/2/21 2/8/21 Yes Kasandra Molina MD   oxybutynin (DITROPAN) 5 MG tablet  9/15/20  Yes Historical Provider, MD pantoprazole (PROTONIX) 20 MG tablet Take 1 tablet by mouth 2 times daily 11/24/20  Yes Lenore Bruce MD   atorvastatin (LIPITOR) 10 MG tablet TAKE 1 TABLET DAILY 11/14/20  Yes Lenore Bruce MD   cetirizine (ZYRTEC) 10 MG tablet TAKE 1 TABLET DAILY 9/3/20  Yes Lenore Bruce MD   meloxicam (MOBIC) 15 MG tablet TAKE ONE TABLET BY MOUTH ONCE DAILY   Yes Historical Provider, MD   telmisartan (MICARDIS) 40 MG tablet TAKE ONE TABLET BY MOUTH ONCE DAILY   Yes Historical Provider, MD   montelukast (SINGULAIR) 10 MG tablet Take 1 tablet by mouth nightly 8/24/20  Yes Lenore Bruce MD   memantine (NAMENDA) 10 MG tablet Take 1 tablet by mouth 2 times daily 8/24/20  Yes Lenore Bruce MD   aspirin 81 MG chewable tablet Take 81 mg by mouth daily   Yes Historical Provider, MD   albuterol sulfate HFA (PROAIR HFA) 108 (90 Base) MCG/ACT inhaler Inhale 2 puffs into the lungs every 4 hours as needed for Wheezing or Shortness of Breath 4/2/20  Yes Lenore Bruce MD   fluticasone (FLONASE) 50 MCG/ACT nasal spray 2 sprays by Nasal route daily 2/24/20  Yes Lenore Bruce MD   finasteride (PROSCAR) 5 MG tablet Take 1 tablet by mouth daily 1/21/19  Yes Lenore Bruce MD   donepezil (ARICEPT) 10 MG tablet Take 10 mg by mouth daily bedtime 10/7/18  Yes Historical Provider, MD   tamsulosin (FLOMAX) 0.4 MG capsule Take 2 capsules by mouth daily 3/22/18  Yes Vance Stout MD   Omega-3 Fatty Acids (FISH OIL) 1000 MG CAPS Take 1,000 mg by mouth daily. Yes Historical Provider, MD   Flaxseed Linseed, (FLAX SEED OIL) 1000 MG CAPS Take 1,000 mg by mouth daily. Yes Historical Provider, MD   Cholecalciferol (VITAMIN D) 2000 UNITS CAPS capsule Take 1 capsule by mouth daily. Yes Historical Provider, MD   multivitamin [facility]) per tablet Take 1 tablet by mouth daily.    Yes Historical Provider, MD gabapentin (NEURONTIN) 100 MG capsule TAKE 1 CAPSULE THREE TIMES A DAY 5/21/20 7/15/20  Marta Montoya MD       REVIEW OF SYSTEMS:  Review of Systems   Constitutional: Negative for activity change, appetite change, chills, fever and unexpected weight change. HENT: Positive for postnasal drip. Negative for hearing loss. Eyes: Negative for visual disturbance. Respiratory: Positive for cough. Negative for chest tightness and shortness of breath. Cardiovascular: Negative for chest pain. Gastrointestinal: Negative for abdominal pain, nausea and vomiting. Genitourinary: Negative for hematuria. Skin: Negative for rash. Allergic/Immunologic: Negative for immunocompromised state. Neurological: Negative for dizziness and headaches. Psychiatric/Behavioral: Negative for dysphoric mood and suicidal ideas. Physical Exam:      Vitals: /68 (Site: Left Upper Arm, Position: Sitting, Cuff Size: Medium Adult)   Pulse 100   Temp 97.5 °F (36.4 °C) (Temporal)   Wt 275 lb (124.7 kg)   SpO2 97%   BMI 35.31 kg/m²     Body mass index is 35.31 kg/m². Wt Readings from Last 3 Encounters:   02/02/21 275 lb (124.7 kg)   08/24/20 278 lb (126.1 kg)   07/15/20 265 lb (120.2 kg)     Physical Exam  Constitutional:       General: He is not in acute distress. Appearance: He is well-developed. He is not diaphoretic. HENT:      Head: Normocephalic and atraumatic. Comments: No sinus tenderness   He does have PND      Right Ear: Tympanic membrane, ear canal and external ear normal.      Left Ear: Tympanic membrane, ear canal and external ear normal.      Mouth/Throat:      Pharynx: No oropharyngeal exudate. Eyes:      General: No scleral icterus. Right eye: No discharge. Left eye: No discharge. Conjunctiva/sclera: Conjunctivae normal.   Neck:      Musculoskeletal: Normal range of motion and neck supple. Cardiovascular:      Rate and Rhythm: Normal rate. Heart sounds: Normal heart sounds. No murmur. Pulmonary:      Effort: Pulmonary effort is normal. No respiratory distress. Breath sounds: Normal breath sounds. No wheezing or rales. Abdominal:      General: There is no distension. Palpations: Abdomen is soft. Tenderness: There is no abdominal tenderness. There is no guarding. Musculoskeletal:         General: No deformity. Lymphadenopathy:      Head:      Right side of head: No submental or submandibular adenopathy. Left side of head: No submental or submandibular adenopathy. Cervical: No cervical adenopathy. Right cervical: No superficial or deep cervical adenopathy. Left cervical: No superficial or deep cervical adenopathy. Skin:     Findings: No rash. Neurological:      Mental Status: He is alert and oriented to person, place, and time. GCS: GCS eye subscore is 4. GCS verbal subscore is 5. GCS motor subscore is 6. Motor: No abnormal muscle tone. Deep Tendon Reflexes: Reflexes are normal and symmetric. Psychiatric:         Behavior: Behavior normal.         Thought Content: Thought content normal.        Assessment/Plan:   Dario Dunham was seen today for cough. Diagnoses and all orders for this visit:    Chronic cough  Patient with chronic cough which we could not explain so far. Most consistent with upper nasal drainage. Trial of tapered dose steroids and will let me know next week how he feels. If not improved consider referral again to ENT  Comments:  cody chronic sinusitis    Other orders  -     methylPREDNISolone (MEDROL DOSEPACK) 4 MG tablet; Take by mouth.      - Patient was encouraged to call the office (and ask to see me) or be seen by other provider for any worsening or lack of improvement of the symptoms or any new symptoms. Additional patients instructions (if given):   Patient Instructions   Please yuliya me in a few days and let me know if you feel better or not. Please call the office (and ask to see me) or be seen by other provider for any worsening or lack of improvement of the symptoms or for any new symptoms as soon as possible. Please make sure to schedule your follow appointment as discussed. Please let me know if ay further questions. Please let me know if there are any symptoms or concerns that you feel that needs to be further addressed. Neymar Corona M.D.   2/2/2021, 5:10 PM      NOTE: This report was transcribed using voice recognition software. Every effort was made to ensure accuracy, however, inadvertent computerized transcription errors may be present.

## 2021-02-03 ENCOUNTER — TELEPHONE (OUTPATIENT)
Dept: FAMILY MEDICINE CLINIC | Age: 71
End: 2021-02-03

## 2021-02-04 ENCOUNTER — OFFICE VISIT (OUTPATIENT)
Dept: PRIMARY CARE CLINIC | Age: 71
End: 2021-02-04
Payer: MEDICARE

## 2021-02-04 DIAGNOSIS — Z20.822 SUSPECTED COVID-19 VIRUS INFECTION: Primary | ICD-10-CM

## 2021-02-04 PROCEDURE — 99211 OFF/OP EST MAY X REQ PHY/QHP: CPT | Performed by: NURSE PRACTITIONER

## 2021-02-04 PROCEDURE — G8417 CALC BMI ABV UP PARAM F/U: HCPCS | Performed by: NURSE PRACTITIONER

## 2021-02-04 PROCEDURE — G8428 CUR MEDS NOT DOCUMENT: HCPCS | Performed by: NURSE PRACTITIONER

## 2021-02-04 NOTE — PROGRESS NOTES
Jaysushma Luis received a viral test for COVID-19. They were educated on isolation and quarantine as appropriate. For any symptoms, they were directed to seek care from their PCP, given contact information to establish with a doctor, directed to an urgent care or the emergency room.

## 2021-02-05 LAB — SARS-COV-2, NAA: NOT DETECTED

## 2021-02-08 ENCOUNTER — TELEPHONE (OUTPATIENT)
Dept: FAMILY MEDICINE CLINIC | Age: 71
End: 2021-02-08

## 2021-02-24 ENCOUNTER — IMMUNIZATION (OUTPATIENT)
Dept: FAMILY MEDICINE CLINIC | Age: 71
End: 2021-02-24
Payer: MEDICARE

## 2021-02-24 PROCEDURE — 91301 COVID-19, MODERNA VACCINE 100MCG/0.5ML DOSE: CPT | Performed by: FAMILY MEDICINE

## 2021-02-24 PROCEDURE — 0011A COVID-19, MODERNA VACCINE 100MCG/0.5ML DOSE: CPT | Performed by: FAMILY MEDICINE

## 2021-03-24 ENCOUNTER — IMMUNIZATION (OUTPATIENT)
Dept: FAMILY MEDICINE CLINIC | Age: 71
End: 2021-03-24
Payer: MEDICARE

## 2021-03-24 PROCEDURE — 0012A COVID-19, MODERNA VACCINE 100MCG/0.5ML DOSE: CPT | Performed by: FAMILY MEDICINE

## 2021-03-24 PROCEDURE — 91301 COVID-19, MODERNA VACCINE 100MCG/0.5ML DOSE: CPT | Performed by: FAMILY MEDICINE

## 2021-04-13 ENCOUNTER — VIRTUAL VISIT (OUTPATIENT)
Dept: FAMILY MEDICINE CLINIC | Age: 71
End: 2021-04-13
Payer: MEDICARE

## 2021-04-13 DIAGNOSIS — R05.3 CHRONIC COUGH: Primary | ICD-10-CM

## 2021-04-13 PROCEDURE — 99441 PR PHYS/QHP TELEPHONE EVALUATION 5-10 MIN: CPT | Performed by: INTERNAL MEDICINE

## 2021-04-13 ASSESSMENT — ENCOUNTER SYMPTOMS
VOMITING: 0
NAUSEA: 0
COUGH: 1
CHEST TIGHTNESS: 0
SHORTNESS OF BREATH: 0
ABDOMINAL PAIN: 0

## 2021-04-13 NOTE — PROGRESS NOTES
2021    TELEHEALTH EVALUATION -- Audio (During CFTHU-18 public health emergency)    HPI:    Sterling Logan. (:  1950) has requested an audio/video evaluation for the following concern(s):    I spoke to the pt- he still c/o cough and congestion   Was worse at his daughter house (floweres, dogs, trees)   He is on inhaler   Pt denies CP/SOB/palpitations/abdominal pain/N/V. No fever chills. Just chronic cough   Steroids helped. Review of Systems   Constitutional: Negative for activity change, appetite change, chills, fever and unexpected weight change. Eyes: Negative for visual disturbance. Respiratory: Positive for cough. Negative for chest tightness and shortness of breath. Cardiovascular: Negative for chest pain. Gastrointestinal: Negative for abdominal pain, nausea and vomiting. Skin: Negative for rash. Allergic/Immunologic: Negative for immunocompromised state. Neurological: Negative for dizziness and headaches. Psychiatric/Behavioral: Negative for dysphoric mood and suicidal ideas. Prior to Visit Medications    Medication Sig Taking?  Authorizing Provider   mometasone-formoterol (DULERA) 200-5 MCG/ACT inhaler Inhale 2 puffs into the lungs every 12 hours Yes Yifan Zuleta MD   oxybutynin (DITROPAN) 5 MG tablet  Yes Historical Provider, MD   pantoprazole (PROTONIX) 20 MG tablet Take 1 tablet by mouth 2 times daily Yes Yifan Zuleta MD   atorvastatin (LIPITOR) 10 MG tablet TAKE 1 TABLET DAILY Yes Yifan Zuleta MD   cetirizine (ZYRTEC) 10 MG tablet TAKE 1 TABLET DAILY Yes Yifan Zuleta MD   meloxicam (MOBIC) 15 MG tablet TAKE ONE TABLET BY MOUTH ONCE DAILY Yes Historical Provider, MD   telmisartan (MICARDIS) 40 MG tablet TAKE ONE TABLET BY MOUTH ONCE DAILY Yes Historical Provider, MD   montelukast (SINGULAIR) 10 MG tablet Take 1 tablet by mouth nightly Yes Yifan Zuleta MD   memantine (NAMENDA) 10 MG tablet Take 1 tablet by mouth 2 times daily Yes Eliazar Zuniga MD   aspirin 81 MG chewable tablet Take 81 mg by mouth daily Yes Historical Provider, MD   albuterol sulfate HFA (PROAIR HFA) 108 (90 Base) MCG/ACT inhaler Inhale 2 puffs into the lungs every 4 hours as needed for Wheezing or Shortness of Breath Yes Eliazar Zuniga MD   fluticasone (FLONASE) 50 MCG/ACT nasal spray 2 sprays by Nasal route daily Yes Eliazar Zuniga MD   finasteride (PROSCAR) 5 MG tablet Take 1 tablet by mouth daily Yes Eliazar Zuniga MD   donepezil (ARICEPT) 10 MG tablet Take 10 mg by mouth daily bedtime Yes Historical Provider, MD   tamsulosin (FLOMAX) 0.4 MG capsule Take 2 capsules by mouth daily Yes Johnny Naidu MD   Omega-3 Fatty Acids (FISH OIL) 1000 MG CAPS Take 1,000 mg by mouth daily. Yes Historical Provider, MD   Flaxseed, Linseed, (FLAX SEED OIL) 1000 MG CAPS Take 1,000 mg by mouth daily. Yes Historical Provider, MD   Cholecalciferol (VITAMIN D) 2000 UNITS CAPS capsule Take 1 capsule by mouth daily. Yes Historical Provider, MD   multivitamin [facility]) per tablet Take 1 tablet by mouth daily. Yes Historical Provider, MD   gabapentin (NEURONTIN) 100 MG capsule TAKE 1 CAPSULE THREE TIMES A DAY  Eliazar Zuniga MD       Social History     Tobacco Use    Smoking status: Former Smoker     Packs/day: 0.50     Years: 10.00     Pack years: 5.00     Types: Cigarettes     Quit date: 2002     Years since quittin.2    Smokeless tobacco: Never Used   Substance Use Topics    Alcohol use:  Yes     Alcohol/week: 4.0 standard drinks     Types: 2 Shots of liquor, 2 Standard drinks or equivalent per week     Comment: rarely    Drug use: No        Past Medical History:   Diagnosis Date    Acute sinusitis 2021    Allergic rhinitis     Arthritis     Asthma     Erectile dysfunction     Hearing loss     Hyperlipidemia     Hypertension     Kidney failure 2018    Normal [address] [address] 2018    shunt placement    Screening for abdominal aortic aneurysm (AAA) performed 03/02/2018    [facility]    Tinnitus     Unspecified sleep apnea        PHYSICAL EXAMINATION:  [ INSTRUCTIONS:  \"[x]\" Indicates a positive item  \"[]\" Indicates a negative item  -- DELETE ALL ITEMS NOT EXAMINED]  Vital Signs: (As obtained by patient/caregiver or practitioner observation)    Blood pressure-  Heart rate-    Respiratory rate-    Temperature-  Pulse oximetry-     Mental status  [x] Alert and awake  [x] Oriented to person/place/time [x]Able to follow commands        Pulmonary/Chest: [x] Respiratory effort normal.  [] No visualized signs of difficulty breathing or respiratory distress        [] Abnormal-            Psychiatric:       [x] Normal Affect [x] No Hallucinations        [] Abnormal-     Other pertinent observable physical exam findings-     ASSESSMENT/PLAN:  1. Chronic cough  As discussed w/ pulm--> resume high dose Symbidort  Pt will let me know if not better soon       No follow-ups on file. Ambreen Gray, was evaluated through a synchronous (real-time) audio-video encounter. The patient (or guardian if applicable) is aware that this is a billable service. Verbal consent to proceed has been obtained within the past 12 months. The visit was conducted pursuant to the emergency declaration under the [address] [place] [address] authority and the [employer] Act. Patient identification was verified, and a caregiver was present when appropriate. The patient was located in a state where the provider was credentialed to provide care. Total time spent on this encounter: 6:05 min    --Blake Reyes MD on 4/13/2021 at 11:46 PM    An electronic signature was used to authenticate this note.

## 2021-05-24 RX ORDER — GABAPENTIN 100 MG/1
CAPSULE ORAL
Qty: 270 CAPSULE | Refills: 3 | Status: SHIPPED | OUTPATIENT
Start: 2021-05-24 | End: 2021-08-25

## 2021-05-24 RX ORDER — GABAPENTIN 100 MG/1
CAPSULE ORAL
Qty: 270 CAPSULE | Refills: 3 | OUTPATIENT
Start: 2021-05-24

## 2021-05-25 ENCOUNTER — VIRTUAL VISIT (OUTPATIENT)
Dept: FAMILY MEDICINE CLINIC | Age: 71
End: 2021-05-25
Payer: MEDICARE

## 2021-05-25 DIAGNOSIS — R05.3 CHRONIC COUGH: Primary | ICD-10-CM

## 2021-05-25 PROCEDURE — 1123F ACP DISCUSS/DSCN MKR DOCD: CPT | Performed by: INTERNAL MEDICINE

## 2021-05-25 PROCEDURE — 1036F TOBACCO NON-USER: CPT | Performed by: INTERNAL MEDICINE

## 2021-05-25 PROCEDURE — 3017F COLORECTAL CA SCREEN DOC REV: CPT | Performed by: INTERNAL MEDICINE

## 2021-05-25 PROCEDURE — 4040F PNEUMOC VAC/ADMIN/RCVD: CPT | Performed by: INTERNAL MEDICINE

## 2021-05-25 PROCEDURE — G8427 DOCREV CUR MEDS BY ELIG CLIN: HCPCS | Performed by: INTERNAL MEDICINE

## 2021-05-25 PROCEDURE — G8417 CALC BMI ABV UP PARAM F/U: HCPCS | Performed by: INTERNAL MEDICINE

## 2021-05-25 PROCEDURE — 99213 OFFICE O/P EST LOW 20 MIN: CPT | Performed by: INTERNAL MEDICINE

## 2021-05-25 RX ORDER — METHYLPREDNISOLONE 4 MG/1
TABLET ORAL
Qty: 1 KIT | Refills: 0 | Status: SHIPPED | OUTPATIENT
Start: 2021-05-25 | End: 2021-05-31

## 2021-05-25 NOTE — PROGRESS NOTES
2021    TELEHEALTH EVALUATION -- Audio/Visual (During [place] [address] public health emergency)    HPI:    Shanika Hutson. (:  1950) has requested an audio/video evaluation for the following concern(s):    Pt still has cough in the mornign  No changes- he still get sputum in the morning. Phlegm- is green/ white  No blood   He feels that he has sinus infections- symptoms worsned recently   No headache/ fever/ chills/   No SOB/ chest pain  No sore throat. Review of Systems    Prior to Visit Medications    Medication Sig Taking? Authorizing Provider   methylPREDNISolone (MEDROL DOSEPACK) 4 MG tablet Take by mouth.  Yes Jhon Saavedra MD   gabapentin (NEURONTIN) 100 MG capsule TAKE 1 CAPSULE THREE TIMES A DAY Yes Jhon Saavedra MD   mometasone-formoterol (DULERA) 200-5 MCG/ACT inhaler Inhale 2 puffs into the lungs every 12 hours Yes Jhon Saavedra MD   oxybutynin (DITROPAN) 5 MG tablet  Yes Historical Provider, MD   pantoprazole (PROTONIX) 20 MG tablet Take 1 tablet by mouth 2 times daily Yes Jhon Saavedra MD   atorvastatin (LIPITOR) 10 MG tablet TAKE 1 TABLET DAILY Yes Jhon Saavedra MD   cetirizine (ZYRTEC) 10 MG tablet TAKE 1 TABLET DAILY Yes Jhon Saavedra MD   meloxicam (MOBIC) 15 MG tablet TAKE ONE TABLET BY MOUTH ONCE DAILY Yes Historical Provider, MD   telmisartan (MICARDIS) 40 MG tablet TAKE ONE TABLET BY MOUTH ONCE DAILY Yes Historical Provider, MD   montelukast (SINGULAIR) 10 MG tablet Take 1 tablet by mouth nightly Yes Jhon Saavedra MD   memantine (NAMENDA) 10 MG tablet Take 1 tablet by mouth 2 times daily Yes Jhon Saavedra MD   aspirin 81 MG chewable tablet Take 81 mg by mouth daily Yes Historical Provider, MD   albuterol sulfate HFA (PROAIR HFA) 108 (90 Base) MCG/ACT inhaler Inhale 2 puffs into the lungs every 4 hours as needed for Wheezing or Shortness of Breath Yes Jhon Saavedra MD   finasteride (PROSCAR) 5 MG tablet Take 1 tablet by mouth daily Yes Yuniel Blanca MD   donepezil (ARICEPT) 10 MG tablet Take 10 mg by mouth daily bedtime Yes Historical Provider, MD   tamsulosin (FLOMAX) 0.4 MG capsule Take 2 capsules by mouth daily Yes Phyllis Peralta MD   Omega-3 Fatty Acids (FISH OIL) 1000 MG CAPS Take 1,000 mg by mouth daily. Yes Historical Provider, MD   Flaxseed, Linseed, (FLAX SEED OIL) 1000 MG CAPS Take 1,000 mg by mouth daily. Yes Historical Provider, MD   Cholecalciferol (VITAMIN D) 2000 UNITS CAPS capsule Take 1 capsule by mouth daily. Yes Historical Provider, MD   multivitamin [facility]) per tablet Take 1 tablet by mouth daily. Yes Historical Provider, MD   fluticasone (FLONASE) 50 MCG/ACT nasal spray 2 sprays by Nasal route daily  Yuniel Blanca MD       Social History     Tobacco Use    Smoking status: Former Smoker     Packs/day: 0.50     Years: 10.00     Pack years: 5.00     Types: Cigarettes     Quit date: 2002     Years since quittin.4    Smokeless tobacco: Never Used   Vaping Use    Vaping Use: Never used   Substance Use Topics    Alcohol use:  Yes     Alcohol/week: 4.0 standard drinks     Types: 2 Shots of liquor, 2 Standard drinks or equivalent per week     Comment: rarely    Drug use: No      Past Medical History:   Diagnosis Date    Acute sinusitis 2021    Allergic rhinitis     Arthritis     Asthma     Erectile dysfunction     Hearing loss     Hyperlipidemia     Hypertension     Kidney failure 2018    Normal pressure hydrocephalus ([address] 2018    shunt placement    Screening for abdominal aortic aneurysm (AAA) [facility] 2018    [facility]    Tinnitus     Unspecified sleep apnea        PHYSICAL EXAMINATION:  [ INSTRUCTIONS:  \"[x]\" Indicates a positive item  \"[]\" Indicates a negative item  -- DELETE ALL ITEMS NOT EXAMINED]  Vital Signs: (As obtained by patient/caregiver or practitioner observation)    Blood pressure- he has no way to check   Heart rate- Respiratory rate-    Temperature-  Pulse oximetry-     Constitutional: [x] Appears well-developed and well-nourished [x] No apparent distress      [] Abnormal-   Mental status  [x] Alert and awake  [x] Oriented to person/place/time [x]Able to follow commands      Eyes:  EOM    []  Normal  [] Abnormal-  Sclera  []  Normal  [] Abnormal -         Discharge []  None visible  [] Abnormal -    HENT:   [x] Normocephalic, atraumatic. [] Abnormal   [] Mouth/Throat: Mucous membranes are moist.     External Ears [x] Normal  [] Abnormal-     Neck: [x] No visualized mass     Pulmonary/Chest: [x] Respiratory effort normal.  [x] No visualized signs of difficulty breathing or respiratory distress        [] Abnormal-      Musculoskeletal:   [] Normal gait with no signs of ataxia         [x] Normal range of motion of neck        [] Abnormal-       Neurological:        [x] No Facial Asymmetry (Cranial nerve 7 motor function) (limited exam to video visit)          [] No gaze palsy        [] Abnormal-         Skin:        [x] No significant exanthematous lesions or discoloration noted on facial skin         [] Abnormal-            Psychiatric:       [x] Normal Affect [] No Hallucinations        [] Abnormal-     Other pertinent observable physical exam findings-     ASSESSMENT/PLAN:  1. Chronic cough  likely allergic sinusitis  He responded well to Medrol dose dawit lat time- another trail  Referral to allergist  discussed the need to find a new PCP       No follow-ups on file. Jacquiline Goldberg., was evaluated through a synchronous (real-time) audio-video encounter. The patient (or guardian if applicable) is aware that this is a billable service. Verbal consent to proceed has been obtained within the past 12 months. The visit was conducted pursuant to the emergency declaration under the [facility], [address] [place] [address] authority and the [employer] General Act. Patient identification was verified, and a caregiver was present when appropriate. The patient was located in a state where the provider was credentialed to provide care. Total time spent on this encounter: 09:30 min     --Blake Reyes MD on 5/25/2021 at 10:07 AM    An electronic signature was used to authenticate this note.

## 2021-07-07 ENCOUNTER — ANESTHESIA EVENT (OUTPATIENT)
Dept: ENDOSCOPY | Age: 71
End: 2021-07-07
Payer: MEDICARE

## 2021-07-09 ENCOUNTER — ANESTHESIA (OUTPATIENT)
Dept: ENDOSCOPY | Age: 71
End: 2021-07-09
Payer: MEDICARE

## 2021-07-16 ENCOUNTER — HOSPITAL ENCOUNTER (OUTPATIENT)
Age: 71
Setting detail: OUTPATIENT SURGERY
Discharge: HOME | End: 2021-07-16
Attending: INTERNAL MEDICINE | Admitting: INTERNAL MEDICINE
Payer: MEDICARE

## 2021-07-16 VITALS
RESPIRATION RATE: 18 BRPM | HEART RATE: 69 BPM | DIASTOLIC BLOOD PRESSURE: 84 MMHG | OXYGEN SATURATION: 98 % | SYSTOLIC BLOOD PRESSURE: 129 MMHG | TEMPERATURE: 97.1 F

## 2021-07-16 VITALS — OXYGEN SATURATION: 99 % | DIASTOLIC BLOOD PRESSURE: 88 MMHG | SYSTOLIC BLOOD PRESSURE: 119 MMHG

## 2021-07-16 DIAGNOSIS — K22.70 BARRETT'S ESOPHAGUS WITHOUT DYSPLASIA: ICD-10-CM

## 2021-07-16 PROCEDURE — 2580000003 HC RX 258: Performed by: ANESTHESIOLOGY

## 2021-07-16 PROCEDURE — 3609012400 HC EGD TRANSORAL BIOPSY SINGLE/MULTIPLE: Performed by: INTERNAL MEDICINE

## 2021-07-16 PROCEDURE — 6360000002 HC RX W HCPCS: Performed by: NURSE ANESTHETIST, CERTIFIED REGISTERED

## 2021-07-16 PROCEDURE — 88342 IMHCHEM/IMCYTCHM 1ST ANTB: CPT

## 2021-07-16 PROCEDURE — 7100000011 HC PHASE II RECOVERY - ADDTL 15 MIN: Performed by: INTERNAL MEDICINE

## 2021-07-16 PROCEDURE — 3609013500 HC EGD REMOVAL TUMOR POLYP/OTHER LESION SNARE TECH: Performed by: INTERNAL MEDICINE

## 2021-07-16 PROCEDURE — 88305 TISSUE EXAM BY PATHOLOGIST: CPT

## 2021-07-16 PROCEDURE — 2709999900 HC NON-CHARGEABLE SUPPLY: Performed by: INTERNAL MEDICINE

## 2021-07-16 PROCEDURE — 3700000000 HC ANESTHESIA ATTENDED CARE: Performed by: INTERNAL MEDICINE

## 2021-07-16 PROCEDURE — 2580000003 HC RX 258: Performed by: NURSE ANESTHETIST, CERTIFIED REGISTERED

## 2021-07-16 PROCEDURE — 7100000010 HC PHASE II RECOVERY - FIRST 15 MIN: Performed by: INTERNAL MEDICINE

## 2021-07-16 PROCEDURE — 3700000001 HC ADD 15 MINUTES (ANESTHESIA): Performed by: INTERNAL MEDICINE

## 2021-07-16 RX ORDER — SODIUM CHLORIDE 9 MG/ML
25 INJECTION, SOLUTION INTRAVENOUS PRN
Status: DISCONTINUED | OUTPATIENT
Start: 2021-07-16 | End: 2021-07-16 | Stop reason: HOSPADM

## 2021-07-16 RX ORDER — LIDOCAINE HYDROCHLORIDE 20 MG/ML
INJECTION, SOLUTION INTRAVENOUS PRN
Status: DISCONTINUED | OUTPATIENT
Start: 2021-07-16 | End: 2021-07-16 | Stop reason: SDUPTHER

## 2021-07-16 RX ORDER — SODIUM CHLORIDE 0.9 % (FLUSH) 0.9 %
10 SYRINGE (ML) INJECTION EVERY 12 HOURS SCHEDULED
Status: DISCONTINUED | OUTPATIENT
Start: 2021-07-16 | End: 2021-07-16 | Stop reason: HOSPADM

## 2021-07-16 RX ORDER — SODIUM CHLORIDE 0.9 % (FLUSH) 0.9 %
10 SYRINGE (ML) INJECTION PRN
Status: DISCONTINUED | OUTPATIENT
Start: 2021-07-16 | End: 2021-07-16 | Stop reason: HOSPADM

## 2021-07-16 RX ORDER — PROPOFOL 10 MG/ML
INJECTION, EMULSION INTRAVENOUS PRN
Status: DISCONTINUED | OUTPATIENT
Start: 2021-07-16 | End: 2021-07-16 | Stop reason: SDUPTHER

## 2021-07-16 RX ORDER — SODIUM CHLORIDE, SODIUM LACTATE, POTASSIUM CHLORIDE, CALCIUM CHLORIDE 600; 310; 30; 20 MG/100ML; MG/100ML; MG/100ML; MG/100ML
INJECTION, SOLUTION INTRAVENOUS CONTINUOUS PRN
Status: DISCONTINUED | OUTPATIENT
Start: 2021-07-16 | End: 2021-07-16 | Stop reason: SDUPTHER

## 2021-07-16 RX ORDER — SODIUM CHLORIDE, SODIUM LACTATE, POTASSIUM CHLORIDE, CALCIUM CHLORIDE 600; 310; 30; 20 MG/100ML; MG/100ML; MG/100ML; MG/100ML
INJECTION, SOLUTION INTRAVENOUS CONTINUOUS
Status: DISCONTINUED | OUTPATIENT
Start: 2021-07-16 | End: 2021-07-16 | Stop reason: HOSPADM

## 2021-07-16 RX ORDER — SODIUM CHLORIDE 9 MG/ML
INJECTION, SOLUTION INTRAVENOUS CONTINUOUS
Status: DISCONTINUED | OUTPATIENT
Start: 2021-07-16 | End: 2021-07-16 | Stop reason: HOSPADM

## 2021-07-16 RX ADMIN — SODIUM CHLORIDE, SODIUM LACTATE, POTASSIUM CHLORIDE, AND CALCIUM CHLORIDE: .6; .31; .03; .02 INJECTION, SOLUTION INTRAVENOUS at 11:54

## 2021-07-16 RX ADMIN — SODIUM CHLORIDE, POTASSIUM CHLORIDE, SODIUM LACTATE AND CALCIUM CHLORIDE: 600; 310; 30; 20 INJECTION, SOLUTION INTRAVENOUS at 11:36

## 2021-07-16 RX ADMIN — PROPOFOL 50 MG: 10 INJECTION, EMULSION INTRAVENOUS at 12:10

## 2021-07-16 RX ADMIN — LIDOCAINE HYDROCHLORIDE 50 MG: 20 INJECTION, SOLUTION INTRAVENOUS at 12:10

## 2021-07-16 RX ADMIN — PROPOFOL 50 MG: 10 INJECTION, EMULSION INTRAVENOUS at 12:15

## 2021-07-16 RX ADMIN — PROPOFOL 50 MG: 10 INJECTION, EMULSION INTRAVENOUS at 12:12

## 2021-07-16 RX ADMIN — PROPOFOL 20 MG: 10 INJECTION, EMULSION INTRAVENOUS at 12:17

## 2021-07-16 ASSESSMENT — PULMONARY FUNCTION TESTS
PIF_VALUE: 0
PIF_VALUE: 1
PIF_VALUE: 0
PIF_VALUE: 1

## 2021-07-16 ASSESSMENT — PAIN - FUNCTIONAL ASSESSMENT
PAIN_FUNCTIONAL_ASSESSMENT: 0-10

## 2021-07-16 ASSESSMENT — LIFESTYLE VARIABLES: SMOKING_STATUS: 0

## 2021-07-16 NOTE — OP NOTE
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 DESCRIPTION OF PROCEDURE:  With the patient in the left lateral position  and after IV Diprivan, the Olympus video endoscope was introduced into  the esophagus and advanced toward the gastroesophageal junction where  small hiatus hernia was seen. 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 Stomach was carefully inspected. Gastric polyp  was seen, measured about 2 cm. We removed it with polypectomy snare  technique. Biopsy from antrum were obtained for Helicobacter pylori. The duodenum was normal.  Scope was then removed without complication. IMPRESSION:  1. 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 2.  Small hiatus hernia. 3.  Gastric polyp - removed. EBL:  None. Lety France MD    D: 2021 12:38:26       T: 2021 13:34:54     [email or web address]  Job#: [id number]     Doc#: [id number]    CC:   Mara Gill MD

## 2021-07-16 NOTE — ANESTHESIA PRE PROCEDURE
Department of Anesthesiology  Preprocedure Note       Name:  Alexis Gonzalez. Age:  80 y.o.  :  1950                                          MRN:  [id number]         Date:  2021      Surgeon: Ana M Maldonado):  Freya Grant MD    Procedure: Procedure(s):  ESOPHAGOGASTRODUODENOSCOPY    Medications prior to admission:   Prior to Admission medications    Medication Sig Start Date End Date Taking?  Authorizing Provider   gabapentin (NEURONTIN) 100 MG capsule TAKE 1 CAPSULE THREE TIMES A DAY 21 Yes Army Jaiden MD   mometasone-formoterol (DULERA) 200-5 MCG/ACT inhaler Inhale 2 puffs into the lungs every 12 hours 21  Yes Army Jaiden MD   oxybutynin (DITROPAN) 5 MG tablet  9/15/20  Yes Historical Provider, MD   pantoprazole (PROTONIX) 20 MG tablet Take 1 tablet by mouth 2 times daily 20  Yes Army Jaiden MD   atorvastatin (LIPITOR) 10 MG tablet TAKE 1 TABLET DAILY 20  Yes Army Jaiden MD   cetirizine (ZYRTEC) 10 MG tablet TAKE 1 TABLET DAILY 9/3/20  Yes Army Jaiden MD   meloxicam (MOBIC) 15 MG tablet TAKE ONE TABLET BY MOUTH ONCE DAILY   Yes Historical Provider, MD   telmisartan (MICARDIS) 40 MG tablet TAKE ONE TABLET BY MOUTH ONCE DAILY   Yes Historical Provider, MD   montelukast (SINGULAIR) 10 MG tablet Take 1 tablet by mouth nightly 20  Yes Army Jaiden MD   memantine (NAMENDA) 10 MG tablet Take 1 tablet by mouth 2 times daily 20  Yes Army Jaiden MD   aspirin 81 MG chewable tablet Take 81 mg by mouth daily   Yes Historical Provider, MD   finasteride (PROSCAR) 5 MG tablet Take 1 tablet by mouth daily 19  Yes Army Jaiden MD   donepezil (ARICEPT) 10 MG tablet Take 10 mg by mouth daily bedtime 10/7/18  Yes Historical Provider, MD   tamsulosin (FLOMAX) 0.4 MG capsule Take 2 capsules by mouth daily 3/22/18  Yes Kathie Leiva MD   Omega-3 Fatty Acids (FISH OIL) 1000 MG CAPS Take 1,000 mg by mouth daily. Yes Historical Provider, MD   Flaxseed, Linseed, (FLAX SEED OIL) 1000 MG CAPS Take 1,000 mg by mouth daily. Yes Historical Provider, MD   Cholecalciferol (VITAMIN D) 2000 UNITS CAPS capsule Take 1 capsule by mouth daily. Yes Historical Provider, MD   multivitamin [facility]) per tablet Take 1 tablet by mouth daily. Yes Historical Provider, MD   albuterol sulfate HFA (PROAIR HFA) 108 (90 Base) MCG/ACT inhaler Inhale 2 puffs into the lungs every 4 hours as needed for Wheezing or Shortness of Breath 4/2/20   Marge Rdz MD   fluticasone (FLONASE) 50 MCG/ACT nasal spray 2 sprays by Nasal route daily 2/24/20   Marge Rdz MD       Current medications:    Current Facility-Administered Medications   Medication Dose Route Frequency Provider Last Rate Last Admin    sodium chloride flush 0.9 % injection 10 mL  10 mL Intravenous 2 times per day Maybelle Mosotho, DO        sodium chloride flush 0.9 % injection 10 mL  10 mL Intravenous PRN Maybelle Mosotho, DO        0.9 % sodium chloride infusion  25 mL Intravenous PRN Maybelle Mosotho, DO        0.9 % sodium chloride infusion   Intravenous Continuous Maybelle Mosotho, DO        lactated ringers infusion   Intravenous Continuous Maybelle Mosotho,  mL/hr at 07/16/21 1136 New Bag at 07/16/21 1136       Allergies: Allergies   Allergen Reactions    Lisinopril Other (See Comments)     cough       Problem List:    Patient Active Problem List   Diagnosis Code    Wrist arthritis M19.039    Sprain of lumbar region S33. 5XXA    Hypertrophy of prostate without urinary obstruction and other lower urinary tract symptoms (LUTS) N40.0    Cardiac dysrhythmia I49.9    Onychomycosis B35.1    Obstructive sleep apnea G47.33    Personal history of other diseases of digestive system Z87.19    Erectile dysfunction N52.9    Vitamin D deficiency E55.9    Mixed hyperlipidemia E78.2    Persistent depressive disorder F34.1    Dysthymia F34.1    Non-seasonal allergic rhinitis due to pollen J30.1    Pulmonary nodule R91.1    Traumatic rhabdomyolysis ([facility]) T79. 6XXA    Acute renal failure due to rhabdomyolysis ([facility]) N17.9, M62.82    Frequent falls R29.6    Functional gait abnormality R26.89    Back pain M54.9    Normal pressure hydrocephalus (HCC) G91.2    Hyponatremia E87.1    Hydrocephalus ([facility]) R23.4    Umbilical hernia, incarcerated K42.0    NPH (normal pressure hydrocephalus) ([facility]) G91.2    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 K22.70    Early onset Alzheimer's dementia ([address] G30.0, F02.80    Morbidly obese ([facility]) E66.01    Chronic cough R05    Acute sinusitis J01.90       Past Medical History:        Diagnosis Date    Acute sinusitis 1/18/2021    Allergic rhinitis     Arthritis     R thumb, low back    Asthma     Erectile dysfunction     Hearing loss     Hyperlipidemia     Hypertension     Kidney failure 07/2018    Normal pressure hydrocephalus ([address] 07/2018    shunt placement    Screening for abdominal aortic aneurysm (AAA) performed 03/02/2018    [address]    Tinnitus     Unspecified sleep apnea        Past Surgical History:        Procedure Laterality Date    COLONOSCOPY  12/14/2018    COLONOSCOPY WITH BIOPSY performed by Sadnra [facility] MD at [facility] ENTEROSCOPY N/A 1/4/2019    ENTEROSCOPY PUSH BIOPSY performed [facility] MD at [facility] Left     lipoma    JOINT REPLACEMENT Left 2015    PARTIAL KNEE REPLACMENT    LAPAROSCOPY N/A 7/18/2018    OPENING AND CLOSING FOR VENTRICULAR PERITONEAL SHUNT performed by Darcy Wilson MD at [place] [address] N/A 07/18/2018    [address]; (N/A )    AR CREATE SHUNT:VENTRIC-PERITONEAL N/A 7/18/2018    VENTRICULAR PERITONEAL SHUNT INSERTION RIGHT SIDE; performed by Parul Treadwell MD at [address] [facility] N/A 12/14/2018    EGD BIOPSY performed by Sandra Elliott MD at [place] [address] GASTROINTESTINAL ENDOSCOPY N/A 3/1/2019    ESOPHAGOGASTRODUODENOSCOPY WITH RADIOFREQUENCY  ABLATION/ ABLATIONS X19 performed by Andre Hendrix MD at [address] [place] [address] N/A 2019    ESOPHAGOGASTRODUODENOSCOPY WITH RADIOFREQUENCY ABLATION performed by Andre Hendrix MD at [address] [place] [address] 2019    EGD BIOPSY performed by Andre Hendrix MD at [address] [place] [address] N/A 2019    ESOPHAGOGASTRODUODENOSCOPY RADIOFREQUENCY ABLATION performed by Andre Hendrix MD at [address] [place] [address] N/A 2019    EGD GASTRIC BIOPSY performed by Andre Hendrix MD at [address] [place] [address] N/A 2019    EGD POLYP COLD SNARE performed by Andre Hendrix MD at [address] [place] [address] N/A 2019    ESOPHAGOGASTRODUODENOSCOPY WITH RADIOFREQUENCY ABLATION performed by Andre Hendrix MD at [address] [place] [address] 2019    EGD POLYP SNARE performed by Andre Hendrix MD at [address] [place] [address] N/A 7/15/2020    ESOPHAGOGASTRODUODENOSCOPY RADIOFREQUENCY ABLATION ON STANDBY performed by Andre Hendrix MD at [address] [place] [address] N/A 7/15/2020    EGD BIOPSY performed by Andre Hendrix MD at [address] [place] [address] History:    Social History     Tobacco Use    Smoking status: Former Smoker     Packs/day: 0.50     Years: 10.00     Pack years: 5.00     Types: Cigarettes     Quit date: 2002     Years since quittin.5    Smokeless tobacco: Never Used   Substance Use Topics    Alcohol use: Not Currently     Alcohol/week: 2.0 standard drinks     Types: 2 Standard drinks or equivalent per week     Comment: rarely                                Counseling given: Not Answered      Vital Signs (Current): There were no vitals filed for this visit. BP Readings from Last 3 Encounters:   02/02/21 100/68   08/24/20 114/70   07/15/20 112/77       NPO Status: Time of last liquid consumption: 0800 (with meds)                        Time of last solid consumption: 1900                        Date of last liquid consumption: 07/16/21                        Date of last solid food consumption: 07/15/21    BMI:   Wt Readings from Last 3 Encounters:   02/02/21 275 lb (124.7 kg)   08/24/20 278 lb (126.1 kg)   07/15/20 265 lb (120.2 kg)     There is no height or weight on file to calculate BMI.    CBC:   Lab Results   Component Value Date    WBC 6.6 11/11/2020    RBC 4.76 11/11/2020    HGB 13.8 11/11/2020    HCT 40.5 11/11/2020    MCV 85.1 11/11/2020    RDW 14.9 11/11/2020     11/11/2020       CMP:   Lab Results   Component Value Date     11/11/2020    K 4.4 11/11/2020    K 4.6 07/23/2018     11/11/2020    CO2 23 11/11/2020    BUN 18 11/11/2020    CREATININE 0.9 11/11/2020    GFRAA >60 11/11/2020    GFRAA >60 04/30/2013    AGRATIO 1.7 11/11/2020    LABGLOM >60 11/11/2020    GLUCOSE 94 11/11/2020    PROT 6.9 11/11/2020    PROT 7.0 07/17/2012    CALCIUM 9.2 11/11/2020    BILITOT 0.5 11/11/2020    ALKPHOS 78 11/11/2020    AST 20 11/11/2020    ALT 26 11/11/2020       POC Tests: No results for input(s): POCGLU, POCNA, POCK, POCCL, POCBUN, POCHEMO, POCHCT in the last 72 hours.     Coags:   Lab Results   Component Value Date    PROTIME 11.6 07/18/2018    INR 1.02 07/18/2018       HCG (If Applicable): No results found for: PREGTESTUR, PREGSERUM, HCG, HCGQUANT     ABGs: No results found for: PHART, PO2ART, PUU0GQZ, JWM7KKY, BEART, Q7VXMEGG     Type & Screen (If Applicable):  No results found for: LABABO, LABRH    Drug/Infectious Status (If Applicable):  No results found for: HIV, HEPCAB    COVID-19 Screening (If Applicable):   Lab Results   Component Value Date    COVID19 NOT DETECTED 02/04/2021           Anesthesia Evaluation  Patient summary reviewed and Nursing notes reviewed no history of anesthetic complications:   Airway: Mallampati: II  TM distance: >3 FB   Neck ROM: full  Mouth opening: > = 3 FB Dental: normal exam         Pulmonary:   (+) sleep apnea: on CPAP,      (-) not a current smoker                           Cardiovascular:    (+) hypertension:,     (-) murmur      Rhythm: regular  Rate: normal                    Neuro/Psych:               GI/Hepatic/Renal:   (+) morbid obesity         ROS comment: Child's esophagus. Endo/Other:                     Abdominal:             Vascular: Other Findings:             Anesthesia Plan      MAC     ASA 3       Induction: intravenous. MIPS: Prophylactic antiemetics administered. Anesthetic plan and risks discussed with patient. Plan discussed with CRNA.                   Paolo Wheeler DO   7/16/2021

## 2021-07-16 NOTE — ANESTHESIA POSTPROCEDURE EVALUATION
Department of Anesthesiology  Postprocedure Note    Patient: Rashawn Sandoval MRN: [id number]  YOB: 1950  Date of evaluation: 7/16/2021  Time:  12:58 PM     Procedure Summary     Date: 07/16/21 Room / Location: [address] [place] [address] Surendra Lord 03 / [facility]    Anesthesia Start: 7718 Anesthesia Stop: 0496    Procedures:       EGD BIOPSY (N/A )      EGD POLYP SNARE (N/A ) Diagnosis:       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      (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 [K22.70])    Surgeons: Neela Hogan MD Responsible Provider: Ashlie Rachel DO    Anesthesia Type: MAC ASA Status: 3          Anesthesia Type: MAC    Kait Phase I: Kait Score: 10    Kait Phase II:      Last vitals: Reviewed and per EMR flowsheets.        Anesthesia Post Evaluation    Patient location during evaluation: PACU  Patient participation: complete - patient participated  Level of consciousness: awake and alert  Airway patency: patent  Nausea & Vomiting: no nausea and no vomiting  Cardiovascular status: blood pressure returned to baseline  Respiratory status: acceptable  Hydration status: euvolemic

## 2021-07-16 NOTE — PROGRESS NOTES
Ambulatory Surgery/Procedure Discharge Note    Pt tolerated procedure well. Denies any pain or nausea post procedure. Discharge instructions reviewed with pt and wife. Verbalize understanding. Written instructions provided at discharge. Discharged in wheelchair to lobby level. Wife to drive home. Vitals:    07/16/21 1247   BP: 129/84   Pulse: 69   Resp: 18   Temp:    SpO2: 98%       In: 540 [P.O.:240; I.V.:300]  Out: -     Restroom use offered before discharge. Yes    Pain assessment:  none  Pain Level: 0        Patient discharged to home/self care.  Patient discharged via wheel chair by transporter to waiting family/S.O.       7/16/2021 12:54 PM

## 2021-08-25 ENCOUNTER — OFFICE VISIT (OUTPATIENT)
Dept: INTERNAL MEDICINE CLINIC | Age: 71
End: 2021-08-25
Payer: MEDICARE

## 2021-08-25 VITALS
BODY MASS INDEX: 36.45 KG/M2 | RESPIRATION RATE: 16 BRPM | DIASTOLIC BLOOD PRESSURE: 78 MMHG | HEART RATE: 87 BPM | OXYGEN SATURATION: 98 % | SYSTOLIC BLOOD PRESSURE: 130 MMHG | WEIGHT: 284 LBS | HEIGHT: 74 IN

## 2021-08-25 DIAGNOSIS — E78.2 MIXED HYPERLIPIDEMIA: ICD-10-CM

## 2021-08-25 DIAGNOSIS — R79.89 ABNORMAL TSH: Primary | ICD-10-CM

## 2021-08-25 DIAGNOSIS — G91.2 NPH (NORMAL PRESSURE HYDROCEPHALUS) (HCC): ICD-10-CM

## 2021-08-25 DIAGNOSIS — E66.01 SEVERE OBESITY (BMI 35.0-35.9 WITH COMORBIDITY) (HCC): ICD-10-CM

## 2021-08-25 DIAGNOSIS — K22.70 BARRETT'S ESOPHAGUS WITHOUT DYSPLASIA: ICD-10-CM

## 2021-08-25 DIAGNOSIS — R73.03 PRE-DIABETES: ICD-10-CM

## 2021-08-25 DIAGNOSIS — G62.9 NEUROPATHY: ICD-10-CM

## 2021-08-25 PROCEDURE — 99204 OFFICE O/P NEW MOD 45 MIN: CPT | Performed by: INTERNAL MEDICINE

## 2021-08-25 PROCEDURE — 3017F COLORECTAL CA SCREEN DOC REV: CPT | Performed by: INTERNAL MEDICINE

## 2021-08-25 PROCEDURE — G8417 CALC BMI ABV UP PARAM F/U: HCPCS | Performed by: INTERNAL MEDICINE

## 2021-08-25 PROCEDURE — 1123F ACP DISCUSS/DSCN MKR DOCD: CPT | Performed by: INTERNAL MEDICINE

## 2021-08-25 PROCEDURE — 4040F PNEUMOC VAC/ADMIN/RCVD: CPT | Performed by: INTERNAL MEDICINE

## 2021-08-25 PROCEDURE — 1036F TOBACCO NON-USER: CPT | Performed by: INTERNAL MEDICINE

## 2021-08-25 PROCEDURE — G8428 CUR MEDS NOT DOCUMENT: HCPCS | Performed by: INTERNAL MEDICINE

## 2021-08-25 RX ORDER — GABAPENTIN 100 MG/1
CAPSULE ORAL
Qty: 270 CAPSULE | Refills: 3 | Status: SHIPPED | OUTPATIENT
Start: 2021-08-25 | End: 2022-04-04 | Stop reason: ALTCHOICE

## 2021-08-25 SDOH — HEALTH STABILITY: PHYSICAL HEALTH: ON AVERAGE, HOW MANY MINUTES DO YOU ENGAGE IN EXERCISE AT THIS LEVEL?: 30 MIN

## 2021-08-25 SDOH — ECONOMIC STABILITY: HOUSING INSECURITY
IN THE LAST 12 MONTHS, WAS THERE A TIME WHEN YOU DID NOT HAVE A STEADY PLACE TO SLEEP OR SLEPT IN A SHELTER (INCLUDING NOW)?: NO

## 2021-08-25 SDOH — ECONOMIC STABILITY: FOOD INSECURITY: WITHIN THE PAST 12 MONTHS, YOU WORRIED THAT YOUR FOOD WOULD RUN OUT BEFORE YOU GOT MONEY TO BUY MORE.: NEVER TRUE

## 2021-08-25 SDOH — ECONOMIC STABILITY: INCOME INSECURITY: IN THE LAST 12 MONTHS, WAS THERE A TIME WHEN YOU WERE NOT ABLE TO PAY THE MORTGAGE OR RENT ON TIME?: NO

## 2021-08-25 SDOH — HEALTH STABILITY: PHYSICAL HEALTH: ON AVERAGE, HOW MANY DAYS PER WEEK DO YOU ENGAGE IN MODERATE TO STRENUOUS EXERCISE (LIKE A BRISK WALK)?: 5 DAYS

## 2021-08-25 SDOH — ECONOMIC STABILITY: FOOD INSECURITY: WITHIN THE PAST 12 MONTHS, THE FOOD YOU BOUGHT JUST DIDN'T LAST AND YOU DIDN'T HAVE MONEY TO GET MORE.: NEVER TRUE

## 2021-08-25 ASSESSMENT — SOCIAL DETERMINANTS OF HEALTH (SDOH)
WITHIN THE LAST YEAR, HAVE YOU BEEN HUMILIATED OR EMOTIONALLY ABUSED IN OTHER WAYS BY YOUR PARTNER OR EX-PARTNER?: NO
DO YOU BELONG TO ANY CLUBS OR ORGANIZATIONS SUCH AS CHURCH GROUPS UNIONS, FRATERNAL OR ATHLETIC GROUPS, OR SCHOOL GROUPS?: NO
HOW HARD IS IT FOR YOU TO PAY FOR THE VERY BASICS LIKE FOOD, HOUSING, MEDICAL CARE, AND HEATING?: NOT HARD AT ALL
HOW OFTEN DO YOU ATTEND CHURCH OR RELIGIOUS SERVICES?: NEVER
HOW OFTEN DO YOU ATTENT MEETINGS OF THE CLUB OR ORGANIZATION YOU BELONG TO?: NEVER
HOW OFTEN DO YOU GET TOGETHER WITH FRIENDS OR RELATIVES?: THREE TIMES A WEEK
WITHIN THE LAST YEAR, HAVE TO BEEN RAPED OR FORCED TO HAVE ANY KIND OF SEXUAL ACTIVITY BY YOUR PARTNER OR EX-PARTNER?: NO
WITHIN THE LAST YEAR, HAVE YOU BEEN AFRAID OF YOUR PARTNER OR EX-PARTNER?: NO
WITHIN THE LAST YEAR, HAVE YOU BEEN KICKED, HIT, SLAPPED, OR OTHERWISE PHYSICALLY HURT BY YOUR PARTNER OR EX-PARTNER?: NO
IN A TYPICAL WEEK, HOW MANY TIMES DO YOU TALK ON THE PHONE WITH FAMILY, FRIENDS, OR NEIGHBORS?: THREE TIMES A WEEK

## 2021-08-25 ASSESSMENT — LIFESTYLE VARIABLES: HOW OFTEN DO YOU HAVE A DRINK CONTAINING ALCOHOL: NEVER

## 2021-08-25 NOTE — PROGRESS NOTES
Outpatient Medications Marked as Taking for the 8/25/21 encounter (Office Visit) with Barry Wichita, DO   Medication Sig Dispense Refill    gabapentin (NEURONTIN) 100 MG capsule Take 3 tablets in the morning and 1 tablet mid afternoon. 270 capsule 3    mometasone-formoterol (DULERA) 200-5 MCG/ACT inhaler Inhale 2 puffs into the lungs every 12 hours 3 Inhaler 3    oxybutynin (DITROPAN) 5 MG tablet       pantoprazole (PROTONIX) 20 MG tablet Take 1 tablet by mouth 2 times daily 180 tablet 2    atorvastatin (LIPITOR) 10 MG tablet TAKE 1 TABLET DAILY 90 tablet 3    cetirizine (ZYRTEC) 10 MG tablet TAKE 1 TABLET DAILY 90 tablet 3    meloxicam (MOBIC) 15 MG tablet TAKE ONE TABLET BY MOUTH ONCE DAILY      montelukast (SINGULAIR) 10 MG tablet Take 1 tablet by mouth nightly 90 tablet 3    memantine (NAMENDA) 10 MG tablet Take 1 tablet by mouth 2 times daily 180 tablet 3    aspirin 81 MG chewable tablet Take 81 mg by mouth daily      finasteride (PROSCAR) 5 MG tablet Take 1 tablet by mouth daily 90 tablet 3    donepezil (ARICEPT) 10 MG tablet Take 10 mg by mouth daily bedtime      tamsulosin (FLOMAX) 0.4 MG capsule Take 2 capsules by mouth daily 90 capsule 1    Omega-3 Fatty Acids (FISH OIL) 1000 MG CAPS Take 1,000 mg by mouth daily.  Flaxseed, Linseed, (FLAX SEED OIL) 1000 MG CAPS Take 1,000 mg by mouth daily.  Cholecalciferol (VITAMIN D) 2000 UNITS CAPS capsule Take 1 capsule by mouth daily.  multivitamin (THERAGRAN) per tablet Take 1 tablet by mouth daily.           Allergies   Allergen Reactions    Lisinopril Other (See Comments)     cough       Past Medical History:   Diagnosis Date    Acute sinusitis 1/18/2021    Allergic rhinitis     Arthritis     R thumb, low back    Asthma     Erectile dysfunction     Hearing loss     Hyperlipidemia     Hypertension     Kidney failure 07/2018    Normal pressure hydrocephalus ([address] 07/2018    shunt placement    Screening for abdominal aortic aneurysm (AAA) [facility] 03/02/2018    [facility]    Tinnitus     Unspecified sleep apnea        Past Surgical History:   Procedure Laterality Date    COLONOSCOPY  12/14/2018    COLONOSCOPY WITH BIOPSY performed by Rolene Canavan, MD at [facility] ENTEROSCOPY N/A 1/4/2019    ENTEROSCOPY PUSH BIOPSY performed by Rolene Canavan, MD at [facility] Left     lipoma    JOINT REPLACEMENT Left 2015    PARTIAL KNEE REPLACMENT    LAPAROSCOPY N/A 7/18/2018    OPENING AND CLOSING FOR VENTRICULAR PERITONEAL SHUNT performed by Shawn Anderson MD at [address] [place], [place] [address]y 20 07/18/2018    [address]; (N/A )    CT CREATE SHUNT:VENTRIC-PERITONEAL N/A 7/18/2018    VENTRICULAR PERITONEAL SHUNT INSERTION RIGHT SIDE; performed by Ewa Faustin MD at [place] [address] 12/14/2018    EGD BIOPSY performed by Rolene Canavan, MD at [facility] N/A 3/1/2019    ESOPHAGOGASTRODUODENOSCOPY WITH RADIOFREQUENCY  ABLATION/ ABLATIONS X19 performed by Rolene Canavan, MD at [facility] N/A 4/24/2019    ESOPHAGOGASTRODUODENOSCOPY WITH RADIOFREQUENCY ABLATION performed by Rolene Canavan, MD at [facility] 4/24/2019    EGD BIOPSY performed by Rolene Canavan, MD at [address] [place] [address],Third Floor N/A 8/2/2019    ESOPHAGOGASTRODUODENOSCOPY RADIOFREQUENCY ABLATION performed by Rolene Canavan, MD at [facility] N/A 8/2/2019    EGD GASTRIC BIOPSY performed by Rolene Canavan, MD at [facility] N/A 8/2/2019    EGD POLYP COLD SNARE performed by Rolene Canavan, MD at [facility] N/A 11/19/2019    ESOPHAGOGASTRODUODENOSCOPY WITH RADIOFREQUENCY ABLATION performed by Rolene Canavan, MD at [place] [address] GASTROINTESTINAL ENDOSCOPY N/A 2019    EGD POLYP SNARE performed by Payal Myers MD at [address] [place] [address] N/A 7/15/2020    ESOPHAGOGASTRODUODENOSCOPY RADIOFREQUENCY ABLATION ON STANDBY performed by Payal Myers MD at [address] [place] [address] N/A 7/15/2020    EGD BIOPSY performed by Payal Myers MD at [address] [place] [address] N/A 2021    EGD BIOPSY performed by Payal Myers MD at [address] [place] [address] N/A 2021    EGD POLYP SNARE performed by Payal Myers MD at [address] [place] [address] Marital status:      Spouse name: Not on file    Number of children: Not on file    Years of education: Not on file    Highest education level: Not on file   Occupational History    Not on file   Tobacco Use    Smoking status: Former Smoker     Packs/day: 0.50     Years: 10.00     Pack years: 5.00     Types: Cigarettes     Quit date: 2002     Years since quittin.6    Smokeless tobacco: Never Used   Vaping Use    Vaping Use: Never used   Substance and Sexual Activity    Alcohol use: Not Currently     Alcohol/week: 2.0 standard drinks     Types: 2 Standard drinks or equivalent per week     Comment: rarely    Drug use: No    Sexual activity: Not on file   Other Topics Concern    Not on file   Social History Narrative    Not on file     Social Determinants of Health     Financial Resource Strain: Low Risk     Difficulty of Paying Living Expenses: Not hard at all   Food Insecurity: No Food Insecurity    Worried About [address] [place] [address] in the Last Year: Never true    Ernst of Food in the Last Year: Never true   Transportation Needs: No Transportation Needs    Lack of Transportation (Medical): No    Lack of Transportation (Non-Medical):  No   Physical Activity: Sufficiently Active    Days of Exercise per Week: 5 days    Minutes of Exercise per Session: 30 min   Stress: No Stress Concern Present    Feeling of Stress : Not at all   Social Connections: Moderately Isolated    Frequency of Communication with Friends and Family: Three times a week    Frequency of Social Gatherings with Friends and Family: Three times a week    Attends Jainism Services: Never    Active Member of Clubs or Organizations: No    Attends Club or Organization Meetings: Never    Marital Status:    Intimate Partner Violence: Not At Risk    Fear of Current or Ex-Partner: No    Emotionally Abused: No    Physically Abused: No    Sexually Abused: No        Family History   Problem Relation Age of Onset    Cancer Father         prostate         OBJECTIVE:    Vitals:    08/25/21 1038   BP: 130/78   Pulse: 87   Resp: 16   SpO2: 98%   Weight: 284 lb (128.8 kg)   Height: 6' 2\" (1.88 m)     Body mass index is 36.46 kg/m². Physical Exam  Constitutional:       General: He is not in acute distress. Appearance: Normal appearance. He is not ill-appearing. HENT:      Head: Normocephalic and atraumatic. Right Ear: External ear normal.      Left Ear: External ear normal.   Eyes:      General: No scleral icterus. Extraocular Movements: Extraocular movements intact. Conjunctiva/sclera: Conjunctivae normal.   Cardiovascular:      Rate and Rhythm: Normal rate and regular rhythm. Pulses: Normal pulses. Heart sounds: No murmur heard. No friction rub. No gallop. Pulmonary:      Effort: Pulmonary effort is normal. No respiratory distress. Breath sounds: No wheezing, rhonchi or rales. Abdominal:      General: Bowel sounds are normal. There is no distension. Palpations: Abdomen is soft. There is no mass. Tenderness: There is no abdominal tenderness. There is no guarding or rebound. Musculoskeletal:      Cervical back: Normal range of motion. Right lower leg: Edema present. Left lower leg: Edema present.    Skin: General: Skin is warm. Findings: No erythema or rash. Neurological:      General: No focal deficit present. Mental Status: He is alert and oriented to person, place, and time. Psychiatric:         Mood and Affect: Mood normal.         Behavior: Behavior normal.         ASSESSMENT/PLAN:  1. Abnormal TSH  TSH elevated on labs from July. Will repeat TSH today  - Comprehensive Metabolic Panel; Future  - TSH with Reflex; Future    2. NPH (normal pressure hydrocephalus) ([facility] [address]  Patient diagnosed with NPH years ago. Patient does have shunt in place. Patient continues to have symptoms of dementia. He is also having symptoms of neuropathy and abnormal behaviors. Patient following closely with neurology  - I have asked patient and family to reach out to neurology as patient had a lot of testing in July and has not heard back, patient likely needs sooner follow up than November as symptoms are progressing.  -Also possible diagnosis of early onset Alzheimer's on donepezil and Namenda per neurology    3. Neuropathy  Patient with worsening neuropathy in the lower extremities bilaterally. Patient has been on Neurontin 300 mg every morning for some time.  -Increase to 300 mg in the morning and 100 mg midafternoon.    - gabapentin (NEURONTIN) 100 MG capsule; Take 3 tablets in the morning and 1 tablet mid afternoon. Dispense: 270 capsule; Refill: 3    4. Severe obesity (BMI 35.0-35.9 with comorbidity) ([facility])  BMI 36.46. lipid panel completed in 2011 shows hypertriglyceridemia (unknown if fasting). Continue lipitor. Hemoglobin a1c 6.0 in November. Pre-DM. Continue to monitor. 5. 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  Patient receiving treatment by GI    6. Mixed hyperlipidemia  Continue lipitor    7. Pre-DM  Hemoglobin a1c in November 2020 was 6.0.   - check CMP    8.  Venous insufficiency  Swelling in bilateral lower extremities, likely 2/2 venous insufficiency patient has had echo in the past that was not significant. - patient to wear compression stockings. Return in about 3 months (around 11/25/2021) for AWV and chronic disease .      The DO Vannessa

## 2021-08-26 PROBLEM — R73.03 PRE-DIABETES: Status: ACTIVE | Noted: 2021-08-26

## 2021-08-26 PROBLEM — G62.9 NEUROPATHY: Status: ACTIVE | Noted: 2021-08-26

## 2021-08-27 ENCOUNTER — PATIENT MESSAGE (OUTPATIENT)
Dept: INTERNAL MEDICINE CLINIC | Age: 71
End: 2021-08-27

## 2021-08-27 NOTE — TELEPHONE ENCOUNTER
From: Spencer Kim. To: Carla Hernandez DO  Sent: 8/27/2021 12:26 PM EDT  Subject: Prescription Question    [facility] has a problem executing then order. something with your info. They claimed to havee tried to get a hold of you, but were unable to. They request give them a call back at [phone]. Thanks for the great visit, the other day! Have a Blessed Weekend.    [facility]

## 2021-09-04 ENCOUNTER — PATIENT MESSAGE (OUTPATIENT)
Dept: INTERNAL MEDICINE CLINIC | Age: 71
End: 2021-09-04

## 2021-09-07 NOTE — TELEPHONE ENCOUNTER
From: Sumi Quiroz. To: Romel Hooks DO  Sent: 9/4/2021 9:41 AM EDT  Subject: Prescription Question    Please send a note to [facility]. There are no more refills left on my Script. It's time to renew my Prescription for Cetirizine Tabs,10mg, 90 pills. Thank you very much!     Sincerely, Ewa Palomo

## 2021-09-08 RX ORDER — CETIRIZINE HYDROCHLORIDE 10 MG/1
TABLET ORAL
Qty: 90 TABLET | Refills: 3 | Status: SHIPPED | OUTPATIENT
Start: 2021-09-08 | End: 2021-12-09 | Stop reason: SDUPTHER

## 2021-09-13 ENCOUNTER — TELEPHONE (OUTPATIENT)
Dept: INTERNAL MEDICINE CLINIC | Age: 71
End: 2021-09-13

## 2021-09-13 NOTE — TELEPHONE ENCOUNTER
Spoke with neurology resident Teresita Oneil at [facility] about patient. She had seen patient in the neurology clinic at [facility]. She and her attending had done a complete work up on patient symptoms. LP was not very revealing. She also sent patient to neurosurgery who felt patients shunt was working well. Patient also had a specific PET to look at neurocognitive function which came back normal. Patient is scheduled for the neurocognitive clinic in November for further evaluation.      The DO Vannessa

## 2021-11-08 ENCOUNTER — OFFICE VISIT (OUTPATIENT)
Dept: ENT CLINIC | Age: 71
End: 2021-11-08
Payer: MEDICARE

## 2021-11-08 VITALS
HEART RATE: 95 BPM | DIASTOLIC BLOOD PRESSURE: 75 MMHG | HEIGHT: 74 IN | WEIGHT: 280.2 LBS | BODY MASS INDEX: 35.96 KG/M2 | SYSTOLIC BLOOD PRESSURE: 130 MMHG | TEMPERATURE: 97.9 F

## 2021-11-08 DIAGNOSIS — J34.2 DEVIATED NASAL SEPTUM: ICD-10-CM

## 2021-11-08 DIAGNOSIS — J34.89 NASAL OBSTRUCTION: Primary | ICD-10-CM

## 2021-11-08 DIAGNOSIS — J34.3 HYPERTROPHY OF NASAL TURBINATES: ICD-10-CM

## 2021-11-08 PROCEDURE — G8417 CALC BMI ABV UP PARAM F/U: HCPCS | Performed by: OTOLARYNGOLOGY

## 2021-11-08 PROCEDURE — 3017F COLORECTAL CA SCREEN DOC REV: CPT | Performed by: OTOLARYNGOLOGY

## 2021-11-08 PROCEDURE — G8427 DOCREV CUR MEDS BY ELIG CLIN: HCPCS | Performed by: OTOLARYNGOLOGY

## 2021-11-08 PROCEDURE — G8484 FLU IMMUNIZE NO ADMIN: HCPCS | Performed by: OTOLARYNGOLOGY

## 2021-11-08 PROCEDURE — 1123F ACP DISCUSS/DSCN MKR DOCD: CPT | Performed by: OTOLARYNGOLOGY

## 2021-11-08 PROCEDURE — 4040F PNEUMOC VAC/ADMIN/RCVD: CPT | Performed by: OTOLARYNGOLOGY

## 2021-11-08 PROCEDURE — 1036F TOBACCO NON-USER: CPT | Performed by: OTOLARYNGOLOGY

## 2021-11-08 PROCEDURE — 99214 OFFICE O/P EST MOD 30 MIN: CPT | Performed by: OTOLARYNGOLOGY

## 2021-11-08 NOTE — PROGRESS NOTES
FOLLOW UP VISIT:    CHIEF COMPLAINT: Nasal obstruction    INTERIM HISTORY: Seen 17 months ago with wheezing. Also had a globus sensation and the need to clear the throat frequently. Had a nocturnal cough and when he first woke up in the morning which is productive of clear mucus. The patient had normal pulmonary function test and a negative chest x-ray prior to our visit. And then on a proton pump inhibitor and I asked him to double it up to see if that improved his symptoms. Today the patient is here because he had an MRI of his head and a work-up for mental status changes and was noted incidentally to have a full left antrum. The patient has no facial pain. He does describe nasal obstruction which is bilateral, the left is worse than the right. There is no rhinorrhea. PAST MEDICAL HISTORY:   Social History     Tobacco Use   Smoking Status Former Smoker    Packs/day: 0.50    Years: 10.00    Pack years: 5.00    Types: Cigarettes    Quit date: 2002    Years since quittin.8   Smokeless Tobacco Never Used                                                    Social History     Substance and Sexual Activity   Alcohol Use Not Currently    Alcohol/week: 2.0 standard drinks    Types: 2 Standard drinks or equivalent per week    Comment: rarely                                                    Current Outpatient Medications:     cetirizine (ZYRTEC) 10 MG tablet, TAKE 1 TABLET DAILY, Disp: 90 tablet, Rfl: 3    gabapentin (NEURONTIN) 100 MG capsule, Take 3 tablets in the morning and 1 tablet mid afternoon. , Disp: 270 capsule, Rfl: 3    mometasone-formoterol (DULERA) 200-5 MCG/ACT inhaler, Inhale 2 puffs into the lungs every 12 hours, Disp: 3 Inhaler, Rfl: 3    oxybutynin (DITROPAN) 5 MG tablet, , Disp: , Rfl:     pantoprazole (PROTONIX) 20 MG tablet, Take 1 tablet by mouth 2 times daily, Disp: 180 tablet, Rfl: 2    atorvastatin (LIPITOR) 10 MG tablet, TAKE 1 TABLET DAILY, Disp: 90 tablet, Rfl: 3    meloxicam (MOBIC) 15 MG tablet, TAKE ONE TABLET BY MOUTH ONCE DAILY, Disp: , Rfl:     montelukast (SINGULAIR) 10 MG tablet, Take 1 tablet by mouth nightly, Disp: 90 tablet, Rfl: 3    memantine (NAMENDA) 10 MG tablet, Take 1 tablet by mouth 2 times daily, Disp: 180 tablet, Rfl: 3    aspirin 81 MG chewable tablet, Take 81 mg by mouth daily, Disp: , Rfl:     finasteride (PROSCAR) 5 MG tablet, Take 1 tablet by mouth daily, Disp: 90 tablet, Rfl: 3    donepezil (ARICEPT) 10 MG tablet, Take 10 mg by mouth daily bedtime, Disp: , Rfl:     tamsulosin (FLOMAX) 0.4 MG capsule, Take 2 capsules by mouth daily, Disp: 90 capsule, Rfl: 1    Omega-3 Fatty Acids (FISH OIL) 1000 MG CAPS, Take 1,000 mg by mouth daily. , Disp: , Rfl:     Flaxseed, Linseed, (FLAX SEED OIL) 1000 MG CAPS, Take 1,000 mg by mouth daily. , Disp: , Rfl:     Cholecalciferol (VITAMIN D) 2000 UNITS CAPS capsule, Take 1 capsule by mouth daily. , Disp: , Rfl:     multivitamin (THERAGRAN) per tablet, Take 1 tablet by mouth daily. , Disp: , Rfl:                                                  Past Medical History:   Diagnosis Date    Acute sinusitis 1/18/2021    Allergic rhinitis     Arthritis     R thumb, low back    Asthma     Erectile dysfunction     Hearing loss     Hyperlipidemia     Hypertension     Kidney failure 07/2018    Normal pressure hydro[facility]us ([facility] [address] 07/2018    shunt placement    Screening for abdominal aortic aneurysm (AAA) performed 03/02/2018    [address]    Tinnitus     Unspecified sleep apnea                                                     Past Surgical History:   Procedure Laterality Date    COLONOSCOPY  12/14/2018    COLONOSCOPY WITH BIOPSY performed by Ronnell Jules MD at [facility] ENTEROSCOPY N/A 1/4/2019    ENTEROSCOPY PUSH BIOPSY performed by Ronnell Jules MD at [facility] [address] Left     lipoma    JOINT REPLACEMENT Left 2015    PARTIAL KNEE REPLACMENT    LAPAROSCOPY N/A 7/18/2018 OPENING AND CLOSING FOR VENTRICULAR PERITONEAL SHUNT performed by Paty Delacruz MD at [address] 07/18/2018     VENTRICULAR PERITONEAL SHUNT INSERTION RIGHT SIDE; (N/A )    [place] CREATE SHUNT:VENTRIC-PERITONEAL N/A 7/18/2018    VENTRICULAR PERITONEAL SHUNT INSERTION RIGHT SIDE; performed by Jovita Crane MD at [address] 12/14/2018    EGD BIOPSY performed by Abdulaziz Gilman MD at [facility] N/ 3/1/2019    ESOPHAGOGASTRODUODENOSCOPY WITH RADIOFREQUENCY  ABLATION/ ABLATIONS X19 performed by Abdulaziz Gilman MD at [facility] N/A 4/24/2019    ESOPHAGOGASTRODUODENOSCOPY WITH RADIOFREQUENCY ABLATION performed by Abdulaziz Gilman MD at [facility] 4/24/2019    EGD BIOPSY performed by Abdulaziz Gilman MD at [facility] 8/2/2019    ESOPHAGOGASTRODUODENOSCOPY RADIOFREQUENCY ABLATION performed by Abdulaziz Gilman MD at [facility] 8/2/2019    EGD GASTRIC BIOPSY performed by Abdulaziz Gilman MD at [facility] 8/2/2019    EGD POLYP COLD SNARE performed by Abdulaziz Gilman MD at [facility] N/A 11/19/2019    ESOPHAGOGASTRODUODENOSCOPY WITH RADIOFREQUENCY ABLATION performed by Abdulaziz Gilman MD at [facility] 11/19/2019    EGD POLYP SNARE performed by Abdulaziz Gilman MD at [facility] 7/15/2020    ESOPHAGOGASTRODUODENOSCOPY RADIOFREQUENCY ABLATION ON STANDBY performed by Abdulaziz Gilman MD at [facility] 7/15/2020    EGD BIOPSY performed by Abdulaziz Gilman MD at [facility] 7/16/2021    EGD BIOPSY performed by Abdulaziz Gilman MD at [facility] ENDOSCOPY    UPPER GASTROINTESTINAL ENDOSCOPY N/A 7/16/2021    EGD POLYP SNARE performed by Abdulaziz Gilman MD at [address] [facility] [address]: Family history reviewed and except as pertinent to the interim history is not contributory. REVIEW OF SYSTEMS:  All pertinent positive and negative findings included in HPI. Otherwise, all other systems are reviewed and are negative    PHYSICAL EXAMINATION:   GENERAL: wdwn- no acute distress  RESPIRATORY: No stridor. COMMUNICATION :  Normal voice  MENTAL STATUS: Alert and oriented x3  HEAD AND FACE: No skin lesions of the face. EXTERNAL EARS AND NOSE: The external ears and nasal pyramid are normal.  FACIAL MUSCLES: All branches of the facial nerve intact. FACE PALPATION: Zygomatic arches and orbital rims are intact. Palpation of the left antrum causes no discomfort. Danii Choe EXTRAOCULAR MUSCLES: Intact with full range of motion. OTOSCOPY:  Normal tympanic membranes, middle ear spaces, and external auditory canals. TUNING FORKS:  Rinne ++ Velez midline at 512 Hz  INTRANASAL:  Septum to left, abuts the lateral wall of the nose. Right inferior turbinate is hypertrophic., turbinates normal, meati clear. LIPS, TEETH, GINGIVA:  Normal mucosa  PHARYNX:  Normal  NECK:  No masses. LYMPH NODES: No cervical lymphadenopathy. SALIVARY GLANDS: Parotid and submandibular glands normal.  THYROID: No goiter or thyroid nodules palpable. MRI REVIEWED: Full left antrum. IMPRESSION: Nasal obstruction due to deviated septum and turbinate hypertrophy. MRI evidence of a full left antrum. PLAN: CT imaging of the facial bones ordered in order to better delineate the extent of disease in the left antrum. FOLLOW-UP: After imaging.

## 2021-11-09 ENCOUNTER — HOSPITAL ENCOUNTER (OUTPATIENT)
Dept: CT IMAGING | Age: 71
Discharge: HOME | End: 2021-11-09
Payer: MEDICARE

## 2021-11-09 DIAGNOSIS — J34.3 HYPERTROPHY OF NASAL TURBINATES: ICD-10-CM

## 2021-11-09 DIAGNOSIS — J34.2 DEVIATED NASAL SEPTUM: ICD-10-CM

## 2021-11-09 DIAGNOSIS — J34.89 NASAL OBSTRUCTION: ICD-10-CM

## 2021-11-09 PROCEDURE — 70486 CT MAXILLOFACIAL W/O DYE: CPT

## 2021-11-12 ENCOUNTER — TELEPHONE (OUTPATIENT)
Dept: ENT CLINIC | Age: 71
End: 2021-11-12

## 2021-11-12 ENCOUNTER — PATIENT MESSAGE (OUTPATIENT)
Dept: INTERNAL MEDICINE CLINIC | Age: 71
End: 2021-11-12

## 2021-11-12 NOTE — TELEPHONE ENCOUNTER
From: Liya Escudero. To: Dr. Esther Duque: 11/12/2021 12:04 PM EST  Subject: renew prescription    Good morning Dr. Valentin Bloom! Could you call in a new prescription to [facility] for me- Atorvastatin, 10mg, 90day supply. [facility] # is [phone]. Thank you very much, and I hope you have a wonderful weekend!   Sincerely, Ashley Schwartz

## 2021-11-15 RX ORDER — ATORVASTATIN CALCIUM 10 MG/1
TABLET, FILM COATED ORAL
Qty: 90 TABLET | Refills: 3 | Status: SHIPPED | OUTPATIENT
Start: 2021-11-15

## 2021-11-16 NOTE — PLAN OF CARE
Problem: Falls - Risk of:  Goal: Will remain free from falls  Will remain free from falls   Outcome: Ongoing  Patient will remain free of falls throughout the duration of the shift. Bed locked in lowest position. Non skid socks on. Bed and chair alarm activated. Call light and belongings within reach. Patient calls out approprietly. ADAM elizabeth x2 for transfers. Will continue to monitor. Problem: Pain:  Goal: Recognizes and communicates pain  Recognizes and communicates pain  Outcome: Ongoing  Patient denies being in any pain at this time. No headache. Will continue to monitor and reassess. Problem: Mobility - Impaired:  Goal: Able to ambulate independently  Able to ambulate independently  Outcome: Ongoing  Patient's mobility is impaired. Patient exhibits weakness to both lower extremities and is very weak upon standing. Patient also tends to lean to the right with sitting straight up and standing. ADAM elizabeth used to get patient up in the chair this afternoon, patient no back in bed. PT worked with patient this afternoon. Will continue to monitor and reassess. Problem: Swallowing - Impaired:  Goal: Absence of aspiration  Absence of aspiration  Outcome: Ongoing  RN noticed s/s of aspiration this AM with thin liquids while administering morning PO medications. SLP consulted per MD and saw patient today. RN following SLP recommendation. RN maintaining upright position with all PO intake. RN educated family on diet recommendations. Supplied thickner at bedside. Will continue to monitor and reassess. Problem: Urinary Elimination - Impaired:  Goal: Reports of episodes of incontinence will decrease - Urinary  Reports of episodes of incontinence will decrease - Urinary  Outcome: Ongoing  Patient is exhibiting urinary incontinence. Patient is being checked and changed routinely. Urinal is at bedside.  RN encouraged patient to call out if the urge to urinate arises and attempt to use the urinal. Zoe care done with each episode of incontinence to protect skin integrity. Will continue to monitor. Abdominal Pain, N/V/D

## 2021-11-24 ASSESSMENT — PATIENT HEALTH QUESTIONNAIRE - PHQ9
2. FEELING DOWN, DEPRESSED OR HOPELESS: 0
SUM OF ALL RESPONSES TO PHQ QUESTIONS 1-9: 1
SUM OF ALL RESPONSES TO PHQ QUESTIONS 1-9: 1
SUM OF ALL RESPONSES TO PHQ9 QUESTIONS 1 & 2: 1
SUM OF ALL RESPONSES TO PHQ QUESTIONS 1-9: 1
1. LITTLE INTEREST OR PLEASURE IN DOING THINGS: 1

## 2021-11-24 ASSESSMENT — LIFESTYLE VARIABLES
HOW OFTEN DO YOU HAVE A DRINK CONTAINING ALCOHOL: 1
AUDIT TOTAL SCORE: 1
HOW MANY STANDARD DRINKS CONTAINING ALCOHOL DO YOU HAVE ON A TYPICAL DAY: ONE OR TWO
HAVE YOU OR SOMEONE ELSE BEEN INJURED AS A RESULT OF YOUR DRINKING: NO
HOW MANY STANDARD DRINKS CONTAINING ALCOHOL DO YOU HAVE ON A TYPICAL DAY: 0
HOW OFTEN DURING THE LAST YEAR HAVE YOU HAD A FEELING OF GUILT OR REMORSE AFTER DRINKING: NEVER
HOW OFTEN DO YOU HAVE SIX OR MORE DRINKS ON ONE OCCASION: NEVER
AUDIT TOTAL SCORE: 0
HOW OFTEN DURING THE LAST YEAR HAVE YOU HAD A FEELING OF GUILT OR REMORSE AFTER DRINKING: 0
HAS A RELATIVE, FRIEND, DOCTOR, OR ANOTHER HEALTH PROFESSIONAL EXPRESSED CONCERN ABOUT YOUR DRINKING OR SUGGESTED YOU CUT DOWN: NO
HOW OFTEN DURING THE LAST YEAR HAVE YOU FAILED TO DO WHAT WAS NORMALLY EXPECTED FROM YOU BECAUSE OF DRINKING: 0
HOW OFTEN DURING THE LAST YEAR HAVE YOU NEEDED AN ALCOHOLIC DRINK FIRST THING IN THE MORNING TO GET YOURSELF GOING AFTER A NIGHT OF HEAVY DRINKING: 0
HOW OFTEN DURING THE LAST YEAR HAVE YOU NEEDED AN ALCOHOLIC DRINK FIRST THING IN THE MORNING TO GET YOURSELF GOING AFTER A NIGHT OF HEAVY DRINKING: NEVER
HOW OFTEN DO YOU HAVE SIX OR MORE DRINKS ON ONE OCCASION: 0
HOW OFTEN DO YOU HAVE A DRINK CONTAINING ALCOHOL: MONTHLY OR LESS
HOW OFTEN DURING THE LAST YEAR HAVE YOU FAILED TO DO WHAT WAS NORMALLY EXPECTED FROM YOU BECAUSE OF DRINKING: NEVER
HAS A RELATIVE, FRIEND, DOCTOR, OR ANOTHER HEALTH PROFESSIONAL EXPRESSED CONCERN ABOUT YOUR DRINKING OR SUGGESTED YOU CUT DOWN: 0
HOW OFTEN DURING THE LAST YEAR HAVE YOU BEEN UNABLE TO REMEMBER WHAT HAPPENED THE NIGHT BEFORE BECAUSE YOU HAD BEEN DRINKING: 0
HAVE YOU OR SOMEONE ELSE BEEN INJURED AS A RESULT OF YOUR DRINKING: 0
HOW OFTEN DURING THE LAST YEAR HAVE YOU FOUND THAT YOU WERE NOT ABLE TO STOP DRINKING ONCE YOU HAD STARTED: NEVER
HOW OFTEN DURING THE LAST YEAR HAVE YOU BEEN UNABLE TO REMEMBER WHAT HAPPENED THE NIGHT BEFORE BECAUSE YOU HAD BEEN DRINKING: NEVER
AUDIT-C TOTAL SCORE: 0
HOW OFTEN DURING THE LAST YEAR HAVE YOU FOUND THAT YOU WERE NOT ABLE TO STOP DRINKING ONCE YOU HAD STARTED: 0
AUDIT-C TOTAL SCORE: 1

## 2021-12-01 ENCOUNTER — OFFICE VISIT (OUTPATIENT)
Dept: INTERNAL MEDICINE CLINIC | Age: 71
End: 2021-12-01
Payer: MEDICARE

## 2021-12-01 VITALS
SYSTOLIC BLOOD PRESSURE: 118 MMHG | RESPIRATION RATE: 16 BRPM | WEIGHT: 279 LBS | BODY MASS INDEX: 34.69 KG/M2 | DIASTOLIC BLOOD PRESSURE: 84 MMHG | HEART RATE: 92 BPM | HEIGHT: 75 IN | OXYGEN SATURATION: 95 %

## 2021-12-01 DIAGNOSIS — Z13.220 SCREENING CHOLESTEROL LEVEL: ICD-10-CM

## 2021-12-01 DIAGNOSIS — M25.561 CHRONIC PAIN OF BOTH KNEES: ICD-10-CM

## 2021-12-01 DIAGNOSIS — R73.03 PREDIABETES: ICD-10-CM

## 2021-12-01 DIAGNOSIS — M25.562 CHRONIC PAIN OF BOTH KNEES: ICD-10-CM

## 2021-12-01 DIAGNOSIS — Z00.00 ROUTINE GENERAL MEDICAL EXAMINATION AT A HEALTH CARE FACILITY: Primary | ICD-10-CM

## 2021-12-01 DIAGNOSIS — G89.29 CHRONIC PAIN OF BOTH KNEES: ICD-10-CM

## 2021-12-01 DIAGNOSIS — Z13.1 SCREENING FOR DIABETES MELLITUS (DM): ICD-10-CM

## 2021-12-01 PROCEDURE — 3017F COLORECTAL CA SCREEN DOC REV: CPT | Performed by: INTERNAL MEDICINE

## 2021-12-01 PROCEDURE — G0439 PPPS, SUBSEQ VISIT: HCPCS | Performed by: INTERNAL MEDICINE

## 2021-12-01 PROCEDURE — 1123F ACP DISCUSS/DSCN MKR DOCD: CPT | Performed by: INTERNAL MEDICINE

## 2021-12-01 PROCEDURE — 4040F PNEUMOC VAC/ADMIN/RCVD: CPT | Performed by: INTERNAL MEDICINE

## 2021-12-01 PROCEDURE — G8484 FLU IMMUNIZE NO ADMIN: HCPCS | Performed by: INTERNAL MEDICINE

## 2021-12-01 RX ORDER — FINASTERIDE 5 MG/1
5 TABLET, FILM COATED ORAL DAILY
Qty: 90 TABLET | Refills: 3 | Status: SHIPPED | OUTPATIENT
Start: 2021-12-01 | End: 2022-06-21 | Stop reason: SDUPTHER

## 2021-12-01 NOTE — PATIENT INSTRUCTIONS
Salonpas lidocaine patches  Voltaren gel      Personalized Preventive Plan for Natalie Ruth. - 12/1/2021  Medicare offers a range of preventive health benefits. Some of the tests and screenings are paid in full while other may be subject to a deductible, co-insurance, and/or copay. Some of these benefits include a comprehensive review of your medical history including lifestyle, illnesses that may run in your family, and various assessments and screenings as appropriate. After reviewing your medical record and screening and assessments performed today your provider may have ordered immunizations, labs, imaging, and/or referrals for you. A list of these orders (if applicable) as well as your Preventive Care list are included within your After Visit Summary for your review. Other Preventive Recommendations:    · A preventive eye exam performed by an eye specialist is recommended every 1-2 years to screen for glaucoma; cataracts, macular degeneration, and other eye disorders. · A preventive dental visit is recommended every 6 months. · Try to get at least 150 minutes of exercise per week or 10,000 steps per day on a pedometer . · Order or download the FREE \"Exercise & Physical Activity: Your Everyday Guide\" from [employer]. Call 8-174.488.4926 or search [employer] online. · You need 6278-5033 mg of calcium and 9448-8590 IU of vitamin D per day. It is possible to meet your calcium requirement with diet alone, but a vitamin D supplement is usually necessary to meet this goal.  · When exposed to the sun, use a sunscreen that protects against both UVA and UVB radiation with an SPF of 30 or greater. Reapply every 2 to 3 hours or after sweating, drying off with a towel, or swimming. · Always wear a seat belt when traveling in a car. Always wear a helmet when riding a bicycle or motorcycle.

## 2021-12-01 NOTE — PROGRESS NOTES
Medicare Annual Wellness Visit  Name: Corrinne Starks. IQEXDW Date: 2021   MRN: [id number] Sex: Male   Age: 80 y.o. Ethnicity: Non- / Non    : 1950 Race: White (non-)      Corrinne Starks. is here for Medicare AWV    Screenings for behavioral, psychosocial and functional/safety risks, and cognitive dysfunction are all negative except as indicated below. These results, as well as other patient data from the [address] form, are documented in Flowsheets linked to this Encounter. Patient notes his knees are bothering him. Has had prior surgery on the left knee. Also has chronic low back pain. MRIs show degenerative changes. Patient no longer taking Mobic. This was stopped by GI. Has stopped snacking. Walking daily at the mall 5-6 days per week for 30min each time. Allergies   Allergen Reactions    Lisinopril Other (See Comments)     cough         Prior to Visit Medications    Medication Sig Taking? Authorizing Provider   finasteride (PROSCAR) 5 MG tablet Take 1 tablet by mouth daily Yes Linda Parker DO   atorvastatin (LIPITOR) 10 MG tablet TAKE 1 TABLET DAILY Yes Linda Parker DO   cetirizine (ZYRTEC) 10 MG tablet TAKE 1 TABLET DAILY Yes Linda Parker DO   gabapentin (NEURONTIN) 100 MG capsule Take 3 tablets in the morning and 1 tablet mid afternoon.  Yes Linda Parker DO   mometasone-formoterol (DULERA) 200-5 MCG/ACT inhaler Inhale 2 puffs into the lungs every 12 hours Yes Ry Olivera MD   oxybutynin (DITROPAN) 5 MG tablet  Yes Historical Provider, MD   pantoprazole (PROTONIX) 20 MG tablet Take 1 tablet by mouth 2 times daily Yes Ry Olivera MD   montelukast (SINGULAIR) 10 MG tablet Take 1 tablet by mouth nightly Yes Ry Olivera MD   memantine (NAMENDA) 10 MG tablet Take 1 tablet by mouth 2 times daily Yes Ry Olivera MD   aspirin 81 MG chewable tablet Take 81 mg by mouth daily Yes Historical Provider, MD   donepezil (ARICEPT) 10 MG tablet Take 10 mg by mouth daily bedtime Yes Historical Provider, MD   tamsulosin (FLOMAX) 0.4 MG capsule Take 2 capsules by mouth daily Yes Joselyn Wilcox MD   Omega-3 Fatty Acids (FISH OIL) 1000 MG CAPS Take 1,000 mg by mouth daily. Yes Historical Provider, MD   Flaxseed, Linseed, (FLAX SEED OIL) 1000 MG CAPS Take 1,000 mg by mouth daily. Yes Historical Provider, MD   Cholecalciferol (VITAMIN D) 2000 UNITS CAPS capsule Take 1 capsule by mouth daily. Yes Historical Provider, MD   multivitamin [facility]) per tablet Take 1 tablet by mouth daily.  Yes Historical Provider, MD         Past Medical History:   Diagnosis Date    Acute sinusitis 1/18/2021    Allergic rhinitis     Arthritis     R thumb, low back    Asthma     Erectile dysfunction     Hearing loss     Hyperlipidemia     Hypertension     Kidney failure 07/2018    Normal pressure hydrocephalus ([address] 07/2018    shunt placement    Screening for abdominal aortic aneurysm (AAA) performed 03/02/2018    [address]    Tinnitus     Unspecified sleep apnea        Past Surgical History:   Procedure Laterality Date    COLONOSCOPY  12/14/2018    COLONOSCOPY WITH BIOPSY performed by Angelo Connolly MD at [facility] ENTEROSCOPY N/A 1/4/2019    ENTEROSCOPY PUSH BIOPSY performed by Angelo [facility]olly MD at [facility] Left     lipoma    JOINT REPLACEMENT Left 2015    PARTIAL KNEE REPLACMENT    LAPAROSCOPY N/A 7/18/2018    OPENING AND CLOSING FOR VENTRICULAR PERITONEAL SHUNT performed by Sherin Stout MD at [address] [place] [address] [address] 07/18/2018    [address]; (N/A )    ND CREATE SHUNT:VENTRIC-PERITONEAL N/A 7/18/2018    VENTRICULAR PERITONEAL SHUNT INSERTION RIGHT SIDE; performed by Pavan Devlin MD at [facility] 12/14/2018    EGD BIOPSY performed by Angelo Connolly MD at [place] [address] ENDOSCOPY N/A 3/1/2019    ESOPHAGOGASTRODUODENOSCOPY WITH RADIOFREQUENCY  ABLATION/ ABLATIONS X19 performed by Crystal Rea MD at [facility]/ 4/24/2019    ESOPHAGOGASTRODUODENOSCOPY WITH RADIOFREQUENCY ABLATION performed by Crystal Rea MD at [address] [place] [address] 4/24/2019    EGD BIOPSY performed by Crystal Rea MD at [facility] 8/2/2019    ESOPHAGOGASTRODUODENOSCOPY RADIOFREQUENCY ABLATION performed by Crystal Rea MD at [facility] 8/2/2019    EGD GASTRIC BIOPSY performed by Crystal Rea MD at [facility] 8/2/2019    EGD POLYP COLD SNARE performed by Crystal Rea MD at [facility] 11/19/2019    ESOPHAGOGASTRODUODENOSCOPY WITH RADIOFREQUENCY ABLATION performed by Crystal Rea MD at [facility] 11/19/2019    EGD POLYP SNARE performed by Crystal Rea MD at [facility] 7/15/2020    ESOPHAGOGASTRODUODENOSCOPY RADIOFREQUENCY ABLATION ON STANDBY performed by Crystal Rea MD at [facility] 7/15/2020    EGD BIOPSY performed by Crystal Rea MD at [facility] 7/16/2021    EGD BIOPSY performed by Crystal Rea MD at [facility] 7/16/2021    [facility] SN[facility] History   Problem Relation Age of Onset    Cancer Father         prostate       CareTeam (Including outside providers/suppliers regularly involved in providing care):   Patient Care Team:  Courtney Hernandez DO as PCP - General (Internal Medicine)  Courtney Hernandez DO as PCP - [facility]  Karyn Osborne MD as Consulting Physician (Pulmonology)    Wt Readings from Last 3 Encounters:   12/01/21 279 lb (126.6 kg)   11/08/21 280 lb 3.2 oz (127.1 kg)   08/25/21 284 lb (128.8 kg)     Vitals:    12/01/21 0944   BP: 118/84   Pulse: 92   Resp: 16   SpO2: 95%   Weight: 279 lb (126.6 kg)   Height: 6' 3\" (1.905 m)     Body mass index is 34.87 kg/m². Based upon direct observation of the patient, evaluation of cognition reveals patient does have some word finding issues, 3 word recall normal.  Patient is following with cognitive specialist at [facility]. General Appearance: in no acute distress and alert  Skin: warm and dry, no rash or erythema  Head: normocephalic and atraumatic  Eyes: pupils equal, round, and reactive to light, extraocular eye movements intact, conjunctivae normal  Pulmonary/Chest: wheezing present- mild and intermittent in all lung fields  Cardiovascular: normal rate, normal S1 and S2, no gallops and intact distal pulses  Abdomen: soft, non-tender, non-distended, normal bowel sounds, no masses or organomegaly  Musculoskeletal: no swollen joints  Neurologic: gait and coordination normal and speech normal, mild tremor     Patient's complete Health Risk Assessment and screening values have been reviewed and are found in Flowsheets. The following problems were reviewed today and where indicated follow up appointments were made and/or referrals ordered. Positive Risk Factor Screenings with Interventions:            General Health and ACP:  General  In general, how would you say your health is?: Fair  In the past 7 days, have you experienced any of the following?  New or Increased Pain, New or Increased Fatigue, Loneliness, Social Isolation, Stress or Anger?: (!) New or Increased Pain  Do you get the social and emotional support that you need?: Yes  Do you have a Living Will?: Yes  Advance Directives     Power of  Living Will ACP-Advance Directive ACP-Power of     Not on File Filed on 11/19/19 Not on File Not on screen  Completed    Hepatitis C screen  Completed    Hepatitis A vaccine  Aged Out    Hepatitis B vaccine  Aged Out    Hib vaccine  Aged Out    Meningococcal (ACWY) vaccine  Aged Out     Recommendations for [employer] Due: see orders and patient instructions/AVS.  . Recommended screening schedule for the next 5-10 years is provided to the patient in written form: see Patient Instructions/AVS.    Raffy Sun was seen today for medicare aw. Diagnoses and all orders for this visit:    Routine general medical examination at a health care facility    Screening for diabetes mellitus (DM)  -     Hemoglobin A1C; Future    Prediabetes  -     Hemoglobin A1C; Future    Screening cholesterol level  -     Lipid Panel; Future    Chronic pain of both knees  -     Basic Metabolic Panel; Future    Other orders  -     finasteride (PROSCAR) 5 MG tablet;  Take 1 tablet by mouth daily

## 2021-12-06 ENCOUNTER — PATIENT MESSAGE (OUTPATIENT)
Dept: INTERNAL MEDICINE CLINIC | Age: 71
End: 2021-12-06

## 2021-12-06 RX ORDER — MEMANTINE HYDROCHLORIDE 10 MG/1
10 TABLET ORAL 2 TIMES DAILY
Qty: 180 TABLET | Refills: 3 | Status: SHIPPED | OUTPATIENT
Start: 2021-12-06 | End: 2022-02-09

## 2021-12-09 RX ORDER — CETIRIZINE HYDROCHLORIDE 10 MG/1
TABLET ORAL
Qty: 90 TABLET | Refills: 2 | Status: SHIPPED | OUTPATIENT
Start: 2021-12-09

## 2021-12-09 NOTE — TELEPHONE ENCOUNTER
Last appointment: 12/1/2021  Next appointment: 6/8/2022  Last refill:   Zyrtec 90 with 3 09/08/2021  Needs to go to mail order.

## 2021-12-14 ENCOUNTER — E-VISIT (OUTPATIENT)
Dept: INTERNAL MEDICINE CLINIC | Age: 71
End: 2021-12-14
Payer: MEDICARE

## 2021-12-14 ENCOUNTER — TELEPHONE (OUTPATIENT)
Dept: INTERNAL MEDICINE CLINIC | Age: 71
End: 2021-12-14

## 2021-12-14 DIAGNOSIS — J44.1 COPD EXACERBATION (HCC): Primary | ICD-10-CM

## 2021-12-14 PROCEDURE — 99422 OL DIG E/M SVC 11-20 MIN: CPT | Performed by: INTERNAL MEDICINE

## 2021-12-14 RX ORDER — METHYLPREDNISOLONE 4 MG/1
TABLET ORAL
Qty: 1 KIT | Refills: 0 | Status: SHIPPED | OUTPATIENT
Start: 2021-12-14 | End: 2021-12-20

## 2021-12-14 RX ORDER — FLUTICASONE PROPIONATE 50 MCG
1 SPRAY, SUSPENSION (ML) NASAL DAILY
Qty: 32 G | Refills: 1 | Status: SHIPPED | OUTPATIENT
Start: 2021-12-14 | End: 2022-07-07 | Stop reason: SDUPTHER

## 2021-12-14 ASSESSMENT — LIFESTYLE VARIABLES
PACKS_PER_DAY: 1
SMOKING_STATUS: NO, I'M A FORMER SMOKER
SMOKING_YEARS: 10

## 2021-12-14 NOTE — PROGRESS NOTES
Patient with likely exacerbation of copd. Will send steroid dose pack to pharmacy. Will also have patient get tested for covid. Patient is picking up inhaler as well. Patient to get pulse ox to monitor o2 levels and to notify me if <94%. 11-20 minutes were spent on the digital evaluation and management of this patient.

## 2021-12-14 NOTE — TELEPHONE ENCOUNTER
Patient went to [facility] this morning and got a Covid test. Results was negative per patient.

## 2021-12-14 NOTE — TELEPHONE ENCOUNTER
----- Message from Bebeto Brennan DO sent at 12/14/2021  2:37 PM EST -----  Regarding: covid testing wednesday 12/15  Please call patient to schedule covid testing for tomorrow 12/15. Thank you.

## 2022-01-19 ENCOUNTER — TELEMEDICINE (OUTPATIENT)
Dept: INTERNAL MEDICINE CLINIC | Age: 72
End: 2022-01-19
Payer: MEDICARE

## 2022-01-19 DIAGNOSIS — J44.1 COPD EXACERBATION (HCC): Primary | ICD-10-CM

## 2022-01-19 PROCEDURE — 99213 OFFICE O/P EST LOW 20 MIN: CPT | Performed by: INTERNAL MEDICINE

## 2022-01-19 PROCEDURE — G8428 CUR MEDS NOT DOCUMENT: HCPCS | Performed by: INTERNAL MEDICINE

## 2022-01-19 PROCEDURE — 3017F COLORECTAL CA SCREEN DOC REV: CPT | Performed by: INTERNAL MEDICINE

## 2022-01-19 PROCEDURE — 1123F ACP DISCUSS/DSCN MKR DOCD: CPT | Performed by: INTERNAL MEDICINE

## 2022-01-19 PROCEDURE — 4040F PNEUMOC VAC/ADMIN/RCVD: CPT | Performed by: INTERNAL MEDICINE

## 2022-01-19 RX ORDER — METHYLPREDNISOLONE 4 MG/1
TABLET ORAL
Qty: 1 KIT | Refills: 0 | Status: SHIPPED | OUTPATIENT
Start: 2022-01-19 | End: 2022-01-25

## 2022-01-19 NOTE — PROGRESS NOTES
Alden Shin. (:  1950) is a 80 y.o. male,Established patient, here for evaluation of the following chief complaint(s): Cough         ASSESSMENT/PLAN:  1. COPD exacerbation ([address]  patient with what seems to be copd exacerbation. He does not look in any distress. This would be the 2nd copd exacerbation in the last 1 month. - will have patient start medrol dose pack which resolved symptoms prior  - continue current inhalers  - will plan to have patient see pulmonologist in the near future. Patient to keep current follow up in    SUBJECTIVE/OBJECTIVE:  HPI    patient is a 79 yo m with pmhx of chronic bronchitis who presents 2/2 cough and congestion. Symptoms started 1 week ago. Had similar symptoms about 1 month ago at which time I prescribed and he took steroids for a little. Then everything resolved. He now Notes stuffy nose, wheezing and coughing. No shortness of breath. No fevers. No exposures that he knows of. He does plan to covid test today. Review of Systems ROS negative except for those noted in the HPI above.        Patient-Reported Vitals 2022   Patient-Reported Weight 265   Patient-Reported Height 6'2'        Physical Exam    [INSTRUCTIONS:  \"[x]\" Indicates a positive item  \"[]\" Indicates a negative item  -- DELETE ALL ITEMS NOT EXAMINED]    Constitutional: [x] Appears well-developed and well-nourished [x] No apparent distress      [] Abnormal -     Mental status: [x] Alert and awake  [x] Oriented to person/place/time [x] Able to follow commands    [] Abnormal -     Eyes:   EOM    [x]  Normal    [] Abnormal -   Sclera  [x]  Normal    [] Abnormal -          Discharge [x]  None visible   [] Abnormal -     HENT: [x] Normocephalic, atraumatic  [] Abnormal -   [x] Mouth/Throat: Mucous membranes are moist    External Ears [x] Normal  [] Abnormal -    Neck: [x] No visualized mass [] Abnormal -     Pulmonary/Chest: [x] Respiratory effort normal   [x] No visualized signs of difficulty breathing or respiratory distress        [] Abnormal -      Musculoskeletal:   [] Normal gait with no signs of ataxia         [x] Normal range of motion of neck        [] Abnormal -     Neurological:        [x] No Facial Asymmetry (Cranial nerve 7 motor function) (limited exam due to video visit)          [x] No gaze palsy        [] Abnormal -          Skin:        [x] No significant exanthematous lesions or discoloration noted on facial skin         [] Abnormal -            Psychiatric:       [x] Normal Affect [] Abnormal -        [x] No Hallucinations    Other pertinent observable physical exam findings:-              Navarro Reyes., was evaluated through a synchronous (real-time) audio-video encounter. The patient (or guardian if applicable) is aware that this is a billable service. Verbal consent to proceed has been obtained within the past 12 months. The visit was conducted pursuant to the emergency declaration under the [address] [place] [address], [address] [place] [address] authority and the [employer] General Act. Patient identification was verified, and a caregiver was present when appropriate. The patient was located in a state where the provider was credentialed to provide care. An electronic signature was used to authenticate this note.     --Javed Sánchez, DO

## 2022-01-31 ENCOUNTER — TELEPHONE (OUTPATIENT)
Dept: INTERNAL MEDICINE CLINIC | Age: 72
End: 2022-01-31

## 2022-01-31 NOTE — TELEPHONE ENCOUNTER
----- Message from Heriberto Padron sent at 1/31/2022  9:58 AM EST -----  Subject: Message to Provider    QUESTIONS  Information for Provider? Patient calling to ask about an samples for   inhalers. ---------------------------------------------------------------------------  --------------  Tangela COHN  What is the best way for the office to contact you? OK to leave message on   voicemail  Preferred Call Back Phone Number? [phone]  ---------------------------------------------------------------------------  --------------  SCRIPT ANSWERS  Relationship to Patient?  Self

## 2022-02-02 NOTE — TELEPHONE ENCOUNTER
Patient needs to have a new prescription sent to the pharmacy for the following medication:    mometasone-formoterol ([address]) 200-5 MCG/ACT inhaler        [address], [address] [place] [address] [phone] - f [phone]

## 2022-02-03 ENCOUNTER — HOSPITAL ENCOUNTER (EMERGENCY)
Age: 72
Discharge: HOME | End: 2022-02-03
Attending: EMERGENCY MEDICINE
Payer: MEDICARE

## 2022-02-03 ENCOUNTER — APPOINTMENT (OUTPATIENT)
Dept: GENERAL RADIOLOGY | Age: 72
End: 2022-02-03
Payer: MEDICARE

## 2022-02-03 VITALS
TEMPERATURE: 97.5 F | BODY MASS INDEX: 34.01 KG/M2 | OXYGEN SATURATION: 95 % | WEIGHT: 265 LBS | SYSTOLIC BLOOD PRESSURE: 148 MMHG | RESPIRATION RATE: 20 BRPM | HEART RATE: 89 BPM | DIASTOLIC BLOOD PRESSURE: 101 MMHG | HEIGHT: 74 IN

## 2022-02-03 DIAGNOSIS — J44.1 COPD EXACERBATION (HCC): Primary | ICD-10-CM

## 2022-02-03 DIAGNOSIS — J30.89 CHRONIC NONSEASONAL ALLERGIC RHINITIS DUE TO POLLEN: ICD-10-CM

## 2022-02-03 LAB
ANION GAP SERPL CALCULATED.3IONS-SCNC: 12 MMOL/L (ref 3–16)
BASE EXCESS VENOUS: 0.5 MMOL/L (ref -2–3)
BASOPHILS ABSOLUTE: 0.1 K/UL (ref 0–0.2)
BASOPHILS RELATIVE PERCENT: 0.8 %
BUN BLDV-MCNC: 14 MG/DL (ref 7–20)
CALCIUM SERPL-MCNC: 9.5 MG/DL (ref 8.3–10.6)
CARBOXYHEMOGLOBIN: 1 % (ref 0–1.5)
CHLORIDE BLD-SCNC: 102 MMOL/L (ref 99–110)
CO2: 23 MMOL/L (ref 21–32)
CREAT SERPL-MCNC: 0.9 MG/DL (ref 0.8–1.3)
EKG ATRIAL RATE: 87 BPM
EKG DIAGNOSIS: NORMAL
EKG P AXIS: 46 DEGREES
EKG P-R INTERVAL: 172 MS
EKG Q-T INTERVAL: 350 MS
EKG QRS DURATION: 68 MS
EKG QTC CALCULATION (BAZETT): 421 MS
EKG R AXIS: 61 DEGREES
EKG T AXIS: 60 DEGREES
EKG VENTRICULAR RATE: 87 BPM
EOSINOPHILS ABSOLUTE: 1.2 K/UL (ref 0–0.6)
EOSINOPHILS RELATIVE PERCENT: 15.6 %
GFR AFRICAN AMERICAN: >60
GFR NON-AFRICAN AMERICAN: >60
GLUCOSE BLD-MCNC: 121 MG/DL (ref 70–99)
HCO3 VENOUS: 26.1 MMOL/L (ref 24–28)
HCT VFR BLD CALC: 40.5 % (ref 40.5–52.5)
HEMOGLOBIN, VEN, REDUCED: 9.8 %
HEMOGLOBIN: 14 G/DL (ref 13.5–17.5)
LYMPHOCYTES ABSOLUTE: 1.8 K/UL (ref 1–5.1)
LYMPHOCYTES RELATIVE PERCENT: 23.4 %
MCH RBC QN AUTO: 29.4 PG (ref 26–34)
MCHC RBC AUTO-ENTMCNC: 34.5 G/DL (ref 31–36)
MCV RBC AUTO: 85.1 FL (ref 80–100)
METHEMOGLOBIN VENOUS: 0.1 % (ref 0–1.5)
MONOCYTES ABSOLUTE: 0.7 K/UL (ref 0–1.3)
MONOCYTES RELATIVE PERCENT: 9.8 %
NEUTROPHILS ABSOLUTE: 3.8 K/UL (ref 1.7–7.7)
NEUTROPHILS RELATIVE PERCENT: 50.4 %
O2 SAT, VEN: 90 %
PCO2, VEN: 44.6 MMHG (ref 41–51)
PDW BLD-RTO: 15.2 % (ref 12.4–15.4)
PH VENOUS: 7.38 (ref 7.35–7.45)
PLATELET # BLD: 215 K/UL (ref 135–450)
PMV BLD AUTO: 6.5 FL (ref 5–10.5)
PO2, VEN: 57.5 MMHG (ref 25–40)
POTASSIUM REFLEX MAGNESIUM: 4.3 MMOL/L (ref 3.5–5.1)
PRO-BNP: 68 PG/ML (ref 0–124)
RAPID INFLUENZA  B AGN: NEGATIVE
RAPID INFLUENZA A AGN: NEGATIVE
RBC # BLD: 4.76 M/UL (ref 4.2–5.9)
SARS-COV-2, NAAT: NOT DETECTED
SODIUM BLD-SCNC: 137 MMOL/L (ref 136–145)
TCO2 CALC VENOUS: 28 MMOL/L
TROPONIN: <0.01 NG/ML
WBC # BLD: 7.5 K/UL (ref 4–11)

## 2022-02-03 PROCEDURE — 71045 X-RAY EXAM CHEST 1 VIEW: CPT

## 2022-02-03 PROCEDURE — 87635 SARS-COV-2 COVID-19 AMP PRB: CPT

## 2022-02-03 PROCEDURE — 82803 BLOOD GASES ANY COMBINATION: CPT

## 2022-02-03 PROCEDURE — 36415 COLL VENOUS BLD VENIPUNCTURE: CPT

## 2022-02-03 PROCEDURE — 93005 ELECTROCARDIOGRAM TRACING: CPT | Performed by: EMERGENCY MEDICINE

## 2022-02-03 PROCEDURE — 6370000000 HC RX 637 (ALT 250 FOR IP): Performed by: EMERGENCY MEDICINE

## 2022-02-03 PROCEDURE — 84484 ASSAY OF TROPONIN QUANT: CPT

## 2022-02-03 PROCEDURE — 85025 COMPLETE CBC W/AUTO DIFF WBC: CPT

## 2022-02-03 PROCEDURE — 99283 EMERGENCY DEPT VISIT LOW MDM: CPT

## 2022-02-03 PROCEDURE — 80048 BASIC METABOLIC PNL TOTAL CA: CPT

## 2022-02-03 PROCEDURE — 96374 THER/PROPH/DIAG INJ IV PUSH: CPT

## 2022-02-03 PROCEDURE — 87804 INFLUENZA ASSAY W/OPTIC: CPT

## 2022-02-03 PROCEDURE — 6360000002 HC RX W HCPCS: Performed by: EMERGENCY MEDICINE

## 2022-02-03 PROCEDURE — 94640 AIRWAY INHALATION TREATMENT: CPT

## 2022-02-03 PROCEDURE — 83880 ASSAY OF NATRIURETIC PEPTIDE: CPT

## 2022-02-03 RX ORDER — PREDNISONE 20 MG/1
40 TABLET ORAL DAILY
Qty: 8 TABLET | Refills: 0 | Status: SHIPPED | OUTPATIENT
Start: 2022-02-04 | End: 2022-02-09

## 2022-02-03 RX ORDER — ALBUTEROL SULFATE 90 UG/1
2 AEROSOL, METERED RESPIRATORY (INHALATION) EVERY 4 HOURS PRN
Qty: 18 G | Refills: 2 | Status: SHIPPED | OUTPATIENT
Start: 2022-02-03 | End: 2022-02-09 | Stop reason: SDUPTHER

## 2022-02-03 RX ORDER — AZITHROMYCIN 250 MG/1
500 TABLET, FILM COATED ORAL ONCE
Status: COMPLETED | OUTPATIENT
Start: 2022-02-03 | End: 2022-02-03

## 2022-02-03 RX ORDER — AZITHROMYCIN 500 MG/1
500 TABLET, FILM COATED ORAL DAILY
Qty: 4 TABLET | Refills: 0 | Status: SHIPPED | OUTPATIENT
Start: 2022-02-04 | End: 2022-02-08

## 2022-02-03 RX ORDER — IPRATROPIUM BROMIDE AND ALBUTEROL SULFATE 2.5; .5 MG/3ML; MG/3ML
1 SOLUTION RESPIRATORY (INHALATION) ONCE
Status: COMPLETED | OUTPATIENT
Start: 2022-02-03 | End: 2022-02-03

## 2022-02-03 RX ORDER — METHYLPREDNISOLONE SODIUM SUCCINATE 125 MG/2ML
125 INJECTION, POWDER, LYOPHILIZED, FOR SOLUTION INTRAMUSCULAR; INTRAVENOUS ONCE
Status: COMPLETED | OUTPATIENT
Start: 2022-02-03 | End: 2022-02-03

## 2022-02-03 RX ORDER — MONTELUKAST SODIUM 10 MG/1
10 TABLET ORAL NIGHTLY
Qty: 90 TABLET | Refills: 3 | Status: SHIPPED | OUTPATIENT
Start: 2022-02-03

## 2022-02-03 RX ADMIN — AZITHROMYCIN MONOHYDRATE 500 MG: 250 TABLET ORAL at 06:41

## 2022-02-03 RX ADMIN — METHYLPREDNISOLONE SODIUM SUCCINATE 125 MG: 125 INJECTION, POWDER, FOR SOLUTION INTRAMUSCULAR; INTRAVENOUS at 06:42

## 2022-02-03 RX ADMIN — IPRATROPIUM BROMIDE AND ALBUTEROL SULFATE 1 AMPULE: .5; 2.5 SOLUTION RESPIRATORY (INHALATION) at 06:45

## 2022-02-03 ASSESSMENT — ENCOUNTER SYMPTOMS
SINUS PAIN: 0
WHEEZING: 1
RHINORRHEA: 0
COUGH: 1
SHORTNESS OF BREATH: 1

## 2022-02-03 NOTE — ED PROVIDER NOTES
[address] ENCOUNTER          ATTENDING PHYSICIAN NOTE       Date of evaluation: 2/3/2022    Chief Complaint     Cough (Wheezing. Has been out of medication)      History of Present Illness     Chao Gagnon is a 80 y.o. male who presents with a complaint of shortness of breath and increasing cough. Has a history of asthma and chronic bronchitis, has been waiting to get his home inhalers refilled by mail order pharmacy but they have not mailed them at home yet. He states he does use a nasal CPAP at night, but he believes that the humidity in the tubing irritates his bronchitis. He denies any sick contacts and states he has been sequestering at home to avoid infections. He denies any chest pain or pressure, only increasing shortness of breath regardless of exertional capacity. He denies any weight changes, fevers or chills    Review of Systems     Review of Systems   Constitutional: Negative for chills and fever. HENT: Negative for rhinorrhea and sinus pain. Respiratory: Positive for cough, shortness of breath and wheezing. Cardiovascular: Negative for chest pain and palpitations. All other systems reviewed and are negative. Past Medical, Surgical, Family, and Social History     He has a past medical history of Acute sinusitis, Allergic rhinitis, Arthritis, Asthma, Erectile dysfunction, Hearing loss, Hyperlipidemia, Hypertension, Kidney failure, Normal pressure hydrocephalus ([address] Screening for abdominal aortic aneurysm (AAA) performed, Tinnitus, and Unspecified sleep apnea. He has a past surgical history that includes other surgical history (N/A, 07/18/2018); pr create shunt:ventric-peritoneal (N/A, 7/18/2018); laparoscopy (N/A, 7/18/2018); Eye surgery (Left); joint replacement (Left, 2015); Upper gastrointestinal endoscopy (N/A, 12/14/2018); Colonoscopy (12/14/2018); Enteroscopy (N/A, 1/4/2019); Upper gastrointestinal endoscopy (N/A, 3/1/2019);  Upper daily.    OMEGA-3 FATTY ACIDS (FISH OIL) 1000 MG CAPS    Take 1,000 mg by mouth daily. OXYBUTYNIN (DITROPAN) 5 MG TABLET        PANTOPRAZOLE (PROTONIX) 20 MG TABLET    Take 1 tablet by mouth 2 times daily    TAMSULOSIN (FLOMAX) 0.4 MG CAPSULE    Take 2 capsules by mouth daily       Allergies     He is allergic to lisinopril. Physical Exam     INITIAL VITALS: BP: (!) 148/101, Temp: 97.5 °F (36.4 °C), Pulse: 89, Resp: 22, SpO2: 95 %   Physical Exam  Constitutional: Well nourished obese  male who appears in mild respiratory distress    HEENT: NC/AT. PERRL 4-2 bilaterally. EOMI. CV:Heart is regular rate and rhythm without murmurs, rubs or gallops. Resp: Patient has inspiratory and expiratory audible wheezing, with subcostal retractions. He is able to speak in short sentences. Abd: Soft, nontender, nondistended. MSK: Full ROM, no edema or tenderness to palpation. Skin: Extremities are warm and well perfused. 2+ radial pulses . Cap Refill <3 seconds. Neuro: A&Ox3. Cranial nerves grossly intact   Strength and sensation intact x4 extremities. Psych: Thought content, behavior & judgement normal.    Diagnostic Results     EKG   Shows sinus rhythm with poor baseline in the lateral precordial leads but no acute ST segment elevations or depressions noted. WA interval is 172, QRS is normal and QTc within normal limits. No signs of ischemia on this EKG.     RADIOLOGY:  XR CHEST PORTABLE    (Results Pending)       LABS:   Results for orders placed or performed during the hospital encounter of 02/03/22   CBC Auto Differential   Result Value Ref Range    WBC 7.5 4.0 - 11.0 K/uL    RBC 4.76 4.20 - 5.90 M/uL    Hemoglobin 14.0 13.5 - 17.5 g/dL    Hematocrit 40.5 40.5 - 52.5 %    MCV 85.1 80.0 - 100.0 fL    MCH 29.4 26.0 - 34.0 pg    MCHC 34.5 31.0 - 36.0 g/dL    RDW 15.2 12.4 - 15.4 %    Platelets 504 382 - 195 K/uL    MPV 6.5 5.0 - 10.5 fL    Neutrophils % 50.4 %    Lymphocytes % 23.4 % Monocytes % 9.8 %    Eosinophils % 15.6 %    Basophils % 0.8 %    Neutrophils Absolute 3.8 1.7 - 7.7 K/uL    Lymphocytes Absolute 1.8 1.0 - 5.1 K/uL    Monocytes Absolute 0.7 0.0 - 1.3 K/uL    Eosinophils Absolute 1.2 (H) 0.0 - 0.6 K/uL    Basophils Absolute 0.1 0.0 - 0.2 K/uL   Blood gas, venous (Lab)   Result Value Ref Range    pH, [place] 7.376 7.350 - 7.450    pCO2, [place] 44.6 41.0 - 51.0 mmHg    pO2, [place] 57.5 (H) 25.0 - 40.0 mmHg    HCO3, Venous 26.1 24.0 - 28.0 mmol/L    Base Excess, [place] 0.5 -2.0 - 3.0 mmol/L    O2 Sat, [place] 90 Not established %    Carboxyhemoglobin 1.0 0.0 - 1.5 %    MetHgb, [place] 0.1 0.0 - 1.5 %    TC02 (Calc), [place] 28 mmol/L    Hemoglobin, [place], Reduced 9.80 %           RECENT VITALS:  BP: (!) 148/101,Temp: 97.5 °F (36.4 °C), Pulse: 89, Resp: 20, SpO2: 95 %     Procedures         ED Course     Nursing Notes, Past Medical Hx, Past Surgical Hx, Social Hx,Allergies, and Family Hx were reviewed. patient was given the following medications:  Orders Placed This Encounter   Medications    ipratropium-albuterol (DUONEB) nebulizer solution 1 ampule     Order Specific Question:   Initiate RT Bronchodilator Protocol     Answer: Yes    methylPREDNISolone sodium (SOLU-MEDROL) injection 125 mg    azithromycin (ZITHROMAX) tablet 500 mg     Order Specific Question:   Antimicrobial Indications     Answer:   COPD Exacerbation       CONSULTS:  None    MEDICAL DECISIONMAKING / ASSESSMENT / Fabby Duong. is a 80 y.o. male presenting with a 6 asthma/COPD exacerbation triggered by running out at home medications. He has no focal lung findings suggesting an acute pneumonia, or spontaneous pneumothorax. He was given a DuoNeb, steroids and azithromycin for presumed flare of his chronic bronchitis. CBC shows a normal WBC count, and Covid and flu swabs were sent given ongoing pandemic saliva low suspicion for active viral infection at this time.   Chest x-ray is not performed yet, VBG shows no significant respiratory acidosis. The plan at this point will be to give DuoNeb therapy, steroids, and observe the patient. Will sign out the patient to my colleague, Dr. Mely Townsend, to reevaluate the patient. It is possible he may be able to go home if he has significant symptom relief and is not hypoxic      Clinical Impression     1. COPD exacerbation ([facility]        Disposition     PATIENT REFERRED TO:  No follow-up provider specified.     DISCHARGE MEDICATIONS:  New Prescriptions    No medications on file       DISPOSITION    Pending at [address] [facility] [address], [place]  02/03/22 6158

## 2022-02-03 NOTE — ED PROVIDER NOTES
[address] [facility] 71 ENCOUNTER          ATTENDING PHYSICIAN NOTE       Date of evaluation: 2/3/2022    ADDENDUM:      Care of this patient was assumed from Dr. Yaneth Mccoy. The patient was seen for Cough (Wheezing. Has been out of medication)  . The patient's initial evaluation and plan have been discussed with the prior provider who initially evaluated the patient. Please see the original HPI and MDM for full details. Nursing Notes, Past Medical Hx, Past Surgical Hx, Social Hx, Allergies, and Family Hx were all reviewed. Diagnostic Results       RADIOLOGY:  XR CHEST PORTABLE   Final Result   Impression: No acute cardiopulmonary abnormality.           LABS:   Results for orders placed or performed during the hospital encounter of 02/03/22   COVID-19, Rapid    Specimen: Nasopharyngeal Swab   Result Value Ref Range    SARS-CoV-2, NAAT Not Detected Not Detected   Rapid influenza A/B antigens    Specimen: Nasopharyngeal   Result Value Ref Range    Rapid Influenza A Ag Negative Negative    Rapid Influenza B Ag Negative Negative   CBC Auto Differential   Result Value Ref Range    WBC 7.5 4.0 - 11.0 K/uL    RBC 4.76 4.20 - 5.90 M/uL    Hemoglobin 14.0 13.5 - 17.5 g/dL    Hematocrit 40.5 40.5 - 52.5 %    MCV 85.1 80.0 - 100.0 fL    MCH 29.4 26.0 - 34.0 pg    MCHC 34.5 31.0 - 36.0 g/dL    RDW 15.2 12.4 - 15.4 %    Platelets 912 447 - 779 K/uL    MPV 6.5 5.0 - 10.5 fL    Neutrophils % 50.4 %    Lymphocytes % 23.4 %    Monocytes % 9.8 %    Eosinophils % 15.6 %    Basophils % 0.8 %    Neutrophils Absolute 3.8 1.7 - 7.7 K/uL    Lymphocytes Absolute 1.8 1.0 - 5.1 K/uL    Monocytes Absolute 0.7 0.0 - 1.3 K/uL    Eosinophils Absolute 1.2 (H) 0.0 - 0.6 K/uL    Basophils Absolute 0.1 0.0 - 0.2 K/uL   Basic Metabolic Panel w/ Reflex to MG   Result Value Ref Range    Sodium 137 136 - 145 mmol/L    Potassium reflex Magnesium 4.3 3.5 - 5.1 mmol/L    Chloride 102 99 - 110 mmol/L    CO2 23 21 - 32 mmol/L    Anion Gap 12 3 - 16    Glucose 121 (H) 70 - 99 mg/dL    BUN 14 7 - 20 mg/dL    CREATININE 0.9 0.8 - 1.3 mg/dL    GFR Non-African American >60 >60    GFR African American >60 >60    Calcium 9.5 8.3 - 10.6 mg/dL   Troponin   Result Value Ref Range    Troponin <0.01 <0.01 ng/mL   Brain Natriuretic Peptide   Result Value Ref Range    Pro-BNP 68 0 - 124 pg/mL   Blood gas, venous (Lab)   Result Value Ref Range    pH, [place] 7.376 7.350 - 7.450    pCO2, [place] 44.6 41.0 - 51.0 mmHg    pO2, [place] 57.5 (H) 25.0 - 40.0 mmHg    HCO3, Venous 26.1 24.0 - 28.0 mmol/L    Base Excess, [place] 0.5 -2.0 - 3.0 mmol/L    O2 Sat, Catal[place] 90 Not established %    Carboxyhemoglobin 1.0 0.0 - 1.5 %    MetHgb, [place] 0.1 0.0 - 1.5 %    TC02 (Calc), [place] 28 mmol/L    Hemoglobin, Catal[place], Reduced 9.80 %   EKG 12 Lead   Result Value Ref Range    Ventricular Rate 87 BPM    Atrial Rate 87 BPM    P-R Interval 172 ms    QRS Duration 68 ms    Q-T Interval 350 ms    QTc Calculation (Bazett) 421 ms    P Axis 46 degrees    R Axis 61 degrees    T Axis 60 degrees    Diagnosis       EKG performed in ER and to be interpreted by ER physician. Confirmed by MD, ER ([id number]),  Aftab Chao ([id number]) on 2/3/2022 7:26:56 AM       RECENT VITALS:  BP: (!) 148/101, Temp: 97.5 °F (36.4 °C), Pulse: 89, Resp: 20, SpO2: 95 %     Procedures   Procedures    ED Course     The patient was given the following medications:  Orders Placed This Encounter   Medications    ipratropium-albuterol (DUONEB) nebulizer solution 1 ampule     Order Specific Question:   Initiate RT Bronchodilator Protocol     Answer:    Yes    methylPREDNISolone sodium (SOLU-MEDROL) injection 125 mg    azithromycin (ZITHROMAX) tablet 500 mg     Order Specific Question:   Antimicrobial Indications     Answer:   COPD Exacerbation    mometasone-formoterol (DULERA) 200-5 MCG/ACT inhaler     Sig: Inhale 2 puffs into the lungs every 12 hours     Dispense:  3 each     Refill:  2    montelukast (SINGULAIR) 10 MG tablet     Sig: Take 1 tablet by mouth nightly     Dispense:  90 tablet     Refill:  3    predniSONE (DELTASONE) 20 MG tablet     Sig: Take 2 tablets by mouth daily     Dispense:  8 tablet     Refill:  0    azithromycin (ZITHROMAX) 500 MG tablet     Sig: Take 1 tablet by mouth daily for 4 days     Dispense:  4 tablet     Refill:  0    albuterol sulfate HFA (PROVENTIL HFA) 108 (90 Base) MCG/ACT inhaler     Sig: Inhale 2 puffs into the lungs every 4 hours as needed for Wheezing or Shortness of Breath (Space out to every 6 hours as symptoms improve) Space out to every 6 hours as symptoms improve. Dispense:  18 g     Refill:  2       CONSULTS:  None    MEDICAL DECISION MAKING / ASSESSMENT / Fely Guerrero. is a 80 y.o. male with history of COPD who initially presented for wheezing, cough, difficulty breathing. Please see the original HPI and MDM for full details. At time of signout, patient was pending reevaluation after nebulizer treatments, chest x-ray, and laboratory studies. His x-ray is without acute findings and labs are overall reassuring. Covid and influenza testing is negative. He is resting comfortably on reexamination, feels significantly improved after a DuoNeb. Has very faint end expiratory wheezing, but no prolonged expiratory phase, no increased work of breathing, and states he overall feels well enough to continue treatment at home. Prescriptions for his inhalers, Singulair, and courses of prednisone and azithromycin were provided. All questions answered to his satisfaction. Discharged in stable condition with written and verbal instructions for which to return to the ED. Clinical Impression     1. COPD exacerbation ([address]    2.  Chronic nonseasonal allergic rhinitis due to pollen        Disposition     PATIENT REFERRED TO:  [facility] Emergency Department  [address]  [address] [facility]    If symptoms worsen    [place] [place], [address] [facility] [address]  [phone]    Schedule an appointment as soon as possible for a visit in 5 days  For reexamination      DISCHARGE MEDICATIONS:  Discharge Medication List as of 2/3/2022  8:10 AM      START taking these medications    Details   predniSONE (DELTASONE) 20 MG tablet Take 2 tablets by mouth daily, Disp-8 tablet, R-0Print      azithromycin (ZITHROMAX) 500 MG tablet Take 1 tablet by mouth daily for 4 days, Disp-4 tablet, R-0Print      albuterol sulfate HFA (PROVENTIL HFA) 108 (90 Base) MCG/ACT inhaler Inhale 2 puffs into the lungs every 4 hours as needed for Wheezing or Shortness of Breath (Space out to every 6 hours as symptoms improve) Space out to every 6 hours as symptoms improve., Disp-18 g, R-2Print             DISPOSITION Decision To Discharge 02/03/2022 08:05:17 AM         Marco Wang MD  02/03/22 [id number]

## 2022-02-09 ENCOUNTER — OFFICE VISIT (OUTPATIENT)
Dept: PULMONOLOGY | Age: 72
End: 2022-02-09
Payer: MEDICARE

## 2022-02-09 VITALS
TEMPERATURE: 96.6 F | BODY MASS INDEX: 34.78 KG/M2 | OXYGEN SATURATION: 95 % | HEIGHT: 74 IN | DIASTOLIC BLOOD PRESSURE: 79 MMHG | WEIGHT: 271 LBS | HEART RATE: 86 BPM | SYSTOLIC BLOOD PRESSURE: 121 MMHG

## 2022-02-09 DIAGNOSIS — J45.40 MODERATE PERSISTENT REACTIVE AIRWAY DISEASE WITHOUT COMPLICATION: Primary | ICD-10-CM

## 2022-02-09 PROCEDURE — 3017F COLORECTAL CA SCREEN DOC REV: CPT | Performed by: INTERNAL MEDICINE

## 2022-02-09 PROCEDURE — G8427 DOCREV CUR MEDS BY ELIG CLIN: HCPCS | Performed by: INTERNAL MEDICINE

## 2022-02-09 PROCEDURE — 1036F TOBACCO NON-USER: CPT | Performed by: INTERNAL MEDICINE

## 2022-02-09 PROCEDURE — G8417 CALC BMI ABV UP PARAM F/U: HCPCS | Performed by: INTERNAL MEDICINE

## 2022-02-09 PROCEDURE — 4040F PNEUMOC VAC/ADMIN/RCVD: CPT | Performed by: INTERNAL MEDICINE

## 2022-02-09 PROCEDURE — 1123F ACP DISCUSS/DSCN MKR DOCD: CPT | Performed by: INTERNAL MEDICINE

## 2022-02-09 PROCEDURE — G8484 FLU IMMUNIZE NO ADMIN: HCPCS | Performed by: INTERNAL MEDICINE

## 2022-02-09 PROCEDURE — 99213 OFFICE O/P EST LOW 20 MIN: CPT | Performed by: INTERNAL MEDICINE

## 2022-02-09 RX ORDER — ALBUTEROL SULFATE 90 UG/1
2 AEROSOL, METERED RESPIRATORY (INHALATION) EVERY 4 HOURS PRN
Qty: 3 EACH | Refills: 3 | Status: SHIPPED | OUTPATIENT
Start: 2022-02-09 | End: 2022-09-19 | Stop reason: SDUPTHER

## 2022-02-09 ASSESSMENT — ENCOUNTER SYMPTOMS
WHEEZING: 1
CHEST TIGHTNESS: 0
SHORTNESS OF BREATH: 0
COUGH: 1

## 2022-02-09 NOTE — PROGRESS NOTES
[facility] Pulmonary and Critical Care    Outpatient Note    Subjective:   CHIEF COMPLAINT:   No chief complaint on file. Interval history on 2/9/22  He had bad wheezing for two days last week but on 2/3/22 at 4:00am he visited the ED and was treated with albuterol and steroids injection. CXR was clear. He was sent home. He was prescribed with ABX, prednisone, dulera and albuterol. Now he feels better. Over the past 1.5 years he had several episodes of cough. Still on protonix. There is no heart burn. He follows with Dr. Tavares Cuadra. Interval history on 7/20/20  Cough and wheezing is much better with symbicort. He is using it twice a day. He did not have to use the rescue inhaler. His main complain is hoarseness of voice. He is rinsing his month most of the time. There is no oral thrush. No sore throat. Of note he has salas's esophagus and for which he had ablation recently. HPI:  6/22/20   The patient is 80 y.o. male who presents today for a new patient visit for evaluation of cough going on for over 6 months. He was investigated extensively and was seen by ENT    Cough is mainly in the morning and night. He sleep sitting at night over the past month. There is SOB with coughing fits for which he use albuterol inhaler. He had GERD and salas's esophagus. He is on PPI BID    No prior hx of asthma. Sister has asthma. Quit smoking in 2002. Used to smoke 1 PPD for 10 years. No functional limitation. However he wheeze with exercise. No prior hx of heart disease. He is on CPAP at night for CAM since 2008. Last sleep study in 2008.  Last time CPAP was checked last year at the [place]    Past Medical History:    Past Medical History:   Diagnosis Date    Acute sinusitis 1/18/2021    Allergic rhinitis     Arthritis     R thumb, low back    Asthma     Erectile dysfunction     Hearing loss     Hyperlipidemia     Hypertension     Kidney failure 07/2018    Normal pressure hydrocephalus (HCC) 2018    shunt placement    Screening [facility] abdominal aortic aneurysm (AAA) [facility] 2018    [facility]    Tinnitus     Unspecified sleep apnea        Social History:    Social History     Tobacco Use   Smoking Status Former Smoker    Packs/day: 0.50    Years: 10.00    Pack years: 5.00    Types: Cigarettes    Quit date: 2002    Years since quittin.1   Smokeless Tobacco Never Used       Family History:  Family History   Problem Relation Age of Onset    Cancer Father         prostate       Current Medications:  Current Outpatient Medications on File Prior to Visit   Medication Sig Dispense Refill    montelukast (SINGULAIR) 10 MG tablet Take 1 tablet by mouth nightly 90 tablet 3    fluticasone (FLONASE) 50 MCG/ACT nasal spray 1 spray by Each Nostril route daily 32 g 1    cetirizine (ZYRTEC) 10 MG tablet TAKE 1 TABLET DAILY 90 tablet 2    finasteride (PROSCAR) 5 MG tablet Take 1 tablet by mouth daily 90 tablet 3    atorvastatin (LIPITOR) 10 MG tablet TAKE 1 TABLET DAILY 90 tablet 3    gabapentin (NEURONTIN) 100 MG capsule Take 3 tablets in the morning and 1 tablet mid afternoon. 270 capsule 3    pantoprazole (PROTONIX) 20 MG tablet Take 1 tablet by mouth 2 times daily 180 tablet 2    aspirin 81 MG chewable tablet Take 81 mg by mouth daily      tamsulosin (FLOMAX) 0.4 MG capsule Take 2 capsules by mouth daily 90 capsule 1    Omega-3 Fatty Acids (FISH OIL) 1000 MG CAPS Take 1,000 mg by mouth daily.  Flaxseed, Linseed, (FLAX SEED OIL) 1000 MG CAPS Take 1,000 mg by mouth daily.  Cholecalciferol (VITAMIN D) 2000 UNITS CAPS capsule Take 1 capsule by mouth daily.  multivitamin (THERAGRAN) per tablet Take 1 tablet by mouth daily. No current facility-administered medications on file prior to visit. REVIEW OF SYSTEMS:    Review of Systems   Constitutional: Negative for chills, fatigue, fever and unexpected weight change.    HENT: Negative for congestion. Respiratory: Positive for cough and wheezing. Negative for chest tightness and shortness of breath. Cardiovascular: Negative for chest pain, palpitations and leg swelling. Neurological: Negative for weakness. Psychiatric/Behavioral: The patient is not nervous/anxious. All other systems reviewed and are negative. Objective:   PHYSICAL EXAM:        VITALS:  /79 (Site: Left Upper Arm, Position: Sitting, Cuff Size: Medium Adult)   Pulse 86   Temp 96.6 °F (35.9 °C) (Infrared)   Ht 6' 2\" (1.88 m)   Wt 271 lb (122.9 kg)   SpO2 95%   BMI 34.79 kg/m²     Physical Exam  Vitals reviewed. Constitutional:       Appearance: He is well-developed. HENT:      Head: Normocephalic and atraumatic. Eyes:      Pupils: Pupils are equal, round, and reactive to light. Neck:      Vascular: No JVD. Cardiovascular:      Rate and Rhythm: Normal rate and regular rhythm. Heart sounds: No murmur heard. Pulmonary:      Effort: Pulmonary effort is normal. No respiratory distress. Breath sounds: No stridor. Wheezing present. No rales. Abdominal:      General: Bowel sounds are normal.      Palpations: Abdomen is soft. Musculoskeletal:         General: No deformity. Cervical back: Neck supple. Right lower leg: Edema present. Left lower leg: Edema present. Skin:     General: Skin is warm and dry. Neurological:      Mental Status: He is alert and oriented to person, place, and time. Psychiatric:         Behavior: Behavior normal.         DATA:    PFT 11/29/2019  FINDINGS:  Spirometry on this patient shows an FEV1 of 2.74 which is 70%  of predicted and a forced vital capacity of 3.54 which is 67% of  predicted, giving a ratio of 77. There was borderline response to  bronchodilators.   Lung volumes show a decreased total lung capacity at  79% of predicted and decreased diffusion prior to correction for  alveolar lung volumes to come back at 91% of Normal left ventricular size and wall thickness. Normal left ventricular systolic function with an estimate ejection fraction   of 55-60%. There are no regional wall motion abnormalities. Normal diastolic function. The left atrium appears dilated. Trace mitral and tricuspid regurgitation. Estimated pulmonary artery systolic pressure is 25 mmHg assuming a right   atrial pressure of 3 mmHg. No prior echo for comparison. Echo on 6/24/20  Normal left ventricular size and wall thickness. Normal left ventricular   systolic function with an estimate ejection fraction of 60%. There are no   regional wall motion abnormalities. Indeterminate diastolic function. Mitral annular calcification is present. MItral leaflets are thickened. Cannot rule out thrombus or vegetation on the anterior mitral leaflet, but   it may be reverberation of calcium. Clinical correlation advised. Trace tricuspid regurgitation. Estimated pulmonary artery systolic pressure is 25 mmHg assuming a right   atrial pressure of 8 mmHg (IVC not visualized, however). Assessment:      Diagnosis Orders   1. Moderate persistent reactive airway disease without complication  mometasone-formoterol (DULERA) 200-5 MCG/ACT inhaler    albuterol sulfate HFA (PROVENTIL HFA) 108 (90 Base) MCG/ACT inhaler       Plan:   80year old male has recurrent episodes of cough and wheezing with cough and sputum production. This is thought to be due to reactive airway disease probably precipitated by GERD. He has family hx of asthma, and hx of GERD with Child's esophagus. He has significant improvement with symbicort 160/4.5, however since starting symbicort he start having hoarseness of voice. (PFT does not show obstruction, probably due to low FVC.   FEV1/SVC is 69.8 consistent with obstructive disease )     Plan  Refill dulera, reduce the dose to 1 puff twice a day with spacer   Albuterol as needed

## 2022-04-04 ENCOUNTER — OFFICE VISIT (OUTPATIENT)
Dept: ENT CLINIC | Age: 72
End: 2022-04-04
Payer: MEDICARE

## 2022-04-04 VITALS
HEIGHT: 74 IN | TEMPERATURE: 97.4 F | DIASTOLIC BLOOD PRESSURE: 77 MMHG | HEART RATE: 96 BPM | SYSTOLIC BLOOD PRESSURE: 120 MMHG | BODY MASS INDEX: 34.42 KG/M2 | WEIGHT: 268.2 LBS

## 2022-04-04 DIAGNOSIS — J32.4 CHRONIC PANSINUSITIS: ICD-10-CM

## 2022-04-04 DIAGNOSIS — J34.2 DEVIATED NASAL SEPTUM: ICD-10-CM

## 2022-04-04 DIAGNOSIS — J34.89 NASAL OBSTRUCTION: Primary | ICD-10-CM

## 2022-04-04 PROCEDURE — G8417 CALC BMI ABV UP PARAM F/U: HCPCS | Performed by: OTOLARYNGOLOGY

## 2022-04-04 PROCEDURE — G8427 DOCREV CUR MEDS BY ELIG CLIN: HCPCS | Performed by: OTOLARYNGOLOGY

## 2022-04-04 PROCEDURE — 4040F PNEUMOC VAC/ADMIN/RCVD: CPT | Performed by: OTOLARYNGOLOGY

## 2022-04-04 PROCEDURE — 99213 OFFICE O/P EST LOW 20 MIN: CPT | Performed by: OTOLARYNGOLOGY

## 2022-04-04 PROCEDURE — 1036F TOBACCO NON-USER: CPT | Performed by: OTOLARYNGOLOGY

## 2022-04-04 PROCEDURE — 3017F COLORECTAL CA SCREEN DOC REV: CPT | Performed by: OTOLARYNGOLOGY

## 2022-04-04 PROCEDURE — 1123F ACP DISCUSS/DSCN MKR DOCD: CPT | Performed by: OTOLARYNGOLOGY

## 2022-04-04 RX ORDER — METHYLPREDNISOLONE 4 MG/1
TABLET ORAL
Qty: 1 KIT | Refills: 0 | Status: SHIPPED | OUTPATIENT
Start: 2022-04-04 | End: 2022-04-25 | Stop reason: ALTCHOICE

## 2022-04-04 NOTE — PROGRESS NOTES
FOLLOW UP VISIT:    CHIEF COMPLAINT: Nasal obstruction. INTERIM HISTORY: Nasal obstruction, bilateral, left worse than right. Has some rhinorrhea bilateral.  Some headache. Recently had imaging of the paranasal sinuses.       PAST MEDICAL HISTORY:   Social History     Tobacco Use   Smoking Status Former Smoker    Packs/day: 0.50    Years: 10.00    Pack years: 5.00    Types: Cigarettes    Quit date: 2002    Years since quittin.2   Smokeless Tobacco Never Used                                                    Social History     Substance and Sexual Activity   Alcohol Use Not Currently    Alcohol/week: 2.0 standard drinks    Types: 2 Standard drinks or equivalent per week    Comment: rarely                                                    Current Outpatient Medications:     [START ON 2022] mometasone-formoterol (DULERA) 200-5 MCG/ACT inhaler, Inhale 1 puff into the lungs every 12 hours, Disp: 3 each, Rfl: 3    albuterol sulfate HFA (PROVENTIL HFA) 108 (90 Base) MCG/ACT inhaler, Inhale 2 puffs into the lungs every 4 hours as needed for Wheezing or Shortness of Breath (Space out to every 6 hours as symptoms improve) Space out to every 6 hours as symptoms improve., Disp: 3 each, Rfl: 3    montelukast (SINGULAIR) 10 MG tablet, Take 1 tablet by mouth nightly, Disp: 90 tablet, Rfl: 3    fluticasone (FLONASE) 50 MCG/ACT nasal spray, 1 spray by Each Nostril route daily, Disp: 32 g, Rfl: 1    cetirizine (ZYRTEC) 10 MG tablet, TAKE 1 TABLET DAILY, Disp: 90 tablet, Rfl: 2    finasteride (PROSCAR) 5 MG tablet, Take 1 tablet by mouth daily, Disp: 90 tablet, Rfl: 3    atorvastatin (LIPITOR) 10 MG tablet, TAKE 1 TABLET DAILY, Disp: 90 tablet, Rfl: 3    pantoprazole (PROTONIX) 20 MG tablet, Take 1 tablet by mouth 2 times daily, Disp: 180 tablet, Rfl: 2    aspirin 81 MG chewable tablet, Take 81 mg by mouth daily, Disp: , Rfl:     tamsulosin (FLOMAX) 0.4 MG capsule, Take 2 capsules by mouth daily, Disp: 90 capsule, Rfl: 1    Omega-3 Fatty Acids (FISH OIL) 1000 MG CAPS, Take 1,000 mg by mouth daily. , Disp: , Rfl:     Flaxseed, Linseed, (FLAX SEED OIL) 1000 MG CAPS, Take 1,000 mg by mouth daily. , Disp: , Rfl:     Cholecalciferol (VITAMIN D) 2000 UNITS CAPS capsule, Take 1 capsule by mouth daily. , Disp: , Rfl:     multivitamin (THERAGRAN) per tablet, Take 1 tablet by mouth daily. , Disp: , Rfl:                                                  Past Medical History:   Diagnosis Date    Acute sinusitis 1/18/2021    Allergic rhinitis     Arthritis     R thumb, low back    Asthma     Erectile dysfunction     Hearing loss     Hyperlipidemia     Hypertension     Kidney failure 07/2018    Normal pressure hydrocephalus ([address] 07/2018    shunt placement    Screening for abdominal aortic aneurysm (AAA) performed 03/02/2018    [facility]    Tinnitus     Unspecified sleep apnea                                                     Past Surgical History:   Procedure Laterality Date    COLONOSCOPY  12/14/2018    COLONOSCOPY WITH BIOPSY performed by Yuniel Mueller MD at [facility] ENTEROSCOPY N/A 1/4/2019    ENTEROSCOPY PUSH BIOPSY performed by Yuniel Mueller MD at [facility] Left     lipoma    JOINT REPLACEMENT Left 2015    PARTIAL KNEE REPLACMENT    LAPAROSCOPY N/A 7/18/2018    OPENING AND CLOSING FOR VENTRICULAR PERITONEAL SHUNT performed by Consuelo Mccormick MD at [address] 07/18/2018    [address]; (N/A )    NV CREATE SHUNT:VENTRIC-PERITONEAL N/A 7/18/2018    VENTRICULAR PERITONEAL SHUNT INSERTION RIGHT SIDE; performed by Nitin Martinez MD at [address] [facility] 12/14/2018    EGD BIOPSY performed by Yuniel Mueller MD at [address] N/A 3/1/2019    ESOPHAGOGASTRODUODENOSCOPY WITH RADIOFREQUENCY  ABLATION/ ABLATIONS X19 performed by Yuniel Mueller MD at [address] ENDOSCOPY    UPPER GASTROINTESTINAL ENDOSCOPY N/A 4/24/2019    ESOPHAGOGASTRODUODENOSCOPY WITH RADIOFREQUENCY ABLATION performed by Carson Lou MD at [address] [facility] 4/24/2019    EGD BIOPSY performed by Carson Lou MD at [address] [facility] N/A 8/2/2019    ESOPHAGOGASTRODUODENOSCOPY RADIOFREQUENCY ABLATION performed by Carson Lou MD at [address] [facility] N/A 8/2/2019    EGD GASTRIC BIOPSY performed by Carson Lou MD at [address] [facility] N/A 8/2/2019    EGD POLYP COLD SNARE performed by Carson Lou MD at [address] [facility] N/A 11/19/2019    ESOPHAGOGASTRODUODENOSCOPY WITH RADIOFREQUENCY ABLATION performed by Carson Lou MD at [address] [facility] N/A 11/19/2019    EGD POLYP SNARE performed by Carson Lou MD at [address] [facility] N/A 7/15/2020    ESOPHAGOGASTRODUODENOSCOPY RADIOFREQUENCY ABLATION ON STANDBY performed by Carson Lou MD at [address] [facility] 7/15/2020    EGD BIOPSY performed by Carson Lou MD at [address] [facility] 7/16/2021    EGD BIOPSY performed by Carson Lou MD at [address] [facility] N/A 7/16/2021    EGD POLYP SNARE performed by Carson Lou MD at [address] [address] [facility] [address]: Family history reviewed and except as pertinent to the interim history is not contributory. REVIEW OF SYSTEMS:  All pertinent positive and negative findings included in HPI. Otherwise, all other systems are reviewed and are negative    PHYSICAL EXAMINATION:   GENERAL: wdwn- no acute distress  RESPIRATORY: No stridor. COMMUNICATION :  Normal voice  MENTAL STATUS: Alert and oriented x3  HEAD AND FACE: No skin lesions of the face.   EXTERNAL EARS AND NOSE: The

## 2022-04-07 ENCOUNTER — OFFICE VISIT (OUTPATIENT)
Dept: ENT CLINIC | Age: 72
End: 2022-04-07
Payer: MEDICARE

## 2022-04-07 VITALS
DIASTOLIC BLOOD PRESSURE: 89 MMHG | BODY MASS INDEX: 34.14 KG/M2 | WEIGHT: 266 LBS | SYSTOLIC BLOOD PRESSURE: 131 MMHG | HEART RATE: 103 BPM | HEIGHT: 74 IN

## 2022-04-07 DIAGNOSIS — J34.3 HYPERTROPHY OF NASAL TURBINATES: ICD-10-CM

## 2022-04-07 DIAGNOSIS — J34.2 DEVIATED NASAL SEPTUM: ICD-10-CM

## 2022-04-07 DIAGNOSIS — J34.89 NASAL OBSTRUCTION: Primary | ICD-10-CM

## 2022-04-07 DIAGNOSIS — J32.4 CHRONIC PANSINUSITIS: ICD-10-CM

## 2022-04-07 DIAGNOSIS — R43.0 ANOSMIA: ICD-10-CM

## 2022-04-07 PROCEDURE — 1036F TOBACCO NON-USER: CPT | Performed by: OTOLARYNGOLOGY

## 2022-04-07 PROCEDURE — 3017F COLORECTAL CA SCREEN DOC REV: CPT | Performed by: OTOLARYNGOLOGY

## 2022-04-07 PROCEDURE — 4040F PNEUMOC VAC/ADMIN/RCVD: CPT | Performed by: OTOLARYNGOLOGY

## 2022-04-07 PROCEDURE — G8417 CALC BMI ABV UP PARAM F/U: HCPCS | Performed by: OTOLARYNGOLOGY

## 2022-04-07 PROCEDURE — 1123F ACP DISCUSS/DSCN MKR DOCD: CPT | Performed by: OTOLARYNGOLOGY

## 2022-04-07 PROCEDURE — G8427 DOCREV CUR MEDS BY ELIG CLIN: HCPCS | Performed by: OTOLARYNGOLOGY

## 2022-04-07 PROCEDURE — 99214 OFFICE O/P EST MOD 30 MIN: CPT | Performed by: OTOLARYNGOLOGY

## 2022-04-07 ASSESSMENT — ENCOUNTER SYMPTOMS
TROUBLE SWALLOWING: 0
SINUS PAIN: 0
COUGH: 0
FACIAL SWELLING: 0
RHINORRHEA: 0
SINUS PRESSURE: 0
SORE THROAT: 0
DIARRHEA: 0
CHOKING: 0
VOICE CHANGE: 0
NAUSEA: 0
EYE ITCHING: 0
EYE PAIN: 0
EYE REDNESS: 0
SHORTNESS OF BREATH: 0

## 2022-04-07 NOTE — LETTER
[address] [facility]  [address]  Phone: [phone]  Fax: [phone]           Anuel Salgado MD      April 7, 2022     Patient: Maryam Perez   MR Number: [id number]   YOB: 1950   Date of Visit: 4/7/2022       Dear Dr. Omero Rawls: Thank you for referring Pretty Woodall to me for evaluation/treatment. Below are the relevant portions of my assessment and plan of care. If you have questions, please do not hesitate to call me. I look forward to following Guanaco Tesfaye along with you. Sincerely,        Anuel Salgado MD    CC providers:   Loren Coffey MD  [facility]

## 2022-04-07 NOTE — PROGRESS NOTES
Subjective:      Patient ID: Shanelle Stephenson is a 80 y.o. male. HPI  Chief Complaint   Patient presents with    Sinus problems  History of Present Illness:Jared is a(n) 80 y.o. male who presents with a 1 plus year history of nasal congestion and loss of smell. Seen Dr. Elsie Baca. CT with chronic pansinusitis. Nasal Discharge: none  Nasal Obstruction: Yes bilateral; constant  Post Nasal Drainage: Yes  Nasal Bleeding: No  Sense of Smell: absent  Eye Symptoms: none  Previous Treatments: antibiotics, steroids, and flonase.  Better on prednisone     Patient Active Problem List   Diagnosis    Wrist arthritis    Sprain of lumbar region    Hypertrophy of prostate without urinary obstruction and other lower urinary tract symptoms (LUTS)    Cardiac dysrhythmia    Onychomycosis    Obstructive sleep apnea    Personal history of other diseases of digestive system    Erectile dysfunction    Vitamin D deficiency    Mixed hyperlipidemia    Persistent depressive disorder    Dysthymia    Non-seasonal allergic rhinitis due to pollen    Pulmonary nodule    Traumatic rhabdomyolysis (HCC)    Acute renal failure due to rhabdomyolysis (HCC)    Frequent falls    Functional gait abnormality    Back pain    Normal pressure hydrocephalus (HCC)    Hyponatremia    Hydrocephalus (HCC)    Umbilical hernia, incarcerated    NPH (normal pressure hydrocephalus) ([address]    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    Early onset Alzheimer's dementia ([address]    Morbidly obese (HCC)    Chronic cough    Acute sinusitis    Neuropathy    Pre-diabetes     Past Surgical History:   Procedure Laterality Date    COLONOSCOPY  12/14/2018    COLONOSCOPY WITH BIOPSY performed by Lauren Flores MD at [facility] ENTEROSCOPY N/A 1/4/2019    ENTEROSCOPY PUSH BIOPSY performed by Lauren Flores MD at [facility] Left     lipoma    JOINT REPLACEMENT Left 2015    PARTIAL KNEE REPLACMENT    LAPAROSCOPY N/A 7/18/2018 OPENING AND CLOSING FOR VENTRICULAR PERITONEAL SHUNT performed by Christiano Frye MD at [address] [facility] [address] 07/18/2018     VENTRICULAR PERITONEAL SHUNT INSERTION RIGHT SIDE; (N/A )    VA CREATE SHUNT:VENTRIC-PERITONEAL N/A 7/18/2018    VENTRICULAR PERITONEAL SHUNT INSERTION RIGHT SIDE; performed by Eli Orellana MD at [address] [facility] 12/14/2018    EGD BIOPSY performed by Juice Trivedi MD at [address] [facility] N/A 3/1/2019    ESOPHAGOGASTRODUODENOSCOPY WITH RADIOFREQUENCY  ABLATION/ ABLATIONS X19 performed by Juice Trivedi MD at [address] [facility] N/A 4/24/2019    ESOPHAGOGASTRODUODENOSCOPY WITH RADIOFREQUENCY ABLATION performed by Juice Trivedi MD at [address] [place] [address] 4/24/2019    EGD BIOPSY performed by Juice Trivedi MD at [address] [facility] N/A 8/2/2019    ESOPHAGOGASTRODUODENOSCOPY RADIOFREQUENCY ABLATION performed by Juice Trivedi MD at [address] [facility] N/A 8/2/2019    EGD GASTRIC BIOPSY performed by Juice Trivedi MD at [address] [facility] N/A 8/2/2019    EGD POLYP COLD SNARE performed by Juice Trivedi MD at [address] [facility] N/A 11/19/2019    ESOPHAGOGASTRODUODENOSCOPY WITH RADIOFREQUENCY ABLATION performed by Juice Trivedi MD at [address] [facility] N/A 11/19/2019    EGD POLYP SNARE performed by Juice Trivedi MD at [facility] N/A 7/15/2020    ESOPHAGOGASTRODUODENOSCOPY RADIOFREQUENCY ABLATION ON STANDBY performed by Juice Trivedi MD at [address] [place] [address] 7/15/2020    EGD BIOPSY performed by Juice Trivedi MD at [address] [place] [address] 7/16/2021    EGD BIOPSY performed by Juice Trivedi MD at [facility] ENDOSCOPY    UPPER GASTROINTESTINAL ENDOSCOPY N/A 2021    EGD POLYP SNARE performed by Salma Rolon MD at [facility] ENDOSCOPY     Family History   Problem Relation Age of Onset    Cancer Father         prostate     Social History     Socioeconomic History    Marital status:      Spouse name: Not on file    Number of children: Not on file    Years of education: Not on file    Highest education level: Not on file   Occupational History    Not on file   Tobacco Use    Smoking status: Former Smoker     Packs/day: 0.50     Years: 10.00     Pack years: 5.00     Types: Cigarettes     Quit date: 2002     Years since quittin.2    Smokeless tobacco: Never Used   Vaping Use    Vaping Use: Never used   Substance and Sexual Activity    Alcohol use: Not Currently     Alcohol/week: 2.0 standard drinks     Types: 2 Standard drinks or equivalent per week     Comment: rarely    Drug use: No    Sexual activity: Not Currently   Other Topics Concern    Not on file   Social History Narrative    Not on file     Social Determinants of Health     Financial Resource Strain: Low Risk     Difficulty of Paying Living Expenses: Not hard at all   Food Insecurity: No Food Insecurity    Worried About Running Out of Food in the Last Year: Never true    Ernst of Food in the Last Year: Never true   Transportation Needs: No Transportation Needs    Lack of Transportation (Medical): No    Lack of Transportation (Non-Medical): No   Physical Activity: Sufficiently Active    Days of Exercise per Week: 5 days    Minutes of Exercise per Session: 30 min   Stress: No Stress Concern Present    Feeling of Stress : Not at all   Social Connections: Moderately Isolated    Frequency of Communication with Friends and Family: Three times a week    Frequency of Social Gatherings with Friends and Family:  Three times a week    Attends Rastafarian Services: Never    Active Member of Clubs or Organizations: No    Attends Club or daily. Yes Historical Provider, MD   Flaxseed, Linseed, (FLAX SEED OIL) 1000 MG CAPS Take 1,000 mg by mouth daily. Yes Historical Provider, MD   Cholecalciferol (VITAMIN D) 2000 UNITS CAPS capsule Take 1 capsule by mouth daily. Yes Historical Provider, MD   multivitamin [facility]) per tablet Take 1 tablet by mouth daily. Yes Historical Provider, MD         Lab Studies:  Lab Results   Component Value Date    WBC 7.5 02/03/2022    HGB 14.0 02/03/2022    HCT 40.5 02/03/2022    MCV 85.1 02/03/2022     02/03/2022     Lab Results   Component Value Date    GLUCOSE 121 (H) 02/03/2022    BUN 14 02/03/2022    CREATININE 0.9 02/03/2022    K 4.3 02/03/2022     02/03/2022     02/03/2022    CALCIUM 9.5 02/03/2022     Lab Results   Component Value Date    MG 2.20 07/16/2018     Lab Results   Component Value Date    PHOS 3.8 07/07/2018     Lab Results   Component Value Date    ALKPHOS 94 08/25/2021    ALT 26 08/25/2021    AST 23 08/25/2021    BILITOT 0.4 08/25/2021    PROT 7.3 08/25/2021     Review of Systems   Constitutional: Negative for activity change, appetite change, chills, fatigue and fever. HENT: Positive for congestion. Negative for ear discharge, ear pain, facial swelling, hearing loss, nosebleeds, postnasal drip, rhinorrhea, sinus pressure, sinus pain, sneezing, sore throat, tinnitus, trouble swallowing and voice change. Eyes: Negative for pain, redness, itching and visual disturbance. Respiratory: Negative for cough, choking and shortness of breath. Gastrointestinal: Negative for diarrhea and nausea. Endocrine: Negative for cold intolerance and heat intolerance. Objective:   Physical Exam  Constitutional:       Appearance: He is well-developed. HENT:      Head: Not macrocephalic and not microcephalic. No Esqeuda's sign, abrasion, right periorbital erythema, left periorbital erythema or laceration. Nose: Septal deviation present.  No nasal deformity, laceration, mucosal edema or rhinorrhea. Right Nostril: No epistaxis or occlusion. Left Nostril: No epistaxis or occlusion. Right Turbinates: Enlarged. Not swollen or pale. Left Turbinates: Enlarged. Not swollen or pale. Right Sinus: No maxillary sinus tenderness or frontal sinus tenderness. Left Sinus: No maxillary sinus tenderness or frontal sinus tenderness. Mouth/Throat:      Lips: No lesions. Mouth: Mucous membranes are moist.      Tongue: No lesions. Palate: No mass. Pharynx: Uvula midline. No oropharyngeal exudate or posterior oropharyngeal erythema. Tonsils: No tonsillar abscesses. Eyes:      General:         Right eye: No discharge. Left eye: No discharge. Extraocular Movements:      Right eye: Normal extraocular motion. Left eye: Normal extraocular motion. Conjunctiva/sclera:      Right eye: Right conjunctiva is not injected. No chemosis or exudate. Left eye: Left conjunctiva is not injected. No chemosis or exudate. Neck:      Thyroid: No thyroid mass or thyromegaly. Cardiovascular:      Rate and Rhythm: Normal rate and regular rhythm. Pulmonary:      Effort: No tachypnea or respiratory distress. Lymphadenopathy:      Head:      Right side of head: No preauricular or posterior auricular adenopathy. Left side of head: No preauricular or posterior auricular adenopathy. Cervical:      Right cervical: No superficial, deep or posterior cervical adenopathy. Left cervical: No superficial, deep or posterior cervical adenopathy. Neurological:      Mental Status: He is alert and oriented to person, place, and time. Impression   1. Paranasal sinus mucosal thickening as described. This is most pronounced within the ethmoid and maxillary sinuses bilaterally. No air-fluid level to suggest acute sinusitis. 2. 5 mm of leftward nasal septal deviation.  Bilateral ostiomeatal unit obstruction, left greater than right, due to mucosal thickening as well as a left-sided Alta cell. Assessment:       Diagnosis Orders   1. Nasal obstruction     2. Deviated nasal septum     3. Chronic pansinusitis     4. Hypertrophy of nasal turbinates     5. Anosmia             Plan:      OR for FESS, septoplasty and turbinate reduction. The patient has a diagnosis of chronic pansinusitis and nasal obstruction which has not been responsive or cannot be treated with maximal medical therapy. The patient has been advised of options including further medical therapy, surgery, or nothing. The risks and benefits of surgery were described not limited to injury to the skull base, brain, stroke, hemorrhage, brain fluid leak, double vision, visual loss, epistaxis, and need for further surgical management of disease. Patient expectations during and after surgery including post-operative sinonasal care and pain control were described. Questions were answered and the patient agreed to proceed with surgery which will be scheduled in an appropriate time frame in line with the severity of disease. The patient was counseled at length about the risks of tracy Covid-19 in the sandra-operative and post-operative states including the recovery window of their procedure. The patient was made aware that tracy Covid-19 after a surgical procedure may worsen their prognosis for recovering from the virus and lend to a higher morbidity and or mortality risk. The patient was given the options of postponing their procedure. All of the risks, benefits, and alternatives were discussed. The patient does wish to proceed with the procedure.                Alo Thakkar MD

## 2022-04-07 NOTE — LETTER
[facility]    Surgery Schedule Request Form: 04/07/22  [address] [place], [address]      DATE OF SURGERY: 5-4-2022    TIME OF SURGERY:  2:00PM            CONF #: ____________________       Patient Information:    Patient name: Dayne Melvin. YOB: 1950 Age/Sex:71 y.o./male    SS #:[id number]    Wt Readings from Last 1 Encounters:   04/07/22 266 lb (120.7 kg)       Telephone Information:   Mobile [address]      Surgeon & Procedure Information:     Lead surgeon: Lorie Garcia Co-Surgeon: kameron  Phone: [phone] Fax: [phone]  PCP: Yuan Lemos DO    Diagnosis: chronic pansinusitis-J32.4, deviated septum-J34.2, turbinate hypertorphy-J34.3, nasal obstruction-J34.89, anosmia-R43.0    Procedure name/CPT: Bilateral Inferior Turbinate Reduction (85528-41), Bilateral Maxillary Antrostomy (29064-51), Bilateral Total Ethmoidectomy with Sphenoidectomy (11469-29) and Septoplasty (50148), Bilateral frontal sinus exploration (31879-67)    Post operative debridement 43455-47, 2 units    Procedure length: 2 hours Anesthesia: General    Special Equipment: no    Patient Status: SDS (OP)    Primary Payor Plan: Medicare, [facility]  Member ID: [id number], [id number]   [address] [place] [address] Drive name: [facility].     [] Implement attached clinical orders for patient.       Electronically signed by Duyen Ansari MD on 4/7/2022 at 11:21 AM

## 2022-04-19 ENCOUNTER — TELEPHONE (OUTPATIENT)
Dept: INTERNAL MEDICINE CLINIC | Age: 72
End: 2022-04-19

## 2022-04-19 NOTE — TELEPHONE ENCOUNTER
Left message for patient to call the office to schedule. Dr. Francisca Foster has availability to see patient next week.

## 2022-04-19 NOTE — TELEPHONE ENCOUNTER
Dr Geoff Rangel will be out of the office next week so we have no where to squeeze him in with her. He will need to see another provider to have his pre op completed.   Thank you

## 2022-04-19 NOTE — TELEPHONE ENCOUNTER
Per me, Sandra Ritter received the return call, so I informed her to schedule where she did. PHONE ENCOUNTER COMPLETED.

## 2022-04-25 PROBLEM — M62.82 ACUTE RENAL FAILURE DUE TO RHABDOMYOLYSIS (HCC): Status: RESOLVED | Noted: 2018-07-02 | Resolved: 2022-04-25

## 2022-04-25 PROBLEM — N17.9 ACUTE RENAL FAILURE DUE TO RHABDOMYOLYSIS (HCC): Status: RESOLVED | Noted: 2018-07-02 | Resolved: 2022-04-25

## 2022-04-25 PROBLEM — E66.01 MORBIDLY OBESE (HCC): Status: RESOLVED | Noted: 2020-11-24 | Resolved: 2022-04-25

## 2022-04-25 PROBLEM — E87.1 HYPONATREMIA: Status: RESOLVED | Noted: 2018-07-16 | Resolved: 2022-04-25

## 2022-04-25 PROBLEM — T79.6XXA TRAUMATIC RHABDOMYOLYSIS (HCC): Status: RESOLVED | Noted: 2018-07-02 | Resolved: 2022-04-25

## 2022-04-25 PROBLEM — J01.90 ACUTE SINUSITIS: Status: RESOLVED | Noted: 2021-01-18 | Resolved: 2022-04-25

## 2022-04-25 NOTE — PROGRESS NOTES
[facility] PRE-SURGICAL TESTING INSTRUCTIONS                                  PRIOR TO PROCEDURE DATE:        1. PLEASE FOLLOW ANY  GUIDELINES/ INSTRUCTIONS PRIOR TO YOUR PROCEDURE AS ADVISED BY YOUR SURGEON. 2. Arrange for someone to drive you home and be with you for the first 24 hours after discharge for your safety after your procedure for which you received sedation. Ensure it is someone we can share information with regarding your discharge. 3. You must contact your surgeon for instructions IF:   You are taking any blood thinners, aspirin, anti-inflammatory or vitamin E.   There is a change in your physical condition such as a cold, fever, rash, cuts, sores or any other infection, especially near your surgical site. 4. Do not drink alcohol the day before or day of your procedure. 5. A Pre-op History and Physical for surgery MUST be completed by your Physician or Urgent Care within 30 days of your procedure date. Please bring a copy with you on the day of your procedure and along with any other testing performed. THE DAY OF YOUR PROCEDURE:  1. Follow instructions for ARRIVAL TIME as DIRECTED BY YOUR SURGEON. 2. Enter the MAIN entrance from [address] and follow the signs to the free [employer] or [employer] parking (offered free of charge 6am-5pm). 3. Enter the Main Entrance of the hospital (do not enter from the lower level of the parking garage). Upon entrance, check in with the  at the main desk on your left. If no one is available at the desk, proceed into the [facility] Waiting Room and go through the door directly into the [facility]. There is a Check-in desk ACROSS from Room 5 (marked with a sign hanging from the ceiling). The phone number for the surgery center is [phone]. 4. Please call [phone] option #2 option #2 if you have not been preregistered yet.   On the day of your procedure bring your insurance card and photo ID. You will be registered at your bedside once brought back to your room. 5. DO NOT EAT ANYTHING eight hours prior to your arrival for surgery. May have 8 ounces of water 4 hours prior to your arrival for surgery. NOTE: ALL Gastric, Bariatric and Bowel surgery patients MUST follow their surgeon's instructions. 6. MEDICATIONS    Take the following medications with a SMALL sip of water:  Protonix   Bariatric patient's call surgeon if on diabetic medications as some need to be stopped 1 week preop   Use your usual dose of inhalers the morning of surgery. BRING your rescue inhaler with you to hospital.    Anesthesia does NOT want you to take insulin the morning of surgery. They will control your blood sugar while you are at the hospital. Please contact your ordering physician for instructions regarding your insulin the night before your procedure. If you have an insulin pump, please keep it set on basal rate. 7. Do not swallow water when brushing teeth. No gum, candy, mints or ice chips. Refrain from smoking or at least decrease the amount. 8. Dress in loose, comfortable clothing appropriate for redressing after your procedure. Do not wear jewelry (including body piercings), make-up (especially NO eye make-up), fingernail polish (NO toenail polish if foot/leg surgery), lotion, powders or metal hairclips. 9. Dentures, glasses, or contacts will need to be removed before your procedure. Bring cases for your glasses, contacts, dentures, or hearing aids to protect them while you are in surgery. 10. If you use a CPAP, please bring it with you on the day of your procedure. 11. We recommend that valuable personal  belongings such as cash, cell phones, e-tablets or jewelry, be left at home during your stay. The hospital will not be responsible for valuables that are not secured in the hospital safe.  However, if your insurance requires a co-pay, you may want to bring a method of payment, i.e. Check or credit card, if you wish to pay your co-pay the day of surgery. 12. If you are to stay overnight, you may bring a bag with personal items. Please have any large items you may need brought in by your family after your arrival to your hospital room. 15. If you have a Living Will or Durable Power of , please bring a copy on the day of your procedure. 15. With your permission, one family member may accompany you while you are being prepared for surgery. Once you are ready, additional family members may join you. HOW WE KEEP YOU SAFE and WORK TO PREVENT SURGICAL SITE INFECTIONS:  1. Health care workers should always check your ID bracelet to verify your name and birth date. You will be asked many times to state your name, date of birth, and allergies. 2. Health care workers should always clean their hands with soap or alcohol gel before providing care to you. It is okay to ask anyone if they cleaned their hands before they touch you. 3. You will be actively involved in verifying the type of procedure you are having and ensuring the correct surgical site. This will be confirmed multiple times prior to your procedure. Do NOT carlee your surgery site UNLESS instructed to by your surgeon. 4. Do not shave or wax for 72 hours prior to procedure near your operative site. Shaving with a razor can irritate your skin and make it easier to develop an infection. On the day of your procedure, any hair that needs to be removed near the surgical site will be clipped by a healthcare worker using a special clippers designed to avoid skin irritation. 5. When you are in the operating room, your surgical site will be cleansed with a special soap, and in most cases, you will be given an antibiotic before the surgery begins. What to expect AFTER YOUR PROCEDURE:  1. Immediately following your procedure, your will be taken to the PACU for the first phase of your recovery.   Your nurse will help you recover from any potential side effects of anesthesia, such as extreme drowsiness, changes in your vital signs or breathing patterns. Nausea, headache, muscle aches, or sore throat may also occur after anesthesia. Your nurse will help you manage these potential side effects. 2. For comfort and safety, arrange to have someone at home with you for the first 24 hours after discharge. 3. You and your family will be given written instructions about your diet, activity, dressing care, medications, and return visits. 4. Once at home, should issues with nausea, pain, or bleeding occur, or should you notice any signs of infection, you should call your surgeon. 5. Always clean your hands before and after caring for your wound. Do not let your family touch your surgery site without cleaning their hands. 6. Narcotic pain medications can cause significant constipation. You may want to add a stool softener to your postoperative medication schedule or speak to your surgeon on how best to manage this SIDE EFFECT. SPECIAL INSTRUCTIONS   Thank you for allowing us to care for you. We strive to exceed your expectations in the delivery of care and service provided to you and your family. If you need to contact the [place] [address] staff for any reason, please call us at [phone]    Instructions reviewed with patient during preadmission testing phone interview.   Marta Hsu RN.4/25/2022 .4:42 PM      ADDITIONAL EDUCATIONAL INFORMATION REVIEWED PER PHONE WITH YOU AND/OR YOUR FAMILY:    Yes Antibacterial Soap

## 2022-04-25 NOTE — PROGRESS NOTES
Place patient label inside box (if no patient label, complete below)  Name:  :  MR#:   Deidre Dugan / PROCEDURE  1. I (we), Natalia Tripathi.  authorize DR RANDY Rice  and/or such assistants as may be selected by him/her, to perform the following operation/procedure(s): BILATERAL INFERIOR TURBINATE REDUCTION, BILATERAL MAXILLARY ANTROSTOMY, BILATERAL TOTAL ETHMOIDECTOMY WITH SPEHNOIDECTOMY AND SEPTOPLASTY, BILATERAL FRONTAL SINUS EXPLORATION        Note: If unable to obtain consent prior to an emergent procedure, document the emergent reason in the medical record. This procedure has been explained to my (our) satisfaction and included in the explanation was:  A) The intended benefit, nature, and extent of the procedure to be performed;  B) The significant risks involved and the probability of success;  C) Alternative procedures and methods of treatment;  D) The dangers and probable consequences of such alternatives (including no procedure or treatment); E) The expected consequences of the procedure on my future health;  F) Whether other qualified individuals would be performing important surgical tasks and/or whether  would be present to advise or support the procedure. I (we) understand that there are other risks of infection and other serious complications in the pre-operative/procedural and postoperative/procedural stages of my (our) care. I (we) have asked all of the questions which I (we) thought were important in deciding whether or not to undergo treatment or diagnosis. These questions have been answered to my (our) satisfaction. I (we) understand that no assurance can be given that the procedure will be a success, and no guarantee or warranty of success has been given to me (us).     2. It has been explained to me (us) that during the course of the operation/procedure, unforeseen conditions may be revealed that necessitate extension of the original procedure(s) or different procedure(s) than those set forth in Paragraph 1. I (we) authorize and request that the above-named physician, his/her assistants or his/her designees, perform procedures as necessary and desirable if deemed to be in my (our) best interest.     Revised 8/2/2021                                                                          Page 1 of 2           3. I acknowledge that health care personnel may be observing this procedure for the purpose of medical education or other specified purposes as may be necessary as requested and/or approved by my (our) physician. 4. I (we) consent to the disposal by the hospital Pathologist of the removed tissue, parts or organs in accordance with hospital policy. 5. I do ____ do not ____ consent to the use of a local infiltration pain blocking agent that will be used by my provider/surgical provider to help alleviate pain during my procedure. 6. I do ____ do not ____ consent to an emergent blood transfusion in the case of a life-threatening situation that requires blood components to be administered. This consent is valid for 24 hours from the beginning of the procedure. 7. This patient does ____ or does not ____ currently have a DNR status/order. If DNR order is in place, obtain Addendum to the Surgical Consent for ALL Patients with a DNR Order to address sandra-operative status for limited intervention or DNR suspension.      8. I have read and fully understand the above Consent for Operation/Procedure and that all blanks were completed before I signed the consent.   _____________________________       _____________________      ____/____am/pm  Signature of Patient or legal representative      Printed Name / Relationship            Date / Time   ____________________________       _____________________      ____/____am/pm  Witness to Signature                                    Printed Name                    Date / Time     If patient is unable to sign or is a minor, complete the following)  Patient is a minor, ____ years of age, or unable to sign because:   ______________________________________________________________________________________________    Mcconnell If a phone consent is obtained, consent will be documented by using two health care professionals, each affirming that the consenting party has no questions and gives consent for the procedure discussed with the physician/provider.   _____________________          ____________________       _____/_____am/pm   2nd witness to phone consent        Printed name           Date / Time    Informed Consent:  I have provided the explanation described above in section 1 to the patient and/or legal representative.  I have provided the patient and/or legal representative with an opportunity to ask any questions about the proposed operation/procedure.   ___________________________          ____________________         ____/____am/pm  Provider / Proceduralist                            Printed name            Date / Time  Revised 8/2/2021                                                                      Page 2 of 2

## 2022-04-26 ENCOUNTER — OFFICE VISIT (OUTPATIENT)
Dept: INTERNAL MEDICINE CLINIC | Age: 72
End: 2022-04-26
Payer: MEDICARE

## 2022-04-26 VITALS
DIASTOLIC BLOOD PRESSURE: 78 MMHG | WEIGHT: 262 LBS | OXYGEN SATURATION: 98 % | BODY MASS INDEX: 33.62 KG/M2 | HEART RATE: 97 BPM | HEIGHT: 74 IN | SYSTOLIC BLOOD PRESSURE: 120 MMHG

## 2022-04-26 DIAGNOSIS — J45.40 MODERATE PERSISTENT REACTIVE AIRWAY DISEASE WITHOUT COMPLICATION: ICD-10-CM

## 2022-04-26 DIAGNOSIS — J34.2 DEVIATED SEPTUM: Primary | ICD-10-CM

## 2022-04-26 DIAGNOSIS — Z01.818 PRE-OP EVALUATION: ICD-10-CM

## 2022-04-26 PROBLEM — J45.909 REACTIVE AIRWAY DISEASE WITHOUT COMPLICATION: Status: ACTIVE | Noted: 2022-04-26

## 2022-04-26 PROBLEM — R29.6 FREQUENT FALLS: Status: RESOLVED | Noted: 2018-07-05 | Resolved: 2022-04-26

## 2022-04-26 PROBLEM — R05.3 CHRONIC COUGH: Status: RESOLVED | Noted: 2020-11-24 | Resolved: 2022-04-26

## 2022-04-26 PROBLEM — J44.9 COPD (CHRONIC OBSTRUCTIVE PULMONARY DISEASE) (HCC): Status: ACTIVE | Noted: 2022-04-26

## 2022-04-26 PROBLEM — R26.89 FUNCTIONAL GAIT ABNORMALITY: Status: RESOLVED | Noted: 2018-07-05 | Resolved: 2022-04-26

## 2022-04-26 PROCEDURE — 4040F PNEUMOC VAC/ADMIN/RCVD: CPT | Performed by: INTERNAL MEDICINE

## 2022-04-26 PROCEDURE — 99214 OFFICE O/P EST MOD 30 MIN: CPT | Performed by: INTERNAL MEDICINE

## 2022-04-26 PROCEDURE — 3017F COLORECTAL CA SCREEN DOC REV: CPT | Performed by: INTERNAL MEDICINE

## 2022-04-26 PROCEDURE — 1123F ACP DISCUSS/DSCN MKR DOCD: CPT | Performed by: INTERNAL MEDICINE

## 2022-04-26 PROCEDURE — G8417 CALC BMI ABV UP PARAM F/U: HCPCS | Performed by: INTERNAL MEDICINE

## 2022-04-26 PROCEDURE — 1036F TOBACCO NON-USER: CPT | Performed by: INTERNAL MEDICINE

## 2022-04-26 PROCEDURE — G8427 DOCREV CUR MEDS BY ELIG CLIN: HCPCS | Performed by: INTERNAL MEDICINE

## 2022-04-26 ASSESSMENT — ENCOUNTER SYMPTOMS
SHORTNESS OF BREATH: 0
COUGH: 0

## 2022-04-26 NOTE — ASSESSMENT & PLAN NOTE
Patient is well controlled/asymptomatic on his current medications.   No change to meds, and can use his inhaler on morning of surgery

## 2022-04-26 NOTE — PROGRESS NOTES
[facility]- Internal Medicine  Preoperative Consultation      2022    Hanglacie Kim. :  1950    Chief Complaint   Patient presents with    Pre-op Exam     Preop -Deviated [id number]--Dr. Gerda Cope fax         HPI:  This patient presents to the office today for a preoperative consultation at the request of Dr. Gerda Cope to assess stability of patient's medical condition(s) listed below. He will be having FESS, septoplasty and turbinate reduction. Issues with chronic sinuitis and deviated septum. Sinus infection doing better and as a result, chronic cough as well. Has stable COPD, not smoking. Rarely needs rescue inhaler. Review of Systems   Constitutional: Negative for chills and fever. Respiratory: Negative for cough and shortness of breath. Cardiovascular: Negative for chest pain and palpitations. Skin: Negative for rash. Hematological: Does not bruise/bleed easily.      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     COPD (chronic obstructive pulmonary disease) (HCC)     COPD [facility]chronic obstructive pulmonary disease) ([facility] [address] 2022    Erectile dysfunction     Hearing loss     Hyperlipidemia     Hypertension     Kidney failure 2018    Normal pressure hydrocephalus ([facility] [address] 2018    shunt placement    Screening for abdominal aortic aneurysm (AAA) performed 2018    [address] [facility]    Tinnitus     Unspecified sleep apnea     Wears glasses     Wears hearing aid in both ears      Past Surgical History:   Procedure Laterality Date    ABLATION OF DYSRHYTHMIC FOCUS      COLONOSCOPY  2018    COLONOSCOPY WITH BIOPSY performed by Konstantin Mcgraw MD at [facility] ENTEROSCOPY N/A 1/4/2019    ENTEROSCOPY PUSH BIOPSY performed by Jean-Claude Warner MD at [facility] Left     lipoma    JOINT REPLACEMENT Left 2015    PARTIAL KNEE REPLACMENT    LAPAROSCOPY N/A 7/18/2018    OPENING AND CLOSING FOR VENTRICULAR PERITONEAL SHUNT performed by Darling Wynne MD at [address] [place] [address] [address] 07/18/2018     VENTRICULAR PERITONEAL SHUNT INSERTION RIGHT SIDE; (N/A )    MI CREATE SHUNT:VENTRIC-PERITONEAL N/A 7/18/2018    VENTRICULAR PERITONEAL SHUNT INSERTION RIGHT SIDE; performed by Bonnie Hernandes MD at [address] [place] [address] N/A 12/14/2018    EGD BIOPSY performed by Jean-Claude Warner MD at [facility] N/A 3/1/2019    ESOPHAGOGASTRODUODENOSCOPY WITH RADIOFREQUENCY  ABLATION/ ABLATIONS X19 performed by Jean-Claude Warner MD at [facility] N/A 4/24/2019    ESOPHAGOGASTRODUODENOSCOPY WITH RADIOFREQUENCY ABLATION performed by Jean-Claude Warner MD at [facility] NA 4/24/2019    EGD BIOPSY performed by Jean-Claude Warner MD at [facility] N/A 8/2/2019    ESOPHAGOGASTRODUODENOSCOPY RADIOFREQUENCY ABLATION performed by Jean-Claude Warner MD at [facility] 8/2/2019    EGD GASTRIC BIOPSY performed by Jean-Claude Warner MD at [facility] N/A 8/2/2019    EGD POLYP COLD SNARE performed by Jean-Claude Warner MD at [facility] N/A 11/19/2019    ESOPHAGOGASTRODUODENOSCOPY WITH RADIOFREQUENCY ABLATION performed by Jean-Claude Warner MD at [facility]/A 11/19/2019    EGD POLYP SNARE performed by Jean-Claude Warner MD at [facility] N 7/15/2020    ESOPHAGOGASTRODUODENOSCOPY RADIOFREQUENCY ABLATION ON STANDBY performed by Jean-Claude Warner MD at [facility] ENDOSCOPY    UPPER GASTROINTESTINAL ENDOSCOPY N/A 7/15/2020    EGD BIOPSY performed by Nathaniel Nunez MD at [address] N/A 2021    EGD BIOPSY performed by Nathaniel Nunez MD at [address] N/A 2021    EGD POLYP SNARE performed by Nathaniel Nunez MD at [facility] ENDOSCOPY     Family History   Problem Relation Age of Onset    Cancer Father         prostate     Social History     Tobacco Use    Smoking status: Former Smoker     Packs/day: 0.50     Years: 10.00     Pack years: 5.00     Types: Cigarettes     Quit date: 2002     Years since quittin.3    Smokeless tobacco: Never Used   Vaping Use    Vaping Use: Never used   Substance Use Topics    Alcohol use: Not Currently     Alcohol/week: 2.0 standard drinks     Types: 2 Standard drinks or equivalent per week     Comment: rarely    Drug use: No     Allergies   Allergen Reactions    Lisinopril Other (See Comments)     cough       Outpatient Medications Marked as Taking for the 22 encounter (Office Visit) with Carloz Collier MD   Medication Sig Dispense Refill    mometasone-formoterol (DULERA) 200-5 MCG/ACT inhaler Inhale 1 puff into the lungs every 12 hours 3 each 3    montelukast (SINGULAIR) 10 MG tablet Take 1 tablet by mouth nightly 90 tablet 3    cetirizine (ZYRTEC) 10 MG tablet TAKE 1 TABLET DAILY 90 tablet 2    finasteride (PROSCAR) 5 MG tablet Take 1 tablet by mouth daily 90 tablet 3    atorvastatin (LIPITOR) 10 MG tablet TAKE 1 TABLET DAILY 90 tablet 3    pantoprazole (PROTONIX) 20 MG tablet Take 1 tablet by mouth 2 times daily 180 tablet 2    aspirin 81 MG chewable tablet Take 81 mg by mouth daily      tamsulosin (FLOMAX) 0.4 MG capsule Take 2 capsules by mouth daily 90 capsule 1    Omega-3 Fatty Acids (FISH OIL) 1000 MG CAPS Take 1,000 mg by mouth daily.  Flaxseed, Linseed, (FLAX SEED OIL) 1000 MG CAPS Take 1,000 mg by mouth daily.       Cholecalciferol (VITAMIN D) 2000 UNITS CAPS capsule Take 1 capsule by mouth daily.  multivitamin (THERAGRAN) per tablet Take 1 tablet by mouth daily. Physical Exam:  /78 (Site: Right Upper Arm, Position: Sitting)   Pulse 97   Ht 6' 2\" (1.88 m)   Wt 262 lb (118.8 kg)   SpO2 98%   BMI 33.64 kg/m²   Physical Exam  Constitutional:       Appearance: Normal appearance. Eyes:      Pupils: Pupils are equal, round, and reactive to light. Neck:      Vascular: No carotid bruit. Cardiovascular:      Rate and Rhythm: Normal rate and regular rhythm. Heart sounds: No murmur heard. Pulmonary:      Effort: Pulmonary effort is normal.      Breath sounds: Normal breath sounds. Abdominal:      General: Bowel sounds are normal.      Palpations: Abdomen is soft. Tenderness: There is no abdominal tenderness. Musculoskeletal:      Right lower leg: Edema (1+ ankle/LE) present. Left lower leg: Edema (1+ ankle/LE) present. Neurological:      General: No focal deficit present. Mental Status: He is alert and oriented to person, place, and time. EKG Interpretation:  (done 2/3/2022) NSR, no ischemic changes.   Lab Review:   Component Date Value    WBC 02/03/2022 7.5     RBC 02/03/2022 4.76     Hemoglobin 02/03/2022 14.0     Hematocrit 02/03/2022 40.5     MCV 02/03/2022 85.1     MCH 02/03/2022 29.4     MCHC 02/03/2022 34.5     RDW 02/03/2022 15.2     Platelets 07/72/4506 215     Sodium 02/03/2022 137     Potassium reflex Magnesi* 02/03/2022 4.3     Chloride 02/03/2022 102     CO2 02/03/2022 23     Anion Gap 02/03/2022 12     Glucose 02/03/2022 121*    BUN 02/03/2022 14     CREATININE 02/03/2022 0.9     GFR Non- 02/03/2022 >60     Calcium 02/03/2022 9.5         ASSESSMENT/PLAN:   Deviated septum  Pre-op evaluation  Comments:  Medically stable with no contraindication to planned procedure  Moderate persistent reactive airway disease without complication  Assessment & Plan:  Patient is well controlled/asymptomatic on his current medications. No change to meds, and can use his inhaler on morning of surgery       Pt advised to avoid NSAID's, OTC vitamin supplements and fish oil 1 week prior to procedure.       Nimo Mckeon MD

## 2022-05-03 ENCOUNTER — ANESTHESIA EVENT (OUTPATIENT)
Dept: OPERATING ROOM | Age: 72
End: 2022-05-03
Payer: MEDICARE

## 2022-05-04 ENCOUNTER — ANESTHESIA (OUTPATIENT)
Dept: OPERATING ROOM | Age: 72
End: 2022-05-04
Payer: MEDICARE

## 2022-05-04 ENCOUNTER — HOSPITAL ENCOUNTER (OUTPATIENT)
Age: 72
Setting detail: OUTPATIENT SURGERY
Discharge: HOME | End: 2022-05-04
Attending: OTOLARYNGOLOGY | Admitting: OTOLARYNGOLOGY
Payer: MEDICARE

## 2022-05-04 VITALS — DIASTOLIC BLOOD PRESSURE: 94 MMHG | SYSTOLIC BLOOD PRESSURE: 149 MMHG | TEMPERATURE: 97.7 F | OXYGEN SATURATION: 100 %

## 2022-05-04 VITALS
BODY MASS INDEX: 32.96 KG/M2 | RESPIRATION RATE: 14 BRPM | OXYGEN SATURATION: 98 % | HEART RATE: 100 BPM | DIASTOLIC BLOOD PRESSURE: 99 MMHG | SYSTOLIC BLOOD PRESSURE: 156 MMHG | WEIGHT: 256.8 LBS | HEIGHT: 74 IN | TEMPERATURE: 97.2 F

## 2022-05-04 DIAGNOSIS — J32.4 CHRONIC PANSINUSITIS: ICD-10-CM

## 2022-05-04 DIAGNOSIS — J34.3 HYPERTROPHY, NASAL, TURBINATE: ICD-10-CM

## 2022-05-04 DIAGNOSIS — J34.2 DEVIATED NASAL SEPTUM: ICD-10-CM

## 2022-05-04 DIAGNOSIS — J34.89 NASAL OBSTRUCTION: ICD-10-CM

## 2022-05-04 PROCEDURE — A4217 STERILE WATER/SALINE, 500 ML: HCPCS | Performed by: OTOLARYNGOLOGY

## 2022-05-04 PROCEDURE — 2580000003 HC RX 258: Performed by: OTOLARYNGOLOGY

## 2022-05-04 PROCEDURE — 88305 TISSUE EXAM BY PATHOLOGIST: CPT

## 2022-05-04 PROCEDURE — 31276 NSL/SINS NDSC FRNT TISS RMVL: CPT | Performed by: OTOLARYNGOLOGY

## 2022-05-04 PROCEDURE — 6370000000 HC RX 637 (ALT 250 FOR IP): Performed by: OTOLARYNGOLOGY

## 2022-05-04 PROCEDURE — 7100000000 HC PACU RECOVERY - FIRST 15 MIN: Performed by: OTOLARYNGOLOGY

## 2022-05-04 PROCEDURE — 7100000001 HC PACU RECOVERY - ADDTL 15 MIN: Performed by: OTOLARYNGOLOGY

## 2022-05-04 PROCEDURE — 30520 REPAIR OF NASAL SEPTUM: CPT | Performed by: OTOLARYNGOLOGY

## 2022-05-04 PROCEDURE — 2500000003 HC RX 250 WO HCPCS: Performed by: NURSE ANESTHETIST, CERTIFIED REGISTERED

## 2022-05-04 PROCEDURE — 3600000014 HC SURGERY LEVEL 4 ADDTL 15MIN: Performed by: OTOLARYNGOLOGY

## 2022-05-04 PROCEDURE — 2709999900 HC NON-CHARGEABLE SUPPLY: Performed by: OTOLARYNGOLOGY

## 2022-05-04 PROCEDURE — 31256 EXPLORATION MAXILLARY SINUS: CPT | Performed by: OTOLARYNGOLOGY

## 2022-05-04 PROCEDURE — 3600000004 HC SURGERY LEVEL 4 BASE: Performed by: OTOLARYNGOLOGY

## 2022-05-04 PROCEDURE — 30140 RESECT INFERIOR TURBINATE: CPT | Performed by: OTOLARYNGOLOGY

## 2022-05-04 PROCEDURE — 7100000010 HC PHASE II RECOVERY - FIRST 15 MIN: Performed by: OTOLARYNGOLOGY

## 2022-05-04 PROCEDURE — 7100000011 HC PHASE II RECOVERY - ADDTL 15 MIN: Performed by: OTOLARYNGOLOGY

## 2022-05-04 PROCEDURE — 6360000002 HC RX W HCPCS: Performed by: NURSE ANESTHETIST, CERTIFIED REGISTERED

## 2022-05-04 PROCEDURE — 3700000000 HC ANESTHESIA ATTENDED CARE: Performed by: OTOLARYNGOLOGY

## 2022-05-04 PROCEDURE — 31257 NSL/SINS NDSC TOT W/SPHENDT: CPT | Performed by: OTOLARYNGOLOGY

## 2022-05-04 PROCEDURE — 2720000010 HC SURG SUPPLY STERILE: Performed by: OTOLARYNGOLOGY

## 2022-05-04 PROCEDURE — 3700000001 HC ADD 15 MINUTES (ANESTHESIA): Performed by: OTOLARYNGOLOGY

## 2022-05-04 PROCEDURE — 2500000003 HC RX 250 WO HCPCS: Performed by: OTOLARYNGOLOGY

## 2022-05-04 PROCEDURE — 2580000003 HC RX 258: Performed by: ANESTHESIOLOGY

## 2022-05-04 RX ORDER — OXYCODONE HYDROCHLORIDE 5 MG/1
10 TABLET ORAL PRN
Status: DISCONTINUED | OUTPATIENT
Start: 2022-05-04 | End: 2022-05-04 | Stop reason: HOSPADM

## 2022-05-04 RX ORDER — LIDOCAINE HYDROCHLORIDE AND EPINEPHRINE 10; 10 MG/ML; UG/ML
INJECTION, SOLUTION INFILTRATION; PERINEURAL PRN
Status: DISCONTINUED | OUTPATIENT
Start: 2022-05-04 | End: 2022-05-04 | Stop reason: HOSPADM

## 2022-05-04 RX ORDER — SODIUM CHLORIDE 9 MG/ML
25 INJECTION, SOLUTION INTRAVENOUS PRN
Status: DISCONTINUED | OUTPATIENT
Start: 2022-05-04 | End: 2022-05-04 | Stop reason: HOSPADM

## 2022-05-04 RX ORDER — EPINEPHRINE NASAL SOLUTION 1 MG/ML
SOLUTION NASAL PRN
Status: DISCONTINUED | OUTPATIENT
Start: 2022-05-04 | End: 2022-05-04 | Stop reason: HOSPADM

## 2022-05-04 RX ORDER — FENTANYL CITRATE 50 UG/ML
25 INJECTION, SOLUTION INTRAMUSCULAR; INTRAVENOUS EVERY 5 MIN PRN
Status: DISCONTINUED | OUTPATIENT
Start: 2022-05-04 | End: 2022-05-04 | Stop reason: HOSPADM

## 2022-05-04 RX ORDER — DEXAMETHASONE SODIUM PHOSPHATE 4 MG/ML
INJECTION, SOLUTION INTRA-ARTICULAR; INTRALESIONAL; INTRAMUSCULAR; INTRAVENOUS; SOFT TISSUE PRN
Status: DISCONTINUED | OUTPATIENT
Start: 2022-05-04 | End: 2022-05-04 | Stop reason: SDUPTHER

## 2022-05-04 RX ORDER — SODIUM CHLORIDE 0.9 % (FLUSH) 0.9 %
5-40 SYRINGE (ML) INJECTION PRN
Status: DISCONTINUED | OUTPATIENT
Start: 2022-05-04 | End: 2022-05-04 | Stop reason: HOSPADM

## 2022-05-04 RX ORDER — OXYCODONE HYDROCHLORIDE 5 MG/1
5 TABLET ORAL PRN
Status: DISCONTINUED | OUTPATIENT
Start: 2022-05-04 | End: 2022-05-04 | Stop reason: HOSPADM

## 2022-05-04 RX ORDER — SODIUM CHLORIDE 9 MG/ML
INJECTION, SOLUTION INTRAVENOUS CONTINUOUS
Status: DISCONTINUED | OUTPATIENT
Start: 2022-05-04 | End: 2022-05-04 | Stop reason: HOSPADM

## 2022-05-04 RX ORDER — DIPHENHYDRAMINE HYDROCHLORIDE 50 MG/ML
12.5 INJECTION INTRAMUSCULAR; INTRAVENOUS
Status: DISCONTINUED | OUTPATIENT
Start: 2022-05-04 | End: 2022-05-04 | Stop reason: HOSPADM

## 2022-05-04 RX ORDER — SUCCINYLCHOLINE/SOD CL,ISO/PF 200MG/10ML
SYRINGE (ML) INTRAVENOUS PRN
Status: DISCONTINUED | OUTPATIENT
Start: 2022-05-04 | End: 2022-05-04 | Stop reason: SDUPTHER

## 2022-05-04 RX ORDER — SODIUM CHLORIDE 0.9 % (FLUSH) 0.9 %
5-40 SYRINGE (ML) INJECTION EVERY 12 HOURS SCHEDULED
Status: DISCONTINUED | OUTPATIENT
Start: 2022-05-04 | End: 2022-05-04 | Stop reason: HOSPADM

## 2022-05-04 RX ORDER — METOPROLOL TARTRATE 5 MG/5ML
INJECTION INTRAVENOUS PRN
Status: DISCONTINUED | OUTPATIENT
Start: 2022-05-04 | End: 2022-05-04 | Stop reason: SDUPTHER

## 2022-05-04 RX ORDER — ONDANSETRON 2 MG/ML
INJECTION INTRAMUSCULAR; INTRAVENOUS PRN
Status: DISCONTINUED | OUTPATIENT
Start: 2022-05-04 | End: 2022-05-04 | Stop reason: SDUPTHER

## 2022-05-04 RX ORDER — OXYMETAZOLINE HYDROCHLORIDE 0.05 G/100ML
SPRAY NASAL PRN
Status: DISCONTINUED | OUTPATIENT
Start: 2022-05-04 | End: 2022-05-04 | Stop reason: HOSPADM

## 2022-05-04 RX ORDER — OXYCODONE HYDROCHLORIDE 5 MG/1
5 TABLET ORAL EVERY 6 HOURS PRN
Qty: 10 TABLET | Refills: 0 | Status: SHIPPED | OUTPATIENT
Start: 2022-05-04 | End: 2022-05-09

## 2022-05-04 RX ORDER — LIDOCAINE HYDROCHLORIDE 20 MG/ML
INJECTION, SOLUTION INTRAVENOUS PRN
Status: DISCONTINUED | OUTPATIENT
Start: 2022-05-04 | End: 2022-05-04 | Stop reason: SDUPTHER

## 2022-05-04 RX ORDER — PROPOFOL 10 MG/ML
INJECTION, EMULSION INTRAVENOUS PRN
Status: DISCONTINUED | OUTPATIENT
Start: 2022-05-04 | End: 2022-05-04 | Stop reason: SDUPTHER

## 2022-05-04 RX ORDER — LABETALOL HYDROCHLORIDE 5 MG/ML
10 INJECTION, SOLUTION INTRAVENOUS
Status: DISCONTINUED | OUTPATIENT
Start: 2022-05-04 | End: 2022-05-04 | Stop reason: HOSPADM

## 2022-05-04 RX ORDER — SODIUM CHLORIDE 9 MG/ML
INJECTION, SOLUTION INTRAVENOUS PRN
Status: DISCONTINUED | OUTPATIENT
Start: 2022-05-04 | End: 2022-05-04 | Stop reason: HOSPADM

## 2022-05-04 RX ORDER — ROCURONIUM BROMIDE 10 MG/ML
INJECTION, SOLUTION INTRAVENOUS PRN
Status: DISCONTINUED | OUTPATIENT
Start: 2022-05-04 | End: 2022-05-04 | Stop reason: SDUPTHER

## 2022-05-04 RX ORDER — LORAZEPAM 2 MG/ML
0.5 INJECTION INTRAMUSCULAR
Status: DISCONTINUED | OUTPATIENT
Start: 2022-05-04 | End: 2022-05-04 | Stop reason: HOSPADM

## 2022-05-04 RX ORDER — ONDANSETRON 2 MG/ML
4 INJECTION INTRAMUSCULAR; INTRAVENOUS
Status: DISCONTINUED | OUTPATIENT
Start: 2022-05-04 | End: 2022-05-04 | Stop reason: HOSPADM

## 2022-05-04 RX ORDER — SODIUM CHLORIDE, SODIUM LACTATE, POTASSIUM CHLORIDE, CALCIUM CHLORIDE 600; 310; 30; 20 MG/100ML; MG/100ML; MG/100ML; MG/100ML
INJECTION, SOLUTION INTRAVENOUS CONTINUOUS
Status: DISCONTINUED | OUTPATIENT
Start: 2022-05-04 | End: 2022-05-04 | Stop reason: HOSPADM

## 2022-05-04 RX ORDER — FENTANYL CITRATE 50 UG/ML
INJECTION, SOLUTION INTRAMUSCULAR; INTRAVENOUS PRN
Status: DISCONTINUED | OUTPATIENT
Start: 2022-05-04 | End: 2022-05-04 | Stop reason: SDUPTHER

## 2022-05-04 RX ORDER — MAGNESIUM HYDROXIDE 1200 MG/15ML
LIQUID ORAL CONTINUOUS PRN
Status: DISCONTINUED | OUTPATIENT
Start: 2022-05-04 | End: 2022-05-04 | Stop reason: HOSPADM

## 2022-05-04 RX ADMIN — FENTANYL CITRATE 50 MCG: 50 INJECTION, SOLUTION INTRAMUSCULAR; INTRAVENOUS at 12:27

## 2022-05-04 RX ADMIN — DEXAMETHASONE SODIUM PHOSPHATE 4 MG: 4 INJECTION, SOLUTION INTRAMUSCULAR; INTRAVENOUS at 12:19

## 2022-05-04 RX ADMIN — ONDANSETRON 4 MG: 2 INJECTION INTRAMUSCULAR; INTRAVENOUS at 12:19

## 2022-05-04 RX ADMIN — ROCURONIUM BROMIDE 10 MG: 10 INJECTION INTRAVENOUS at 12:19

## 2022-05-04 RX ADMIN — Medication 160 MG: at 12:19

## 2022-05-04 RX ADMIN — SODIUM CHLORIDE, POTASSIUM CHLORIDE, SODIUM LACTATE AND CALCIUM CHLORIDE: 600; 310; 30; 20 INJECTION, SOLUTION INTRAVENOUS at 11:43

## 2022-05-04 RX ADMIN — METOPROLOL TARTRATE 1 MG: 5 INJECTION INTRAVENOUS at 12:49

## 2022-05-04 RX ADMIN — FENTANYL CITRATE 100 MCG: 50 INJECTION, SOLUTION INTRAMUSCULAR; INTRAVENOUS at 13:15

## 2022-05-04 RX ADMIN — ROCURONIUM BROMIDE 30 MG: 10 INJECTION INTRAVENOUS at 12:23

## 2022-05-04 RX ADMIN — LIDOCAINE HYDROCHLORIDE 50 MG: 20 INJECTION, SOLUTION INTRAVENOUS at 12:20

## 2022-05-04 RX ADMIN — METOPROLOL TARTRATE 1 MG: 5 INJECTION INTRAVENOUS at 12:46

## 2022-05-04 RX ADMIN — PROPOFOL 170 MG: 10 INJECTION, EMULSION INTRAVENOUS at 12:19

## 2022-05-04 RX ADMIN — FENTANYL CITRATE 50 MCG: 50 INJECTION, SOLUTION INTRAMUSCULAR; INTRAVENOUS at 12:38

## 2022-05-04 ASSESSMENT — PULMONARY FUNCTION TESTS
PIF_VALUE: 26
PIF_VALUE: 1
PIF_VALUE: 24
PIF_VALUE: 1
PIF_VALUE: 24
PIF_VALUE: 1
PIF_VALUE: 29
PIF_VALUE: 24
PIF_VALUE: 24
PIF_VALUE: 25
PIF_VALUE: 1
PIF_VALUE: 24
PIF_VALUE: 24
PIF_VALUE: 29
PIF_VALUE: 24
PIF_VALUE: 20
PIF_VALUE: 0
PIF_VALUE: 25
PIF_VALUE: 34
PIF_VALUE: 20
PIF_VALUE: 25
PIF_VALUE: 26
PIF_VALUE: 2
PIF_VALUE: 24
PIF_VALUE: 23
PIF_VALUE: 1
PIF_VALUE: 24
PIF_VALUE: 29
PIF_VALUE: 2
PIF_VALUE: 24
PIF_VALUE: 25
PIF_VALUE: 24
PIF_VALUE: 1
PIF_VALUE: 24
PIF_VALUE: 32
PIF_VALUE: 24
PIF_VALUE: 20
PIF_VALUE: 24
PIF_VALUE: 25
PIF_VALUE: 24
PIF_VALUE: 29
PIF_VALUE: 32
PIF_VALUE: 29
PIF_VALUE: 10
PIF_VALUE: 28
PIF_VALUE: 24
PIF_VALUE: 0
PIF_VALUE: 24
PIF_VALUE: 26
PIF_VALUE: 24
PIF_VALUE: 24
PIF_VALUE: 0
PIF_VALUE: 24
PIF_VALUE: 1
PIF_VALUE: 24
PIF_VALUE: 2
PIF_VALUE: 24
PIF_VALUE: 24
PIF_VALUE: 1
PIF_VALUE: 24
PIF_VALUE: 1
PIF_VALUE: 20
PIF_VALUE: 24
PIF_VALUE: 25
PIF_VALUE: 20
PIF_VALUE: 1
PIF_VALUE: 24
PIF_VALUE: 24

## 2022-05-04 ASSESSMENT — PAIN SCALES - GENERAL
PAINLEVEL_OUTOF10: 0
PAINLEVEL_OUTOF10: 0
PAINLEVEL_OUTOF10: 2
PAINLEVEL_OUTOF10: 0

## 2022-05-04 ASSESSMENT — PAIN DESCRIPTION - ORIENTATION: ORIENTATION: ANTERIOR

## 2022-05-04 ASSESSMENT — LIFESTYLE VARIABLES: SMOKING_STATUS: 0

## 2022-05-04 ASSESSMENT — PAIN DESCRIPTION - LOCATION: LOCATION: NOSE

## 2022-05-04 ASSESSMENT — PAIN DESCRIPTION - DESCRIPTORS: DESCRIPTORS: SORE

## 2022-05-04 NOTE — OP NOTE
Operative Note      Patient Name: Nayeil Graham YOB: 1950  Medical Record Number:  [id number]  Billing Number:  [id number]  Date of Procedure: 5/4/2022  Time: 1200    Brief History: This is a 72 y/o male with chronic pansinusitis and nasal obstruction  Pre-operative Diagnosis: chronic pansinusitis, deviated septum and turbinate hypertrophy    Post-operative Diagnosis: Same  Proc: 1. Bilateral nasal endoscopy with maxillary antrosotomy  (39056-80)   2. Bilateral nasal endoscopy with total spheno-ethmoidectomy (28647-64)   3. Bilateral nasal endoscopy with frontal sinus exploration (70858-30)   4. Septoplasty   5. Bilateral submucosal resection of inferior turbinates    Circulator: Brown Workman RN  Scrub Person First: Jeb Sparrow RN  Scrub Person Second: Joelle Chapa RN  Circulator Assist: America Gross    Anesthesia: 1. 8 cc 1% lidocaine with 1:100,000 epinephrine injected in the uncinate  and middle turbinate bilateral  EBL: 200 ml  Specimens: bilaterla sinus contents  Findings: Bilateral mucosal edema, turbinate hypertrophy and deviated septum to left  Complications: none  Antibiotics: none    After consent was confirmed the patient came into the operating room and was placed in supine position. General IV anesthesia was obtained and the patient intubated by Anesthesiology without difficulty. The table was turned and 8 cc's of 1% lidocaine with 1:100,000 epinephrine was injected into the uncinate, middle turbinate and anterior wall of the sphenoid sinus. Surgery began on the right nasal cavity. Using a zero degree endoscope and and a caudal elevator the middle turbinate was medialized in its inferior third. The uncinate was in fractured with a frontal sinus seeker. A Veronica's window was made with a back biting forceps. The uncinate was removed with a 4mm, 0 degree straight shot micro debrider in its entirety from the inferior turbinate to the skull base.  The natural ostium of the maxillary sinus was identified and back-fractured through the fontanelle with a frontal sinus seeker. A large maxillary antrostomy was then performed with the 0 degree micro debrider. A 0 degree micro-debrider was used to remove the ethmoid bulla lateral to the lamina papyracea , superior to the skull base and posterior to the basal lamella. A large window was made in the lamella preserving an inferior 1 cm strut. The posterior ethmoids were then removed with the micro debrider lateral to the lamina, posterior to the anterior wall of the sphenoid sinus and superior to the skull base. 1:1000 topical epinephrine pledgets were occasionally placed for hemostasis. The inferior half of the superior turbinate was resected with true cut rongeurs. The natural ostium was identified with a caudal elevator, entered and down-fractured in a medial and inferior direction. A large sphenoidotomy was created with Kerrison rongeurs and the micro debrider. 1:1000 topical epinephrine pledgets were occasionally placed for hemostasis. Using a combination of 0 and 45 degree endoscopes the agar nasi cell and frontal recess was opened into the frontal sinus with 0 and 40 degree micro debriders. This allowed full visualization into the frontal recess and sinus. 1:1000 topical epinephrine pledgets were placed for hemostasis. Antibiotics      A danika-transfixion incision was made in the left nasal cavity. A sub-perichondrial and sub-periosteal plane was elevated with a caudal elevator to the vomer. Elevation was performed from maxillary crest to 1.5 cm from the dorsal septum. A vertical incision was made 1.5 cm posterior to the caudal septum with a caudal and a contralateral sub-perichondrial and sub-periosteal plane was elevated with a caudal elevator to the vomer. Deviated cartilage and bone was removed preserving an anterior and superior strut for nasal support.   The mucosa was re-approximated with a 4-0 plane gut suture on a mejia needle. The hem-itransfixion incision was closed with a 5-0 chromic interrupted stitch. Afrin soaked pledgets were placed for hemostasis. Surgery continued on the left nasal cavity. Using a zero degree endoscope and and a caudal elevator the middle turbinate was medialized in its inferior third. The uncinate was in fractured with a frontal sinus seeker. A Veronica's window was made with a back biting forceps. The uncinate was removed with a 4mm, 0 degree straight shot micro debrider in its entirety from the inferior turbinate to the skull base. The natural ostium of the maxillary sinus was identified and back-fractured through the fontanelle with a frontal sinus seeker. A large maxillary antrostomy was then performed with the 0 degree micro debrider. A 0 degree micro-debrider was used to remove the ethmoid bulla lateral to the lamina papyracea , superior to the skull base and posterior to the basal lamella. A large window was made in the lamella preserving an inferior 1 cm strut. The posterior ethmoids were then removed with the micro debrider lateral to the lamina, posterior to the anterior wall of the sphenoid sinus and superior to the skull base. 1:1000 topical epinephrine pledgets were occasionally placed for hemostasis. The inferior half of the superior turbinate was resected with true cut rongeurs. The natural ostium was identified with a caudal elevator, entered and down-fractured in a medial and inferior direction. A large sphenoidotomy was created with Kerrison rongeurs and the micro debrider. 1:1000 topical epinephrine pledgets were occasionally placed for hemostasis. Using a combination of 0 and 45 degree endoscopes the agar nasi cell and frontal recess was opened into the frontal sinus with 0 and 40 degree micro debriders. This allowed full visualization into the frontal recess and sinus. 1:1000 topical epinephrine pledgets were placed for hemostasis.     An incision was made in the tip of the inferior turbinate on the left with a protected needle tip Bovie. A sub-periosteal plane was developed along the full length of the medial surface of the turbinate bone. A submucosal reduction was performed with a 2.9 mm turbinate micro-debrider. The turbinate bone was then in-fractured and out-fractured with a caudal elevator. An afrin soaked pledget was placed. An incision was made in the tip of the inferior turbinate on the right with a protected needle tip Bovie. A sub-periosteal plane was developed along the full length of the medial surface of the turbinate bone. A submucosal reduction was performed with a 2.9 mm turbinate micro-debrider. The turbinate bone was then in-fractured and out-fractured with a caudal elevator. An afrin soaked pledget was placed. Pledgets were removed and Castillo splints were placed between the septum and turbinates in each nostril. The splints were secured through the septum with a 3-0 prolene stitch. The patient was then awoken from general anesthesia, extubated, and sent to the post-op care unit in stable condition. I attest that I was present for and performed the key points of the operation myself. The patient was then awoken from general anesthesia, extubated and sent to the PACU in stable condition. I attest that I was present for and performed the entire procedure myself.      SIENNA      Electronically signed by Rosa Ruiz MD on 5/4/2022 at 1:26 PM

## 2022-05-04 NOTE — PROGRESS NOTES
BILATERAL INFERIOR TURBINATE REDUCTION, BILATERAL MAXILLARY ANTROSTOMY, BILATERAL TOTAL ETHMOIDECTOMY WITH SPEHNOIDECTOMY AND SEPTOPLASTY, BILATERAL FRONTAL SINUS EXPLORATION - Bilateral    Dr Jorge Barry    Current Allergies: Lisinopril    No results for input(s): POCGLU in the last 72 hours. Admitted to PACU bed 7 from OR. Arrived on a stretcher . Attached to PACU monitoring system. Alarms and parameters set. Report received from anesthesia personnel [address] [place] [address]. OR staff did not report skin issues that were observed while in OR  No problems reported intraoperatively. Pt arrived with oxygen per nasal cannula with oxygen at 4 liters. Athrombic wraps in place. removed for discharge     Doctors aware of all labs before coming to recovery.

## 2022-05-04 NOTE — H&P
Mylene Gracieon    [id number]    [facility] Same Day Surgery Update H & P  Department of General Surgery   Surgical Service   Pre-operative History and Physical  Last H & P within the last 30 days. DIAGNOSIS:   Chronic pansinusitis [J32.4]  Deviated nasal septum [J34.2]  Hypertrophy, nasal, turbinate [J34.3]  Nasal obstruction [J34.89]    Procedure(s):  BILATERAL INFERIOR TURBINATE REDUCTION, BILATERAL MAXILLARY ANTROSTOMY, BILATERAL TOTAL ETHMOIDECTOMY WITH SPEHNOIDECTOMY AND SEPTOPLASTY, BILATERAL FRONTAL SINUS EXPLORATION    History obtained from: Patient interview and EHR      HISTORY OF PRESENT ILLNESS:   The patient is a 71 y.o. male with c/o nasal congestion, nasal obstruction and loss of smell in the setting of chronic pansinusitis, deviated septum and nasal turbinate hypertrophy. Their symptoms have been unrelieved with conservative therapy and they presents today for the above procedure. Illness Screening: Patient denies fever, chills, worsening cough, or close contact with sick individuals.         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     COPD (chronic obstructive pulmonary disease) (HCC)     COPD (chronic obstructive pulmonary disease) ([facility] [address] [address] 4/26/2022    Erectile dysfunction     Hearing loss     Hyperlipidemia     Hypertension     Kidney failure 07/2018    Normal pressure hydrocephalus ([facility] [address] 07/2018    shunt placement    Screening for abdominal aortic aneurysm (AAA) performed 03/02/2018    [facility]    Tinnitus     Unspecified sleep apnea     Wears glasses     Wears hearing aid in both ears      Past Surgical History:        Procedure Laterality Date    ABLATION OF DYSRHYTHMIC FOCUS      COLONOSCOPY  12/14/2018    COLONOSCOPY WITH BIOPSY performed by J Carlos Rodrigues MD at [facility] ENTEROSCOPY N/A 1/4/2019    ENTEROSCOPY PUSH BIOPSY performed by J Carlos Rodrigues MD at [address] [place] [address] ENDOSCOPY    EYE SURGERY Left     lipoma    JOINT REPLACEMENT Left 2015    PARTIAL KNEE REPLACMENT    LAPAROSCOPY N/A 7/18/2018    OPENING AND CLOSING FOR VENTRICULAR PERITONEAL SHUNT performed by Anh Lawrence MD [facility] 07/18/2018     VENTRICULAR PERITONEAL SHUNT INSERTION RIGHT SIDE; (N/A )    WI CREATE SHUNT:VENTRIC-PERITONEAL N/A 7/18/2018    VENTRICULAR PERITONEAL SHUNT INSERTION RIGHT SIDE; performed by Reagan Ruano MD at [facility] 12/14/2018    EGD BIOPSY [facility] 3[facility]2019    ESOPHAGOGASTRODUODENOSCOPY WITH RADIOFREQUENCY  ABLATION/ ABLATIONS X19 performed by Roxanne Watson MD at [facility] 4/24/2019    ESOPHAGOGASTRODUODENOSCOPY WITH RADIOFREQUENCY ABLATION performed by Roxanne Watson MD at [facility] 4/24/2019    EGD BIOPSY performed by Roxanne Watson MD at [facility] 8/2/2019    ESOPHAGOGASTRODUODENOSCOPY RADIOFREQUENCY ABLATION performed by Roxanne Watson MD at [facility] 8/2/2019    EGD GASTRIC BIOPSY performed by Roxanne Watson MD at [facility] 8/2/2019    EGD POLYP COLD SNARE performed by Roxanne Watson MD at [facility] 11/19/2019    ESOPHAGOGASTRODUODENOSCOPY WITH RADIOFREQUENCY ABLATION performed by Roxanne Watson MD at [facility] 11/19/2019    EGD POLYP SNARE performed by Roxanne Watson MD at [facility] 7/15/2020    ESOPHAGOGASTRODUODENOSCOPY RADIOFREQUENCY ABLATION ON STANDBY performed by Roxanne Watson MD at [facility] 7/15/2020    EGD BIOPSY performed by Payal Myers MD at [place] [address] N/A 7/16/2021    EGD BIOPSY performed by Payal Myers MD at [place] [address] N/A 7/16/2021    EGD POLYP SNARE performed by Payal Myers MD at [place] [address]         Medications Prior to Admission:      Prior to Admission medications    Medication Sig Start Date End Date Taking? Authorizing Provider   mometasone-formoterol (DULERA) 200-5 MCG/ACT inhaler Inhale 1 puff into the lungs every 12 hours 4/11/22   Christiano Oliveira MD   albuterol sulfate HFA (PROVENTIL HFA) 108 (90 Base) MCG/ACT inhaler Inhale 2 puffs into the lungs every 4 hours as needed for Wheezing or Shortness of Breath (Space out to every 6 hours as symptoms improve) Space out to every 6 hours as symptoms improve. 2/9/22   Christiano Oliveira MD   montelukast (SINGULAIR) 10 MG tablet Take 1 tablet by mouth nightly 2/3/22   Court Teixeira MD   fluticasone [facility]) 50 MCG/ACT nasal spray 1 spray by Each Nostril route daily 12/14/21   Linda Parker,    cetirizine (ZYRTEC) 10 MG tablet TAKE 1 TABLET DAILY 12/9/21   Linda Parker DO   finasteride (PROSCAR) 5 MG tablet Take 1 tablet by mouth daily 12/1/21   Linda Parker DO   atorvastatin (LIPITOR) 10 MG tablet TAKE 1 TABLET DAILY 11/15/21   Linda Parker DO   pantoprazole (PROTONIX) 20 MG tablet Take 1 tablet by mouth 2 times daily 11/24/20   Kyle Reed MD   aspirin 81 MG chewable tablet Take 81 mg by mouth daily    Historical Provider, MD   tamsulosin (FLOMAX) 0.4 MG capsule Take 2 capsules by mouth daily 3/22/18   eLo Manrique MD   Omega-3 Fatty Acids (FISH OIL) 1000 MG CAPS Take 1,000 mg by mouth daily. Historical Provider, MD   Flaxseed, Linseed, (FLAX SEED OIL) 1000 MG CAPS Take 1,000 mg by mouth daily. Historical Provider, MD   Cholecalciferol (VITAMIN D) 2000 UNITS CAPS capsule Take 1 capsule by mouth daily.     Historical Provider, MD   multivitamin [facility]) per tablet Take 1 tablet by mouth daily. Historical Provider, MD         Allergies:  Lisinopril    PHYSICAL EXAM:      /85   Pulse 84   Temp 98.3 °F (36.8 °C) (Temporal)   Resp 16   Ht 6' 2\" (1.88 m)   Wt 256 lb 12.8 oz (116.5 kg)   SpO2 98%   BMI 32.97 kg/m²      Airway:  Airway patent with no audible stridor    Heart:  Regular rate and rhythm, No murmur noted    Lungs:  No increased work of breathing, good air exchange, clear to auscultation bilaterally, no crackles or wheezing    Abdomen:  Soft, non-distended, non-tender, no rebound tenderness or guarding, and no masses palpated    ASSESSMENT AND PLAN     Patient is a 71 y.o. male with above specified procedure planned. 1.  The patients history and physical was obtained and signed off by the pre-admission testing department. Patient seen and focused exam done today- no new changes since last physical exam on 4/26/22    2. Access to ancillary services are available per request of the provider.     JEREMI Pearce - CNP     5/4/2022

## 2022-05-04 NOTE — ANESTHESIA POSTPROCEDURE EVALUATION
Department of Anesthesiology  Postprocedure Note    Patient: Bib Scherer MRN: [id number]  YOB: 1950  Date of evaluation: 5/4/2022  Time:  5:53 PM     Procedure Summary     Date: 05/04/22 Room / Location: [address] [place] [address]    Anesthesia Start: 2312 Anesthesia Stop: 8631    Procedure: BILATERAL INFERIOR TURBINATE REDUCTION, BILATERAL MAXILLARY ANTROSTOMY, BILATERAL TOTAL ETHMOIDECTOMY WITH SPEHNOIDECTOMY AND SEPTOPLASTY, BILATERAL FRONTAL SINUS EXPLORATION (Bilateral ) Diagnosis:       Chronic pansinusitis      Deviated nasal septum      Hypertrophy, nasal, turbinate      Nasal obstruction      (Chronic pansinusitis [J32.4], Deviated nasal septum [J34.2] Hypertrophy, nasal, turbinate [J34.3] Nasal obstruction [J34.89])    Surgeons: Laura Diaz MD Responsible Provider: Daniele Ricardo MD    Anesthesia Type: general ASA Status: 3          Anesthesia Type: general    Kait Phase I: Kait Score: 10    Kait Phase II: Kait Score: 10    Last vitals: Reviewed and per EMR flowsheets.        Anesthesia Post Evaluation    Patient location during evaluation: PACU  Patient participation: complete - patient participated  Level of consciousness: awake and alert  Pain score: 2  Airway patency: patent  Nausea & Vomiting: no nausea and no vomiting  Complications: no  Cardiovascular status: hemodynamically stable  Respiratory status: acceptable  Hydration status: euvolemic

## 2022-05-04 NOTE — PROGRESS NOTES
Pt to [facility] for nasal surgery. Pt alert; oriented X 4; speech clear; breathing easily on RA; walks with steady gait without assist.  Denies any pain. #20 IV placed in right wrist by Sherren Dolores, RN. No abx ordered. Pt wanted to wear his wedding ring, so same was taped, and a jewelry waiver signed. Wife [facility] in waiting room and she has pt's glasses.

## 2022-05-04 NOTE — PROGRESS NOTES
PACU Transfer to [facility] #5    Procedure(s):  BILATERAL INFERIOR TURBINATE REDUCTION, BILATERAL MAXILLARY ANTROSTOMY, BILATERAL TOTAL ETHMOIDECTOMY WITH SPEHNOIDECTOMY AND SEPTOPLASTY, BILATERAL FRONTAL SINUS EXPLORATION    Dr Yvonna Duverney    Pt's Current Allergies: Lisinopril    Pt meets criteria to transfer to next phase of care per Kat Paulo and MAGDALENO standards    No results for input(s): POCGLU in the last 72 hours. Vitals:    05/04/22 1445   BP: (!) 147/99   Pulse: 98   Resp: 12   Temp: 98.3 °F (36.8 °C)   SpO2: 97%      BP within 20% of pt's admitting BP as per Kait Score      Intake/Output Summary (Last 24 hours) at 5/4/2022 1451  Last data filed at 5/4/2022 1449  Gross per 24 hour   Intake 1147 ml   Output --   Net 1147 ml     Drank diet soda  Ate crackers    Pain assessment:  none  Pain Level: 0    Patient was assessed for unknown alterations to skin integrity. There were not unknown alterations observed. Patient transferred to care of Austin Coyle RN.  Via handoff sheet  Family updated and directed to [facility] via waiting room staff    5/4/2022 2:51 PM

## 2022-05-04 NOTE — BRIEF OP NOTE
Brief Postoperative Note      Patient: Judge Smith   YOB: 1950  MRN: [id number]    Date of Procedure: 5/4/2022    Pre-Op Diagnosis: Chronic pansinusitis [J32.4], Deviated nasal septum [J34.2] Hypertrophy, nasal, turbinate [J34.3] Nasal obstruction [J34.89]    Post-Op Diagnosis: Same       Procedure(s):  BILATERAL INFERIOR TURBINATE REDUCTION, BILATERAL MAXILLARY ANTROSTOMY, BILATERAL TOTAL ETHMOIDECTOMY WITH SPEHNOIDECTOMY AND SEPTOPLASTY, BILATERAL FRONTAL SINUS EXPLORATION    Surgeon(s):  Beth Diane MD    Assistant:  * No surgical staff found *    Anesthesia: General    Estimated Blood Loss (mL): 692     Complications: None    Specimens:   ID Type Source Tests Collected by Time Destination   A : SINUS CONTENT Tissue Tissue SURGICAL PATHOLOGY Beth Diane MD 5/4/2022 1320        Implants:  * No implants in log *      Drains: * No LDAs found *    Findings: Bilateral mucosal edema, turbinate hypertrophy and deviated septum to left    Electronically signed by Damion Almanzar MD on 5/4/2022 at 1:25 PM

## 2022-05-04 NOTE — PROGRESS NOTES
Ambulatory Surgery/Procedure Discharge Note    Vitals:    05/04/22 1452   BP: (!) 156/99   Pulse: 100   Resp: 14   Temp: 97.2 °F (36.2 °C)   SpO2: 98%     Elevated BP meets Kait Score Criteria  In: 1347 [P.O.:422; I.V.:925]  Out: -     Restroom use offered before discharge. Yes  Assisted pt to BR to void QS X1  Pain assessment:  level of pain (1-10, 10 severe),   Pain Level: 2    Tolerates po well  Mustache drsg applied after blood drainage noted while patient sitting up to get dressed. Drsg remains D&I at discharge with minimal amount of blood noted on drip pad. DC instructions given to pt and S. O. with guaze and paper tape for mustache drsgs provided and both states 'ready to go home'  Patient discharged to home/self care.  Patient discharged via wheel chair by transporter to waiting family/S.O.       5/4/2022 3:48 PM

## 2022-05-09 ENCOUNTER — OFFICE VISIT (OUTPATIENT)
Dept: ENT CLINIC | Age: 72
End: 2022-05-09
Payer: MEDICARE

## 2022-05-09 DIAGNOSIS — J32.4 CHRONIC PANSINUSITIS: Primary | ICD-10-CM

## 2022-05-09 PROCEDURE — 99024 POSTOP FOLLOW-UP VISIT: CPT | Performed by: OTOLARYNGOLOGY

## 2022-05-09 PROCEDURE — 31237 NSL/SINS NDSC SURG BX POLYPC: CPT | Performed by: OTOLARYNGOLOGY

## 2022-05-09 NOTE — PROGRESS NOTES
S/P septum and FESS. Doing well. Congested. PE: Castillo splints intact. Removed. Bilateral middle meatal crusting and clots. Debrided. Nasal Endoscopy with Debridement  Pre Op Dx: Nasal congestion, post operative sinus surgery  Post Op Dx: Same  Procedure: Nasal endoscopy with debridement bilateral  Anesthesia: 2% lidocaine with afrin tiopical  EBL: None  Specimens: None  Findings: both sides nasal cavity and sinus crusting and mucus. Procedure:    After the application of afrin and pontocaine the nasal sinus cavity was debrided utilizing a zero degree marimar endoscope with Kerrison rongeurs and van suctions on both sides. The sinus lining was healing well. No evidence of bleeding or rhinorrhea was noted. Tolerated well.     Complications: None

## 2022-05-21 ENCOUNTER — TELEMEDICINE (OUTPATIENT)
Dept: INTERNAL MEDICINE CLINIC | Age: 72
End: 2022-05-21
Payer: MEDICARE

## 2022-05-21 DIAGNOSIS — U07.1 COVID-19 VIRUS INFECTION: Primary | ICD-10-CM

## 2022-05-21 PROCEDURE — 99442 PR PHYS/QHP TELEPHONE EVALUATION 11-20 MIN: CPT | Performed by: INTERNAL MEDICINE

## 2022-05-21 NOTE — PROGRESS NOTES
VIDAL Carson is a 71 y.o. male evaluated via telephone on 5/21/2022 for Fever (last night 100 and today 98), Cough (yesterday), and Chills (tested positive yestersday at home test )      [facility]) Physicians  Internal Medicine  Patient Encounter  Kenya Aldana D.O., [facility]       Chief Complaint   Patient presents with    Fever     last night 100 and today 98    Cough     yesterday    Chills     tested positive yestersday at home test         HPI: This is a 71-year-old male with history of asthma, COPD, normal pressure hydrocephalus,  shunt, sleep apnea, early onset Alzheimer's dementia who called the emergency line reporting that he has developed cough and runny nose starting about 3 or 4 days ago. Patient has been sneezing as well. Last night he developed a temperature of 100 with associated chills and sweats. He has not had any GI symptoms. He denies any change in taste or smell. He denies any shortness of breath or wheezing. He has not needed his inhaler. His wife tested positive for COVID 19 this past Tuesday. Patient took a COVID-19 test this morning and the test was positive. Patient is status post sinus surgery for chronic pansinusitis and deviated septum. This was done on 5/4/2022. Documentation:  I communicated with the patient and/or health care decision maker about COVID-19. Details of this discussion including any medical advice provided: See below    Encounter Diagnosis   Name Primary?  COVID-19 virus infection Yes       PLAN:  #1 given patient's risk factors, will treat with antiviralPaxlovid. Reviewed potential adverse side effects. #2  Due to significant tensional drug interactions with Paxlovid we reviewed his medications. I recommended that he hold his atorvastatin for 7 days and hold his tamsulosin if possible. Patient denies any prior history of urinary retention. Patient advised to monitor for this problem  #3 push fluids.   Patient counseled  #4 patient advised on isolation protocol  #5  Advised patient to monitor his oxygen. Advised to go to the emergency department should he develop worsening shortness of breath or hypoxia. Total Time: minutes: 11-20 minutes (15 minutes)    VIDAL Hebert was evaluated through a synchronous (real-time) audio encounter. Patient identification was verified at the start of the visit. He (or guardian if applicable) is aware that this is a billable service, which includes applicable co-pays. This visit was conducted with the patient's (and/or legal guardian's) verbal consent. He has not had a related appointment within my department in the past 7 days or scheduled within the next 24 hours. The patient was located in a state where the provider was licensed to provide care.     Note: not billable if this call serves to triage the patient into an appointment for the relevant concern    [address] [place] [address]

## 2022-05-31 ENCOUNTER — OFFICE VISIT (OUTPATIENT)
Dept: ENT CLINIC | Age: 72
End: 2022-05-31
Payer: MEDICARE

## 2022-05-31 VITALS
SYSTOLIC BLOOD PRESSURE: 115 MMHG | TEMPERATURE: 98.8 F | HEIGHT: 74 IN | HEART RATE: 94 BPM | BODY MASS INDEX: 33.16 KG/M2 | WEIGHT: 258.4 LBS | DIASTOLIC BLOOD PRESSURE: 65 MMHG

## 2022-05-31 DIAGNOSIS — J34.2 DEVIATED NASAL SEPTUM: Primary | ICD-10-CM

## 2022-05-31 DIAGNOSIS — J32.4 CHRONIC PANSINUSITIS: ICD-10-CM

## 2022-05-31 PROCEDURE — 99024 POSTOP FOLLOW-UP VISIT: CPT | Performed by: OTOLARYNGOLOGY

## 2022-05-31 PROCEDURE — 31231 NASAL ENDOSCOPY DX: CPT | Performed by: OTOLARYNGOLOGY

## 2022-05-31 NOTE — PROGRESS NOTES
S/P FESS and septoplasty. Doing well. Breathing well. PE: Nasal cavity clear. Healing well. Due to the patients chronic sinus disease and/or history of sinonasal neoplasm for surveillance a nasal endoscopy with or without debridement will be performed to complete a significant physical examination of the patient which cannot be performed by anterior rhinoscopy alone (failure of complete examination of the paranasal sinuses). Failure to provide this procedure may lead to late detection of significant chronic benign disease, acute exacerbation, resolution or failure of early diagnosis of recurrent cancer. The procedure report is present in the body of the chart. Nasal Endoscopy    Pre OP: Chronic sinusitis  Post OP: same  Reason: Post operative healing  Procedure: Nasal endoscopy  Surgeon: Jess Otto  Anesthesia: Afrin with 2% lidocaine  Estimated Blood Loss: None      After obtaining verbal consent from the patient 1% lidocaine with afrin was sprayed into the nasal cavities. After allowing a time for anesthesia, a nasal endoscope was placed into the nostril. The septum, inferior, and middle turbinates were examined. The middle meatus, and sphenoethmoid recess was examined bilaterally. Cultures were not obtained from the sinuses. There were no complications. Pertinent positives included: There was not edema and purulence in the left middle meatus. There was not edema and purulence at the right middle meatus. Polyps were not identified in the sinuses. Masses were not identified. Tolerated well without complication. I attest that I was present for and did the entire procedure myself. A/P: Continue rinses daily.    Follow up in 3 months

## 2022-06-08 ENCOUNTER — OFFICE VISIT (OUTPATIENT)
Dept: INTERNAL MEDICINE CLINIC | Age: 72
End: 2022-06-08
Payer: MEDICARE

## 2022-06-08 VITALS
OXYGEN SATURATION: 98 % | HEART RATE: 69 BPM | HEIGHT: 74 IN | WEIGHT: 261.6 LBS | DIASTOLIC BLOOD PRESSURE: 80 MMHG | SYSTOLIC BLOOD PRESSURE: 122 MMHG | BODY MASS INDEX: 33.57 KG/M2

## 2022-06-08 DIAGNOSIS — F02.818 EARLY ONSET ALZHEIMER'S DEMENTIA WITH BEHAVIORAL DISTURBANCE (HCC): ICD-10-CM

## 2022-06-08 DIAGNOSIS — F33.1 MODERATE EPISODE OF RECURRENT MAJOR DEPRESSIVE DISORDER (HCC): Primary | ICD-10-CM

## 2022-06-08 DIAGNOSIS — G91.2 NPH (NORMAL PRESSURE HYDROCEPHALUS) (HCC): ICD-10-CM

## 2022-06-08 DIAGNOSIS — G30.0 EARLY ONSET ALZHEIMER'S DEMENTIA WITH BEHAVIORAL DISTURBANCE (HCC): ICD-10-CM

## 2022-06-08 PROCEDURE — 1036F TOBACCO NON-USER: CPT | Performed by: INTERNAL MEDICINE

## 2022-06-08 PROCEDURE — G8417 CALC BMI ABV UP PARAM F/U: HCPCS | Performed by: INTERNAL MEDICINE

## 2022-06-08 PROCEDURE — G8427 DOCREV CUR MEDS BY ELIG CLIN: HCPCS | Performed by: INTERNAL MEDICINE

## 2022-06-08 PROCEDURE — 3017F COLORECTAL CA SCREEN DOC REV: CPT | Performed by: INTERNAL MEDICINE

## 2022-06-08 PROCEDURE — 1123F ACP DISCUSS/DSCN MKR DOCD: CPT | Performed by: INTERNAL MEDICINE

## 2022-06-08 PROCEDURE — 99214 OFFICE O/P EST MOD 30 MIN: CPT | Performed by: INTERNAL MEDICINE

## 2022-06-08 RX ORDER — SERTRALINE HYDROCHLORIDE 25 MG/1
25 TABLET, FILM COATED ORAL DAILY
Qty: 30 TABLET | Refills: 3 | Status: SHIPPED | OUTPATIENT
Start: 2022-06-08 | End: 2022-07-07

## 2022-06-08 ASSESSMENT — PATIENT HEALTH QUESTIONNAIRE - PHQ9
9. THOUGHTS THAT YOU WOULD BE BETTER OFF DEAD, OR OF HURTING YOURSELF: 0
1. LITTLE INTEREST OR PLEASURE IN DOING THINGS: 3
SUM OF ALL RESPONSES TO PHQ9 QUESTIONS 1 & 2: 3
SUM OF ALL RESPONSES TO PHQ QUESTIONS 1-9: 4
2. FEELING DOWN, DEPRESSED OR HOPELESS: 0
10. IF YOU CHECKED OFF ANY PROBLEMS, HOW DIFFICULT HAVE THESE PROBLEMS MADE IT FOR YOU TO DO YOUR WORK, TAKE CARE OF THINGS AT HOME, OR GET ALONG WITH OTHER PEOPLE: 0
5. POOR APPETITE OR OVEREATING: 0
SUM OF ALL RESPONSES TO PHQ QUESTIONS 1-9: 4
8. MOVING OR SPEAKING SO SLOWLY THAT OTHER PEOPLE COULD HAVE NOTICED. OR THE OPPOSITE, BEING SO FIGETY OR RESTLESS THAT YOU HAVE BEEN MOVING AROUND A LOT MORE THAN USUAL: 0
3. TROUBLE FALLING OR STAYING ASLEEP: 0
6. FEELING BAD ABOUT YOURSELF - OR THAT YOU ARE A FAILURE OR HAVE LET YOURSELF OR YOUR FAMILY DOWN: 0
7. TROUBLE CONCENTRATING ON THINGS, SUCH AS READING THE NEWSPAPER OR WATCHING TELEVISION: 1
SUM OF ALL RESPONSES TO PHQ QUESTIONS 1-9: 4
4. FEELING TIRED OR HAVING LITTLE ENERGY: 0
SUM OF ALL RESPONSES TO PHQ QUESTIONS 1-9: 4

## 2022-06-08 NOTE — PROGRESS NOTES
VIDAL Wayne (:  1950) is a 71 y.o. male,Established patient, here for evaluation of the following chief complaint(s):  No chief complaint on file. Vitals:    22 1001   BP: 122/80   Pulse: 69   SpO2: 98%        ASSESSMENT/PLAN:  1. Moderate episode of recurrent major depressive disorder ([facility] [address]  Please see HPI for details on symptoms. Denies suicidal ideation.  -Start Zoloft 25 mg daily  -Patient to notify me with any side effects  -Follow-up in 4 weeks  -     sertraline (ZOLOFT) 25 MG tablet; Take 1 tablet by mouth daily, Disp-30 tablet, R-3Normal  2. NPH (normal pressure hydrocephalus) ([facility]  Follow-up with neurology  3. Early onset Alzheimer's dementia with behavioral disturbance [facility])  Neurology stop a lot of patient's medications for memory including 1 episode. Things seem to be stable. Continue management per neurology    Return in about 4 weeks (around 2022) for Depression. SUBJECTIVE/OBJECTIVE:  HPI     Patient is a 67-year-old male with past medical history of NPH, Child's esophagus, reactive airway disease, mixed hyperlipidemia, and difficulty with memory who presents secondary to follow-up for chronic diseases as well as secondary to depression. Patient notes that over the last month or maybe more he has been noticing feeling down or depressed. He notes that he no longer drives and this is contributing. He denies any suicidal ideation. He feels that he is eating less than normal.  He also feels that he may be not getting as good of sleep is normal.  He is concerned that he needs medication secondary to feeling down. He notes that he was on medication briefly years ago and this helped. Continues with problems with urination. He has been having diarrhea the last 1 week. 1-2 times over the last weekend. Has had normal bm since over the weekend. He is hoping things have completely resolved. Of note patient also had recent COVID-19 infection.   Patient has gotten back to his baseline. Patient did take Paxil of it and noticed significant improvement in symptoms following. Review of Systems ROS negative except for those noted in the HPI above. Vitals:    06/08/22 1001   BP: 122/80   Pulse: 69   SpO2: 98%     Physical Exam  Constitutional:       General: He is not in acute distress. Appearance: Normal appearance. He is not ill-appearing. HENT:      Head: Normocephalic and atraumatic. Eyes:      Extraocular Movements: Extraocular movements intact. Conjunctiva/sclera: Conjunctivae normal.   Cardiovascular:      Rate and Rhythm: Normal rate and regular rhythm. Heart sounds: No murmur heard. No friction rub. No gallop. Pulmonary:      Effort: Pulmonary effort is normal. No respiratory distress. Breath sounds: Normal breath sounds. No wheezing, rhonchi or rales. Abdominal:      General: Bowel sounds are normal. There is no distension. Palpations: Abdomen is soft. There is no mass. Tenderness: There is no abdominal tenderness. There is no right CVA tenderness or guarding. Musculoskeletal:      Right lower leg: No edema. Left lower leg: No edema. Skin:     General: Skin is warm. Findings: No erythema or rash. Neurological:      General: No focal deficit present. Mental Status: He is alert and oriented to person, place, and time. Psychiatric:         Behavior: Behavior normal.           An electronic signature was used to authenticate this note.     --Carla Hernandez DO

## 2022-06-21 RX ORDER — FINASTERIDE 5 MG/1
5 TABLET, FILM COATED ORAL DAILY
Qty: 90 TABLET | Refills: 3 | Status: SHIPPED | OUTPATIENT
Start: 2022-06-21 | End: 2022-07-01 | Stop reason: SDUPTHER

## 2022-06-27 ENCOUNTER — TELEPHONE (OUTPATIENT)
Dept: INTERNAL MEDICINE CLINIC | Age: 72
End: 2022-06-27

## 2022-06-27 NOTE — TELEPHONE ENCOUNTER
Last appointment: 6/8/2022  Next appointment: 7/7/2022  Last refill: Finasteride was called in on 06/21/2022 # 90x 3 refills

## 2022-07-01 RX ORDER — FINASTERIDE 5 MG/1
5 TABLET, FILM COATED ORAL DAILY
Qty: 90 TABLET | Refills: 3 | Status: SHIPPED | OUTPATIENT
Start: 2022-07-01

## 2022-07-01 NOTE — TELEPHONE ENCOUNTER
Last appointment: 6/8/2022  Next appointment: 7/7/2022  Last refill: 6/21/2022  #90 x3 ([address]).  Needs sent to mail order

## 2022-07-07 ENCOUNTER — TELEMEDICINE (OUTPATIENT)
Dept: INTERNAL MEDICINE CLINIC | Age: 72
End: 2022-07-07
Payer: MEDICARE

## 2022-07-07 DIAGNOSIS — J01.91 ACUTE RECURRENT SINUSITIS, UNSPECIFIED LOCATION: Primary | ICD-10-CM

## 2022-07-07 DIAGNOSIS — F33.1 MODERATE EPISODE OF RECURRENT MAJOR DEPRESSIVE DISORDER (HCC): ICD-10-CM

## 2022-07-07 DIAGNOSIS — L60.0 INGROWN NAIL: ICD-10-CM

## 2022-07-07 PROCEDURE — G8427 DOCREV CUR MEDS BY ELIG CLIN: HCPCS | Performed by: INTERNAL MEDICINE

## 2022-07-07 PROCEDURE — 99214 OFFICE O/P EST MOD 30 MIN: CPT | Performed by: INTERNAL MEDICINE

## 2022-07-07 PROCEDURE — 3017F COLORECTAL CA SCREEN DOC REV: CPT | Performed by: INTERNAL MEDICINE

## 2022-07-07 PROCEDURE — 1123F ACP DISCUSS/DSCN MKR DOCD: CPT | Performed by: INTERNAL MEDICINE

## 2022-07-07 RX ORDER — AMOXICILLIN AND CLAVULANATE POTASSIUM 875; 125 MG/1; MG/1
1 TABLET, FILM COATED ORAL 2 TIMES DAILY
Qty: 14 TABLET | Refills: 0 | Status: SHIPPED | OUTPATIENT
Start: 2022-07-07 | End: 2022-07-14

## 2022-07-07 RX ORDER — FLUTICASONE PROPIONATE 50 MCG
1 SPRAY, SUSPENSION (ML) NASAL DAILY
Qty: 32 G | Refills: 1 | Status: SHIPPED | OUTPATIENT
Start: 2022-07-07

## 2022-07-07 RX ORDER — GABAPENTIN 100 MG/1
100 CAPSULE ORAL 4 TIMES DAILY
COMMUNITY

## 2022-07-07 ASSESSMENT — PATIENT HEALTH QUESTIONNAIRE - PHQ9
8. MOVING OR SPEAKING SO SLOWLY THAT OTHER PEOPLE COULD HAVE NOTICED. OR THE OPPOSITE, BEING SO FIGETY OR RESTLESS THAT YOU HAVE BEEN MOVING AROUND A LOT MORE THAN USUAL: 0
7. TROUBLE CONCENTRATING ON THINGS, SUCH AS READING THE NEWSPAPER OR WATCHING TELEVISION: 0
3. TROUBLE FALLING OR STAYING ASLEEP: 0
5. POOR APPETITE OR OVEREATING: 0
SUM OF ALL RESPONSES TO PHQ QUESTIONS 1-9: 4
SUM OF ALL RESPONSES TO PHQ9 QUESTIONS 1 & 2: 2
1. LITTLE INTEREST OR PLEASURE IN DOING THINGS: 2
6. FEELING BAD ABOUT YOURSELF - OR THAT YOU ARE A FAILURE OR HAVE LET YOURSELF OR YOUR FAMILY DOWN: 0
4. FEELING TIRED OR HAVING LITTLE ENERGY: 2
2. FEELING DOWN, DEPRESSED OR HOPELESS: 0
SUM OF ALL RESPONSES TO PHQ QUESTIONS 1-9: 4
9. THOUGHTS THAT YOU WOULD BE BETTER OFF DEAD, OR OF HURTING YOURSELF: 0

## 2022-07-07 NOTE — PROGRESS NOTES
Gela Francisco. (:  1950) is a Established patient, here for evaluation of the following:    Assessment & Plan   Below is the assessment and plan developed based on review of pertinent history, physical exam, labs, studies, and medications. 1. Acute recurrent sinusitis, unspecified location  See HPI for details of patient's symptoms patient's history we will treat with Augmentin. Flonase for nasal congestion.  -     amoxicillin-clavulanate (AUGMENTIN) 875-125 MG per tablet; Take 1 tablet by mouth 2 times daily for 7 days, Disp-14 tablet, R-0Normal  -     fluticasone (FLONASE) 50 MCG/ACT nasal spray; 1 spray by Each Nostril route daily, Disp-32 g, R-1Normal  2. Moderate episode of recurrent major depressive disorder ([facility] [address]  Please see PHQ-9 for details on symptoms. Will increase Zoloft to 50 mg daily. Follow-up in 4 weeks. -     sertraline (ZOLOFT) 50 MG tablet; Take 1 tablet by mouth daily, Disp-30 tablet, R-2Normal  3. Ingrown nail  -     Amb External Referral To Podiatry    Return in about 4 weeks (around 2022). Subjective   HPI     Patient is a 64-year-old male with past medical history of Child's esophagus, NPH, dementia, reactive airway disease who presents secondary to follow-up for depression and starting a new medication as well as new nasal congestion. Sputum and congestion started 1-2 weeks ago. He notes feeling of sinus issues. No facial pain or pressure. He notes feeling this prior to his sinus surgery. Denies any other sick symptoms including cough, fever or chills or sore throat. Patient denies any shortness of breath. Patient notes that he has not noticed much improvement in feeling down on Zoloft. He has not noticed any side effects with the medication either. See PHQ-9 for details    Review of Systems ROS negative except for those noted in the HPI above.           Objective   Patient-Reported Vitals  Patient-Reported Pulse: 115  Patient-Reported Pulse Oximetry: 93       Physical Exam  [INSTRUCTIONS:  \"[x]\" Indicates a positive item  \"[]\" Indicates a negative item  -- DELETE ALL ITEMS NOT EXAMINED]    Constitutional: [x] Appears well-developed and well-nourished [x] No apparent distress      [] Abnormal -     Mental status: [x] Alert and awake  [x] Oriented to person/place/time [x] Able to follow commands    [] Abnormal -     Eyes:   EOM    [x]  Normal    [] Abnormal -   Sclera  [x]  Normal    [] Abnormal -          Discharge [x]  None visible   [] Abnormal -     HENT: [x] Normocephalic, atraumatic  [] Abnormal -   [x] Mouth/Throat: Mucous membranes are moist    External Ears [x] Normal  [] Abnormal -    Neck: [x] No visualized mass [] Abnormal -     Pulmonary/Chest: [x] Respiratory effort normal   [x] No visualized signs of difficulty breathing or respiratory distress        [] Abnormal -      Musculoskeletal:   [] Normal gait with no signs of ataxia         [x] Normal range of motion of neck        [] Abnormal -     Neurological:        [x] No Facial Asymmetry (Cranial nerve 7 motor function) (limited exam due to video visit)          [x] No gaze palsy        [] Abnormal -          Skin:        [x] No significant exanthematous lesions or discoloration noted on facial skin         [] Abnormal -            Psychiatric:       [x] Normal Affect [] Abnormal -        [x] No Hallucinations    Other pertinent observable physical exam findings:-               Reginald Lamar., was evaluated through a synchronous (real-time) audio-video encounter. The patient (or guardian if applicable) is aware that this is a billable service, which includes applicable co-pays. This Virtual Visit was conducted with patient's (and/or legal guardian's) consent. The visit was conducted pursuant to the emergency declaration under the [facility], [address] [place] [address] authority and the [employer] General Act.   Patient identification was verified, and a caregiver was present when appropriate. The patient was located at Home: [place] [address]. Provider was located at [facility] (Appt Dept): [address] [facility],  [address] [place] [address].         --Emerson Meadows DO

## 2022-07-08 ENCOUNTER — TELEPHONE (OUTPATIENT)
Dept: INTERNAL MEDICINE CLINIC | Age: 72
End: 2022-07-08

## 2022-07-08 NOTE — TELEPHONE ENCOUNTER
----- Message from Emerson Meadows DO sent at 7/8/2022  3:10 PM EDT -----  Please call patient and ask him to check his HR and pulse ox again today. Please also ask if he is having any shortness of breath today.  Thank you

## 2022-07-11 ENCOUNTER — OFFICE VISIT (OUTPATIENT)
Dept: ENT CLINIC | Age: 72
End: 2022-07-11
Payer: MEDICARE

## 2022-07-11 VITALS
HEIGHT: 74 IN | DIASTOLIC BLOOD PRESSURE: 84 MMHG | WEIGHT: 261 LBS | SYSTOLIC BLOOD PRESSURE: 126 MMHG | HEART RATE: 99 BPM | BODY MASS INDEX: 33.5 KG/M2

## 2022-07-11 DIAGNOSIS — J32.4 CHRONIC PANSINUSITIS: Primary | ICD-10-CM

## 2022-07-11 PROCEDURE — 3017F COLORECTAL CA SCREEN DOC REV: CPT | Performed by: OTOLARYNGOLOGY

## 2022-07-11 PROCEDURE — G8417 CALC BMI ABV UP PARAM F/U: HCPCS | Performed by: OTOLARYNGOLOGY

## 2022-07-11 PROCEDURE — G8427 DOCREV CUR MEDS BY ELIG CLIN: HCPCS | Performed by: OTOLARYNGOLOGY

## 2022-07-11 PROCEDURE — 99214 OFFICE O/P EST MOD 30 MIN: CPT | Performed by: OTOLARYNGOLOGY

## 2022-07-11 PROCEDURE — 1123F ACP DISCUSS/DSCN MKR DOCD: CPT | Performed by: OTOLARYNGOLOGY

## 2022-07-11 PROCEDURE — 1036F TOBACCO NON-USER: CPT | Performed by: OTOLARYNGOLOGY

## 2022-07-11 RX ORDER — PREDNISONE 10 MG/1
40 TABLET ORAL DAILY
Qty: 20 TABLET | Refills: 0 | Status: SHIPPED | OUTPATIENT
Start: 2022-07-11 | End: 2022-07-16

## 2022-07-11 ASSESSMENT — ENCOUNTER SYMPTOMS
EYE PAIN: 0
FACIAL SWELLING: 0
COUGH: 0
TROUBLE SWALLOWING: 0
EYE REDNESS: 0
RHINORRHEA: 0
CHOKING: 0
SINUS PRESSURE: 0
SHORTNESS OF BREATH: 0
SORE THROAT: 0
EYE ITCHING: 0
VOICE CHANGE: 0
SINUS PAIN: 0
NAUSEA: 0
DIARRHEA: 0

## 2022-07-11 NOTE — PROGRESS NOTES
Subjective:      Patient ID: Darrell Norton. is a 71 y.o. male. HPI  Chief Complaint   Patient presents with    sinusitis  History of Present Illness:Jared is a(n) 71 y.o. male who presents with 2 weeks blowing green and post nasal drip. Saw PCP. Started on Augmentin. Still draining. Stoped rinses. Started flonase.         Patient Active Problem List   Diagnosis    Wrist arthritis    Hypertrophy of prostate without urinary obstruction and other lower urinary tract symptoms (LUTS)    Cardiac dysrhythmia    Onychomycosis    Obstructive sleep apnea    Personal history of other diseases of digestive system    Erectile dysfunction    Vitamin D deficiency    Mixed hyperlipidemia    Persistent depressive disorder    Dysthymia    Non-seasonal allergic rhinitis due to pollen    Pulmonary nodule    Back pain    NPH (normal pressure hydrocephalus) ([facility])-s/p shunt    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    Early onset Alzheimer's dementia ([facility] [address]    Neuropathy    Pre-diabetes    Reactive airway disease without complication    Chronic pansinusitis    Deviated nasal septum    Hypertrophy, nasal, turbinate    Nasal obstruction     Past Surgical History:   Procedure Laterality Date    ABLATION OF DYSRHYTHMIC FOCUS      COLONOSCOPY  12/14/2018    COLONOSCOPY WITH BIOPSY performed by Moncho Thomason MD at [facility] ENTEROSCOPY N/A 1/4/2019    ENTEROSCOPY PUSH BIOPSY performed by Moncho Thomason MD at [facility] Left     lipoma    EYE SURGERY      as a child, weak eye muscles    HERNIA REPAIR      umbilical    JOINT REPLACEMENT Left 2015    PARTIAL KNEE REPLACMENT    LAPAROSCOPY N/A 7/18/2018    OPENING AND CLOSING FOR VENTRICULAR PERITONEAL SHUNT performed by Americo Suazo MD at [address] [place] [address] N/A 07/18/2018     VENTRICULAR PERITONEAL SHUNT INSERTION RIGHT SIDE; (N/A )    MD CREATE SHUNT:VENTRIC-PERITONEAL N/A 7/18/2018    VENTRICULAR PERITONEAL SHUNT INSERTION RIGHT SIDE; performed by Rancho Way MD at [address] Bilateral 5/4/2022    BILATERAL INFERIOR TURBINATE REDUCTION, BILATERAL MAXILLARY ANTROSTOMY, BILATERAL TOTAL ETHMOIDECTOMY WITH SPEHNOIDECTOMY AND SEPTOPLASTY, BILATERAL FRONTAL SINUS EXPLORATION performed by Analisa Murray MD at [id number] [place] [address] 12/14/2018    EGD BIOPSY performed by Austen Cleaning MD at [address] [place] [address] [employer] N/A 3/1/2019    ESOPHAGOGASTRODUODENOSCOPY WITH RADIOFREQUENCY  ABLATION/ ABLATIONS X19 performed by Austen Cleaning MD at [address] [place] [address] [employer] N/A 4/24/2019    ESOPHAGOGASTRODUODENOSCOPY WITH RADIOFREQUENCY ABLATION performed by Austen Cleaning MD [facility] 4/24/2019    EGD BIOPSY performed by Austen Cleaning MD at [address] [place] [address] [facility] N/A 8/2/2019    ESOPHAGOGASTRODUODENOSCOPY RADIOFREQUENCY ABLATION performed by Austen Cleaning MD at [address] [place] [address] [facility] [facility] N/A 8/2/2019    EGD GASTRIC BIOPSY performed by Austen Cleaning MD at [address] [place] [address] [facility] [facility] N/A 8/2/2019    EGD POLYP COLD SNARE performed by Austen Cleaning MD at [address] [place] [address] [facility] [facility] N/A 11/19/2019    ESOPHAGOGASTRODUODENOSCOPY WITH RADIOFREQUENCY ABLATION performed by Austen Clenaing MD at [address] [place] [address] [facility] [facility] N/A 11/19/2019    EGD POLYP SNARE performed by Austen Cleaning MD at [address] [place] [address] [facility] [facility] N/A 7/15/2020    ESOPHAGOGASTRODUODENOSCOPY RADIOFREQUENCY ABLATION ON STANDBY performed by Austen Cleaning MD at [facility] 7/15/2020    EGD BIOPSY performed by Austen Cleaning MD at [facility] 7/16/2021    EGD BIOPSY performed by Austen Cleaning MD at [place] [address] Organization Meetings: Never    Marital Status:    Intimate Partner Violence: Not At Risk    Fear of Current or Ex-Partner: No    Emotionally Abused: No    Physically Abused: No    Sexually Abused: No   Housing Stability: Unknown    Unable to Pay for Housing in the Last Year: No    Number of Places Lived in the Last Year: Not on file    Unstable Housing in the Last Year: No       DRUG/FOOD ALLERGIES: Lisinopril    CURRENT MEDICATIONS  Prior to Admission medications    Medication Sig Start Date End Date Taking? Authorizing Provider   gabapentin (NEURONTIN) 100 MG capsule Take 100 mg by mouth 4 times daily. Yes Historical Provider, MD   amoxicillin-clavulanate (AUGMENTIN) 875-125 MG per tablet Take 1 tablet by mouth 2 times daily for 7 days 7/7/22 7/14/22 Yes Linda Parker DO   fluticasone (FLONASE) 50 MCG/ACT nasal spray 1 spray by Each Nostril route daily 7/7/22  Yes Linda Parkre DO   sertraline (ZOLOFT) 50 MG tablet Take 1 tablet by mouth daily 7/7/22  Yes Linda Parker DO   finasteride (PROSCAR) 5 MG tablet Take 1 tablet by mouth daily 7/1/22  Yes Linda Parker DO   mometasone-formoterol (DULERA) 200-5 MCG/ACT inhaler Inhale 1 puff into the lungs every 12 hours 4/11/22  Yes Christiano Oliveira MD   albuterol sulfate HFA (PROVENTIL HFA) 108 (90 Base) MCG/ACT inhaler Inhale 2 puffs into the lungs every 4 hours as needed for Wheezing or Shortness of Breath (Space out to every 6 hours as symptoms improve) Space out to every 6 hours as symptoms improve.  2/9/22  Yes Christiano Oliveira MD   montelukast (SINGULAIR) 10 MG tablet Take 1 tablet by mouth nightly 2/3/22  Yes Neli Lockwood MD   cetirizine (ZYRTEC) 10 MG tablet TAKE 1 TABLET DAILY 12/9/21  Yes Linda Parker DO   atorvastatin (LIPITOR) 10 MG tablet TAKE 1 TABLET DAILY 11/15/21  Yes Linda Parker DO   pantoprazole (PROTONIX) 20 MG tablet Take 1 tablet by mouth 2 times daily 11/24/20  Yes Rabia Brandt MD aspirin 81 MG chewable tablet Take 81 mg by mouth daily   Yes Historical Provider, MD   tamsulosin (FLOMAX) 0.4 MG capsule Take 2 capsules by mouth daily 3/22/18  Yes Laine Camarillo MD   Omega-3 Fatty Acids (FISH OIL) 1000 MG CAPS Take 1,000 mg by mouth daily. Yes Historical Provider, MD   Flaxseed, Linseed, (FLAX SEED OIL) 1000 MG CAPS Take 1,000 mg by mouth daily. Yes Historical Provider, MD   Cholecalciferol (VITAMIN D) 2000 UNITS CAPS capsule Take 1 capsule by mouth daily. Yes Historical Provider, MD   multivitamin [facility]) per tablet Take 1 tablet by mouth daily. Yes Historical Provider, MD         Lab Studies:  Lab Results   Component Value Date    WBC 7.5 02/03/2022    HGB 14.0 02/03/2022    HCT 40.5 02/03/2022    MCV 85.1 02/03/2022     02/03/2022     Lab Results   Component Value Date    GLUCOSE 121 (H) 02/03/2022    BUN 14 02/03/2022    CREATININE 0.9 02/03/2022    K 4.3 02/03/2022     02/03/2022     02/03/2022    CALCIUM 9.5 02/03/2022     Lab Results   Component Value Date    MG 2.20 07/16/2018     Lab Results   Component Value Date    PHOS 3.8 07/07/2018     Lab Results   Component Value Date    ALKPHOS 94 08/25/2021    ALT 26 08/25/2021    AST 23 08/25/2021    BILITOT 0.4 08/25/2021    PROT 7.3 08/25/2021     Review of Systems   Constitutional: Negative for activity change, appetite change, chills, fatigue and fever. HENT: Positive for congestion. Negative for ear discharge, ear pain, facial swelling, hearing loss, nosebleeds, postnasal drip, rhinorrhea, sinus pressure, sinus pain, sneezing, sore throat, tinnitus, trouble swallowing and voice change. Eyes: Negative for pain, redness, itching and visual disturbance. Respiratory: Negative for cough, choking and shortness of breath. Gastrointestinal: Negative for diarrhea and nausea. Endocrine: Negative for cold intolerance and heat intolerance.        Objective:   Physical Exam  Constitutional:       Appearance: He is well-developed. HENT:      Head: Not macrocephalic and not microcephalic. No Esqueda's sign, abrasion, right periorbital erythema, left periorbital erythema or laceration. Nose: No nasal deformity, septal deviation, laceration, mucosal edema or rhinorrhea. Right Nostril: No epistaxis or occlusion. Left Nostril: No epistaxis or occlusion. Right Turbinates: Not enlarged, swollen or pale. Left Turbinates: Not enlarged, swollen or pale. Right Sinus: No maxillary sinus tenderness or frontal sinus tenderness. Left Sinus: No maxillary sinus tenderness or frontal sinus tenderness. Mouth/Throat:      Lips: No lesions. Mouth: Mucous membranes are moist.      Tongue: No lesions. Palate: No mass. Pharynx: Uvula midline. No oropharyngeal exudate or posterior oropharyngeal erythema. Tonsils: No tonsillar abscesses. Eyes:      General:         Right eye: No discharge. Left eye: No discharge. Extraocular Movements:      Right eye: Normal extraocular motion. Left eye: Normal extraocular motion. Conjunctiva/sclera:      Right eye: Right conjunctiva is not injected. No chemosis or exudate. Left eye: Left conjunctiva is not injected. No chemosis or exudate. Neck:      Thyroid: No thyroid mass or thyromegaly. Cardiovascular:      Rate and Rhythm: Normal rate and regular rhythm. Pulmonary:      Effort: No tachypnea or respiratory distress. Lymphadenopathy:      Head:      Right side of head: No preauricular or posterior auricular adenopathy. Left side of head: No preauricular or posterior auricular adenopathy. Cervical:      Right cervical: No superficial, deep or posterior cervical adenopathy. Left cervical: No superficial, deep or posterior cervical adenopathy. Neurological:      Mental Status: He is alert and oriented to person, place, and time.        Due to the patients chronic sinus disease and/or history of sinonasal neoplasm for surveillance a nasal endoscopy with or without debridement will be performed to complete a significant physical examination of the patient which cannot be performed by anterior rhinoscopy alone (failure of complete examination of the paranasal sinuses). Failure to provide this procedure may lead to late detection of significant chronic benign disease, acute exacerbation, resolution or failure of early diagnosis of recurrent cancer. The procedure report is present in the body of the chart. Nasal Endoscopy    Pre OP: sinus infection  Post OP: acute pansinusitis  Reason: 2 weeks symtoms. Procedure: Nasal endoscopy  Surgeon: Negar Frausto  Anesthesia: Afrin with 2% lidocaine  Estimated Blood Loss: None      After obtaining verbal consent from the patient 1% lidocaine with afrin was sprayed into the nasal cavities. After allowing a time for anesthesia, a nasal endoscope was placed into the nostril. The septum, inferior, and middle turbinates were examined. The middle meatus, and sphenoethmoid recess was examined bilaterally. Cultures were obtained from the left ethmoid sinuses. There were no complications. Pertinent positives included: There was edema and purulence in the left middle meatus. There was edema and purulence at the right middle meatus. Polyps were not identified in the sinuses. Masses were not identified. Tolerated well without complication. I attest that I was present for and did the entire procedure myself. Assessment:       Diagnosis Orders   1. Chronic pansinusitis  Culture, Anaerobic and Aerobic    predniSONE (DELTASONE) 10 MG tablet           Plan:      Continue Augmentin. Add prednisone. Call with culture results and plan.          Alia Brayn MD

## 2022-07-16 LAB
ANAEROBIC CULTURE: ABNORMAL
GRAM STAIN RESULT: ABNORMAL
ORGANISM: ABNORMAL
WOUND/ABSCESS: ABNORMAL
WOUND/ABSCESS: ABNORMAL

## 2022-07-18 ENCOUNTER — TELEPHONE (OUTPATIENT)
Dept: ENT CLINIC | Age: 72
End: 2022-07-18

## 2022-07-18 DIAGNOSIS — J01.00 ACUTE NON-RECURRENT MAXILLARY SINUSITIS: Primary | ICD-10-CM

## 2022-07-18 RX ORDER — SULFAMETHOXAZOLE AND TRIMETHOPRIM 400; 80 MG/1; MG/1
1 TABLET ORAL 2 TIMES DAILY
Qty: 42 TABLET | Refills: 0 | Status: SHIPPED | OUTPATIENT
Start: 2022-07-18 | End: 2022-08-08

## 2022-07-18 NOTE — TELEPHONE ENCOUNTER
----- Message from Denia Paula MD sent at 7/18/2022  8:32 AM EDT -----  Please let patient know his culture grew two bacteria that should respond to bactrim. I sent a prescription to his pharmacy.

## 2022-07-18 NOTE — TELEPHONE ENCOUNTER
Spoke to patients wife and informed her that culture grew bacteria and medication was sent to the pharmacy.

## 2022-08-08 ENCOUNTER — OFFICE VISIT (OUTPATIENT)
Dept: INTERNAL MEDICINE CLINIC | Age: 72
End: 2022-08-08
Payer: MEDICARE

## 2022-08-08 VITALS
DIASTOLIC BLOOD PRESSURE: 79 MMHG | OXYGEN SATURATION: 96 % | HEART RATE: 89 BPM | WEIGHT: 267.8 LBS | BODY MASS INDEX: 34.38 KG/M2 | SYSTOLIC BLOOD PRESSURE: 128 MMHG

## 2022-08-08 DIAGNOSIS — F33.1 MODERATE EPISODE OF RECURRENT MAJOR DEPRESSIVE DISORDER (HCC): ICD-10-CM

## 2022-08-08 DIAGNOSIS — E78.2 MIXED HYPERLIPIDEMIA: ICD-10-CM

## 2022-08-08 DIAGNOSIS — R73.03 PRE-DIABETES: Primary | ICD-10-CM

## 2022-08-08 DIAGNOSIS — G30.0 EARLY ONSET ALZHEIMER'S DEMENTIA WITH BEHAVIORAL DISTURBANCE (HCC): ICD-10-CM

## 2022-08-08 DIAGNOSIS — F02.818 EARLY ONSET ALZHEIMER'S DEMENTIA WITH BEHAVIORAL DISTURBANCE (HCC): ICD-10-CM

## 2022-08-08 DIAGNOSIS — G62.9 NEUROPATHY: ICD-10-CM

## 2022-08-08 PROCEDURE — G8417 CALC BMI ABV UP PARAM F/U: HCPCS | Performed by: INTERNAL MEDICINE

## 2022-08-08 PROCEDURE — 1036F TOBACCO NON-USER: CPT | Performed by: INTERNAL MEDICINE

## 2022-08-08 PROCEDURE — 1123F ACP DISCUSS/DSCN MKR DOCD: CPT | Performed by: INTERNAL MEDICINE

## 2022-08-08 PROCEDURE — 99214 OFFICE O/P EST MOD 30 MIN: CPT | Performed by: INTERNAL MEDICINE

## 2022-08-08 PROCEDURE — 3017F COLORECTAL CA SCREEN DOC REV: CPT | Performed by: INTERNAL MEDICINE

## 2022-08-08 PROCEDURE — G8427 DOCREV CUR MEDS BY ELIG CLIN: HCPCS | Performed by: INTERNAL MEDICINE

## 2022-08-08 NOTE — PROGRESS NOTES
At the time of evaluation, the patient is awake and alert.  He is resting in chair.  He has oxygen on board.  He complains of some dyspnea again today.  He was given some diuretics yesterday and has been urinating well subsequently.  The patient does not appear to be in acute distress currently.  He denies any nausea or vomiting.  No dizziness.  He denies any chest pain other than some mild incisional discomfort.  He denies any abdominal pain or diarrhea.  No dysuria or hematuria.  He does have a Jauregui catheter in place.  His appetite is low.  He is on liquids.  No other complaints.    PHYSICAL EXAMINATION:  Vital Signs (last 24 hrs)__100.0, 130/70, 22, 80s___      Intake Output Balance    11/16/2018 7a-3p    10  1320 -1310   3p-11p     4   880  -876   11p-7a     3   530  -527   Totals    17  2730 -2713    11/15/2018 7a-3p   466   470    -3   3p-11p   340   655  -314   11p-7a   128   740  -611   Totals   934  1865  -928    HEENT: Unchanged    NECK:  Supple.  No JVP elevation.  Good carotid upstroke and volume.     HEART:  S1, S2.  No gallops or rubs.     LUNGS:  Reveal rhonchi bilaterally.  Bibasilar crackles.  No wheezes.  Good chest expansion.     ABDOMEN:  Soft, nontender, and nondistended.  Bowel sounds are present.      EXTREMITIES:  Trace bilateral upper extremity edema.  He does have trace bilateral lower extremity edema.  There is no livedo reticularis.     SKIN:  Reveals no rash.     Labs (Last four charted values)  WBC                  10.7 (NOV 17) H 11.3 (NOV 16) H 14.7 (NOV 15) H 17.1 (NOV 14)   Hgb                  L 10.4 (NOV 17) L 9.6 (NOV 16) L 10.2 (NOV 15) L 9.5 (NOV 14)   Hct                  L 31 (NOV 17) L 28 (NOV 16) L 31 (NOV 15) L 29 (NOV 14)   Plt                  158 (NOV 17) L 124 (NOV 16) L 109 (NOV 15) L 96 (NOV 14)   Na                   139 (NOV 17) 138 (NOV 16) L 134 (NOV 16) 137 (NOV 15)   K                    4.8 (NOV 17) 4.8 (NOV 16) 5.1 (NOV 16) 5.0 (NOV 15)   CO2    Eloisa Coats. (:  1950) is a 71 y.o. male,Established patient, here for evaluation of the following chief complaint(s):  Follow-up      ASSESSMENT/PLAN:  1. Pre-diabetes  POC blood sugar in the office today 110. Patient was tremulous with a normal in the hands bilaterally which led to checking blood sugar in the office. Last hemoglobin A1c 6.2. We will repeat today. Also check CMP. -     Hemoglobin A1C; Future  -     Comprehensive Metabolic Panel; Future  2. Moderate episode of recurrent major depressive disorder ([facility] [address]  Patient still feeling down and depressed. No side effects noted with sertraline. There was some concern for possible drowsiness however this is no more than normal prior to starting sertraline. Will increase dose to 75 mg daily. -     sertraline (ZOLOFT) 50 MG tablet; Take 1.5 tablets by mouth in the morning., Disp-45 tablet, R-2Normal  3. Mixed hyperlipidemia  Patient to continue statin. Full check CMP. Lipid panel due in December.  -     Comprehensive Metabolic Panel; Future  4. Early onset Alzheimer's dementia with behavioral disturbance ([facility] [address]  History of early onset Alzheimer's dementia. Patient also has worsening tremors in the bilateral upper extremities. I discussed patient going back to neurology with wife for couple weeks to months ago. Educated both wife and patient on scheduling this appointment to discuss next steps. 5. Neuropathy  Well-controlled on gabapentin. Continue this medication    Return in about 6 weeks (around 2022) for depression. SUBJECTIVE/OBJECTIVE:  HPI    Patient is a 71-year-old male with past medical history of Child's esophagus, NPH, early onset Alzheimer's, prediabetes and depression who presents for follow-up for the above listed chronic diseases. Patient notes that he is overall been doing well. He has noted that his tremors in his hands have worsened more recently. He has been seeing ENT for chronic sinusitis.   He                29 (NOV 17) H 30 (NOV 16) 26 (NOV 16) 27 (NOV 15)   Cl                   102 (NOV 17) 101 (NOV 16) 102 (NOV 16) 103 (NOV 15)   Cr                   1.25 (NOV 17) H 1.56 (NOV 16) H 1.64 (NOV 16) H 1.84 (NOV 15)   BUN                  H 55 (NOV 17) H 64 (NOV 16) H 69 (NOV 16) H 65 (NOV 15)   Glucose              H 111 (NOV 17) H 129 (NOV 16) H 132 (NOV 16) H 152 (NOV 15)   Mg                   2.6 (NOV 17) 2.6 (NOV 16) 2.6 (NOV 16) 2.3 (NOV 15)   Phos                 2.7 (NOV 17) 3.2 (NOV 16)   Ca                   9.2 (NOV 17) 9.1 (NOV 16) 9.2 (NOV 16) 9.8 (NOV 15)   PT                   11.1 (NOV 13) H 12.8 (NOV 12) H 16.0 (NOV 12) 11.4 (NOV 12)   INR                  1.1 (NOV 13) H 1.3 (NOV 12) H 1.7 (NOV 12) 1.1 (NOV 12)   PTT                  25 (NOV 13) 24 (NOV 12) 24 (NOV 12) H 34 (NOV 06)    IMPRESSION:  This is a 77-year-old white male with history of hypertension, atrial fibrillation, mitral valve regurgitation with mitral valve prolapse, who underwent mitral valve replacement, additionally:  1. He has acute kidney injury.  This may be secondary to perioperative ischemia versus intravascular depletion with ischemia versus blood loss anemia.  Doubt obstruction interstitial nephritis versus cholesterol emboli are also possible.  Large improvement is noted in renal function at this time.  2. Fluid overload.  The patient has mild peripheral edema and possibly some pulmonary congestion.  He does subjectively have dyspnea.  Excellent urine output yesterday.  Patient may still have a little bit of congestion  3. Hyponatremia is very mild at this time.  4. Hypertension seems to be secondary to pain or dyspnea.    PLAN:  At this time:  1. Dose of Lasix today .  2. Maintain Jauregui catheter.  3. Encourage oral intake.  4. Avoid nephrotoxins.  5. Serial chemistries.    6. This was discussed with the patient, staff at length.  All questions answered in detail.                Electronically Signed On  was recently treated with Bactrim. He has a follow-up with them this week. He notes that his depression is still present. He feels very frustrated with not being able to drive. His wife does not feel it safe for him to drive. He does have neuropathy in his feet and has difficulty with attention. He did have a driving test done about 3 years ago which stated that he could continue to drive. He has not done 1 more recently with more recent changes to his mental status and his numbness in his feet changing. This is leading to increased depression however. Patient not having side effects from sertraline. There was some question of whether or not this was making him drowsy. He is not falling asleep during the day more than normal prior to sertraline being started. Review of Systems ROS negative except for those noted in the HPI above. Vitals:    08/08/22 0829   BP: 128/79   Pulse: 89   SpO2: 96%         Physical Exam  Constitutional:       General: He is not in acute distress. Appearance: Normal appearance. He is not ill-appearing. HENT:      Head: Normocephalic and atraumatic. Right Ear: External ear normal.      Left Ear: External ear normal.   Eyes:      Extraocular Movements: Extraocular movements intact. Conjunctiva/sclera: Conjunctivae normal.   Cardiovascular:      Rate and Rhythm: Normal rate and regular rhythm. Heart sounds: No murmur heard. No friction rub. No gallop. Pulmonary:      Effort: Pulmonary effort is normal. No respiratory distress. Breath sounds: Normal breath sounds. No wheezing, rhonchi or rales. Abdominal:      General: Bowel sounds are normal. There is no distension. Palpations: Abdomen is soft. Tenderness: There is no abdominal tenderness. There is no guarding or rebound. Musculoskeletal:      Right lower leg: No edema. Left lower leg: No edema. Skin:     General: Skin is warm. Findings: No erythema or rash. 11.17.2018 16:37  ___________________________________________________   Mark Anthony Turk MD     Neurological:      Mental Status: He is alert and oriented to person, place, and time. Comments: Tremors in the bilateral hands at rest present. Psychiatric:         Mood and Affect: Mood normal.         Behavior: Behavior normal.         An electronic signature was used to authenticate this note.     --Kylee Galdamez, DO

## 2022-08-17 ENCOUNTER — TELEPHONE (OUTPATIENT)
Dept: ENT CLINIC | Age: 72
End: 2022-08-17

## 2022-08-17 NOTE — TELEPHONE ENCOUNTER
Pt called to see if Dr. Lorie Ramirez would be able to recommend anything to help with their Sinus Infection. Pt says the medicine helped but then the symptoms have come back. Pt is scheduled on 8/30, pt would like to know if they should come in sooner?  Please contact pt when possible, thank you!!

## 2022-08-18 ENCOUNTER — OFFICE VISIT (OUTPATIENT)
Dept: ENT CLINIC | Age: 72
End: 2022-08-18
Payer: MEDICARE

## 2022-08-18 DIAGNOSIS — J32.4 CHRONIC PANSINUSITIS: Primary | ICD-10-CM

## 2022-08-18 PROCEDURE — 31231 NASAL ENDOSCOPY DX: CPT | Performed by: OTOLARYNGOLOGY

## 2022-08-18 RX ORDER — PREDNISONE 10 MG/1
TABLET ORAL
Qty: 38 TABLET | Refills: 0 | Status: SHIPPED | OUTPATIENT
Start: 2022-08-18 | End: 2022-09-20 | Stop reason: SDUPTHER

## 2022-08-24 LAB
ANAEROBIC CULTURE: NORMAL
GRAM STAIN RESULT: NORMAL
WOUND/ABSCESS: NORMAL

## 2022-08-26 ENCOUNTER — TELEPHONE (OUTPATIENT)
Dept: ENT CLINIC | Age: 72
End: 2022-08-26

## 2022-08-26 NOTE — TELEPHONE ENCOUNTER
----- Message from Sushma Valadez MD sent at 8/25/2022  6:37 PM EDT -----  And get his CT as we discussed at his appointment

## 2022-09-06 ENCOUNTER — HOSPITAL ENCOUNTER (OUTPATIENT)
Dept: CT IMAGING | Age: 72
Discharge: HOME | End: 2022-09-06
Payer: MEDICARE

## 2022-09-06 DIAGNOSIS — J32.4 CHRONIC PANSINUSITIS: ICD-10-CM

## 2022-09-06 PROCEDURE — 70486 CT MAXILLOFACIAL W/O DYE: CPT

## 2022-09-15 ENCOUNTER — OFFICE VISIT (OUTPATIENT)
Dept: ENT CLINIC | Age: 72
End: 2022-09-15
Payer: MEDICARE

## 2022-09-15 DIAGNOSIS — J32.4 CHRONIC PANSINUSITIS: Primary | ICD-10-CM

## 2022-09-15 DIAGNOSIS — J34.89 NASAL OBSTRUCTION: ICD-10-CM

## 2022-09-15 DIAGNOSIS — J34.3 HYPERTROPHY OF NASAL TURBINATES: ICD-10-CM

## 2022-09-15 DIAGNOSIS — J34.2 DEVIATED NASAL SEPTUM: ICD-10-CM

## 2022-09-15 PROCEDURE — 1123F ACP DISCUSS/DSCN MKR DOCD: CPT | Performed by: OTOLARYNGOLOGY

## 2022-09-15 PROCEDURE — 99214 OFFICE O/P EST MOD 30 MIN: CPT | Performed by: OTOLARYNGOLOGY

## 2022-09-15 PROCEDURE — 3017F COLORECTAL CA SCREEN DOC REV: CPT | Performed by: OTOLARYNGOLOGY

## 2022-09-15 PROCEDURE — 1036F TOBACCO NON-USER: CPT | Performed by: OTOLARYNGOLOGY

## 2022-09-15 PROCEDURE — G8427 DOCREV CUR MEDS BY ELIG CLIN: HCPCS | Performed by: OTOLARYNGOLOGY

## 2022-09-15 PROCEDURE — G8417 CALC BMI ABV UP PARAM F/U: HCPCS | Performed by: OTOLARYNGOLOGY

## 2022-09-15 ASSESSMENT — ENCOUNTER SYMPTOMS
CHOKING: 0
EYE PAIN: 0
VOICE CHANGE: 0
SHORTNESS OF BREATH: 0
SINUS PAIN: 0
TROUBLE SWALLOWING: 0
FACIAL SWELLING: 0
RHINORRHEA: 0
EYE ITCHING: 0
SINUS PRESSURE: 0
SORE THROAT: 0
NAUSEA: 0
EYE REDNESS: 0
DIARRHEA: 0
COUGH: 0

## 2022-09-15 NOTE — PROGRESS NOTES
Subjective:      Patient ID: Justine Bishop. is a 71 y.o. male. HPI  Chief Complaint   Patient presents with    CT follow up  History of Present Illness:Jared is a(n) 71 y.o. male who presents with a follow up CT. Still congested and post nasal drip. Left nasal obstruction. Discussed CT results and showed patient images and interpreted findings. Pansinusitis. Discussed revision surgery. Patient agreed.        Patient Active Problem List   Diagnosis    Wrist arthritis    Hypertrophy of prostate without urinary obstruction and other lower urinary tract symptoms (LUTS)    Cardiac dysrhythmia    Onychomycosis    Obstructive sleep apnea    Personal history of other diseases of digestive system    Erectile dysfunction    Vitamin D deficiency    Mixed hyperlipidemia    Persistent depressive disorder    Dysthymia    Non-seasonal allergic rhinitis due to pollen    Pulmonary nodule    Back pain    NPH (normal pressure hydrocephalus) (HCC)-s/p shunt    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    Early onset Alzheimer's dementia (HCC)    Neuropathy    Pre-diabetes    Reactive airway disease without complication    Chronic pansinusitis    Deviated nasal septum    Hypertrophy, nasal, turbinate    Nasal obstruction     Past Surgical History:   Procedure Laterality Date    ABLATION OF DYSRHYTHMIC FOCUS      COLONOSCOPY  12/14/2018    COLONOSCOPY WITH BIOPSY performed by Rosa Nugent MD at [facility] ENDOSCOPY    ENTEROSCOPY N/A 1/4/2019    ENTEROSCOPY PUSH BIOPSY performed by Rosa Nugent MD at [address] Left     lipoma    EYE SURGERY      as a child, weak eye muscles    HERNIA REPAIR      umbilical    JOINT REPLACEMENT Left 2015    PARTIAL KNEE REPLACMENT    LAPAROSCOPY N/A 7/18/2018    OPENING AND CLOSING FOR VENTRICULAR PERITONEAL SHUNT performed by Kaylynn Macdonald MD at [address] [place] [address] N/A 07/18/2018     VENTRICULAR PERITONEAL SHUNT INSERTION RIGHT SIDE; (N/A )    KYLEE NORWOOD SHUNT:VENTRIC-PERITONEAL N/A 7/18/2018    VENTRICULAR PERITONEAL SHUNT INSERTION RIGHT SIDE; performed by Delicia Velasquez MD at [address] Bilateral 5/4/2022    BILATERAL INFERIOR TURBINATE REDUCTION, BILATERAL MAXILLARY ANTROSTOMY, BILATERAL TOTAL ETHMOIDECTOMY WITH SPEHNOIDECTOMY AND SEPTOPLASTY, BILATERAL FRONTAL SINUS EXPLORATION performed by Luisa Fabian MD at [place] [address]. 12/14/2018    EGD BIOPSY performed by Ronnell Jules MD at [place] [address] N/A 3/1/2019    ESOPHAGOGASTRODUODENOSCOPY WITH RADIOFREQUENCY  ABLATION/ ABLATIONS X19 performed by Ronnell Julse MD at [place] [address] N/A 4/24/2019    ESOPHAGOGASTRODUODENOSCOPY WITH RADIOFREQUENCY ABLATION performed by Ronnell Jules MD at [place] [address] 4/24/2019    EGD BIOPSY performed by Ronnell Jules MD at [place] [address] N/A 8/2/2019    ESOPHAGOGASTRODUODENOSCOPY RADIOFREQUENCY ABLATION performed by Ronnell Jules MD at [place] [address] N/A 8/2/2019    EGD GASTRIC BIOPSY performed by Ronnell Jules MD at [place] [address] N/A 8/2/2019    EGD POLYP COLD SNARE performed by Ronnell Jules MD at [place] [address] N/A 11/19/2019    ESOPHAGOGASTRODUODENOSCOPY WITH RADIOFREQUENCY ABLATION performed by Ronnell Jules MD at [place] [address] N/A 11/19/2019    EGD POLYP SNARE performed by Ronnell Jules MD at [place] [address] N/A 7/15/2020    ESOPHAGOGASTRODUODENOSCOPY RADIOFREQUENCY ABLATION ON STANDBY performed by Ronnell Jules MD at [place] [address] 7/15/2020    EGD BIOPSY performed by Ronnell Jules MD at [place] [address] 7/16/2021    EGD BIOPSY performed by Alice Trevino MD at [address] N/A 2021    EGD POLYP SNARE performed by Alice Trevino MD at [facility] ENDOSCOPY     Family History   Problem Relation Age of Onset    Cancer Father         prostate     Social History     Socioeconomic History    Marital status:      Spouse name: Not on file    Number of children: Not on file    Years of education: Not on file    Highest education level: Not on file   Occupational History    Not on file   Tobacco Use    Smoking status: Former     Packs/day: 0.50     Years: 10.00     Pack years: 5.00     Types: Cigarettes     Quit date: 2002     Years since quittin.7    Smokeless tobacco: Never   Vaping Use    Vaping Use: Never used   Substance and Sexual Activity    Alcohol use: Not Currently     Alcohol/week: 2.0 standard drinks     Types: 2 Standard drinks or equivalent per week     Comment: rarely -- once a year    Drug use: Never    Sexual activity: Not Currently   Other Topics Concern    Not on file   Social History Narrative    Not on file     Social Determinants of Health     Financial Resource Strain: Not on file   Food Insecurity: Not on file   Transportation Needs: Not on file   Physical Activity: Not on file   Stress: Not on file   Social Connections: Not on file   Intimate Partner Violence: Not on file   Housing Stability: Not on file       DRUG/FOOD ALLERGIES: Lisinopril    CURRENT MEDICATIONS  Prior to Admission medications    Medication Sig Start Date End Date Taking? Authorizing Provider   predniSONE (DELTASONE) 10 MG tablet 4 tabs a day for 5 days, 3 tabs a day for 3 days, 2 tabs a day for 3 days,1 tab a day for 3 days 22  Yes Lawanda Anrold MD   sertraline (ZOLOFT) 50 MG tablet Take 1.5 tablets by mouth in the morning. 22  Yes Linda Parker DO   gabapentin (NEURONTIN) 100 MG capsule Take 100 mg by mouth 4 times daily.    Yes Historical Provider, MD   fluticasone (FLONASE) 50 MCG/ACT nasal spray 1 spray by Each Nostril route daily 7/7/22  Yes Linda Parker DO   finasteride (PROSCAR) 5 MG tablet Take 1 tablet by mouth daily 7/1/22  Yes Linda Parker DO   mometasone-formoterol (DULERA) 200-5 MCG/ACT inhaler Inhale 1 puff into the lungs every 12 hours 4/11/22  Yes Christiano Oliveira MD   albuterol sulfate HFA (PROVENTIL HFA) 108 (90 Base) MCG/ACT inhaler Inhale 2 puffs into the lungs every 4 hours as needed for Wheezing or Shortness of Breath (Space out to every 6 hours as symptoms improve) Space out to every 6 hours as symptoms improve. 2/9/22  Yes Christiano Oliveira MD   montelukast (SINGULAIR) 10 MG tablet Take 1 tablet by mouth nightly 2/3/22  Yes Kavya Gibbons MD   cetirizine (ZYRTEC) 10 MG tablet TAKE 1 TABLET DAILY 12/9/21  Yes Linda Parker DO   atorvastatin (LIPITOR) 10 MG tablet TAKE 1 TABLET DAILY 11/15/21  Yes Linda Parker DO   pantoprazole (PROTONIX) 20 MG tablet Take 1 tablet by mouth 2 times daily 11/24/20  Yes Nissa Infante MD   aspirin 81 MG chewable tablet Take 81 mg by mouth daily   Yes Historical Provider, MD   tamsulosin (FLOMAX) 0.4 MG capsule Take 2 capsules by mouth daily 3/22/18  Yes Jimy Aden MD   Omega-3 Fatty Acids (FISH OIL) 1000 MG CAPS Take 1,000 mg by mouth daily. Yes Historical Provider, MD   Flaxseed, Linseed, (FLAX SEED OIL) 1000 MG CAPS Take 1,000 mg by mouth daily. Yes Historical Provider, MD   Cholecalciferol (VITAMIN D) 2000 UNITS CAPS capsule Take 1 capsule by mouth daily. Yes Historical Provider, MD   multivitamin [facility]) per tablet Take 1 tablet by mouth daily.    Yes Historical Provider, MD         Lab Studies:  Lab Results   Component Value Date    WBC 7.5 02/03/2022    HGB 14.0 02/03/2022    HCT 40.5 02/03/2022    MCV 85.1 02/03/2022     02/03/2022     Lab Results   Component Value Date    GLUCOSE 103 (H) 08/08/2022    BUN 15 08/08/2022    CREATININE 1.0 08/08/2022    K 4.3 08/08/2022    NA 140 08/08/2022     08/08/2022    CALCIUM 9.4 08/08/2022     Lab Results   Component Value Date    MG 2.20 07/16/2018     Lab Results   Component Value Date    PHOS 3.8 07/07/2018     Lab Results   Component Value Date    ALKPHOS 95 08/08/2022    ALT 28 08/08/2022    AST 22 08/08/2022    BILITOT 0.4 08/08/2022    PROT 7.0 08/08/2022      Review of Systems   Constitutional:  Negative for activity change, appetite change, chills, fatigue and fever. HENT:  Positive for congestion. Negative for ear discharge, ear pain, facial swelling, hearing loss, nosebleeds, postnasal drip, rhinorrhea, sinus pressure, sinus pain, sneezing, sore throat, tinnitus, trouble swallowing and voice change. Eyes:  Negative for pain, redness, itching and visual disturbance. Respiratory:  Negative for cough, choking and shortness of breath. Gastrointestinal:  Negative for diarrhea and nausea. Endocrine: Negative for cold intolerance and heat intolerance. Objective:   Physical Exam  Constitutional:       Appearance: He is well-developed. HENT:      Head: Not macrocephalic and not microcephalic. No Esqueda's sign, abrasion, right periorbital erythema, left periorbital erythema or laceration. Nose: No nasal deformity, septal deviation, laceration, mucosal edema or rhinorrhea. Right Nostril: No epistaxis or occlusion. Left Nostril: No epistaxis or occlusion. Right Turbinates: Not enlarged, swollen or pale. Left Turbinates: Not enlarged, swollen or pale. Right Sinus: No maxillary sinus tenderness or frontal sinus tenderness. Left Sinus: No maxillary sinus tenderness or frontal sinus tenderness. Mouth/Throat:      Lips: No lesions. Mouth: Mucous membranes are moist.      Tongue: No lesions. Palate: No mass. Pharynx: Uvula midline. No oropharyngeal exudate or posterior oropharyngeal erythema. Tonsils: No tonsillar abscesses.    Eyes:      General:         Right eye: No discharge. Left eye: No discharge. Extraocular Movements:      Right eye: Normal extraocular motion. Left eye: Normal extraocular motion. Conjunctiva/sclera:      Right eye: Right conjunctiva is not injected. No chemosis or exudate. Left eye: Left conjunctiva is not injected. No chemosis or exudate. Neck:      Thyroid: No thyroid mass or thyromegaly. Cardiovascular:      Rate and Rhythm: Normal rate and regular rhythm. Pulmonary:      Effort: No tachypnea or respiratory distress. Lymphadenopathy:      Head:      Right side of head: No preauricular or posterior auricular adenopathy. Left side of head: No preauricular or posterior auricular adenopathy. Cervical:      Right cervical: No superficial, deep or posterior cervical adenopathy. Left cervical: No superficial, deep or posterior cervical adenopathy. Neurological:      Mental Status: He is alert and oriented to person, place, and time. Assessment:       Diagnosis Orders   1. Chronic pansinusitis        2. Nasal obstruction        3. Deviated nasal septum                Plan:      OR for revision FESS and septoplasty     The patient has a diagnosis of CRS which has not been responsive or cannot be treated with maximal medical therapy. The patient has been advised of options including further medical therapy, surgery, or nothing. The risks and benefits of surgery were described not limited to injury to the skull base, brain, stroke, hemorrhage, brain fluid leak, double vision, visual loss, epistaxis, and need for further surgical management of disease. Patient expectations during and after surgery including post-operative sinonasal care and pain control were described. Other health co-morbidities that would increase the risks of bodily harm, complications and risk of death including none were discussed .  Questions were answered and the patient agreed to proceed with surgery which will be scheduled in an appropriate time frame in line with the severity of disease.           Kole Mae MD

## 2022-09-15 NOTE — LETTER
[facility]    Surgery Schedule Request Form: 09/15/22  [id number] Charlee Greenberg      DATE OF SURGERY: ###    TIME OF SURGERY:  ###            CONF #: ____________________       Patient Information:    Patient name: Orly Carranza. YOB: 1950 Age/Sex:72 y.o./male    SS #:[id number]    Wt Readings from Last 1 Encounters:   08/08/22 267 lb 12.8 oz (121.5 kg)       Telephone Information:   Mobile [address]     Surgeon & Procedure Information:     Lead surgeon: Judy Ontiveros Co-Surgeon: kameron  Phone: [phone] Fax: [phone]  PCP: Sonali Gee DO    Diagnosis: chronic pansinusitis, turbinate hypertrophy    Procedure name/CPT: Bilateral frontal sinus exploration (32415-23), Bilateral Inferior Turbinate Reduction (45807-92), Bilateral Maxillary Antrostomy (37823-15), Bilateral Total Ethmoidectomy with Sphenoidectomy (91694-08) and Image Navigation (26043) Revision septoplasty (63093)    Post-operative sinus debridement 76767-75, 2 units    Procedure length: 2 hours Anesthesia: General    Special Equipment: yes - [place] Image Guidance    Patient Status: SDS (OP)    Primary Payor Plan: ###  Member ID: ###   Subscriber name: ###    [] Implement attached clinical orders for patient.       Electronically signed by Anastasia Murillo MD on 9/15/2022 at 8:14 AM
turbinate hypertrophy-J34.3    Procedure name/CPT: Bilateral frontal sinus exploration (21413-64), Bilateral Inferior Turbinate Reduction (91204-83), Bilateral Maxillary Antrostomy (50336-25), Bilateral Total Ethmoidectomy with Sphenoidectomy (46727-76) and Image Navigation (86572) Revision septoplasty (03699)    Post-operative sinus debridement 84188-84, 2 units    Procedure length: 2 hours Anesthesia: General    Special Equipment: yes - [place] Image Guidance    Patient Status: SDS (OP)    Primary Payor Plan: Medicare,   Member Kathy, [id number]  [address] [place] [address] name: Ruth Mcgrath.    [] Implement attached clinical orders for patient. Electronically signed by Faith Mijares MD on 9/15/2022 at 8:14 AM      [] Implement attached clinical orders for patient.       Electronically signed by Faith Mijares MD on 9/15/2022 at 8:14 AM

## 2022-09-19 ENCOUNTER — TELEPHONE (OUTPATIENT)
Dept: ENT CLINIC | Age: 72
End: 2022-09-19

## 2022-09-19 ENCOUNTER — OFFICE VISIT (OUTPATIENT)
Dept: INTERNAL MEDICINE CLINIC | Age: 72
End: 2022-09-19
Payer: MEDICARE

## 2022-09-19 VITALS
HEIGHT: 74 IN | BODY MASS INDEX: 34.42 KG/M2 | HEART RATE: 88 BPM | OXYGEN SATURATION: 99 % | SYSTOLIC BLOOD PRESSURE: 126 MMHG | DIASTOLIC BLOOD PRESSURE: 72 MMHG | WEIGHT: 268.2 LBS

## 2022-09-19 DIAGNOSIS — J45.40 MODERATE PERSISTENT REACTIVE AIRWAY DISEASE WITHOUT COMPLICATION: ICD-10-CM

## 2022-09-19 DIAGNOSIS — Z01.818 PRE-OP EVALUATION: Primary | ICD-10-CM

## 2022-09-19 DIAGNOSIS — J32.4 CHRONIC PANSINUSITIS: ICD-10-CM

## 2022-09-19 PROCEDURE — 99214 OFFICE O/P EST MOD 30 MIN: CPT | Performed by: INTERNAL MEDICINE

## 2022-09-19 PROCEDURE — G0008 ADMIN INFLUENZA VIRUS VAC: HCPCS | Performed by: INTERNAL MEDICINE

## 2022-09-19 PROCEDURE — 90694 VACC AIIV4 NO PRSRV 0.5ML IM: CPT | Performed by: INTERNAL MEDICINE

## 2022-09-19 PROCEDURE — G8417 CALC BMI ABV UP PARAM F/U: HCPCS | Performed by: INTERNAL MEDICINE

## 2022-09-19 PROCEDURE — G8427 DOCREV CUR MEDS BY ELIG CLIN: HCPCS | Performed by: INTERNAL MEDICINE

## 2022-09-19 PROCEDURE — 1036F TOBACCO NON-USER: CPT | Performed by: INTERNAL MEDICINE

## 2022-09-19 PROCEDURE — 1123F ACP DISCUSS/DSCN MKR DOCD: CPT | Performed by: INTERNAL MEDICINE

## 2022-09-19 PROCEDURE — 3017F COLORECTAL CA SCREEN DOC REV: CPT | Performed by: INTERNAL MEDICINE

## 2022-09-19 RX ORDER — ALBUTEROL SULFATE 90 UG/1
2 AEROSOL, METERED RESPIRATORY (INHALATION) EVERY 4 HOURS PRN
Qty: 3 EACH | Refills: 3 | Status: SHIPPED | OUTPATIENT
Start: 2022-09-19

## 2022-09-19 SDOH — ECONOMIC STABILITY: FOOD INSECURITY: WITHIN THE PAST 12 MONTHS, YOU WORRIED THAT YOUR FOOD WOULD RUN OUT BEFORE YOU GOT MONEY TO BUY MORE.: NEVER TRUE

## 2022-09-19 SDOH — ECONOMIC STABILITY: FOOD INSECURITY: WITHIN THE PAST 12 MONTHS, THE FOOD YOU BOUGHT JUST DIDN'T LAST AND YOU DIDN'T HAVE MONEY TO GET MORE.: NEVER TRUE

## 2022-09-19 ASSESSMENT — PATIENT HEALTH QUESTIONNAIRE - PHQ9
3. TROUBLE FALLING OR STAYING ASLEEP: 3
SUM OF ALL RESPONSES TO PHQ QUESTIONS 1-9: 8
6. FEELING BAD ABOUT YOURSELF - OR THAT YOU ARE A FAILURE OR HAVE LET YOURSELF OR YOUR FAMILY DOWN: 0
7. TROUBLE CONCENTRATING ON THINGS, SUCH AS READING THE NEWSPAPER OR WATCHING TELEVISION: 2
2. FEELING DOWN, DEPRESSED OR HOPELESS: 0
5. POOR APPETITE OR OVEREATING: 0
8. MOVING OR SPEAKING SO SLOWLY THAT OTHER PEOPLE COULD HAVE NOTICED. OR THE OPPOSITE, BEING SO FIGETY OR RESTLESS THAT YOU HAVE BEEN MOVING AROUND A LOT MORE THAN USUAL: 0
SUM OF ALL RESPONSES TO PHQ QUESTIONS 1-9: 8
4. FEELING TIRED OR HAVING LITTLE ENERGY: 3
SUM OF ALL RESPONSES TO PHQ QUESTIONS 1-9: 8
SUM OF ALL RESPONSES TO PHQ QUESTIONS 1-9: 8
9. THOUGHTS THAT YOU WOULD BE BETTER OFF DEAD, OR OF HURTING YOURSELF: 0
1. LITTLE INTEREST OR PLEASURE IN DOING THINGS: 0
SUM OF ALL RESPONSES TO PHQ9 QUESTIONS 1 & 2: 0

## 2022-09-19 ASSESSMENT — SOCIAL DETERMINANTS OF HEALTH (SDOH): HOW HARD IS IT FOR YOU TO PAY FOR THE VERY BASICS LIKE FOOD, HOUSING, MEDICAL CARE, AND HEATING?: NOT HARD AT ALL

## 2022-09-19 NOTE — TELEPHONE ENCOUNTER
Pt called said he completed his Pre-op exam but his Doctor said he had some Wheezing, pt was told to call and ask if Dr. Nadia Kearns would be okay if the PCP calls in a prescription for a steroid to help with the wheezing. Please give pt a call when possible, thank you!

## 2022-09-19 NOTE — PROGRESS NOTES
[facility] Primary Care  Preoperative Evaluation  James Sellers DO  Internal Medicine      Yonathan Cooper. YOB: 1950    Date of Service:  9/19/2022    Chief Complaint   Patient presents with    Sera Nolasco fax [phone] 09/30/2022       HPI:    This patient presents tot office today for a preoperative evaluation at the request of surgeon, Dr. David Lombardo, who plans on performing bilateral frontal sinus expolration bilateral inferior turbinate [facility]  The current problem began many years ago, and symptoms have been unimproved with time. Conservative therapy:  failed first surgery . Planned anesthesia: General   Known anesthesia problems: None   Bleeding risk: No recent or remote history of abnormal bleeding  Personal or FH of DVT/PE: No   Recent steroid use: yes - for sinus issue (about 1 month ago)  Exercise tolerance before surgery at least 4 METS (Can walk up a flight of steps or a hill or walk on level ground at 3 to 4 mph): Yes     Patient does note that he has been having some increased nasal congestion cough and sputum production. He feels this is all secondary to his chronic sinusitis. This is why he is going forward with his second surgery. He has been using his Dulera once daily. He does occasionally feel wheezy. He has been using his albuterol inhaler as well. He is still able to walk without any chest pain or shortness of breath. He notes most symptoms in the early morning when he first wakes up.     Patient Active Problem List   Diagnosis    Wrist arthritis    Hypertrophy of prostate without urinary obstruction and other lower urinary tract symptoms (LUTS)    Cardiac dysrhythmia    Onychomycosis    Obstructive sleep apnea    Personal history of other diseases of digestive system    Erectile dysfunction    Vitamin D deficiency    Mixed hyperlipidemia    Persistent depressive disorder Dysthymia    Non-seasonal allergic rhinitis due to pollen    Pulmonary nodule    Back pain    NPH (normal pressure hydrocephalus) ([facility])-s/p shunt    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    Early onset Alzheimer's dementia ([facility])    Neuropathy    Pre-diabetes    Reactive airway disease without complication    Chronic pansinusitis    Deviated nasal septum    Hypertrophy, nasal, turbinate    Nasal obstruction       Review of Systems     Allergies   Allergen Reactions    Lisinopril Other (See Comments)     cough     Outpatient Medications Marked as Taking for the 9/19/22 encounter (Office Visit) with Susan Jensen, DO   Medication Sig Dispense Refill    albuterol sulfate HFA (PROVENTIL HFA) 108 (90 Base) MCG/ACT inhaler Inhale 2 puffs into the lungs every 4 hours as needed for Wheezing or Shortness of Breath (Space out to every 6 hours as symptoms improve) Space out to every 6 hours as symptoms improve. 3 each 3    sertraline (ZOLOFT) 50 MG tablet Take 1.5 tablets by mouth in the morning. 45 tablet 2    gabapentin (NEURONTIN) 100 MG capsule Take 100 mg by mouth 4 times daily. fluticasone (FLONASE) 50 MCG/ACT nasal spray 1 spray by Each Nostril route daily 32 g 1    finasteride (PROSCAR) 5 MG tablet Take 1 tablet by mouth daily 90 tablet 3    mometasone-formoterol (DULERA) 200-5 MCG/ACT inhaler Inhale 1 puff into the lungs every 12 hours 3 each 3    montelukast (SINGULAIR) 10 MG tablet Take 1 tablet by mouth nightly 90 tablet 3    cetirizine (ZYRTEC) 10 MG tablet TAKE 1 TABLET DAILY 90 tablet 2    atorvastatin (LIPITOR) 10 MG tablet TAKE 1 TABLET DAILY 90 tablet 3    pantoprazole (PROTONIX) 20 MG tablet Take 1 tablet by mouth 2 times daily 180 tablet 2    aspirin 81 MG chewable tablet Take 81 mg by mouth daily      tamsulosin (FLOMAX) 0.4 MG capsule Take 2 capsules by mouth daily 90 capsule 1    Omega-3 Fatty Acids (FISH OIL) 1000 MG CAPS Take 1,000 mg by mouth daily.       Flaxseed, Linseed, (FLAX SEED OIL) 1000 MG CAPS Take 1,000 mg by mouth daily. Cholecalciferol (VITAMIN D) 2000 UNITS CAPS capsule Take 1 capsule by mouth daily. multivitamin (THERAGRAN) per tablet Take 1 tablet by mouth daily.          Past Medical History:   Diagnosis Date    Allergic rhinitis     Arthritis     R thumb, low back    Asthma     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     COPD (chronic obstructive pulmonary disease) ([facility])     COPD (chronic obstructive pulmonary disease) ([facility] [facility] 4/26/2022    Erectile dysfunction     Hearing loss     Hyperlipidemia     Hypertension     Kidney failure 07/2018    Normal pressure hydrocephalus ([facility] [address] 07/2018    shunt placement    Screening for abdominal aortic aneurysm (AAA) performed 03/02/2018    [facility]    Tinnitus     Unspecified sleep apnea     Wears glasses     Wears hearing aid in both ears      Past Surgical History:   Procedure Laterality Date    ABLATION OF DYSRHYTHMIC FOCUS      COLONOSCOPY  12/14/2018    COLONOSCOPY WITH BIOPSY performed by Phoebe Rand MD at [facility]    ENTEROSCOPY N/A 1/4/2019    ENTEROSCOPY PUSH BIOPSY performed by Phoebe Rand MD at [address] [facility] Left     lipoma    EYE SURGERY      as a child, weak eye muscles    HERNIA REPAIR      umbilical    JOINT REPLACEMENT Left 2015    PARTIAL KNEE REPLACMENT    LAPAROSCOPY N/A 7/18/2018    OPENING AND CLOSING FOR VENTRICULAR PERITONEAL SHUNT performed by Shayy Chin MD at [address] N/A 07/18/2018     [facility]; (N/A )    [place] CREATE SHUNT:VENTRIC-PERITONEAL N/A 7/18/2018    VENTRICULAR PERITONEAL SHUNT INSERTION RIGHT SIDE; performed by Lety Diaz MD at [address] [address] [address] Bilateral 5/4/2022    BILATERAL INFERIOR TURBINATE REDUCTION, BILATERAL MAXILLARY ANTROSTOMY, BILATERAL TOTAL ETHMOIDECTOMY WITH SPEHNOIDECTOMY AND SEPTOPLASTY, BILATERAL FRONTAL SINUS EXPLORATION performed by Martin Tang MD at [address] [place] [address] 12/14/2018    EGD BIOPSY performed by Moncho Thomason MD at [place] [address] N/A 3/1/2019    ESOPHAGOGASTRODUODENOSCOPY WITH RADIOFREQUENCY  ABLATION/ ABLATIONS X19 performed by Moncho Thomason MD at [place] [address] N/A 4/24/2019    ESOPHAGOGASTRODUODENOSCOPY WITH RADIOFREQUENCY ABLATION performed by Moncho Thomason MD at [address] 4/24/2019    EGD BIOPSY performed by Moncho Thomason MD at [place] [address] N/A 8/2/2019    ESOPHAGOGASTRODUODENOSCOPY RADIOFREQUENCY ABLATION performed by Moncho Thomason MD at [place] [address] N/A 8/2/2019    EGD GASTRIC BIOPSY performed by Moncho Thomason MD at [place] [address] N/A 8/2/2019    EGD POLYP COLD SNARE performed by Moncho Thomason MD at [place] [address] N/A 11/19/2019    ESOPHAGOGASTRODUODENOSCOPY WITH RADIOFREQUENCY ABLATION performed by Moncho Thomason MD at [place] [address] N/A 11/19/2019    EGD POLYP SNARE performed by Moncho Thomason MD at [place] [address] N/A 7/15/2020    ESOPHAGOGASTRODUODENOSCOPY RADIOFREQUENCY ABLATION ON STANDBY performed by Moncho Thomason MD at [place] [address] N/A 7/15/2020    EGD BIOPSY performed by Moncho Thomason MD at [place] [address] N/A 7/16/2021    EGD BIOPSY performed by Moncho Thomason MD at [address] 7/16/2021    EGD POLYP SNARE performed by Moncho Thomason MD at [address] History   Problem Relation Age of Onset    Cancer Father         prostate     Social History     Socioeconomic History    Marital status:      Spouse name: Not on file    Number of children: Not on file    Years of education: Not on file    Highest education level: Not on file   Occupational History    Not on file   Tobacco Use    Smoking status: Former     Packs/day: 0.50     Years: 10.00     Pack years: 5.00     Types: Cigarettes     Quit date: 2002     Years since quittin.7    Smokeless tobacco: Never   Vaping Use    Vaping Use: Never used   Substance and Sexual Activity    Alcohol use: Not Currently     Alcohol/week: 2.0 standard drinks     Types: 2 Standard drinks or equivalent per week     Comment: rarely -- once a year    Drug use: Never    Sexual activity: Not Currently   Other Topics Concern    Not on file   Social History Narrative    Not on file     Social Determinants of Health     Financial Resource Strain: Low Risk     Difficulty of Paying Living Expenses: Not hard at all   Food Insecurity: No Food Insecurity    Worried About Running Out of Food in the Last Year: Never true    Ran Out of Food in the Last Year: Never true   Transportation Needs: Not on file   Physical Activity: Not on file   Stress: Not on file   Social Connections: Not on file   Intimate Partner Violence: Not on file   Housing Stability: Not on file       Objective:    Vitals:    22 0929   BP: 126/72   Pulse: 88   SpO2: 99%   Weight: 268 lb 3.2 oz (121.7 kg)   Height: 6' 2\" (1.88 m)       Wt Readings from Last 2 Encounters:   22 268 lb 3.2 oz (121.7 kg)   22 267 lb 12.8 oz (121.5 kg)     BP Readings from Last 3 Encounters:   22 126/72   22 128/79   22 126/84      Physical Exam  Constitutional:       General: He is not in acute distress. Appearance: Normal appearance. He is not ill-appearing. HENT:      Head: Normocephalic and atraumatic. Eyes:      General: No scleral icterus. Extraocular Movements: Extraocular movements intact. Conjunctiva/sclera: Conjunctivae normal.   Cardiovascular:      Rate and Rhythm: Normal rate and regular rhythm. Pulses: Normal pulses. Heart sounds: No murmur heard. No friction rub.  No gallop. Pulmonary:      Effort: Pulmonary effort is normal. No respiratory distress. Breath sounds: No wheezing, rhonchi or rales. Abdominal:      General: Bowel sounds are normal. There is no distension. Palpations: Abdomen is soft. Tenderness: There is no abdominal tenderness. There is no guarding or rebound. Musculoskeletal:      Cervical back: No muscular tenderness. Right lower leg: No edema. Left lower leg: No edema. Lymphadenopathy:      Cervical: No cervical adenopathy. Skin:     General: Skin is warm. Findings: No erythema or rash. Neurological:      General: No focal deficit present. Mental Status: He is alert and oriented to person, place, and time. Psychiatric:         Mood and Affect: Mood normal.         Behavior: Behavior normal.        Lab Results   Component Value Date    HDL 48 12/01/2021    LDLCALC 79 12/01/2021     Lab Results   Component Value Date    LABA1C 6.0 08/08/2022     Lab Results   Component Value Date     08/08/2022    K 4.3 08/08/2022    BUN 15 08/08/2022    CREATININE 1.0 08/08/2022    LABGLOM >60 08/08/2022    GFRAA >60 08/08/2022    CALCIUM 9.4 08/08/2022    VITD25 40.9 08/18/2015     Lab Results   Component Value Date    ALT 28 08/08/2022    AST 22 08/08/2022     Lab Results   Component Value Date    HGB 14.0 02/03/2022     Lab Results   Component Value Date    TSHREFLEX 4.18 08/25/2021    TSH 3.56 04/21/2021             Assessment/Plan:     1. Pre-op evaluation  Patient low risk for low to moderate risk surgery. Patient did have wheezing on exam today. This is with history of moderate persistent reactive airway disease. Patient has only been taking his Dulera once per day. Would like patient to take this twice per day as instructed. We will plan to recheck patient in 1 week. If her wheezing has resolved okay to proceed with surgery. Like to make sure that he is optimized prior to surgery.   Patient also call his ENT to discuss symptoms of nasal congestion, drainage and discussed possible need for steroids versus antibiotics prior to surgery. The last time patient was on antibiotics was in July. Patient has been on steroids in August.    2. Moderate persistent reactive airway disease without complication  See #1 above    3. Chronic pansinusitis  See #1 above    Emergent procedure No  Active Cardiac Condition (decompensated HF, Arrhythmia, MI <3 weeks, severe valve disease):  No   Risk Level of Procedure low to moderate  Revised Cardiac Risk Index Risk factors: None    Patient is low to moderate risk for major cardiac complications during a low risk procedure and may continue as planned. Preoperative workup as follows: patient does have wheezing on todays exam. Would like patient to take his dulara 2 x per day instead of one and come back in 1 week for recheck to be sure lungs are optimized prior to surgery.  Labs were done in august. EKG from feb reviewed, no repeat at this time  Change in medication regimen before surgery: Take zoloft and PPI on morning of surgery with sip of water, and hold all other medications until after surgery  Prophylaxis for cardiac events with perioperative beta-blockers:Not indicated    Deep vein thrombosis prophylaxis: regimen to be chosen by surgical team    Further recommendations requested from consultants: No    --Linda CHAVEZ [address] [place] [address], DO on 9/19/2022 at 10:21 AM

## 2022-09-20 ENCOUNTER — TELEPHONE (OUTPATIENT)
Dept: INTERNAL MEDICINE CLINIC | Age: 72
End: 2022-09-20

## 2022-09-20 DIAGNOSIS — J32.4 CHRONIC PANSINUSITIS: ICD-10-CM

## 2022-09-20 RX ORDER — PREDNISONE 10 MG/1
TABLET ORAL
Qty: 38 TABLET | Refills: 0 | Status: SHIPPED | OUTPATIENT
Start: 2022-09-20

## 2022-09-20 NOTE — TELEPHONE ENCOUNTER
Patient calling back to state that his ENT Doctor is actually not calling that prescription in for patient and he is requesting that Dr. Yogi Lara call it in.     predniSONE ([place]) 10 MG tablet  Last appointment: 9/19/2022  Next appointment: 9/26/2022  Last refill: 08/18/22    Patient would please like this called in to:  [facility] [id number]   [facility]   Phone:  [phone]  Fax:  [phone]    Please contact patient with any additional questions.

## 2022-09-20 NOTE — TELEPHONE ENCOUNTER
Patient is calling to let Dr. Andrew Rodriguez know his ENT Doctor will be calling in his predniSONE (DELTASONE) 10 MG tablet prescription for him so she does not have to. Patient just wanted to update Dr. Andrew Rodriguez. Please contact patient with any questions.

## 2022-09-26 ENCOUNTER — TELEPHONE (OUTPATIENT)
Dept: ENT CLINIC | Age: 72
End: 2022-09-26

## 2022-09-26 ENCOUNTER — OFFICE VISIT (OUTPATIENT)
Dept: INTERNAL MEDICINE CLINIC | Age: 72
End: 2022-09-26
Payer: MEDICARE

## 2022-09-26 VITALS
HEART RATE: 87 BPM | DIASTOLIC BLOOD PRESSURE: 68 MMHG | BODY MASS INDEX: 34.56 KG/M2 | SYSTOLIC BLOOD PRESSURE: 122 MMHG | OXYGEN SATURATION: 97 % | WEIGHT: 269.2 LBS

## 2022-09-26 DIAGNOSIS — Z01.818 PRE-OP EVALUATION: ICD-10-CM

## 2022-09-26 DIAGNOSIS — T17.908A ASPIRATION INTO AIRWAY, INITIAL ENCOUNTER: ICD-10-CM

## 2022-09-26 DIAGNOSIS — J45.41 MODERATE PERSISTENT ASTHMA WITH ACUTE EXACERBATION: Primary | ICD-10-CM

## 2022-09-26 PROCEDURE — G8427 DOCREV CUR MEDS BY ELIG CLIN: HCPCS | Performed by: INTERNAL MEDICINE

## 2022-09-26 PROCEDURE — 1036F TOBACCO NON-USER: CPT | Performed by: INTERNAL MEDICINE

## 2022-09-26 PROCEDURE — 1123F ACP DISCUSS/DSCN MKR DOCD: CPT | Performed by: INTERNAL MEDICINE

## 2022-09-26 PROCEDURE — G8417 CALC BMI ABV UP PARAM F/U: HCPCS | Performed by: INTERNAL MEDICINE

## 2022-09-26 PROCEDURE — 3017F COLORECTAL CA SCREEN DOC REV: CPT | Performed by: INTERNAL MEDICINE

## 2022-09-26 PROCEDURE — 99214 OFFICE O/P EST MOD 30 MIN: CPT | Performed by: INTERNAL MEDICINE

## 2022-09-26 NOTE — TELEPHONE ENCOUNTER
Pt called says their PCP wants him to remind Dr. Janes Burden that he is still on Prednisone. He wants to make sure there should be no problems with taking it before his procedure on 9/30. Should he stop taking it? Please contact pt, thank you!

## 2022-09-26 NOTE — PROGRESS NOTES
Deloris Grant. (:  1950) is a 71 y.o. male,Established patient, here for evaluation of the following chief complaint(s):  Follow-up (Listen to lungs )      Vitals:    22 0811   BP: 122/68   Pulse: 87   SpO2: 97%        ASSESSMENT/PLAN:  1. Moderate persistent asthma with acute exacerbation  Much improved. Patient to continue on prednisone taper. Patient to notify surgeon again that he will be on prednisone taper for his surgery. The surgeon had said it was okay for me to start prednisone last week when questioned about this. Patient is much improved. Wheezing has completely resolved. Patient to continue Dulera twice a day. 2. Aspiration into airway, initial encounter  Patient had an episode of what sounds like aspiration to thin liquids. We will plan to do a swallow study following his surgery. Follow-up in 4 weeks. 3. Pre-op evaluation  Patient now optimized for surgery. Okay to go forward with surgery. Patient overall low risk for low to moderate risk surgery. Return in about 4 weeks (around 10/24/2022). SUBJECTIVE/OBJECTIVE:  HPI    Patient is a 67-year-old male with past medical history of NPH, asthma, chronic sinusitis who presents secondary to follow-up for asthma exacerbation prior to surgery. Patient notes breathing has been much better. He continues on prednisone taper. He is now doing his Dulera twice a day. He    His only concern was a episode of what he thinks was aspiration to water a couple days ago. Wife describes it as patient not being able to get a good breath. Continue coughing and no breathing. Eventually patient was able to recover on his own. This is happened in the past.  It tends to always happen with thin liquids. Review of Systems ROS negative except for those noted in the HPI above. Vitals:    22 0811   BP: 122/68   Pulse: 87   SpO2: 97%       Physical Exam  Constitutional:       Appearance: Normal appearance.    HENT:      Head: Normocephalic and atraumatic. Right Ear: External ear normal.      Left Ear: External ear normal.   Eyes:      Extraocular Movements: Extraocular movements intact. Conjunctiva/sclera: Conjunctivae normal.   Cardiovascular:      Rate and Rhythm: Normal rate and regular rhythm. Heart sounds: No murmur heard. No friction rub. No gallop. Pulmonary:      Effort: Pulmonary effort is normal. No respiratory distress. Breath sounds: Normal breath sounds. No wheezing, rhonchi or rales. Skin:     General: Skin is warm. Findings: No erythema or rash. Neurological:      General: No focal deficit present. Mental Status: He is alert and oriented to person, place, and time. Psychiatric:         Mood and Affect: Mood normal.         Behavior: Behavior normal.          An electronic signature was used to authenticate this note.     --Ting Ross, DO

## 2022-09-28 NOTE — PROGRESS NOTES
[facility] PRE-SURGICAL TESTING INSTRUCTIONS                      PRIOR TO PROCEDURE DATE:    1. PLEASE FOLLOW ANY INSTRUCTIONS GIVEN TO YOU PER YOUR SURGEON. 2. Arrange for someone to drive you home and be with you for the first 24 hours after discharge for your safety after your procedure for which you received sedation. Ensure it is someone we can share information with regarding your discharge. NOTE: At this time ONLY 2 ADULTS may accompany you & everyone must be MASKED. 3. You must contact your surgeon for instructions IF:  You are taking any blood thinners, aspirin, anti-inflammatory or vitamins. There is a change in your physical condition such as a cold, fever, rash, cuts, sores, or any other infection, especially near your surgical site. 4. Do not drink alcohol the day before or day of your procedure. Do not use any recreational marijuana at least 24 hours or street drugs (heroin, cocaine) at minimum 5 days prior to your procedure. 5. A Pre-Surgical History and Physical MUST be completed WITHIN 30 DAYS OR LESS prior to your procedure. by your Physician or an Urgent Care        THE DAY OF YOUR PROCEDURE:  1. Follow instructions for ARRIVAL TIME as DIRECTED BY YOUR SURGEON. 2. Enter the MAIN entrance from [employer] and follow the signs to the free Parking Garage or [employer] (offered free of charge 7 am-5pm). 3. Enter the Main Entrance of the hospital (do not enter from the lower level of the parking garage). Upon entrance, check in with the  at the surgical information desk on your LEFT. Bring your insurance card and photo ID to register      4. DO NOT EAT ANYTHING 8 hours prior to arrival for surgery. You may have up to 8 ounces of water 4 hours prior to your arrival for surgery.    NOTE: ALL Gastric, Bariatric & Bowel surgery patients - you MUST follow your surgeon's instructions regarding eating/ drinking as you will have very specific instructions to follow. If you did not receive these, call your surgeon's office immediately. 5. MEDICATIONS:  Take the following medications with a SMALL sip of water: PROTONIX, FLOMAX ZOLOFT   Use your usual dose of inhalers the morning of surgery. BRING your rescue inhaler with you to hospital.   Anesthesia does NOT want you to take insulin the morning of surgery. They will control your blood sugar while you are at the hospital. Please contact your ordering physician for instructions regarding your insulin the night before your procedure. If you have an insulin pump, please keep it set on basal rate. Bariatric patient's call your surgeon if on diabetic medications as some may need to be stopped 1 week prior to surgery    6. Do not swallow additional water when brushing teeth. No gum, candy, mints, or ice chips. Refrain from smoking or at least decrease the amount on day of surgery. 7. Morning of surgery:   Take a shower with an antibacterial soap (i.e., Safeguard or Dial) OR your physician may have instructed you to use Hibiclens. Dress in loose, comfortable clothing appropriate for redressing after your procedure. Do not wear jewelry (including body piercings), make-up (especially NO eye make-up), fingernail polish (NO toenail polish if foot/leg surgery), lotion, powders, or metal hairclips. Do not shave or wax for 72 hours prior to procedure near your operative site. Shaving with a razor can irritate your skin and make it easier to develop an infection. On the day of your procedure, any hair that needs to be removed near the surgical site will be 'clipped' by a healthcare worker using a special clipper designed to avoid skin irritation. 8. Dentures, glasses, or contacts will need to be removed before your procedure. Bring cases for your glasses, contacts, dentures, or hearing aids to protect them while you are in surgery.       9. If you use a CPAP, please bring it with you on the day of your procedure. 10. We recommend that valuable personal belongings such as cash, cell phones, e-tablets, or jewelry, be left at home during your stay. The hospital will not be responsible for valuables that are not secured in the hospital safe. However, if your insurance requires a co-pay, you may want to bring a method of payment, i.e., Check or credit card, if you wish to pay your co-pay the day of surgery. 11. If you are to stay overnight, you may bring a bag with personal items. Please have any large items you may need brought in by your family after your arrival to your hospital room. 12. If you have a Living Will or Durable Power of , please bring a copy on the day of your procedure. How we keep you safe and work to prevent surgical site infections:   1. Health care workers should always check your ID bracelet to verify your name and birth date. You will be asked many times to state your name, date of birth, and allergies. 2. Health care workers should always clean their hands with soap or alcohol gel before providing care to you. It is okay to ask anyone if they cleaned their hands before they touch you. 3. You will be actively involved in verifying the type of procedure you are having and ensuring the correct surgical site. This will be confirmed multiple times prior to your procedure. Do NOT carlee your surgery site UNLESS instructed to by your surgeon. 4. When you are in the operating room, your surgical site will be cleansed with a special soap, and in most cases, you will be given an antibiotic before the surgery begins. What to expect AFTER your procedure? 1. Immediately following your procedure, your will be taken to the PACU for the first phase of your recovery. Your nurse will help you recover from any potential side effects of anesthesia, such as extreme drowsiness, changes in your vital signs or breathing patterns.  Nausea, headache, muscle aches, or sore throat may also occur after anesthesia. Your nurse will help you manage these potential side effects. 2. For comfort and safety, arrange to have someone at home with you for the first 24 hours after discharge. 3. You and your family will be given written instructions about your diet, activity, dressing care, medications, and return visits. 4. Once at home, should issues with nausea, pain, or bleeding occur, or should you notice any signs of infection, you should call your surgeon. 5. Always clean your hands before and after caring for your wound. Do not let your family touch your surgery site without cleaning their hands. 6. Narcotic pain medications can cause significant constipation. You may want to add a stool softener to your postoperative medication schedule or speak to your surgeon on how best to manage this SIDE EFFECT. SPECIAL INSTRUCTIONS     Thank you for allowing us to care for you. We strive to exceed your expectations in the delivery of care and service provided to you and your family. If you need to contact the [place] [address] staff for any reason, please call us at [phone]    Instructions reviewed with patient during preadmission testing phone interview.   Marta Hsu RN.9/28/2022 .3:24 PM      ADDITIONAL EDUCATIONAL INFORMATION REVIEWED PER PHONE WITH YOU AND/OR YOUR FAMILY:  No Hibiclens® Bathing Instructions   Yes Antibacterial Soap

## 2022-09-28 NOTE — PROGRESS NOTES
Place patient label inside box (if no patient label, complete below)  Name:  :  MR#:     Sharmila Matute [address] / PROCEDURE  I (we), Flavia Denson ( authorize DR RANDY Singer and/or such assistants as may be selected by him/her, to perform the following operation/procedure(s): BILATERAL FRONTAL SINUS EXPLORATION, BILATERAL INFERIOR TURBINATE REDUCTION, BILATERAL MAXILLARY ANTROSTOMY, BILATERAL TOTAL ETHMOIDECTOMY WITH SPHENOIDECTOMY AND IMAGE NAVIGATION, REVISION SEPTOPLASTY        Note: If unable to obtain consent prior to an emergent procedure, document the emergent reason in the medical record. This procedure has been explained to my (our) satisfaction and included in the explanation was: The intended benefit, nature, and extent of the procedure to be performed; The significant risks involved and the probability of success; Alternative procedures and methods of treatment; The dangers and probable consequences of such alternatives (including no procedure or treatment); The expected consequences of the procedure on my future health; Whether other qualified individuals would be performing important surgical tasks and/or whether  would be present to advise or support the procedure. I (we) understand that there are other risks of infection and other serious complications in the pre-operative/procedural and postoperative/procedural stages of my (our) care. I (we) have asked all of the questions which I (we) thought were important in deciding whether or not to undergo treatment or diagnosis. These questions have been answered to my (our) satisfaction. I (we) understand that no assurance can be given that the procedure will be a success, and no guarantee or warranty of success has been given to me (us).     It has been explained to me (us) that during the course of the operation/procedure, unforeseen conditions may be revealed that necessitate extension of the original procedure(s) or different procedure(s) than those set forth in Paragraph 1. I (we) authorize and request that the above-named physician, his/her assistants or his/her designees, perform procedures as necessary and desirable if deemed to be in my (our) best interest.     Revised 8/2/2021                                                                          Page 1 of 2         I acknowledge that health care personnel may be observing this procedure for the purpose of medical education or other specified purposes as may be necessary as requested and/or approved by my (our) physician. I (we) consent to the disposal by the hospital Pathologist of the removed tissue, parts or organs in accordance with hospital policy. I do ____ do not ____ consent to the use of a local infiltration pain blocking agent that will be used by my provider/surgical provider to help alleviate pain during my procedure. I do ____ do not ____ consent to an emergent blood transfusion in the case of a life-threatening situation that requires blood components to be administered. This consent is valid for 24 hours from the beginning of the procedure. This patient does ____ or does not ____ currently have a DNR status/order. If DNR order is in place, obtain Addendum to the Surgical Consent for ALL Patients with a DNR Order to address sandra-operative status for limited intervention or DNR suspension.      I have read and fully understand the above Consent for Operation/Procedure and that all blanks were completed before I signed the consent.   _____________________________       _____________________      ____/____am/pm  Signature of Patient or legal representative      Printed Name / Relationship            Date / Time   ____________________________       _____________________      ____/____am/pm  Witness to Signature                                    Printed Name                    Date / Time    If patient is unable to sign or is a minor, complete the following)  Patient is a minor, ____ years of age, or unable to sign because:   ______________________________________________________________________________________________    If a phone consent is obtained, consent will be documented by using two health care professionals, each affirming that the consenting party has no questions and gives consent for the procedure discussed with the physician/provider.   _____________________          ____________________       _____/_____am/pm   2nd witness to phone consent        Printed name           Date / Time    Informed Consent:  I have provided the explanation described above in section 1 to the patient and/or legal representative.  I have provided the patient and/or legal representative with an opportunity to ask any questions about the proposed operation/procedure.   ___________________________          ____________________         ____/____am/pm  Provider / Proceduralist                            Printed name            Date / Time  Revised 8/2/2021                                                                      Page 2 of 2

## 2022-09-30 ENCOUNTER — ANESTHESIA (OUTPATIENT)
Dept: OPERATING ROOM | Age: 72
End: 2022-09-30
Payer: MEDICARE

## 2022-09-30 ENCOUNTER — ANESTHESIA EVENT (OUTPATIENT)
Dept: OPERATING ROOM | Age: 72
End: 2022-09-30
Payer: MEDICARE

## 2022-09-30 ENCOUNTER — HOSPITAL ENCOUNTER (OUTPATIENT)
Age: 72
Setting detail: OUTPATIENT SURGERY
Discharge: HOME | End: 2022-09-30
Attending: OTOLARYNGOLOGY | Admitting: OTOLARYNGOLOGY
Payer: MEDICARE

## 2022-09-30 VITALS
HEART RATE: 85 BPM | OXYGEN SATURATION: 97 % | TEMPERATURE: 97.8 F | RESPIRATION RATE: 16 BRPM | BODY MASS INDEX: 34.01 KG/M2 | WEIGHT: 265 LBS | HEIGHT: 74 IN | DIASTOLIC BLOOD PRESSURE: 75 MMHG | SYSTOLIC BLOOD PRESSURE: 130 MMHG

## 2022-09-30 DIAGNOSIS — J32.4 CHRONIC PANSINUSITIS: ICD-10-CM

## 2022-09-30 DIAGNOSIS — J34.3 NASAL TURBINATE HYPERTROPHY: ICD-10-CM

## 2022-09-30 DIAGNOSIS — J34.2 DEVIATED NASAL SEPTUM: Primary | ICD-10-CM

## 2022-09-30 DIAGNOSIS — J34.89 NASAL OBSTRUCTION: ICD-10-CM

## 2022-09-30 PROCEDURE — 87102 FUNGUS ISOLATION CULTURE: CPT

## 2022-09-30 PROCEDURE — 2580000003 HC RX 258: Performed by: ANESTHESIOLOGY

## 2022-09-30 PROCEDURE — 6360000002 HC RX W HCPCS: Performed by: OTOLARYNGOLOGY

## 2022-09-30 PROCEDURE — 87116 MYCOBACTERIA CULTURE: CPT

## 2022-09-30 PROCEDURE — 3700000000 HC ANESTHESIA ATTENDED CARE: Performed by: OTOLARYNGOLOGY

## 2022-09-30 PROCEDURE — 3700000001 HC ADD 15 MINUTES (ANESTHESIA): Performed by: OTOLARYNGOLOGY

## 2022-09-30 PROCEDURE — 2500000003 HC RX 250 WO HCPCS: Performed by: NURSE ANESTHETIST, CERTIFIED REGISTERED

## 2022-09-30 PROCEDURE — 3600000004 HC SURGERY LEVEL 4 BASE: Performed by: OTOLARYNGOLOGY

## 2022-09-30 PROCEDURE — 30520 REPAIR OF NASAL SEPTUM: CPT | Performed by: OTOLARYNGOLOGY

## 2022-09-30 PROCEDURE — 7100000011 HC PHASE II RECOVERY - ADDTL 15 MIN: Performed by: OTOLARYNGOLOGY

## 2022-09-30 PROCEDURE — 61782 SCAN PROC CRANIAL EXTRA: CPT | Performed by: OTOLARYNGOLOGY

## 2022-09-30 PROCEDURE — 7100000001 HC PACU RECOVERY - ADDTL 15 MIN: Performed by: OTOLARYNGOLOGY

## 2022-09-30 PROCEDURE — 7100000000 HC PACU RECOVERY - FIRST 15 MIN: Performed by: OTOLARYNGOLOGY

## 2022-09-30 PROCEDURE — 88305 TISSUE EXAM BY PATHOLOGIST: CPT

## 2022-09-30 PROCEDURE — 2580000003 HC RX 258: Performed by: OTOLARYNGOLOGY

## 2022-09-30 PROCEDURE — 3600000014 HC SURGERY LEVEL 4 ADDTL 15MIN: Performed by: OTOLARYNGOLOGY

## 2022-09-30 PROCEDURE — 87205 SMEAR GRAM STAIN: CPT

## 2022-09-30 PROCEDURE — C2625 STENT, NON-COR, TEM W/DEL SY: HCPCS | Performed by: OTOLARYNGOLOGY

## 2022-09-30 PROCEDURE — 2500000003 HC RX 250 WO HCPCS: Performed by: OTOLARYNGOLOGY

## 2022-09-30 PROCEDURE — 7100000010 HC PHASE II RECOVERY - FIRST 15 MIN: Performed by: OTOLARYNGOLOGY

## 2022-09-30 PROCEDURE — 31257 NSL/SINS NDSC TOT W/SPHENDT: CPT | Performed by: OTOLARYNGOLOGY

## 2022-09-30 PROCEDURE — 87015 SPECIMEN INFECT AGNT CONCNTJ: CPT

## 2022-09-30 PROCEDURE — 6370000000 HC RX 637 (ALT 250 FOR IP): Performed by: OTOLARYNGOLOGY

## 2022-09-30 PROCEDURE — A4217 STERILE WATER/SALINE, 500 ML: HCPCS | Performed by: OTOLARYNGOLOGY

## 2022-09-30 PROCEDURE — 31276 NSL/SINS NDSC FRNT TISS RMVL: CPT | Performed by: OTOLARYNGOLOGY

## 2022-09-30 PROCEDURE — 87206 SMEAR FLUORESCENT/ACID STAI: CPT

## 2022-09-30 PROCEDURE — 30140 RESECT INFERIOR TURBINATE: CPT | Performed by: OTOLARYNGOLOGY

## 2022-09-30 PROCEDURE — 87070 CULTURE OTHR SPECIMN AEROBIC: CPT

## 2022-09-30 PROCEDURE — 2709999900 HC NON-CHARGEABLE SUPPLY: Performed by: OTOLARYNGOLOGY

## 2022-09-30 PROCEDURE — 2720000010 HC SURG SUPPLY STERILE: Performed by: OTOLARYNGOLOGY

## 2022-09-30 PROCEDURE — 31256 EXPLORATION MAXILLARY SINUS: CPT | Performed by: OTOLARYNGOLOGY

## 2022-09-30 PROCEDURE — 6360000002 HC RX W HCPCS: Performed by: NURSE ANESTHETIST, CERTIFIED REGISTERED

## 2022-09-30 RX ORDER — ESMOLOL HYDROCHLORIDE 10 MG/ML
INJECTION INTRAVENOUS PRN
Status: DISCONTINUED | OUTPATIENT
Start: 2022-09-30 | End: 2022-09-30 | Stop reason: SDUPTHER

## 2022-09-30 RX ORDER — SODIUM CHLORIDE 0.9 % (FLUSH) 0.9 %
5-40 SYRINGE (ML) INJECTION PRN
Status: DISCONTINUED | OUTPATIENT
Start: 2022-09-30 | End: 2022-09-30 | Stop reason: HOSPADM

## 2022-09-30 RX ORDER — PROPOFOL 10 MG/ML
INJECTION, EMULSION INTRAVENOUS PRN
Status: DISCONTINUED | OUTPATIENT
Start: 2022-09-30 | End: 2022-09-30 | Stop reason: SDUPTHER

## 2022-09-30 RX ORDER — HYDRALAZINE HYDROCHLORIDE 20 MG/ML
10 INJECTION INTRAMUSCULAR; INTRAVENOUS
Status: DISCONTINUED | OUTPATIENT
Start: 2022-09-30 | End: 2022-09-30 | Stop reason: HOSPADM

## 2022-09-30 RX ORDER — SULFAMETHOXAZOLE AND TRIMETHOPRIM 400; 80 MG/1; MG/1
1 TABLET ORAL 2 TIMES DAILY
Qty: 42 TABLET | Refills: 0 | Status: SHIPPED | OUTPATIENT
Start: 2022-09-30 | End: 2022-10-21

## 2022-09-30 RX ORDER — METOPROLOL TARTRATE 5 MG/5ML
INJECTION INTRAVENOUS PRN
Status: DISCONTINUED | OUTPATIENT
Start: 2022-09-30 | End: 2022-09-30 | Stop reason: SDUPTHER

## 2022-09-30 RX ORDER — LIDOCAINE HYDROCHLORIDE 10 MG/ML
1 INJECTION, SOLUTION EPIDURAL; INFILTRATION; INTRACAUDAL; PERINEURAL
Status: DISCONTINUED | OUTPATIENT
Start: 2022-09-30 | End: 2022-09-30 | Stop reason: HOSPADM

## 2022-09-30 RX ORDER — LIDOCAINE HYDROCHLORIDE 20 MG/ML
INJECTION, SOLUTION EPIDURAL; INFILTRATION; INTRACAUDAL; PERINEURAL PRN
Status: DISCONTINUED | OUTPATIENT
Start: 2022-09-30 | End: 2022-09-30 | Stop reason: HOSPADM

## 2022-09-30 RX ORDER — HYDRALAZINE HYDROCHLORIDE 20 MG/ML
INJECTION INTRAMUSCULAR; INTRAVENOUS PRN
Status: DISCONTINUED | OUTPATIENT
Start: 2022-09-30 | End: 2022-09-30 | Stop reason: SDUPTHER

## 2022-09-30 RX ORDER — ROCURONIUM BROMIDE 10 MG/ML
INJECTION, SOLUTION INTRAVENOUS PRN
Status: DISCONTINUED | OUTPATIENT
Start: 2022-09-30 | End: 2022-09-30 | Stop reason: SDUPTHER

## 2022-09-30 RX ORDER — LABETALOL HYDROCHLORIDE 5 MG/ML
10 INJECTION, SOLUTION INTRAVENOUS
Status: DISCONTINUED | OUTPATIENT
Start: 2022-09-30 | End: 2022-09-30 | Stop reason: HOSPADM

## 2022-09-30 RX ORDER — ONDANSETRON 2 MG/ML
INJECTION INTRAMUSCULAR; INTRAVENOUS PRN
Status: DISCONTINUED | OUTPATIENT
Start: 2022-09-30 | End: 2022-09-30 | Stop reason: SDUPTHER

## 2022-09-30 RX ORDER — FAMOTIDINE 10 MG/ML
INJECTION, SOLUTION INTRAVENOUS PRN
Status: DISCONTINUED | OUTPATIENT
Start: 2022-09-30 | End: 2022-09-30 | Stop reason: SDUPTHER

## 2022-09-30 RX ORDER — OXYCODONE HYDROCHLORIDE 5 MG/1
5 TABLET ORAL EVERY 6 HOURS PRN
Qty: 10 TABLET | Refills: 0 | Status: SHIPPED | OUTPATIENT
Start: 2022-09-30 | End: 2022-10-03

## 2022-09-30 RX ORDER — DEXAMETHASONE SODIUM PHOSPHATE 4 MG/ML
INJECTION, SOLUTION INTRA-ARTICULAR; INTRALESIONAL; INTRAMUSCULAR; INTRAVENOUS; SOFT TISSUE PRN
Status: DISCONTINUED | OUTPATIENT
Start: 2022-09-30 | End: 2022-09-30 | Stop reason: SDUPTHER

## 2022-09-30 RX ORDER — SODIUM CHLORIDE 9 MG/ML
INJECTION, SOLUTION INTRAVENOUS PRN
Status: DISCONTINUED | OUTPATIENT
Start: 2022-09-30 | End: 2022-09-30 | Stop reason: HOSPADM

## 2022-09-30 RX ORDER — LIDOCAINE HYDROCHLORIDE 20 MG/ML
INJECTION, SOLUTION INFILTRATION; PERINEURAL PRN
Status: DISCONTINUED | OUTPATIENT
Start: 2022-09-30 | End: 2022-09-30 | Stop reason: SDUPTHER

## 2022-09-30 RX ORDER — SODIUM CHLORIDE 0.9 % (FLUSH) 0.9 %
5-40 SYRINGE (ML) INJECTION EVERY 12 HOURS SCHEDULED
Status: DISCONTINUED | OUTPATIENT
Start: 2022-09-30 | End: 2022-09-30 | Stop reason: HOSPADM

## 2022-09-30 RX ORDER — TRIAMCINOLONE ACETONIDE 40 MG/ML
INJECTION, SUSPENSION INTRA-ARTICULAR; INTRAMUSCULAR PRN
Status: DISCONTINUED | OUTPATIENT
Start: 2022-09-30 | End: 2022-09-30 | Stop reason: HOSPADM

## 2022-09-30 RX ORDER — OXYMETAZOLINE HYDROCHLORIDE 0.05 G/100ML
SPRAY NASAL PRN
Status: DISCONTINUED | OUTPATIENT
Start: 2022-09-30 | End: 2022-09-30 | Stop reason: HOSPADM

## 2022-09-30 RX ORDER — MAGNESIUM HYDROXIDE 1200 MG/15ML
LIQUID ORAL CONTINUOUS PRN
Status: DISCONTINUED | OUTPATIENT
Start: 2022-09-30 | End: 2022-09-30 | Stop reason: HOSPADM

## 2022-09-30 RX ORDER — FENTANYL CITRATE 50 UG/ML
INJECTION, SOLUTION INTRAMUSCULAR; INTRAVENOUS PRN
Status: DISCONTINUED | OUTPATIENT
Start: 2022-09-30 | End: 2022-09-30 | Stop reason: SDUPTHER

## 2022-09-30 RX ORDER — PROCHLORPERAZINE EDISYLATE 5 MG/ML
5 INJECTION INTRAMUSCULAR; INTRAVENOUS
Status: DISCONTINUED | OUTPATIENT
Start: 2022-09-30 | End: 2022-09-30 | Stop reason: HOSPADM

## 2022-09-30 RX ORDER — FENTANYL CITRATE 50 UG/ML
50 INJECTION, SOLUTION INTRAMUSCULAR; INTRAVENOUS EVERY 5 MIN PRN
Status: DISCONTINUED | OUTPATIENT
Start: 2022-09-30 | End: 2022-09-30 | Stop reason: HOSPADM

## 2022-09-30 RX ORDER — EPINEPHRINE NASAL SOLUTION 1 MG/ML
SOLUTION NASAL PRN
Status: DISCONTINUED | OUTPATIENT
Start: 2022-09-30 | End: 2022-09-30 | Stop reason: HOSPADM

## 2022-09-30 RX ORDER — SODIUM CHLORIDE, SODIUM LACTATE, POTASSIUM CHLORIDE, CALCIUM CHLORIDE 600; 310; 30; 20 MG/100ML; MG/100ML; MG/100ML; MG/100ML
INJECTION, SOLUTION INTRAVENOUS CONTINUOUS
Status: DISCONTINUED | OUTPATIENT
Start: 2022-09-30 | End: 2022-09-30 | Stop reason: HOSPADM

## 2022-09-30 RX ORDER — ONDANSETRON 2 MG/ML
4 INJECTION INTRAMUSCULAR; INTRAVENOUS
Status: DISCONTINUED | OUTPATIENT
Start: 2022-09-30 | End: 2022-09-30 | Stop reason: HOSPADM

## 2022-09-30 RX ADMIN — SODIUM CHLORIDE, POTASSIUM CHLORIDE, SODIUM LACTATE AND CALCIUM CHLORIDE: 600; 310; 30; 20 INJECTION, SOLUTION INTRAVENOUS at 06:05

## 2022-09-30 RX ADMIN — PROPOFOL 100 MG: 10 INJECTION, EMULSION INTRAVENOUS at 09:01

## 2022-09-30 RX ADMIN — FAMOTIDINE 20 MG: 10 INJECTION, SOLUTION INTRAVENOUS at 07:29

## 2022-09-30 RX ADMIN — HYDRALAZINE HYDROCHLORIDE 10 MG: 20 INJECTION INTRAMUSCULAR; INTRAVENOUS at 09:24

## 2022-09-30 RX ADMIN — FENTANYL CITRATE 100 MCG: 50 INJECTION, SOLUTION INTRAMUSCULAR; INTRAVENOUS at 09:04

## 2022-09-30 RX ADMIN — ESMOLOL HYDROCHLORIDE 10 MG: 10 INJECTION, SOLUTION INTRAVENOUS at 07:44

## 2022-09-30 RX ADMIN — DEXAMETHASONE SODIUM PHOSPHATE 8 MG: 4 INJECTION, SOLUTION INTRAMUSCULAR; INTRAVENOUS at 07:34

## 2022-09-30 RX ADMIN — ROCURONIUM BROMIDE 80 MG: 10 INJECTION INTRAVENOUS at 07:32

## 2022-09-30 RX ADMIN — FENTANYL CITRATE 100 MCG: 50 INJECTION, SOLUTION INTRAMUSCULAR; INTRAVENOUS at 08:26

## 2022-09-30 RX ADMIN — SODIUM CHLORIDE, POTASSIUM CHLORIDE, SODIUM LACTATE AND CALCIUM CHLORIDE: 600; 310; 30; 20 INJECTION, SOLUTION INTRAVENOUS at 08:26

## 2022-09-30 RX ADMIN — ESMOLOL HYDROCHLORIDE 10 MG: 10 INJECTION, SOLUTION INTRAVENOUS at 07:45

## 2022-09-30 RX ADMIN — ONDANSETRON 8 MG: 2 INJECTION INTRAMUSCULAR; INTRAVENOUS at 07:28

## 2022-09-30 RX ADMIN — PROPOFOL 100 MG: 10 INJECTION, EMULSION INTRAVENOUS at 09:09

## 2022-09-30 RX ADMIN — METOPROLOL TARTRATE 2.5 MG: 5 INJECTION INTRAVENOUS at 08:19

## 2022-09-30 RX ADMIN — PHENYLEPHRINE HYDROCHLORIDE 100 MCG: 10 INJECTION, SOLUTION INTRAMUSCULAR; INTRAVENOUS; SUBCUTANEOUS at 07:40

## 2022-09-30 RX ADMIN — PROPOFOL 250 MG: 10 INJECTION, EMULSION INTRAVENOUS at 07:32

## 2022-09-30 RX ADMIN — ROCURONIUM BROMIDE 20 MG: 10 INJECTION INTRAVENOUS at 08:43

## 2022-09-30 RX ADMIN — METOPROLOL TARTRATE 2.5 MG: 5 INJECTION INTRAVENOUS at 09:25

## 2022-09-30 RX ADMIN — LIDOCAINE HYDROCHLORIDE 100 MG: 20 INJECTION, SOLUTION INFILTRATION; PERINEURAL at 07:32

## 2022-09-30 RX ADMIN — FENTANYL CITRATE 100 MCG: 50 INJECTION, SOLUTION INTRAMUSCULAR; INTRAVENOUS at 07:29

## 2022-09-30 RX ADMIN — SUGAMMADEX 200 MG: 100 INJECTION, SOLUTION INTRAVENOUS at 09:23

## 2022-09-30 ASSESSMENT — LIFESTYLE VARIABLES: SMOKING_STATUS: 0

## 2022-09-30 ASSESSMENT — PAIN - FUNCTIONAL ASSESSMENT: PAIN_FUNCTIONAL_ASSESSMENT: 0-10

## 2022-09-30 ASSESSMENT — PAIN SCALES - GENERAL
PAINLEVEL_OUTOF10: 4
PAINLEVEL_OUTOF10: 3
PAINLEVEL_OUTOF10: 4

## 2022-09-30 NOTE — OP NOTE
Operative Note      Patient Name: Evette Danielle. YOB: 1950  Medical Record Number:  [id number]  Billing Number:  [id number]  Date of Procedure: 9/30/2022  Time: 0730    Brief History: This is a 70 y/o male with persistent pansinusitis after prior surgery. He has also developed turbinate hypertrophy and has a large right nasal cavity septal bulbous swelling and a low lying large spur to the left from the septum. Pre-operative Diagnosis: Pansinusitis, nasal obstruction, deviated nasal septum, turbinate hypertorphy    Post-operative Diagnosis: Same  Proc: 1. Bilateral nasal endoscopy with maxillary antrosotomy  (39081-37)   2. Bilateral nasal endoscopy with total spheno-ethmoidectomy (52289-01)   3. Bilateral nasal endoscopy with frontal sinus exploration (53409-61)   4. Stereotactic computer assisted surgery (54745, [id number])   5. Bilateral submucosal resection of inferior turbinates 72534-98)   6. Septoplasty    Circulator: Pau Espana RN  Scrub Person First: Rogelio Thompson  Circulator Assist: Kait Kearns RN  Scrub Tech Student: Dejan Rice    Anesthesia: 1. 10 cc 1% lidocaine with 1:100,000 epinephrine injected in the uncinate  and middle turbinate bilateral  EBL: 200ml  Specimens: Left frontal sinus tissue for culture, bilateral sinus contents for pathology  Findings: Diffuse mucosal edema and frontal sinus purulence. Prominent septal bul and large spur to left. Turbinate hypertrophy  Complications: none  Antibiotics: none    After consent was confirmed the patient came into the operating room and was placed in supine position. General IV anesthesia was obtained and the patient intubated by Anesthesiology without difficulty. The table was turned and 10 cc's of 1% lidocaine with 1:100,000 epinephrine was injected into the uncinate, middle turbinate and anterior wall of the sphenoid sinus.  To allow a time for anesthesia and decongestant the patients Cebix CT was loaded onto the land sarkis device. The head  was placed on the patients forehead without pressure. The imaging was then registered to the patient in point to point fashion with a high degree of accuracy less than two millimeters. Image guidance was utilized due to the revision nature of the procedure to avoid unwarranted complication   Surgery began on the left nasal cavity. Using a zero degree endoscope and and a caudal elevator the middle turbinate was medialized in its inferior third. The uncinate was in fractured with a frontal sinus seeker. A Veronica's window was made with a back biting forceps. The uncinate was removed with a 4mm, 0 degree straight shot micro debrider in its entirety from the inferior turbinate to the skull base. The natural ostium of the maxillary sinus was identified and back-fractured through the fontanelle with a frontal sinus seeker. A large maxillary antrostomy was then performed with the 0 degree micro debrider. A 0 degree micro-debrider was used to remove the ethmoid bulla lateral to the lamina papyracea , superior to the skull base and posterior to the basal lamella. A large window was made in the lamella preserving an inferior 1 cm strut. The posterior ethmoids were then removed with the micro debrider lateral to the lamina, posterior to the anterior wall of the sphenoid sinus and superior to the skull base. 1:1000 topical epinephrine pledgets were occasionally placed for hemostasis. The inferior half of the superior turbinate was resected with true cut rongeurs. The natural ostium was identified with a caudal elevator, entered and down-fractured in a medial and inferior direction. A large sphenoidotomy was created with Kerrison rongeurs and the micro debrider. 1:1000 topical epinephrine pledgets were occasionally placed for hemostasis.     Using a combination of 0 and 45 degree endoscopes the agar nasi cell and frontal recess was opened into the frontal sinus with 0 and 40 degree micro debriders. This allowed full visualization into the frontal recess and sinus. 1:1000 topical epinephrine pledgets were placed for hemostasis. Surgery continued on the right nasal cavity. Using a zero degree endoscope and and a caudal elevator the middle turbinate was medialized in its inferior third. The uncinate was in fractured with a frontal sinus seeker. A Veronica's window was made with a back biting forceps. The uncinate was removed with a 4mm, 0 degree straight shot micro debrider in its entirety from the inferior turbinate to the skull base. The natural ostium of the maxillary sinus was identified and back-fractured through the fontanelle with a frontal sinus seeker. A large maxillary antrostomy was then performed with the 0 degree micro debrider. A 0 degree micro-debrider was used to remove the ethmoid bulla lateral to the lamina papyracea , superior to the skull base and posterior to the basal lamella. A large window was made in the lamella preserving an inferior 1 cm strut. The posterior ethmoids were then removed with the micro debrider lateral to the lamina, posterior to the anterior wall of the sphenoid sinus and superior to the skull base. 1:1000 topical epinephrine pledgets were occasionally placed for hemostasis. The inferior half of the superior turbinate was resected with true cut rongeurs. The natural ostium was identified with a caudal elevator, entered and down-fractured in a medial and inferior direction. A large sphenoidotomy was created with Kerrison rongeurs and the micro debrider. 1:1000 topical epinephrine pledgets were occasionally placed for hemostasis. Using a combination of 0 and 45 degree endoscopes the agar nasi cell and frontal recess was opened into the frontal sinus with 0 and 40 degree micro debriders. This allowed full visualization into the frontal recess and sinus. 1:1000 topical epinephrine pledgets were placed for hemostasis.     A danika-transfixion incision was made in the left nasal cavity. A sub-perichondrial and sub-periosteal plane was elevated with a caudal elevator to the vomer. Elevation was performed from maxillary crest to 1.5 cm from the dorsal septum. A vertical incision was made 1.5 cm posterior to the caudal septum with a caudal and a contralateral sub-perichondrial and sub-periosteal plane was elevated with a caudal elevator to the vomer. Deviated cartilage and bone was removed preserving an anterior and superior strut for nasal support. The mucosa was re-approximated with a 4-0 plane gut suture on a mejia needle. The hem-itransfixion incision was closed with a 5-0 chromic interrupted stitch. Afrin soaked pledgets were placed for hemostasis. An incision was made in the tip of the inferior turbinate on the left with a protected needle tip Bovie. A sub-periosteal plane was developed along the full length of the medial surface of the turbinate bone. A submucosal reduction was performed with a 2.9 mm turbinate micro-debrider. The turbinate bone was then in-fractured and out-fractured with a caudal elevator. An afrin soaked pledget was placed. An incision was made in the tip of the inferior turbinate on the right with a protected needle tip Bovie. A sub-periosteal plane was developed along the full length of the medial surface of the turbinate bone. A submucosal reduction was performed with a 2.9 mm turbinate micro-debrider. The turbinate bone was then in-fractured and out-fractured with a caudal elevator. An afrin soaked pledget was placed. Pledgets were removed and Castillo splints were placed between the septum and turbinates in each nostril. The splints were secured through the septum with a 3-0 prolene stitch. The patient was then awoken from general anesthesia, extubated, and sent to the post-op care unit in stable condition. I attest that I was present for and performed the key points of the operation myself.         SIENNA      Electronically signed by Kennedi Luis MD on 9/30/2022 at 9:46 AM

## 2022-09-30 NOTE — PROGRESS NOTES
Wife called and updated. Patient taking ice chips, rates pain still a \"4\" and tolerable but can tell something has been done. Declined need for pain medication.

## 2022-09-30 NOTE — PROGRESS NOTES
PACU Transfer to [facility]    Procedure(s):  BILATERAL FRONTAL SINUS EXPLORATION, BILATERAL INFERIOR TURBINATE REDUCTION, BILATERAL MAXILLARY ANTROSTOMY, BILATERAL TOTAL ETHMOIDECTOMY WITH SPHENOIDECTOMY AND IMAGE NAVIGATION, REVISION SEPTOPLASTY    Pt's Current Allergies: Lisinopril    Pt meets criteria to transfer to next phase of care per Karley Scanlon and MAGDALENO standards    No results for input(s): POCGLU in the last 72 hours. Vitals:    09/30/22 1030   BP: 128/76   Pulse: 86   Resp: (!) 8   Temp: 97 °F (36.1 °C)   SpO2: 96%      BP within 20% of pt's admitting BP as per Kait Score      Intake/Output Summary (Last 24 hours) at 9/30/2022 1044  Last data filed at 9/30/2022 0923  Gross per 24 hour   Intake 1400 ml   Output 200 ml   Net 1200 ml       Pain assessment:  none  Pain Level: 4    Patient was assessed for unknown alterations to skin integrity. There were not unknown alterations observed. Patient transferred to care of Austin Coyle RN.    Family updated and directed to Austin Coyle    9/30/2022 10:44 AM

## 2022-09-30 NOTE — ANESTHESIA POSTPROCEDURE EVALUATION
Department of Anesthesiology  Postprocedure Note    Patient: Sukh Aparicio. MRN: [id number]  YOB: 1950  Date of evaluation: 9/30/2022      Procedure Summary     Date: 09/30/22 Room / Location: [facility] [address] [place] [address] / [facility]    Anesthesia Start: 4582 Anesthesia Stop: 9609    Procedure: BILATERAL FRONTAL SINUS EXPLORATION, BILATERAL INFERIOR TURBINATE REDUCTION, BILATERAL MAXILLARY ANTROSTOMY, BILATERAL TOTAL ETHMOIDECTOMY WITH SPHENOIDECTOMY AND IMAGE NAVIGATION, REVISION SEPTOPLASTY (Bilateral: Nose) Diagnosis:       Chronic pansinusitis      Nasal turbinate hypertrophy      (Chronic pansinusitis [J32.4])      (Nasal turbinate hypertrophy [J34.3])    Surgeons: Lucas Dave MD Responsible Provider: Gin Duke DO    Anesthesia Type: general ASA Status: 3          Anesthesia Type: No value filed.     Kait Phase I: Kait Score: 10    Kait Phase II: Kait Score: 10      Anesthesia Post Evaluation    Patient location during evaluation: PACU  Patient participation: complete - patient participated  Level of consciousness: awake and alert  Pain score: 4  Airway patency: patent  Nausea & Vomiting: no nausea and no vomiting  Cardiovascular status: blood pressure returned to baseline  Respiratory status: acceptable  Hydration status: euvolemic

## 2022-09-30 NOTE — ANESTHESIA PRE PROCEDURE
Department of Anesthesiology  Preprocedure Note       Name:  Danna Villa. Age:  71 y.o.  :  1950                                          MRN:  [id number]         Date:  2022      Surgeon: Carlos Shi):  Eloy Shah MD    Procedure: Procedure(s):  BILATERAL FRONTAL SINUS EXPLORATION, BILATERAL INFERIOR TURBINATE REDUCTION, BILATERAL MAXILLARY ANTROSTOMY, BILATERAL TOTAL ETHMOIDECTOMY WITH SPHENOIDECTOMY AND IMAGE NAVIGATION, REVISION SEPTOPLASTY    Medications prior to admission:   Prior to Admission medications    Medication Sig Start Date End Date Taking? Authorizing Provider   predniSONE (DELTASONE) 10 MG tablet 4 tabs a day for 5 days, 3 tabs a day for 3 days, 2 tabs a day for 3 days,1 tab a day for 3 days 22   Linda Parker DO   albuterol sulfate HFA (PROVENTIL HFA) 108 (90 Base) MCG/ACT inhaler Inhale 2 puffs into the lungs every 4 hours as needed for Wheezing or Shortness of Breath (Space out to every 6 hours as symptoms improve) Space out to every 6 hours as symptoms improve. 22   Linda Parker DO   sertraline (ZOLOFT) 50 MG tablet Take 1.5 tablets by mouth in the morning. 22   Linda Parker DO   gabapentin (NEURONTIN) 100 MG capsule Take 100 mg by mouth 4 times daily.     Historical Provider, MD   fluticasone (FLONASE) 50 MCG/ACT nasal spray 1 spray by Each Nostril route daily 22   Linda Parker DO   finasteride (PROSCAR) 5 MG tablet Take 1 tablet by mouth daily 22   Linda Parker DO   mometasone-formoterol (DULERA) 200-5 MCG/ACT inhaler Inhale 1 puff into the lungs every 12 hours 22   Christiano Oliveira MD   montelukast (SINGULAIR) 10 MG tablet Take 1 tablet by mouth nightly 2/3/22   Judge Eb MD   cetirizine (ZYRTEC) 10 MG tablet TAKE 1 TABLET DAILY 21   Linda Parker DO   atorvastatin (LIPITOR) 10 MG tablet TAKE 1 TABLET DAILY 11/15/21   Linda Parker DO   pantoprazole (PROTONIX) 20 MG tablet Take 1 tablet by mouth 2 times daily 11/24/20   Sofie Lopes MD   aspirin 81 MG chewable tablet Take 81 mg by mouth daily    Historical Provider, MD   tamsulosin (FLOMAX) 0.4 MG capsule Take 2 capsules by mouth daily 3/22/18   Abdirashid Neal MD   Omega-3 Fatty Acids (FISH OIL) 1000 MG CAPS Take 1,000 mg by mouth daily    Historical Provider, MD   Flaxseed, Linseed, (FLAX SEED OIL) 1000 MG CAPS Take 1,000 mg by mouth daily    Historical Provider, MD   Cholecalciferol (VITAMIN D) 2000 UNITS CAPS capsule Take 1 capsule by mouth daily    Historical Provider, MD   multivitamin (THERAGRAN) per tablet Take 1 tablet by mouth daily    Historical Provider, MD       Current medications:    Current Facility-Administered Medications   Medication Dose Route Frequency Provider Last Rate Last Admin    lidocaine PF 1 % injection 1 mL  1 mL IntraDERmal Once PRN General ShuttMD anh        lactated ringers infusion   IntraVENous Continuous General ShuttMD anh 125 mL/hr at 09/30/22 0726 NoRateChange at 09/30/22 0726    sodium chloride flush 0.9 % injection 5-40 mL  5-40 mL IntraVENous 2 times per day General ShuttMD anh        sodium chloride flush 0.9 % injection 5-40 mL  5-40 mL IntraVENous PRN General ShuttMD anh        0.9 % sodium chloride infusion   IntraVENous PRN General MD Kendall         Facility-Administered Medications Ordered in Other Encounters   Medication Dose Route Frequency Provider Last Rate Last Admin    famotidine (PEPCID) injection   IntraVENous PRN Quanah Scale, APRN - CRNA   20 mg at 09/30/22 0729    fentaNYL (SUBLIMAZE) injection   IntraVENous PRN Quanah Scale, APRN - CRNA   100 mcg at 09/30/22 0729    dexamethasone (DECADRON) injection   IntraVENous PRN Quanah Scale, APRN - CRNA   8 mg at 09/30/22 0734    ondansetron (ZOFRAN) injection   IntraVENous PRN Quanah Scale, APRN - CRNA   8 mg at 09/30/22 8353    propofol injection   IntraVENous PRN Quanah Scale, APRN - CRNA   250 mg at 09/30/22 0732    rocuronium (ZEMURON) injection   IntraVENous PRN [place] Monas, APRN - CRNA   80 mg at 09/30/22 0732    lidocaine 2 % injection   IntraVENous PRN [place] Monas, APRN - CRNA   100 mg at 09/30/22 0732    phenylephrine (JARETH-SYNEPHRINE) injection   IntraVENous PRN [place] Monas, APRN - CRNA   100 mcg at 09/30/22 0740    esmolol (BREVIBLOC) injection   IntraVENous PRN [place] Monas, APRN - CRNA   10 mg at 09/30/22 0745       Allergies:     Allergies   Allergen Reactions    Lisinopril Other (See Comments)     cough       Problem List:    Patient Active Problem List   Diagnosis Code    Wrist arthritis M19.039    Hypertrophy of prostate without urinary obstruction and other lower urinary tract symptoms (LUTS) N40.0    Cardiac dysrhythmia I49.9    Onychomycosis B35.1    Obstructive sleep apnea G47.33    Personal history of other diseases of digestive system Z87.19    Erectile dysfunction N52.9    Vitamin D deficiency E55.9    Mixed hyperlipidemia E78.2    Persistent depressive disorder F34.1    Dysthymia F34.1    Non-seasonal allergic rhinitis due to pollen J30.1    Pulmonary nodule R91.1    Back pain M54.9    NPH (normal pressure hydrocephalus) ([facility])-s/p shunt G91.2    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 K22.70    Early onset Alzheimer's dementia ([facility] G30.0, F02.80    Neuropathy G62.9    Pre-diabetes R73.03    Reactive airway disease without complication A23.299    Chronic pansinusitis J32.4    Deviated nasal septum J34.2    Hypertrophy, nasal, turbinate J34.3    Nasal obstruction J34.89       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     COPD (chronic obstructive pulmonary disease) ([facility])     COPD (chronic obstructive pulmonary disease) ([facility] 4/26/2022    Erectile dysfunction     Hearing loss     Hyperlipidemia     Hypertension     Kidney failure 07/2018    Normal pressure hydrocephalus ([address] 07/2018    shunt placement    Screening for abdominal aortic aneurysm (AAA) [facility] 03/02/2018    [facility]    Tinnitus     Unspecified sleep apnea     Wears glasses     Wears hearing aid in both ears        Past Surgical History:        Procedure Laterality Date    COLONOSCOPY  12/14/2018    COLONOSCOPY WITH BIOPSY performed [facility] Abel [facility] MD at [facility] ENTEROSCOPY N/A 01/04/2019    ENTEROSCOPY PUSH BIOPSY [facility] Left     lipoma    EYE SURGERY      as a child, weak eye muscles    HERNIA REPAIR      umbilical    JOINT REPLACEMENT Left 2015    PARTIAL KNEE REPLACMENT    LAPAROSCOPY N/A 07/18/2018    OPENING AND CLOSING FOR VENTRICULAR PERITONEAL SHUNT [facility] 07/18/2018    [address]; (N/A )    SD CREATE SHUNT:VENTRIC-PERITONEAL N/A 07/18/2018    VENTRICULAR PERITONEAL SHUNT INSERTION RIGHT SIDE; performed by Ho Hart MD at [address] Bilateral 05/04/2022    BILATERAL INFERIOR TURBINATE REDUCTION, BILATERAL MAXILLARY ANTROSTOMY, BILATERAL TOTAL ETHMOIDECTOMY WITH SPEHNOIDECTOMY AND SEPTOPLASTY, BILATERAL FRONTAL SINUS EXPLORATION performed by Meg Sebastian MD at [facility] 12/14/2018    EGD BIOPSY performed by Abel Chan MD at [address] [facility] N/A 03/01/2019    ESOPHAGOGASTRODUODENOSCOPY WITH RADIOFREQUENCY  ABLATION/ ABLATIONS X19 performed by Abel Chan MD at [address] [place] [address],[facility] N/A 04/24/2019    ESOPHAGOGASTRODUODENOSCOPY WITH RADIOFREQUENCY ABLATION performed by Abel Chan MD at [address] [place] [address] 04/24/2019    EGD BIOPSY performed by Abel Chan MD at [address] [facility] N/A 08/02/2019 ESOPHAGOGASTRODUODENOSCOPY RADIOFREQUENCY ABLATION performed by Valentin Nguyễn MD at [address] [place] [address] N/A 2019    EGD GASTRIC BIOPSY performed by Valentin Nguyễn MD at [address] [place] [address] N/A 2019    EGD POLYP COLD SNARE performed by Valentin Nguyễn MD at [address] [place] [address] N/A 2019    ESOPHAGOGASTRODUODENOSCOPY WITH RADIOFREQUENCY ABLATION performed by Valentin Nguyễn MD at [address] [place] [address] 2019    EGD POLYP SNARE performed by Valentin Nguyễn MD at [address] [place] [address] N/A 07/15/2020    ESOPHAGOGASTRODUODENOSCOPY RADIOFREQUENCY ABLATION ON STANDBY performed by Valentin Nguyễn MD at [address] [place] [address] 07/15/2020    EGD BIOPSY performed by Valentin Nguyễn MD at [address] [place] [address] N/A 2021    EGD BIOPSY performed by Valentin Nguyễn MD at [address] [place] [address] N/A 2021    EGD POLYP SNARE performed by Valentin Nguyễn MD at [address] [place] [address] History:    Social History     Tobacco Use    Smoking status: Former     Packs/day: 0.50     Years: 10.00     Pack years: 5.00     Types: Cigarettes     Quit date: 2002     Years since quittin.7    Smokeless tobacco: Never   Substance Use Topics    Alcohol use: Not Currently     Alcohol/week: 2.0 standard drinks     Types: 2 Standard drinks or equivalent per week     Comment: rarely -- once a year                                Counseling given: Not Answered      Vital Signs (Current):   Vitals:    22 1519 22 0551   BP:  130/88   Pulse:  70   Resp:  16   Temp:  97.3 °F (36.3 °C)   TempSrc:  Temporal   SpO2:  97%   Weight: 269 lb (122 kg) 265 lb (120.2 kg)   Height: 6' 2\" (1.88 m) 6' 2\" (1.88 m)                                              BP Readings from Last 3 Encounters: 09/30/22 130/88   09/26/22 122/68   09/19/22 126/72       NPO Status: Time of last liquid consumption: 1900                        Time of last solid consumption: 1900                        Date of last liquid consumption: 09/29/22                        Date of last solid food consumption: 09/29/22    BMI:   Wt Readings from Last 3 Encounters:   09/30/22 265 lb (120.2 kg)   09/26/22 269 lb 3.2 oz (122.1 kg)   09/19/22 268 lb 3.2 oz (121.7 kg)     Body mass index is 34.02 kg/m². CBC:   Lab Results   Component Value Date/Time    WBC 7.5 02/03/2022 06:34 AM    RBC 4.76 02/03/2022 06:34 AM    HGB 14.0 02/03/2022 06:34 AM    HCT 40.5 02/03/2022 06:34 AM    MCV 85.1 02/03/2022 06:34 AM    RDW 15.2 02/03/2022 06:34 AM     02/03/2022 06:34 AM       CMP:   Lab Results   Component Value Date/Time     08/08/2022 09:20 AM    K 4.3 08/08/2022 09:20 AM    K 4.3 02/03/2022 06:34 AM     08/08/2022 09:20 AM    CO2 21 08/08/2022 09:20 AM    BUN 15 08/08/2022 09:20 AM    CREATININE 1.0 08/08/2022 09:20 AM    GFRAA >60 08/08/2022 09:20 AM    GFRAA >60 04/30/2013 08:11 AM    AGRATIO 1.8 08/08/2022 09:20 AM    LABGLOM >60 08/08/2022 09:20 AM    GLUCOSE 103 08/08/2022 09:20 AM    PROT 7.0 08/08/2022 09:20 AM    PROT 7.0 07/17/2012 08:24 AM    CALCIUM 9.4 08/08/2022 09:20 AM    BILITOT 0.4 08/08/2022 09:20 AM    ALKPHOS 95 08/08/2022 09:20 AM    AST 22 08/08/2022 09:20 AM    ALT 28 08/08/2022 09:20 AM       POC Tests: No results for input(s): POCGLU, POCNA, POCK, POCCL, POCBUN, POCHEMO, POCHCT in the last 72 hours. Coags:   Lab Results   Component Value Date/Time    PROTIME 11.6 07/18/2018 07:53 AM    INR 1.02 07/18/2018 07:53 AM       HCG (If Applicable): No results found for: PREGTESTUR, PREGSERUM, HCG, HCGQUANT     ABGs: No results found for: PHART, PO2ART, ZLJ4DHX, QQW8QZO, BEART, E6VJEVFV     Type & Screen (If Applicable):  No results found for: LABABO, LABRH    Drug/Infectious Status (If Applicable):   No results found for: HIV, HEPCAB    COVID-19 Screening (If Applicable):   Lab Results   Component Value Date/Time    COVID19 Not Detected 02/03/2022 06:40 AM    COVID19 NOT DETECTED 02/04/2021 02:14 PM           Anesthesia Evaluation  Patient summary reviewed and Nursing notes reviewed no history of anesthetic complications:   Airway: Mallampati: III  TM distance: >3 FB   Neck ROM: full  Mouth opening: > = 3 FB   Dental: normal exam         Pulmonary: breath sounds clear to auscultation  (+) COPD:  sleep apnea: on CPAP,      (-) not a current smoker                          ROS comment: Recent asthma exacerbation-feeling better with steroid taper   Cardiovascular:    (+) hypertension:,         Rhythm: regular  Rate: normal                    Neuro/Psych:   (+) dementia            GI/Hepatic/Renal:            ROS comment: Child's esophagus . Endo/Other:    (+) : arthritis: OA., .                 Abdominal:   (+) obese,           Vascular: Other Findings:           Anesthesia Plan      general     ASA 3       Induction: intravenous. MIPS: Prophylactic antiemetics administered. Anesthetic plan and risks discussed with patient. Plan discussed with CRNA.                     Jerald Yung DO   9/30/2022

## 2022-09-30 NOTE — DISCHARGE INSTRUCTIONS
Discharge Instructions for Functional Endoscopic Sinus Surgery    Functional endoscopic sinus surgery is a procedure to enlarge pathways to the sinuses. It is done to improve sinus drainage and may help to treat recurring sinus problems and infections. Recovery from this surgery takes about 1 weeks. Steps to [facility]   While your sinuses are healing:   Do not blow your nose until your doctor says it is okay to do so. This is usually after 48 hours. You may have bloody discharge from your nose. Create a drip pad using rolls of gauze. Hold the roll under your nose to soak up any discharge. Avoid air pollutants like dust and smoke. Physical Activity   During your recovery:   Avoid swimming in oceans and lakes for 2 weeks. Get plenty of rest for at least a 2-3 days. If your job involves heavy lifting, you may need to stay out of work up to 1 week. Ask your doctor when you will be able to return to work. Do not drive unless your doctor has given you permission to do so. Medications   Your doctor may advise:   Over-the-counter pain medication. Saline spray to moisturize the nose and clear nasal crusting. Two sprays every two hours while awake. Afrin nasal spray. Two sprays each nostril three times a day for three days then stop. Follow these general medication guidelines:   Take your medication as directed. Do not change the amount or schedule. Tylenol 1000 mg (two 500 mg tablets) by mouth every 6 hours for pain. Ibuprofen (Advil or Motrin) 600 mg (three 200 mg tablets) by mouth every 6 hours for pain. Alternate these two medications every three hours. Oxycodone, 5 mg tablets. Take one tablet every 6 hours as needed for pain. Ask what side effects could occur. Report them to your doctor. Talk to your doctor before you stop taking the medication. Do not share your medication with anyone. Medications can be dangerous when mixed.  Talk to your doctor if you are taking more than one medication, including over-the-counter products and supplements. If you had to stop medications before surgery, ask your doctor when you can start again. Follow-up  You will need to be seen by your doctor 1week after surgery. If you had splints put in your nose during surgery, they will be taken out at your follow-up visit. It is important to go to any recommended appointments. Call Your Doctor If Any of the Following Occur ([phone])  Contact your doctor if your recovery is not progressing as expected or you develop complications, such as:  Signs of infection, including fever and chills  Pain that you cannot control with the medications that you have been given  Redness, swelling, increasing pain, excessive bleeding, or discharge from the nose  Lightheadedness  Nausea and vomiting  Vomiting blood or material that looks like coffee grounds  Bruising around the eye(s)  Swelling of the eye(s)  Changes in vision  A headache that lasts longer than 2 days after surgery    If you think you have an emergency, call for medical help right away. [facility]    There are potential side effects of anesthesia or sedation you may experience for the first 24 hours. These side effects include:    Confusion or Memory loss, Dizziness, or Delayed Reaction Times   [x]A responsible person should be with you for the next 24 hours. Do not operate any vehicles (automobiles, bicycles, motorcycles) or power tools or machinery for 24 hours. Do not sign any legal documents or make any legal decisions for 24 hours. Do not drink alcohol for 24 hours or while taking narcotic pain medication. Nausea    [x]Start with light diet and progress to your normal diet as you feel like eating. However, if you experience nausea or repeated episodes of vomiting which persist beyond 12-24 hours, notify your physician. Once nausea has passed, remember to keep drinking fluids.     Difficulty Passing Urine  [x]Drink extra amounts of fluid today. Notify your physician if you have not urinated within 8 hours after your procedure or you feel uncomfortable. Irritated Throat from a Breathing Tube  [x]Drink extra amounts of fluid today. Lozenges may help. Muscle Aches  [x]You may experience some generalized body aches as your muscles recover from medications used to relax them during surgery. These will gradually subside. MEDICATION INSTRUCTIONS:  [x]Prescription(S) x   2  sent with you. Use as directed. When taking pain medications, you may experience the side effect of dizziness or drowsiness. Do not drink alcohol or drive when taking these medications. []Prescription(S) x          Called to Pharmacy Name and location:    [x]Give the list of your medications to your primary care physician on your next visit. Keep your med list updated and carry it with in case of emergencies. [x] Narcotic pain medications can cause the side effect of significant constipation. You may want to add a stool softener to your postoperative medication schedule or speak to your surgeon on how best to manage this side effect. NARCOTIC SAFETY:  Your pain medicine is only for you to take. Safely store your medicines. Store pills up high and out of reach of children and pets. Ensure safety caps are snapped tightly  Keep track of how many pills you have left    Unused medication can be disposed of by taking them to a drop-off box or take-back program that is authorized by the [facility]. Access to a site near you can be found on the [facility] [employer] website ([address] [facility].gov). If you have a CPAP machine, it is very important that you use it daily during all periods of sleep and daytime rest during your recovery at home. Surgery and Anesthesia place a significant amount of stress on your body.   Using your CPAP will help keep you safe and lessen the negative effects of that stress. FOLLOW-UP RECOVERY CARE:  [x]Call the office at [phone])for follow-up appointment and problems    Watch for these possible complications, symptoms, or side effects of anesthesia. Call physician if they or any other problems occur:  Signs of INFECTION   > Fever over 101°     > Redness, swelling, hardness or warmth at the operative site   >Foul smelling or cloudy drainage at the operative site   Unrelieved PAIN  Unrelieved NAUSEA  Blood soaked dressing. (Some oozing may be normal)  Inability to urinate      Numb, pale, blue, cold or tingling extremity      Physician:  Ochoa    The above instructions were reviewed with patient/significant other. The following additional patient specific information was reviewed with the patient/significant other:  [x]Procedure/physician specific instructions  []Medication information sheet(S) including potential side effects  []Allisons egress test  []Pain Ball management  []FAQ Catheter associated blood stream infections  []FAQ Surgical Site Infections  []Other-    I have read and understand the instructions given to me: ____________________________________________   (Patient/S.O. Signature)            Date/time 9/30/2022 9:43 AM         PACU:  [phone]   M-F 700 AM - 7 PM      SAME DAY SERVICES:  [phone] M-F 7AM-6PM        If you smoke STOP. We care about your health!

## 2022-09-30 NOTE — PROGRESS NOTES
Patient arrived from OR to PACU # 17 s/p BILATERAL FRONTAL SINUS EXPLORATION, BILATERAL INFERIOR TURBINATE REDUCTION, BILATERAL MAXILLARY ANTROSTOMY, BILATERAL TOTAL ETHMOIDECTOMY WITH SPHENOIDECTOMY AND IMAGE NAVIGATION, REVISION SEPTOPLASTY (Bilateral: Nose) per . Attached to PACU monitoring device, report received from CRNA who stated elevated b/p treated intraop. Arrived 100% NRB awake.   to bedside spoke to patient minutes after arrival.

## 2022-09-30 NOTE — H&P
Tone Bhatti    [id number]    [facility] Same Day Surgery Update H & P  Department of General Surgery   Surgical Service   Pre-operative History and Physical  Last H & P within the last 30 days. DIAGNOSIS:   Chronic pansinusitis [J32.4]  Nasal turbinate hypertrophy [J34.3]    Procedure(s):  BILATERAL FRONTAL SINUS EXPLORATION, BILATERAL INFERIOR TURBINATE REDUCTION, BILATERAL MAXILLARY ANTROSTOMY, BILATERAL TOTAL ETHMOIDECTOMY WITH SPHENOIDECTOMY AND IMAGE NAVIGATION, REVISION SEPTOPLASTY    History obtained from: Patient interview and EHR      HISTORY OF PRESENT ILLNESS:   The patient is a 71 y.o. male with c/o with c/o congestion and nasal obstruction in the setting nasal turbinate hypertrophy and chronic pansinusitis. Their symptoms have been unrelieved with conservative therapy and they presents today for the above procedure. Illness Screening: Patient denies fever, chills, worsening cough, or close contact with sick individuals.         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     COPD (chronic obstructive pulmonary disease) ([facility])     COPD (chronic obstructive pulmonary disease) ([facility] [address] 4/26/2022    Erectile dysfunction     Hearing loss     Hyperlipidemia     Hypertension     Kidney failure 07/2018    Normal pressure hydrocephalus ([facility] [address] 07/2018    shunt placement    Screening for abdominal aortic aneurysm (AAA) performed 03/02/2018    [facility]    Tinnitus     Unspecified sleep apnea     Wears glasses     Wears hearing aid in both ears      Past Surgical History:        Procedure Laterality Date    COLONOSCOPY  12/14/2018    COLONOSCOPY WITH BIOPSY performed by Scooter Coppola MD at [facility]    ENTEROSCOPY N/A 01/04/2019    ENTEROSCOPY PUSH BIOPSY performed by Scooter Coppola MD at [address] Left     lipoma    EYE SURGERY      as a child, weak eye muscles    HERNIA REPAIR umbilical    JOINT REPLACEMENT Left 2015    PARTIAL KNEE REPLACMENT    LAPAROSCOPY N/A 07/18/2018    OPENING AND CLOSING FOR VENTRICULAR PERITONEAL SHUNT performed by Susi Irwin MD at [address] 07/18/2018     VENTRICULAR PERITONEAL SHUNT INSERTION RIGHT SIDE; (N/A )    CA CREATE SHUNT:VENTRIC-PERITONEAL N/A 07/18/2018    VENTRICULAR PERITONEAL SHUNT INSERTION RIGHT SIDE; performed by Chadd Magana MD at [address] [facility] 05/04/2022    BILATERAL INFERIOR TURBINATE REDUCTION, BILATERAL MAXILLARY ANTROSTOMY, BILATERAL TOTAL ETHMOIDECTOMY WITH SPEHNOIDECTOMY AND SEPTOPLASTY, BILATERAL FRONTAL SINUS EXPLORATION performed by Eddye Alpers, MD at [address] [place] [address] 12/14/2018    EGD BIOPSY performed by Darian Pace MD at [place] [address] N/A 03/01/2019    ESOPHAGOGASTRODUODENOSCOPY WITH RADIOFREQUENCY  ABLATION/ ABLATIONS X19 performed by Darian Pace MD at [place] [address] N/A 04/24/2019    ESOPHAGOGASTRODUODENOSCOPY WITH RADIOFREQUENCY ABLATION performed by Darian Pace MD at [address] [place] [address] 04/24/2019    EGD BIOPSY performed by Darian Pace MD at [place] [address] N/A 08/02/2019    ESOPHAGOGASTRODUODENOSCOPY RADIOFREQUENCY ABLATION performed by Darian Pace MD at [address] [place] [address] 08/02/2019    EGD GASTRIC BIOPSY performed by Darian Pace MD at [address] [place] [address] 08/02/2019    EGD POLYP COLD SNARE performed by Darian Pace MD at [place] [address] N/A 11/19/2019    ESOPHAGOGASTRODUODENOSCOPY WITH RADIOFREQUENCY ABLATION performed by Darian Pace MD at [address] [place] [address] 11/19/2019    EGD POLYP SNARE performed by Darina Pace MD at [address] [place] [address] GASTROINTESTINAL ENDOSCOPY N/A 07/15/2020    ESOPHAGOGASTRODUODENOSCOPY RADIOFREQUENCY ABLATION ON STANDBY performed by Justin Mckeon MD at [address] [place] [address] 07/15/2020    EGD BIOPSY performed by Justin Mckeon MD at [address] [place] [address] 07/16/2021    EGD BIOPSY performed by Justin Mckeon MD at [facility] [address] N/A 07/16/2021    EGD POLYP SNARE performed by Justin Mckeon MD at [place] [address]         Medications Prior to Admission:      Prior to Admission medications    Medication Sig Start Date End Date Taking? Authorizing Provider   predniSONE (DELTASONE) 10 MG tablet 4 tabs a day for 5 days, 3 tabs a day for 3 days, 2 tabs a day for 3 days,1 tab a day for 3 days 9/20/22   Linda Parker DO   albuterol sulfate HFA (PROVENTIL HFA) 108 (90 Base) MCG/ACT inhaler Inhale 2 puffs into the lungs every 4 hours as needed for Wheezing or Shortness of Breath (Space out to every 6 hours as symptoms improve) Space out to every 6 hours as symptoms improve. 9/19/22   Linda Parker DO   sertraline (ZOLOFT) 50 MG tablet Take 1.5 tablets by mouth in the morning. 8/8/22   Linda Parker DO   gabapentin (NEURONTIN) 100 MG capsule Take 100 mg by mouth 4 times daily.     Historical Provider, MD   fluticasone (FLONASE) 50 MCG/ACT nasal spray 1 spray by Each Nostril route daily 7/7/22   Linda Parker DO   finasteride (PROSCAR) 5 MG tablet Take 1 tablet by mouth daily 7/1/22   Linda Parker DO   mometasone-formoterol (DULERA) 200-5 MCG/ACT inhaler Inhale 1 puff into the lungs every 12 hours 4/11/22   Christiano Oliveira MD   montelukast (SINGULAIR) 10 MG tablet Take 1 tablet by mouth nightly 2/3/22   Tara Quick MD   cetirizine (ZYRTEC) 10 MG tablet TAKE 1 TABLET DAILY 12/9/21   Linda Parker DO   atorvastatin (LIPITOR) 10 MG tablet TAKE 1 TABLET DAILY 11/15/21   Linda Parker DO   pantoprazole (PROTONIX) 20 MG tablet Take 1 tablet by mouth 2 times daily 11/24/20   Brie Goldman MD   aspirin 81 MG chewable tablet Take 81 mg by mouth daily    Historical Provider, MD   tamsulosin (FLOMAX) 0.4 MG capsule Take 2 capsules by mouth daily 3/22/18   Kip Lo MD   Omega-3 Fatty Acids (FISH OIL) 1000 MG CAPS Take 1,000 mg by mouth daily    Historical Provider, MD   Flaxseed, Linseed, (FLAX SEED OIL) 1000 MG CAPS Take 1,000 mg by mouth daily    Historical Provider, MD   Cholecalciferol (VITAMIN D) 2000 UNITS CAPS capsule Take 1 capsule by mouth daily    Historical Provider, MD   multivitamin (THERAGRAN) per tablet Take 1 tablet by mouth daily    Historical Provider, MD         Allergies:  Lisinopril    PHYSICAL EXAM:      /88   Pulse 70   Temp 97.3 °F (36.3 °C) (Temporal)   Resp 16   Ht 6' 2\" (1.88 m)   Wt 265 lb (120.2 kg)   SpO2 97%   BMI 34.02 kg/m²      Airway:  Airway patent with no audible stridor    Heart:  Regular rate and rhythm, No murmur noted    Lungs:  No increased work of breathing, good air exchange, clear to auscultation bilaterally, no crackles or wheezing    Abdomen:  Soft, non-distended, non-tender, no rebound tenderness or guarding, and no masses palpated    ASSESSMENT AND PLAN     Patient is a 71 y.o. male with above specified procedure planned. 1.  The patients history and physical was obtained and signed off by the pre-admission testing department. Patient seen and focused exam done today- no new changes since last physical exam on 9/26/22    2. Access to ancillary services are available per request of the provider.     Richie Lane, APRN - CNP     9/30/2022

## 2022-09-30 NOTE — BRIEF OP NOTE
Brief Postoperative Note      Patient: Alden Mayen. YOB: 1950  MRN: [id number]    Date of Procedure: 9/30/2022    Pre-Op Diagnosis: Chronic pansinusitis [J32.4]  Nasal turbinate hypertrophy [J34.3], Deviated nasal septum    Post-Op Diagnosis: Same       Procedure(s):  BILATERAL FRONTAL SINUS EXPLORATION, BILATERAL INFERIOR TURBINATE REDUCTION, BILATERAL MAXILLARY ANTROSTOMY, BILATERAL TOTAL ETHMOIDECTOMY WITH SPHENOIDECTOMY AND IMAGE NAVIGATION, REVISION SEPTOPLASTY    Surgeon(s):  Rosa Isela Chadwick MD    Assistant:  Surgical Assistant: Ruy Ortiz    Anesthesia: General    Estimated Blood Loss (mL): 855     Complications: None    Specimens:   ID Type Source Tests Collected by Time Destination   1 : LEFT FRONTAL SINUS TISSUE Tissue Tissue CULTURE, FUNGUS, CULTURE, TISSUE, CULTURE WITH SMEAR, ACID FAST BACILLIUS Rosa Isela Chadwick MD 9/30/2022 8976    A : BILATERAL SINUS CONTENTS Tissue Tissue SURGICAL PATHOLOGY Rosa Isela Chadwick MD 9/30/2022 8813        Implants:  * No implants in log *      Drains: * No LDAs found *    Findings: Diffuse polypoid changes with purulence in the bilateral frontal recesses.      Electronically signed by Trey Anderson MD on 9/30/2022 at 9:19 AM

## 2022-10-04 ENCOUNTER — OFFICE VISIT (OUTPATIENT)
Dept: ENT CLINIC | Age: 72
End: 2022-10-04
Payer: MEDICARE

## 2022-10-04 DIAGNOSIS — J32.4 CHRONIC PANSINUSITIS: Primary | ICD-10-CM

## 2022-10-04 DIAGNOSIS — J34.89 NASAL OBSTRUCTION: ICD-10-CM

## 2022-10-04 PROCEDURE — 99024 POSTOP FOLLOW-UP VISIT: CPT | Performed by: OTOLARYNGOLOGY

## 2022-10-04 PROCEDURE — 31237 NSL/SINS NDSC SURG BX POLYPC: CPT | Performed by: OTOLARYNGOLOGY

## 2022-10-04 NOTE — PROGRESS NOTES
S/P fess, revision septum and turbinate reduction. Doing well. Congested. PE: Splints intact, removed and nasal cavity suctioned. Excellent airway. Old blood and mucus in middle meatus bilateral. Debrided. Nasal Endoscopy with Debridement  Pre Op Dx: Nasal congestion, post operative sinus surgery  Post Op Dx: Same  Procedure: Nasal endoscopy with debridement bilateral  Anesthesia: 2% lidocaine with afrin tiopical  EBL: None  Specimens: None  Findings: both sides nasal cavity and sinus crusting and mucus. Procedure:    After the application of afrin and pontocaine the nasal sinus cavity was debrided utilizing a zero degree marimar endoscope with Kerrison rongeurs and van suctions on both sides. The sinus lining was healing well. No evidence of bleeding or rhinorrhea was noted. Tolerated well. Complications: None    Follow up in 3 weeks.

## 2022-10-05 LAB
ANAEROBIC CULTURE: NORMAL
GRAM STAIN RESULT: NORMAL
WOUND/ABSCESS: NORMAL

## 2022-10-24 ENCOUNTER — OFFICE VISIT (OUTPATIENT)
Dept: INTERNAL MEDICINE CLINIC | Age: 72
End: 2022-10-24
Payer: MEDICARE

## 2022-10-24 VITALS
HEIGHT: 74 IN | BODY MASS INDEX: 34.8 KG/M2 | SYSTOLIC BLOOD PRESSURE: 126 MMHG | WEIGHT: 271.2 LBS | DIASTOLIC BLOOD PRESSURE: 76 MMHG | HEART RATE: 89 BPM | OXYGEN SATURATION: 99 %

## 2022-10-24 DIAGNOSIS — G30.0 EARLY ONSET ALZHEIMER'S DEMENTIA WITHOUT BEHAVIORAL DISTURBANCE, PSYCHOTIC DISTURBANCE, MOOD DISTURBANCE, OR ANXIETY, UNSPECIFIED DEMENTIA SEVERITY (HCC): ICD-10-CM

## 2022-10-24 DIAGNOSIS — G25.0 ESSENTIAL TREMOR: ICD-10-CM

## 2022-10-24 DIAGNOSIS — L98.9 SKIN LESION ON EXAMINATION: ICD-10-CM

## 2022-10-24 DIAGNOSIS — F02.80 EARLY ONSET ALZHEIMER'S DEMENTIA WITHOUT BEHAVIORAL DISTURBANCE, PSYCHOTIC DISTURBANCE, MOOD DISTURBANCE, OR ANXIETY, UNSPECIFIED DEMENTIA SEVERITY (HCC): ICD-10-CM

## 2022-10-24 DIAGNOSIS — R22.43 LOCALIZED SWELLING OF BOTH LOWER LEGS: Primary | ICD-10-CM

## 2022-10-24 DIAGNOSIS — F34.1 PERSISTENT DEPRESSIVE DISORDER: ICD-10-CM

## 2022-10-24 LAB
FUNGUS (MYCOLOGY) CULTURE: NORMAL
FUNGUS STAIN: NORMAL

## 2022-10-24 PROCEDURE — 99214 OFFICE O/P EST MOD 30 MIN: CPT | Performed by: INTERNAL MEDICINE

## 2022-10-24 PROCEDURE — 1036F TOBACCO NON-USER: CPT | Performed by: INTERNAL MEDICINE

## 2022-10-24 PROCEDURE — G8427 DOCREV CUR MEDS BY ELIG CLIN: HCPCS | Performed by: INTERNAL MEDICINE

## 2022-10-24 PROCEDURE — G8417 CALC BMI ABV UP PARAM F/U: HCPCS | Performed by: INTERNAL MEDICINE

## 2022-10-24 PROCEDURE — G8484 FLU IMMUNIZE NO ADMIN: HCPCS | Performed by: INTERNAL MEDICINE

## 2022-10-24 PROCEDURE — 3017F COLORECTAL CA SCREEN DOC REV: CPT | Performed by: INTERNAL MEDICINE

## 2022-10-24 PROCEDURE — 1123F ACP DISCUSS/DSCN MKR DOCD: CPT | Performed by: INTERNAL MEDICINE

## 2022-10-24 NOTE — PROGRESS NOTES
Liliane Cerda. (:  1950) is a 71 y.o. male,Established patient, here for evaluation of the following chief complaint(s):  Follow-up        ASSESSMENT/PLAN:  1. Localized swelling of both lower legs  Likely secondary to diastolic dysfunction noted on echo 2 years ago. Patient wearing compression socks. Okay to continue compression socks. We will consider repeat echo at follow-up appointment in 3 months. Patient to notify me with any worsening of symptoms. 2. Early onset Alzheimer's dementia without behavioral disturbance, psychotic disturbance, mood disturbance, or anxiety, unspecified dementia severity ([facility] [address]  Plans for further testing of his cognition. Patient did see neurology. 3. Essential tremor  Tremor thought to be essential tremor by neurology. No medication at this time. 4. Persistent depressive disorder  Well-controlled on Zoloft. Patient to continue this medication. 5. Skin lesion on examination  1 small spot of erythema on the right shin. Area is not raised and not painful to touch. Patient to continue to monitor this area daily. Any worsening of symptoms patient to notify me immediately. It does not appear like erythema nodosum at this time however if more lesions appear we will plan for referral to dermatology. Return in about 3 months (around 2023) for chronic disease follow up. SUBJECTIVE/OBJECTIVE:  HPI    Patient is 72 yo male with pmhx NPH, barretts, depression, preDM and Alzheimers who presents for follow up for chronic disease. Patient notes he is overall feeling well. His surgery went well. He can smell and taste now. He did see neurology. Dx with essential tremor. Not started on medication. Has been stable on its own. Patient notes symptoms are currently tolerable. They did recommend doing retesting for his memory. Patient feels this has been getting worse. However, family has noted that his odd behaviors have improved some.   This is started since being on the Zoloft. Unclear if this could be because of decreased anxiety. Patient notes that anxiety and depression have improved significantly on the Zoloft. When drinking water or liquid a couple times a week he has issues with choking. . Inhaling with a mouth full. Feels like he cannot breathe sometimes. Notes a lot of coughing. Has required thickened foods in the past.  Patient notes if he is mindful and slow this tends not to happen. Review of Systems ROS negative except for those noted in the HPI above. Vitals:    10/24/22 0931   BP: 126/76   Pulse: 89   SpO2: 99%         Physical Exam  Constitutional:       General: He is not in acute distress. Appearance: Normal appearance. He is not ill-appearing. HENT:      Head: Normocephalic and atraumatic. Right Ear: External ear normal.      Left Ear: External ear normal.   Eyes:      Extraocular Movements: Extraocular movements intact. Conjunctiva/sclera: Conjunctivae normal.   Cardiovascular:      Rate and Rhythm: Normal rate and regular rhythm. Heart sounds: No murmur heard. No friction rub. No gallop. Pulmonary:      Effort: Pulmonary effort is normal. No respiratory distress. Breath sounds: Normal breath sounds. No wheezing, rhonchi or rales. Abdominal:      General: Bowel sounds are normal. There is no distension. Palpations: Abdomen is soft. Tenderness: There is no abdominal tenderness. There is no guarding or rebound. Musculoskeletal:      Right lower leg: Edema present. Left lower leg: Edema present. Skin:     General: Skin is warm. Findings: Erythema (patch of erythema noted on the right shin . non painful) present. Neurological:      General: No focal deficit present. Mental Status: He is alert and oriented to person, place, and time.    Psychiatric:         Mood and Affect: Mood normal.         Behavior: Behavior normal.         An electronic signature was used to authenticate this note.     --Jennifer Rodriguez, DO

## 2022-10-25 ENCOUNTER — TELEPHONE (OUTPATIENT)
Dept: INTERNAL MEDICINE CLINIC | Age: 72
End: 2022-10-25

## 2022-10-25 ENCOUNTER — OFFICE VISIT (OUTPATIENT)
Dept: ENT CLINIC | Age: 72
End: 2022-10-25
Payer: MEDICARE

## 2022-10-25 DIAGNOSIS — J34.89 NASAL OBSTRUCTION: ICD-10-CM

## 2022-10-25 DIAGNOSIS — J34.3 HYPERTROPHY OF NASAL TURBINATES: ICD-10-CM

## 2022-10-25 DIAGNOSIS — J32.4 CHRONIC PANSINUSITIS: Primary | ICD-10-CM

## 2022-10-25 LAB
AFB CULTURE (MYCOBACTERIA): NORMAL
AFB SMEAR: NORMAL

## 2022-10-25 PROCEDURE — 99024 POSTOP FOLLOW-UP VISIT: CPT | Performed by: OTOLARYNGOLOGY

## 2022-10-25 PROCEDURE — 31237 NSL/SINS NDSC SURG BX POLYPC: CPT | Performed by: OTOLARYNGOLOGY

## 2022-10-25 NOTE — TELEPHONE ENCOUNTER
Patient is calling to let /ghanshyam know that he and his wife Christofer Bianchi (also a patient of ) got their [employer] Booster shots on 10/24 at [place]. Just an FYI.

## 2022-10-25 NOTE — PROGRESS NOTES
S/P FESS and turbinate reduction. Breathing great. Good sense of smell. PE: Moderate bilateral nasal cavity and sinus crusting. Debrided. Nasal Endoscopy with Debridement  Pre Op Dx: Nasal congestion, post operative sinus surgery  Post Op Dx: Same  Procedure: Nasal endoscopy with debridement bilateral  Anesthesia: 2% lidocaine with afrin tiopical  EBL: None  Specimens: None  Findings: both sides nasal cavity and sinus crusting and mucus. Procedure:    After the application of afrin and pontocaine the nasal sinus cavity was debrided utilizing a zero degree marimar endoscope with Kerrison rongeurs and van suctions on both sides. The sinus lining was healing well. No evidence of bleeding or rhinorrhea was noted. Tolerated well. Complications: None       A/P: Rinses for another month. Flonase daily. Follow up in 3 months.

## 2022-10-27 ENCOUNTER — HOSPITAL ENCOUNTER (OUTPATIENT)
Dept: GENERAL RADIOLOGY | Age: 72
Discharge: HOME | End: 2022-10-27
Payer: MEDICARE

## 2022-10-27 ENCOUNTER — HOSPITAL ENCOUNTER (OUTPATIENT)
Dept: CT IMAGING | Age: 72
Discharge: HOME | End: 2022-10-27
Payer: MEDICARE

## 2022-10-27 DIAGNOSIS — G91.2 (IDIOPATHIC) NORMAL PRESSURE HYDROCEPHALUS (HCC): ICD-10-CM

## 2022-10-27 DIAGNOSIS — G91.2 LOW PRESSURE HYDROCEPHALUS (HCC): ICD-10-CM

## 2022-10-27 PROCEDURE — 70450 CT HEAD/BRAIN W/O DYE: CPT

## 2022-10-27 PROCEDURE — 70250 X-RAY EXAM OF SKULL: CPT

## 2022-11-22 ENCOUNTER — APPOINTMENT (OUTPATIENT)
Dept: GENERAL RADIOLOGY | Age: 72
End: 2022-11-22
Payer: MEDICARE

## 2022-11-22 ENCOUNTER — TELEPHONE (OUTPATIENT)
Dept: INTERNAL MEDICINE CLINIC | Age: 72
End: 2022-11-22

## 2022-11-22 ENCOUNTER — HOSPITAL ENCOUNTER (EMERGENCY)
Age: 72
Discharge: HOME | End: 2022-11-22
Attending: EMERGENCY MEDICINE
Payer: MEDICARE

## 2022-11-22 VITALS
HEART RATE: 83 BPM | OXYGEN SATURATION: 100 % | TEMPERATURE: 97.6 F | DIASTOLIC BLOOD PRESSURE: 78 MMHG | SYSTOLIC BLOOD PRESSURE: 142 MMHG | RESPIRATION RATE: 14 BRPM

## 2022-11-22 DIAGNOSIS — J98.8 VIRAL RESPIRATORY ILLNESS: ICD-10-CM

## 2022-11-22 DIAGNOSIS — B97.89 VIRAL RESPIRATORY ILLNESS: ICD-10-CM

## 2022-11-22 DIAGNOSIS — J45.41 MODERATE PERSISTENT ASTHMA WITH EXACERBATION: Primary | ICD-10-CM

## 2022-11-22 LAB
EKG ATRIAL RATE: 74 BPM
EKG DIAGNOSIS: NORMAL
EKG P AXIS: 27 DEGREES
EKG P-R INTERVAL: 170 MS
EKG Q-T INTERVAL: 378 MS
EKG QRS DURATION: 68 MS
EKG QTC CALCULATION (BAZETT): 419 MS
EKG R AXIS: 34 DEGREES
EKG T AXIS: 41 DEGREES
EKG VENTRICULAR RATE: 74 BPM
RAPID INFLUENZA  B AGN: NEGATIVE
RAPID INFLUENZA A AGN: POSITIVE
SARS-COV-2, NAAT: NOT DETECTED

## 2022-11-22 PROCEDURE — 99285 EMERGENCY DEPT VISIT HI MDM: CPT

## 2022-11-22 PROCEDURE — 6360000002 HC RX W HCPCS: Performed by: EMERGENCY MEDICINE

## 2022-11-22 PROCEDURE — 94761 N-INVAS EAR/PLS OXIMETRY MLT: CPT

## 2022-11-22 PROCEDURE — 87635 SARS-COV-2 COVID-19 AMP PRB: CPT

## 2022-11-22 PROCEDURE — 6370000000 HC RX 637 (ALT 250 FOR IP): Performed by: EMERGENCY MEDICINE

## 2022-11-22 PROCEDURE — 93005 ELECTROCARDIOGRAM TRACING: CPT | Performed by: EMERGENCY MEDICINE

## 2022-11-22 PROCEDURE — 94640 AIRWAY INHALATION TREATMENT: CPT

## 2022-11-22 PROCEDURE — 87804 INFLUENZA ASSAY W/OPTIC: CPT

## 2022-11-22 PROCEDURE — 71046 X-RAY EXAM CHEST 2 VIEWS: CPT

## 2022-11-22 RX ORDER — IPRATROPIUM BROMIDE AND ALBUTEROL SULFATE 2.5; .5 MG/3ML; MG/3ML
1 SOLUTION RESPIRATORY (INHALATION) ONCE
Status: COMPLETED | OUTPATIENT
Start: 2022-11-22 | End: 2022-11-22

## 2022-11-22 RX ORDER — ALBUTEROL SULFATE 2.5 MG/3ML
2.5 SOLUTION RESPIRATORY (INHALATION) ONCE
Status: COMPLETED | OUTPATIENT
Start: 2022-11-22 | End: 2022-11-22

## 2022-11-22 RX ORDER — PREDNISONE 10 MG/1
TABLET ORAL
Qty: 20 TABLET | Refills: 0 | Status: SHIPPED | OUTPATIENT
Start: 2022-11-22 | End: 2022-12-02

## 2022-11-22 RX ORDER — PREDNISONE 20 MG/1
60 TABLET ORAL ONCE
Status: COMPLETED | OUTPATIENT
Start: 2022-11-22 | End: 2022-11-22

## 2022-11-22 RX ADMIN — ALBUTEROL SULFATE 2.5 MG: 2.5 SOLUTION RESPIRATORY (INHALATION) at 13:29

## 2022-11-22 RX ADMIN — IPRATROPIUM BROMIDE AND ALBUTEROL SULFATE 1 AMPULE: .5; 3 SOLUTION RESPIRATORY (INHALATION) at 13:29

## 2022-11-22 RX ADMIN — PREDNISONE 60 MG: 20 TABLET ORAL at 13:02

## 2022-11-22 ASSESSMENT — ENCOUNTER SYMPTOMS
ABDOMINAL PAIN: 0
CHOKING: 1
RHINORRHEA: 1
WHEEZING: 1
VOMITING: 0
SHORTNESS OF BREATH: 1
GASTROINTESTINAL NEGATIVE: 1
SORE THROAT: 0
TROUBLE SWALLOWING: 0
COUGH: 1
NAUSEA: 0
DIARRHEA: 0

## 2022-11-22 NOTE — TELEPHONE ENCOUNTER
Spoke with the patient, he stated his oxygen was in the 90's but the Sob is the same. He will go to the er.

## 2022-11-22 NOTE — TELEPHONE ENCOUNTER
Patient is calling with concerns of:  Cough  SOB/difficulty breathing  Chest Congestion  Nasal congestion  Nasal Drainage    Cough and sinus sxs began a week. Patient has not taken a COVID test yet. Advised to take at-home test and call back with results. Please contact patient at number provided to further advise. Aware Dr. Gage Brown is not in office today.

## 2022-11-22 NOTE — DISCHARGE INSTRUCTIONS
As discussed, your chest x-ray did not show any evidence of pneumonia. A flu and COVID swab were performed, and you may look those results up in [address] However, you are outside the window for treatment of either flu or COVID, as your symptoms began more than 5 days ago. For your asthma exacerbation, you should take 4 more days of prednisone as prescribed, starting tomorrow. When you return home, you should use your albuterol inhaler every 4 hours while awake for the first day or 2, until your breathing is feeling better. After that, you may space it out to every 6-8 hours as needed. Please call your doctor's office to arrange a follow-up appointment if you are not feeling better in a few days. Call your doctor, or return to the emergency department, if you have worsening difficulty breathing, chest pain, fevers greater than 101°F, or other concerning symptoms.

## 2022-11-22 NOTE — ED PROVIDER NOTES
[address] [facility]          ATTENDING PHYSICIAN NOTE       Date of evaluation: 11/22/2022    Chief Complaint     Shortness of Breath (States sob x1 week, states hx of asthma, reports oxygen at home was 88%. Pt staing 95% in triage. Pt denies cp. Pt states he has been congested. Negative covid test at home )      History of Present Illness     Christine Villanueva is a 71 y.o. male who presents increasing cough, wheezing, shortness of breath, and concerns regarding low pulse oximeter readings at home. Patient states that he has been feeling ill for about a week. His symptoms started last week with nasal congestion and rhinorrhea, and he states that the congestion then moved down into his chest, with increasing cough. He states that he feels shortness of breath that is typically worse after coughing. He has a history of asthma, has needed to increase his albuterol inhaler usage at home. Today he called his primary care provider's office regarding his symptoms, and was told to take a home COVID test.  This was negative. He was told to check his oxygen, and the home pulse oximeter reading that they had was giving oxygen saturation results in the high 80s. For this reason he was advised by his primary care provider to come to the emergency department for further evaluation. He denies any fevers or chills. He denies any chest pain. He denies any nausea or vomiting, abdominal pain, changes in bowel habits, urinary symptoms. Review of Systems     Review of Systems   Constitutional:  Positive for fatigue. Negative for activity change, appetite change, chills and fever. HENT:  Positive for congestion and rhinorrhea. Negative for sore throat and trouble swallowing. Respiratory:  Positive for cough, choking, shortness of breath and wheezing.          Patient's wife describes a choking episode when drinking liquids that occurred 2 days ago, similar to an episode that occurred earlier this summer, concerning to his primary care provider for an episode of aspiration. Cardiovascular:  Negative for chest pain and palpitations. Gastrointestinal: Negative. Negative for abdominal pain, diarrhea, nausea and vomiting. Genitourinary: Negative. Negative for difficulty urinating and dysuria. Musculoskeletal: Negative. Neurological: Negative. Negative for dizziness, syncope, light-headedness and headaches. Psychiatric/Behavioral: Negative. 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 He has a past surgical history that includes other surgical history (N/A, 07/18/2018); pr create shunt:ventric-peritoneal (N/A, 07/18/2018); laparoscopy (N/A, 07/18/2018); Eye surgery (Left); joint replacement (Left, 2015); Upper gastrointestinal endoscopy (N/A, 12/14/2018); Colonoscopy (12/14/2018); Enteroscopy (N/A, 01/04/2019); Upper gastrointestinal endoscopy (N/A, 03/01/2019); Upper gastrointestinal endoscopy (N/A, 04/24/2019); Upper gastrointestinal endoscopy (N/A, 04/24/2019); Upper gastrointestinal endoscopy (N/A, 08/02/2019); Upper gastrointestinal endoscopy (N/A, 08/02/2019); Upper gastrointestinal endoscopy (N/A, 08/02/2019); Upper gastrointestinal endoscopy (N/A, 11/19/2019); Upper gastrointestinal endoscopy (N/A, 11/19/2019); Upper gastrointestinal endoscopy (N/A, 07/15/2020); Upper gastrointestinal endoscopy (N/A, 07/15/2020); Upper gastrointestinal endoscopy (N/A, 07/16/2021);  Upper gastrointestinal endoscopy (N/A, 07/16/2021); eye surgery; hernia repair; Sinus endoscopy (Bilateral, 05/04/2022); and Sinus endoscopy (Bilateral, 9/30/2022). His family history includes Cancer in his father. He reports that he quit smoking about 20 years ago. His smoking use included cigarettes. He has a 5.00 pack-year smoking history. He has never used smokeless tobacco. He reports that he does not currently use alcohol after a past usage of about 2.0 standard drinks per week. He reports that he does not use drugs. Medications     Previous Medications    ALBUTEROL SULFATE HFA (PROVENTIL HFA) 108 (90 BASE) MCG/ACT INHALER    Inhale 2 puffs into the lungs every 4 hours as needed for Wheezing or Shortness of Breath (Space out to every 6 hours as symptoms improve) Space out to every 6 hours as symptoms improve. ASPIRIN 81 MG CHEWABLE TABLET    Take 81 mg by mouth daily    ATORVASTATIN (LIPITOR) 10 MG TABLET    TAKE 1 TABLET DAILY    CETIRIZINE (ZYRTEC) 10 MG TABLET    TAKE 1 TABLET DAILY    CHOLECALCIFEROL (VITAMIN D) 2000 UNITS CAPS CAPSULE    Take 1 capsule by mouth daily    FINASTERIDE (PROSCAR) 5 MG TABLET    Take 1 tablet by mouth daily    FLAXSEED, LINSEED, (FLAX SEED OIL) 1000 MG CAPS    Take 1,000 mg by mouth daily    FLUTICASONE (FLONASE) 50 MCG/ACT NASAL SPRAY    1 spray by Each Nostril route daily    GABAPENTIN (NEURONTIN) 100 MG CAPSULE    Take 100 mg by mouth 4 times daily. MOMETASONE-FORMOTEROL (DULERA) 200-5 MCG/ACT INHALER    Inhale 1 puff into the lungs every 12 hours    MONTELUKAST (SINGULAIR) 10 MG TABLET    Take 1 tablet by mouth nightly    MULTIVITAMIN (THERAGRAN) PER TABLET    Take 1 tablet by mouth daily    OMEGA-3 FATTY ACIDS (FISH OIL) 1000 MG CAPS    Take 1,000 mg by mouth daily    PANTOPRAZOLE (PROTONIX) 20 MG TABLET    Take 1 tablet by mouth 2 times daily    SERTRALINE (ZOLOFT) 50 MG TABLET    Take 1.5 tablets by mouth in the morning.     TAMSULOSIN (FLOMAX) 0.4 MG CAPSULE    Take 2 capsules by mouth daily Allergies     He is allergic to lisinopril. Physical Exam     INITIAL VITALS: BP: 131/75, Temp: 97.6 °F (36.4 °C), Heart Rate: 88, Resp: 18, SpO2: 95 %     General: Well appearing. Pleasantly conversational, and in NAD. HEENT: Pupils are equal, round, and reactive to light. Extraocular muscles are intact. Conjunctivae are clear and moist. No redness or drainage from the eyes. There are some mild audible nasal congestion, without active drainage from the nose. The oropharynx appeared to be normal.    Neck: Supple, with full range of motion. Cardiovascular: Normal S1-S2 without murmur rub or gallop. 2+ radial pulses bilaterally. 2+ DP pulses bilaterally. Respiratory: Unlabored breathing with equal chest rise and fall. Patient has generally good air entry, with end inspiratory wheezing and full expiratory phase wheezing throughout. No focal crackles are noted. Abdomen: Soft and nontender, without guarding or rebound tenderness. No masses or hepatosplenomegaly. Skin: Warm and dry, without rashes or ecchymoses, lacerations or abrasions. Neuro: Alert and oriented x3. No focal neurologic deficits are noted. Extremities: Warm and well-perfused, without clubbing, cyanosis, or edema. The patient moves all extremities equally. Psych: The patient's mood and affect are generally within normal limits for their presentation. Diagnostic Results     EKG   Normal sinus rhythm with a ventricular rate of 74 bpm.  Normal axis. Normal intervals, with NE interval 170 ms, QRS duration 68 ms,  ms. There is T wave flattening in lead III. No other ST segment or T wave abnormalities are noted. This EKG is essentially unchanged from a prior EKG dated February 3, 2022.     RADIOLOGY:  XR CHEST (2 VW)   Final Result      No acute radiographic abnormality of the chest.          LABS:   Results for orders placed or performed during the hospital encounter of 11/22/22   EKG 12 Lead   Result Value Ref Range    Ventricular Rate 74 BPM    Atrial Rate 74 BPM    P-R Interval 170 ms    QRS Duration 68 ms    Q-T Interval 378 ms    QTc Calculation (Bazett) 419 ms    P Axis 27 degrees    R Axis 34 degrees    T Axis 41 degrees    Diagnosis       EKG performed in ER and to be interpreted by ER physician. Confirmed by MD, ER ([id number]),  Antoinette Avalos ([id number]) on 11/22/2022 12:53:38 PM         RECENT VITALS:  BP: 131/75, Temp: 97.6 °F (36.4 °C), Heart Rate: 71, Resp: 17, SpO2: 100 %     Procedures       ED Course     Nursing Notes, Past Medical Hx, Past Surgical Hx, Social Hx, Allergies, and Family Hx were reviewed. The patient was given the following medications:  Orders Placed This Encounter   Medications    ipratropium-albuterol (DUONEB) nebulizer solution 1 ampule     Order Specific Question:   Initiate RT Bronchodilator Protocol     Answer:   No    albuterol (PROVENTIL) nebulizer solution 2.5 mg     Order Specific Question:   Initiate RT Bronchodilator Protocol     Answer:   No    predniSONE (DELTASONE) tablet 60 mg    predniSONE (DELTASONE) 10 MG tablet     Sig: Take 5 tablets by mouth once daily for 4 days, starting tomorrow     Dispense:  20 tablet     Refill:  0       CONSULTS:  None    MEDICAL [facility] / ASSESSMENT / [place] Beerin. is a 71 y.o. male with a history of moderate persistent asthma, who presents to the emergency department with increasing cough, shortness of breath and wheezing in the setting of a 5 to 6-day history of upper respiratory infectious symptoms, followed by lower respiratory congestion and cough. He used a home oximeter, and O2 saturations were reading in the high 80s, so he was referred to the emergency department for further evaluation by his primary care provider. The patient's O2 saturations have been in the mid to high 90s since his arrival to the emergency department. He does have diffuse wheezing on lung examination, without focal crackles.   His chest x-ray shows no evidence of focal airspace disease. Flu and COVID swab have been obtained and are pending, but the patient would not be a candidate for treatment of either viral infection, therefore it is reasonable to allow the patient to follow-up these results in my chart at home. Patient was treated with oral prednisone and a DuoNeb followed by an albuterol nebulizer, after which she felt much improved, and wheezing significantly improved on auscultation. At this point, it appears that the patient is experiencing an asthma exacerbation precipitated by a viral respiratory illness. His O2 saturation is now on in the 99 to 100% range consistently, even after ambulation. He feels much improved and is comfortable with discharge and outpatient follow-up. He will be given a prescription of prednisone to complete a 5-day burst, and was given instructions on schedule use of his albuterol inhaler every 4 hours for the first 24 to 48 hours, until his symptoms begin to improve, as well as other self-care instructions for asthma exacerbation at home. He is asked to follow-up closely with his primary care provider, and is given return precautions for any worsening symptoms or other concerns. Clinical Impression     1. Moderate persistent asthma with exacerbation    2. Viral respiratory illness        Disposition     PATIENT REFERRED TO:  Dina Pennington, [address] [facility]    Schedule an appointment as soon as possible for a visit       DISCHARGE MEDICATIONS:  New Prescriptions    PREDNISONE (DELTASONE) 10 MG TABLET    Take 5 tablets by mouth once daily for 4 days, starting tomorrow       DISPOSITION Decision To Discharge    (Please note that portions of this note were completed with voice recognition software.   Efforts were made to edit the dictations but occasionally words are mis-transcribed.)     Juaquin Todd MD  11/22/22 [id number]

## 2022-11-22 NOTE — TELEPHONE ENCOUNTER
Spoke with Dr. Joe Larson via phone call, she stated if he is having a lot of SOB and dosent have a pulse ox he needs to go to the er. Spoke with the patient, he is having the SOB pretty bad in the mornings and after have a coughing fit. His oxygen was in the low 80's. Patient has been advised to go to the er per Dr. Joe Larson for further evaluation.  Patient will have his wife take him there and follow back up with us after

## 2022-11-30 RX ORDER — GABAPENTIN 100 MG/1
100 CAPSULE ORAL DAILY
Qty: 90 CAPSULE | Refills: 0 | Status: SHIPPED | OUTPATIENT
Start: 2022-11-30 | End: 2023-01-04 | Stop reason: SDUPTHER

## 2022-11-30 NOTE — TELEPHONE ENCOUNTER
Patient is requesting a refill on the following prescription:  gabapentin (NEURONTIN) 100 MG capsule  Last appointment: 10/24/2022  Next appointment: 1/23/2023  Last refill: 07/01/2022      Please call in to:  [address]     Please contact patient with any additional questions.

## 2022-12-12 ENCOUNTER — TELEPHONE (OUTPATIENT)
Dept: PULMONOLOGY | Age: 72
End: 2022-12-12

## 2022-12-12 NOTE — TELEPHONE ENCOUNTER
Patient has been having continuing sinus, nasal, and chest congestion for several months, possibly a year. He has had nasal surgery but has not improved. Wants to discuss inhalers and other med alternatives with Dr. Jonna Pantoja. Appt moved from Feb 2023 to 12/15/2022.

## 2022-12-13 ENCOUNTER — OFFICE VISIT (OUTPATIENT)
Dept: ENT CLINIC | Age: 72
End: 2022-12-13
Payer: MEDICARE

## 2022-12-13 VITALS
DIASTOLIC BLOOD PRESSURE: 73 MMHG | BODY MASS INDEX: 34.52 KG/M2 | SYSTOLIC BLOOD PRESSURE: 125 MMHG | OXYGEN SATURATION: 95 % | HEART RATE: 100 BPM | HEIGHT: 74 IN | TEMPERATURE: 97 F | WEIGHT: 269 LBS

## 2022-12-13 DIAGNOSIS — J06.9 VIRAL UPPER RESPIRATORY TRACT INFECTION: ICD-10-CM

## 2022-12-13 DIAGNOSIS — J32.4 CHRONIC PANSINUSITIS: Primary | ICD-10-CM

## 2022-12-13 PROCEDURE — 3017F COLORECTAL CA SCREEN DOC REV: CPT | Performed by: OTOLARYNGOLOGY

## 2022-12-13 PROCEDURE — 31231 NASAL ENDOSCOPY DX: CPT | Performed by: OTOLARYNGOLOGY

## 2022-12-13 PROCEDURE — G8417 CALC BMI ABV UP PARAM F/U: HCPCS | Performed by: OTOLARYNGOLOGY

## 2022-12-13 PROCEDURE — G8427 DOCREV CUR MEDS BY ELIG CLIN: HCPCS | Performed by: OTOLARYNGOLOGY

## 2022-12-13 PROCEDURE — 1036F TOBACCO NON-USER: CPT | Performed by: OTOLARYNGOLOGY

## 2022-12-13 PROCEDURE — G8484 FLU IMMUNIZE NO ADMIN: HCPCS | Performed by: OTOLARYNGOLOGY

## 2022-12-13 PROCEDURE — 99212 OFFICE O/P EST SF 10 MIN: CPT | Performed by: OTOLARYNGOLOGY

## 2022-12-13 PROCEDURE — 1123F ACP DISCUSS/DSCN MKR DOCD: CPT | Performed by: OTOLARYNGOLOGY

## 2022-12-13 ASSESSMENT — ENCOUNTER SYMPTOMS
NAUSEA: 0
EYE PAIN: 0
FACIAL SWELLING: 0
WHEEZING: 1
COUGH: 1
SHORTNESS OF BREATH: 0
CHOKING: 0
RHINORRHEA: 0
TROUBLE SWALLOWING: 0
VOICE CHANGE: 0
EYE REDNESS: 0
SINUS PRESSURE: 0
EYE ITCHING: 0
DIARRHEA: 0
SORE THROAT: 0
SINUS PAIN: 0

## 2022-12-13 NOTE — PROGRESS NOTES
Subjective:      Patient ID: Sia Burks is a 71 y.o. male. HPI  Chief Complaint   Patient presents with    nasal and chest congestion   History of Present Illness:Jared is a(n) 71 y.o. male who presents with a 2 week history of nasal congestion and chest congestion. Can breath but clogs as day goes along. Not rinsing every day. Using flonase daily. Has visit with pulmonary this week.    Nasal Discharge: clear  Nasal Obstruction: Yes bilateral;  in the evening  Post Nasal Drainage: No  Nasal Bleeding: No  Sense of Smell: decreased  Eye Symptoms: none  Previous Treatments: As above     Patient Active Problem List   Diagnosis    Wrist arthritis    Hypertrophy of prostate without urinary obstruction and other lower urinary tract symptoms (LUTS)    Cardiac dysrhythmia    Onychomycosis    Obstructive sleep apnea    Personal history of other diseases of digestive system    Erectile dysfunction    Vitamin D deficiency    Mixed hyperlipidemia    Persistent depressive disorder    Dysthymia    Non-seasonal allergic rhinitis due to pollen    Pulmonary nodule    Back pain    NPH (normal pressure hydrocephalus) ([facility])-s/p shunt    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    Early onset Alzheimer's dementia ([facility])    Neuropathy    Pre-diabetes    Reactive airway disease without complication    Chronic pansinusitis    Deviated nasal septum    Hypertrophy, nasal, turbinate    Nasal obstruction    Nasal turbinate hypertrophy    Essential tremor     Past Surgical History:   Procedure Laterality Date    COLONOSCOPY  12/14/2018    COLONOSCOPY WITH BIOPSY performed by Stephanie Hahn MD at [facility]    ENTEROSCOPY N/A 01/04/2019    ENTEROSCOPY PUSH BIOPSY performed by Stephanie Hahn MD at . [place] Left     lipoma    EYE SURGERY      as a child, weak eye muscles    HERNIA REPAIR      umbilical    JOINT REPLACEMENT Left 2015    PARTIAL KNEE REPLACMENT    LAPAROSCOPY N/A 07/18/2018    OPENING AND CLOSING FOR VENTRICULAR PERITONEAL SHUNT performed by Shakira Mcbride MD at [facility] N/A 07/18/2018     VENTRICULAR PERITONEAL SHUNT INSERTION RIGHT SIDE; (N/A )    FL CREATE SHUNT:VENTRIC-PERITONEAL N/A 07/18/2018    VENTRICULAR PERITONEAL SHUNT INSERTION RIGHT SIDE; performed by Mulugeta Mera MD at [address] [address] [address] Bilateral 05/04/2022    BILATERAL INFERIOR TURBINATE REDUCTION, BILATERAL MAXILLARY ANTROSTOMY, BILATERAL TOTAL ETHMOIDECTOMY WITH SPEHNOIDECTOMY AND SEPTOPLASTY, BILATERAL FRONTAL SINUS EXPLORATION performed by Anju Tomlinson MD at [address] [place] [address] Bilateral 9/30/2022    BILATERAL FRONTAL SINUS EXPLORATION, BILATERAL INFERIOR TURBINATE REDUCTION, BILATERAL MAXILLARY ANTROSTOMY, BILATERAL TOTAL ETHMOIDECTOMY WITH SPHENOIDECTOMY AND IMAGE NAVIGATION, REVISION SEPTOPLASTY performed by Anju Tomlinson MD at [address] [place] [address] 12/14/2018    EGD BIOPSY performed by Paulina Calderón MD at [place] [address] N/A 03/01/2019    ESOPHAGOGASTRODUODENOSCOPY WITH RADIOFREQUENCY  ABLATION/ ABLATIONS X19 performed by Paulina Calderón MD at [place] [address] N/A 04/24/2019    ESOPHAGOGASTRODUODENOSCOPY WITH RADIOFREQUENCY ABLATION performed by Paulina Calderón MD at [place] [address] 04/24/2019    EGD BIOPSY performed by Paulina Calderón MD at [place] [address] N/A 08/02/2019    ESOPHAGOGASTRODUODENOSCOPY RADIOFREQUENCY ABLATION performed by Paulina Calderón MD at [place] [address] 08/02/2019    EGD GASTRIC BIOPSY performed by Paulina Calderón MD at [place] [address] 08/02/2019    EGD POLYP COLD SNARE performed by Paulina Calderón MD at [place] [address] 11/19/2019    ESOPHAGOGASTRODUODENOSCOPY WITH RADIOFREQUENCY ABLATION performed by Kasey Das MD at [address] [place] [address] N/A 2019    EGD POLYP SNARE performed by Kasey Das MD at [address] [place] [address] N/A 07/15/2020    ESOPHAGOGASTRODUODENOSCOPY RADIOFREQUENCY ABLATION ON STANDBY performed by Kasey Das MD at [address] [place] [address] N/A 07/15/2020    EGD BIOPSY performed by Kasey Das MD at [address] [place] [address] N/A 2021    EGD BIOPSY performed by Kasey Das MD at [address] [place] [address] N/A 2021    EGD POLYP SNARE performed by Kasey Das MD at [address] ENDOSCOPY     Family History   Problem Relation Age of Onset    Cancer Father         prostate     Social History     Socioeconomic History    Marital status:      Spouse name: Not on file    Number of children: Not on file    Years of education: Not on file    Highest education level: Not on file   Occupational History    Not on file   Tobacco Use    Smoking status: Former     Packs/day: 0.50     Years: 10.00     Pack years: 5.00     Types: Cigarettes     Quit date: 2002     Years since quittin.9    Smokeless tobacco: Never   Vaping Use    Vaping Use: Never used   Substance and Sexual Activity    Alcohol use: Not Currently     Alcohol/week: 2.0 standard drinks     Types: 2 Standard drinks or equivalent per week     Comment: rarely -- once a year    Drug use: Never    Sexual activity: Not Currently   Other Topics Concern    Not on file   Social History Narrative    Not on file     Social Determinants of Health     Financial Resource Strain: Low Risk     Difficulty of Paying Living Expenses: Not hard at all   Food Insecurity: No Food Insecurity    Worried About Running Out of Food in the Last Year: Never true    Ran Out of Food in the Last Year: Never true   Transportation Needs: Not on file   Physical Activity: Not on file   Stress: Not on file   Social Connections: Not on file   Intimate Partner Violence: Not on file   Housing Stability: Not on file       DRUG/FOOD ALLERGIES: Lisinopril    CURRENT MEDICATIONS  Prior to Admission medications    Medication Sig Start Date End Date Taking? Authorizing Provider   gabapentin (NEURONTIN) 100 MG capsule Take 1 capsule by mouth daily for 90 days. 11/30/22 2/28/23 Yes Linda Parker DO   albuterol sulfate HFA (PROVENTIL HFA) 108 (90 Base) MCG/ACT inhaler Inhale 2 puffs into the lungs every 4 hours as needed for Wheezing or Shortness of Breath (Space out to every 6 hours as symptoms improve) Space out to every 6 hours as symptoms improve. 9/19/22  Yes Linda Parker DO   sertraline (ZOLOFT) 50 MG tablet Take 1.5 tablets by mouth in the morning.  8/8/22  Yes Linda Parker DO   fluticasone (FLONASE) 50 MCG/ACT nasal spray 1 spray by Each Nostril route daily 7/7/22  Yes Linda Parker DO   finasteride (PROSCAR) 5 MG tablet Take 1 tablet by mouth daily 7/1/22  Yes Linda Parker DO   mometasone-formoterol (DULERA) 200-5 MCG/ACT inhaler Inhale 1 puff into the lungs every 12 hours 4/11/22  Yes Christiano Oliveira MD   montelukast (SINGULAIR) 10 MG tablet Take 1 tablet by mouth nightly 2/3/22  Yes Arleth Leal MD   cetirizine (ZYRTEC) 10 MG tablet TAKE 1 TABLET DAILY 12/9/21  Yes Linda Parker DO   atorvastatin (LIPITOR) 10 MG tablet TAKE 1 TABLET DAILY 11/15/21  Yes Linda Parker DO   pantoprazole (PROTONIX) 20 MG tablet Take 1 tablet by mouth 2 times daily 11/24/20  Yes Gary Pacheco MD   aspirin 81 MG chewable tablet Take 81 mg by mouth daily   Yes Historical Provider, MD   tamsulosin (FLOMAX) 0.4 MG capsule Take 2 capsules by mouth daily 3/22/18  Yes Jayant Harrell MD   Omega-3 Fatty Acids (FISH OIL) 1000 MG CAPS Take 1,000 mg by mouth daily   Yes Historical Provider, MD   Flaxseed, Linseed, (FLAX SEED OIL) 1000 MG CAPS Take 1,000 mg by mouth daily   Yes Historical Provider, MD Cholecalciferol (VITAMIN D) 2000 UNITS CAPS capsule Take 1 capsule by mouth daily   Yes Historical Provider, MD   multivitamin [facility]) per tablet Take 1 tablet by mouth daily   Yes Historical Provider, MD         Lab Studies:  Lab Results   Component Value Date    WBC 7.5 02/03/2022    HGB 14.0 02/03/2022    HCT 40.5 02/03/2022    MCV 85.1 02/03/2022     02/03/2022     Lab Results   Component Value Date    GLUCOSE 103 (H) 08/08/2022    BUN 15 08/08/2022    CREATININE 1.0 08/08/2022    K 4.3 08/08/2022     08/08/2022     08/08/2022    CALCIUM 9.4 08/08/2022     Lab Results   Component Value Date    MG 2.20 07/16/2018     Lab Results   Component Value Date    PHOS 3.8 07/07/2018     Lab Results   Component Value Date    ALKPHOS 95 08/08/2022    ALT 28 08/08/2022    AST 22 08/08/2022    BILITOT 0.4 08/08/2022    PROT 7.0 08/08/2022      Review of Systems   Constitutional:  Negative for activity change, appetite change, chills, fatigue and fever. HENT:  Positive for congestion. Negative for ear discharge, ear pain, facial swelling, hearing loss, nosebleeds, postnasal drip, rhinorrhea, sinus pressure, sinus pain, sneezing, sore throat, tinnitus, trouble swallowing and voice change. Eyes:  Negative for pain, redness, itching and visual disturbance. Respiratory:  Positive for cough and wheezing. Negative for choking and shortness of breath. Gastrointestinal:  Negative for diarrhea and nausea. Endocrine: Negative for cold intolerance and heat intolerance. Objective:   Physical Exam  Constitutional:       Appearance: He is well-developed. HENT:      Head: Not macrocephalic and not microcephalic. No Esqueda's sign, abrasion, right periorbital erythema, left periorbital erythema or laceration. Nose: No nasal deformity, septal deviation, laceration, mucosal edema or rhinorrhea. Right Nostril: No epistaxis or occlusion. Left Nostril: No epistaxis or occlusion.       Right Turbinates: Not enlarged, swollen or pale. Left Turbinates: Not enlarged, swollen or pale. Right Sinus: No maxillary sinus tenderness or frontal sinus tenderness. Left Sinus: No maxillary sinus tenderness or frontal sinus tenderness. Mouth/Throat:      Lips: No lesions. Mouth: Mucous membranes are moist.      Tongue: No lesions. Palate: No mass. Pharynx: Uvula midline. No oropharyngeal exudate or posterior oropharyngeal erythema. Tonsils: No tonsillar abscesses. Eyes:      General:         Right eye: No discharge. Left eye: No discharge. Extraocular Movements:      Right eye: Normal extraocular motion. Left eye: Normal extraocular motion. Conjunctiva/sclera:      Right eye: Right conjunctiva is not injected. No chemosis or exudate. Left eye: Left conjunctiva is not injected. No chemosis or exudate. Neck:      Thyroid: No thyroid mass or thyromegaly. Cardiovascular:      Rate and Rhythm: Normal rate and regular rhythm. Pulmonary:      Effort: No tachypnea or respiratory distress. Lymphadenopathy:      Head:      Right side of head: No preauricular or posterior auricular adenopathy. Left side of head: No preauricular or posterior auricular adenopathy. Cervical:      Right cervical: No superficial, deep or posterior cervical adenopathy. Left cervical: No superficial, deep or posterior cervical adenopathy. Neurological:      Mental Status: He is alert and oriented to person, place, and time. Due to the patients chronic sinus disease and/or history of sinonasal neoplasm for surveillance a nasal endoscopy with or without debridement will be performed to complete a significant physical examination of the patient which cannot be performed by anterior rhinoscopy alone (failure of complete examination of the paranasal sinuses).  Failure to provide this procedure may lead to late detection of significant chronic benign disease, acute exacerbation, resolution or failure of early diagnosis of recurrent cancer. The procedure report is present in the body of the chart. Nasal Endoscopy    Pre OP: nasal and chest congestion  Post OP: Sinus infection  Reason: New symtpoms. Multi organ  Procedure: Nasal endoscopy  Surgeon: Russell Tesfaye  Anesthesia: Afrin with 2% lidocaine  Estimated Blood Loss: None      After obtaining verbal consent from the patient 1% lidocaine with afrin was sprayed into the nasal cavities. After allowing a time for anesthesia, a nasal endoscope was placed into the nostril. The septum, inferior, and middle turbinates were examined. The middle meatus, and sphenoethmoid recess was examined bilaterally. Cultures were obtained from the left ethmoid sinuses. There were no complications. Moderate ethmoid inflammation with thick mucus. Cultured. Pertinent positives included: There was edema and purulence in the left middle meatus. There was edema and purulence at the right middle meatus. Polyps were not identified in the sinuses. Masses were not identified. Tolerated well without complication. I attest that I was present for and did the entire procedure myself. Assessment:       Diagnosis Orders   1. Chronic pansinusitis  Culture, Aerobic and Anaerobic      2. Viral upper respiratory tract infection                Plan:      Viral URI:  OTC cold and flu with expectorant. Await cultures for further suggestions. Agree with pulmonary visit. CRS:  Inflamed ethmoids with thick mucus. Culture obtained. Call when results available for further recommendations and medications.          Maria Del Carmen Alves MD

## 2022-12-14 DIAGNOSIS — F33.1 MODERATE EPISODE OF RECURRENT MAJOR DEPRESSIVE DISORDER (HCC): ICD-10-CM

## 2022-12-15 ENCOUNTER — OFFICE VISIT (OUTPATIENT)
Dept: PULMONOLOGY | Age: 72
End: 2022-12-15
Payer: MEDICARE

## 2022-12-15 VITALS
HEART RATE: 103 BPM | HEIGHT: 74 IN | DIASTOLIC BLOOD PRESSURE: 76 MMHG | OXYGEN SATURATION: 96 % | SYSTOLIC BLOOD PRESSURE: 132 MMHG | BODY MASS INDEX: 34.65 KG/M2 | WEIGHT: 270 LBS

## 2022-12-15 DIAGNOSIS — J45.50 SEVERE PERSISTENT REACTIVE AIRWAY DISEASE WITHOUT COMPLICATION: Primary | ICD-10-CM

## 2022-12-15 PROCEDURE — 1036F TOBACCO NON-USER: CPT | Performed by: INTERNAL MEDICINE

## 2022-12-15 PROCEDURE — 3017F COLORECTAL CA SCREEN DOC REV: CPT | Performed by: INTERNAL MEDICINE

## 2022-12-15 PROCEDURE — 99214 OFFICE O/P EST MOD 30 MIN: CPT | Performed by: INTERNAL MEDICINE

## 2022-12-15 PROCEDURE — G8417 CALC BMI ABV UP PARAM F/U: HCPCS | Performed by: INTERNAL MEDICINE

## 2022-12-15 PROCEDURE — G8484 FLU IMMUNIZE NO ADMIN: HCPCS | Performed by: INTERNAL MEDICINE

## 2022-12-15 PROCEDURE — 1123F ACP DISCUSS/DSCN MKR DOCD: CPT | Performed by: INTERNAL MEDICINE

## 2022-12-15 PROCEDURE — G8427 DOCREV CUR MEDS BY ELIG CLIN: HCPCS | Performed by: INTERNAL MEDICINE

## 2022-12-15 RX ORDER — PANTOPRAZOLE SODIUM 40 MG/1
TABLET, DELAYED RELEASE ORAL
COMMUNITY
Start: 2022-10-26

## 2022-12-15 ASSESSMENT — ENCOUNTER SYMPTOMS
WHEEZING: 1
SHORTNESS OF BREATH: 0
CHEST TIGHTNESS: 0
COUGH: 1

## 2022-12-15 NOTE — PROGRESS NOTES
[facility]    Outpatient Note    Subjective:   CHIEF COMPLAINT:   Chief Complaint   Patient presents with    1 Year Follow Up     Interval history on 12/15/22  He had 2 sinus surgeries since I saw him last.  He continues to have episodes of cough with SOB and wheezing. In thanksgiving he had to be treated with steroids and breathing treatment. He use CPAP regularly. There is no heartburn. He is going to see Dr. Alexandru Brown in Feb.  He is on PPI. He use albuterol 6 times a day. It helps clearing the lung. He is also on Dulera twice a day. He has leg swelling over the past 6 months to a year. Interval history on 2/9/22  He had bad wheezing for two days last week but on 2/3/22 at 4:00am he visited the ED and was treated with albuterol and steroids injection. CXR was clear. He was sent home. He was prescribed with ABX, prednisone, dulera and albuterol. Now he feels better. Over the past 1.5 years he had several episodes of cough. Still on protonix. There is no heart burn. He follows with Dr. Alexandru Brown. Interval history on 7/20/20  Cough and wheezing is much better with symbicort. He is using it twice a day. He did not have to use the rescue inhaler. His main complain is hoarseness of voice. He is rinsing his month most of the time. There is no oral thrush. No sore throat. Of note he has salas's esophagus and for which he had ablation recently. HPI:  6/22/20   The patient is 71 y.o. male who presents today for a new patient visit for evaluation of cough going on for over 6 months. He was investigated extensively and was seen by ENT    Cough is mainly in the morning and night. He sleep sitting at night over the past month. There is SOB with coughing fits for which he use albuterol inhaler. He had GERD and salas's esophagus. He is on PPI BID    No prior hx of asthma. Sister has asthma. Quit smoking in 2002.   Used to smoke 1 PPD for 10 years. No functional limitation. However he wheeze with exercise. No prior hx of heart disease. He is on CPAP at night for CAM since . Last sleep study in . Last time CPAP was checked last year at the [address] [place] [address]    Past Medical History:    Past Medical History:   Diagnosis Date    Allergic rhinitis     Arthritis     R thumb, low back    Asthma     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     COPD (chronic obstructive pulmonary disease) ([facility])     COPD (chronic obstructive pulmonary disease) ([facility] 2022    Erectile dysfunction     Hearing loss     Hyperlipidemia     Hypertension     Kidney failure 2018    Normal pressure hydrocephalus ([facility] 2018    shunt placement    Screening for abdominal aortic aneurysm (AAA) performed 2018    [facility]    Tinnitus     Unspecified sleep apnea     Wears glasses     Wears hearing aid in both ears        Social History:    Social History     Tobacco Use   Smoking Status Former    Packs/day: 0.50    Years: 10.00    Pack years: 5.00    Types: Cigarettes    Quit date: 2002    Years since quittin.9   Smokeless Tobacco Never       Family History:  Family History   Problem Relation Age of Onset    Cancer Father         prostate       Current Medications:  Current Outpatient Medications on File Prior to Visit   Medication Sig Dispense Refill    sertraline (ZOLOFT) 50 MG tablet TAKE 1 AND 1/2 TABLET BY MOUTH EVERY MORNING 45 tablet 2    gabapentin (NEURONTIN) 100 MG capsule Take 1 capsule by mouth daily for 90 days. 90 capsule 0    albuterol sulfate HFA (PROVENTIL HFA) 108 (90 Base) MCG/ACT inhaler Inhale 2 puffs into the lungs every 4 hours as needed for Wheezing or Shortness of Breath (Space out to every 6 hours as symptoms improve) Space out to every 6 hours as symptoms improve.  3 each 3    fluticasone (FLONASE) 50 MCG/ACT nasal spray 1 spray by Each Nostril route daily 32 g 1    finasteride (PROSCAR) 5 MG tablet Take 1 tablet by mouth daily 90 tablet 3 montelukast (SINGULAIR) 10 MG tablet Take 1 tablet by mouth nightly 90 tablet 3    cetirizine (ZYRTEC) 10 MG tablet TAKE 1 TABLET DAILY 90 tablet 2    atorvastatin (LIPITOR) 10 MG tablet TAKE 1 TABLET DAILY 90 tablet 3    pantoprazole (PROTONIX) 20 MG tablet Take 1 tablet by mouth 2 times daily 180 tablet 2    aspirin 81 MG chewable tablet Take 81 mg by mouth daily      tamsulosin (FLOMAX) 0.4 MG capsule Take 2 capsules by mouth daily 90 capsule 1    Omega-3 Fatty Acids (FISH OIL) 1000 MG CAPS Take 1,000 mg by mouth daily      Flaxseed, Linseed, (FLAX SEED OIL) 1000 MG CAPS Take 1,000 mg by mouth daily      Cholecalciferol (VITAMIN D) 2000 UNITS CAPS capsule Take 1 capsule by mouth daily      multivitamin (THERAGRAN) per tablet Take 1 tablet by mouth daily      pantoprazole (PROTONIX) 40 MG tablet        No current facility-administered medications on file prior to visit. REVIEW OF SYSTEMS:    Review of Systems   Constitutional:  Negative for chills, fatigue, fever and unexpected weight change. HENT:  Negative for congestion. Respiratory:  Positive for cough and wheezing. Negative for chest tightness and shortness of breath. Cardiovascular:  Negative for chest pain, palpitations and leg swelling. Neurological:  Negative for weakness. Psychiatric/Behavioral:  The patient is not nervous/anxious. All other systems reviewed and are negative. Objective:   PHYSICAL EXAM:        VITALS:  /76 (Site: Right Upper Arm, Position: Sitting, Cuff Size: Medium Adult)   Pulse (!) 103   Ht 6' 2\" (1.88 m)   Wt 270 lb (122.5 kg)   SpO2 96%   BMI 34.67 kg/m²     Physical Exam  Vitals reviewed. Constitutional:       Appearance: He is well-developed. HENT:      Head: Normocephalic and atraumatic. Eyes:      Pupils: Pupils are equal, round, and reactive to light. Neck:      Vascular: No JVD. Cardiovascular:      Rate and Rhythm: Normal rate and regular rhythm.       Heart sounds: No murmur heard.  Pulmonary:      Effort: Pulmonary effort is normal. No respiratory distress. Breath sounds: No stridor. Wheezing present. No rales. Abdominal:      General: Bowel sounds are normal.      Palpations: Abdomen is soft. Musculoskeletal:         General: No deformity. Cervical back: Neck supple. Right lower leg: Edema present. Left lower leg: Edema present. Skin:     General: Skin is warm and dry. Neurological:      Mental Status: He is alert and oriented to person, place, and time. Psychiatric:         Behavior: Behavior normal.       DATA:    PFT 11/29/2019  FINDINGS:  Spirometry on this patient shows an FEV1 of 2.74 which is 70%  of predicted and a forced vital capacity of 3.54 which is 67% of  predicted, giving a ratio of 77. There was borderline response to  bronchodilators. Lung volumes show a decreased total lung capacity at  79% of predicted and decreased diffusion prior to correction for  alveolar lung volumes to come back at 91% of predicted. CONCLUSION:  Moderate restrictive defect with borderline bronchodilator  response and decreased diffusion prior to correction for alveolar lung  volumes. Findings could be consistent with the patient's BMI of 35.5,  but could not also rule out interstitial lung disease or pulmonary  vascular disease. Clinical correlation is recommended. CT chest   DATE: 12/17/2019       EXAM: CT CHEST WO CONTRAST       INDICATION: Cough, short of breath, abnormal pulmonary function tests       COMPARISON: None       TECHNIQUE: Axial CT imaging of the chest was performed. Axial images, multiplanar reformatted images and axial maximum intensity projection were reviewed. Individualized dose optimization technique was used in order to meet ALARA standards for    radiation dose reduction.   In addition to vendor specific dose reduction algorithms, the dose reduction techniques vary based on the specific scanner utilized but frequently include automated exposure control, adjustment of the mA and/or kV according to    patient size, and use of iterative reconstruction technique. Additional high-resolution inspiratory and expiratory imaging obtained. CONTRAST: None       FINDINGS:       LUNGS AND AIRWAYS: Airways are patent. No focal pulmonary consolidation. No significant interstitial thickening to suggest fibrosis. PLEURA: No pleural effusions or significant pleural thickening. HEART / GREAT VESSELS: Heart size is normal.  Minimal arterial calcification. ADENOPATHY/MEDIASTINUM: No adenopathy. CHEST WALL / LOWER NECK: No significant abnormality. UPPER ABDOMEN: Liver is decreased in attenuation. Left lobe contour prominence unchanged. Peritoneal catheter entering right upper quadrant. BONES: No acute abnormality. Impression       1. Clear lungs. No evidence of interstitial fibrosis. Echo on 12/27/18  Summary   Technically difficult examination with Definity. Normal left ventricular size and wall thickness. Normal left ventricular systolic function with an estimate ejection fraction   of 55-60%. There are no regional wall motion abnormalities. Normal diastolic function. The left atrium appears dilated. Trace mitral and tricuspid regurgitation. Estimated pulmonary artery systolic pressure is 25 mmHg assuming a right   atrial pressure of 3 mmHg. No prior echo for comparison. Echo on 6/24/20  Normal left ventricular size and wall thickness. Normal left ventricular   systolic function with an estimate ejection fraction of 60%. There are no   regional wall motion abnormalities. Indeterminate diastolic function. Mitral annular calcification is present. MItral leaflets are thickened. Cannot rule out thrombus or vegetation on the anterior mitral leaflet, but   it may be reverberation of calcium. Clinical correlation advised. Trace tricuspid regurgitation.    Estimated pulmonary artery systolic pressure is 25 mmHg assuming a right   atrial pressure of 8 mmHg (IVC not visualized, however). Assessment:      Diagnosis Orders   1. Severe persistent reactive airway disease without complication            Plan:   71year old male has recurrent episodes of cough and wheezing with cough and sputum production. This is thought to be due to reactive airway disease probably precipitated by post nasal drip and GERD. He has family hx of asthma, and hx of GERD with Child's esophagus. He also has recurrent sinus congestion and had two sinus surgeries over the past year. He has hoarseness of voice after starting symbicort. He continue to have recurrent episodes of cough, chest tightness and wheezing. He uses albuterol frequently. Today I watched him using an inhaler and his technique is not effective. (PFT does not show obstruction, probably due to low FVC. FEV1/SVC is 69.8 consistent with obstructive disease )     Plan  Change symbicort to Ruchilleen Caldebbie. I educated him on proper use. Technique still not perfect. Advised to use spacer all the time  Continue albuterol PRN   Continue singulair  Consider switching to nebulizer if these measures doesn't help.   F/u as scheduled in Feb

## 2022-12-16 LAB
ANAEROBIC CULTURE: NORMAL
GRAM STAIN RESULT: NORMAL
WOUND/ABSCESS: NORMAL

## 2022-12-19 ENCOUNTER — TELEPHONE (OUTPATIENT)
Dept: ENT CLINIC | Age: 72
End: 2022-12-19

## 2022-12-19 DIAGNOSIS — J32.4 CHRONIC PANSINUSITIS: Primary | ICD-10-CM

## 2022-12-19 RX ORDER — PREDNISONE 10 MG/1
TABLET ORAL
Qty: 38 TABLET | Refills: 0 | Status: SHIPPED | OUTPATIENT
Start: 2022-12-19

## 2022-12-30 RX ORDER — CETIRIZINE HYDROCHLORIDE 10 MG/1
TABLET ORAL
Qty: 90 TABLET | Refills: 3 | Status: SHIPPED | OUTPATIENT
Start: 2022-12-30

## 2022-12-30 RX ORDER — ATORVASTATIN CALCIUM 10 MG/1
TABLET, FILM COATED ORAL
Qty: 90 TABLET | Refills: 3 | Status: SHIPPED | OUTPATIENT
Start: 2022-12-30

## 2023-01-16 ENCOUNTER — PATIENT MESSAGE (OUTPATIENT)
Dept: INTERNAL MEDICINE CLINIC | Age: 73
End: 2023-01-16

## 2023-01-16 DIAGNOSIS — F33.1 MODERATE EPISODE OF RECURRENT MAJOR DEPRESSIVE DISORDER (HCC): ICD-10-CM

## 2023-01-16 NOTE — TELEPHONE ENCOUNTER
From: Leena De Leon To: Dr. Pace Service: 1/16/2023 5:06 PM EST  Subject: renew prescriptions-SERTRALINE 50mg&FINASTERIDE.5mg    Good Morning Dr. Jerilyn Denny! I Hope you had a good weekend. Would you please send these 2 Prescriptions to [facility] for me. It's cheaper for 90day scripts than anywhere else! Thanks a lot!  Raul Marcus

## 2023-01-17 RX ORDER — FINASTERIDE 5 MG/1
5 TABLET, FILM COATED ORAL DAILY
Qty: 90 TABLET | Refills: 1 | Status: SHIPPED | OUTPATIENT
Start: 2023-01-17

## 2023-01-24 ENCOUNTER — OFFICE VISIT (OUTPATIENT)
Dept: ENT CLINIC | Age: 73
End: 2023-01-24
Payer: MEDICARE

## 2023-01-24 ENCOUNTER — TELEPHONE (OUTPATIENT)
Dept: PULMONOLOGY | Age: 73
End: 2023-01-24

## 2023-01-24 VITALS
WEIGHT: 268 LBS | DIASTOLIC BLOOD PRESSURE: 97 MMHG | SYSTOLIC BLOOD PRESSURE: 153 MMHG | HEIGHT: 74 IN | BODY MASS INDEX: 34.39 KG/M2 | HEART RATE: 111 BPM

## 2023-01-24 DIAGNOSIS — J32.4 CHRONIC PANSINUSITIS: Primary | ICD-10-CM

## 2023-01-24 PROCEDURE — 99999 PR OFFICE/OUTPT VISIT,PROCEDURE ONLY: CPT | Performed by: OTOLARYNGOLOGY

## 2023-01-24 PROCEDURE — 31231 NASAL ENDOSCOPY DX: CPT | Performed by: OTOLARYNGOLOGY

## 2023-01-24 NOTE — TELEPHONE ENCOUNTER
Patient stopped in the office to have a breathing treatment, he said he was told by Dr Jose Carlos Rosario to stop in the office instead of going to the ED. I explained we did not do treatments here but Dr Jose Carlos Rosario meant to get a nebulizer since we have them on hand from [facility]. Ok'd by Dr Jose Carlos Rosario to give pt a nebulizer machine. Patient will need nebulizer solution sent to [facility] in [address] [place] [address]. Please send.  Thank you        Patient is aware that Rx was sent to [facility]

## 2023-01-25 RX ORDER — ALBUTEROL SULFATE 2.5 MG/3ML
2.5 SOLUTION RESPIRATORY (INHALATION) EVERY 6 HOURS PRN
Qty: 120 EACH | Refills: 3 | Status: SHIPPED | OUTPATIENT
Start: 2023-01-25

## 2023-01-26 ENCOUNTER — HOSPITAL ENCOUNTER (INPATIENT)
Age: 73
LOS: 3 days | Discharge: HOME | DRG: 202 | End: 2023-01-29
Attending: EMERGENCY MEDICINE | Admitting: INTERNAL MEDICINE
Payer: MEDICARE

## 2023-01-26 ENCOUNTER — APPOINTMENT (OUTPATIENT)
Dept: GENERAL RADIOLOGY | Age: 73
DRG: 202 | End: 2023-01-26
Payer: MEDICARE

## 2023-01-26 DIAGNOSIS — J96.01 ACUTE HYPOXEMIC RESPIRATORY FAILURE (HCC): Primary | ICD-10-CM

## 2023-01-26 DIAGNOSIS — J45.901 MODERATE ASTHMA WITH EXACERBATION, UNSPECIFIED WHETHER PERSISTENT: ICD-10-CM

## 2023-01-26 DIAGNOSIS — J32.4 CHRONIC PANSINUSITIS: ICD-10-CM

## 2023-01-26 LAB
ANION GAP SERPL CALCULATED.3IONS-SCNC: 15 MMOL/L (ref 3–16)
BASE EXCESS VENOUS: -2.4 MMOL/L (ref -2–3)
BASOPHILS ABSOLUTE: 0 K/UL (ref 0–0.2)
BASOPHILS RELATIVE PERCENT: 0 %
BUN BLDV-MCNC: 15 MG/DL (ref 7–20)
CALCIUM SERPL-MCNC: 9.6 MG/DL (ref 8.3–10.6)
CARBOXYHEMOGLOBIN: 0.9 % (ref 0–1.5)
CHLORIDE BLD-SCNC: 98 MMOL/L (ref 99–110)
CO2: 21 MMOL/L (ref 21–32)
CREAT SERPL-MCNC: 0.9 MG/DL (ref 0.8–1.3)
EKG ATRIAL RATE: 102 BPM
EKG DIAGNOSIS: NORMAL
EKG P AXIS: 3 DEGREES
EKG P-R INTERVAL: 158 MS
EKG Q-T INTERVAL: 338 MS
EKG QRS DURATION: 68 MS
EKG QTC CALCULATION (BAZETT): 440 MS
EKG R AXIS: 67 DEGREES
EKG T AXIS: 47 DEGREES
EKG VENTRICULAR RATE: 102 BPM
EOSINOPHILS ABSOLUTE: 2.2 K/UL (ref 0–0.6)
EOSINOPHILS RELATIVE PERCENT: 23 %
GFR SERPL CREATININE-BSD FRML MDRD: >60 ML/MIN/{1.73_M2}
GLUCOSE BLD-MCNC: 133 MG/DL (ref 70–99)
HCO3 VENOUS: 23.9 MMOL/L (ref 24–28)
HCT VFR BLD CALC: 42.4 % (ref 40.5–52.5)
HEMOGLOBIN, VEN, REDUCED: 4.5 %
HEMOGLOBIN: 14.1 G/DL (ref 13.5–17.5)
LYMPHOCYTES ABSOLUTE: 2.3 K/UL (ref 1–5.1)
LYMPHOCYTES RELATIVE PERCENT: 24 %
MCH RBC QN AUTO: 27.5 PG (ref 26–34)
MCHC RBC AUTO-ENTMCNC: 33.2 G/DL (ref 31–36)
MCV RBC AUTO: 82.9 FL (ref 80–100)
METHEMOGLOBIN VENOUS: 0.1 % (ref 0–1.5)
MICROCYTES: ABNORMAL
MONOCYTES ABSOLUTE: 0.4 K/UL (ref 0–1.3)
MONOCYTES RELATIVE PERCENT: 4 %
NEUTROPHILS ABSOLUTE: 4.6 K/UL (ref 1.7–7.7)
NEUTROPHILS RELATIVE PERCENT: 49 %
O2 SAT, VEN: 96 %
PCO2, VEN: 45.6 MMHG (ref 41–51)
PDW BLD-RTO: 14.3 % (ref 12.4–15.4)
PH VENOUS: 7.33 (ref 7.35–7.45)
PLATELET # BLD: 338 K/UL (ref 135–450)
PMV BLD AUTO: 6.4 FL (ref 5–10.5)
PO2, VEN: 78 MMHG (ref 25–40)
POTASSIUM REFLEX MAGNESIUM: 4 MMOL/L (ref 3.5–5.1)
PRO-BNP: 32 PG/ML (ref 0–124)
RAPID INFLUENZA  B AGN: NEGATIVE
RAPID INFLUENZA A AGN: NEGATIVE
RBC # BLD: 5.11 M/UL (ref 4.2–5.9)
SARS-COV-2, NAAT: NOT DETECTED
SODIUM BLD-SCNC: 134 MMOL/L (ref 136–145)
TCO2 CALC VENOUS: 25 MMOL/L
TROPONIN: <0.01 NG/ML
WBC # BLD: 9.4 K/UL (ref 4–11)

## 2023-01-26 PROCEDURE — 87804 INFLUENZA ASSAY W/OPTIC: CPT

## 2023-01-26 PROCEDURE — 94664 DEMO&/EVAL PT USE INHALER: CPT

## 2023-01-26 PROCEDURE — 6360000002 HC RX W HCPCS: Performed by: INTERNAL MEDICINE

## 2023-01-26 PROCEDURE — 80048 BASIC METABOLIC PNL TOTAL CA: CPT

## 2023-01-26 PROCEDURE — 6370000000 HC RX 637 (ALT 250 FOR IP): Performed by: PHYSICIAN ASSISTANT

## 2023-01-26 PROCEDURE — 84484 ASSAY OF TROPONIN QUANT: CPT

## 2023-01-26 PROCEDURE — 82803 BLOOD GASES ANY COMBINATION: CPT

## 2023-01-26 PROCEDURE — 87635 SARS-COV-2 COVID-19 AMP PRB: CPT

## 2023-01-26 PROCEDURE — 36415 COLL VENOUS BLD VENIPUNCTURE: CPT

## 2023-01-26 PROCEDURE — 6360000002 HC RX W HCPCS: Performed by: PHYSICIAN ASSISTANT

## 2023-01-26 PROCEDURE — 2060000000 HC ICU INTERMEDIATE R&B

## 2023-01-26 PROCEDURE — 94761 N-INVAS EAR/PLS OXIMETRY MLT: CPT

## 2023-01-26 PROCEDURE — 93005 ELECTROCARDIOGRAM TRACING: CPT | Performed by: PHYSICIAN ASSISTANT

## 2023-01-26 PROCEDURE — 2700000000 HC OXYGEN THERAPY PER DAY

## 2023-01-26 PROCEDURE — 6370000000 HC RX 637 (ALT 250 FOR IP): Performed by: INTERNAL MEDICINE

## 2023-01-26 PROCEDURE — 96374 THER/PROPH/DIAG INJ IV PUSH: CPT

## 2023-01-26 PROCEDURE — 2580000003 HC RX 258: Performed by: INTERNAL MEDICINE

## 2023-01-26 PROCEDURE — 94640 AIRWAY INHALATION TREATMENT: CPT

## 2023-01-26 PROCEDURE — 71045 X-RAY EXAM CHEST 1 VIEW: CPT

## 2023-01-26 PROCEDURE — 83880 ASSAY OF NATRIURETIC PEPTIDE: CPT

## 2023-01-26 PROCEDURE — 85025 COMPLETE CBC W/AUTO DIFF WBC: CPT

## 2023-01-26 PROCEDURE — 99285 EMERGENCY DEPT VISIT HI MDM: CPT

## 2023-01-26 RX ORDER — ALBUTEROL SULFATE 2.5 MG/3ML
2.5 SOLUTION RESPIRATORY (INHALATION) ONCE
Status: COMPLETED | OUTPATIENT
Start: 2023-01-26 | End: 2023-01-26

## 2023-01-26 RX ORDER — IPRATROPIUM BROMIDE AND ALBUTEROL SULFATE 2.5; .5 MG/3ML; MG/3ML
1 SOLUTION RESPIRATORY (INHALATION) ONCE
Status: COMPLETED | OUTPATIENT
Start: 2023-01-26 | End: 2023-01-26

## 2023-01-26 RX ORDER — ARFORMOTEROL TARTRATE 15 UG/2ML
15 SOLUTION RESPIRATORY (INHALATION) 2 TIMES DAILY
Status: DISCONTINUED | OUTPATIENT
Start: 2023-01-26 | End: 2023-01-29 | Stop reason: HOSPADM

## 2023-01-26 RX ORDER — ACETAMINOPHEN 650 MG/1
650 SUPPOSITORY RECTAL EVERY 6 HOURS PRN
Status: DISCONTINUED | OUTPATIENT
Start: 2023-01-26 | End: 2023-01-29 | Stop reason: HOSPADM

## 2023-01-26 RX ORDER — ACETAMINOPHEN 325 MG/1
650 TABLET ORAL EVERY 6 HOURS PRN
Status: DISCONTINUED | OUTPATIENT
Start: 2023-01-26 | End: 2023-01-29 | Stop reason: HOSPADM

## 2023-01-26 RX ORDER — BUDESONIDE AND FORMOTEROL FUMARATE DIHYDRATE 160; 4.5 UG/1; UG/1
2 AEROSOL RESPIRATORY (INHALATION) 2 TIMES DAILY
Status: DISCONTINUED | OUTPATIENT
Start: 2023-01-26 | End: 2023-01-26

## 2023-01-26 RX ORDER — METHYLPREDNISOLONE SODIUM SUCCINATE 125 MG/2ML
125 INJECTION, POWDER, LYOPHILIZED, FOR SOLUTION INTRAMUSCULAR; INTRAVENOUS ONCE
Status: COMPLETED | OUTPATIENT
Start: 2023-01-26 | End: 2023-01-26

## 2023-01-26 RX ORDER — ENOXAPARIN SODIUM 100 MG/ML
30 INJECTION SUBCUTANEOUS 2 TIMES DAILY
Status: DISCONTINUED | OUTPATIENT
Start: 2023-01-26 | End: 2023-01-29 | Stop reason: HOSPADM

## 2023-01-26 RX ORDER — BUDESONIDE 0.5 MG/2ML
0.5 INHALANT ORAL 2 TIMES DAILY
Status: DISCONTINUED | OUTPATIENT
Start: 2023-01-26 | End: 2023-01-29 | Stop reason: HOSPADM

## 2023-01-26 RX ORDER — TAMSULOSIN HYDROCHLORIDE 0.4 MG/1
0.8 CAPSULE ORAL DAILY
Status: DISCONTINUED | OUTPATIENT
Start: 2023-01-27 | End: 2023-01-29 | Stop reason: HOSPADM

## 2023-01-26 RX ORDER — CETIRIZINE HYDROCHLORIDE 10 MG/1
10 TABLET ORAL DAILY
Status: DISCONTINUED | OUTPATIENT
Start: 2023-01-27 | End: 2023-01-29 | Stop reason: HOSPADM

## 2023-01-26 RX ORDER — PANTOPRAZOLE SODIUM 40 MG/1
40 TABLET, DELAYED RELEASE ORAL EVERY MORNING
COMMUNITY

## 2023-01-26 RX ORDER — ONDANSETRON 4 MG/1
4 TABLET, ORALLY DISINTEGRATING ORAL EVERY 8 HOURS PRN
Status: DISCONTINUED | OUTPATIENT
Start: 2023-01-26 | End: 2023-01-29 | Stop reason: HOSPADM

## 2023-01-26 RX ORDER — FUROSEMIDE 10 MG/ML
20 INJECTION INTRAMUSCULAR; INTRAVENOUS ONCE
Status: COMPLETED | OUTPATIENT
Start: 2023-01-26 | End: 2023-01-26

## 2023-01-26 RX ORDER — PREDNISONE 20 MG/1
40 TABLET ORAL DAILY
Status: DISCONTINUED | OUTPATIENT
Start: 2023-01-29 | End: 2023-01-27

## 2023-01-26 RX ORDER — GABAPENTIN 100 MG/1
400 CAPSULE ORAL EVERY MORNING
COMMUNITY

## 2023-01-26 RX ORDER — GABAPENTIN 100 MG/1
100 CAPSULE ORAL 4 TIMES DAILY
Status: DISCONTINUED | OUTPATIENT
Start: 2023-01-26 | End: 2023-01-29 | Stop reason: HOSPADM

## 2023-01-26 RX ORDER — SODIUM CHLORIDE 0.9 % (FLUSH) 0.9 %
5-40 SYRINGE (ML) INJECTION EVERY 12 HOURS SCHEDULED
Status: DISCONTINUED | OUTPATIENT
Start: 2023-01-26 | End: 2023-01-29 | Stop reason: HOSPADM

## 2023-01-26 RX ORDER — ALBUTEROL SULFATE 90 UG/1
2 AEROSOL, METERED RESPIRATORY (INHALATION) EVERY 6 HOURS PRN
COMMUNITY

## 2023-01-26 RX ORDER — IPRATROPIUM BROMIDE AND ALBUTEROL SULFATE 2.5; .5 MG/3ML; MG/3ML
1 SOLUTION RESPIRATORY (INHALATION)
Status: DISCONTINUED | OUTPATIENT
Start: 2023-01-27 | End: 2023-01-27

## 2023-01-26 RX ORDER — ASPIRIN 81 MG/1
81 TABLET, CHEWABLE ORAL DAILY
Status: DISCONTINUED | OUTPATIENT
Start: 2023-01-27 | End: 2023-01-29 | Stop reason: HOSPADM

## 2023-01-26 RX ORDER — IPRATROPIUM BROMIDE AND ALBUTEROL SULFATE 2.5; .5 MG/3ML; MG/3ML
1 SOLUTION RESPIRATORY (INHALATION)
Status: DISCONTINUED | OUTPATIENT
Start: 2023-01-26 | End: 2023-01-26

## 2023-01-26 RX ORDER — POLYETHYLENE GLYCOL 3350 17 G/17G
17 POWDER, FOR SOLUTION ORAL DAILY PRN
Status: DISCONTINUED | OUTPATIENT
Start: 2023-01-26 | End: 2023-01-29 | Stop reason: HOSPADM

## 2023-01-26 RX ORDER — SODIUM CHLORIDE 9 MG/ML
INJECTION, SOLUTION INTRAVENOUS PRN
Status: DISCONTINUED | OUTPATIENT
Start: 2023-01-26 | End: 2023-01-29 | Stop reason: HOSPADM

## 2023-01-26 RX ORDER — FINASTERIDE 5 MG/1
5 TABLET, FILM COATED ORAL DAILY
Status: DISCONTINUED | OUTPATIENT
Start: 2023-01-27 | End: 2023-01-29 | Stop reason: HOSPADM

## 2023-01-26 RX ORDER — TAMSULOSIN HYDROCHLORIDE 0.4 MG/1
0.4 CAPSULE ORAL DAILY
COMMUNITY

## 2023-01-26 RX ORDER — PANTOPRAZOLE SODIUM 40 MG/1
40 TABLET, DELAYED RELEASE ORAL DAILY
Status: DISCONTINUED | OUTPATIENT
Start: 2023-01-27 | End: 2023-01-29 | Stop reason: HOSPADM

## 2023-01-26 RX ORDER — ATORVASTATIN CALCIUM 10 MG/1
10 TABLET, FILM COATED ORAL NIGHTLY
Status: DISCONTINUED | OUTPATIENT
Start: 2023-01-26 | End: 2023-01-29 | Stop reason: HOSPADM

## 2023-01-26 RX ORDER — MONTELUKAST SODIUM 10 MG/1
10 TABLET ORAL NIGHTLY
Status: DISCONTINUED | OUTPATIENT
Start: 2023-01-26 | End: 2023-01-29 | Stop reason: HOSPADM

## 2023-01-26 RX ORDER — ONDANSETRON 2 MG/ML
4 INJECTION INTRAMUSCULAR; INTRAVENOUS EVERY 6 HOURS PRN
Status: DISCONTINUED | OUTPATIENT
Start: 2023-01-26 | End: 2023-01-29 | Stop reason: HOSPADM

## 2023-01-26 RX ORDER — METHYLPREDNISOLONE SODIUM SUCCINATE 40 MG/ML
40 INJECTION, POWDER, LYOPHILIZED, FOR SOLUTION INTRAMUSCULAR; INTRAVENOUS EVERY 8 HOURS
Status: DISCONTINUED | OUTPATIENT
Start: 2023-01-26 | End: 2023-01-27

## 2023-01-26 RX ORDER — SODIUM CHLORIDE 0.9 % (FLUSH) 0.9 %
5-40 SYRINGE (ML) INJECTION PRN
Status: DISCONTINUED | OUTPATIENT
Start: 2023-01-26 | End: 2023-01-29 | Stop reason: HOSPADM

## 2023-01-26 RX ADMIN — ALBUTEROL SULFATE 2.5 MG: 2.5 SOLUTION RESPIRATORY (INHALATION) at 10:06

## 2023-01-26 RX ADMIN — GABAPENTIN 100 MG: 100 CAPSULE ORAL at 21:29

## 2023-01-26 RX ADMIN — FUROSEMIDE 20 MG: 10 INJECTION, SOLUTION INTRAMUSCULAR; INTRAVENOUS at 20:19

## 2023-01-26 RX ADMIN — METHYLPREDNISOLONE SODIUM SUCCINATE 40 MG: 40 INJECTION, POWDER, FOR SOLUTION INTRAMUSCULAR; INTRAVENOUS at 21:29

## 2023-01-26 RX ADMIN — MONTELUKAST 10 MG: 10 TABLET, FILM COATED ORAL at 21:30

## 2023-01-26 RX ADMIN — ATORVASTATIN CALCIUM 10 MG: 10 TABLET, FILM COATED ORAL at 21:30

## 2023-01-26 RX ADMIN — METHYLPREDNISOLONE SODIUM SUCCINATE 125 MG: 125 INJECTION, POWDER, FOR SOLUTION INTRAMUSCULAR; INTRAVENOUS at 09:45

## 2023-01-26 RX ADMIN — IPRATROPIUM BROMIDE AND ALBUTEROL SULFATE 1 AMPULE: 2.5; .5 SOLUTION RESPIRATORY (INHALATION) at 10:12

## 2023-01-26 RX ADMIN — SODIUM CHLORIDE, PRESERVATIVE FREE 10 ML: 5 INJECTION INTRAVENOUS at 21:29

## 2023-01-26 RX ADMIN — ENOXAPARIN SODIUM 30 MG: 100 INJECTION SUBCUTANEOUS at 21:29

## 2023-01-26 ASSESSMENT — LIFESTYLE VARIABLES
HOW OFTEN DO YOU HAVE A DRINK CONTAINING ALCOHOL: NEVER
HOW MANY STANDARD DRINKS CONTAINING ALCOHOL DO YOU HAVE ON A TYPICAL DAY: PATIENT DOES NOT DRINK

## 2023-01-26 ASSESSMENT — ENCOUNTER SYMPTOMS
TROUBLE SWALLOWING: 0
VOMITING: 0
NAUSEA: 0
WHEEZING: 1
COLOR CHANGE: 0
RHINORRHEA: 0
SHORTNESS OF BREATH: 1
COUGH: 1
ABDOMINAL PAIN: 0
SORE THROAT: 0

## 2023-01-26 ASSESSMENT — PAIN - FUNCTIONAL ASSESSMENT: PAIN_FUNCTIONAL_ASSESSMENT: NONE - DENIES PAIN

## 2023-01-26 NOTE — ED NOTES
Assisted pt to BR, ambulated with ease; gait steady and SBA only.  Increased SOB with exertion but SpO2 97% once pt is back in bed     MARA Dempsey  01/26/23 3098

## 2023-01-26 NOTE — PROGRESS NOTES
Pharmacy  Note  - Admission Medication History    List of kznsv-zo-tgxrdroem medications is complete.  I reviewed Rx fill history via \"Complete Dispense Report\" in Epic, and spoke to patient      The following changes made to zprow-fi-gmycmnrar medication list:    Dose or Frequency CHANGE:  1) gabapentin 100 mg QID--> 400 mg daily  2) tamsulosin 0.8 mg daily--> 0.4 mg daily   3) ASA chew --> ASA tablet       Current Outpatient Medications   Medication Instructions    albuterol (PROVENTIL) 2.5 mg, Nebulization, EVERY 6 HOURS PRN    albuterol sulfate HFA (PROVENTIL;VENTOLIN;PROAIR) 108 (90 Base) MCG/ACT inhaler 2 puffs, Inhalation, EVERY 6 HOURS PRN    aspirin 81 mg, Oral, EVERY MORNING    atorvastatin (LIPITOR) 10 MG tablet TAKE 1 TABLET DAILY    Budeson-Glycopyrrol-Formoterol 160-9-4.8 MCG/ACT AERO 2 puffs, Inhalation, 2 times daily    cetirizine (ZYRTEC) 10 MG tablet TAKE 1 TABLET DAILY    Cholecalciferol (VITAMIN D) 2000 UNITS CAPS capsule 1 capsule, Oral, EVERY MORNING    finasteride (PROSCAR) 5 mg, Oral, DAILY    fish oil 1,000 mg, Oral, EVERY MORNING    Flax Seed Oil 1,000 mg, Oral, DAILY    fluticasone (FLONASE) 50 MCG/ACT nasal spray 1 spray, Each Nostril, DAILY    gabapentin (NEURONTIN) 400 mg, Oral, EVERY MORNING    montelukast (SINGULAIR) 10 mg, Oral, NIGHTLY    multivitamin (THERAGRAN) per tablet 1 tablet, Oral, EVERY MORNING    pantoprazole (PROTONIX) 40 mg, Oral, EVERY MORNING    sertraline (ZOLOFT) 50 MG tablet TAKE 1 AND 1/2 TABLET BY MOUTH EVERY MORNING    tamsulosin (FLOMAX) 0.4 mg, Oral, DAILY        1/26/2023 5:47 PM  Ct Abbasi  PharmD Candidate   Class of 2023

## 2023-01-26 NOTE — ED PROVIDER NOTES
[address] [facility] [address] ENCOUNTER          PHYSICIAN ASSISTANT NOTE       Date of evaluation: 1/26/2023    Chief Complaint     Shortness of Breath      History of Present Illness     Pineda Wagner. is a 71 y.o. male, with a history of hypertension, hyperlipidemia, asthma/COPD and chronic sinusitis, who presents to the ED with complaints of a 4 to 5-day history of increasing shortness of breath with wheezing. He reports sinus drainage and occasional cough that is not far off of his baseline. Also reports lower extremity swelling that is also at baseline. He has had no fever or chills. Denies chest and back pain. He did see his primary care provider yesterday and was prescribed an albuterol nebulizer in addition to his inhaler which he has used 3 times in the last 24 hours with brief improvement, his last use was 5 hours prior to arrival.  Patient does not use supplemental oxygen at home. States he typically does get better with steroids but these were not started yesterday. His last course was just over a month ago. He typically does well with a 2-week taper however, states it only will maintain him for another 2 weeks after that. His chart review does show evidence of a prednisone prescriptions almost monthly for the last 6 months. The patient was switched 1 month ago from [facility] to [facility] as the [facility] was not effective for maintenance of his asthma but he has noticed no change with the new inhaler. He is followed by Dr. Bill Murphy for pulmonology. He otherwise has no complaints. ASSESSMENT / PLAN  (MEDICAL DECISION MAKING)     INITIAL VITALS: BP: (!) 161/115, Temp: 98.7 °F (37.1 °C), Heart Rate: (!) 103, Resp: 19, SpO2: 92 %    Medical Decision Making  Pineda Wagner. is a 71 y.o. male with a history of asthma, presenting with increasing shortness of breath with wheezing over the last 4 to 5 days, likely asthma exacerbation.   He does have diffuse wheezing on exam with moderate respiratory distress, ambulatory pulse ox coming in from the lobby was 78%. This maintained at 95 to 96% on 2 L nasal cannula after a brief period on a nonrebreather. He does have high blood pressure and hyperlipidemia putting him at risk for coronary artery disease. For this reason additional diagnostics were ordered to rule out cardiac etiology of his wheezing. Chest x-ray showed no evidence of pneumonia or pulmonary edema. Cardiac studies were unremarkable making acute coronary syndrome and CHF unlikely. While he is hypoxic with tachycardia, the audible wheezing  and lack of risk factors make it less likely PE.  VBG indicates that he is not retaining CO2, this is likely asthma exacerbation, possibly related to the weather changes vs viral etiology. He was given a DuoNeb with 2 albuterol nebulizer treatments along with Solu-Medrol 125 mg IV. Upon reassessment, he was notably more comfortable with less wheezing. He was given a trial off of the supplemental oxygen at which point his pulse ox dropped to 91% while at rest.  It would likely significantly with exertion and for this reason the patient will be admitted to the hospital for additional management of his asthma exacerbation requiring supplemental oxygen. He is in stable condition upon waiting transport to the floor. Amount and/or Complexity of Data Reviewed  Independent Historian: spouse     Details: The patient is accompanied by his wife who provides collateral history. External Data Reviewed: ECG and notes. Labs: ordered. Details: COVID test is negative. Labs include an unremarkable CBC, troponin and BNP. BMP results a sodium of 134, chloride 98, glucose 133, it is otherwise unremarkable. Flu swab is negative. VBG results a pH of 7.327, PCO2 45.6, PO2 78 HCO3 23.9. Radiology: ordered. Details: Chest x-ray shows mild bibasilar atelectasis, lungs otherwise clear. ECG/medicine tests: ordered.  Decision-making details documented in ED Course. Details: No acute findings. Risk  Prescription drug management. Decision regarding hospitalization. This patient was also evaluated by the attending physician. All care plans were discussed and agreed upon. Clinical Impression     1. Acute hypoxemic respiratory failure (HCC)    2. Moderate asthma with exacerbation, unspecified whether persistent        Disposition       DISPOSITION Decision To Admit 01/26/2023 11:08:57 AM        Diagnostic Results and Other Data     EKG   Interpreted in conjunction with emergency department physician Alphonse Riggs MD  Rhythm: sinus tachycardia  Rate: 100-110  Axis: normal  Ectopy: none  Conduction: normal  ST Segments: no acute change  T Waves: no acute change  Q Waves:none  Clinical Impression: no acute changes other than increased rate today  Comparison:  11/22/2022    RECENT VITALS:  BP: (!) 142/98, Temp: 98.7 °F (37.1 °C), Heart Rate: 99, Resp: 18, SpO2: 94 %     ED Course     Nursing Notes, Past Medical Hx,Past Surgical Hx, Social Hx, Allergies, and Family Hx were reviewed.          The patient was given the following medications:  Orders Placed This Encounter   Medications    methylPREDNISolone sodium (SOLU-MEDROL) injection 125 mg    DISCONTD: ipratropium-albuterol (DUONEB) nebulizer solution 1 ampule     Order Specific Question:   Initiate RT Bronchodilator Protocol     Answer:   Yes - ED Protocol    albuterol (PROVENTIL) nebulizer solution 2.5 mg     Order Specific Question:   Initiate RT Bronchodilator Protocol     Answer:   Yes - ED Protocol    albuterol (PROVENTIL) nebulizer solution 2.5 mg     Order Specific Question:   Initiate RT Bronchodilator Protocol     Answer:   Yes - ED Protocol    ipratropium-albuterol (DUONEB) nebulizer solution 1 ampule     Order Specific Question:   Initiate RT Bronchodilator Protocol     Answer:   Yes - ED Protocol       CONSULTS:  IP CONSULT TO HOSPITALIST    Review of Systems     Review of Systems   Constitutional:  Negative for chills and fever. HENT:  Positive for congestion and postnasal drip. Negative for rhinorrhea, sore throat and trouble swallowing. Respiratory:  Positive for cough, shortness of breath and wheezing. Cardiovascular:  Positive for leg swelling (baseline). Negative for chest pain. Gastrointestinal:  Negative for abdominal pain, nausea and vomiting. Genitourinary:  Negative for decreased urine volume and difficulty urinating. Musculoskeletal:  Negative for arthralgias and myalgias. Skin:  Negative for color change and rash. Neurological:  Negative for dizziness, light-headedness and headaches. All other systems reviewed and are negative. 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 He has a past surgical history that includes other surgical history (N/A, 07/18/2018); pr crtj shunt ieebzannbl-odqhotwyj-ybixext terminus (N/A, 07/18/2018); laparoscopy (N/A, 07/18/2018); Eye surgery (Left); joint replacement (Left, 2015); Upper gastrointestinal endoscopy (N/A, 12/14/2018); Colonoscopy (12/14/2018); Enteroscopy (N/A, 01/04/2019); Upper gastrointestinal endoscopy (N/A, 03/01/2019); Upper gastrointestinal endoscopy (N/A, 04/24/2019); Upper gastrointestinal endoscopy (N/A, 04/24/2019); Upper gastrointestinal endoscopy (N/A, 08/02/2019); Upper gastrointestinal endoscopy (N/A, 08/02/2019); Upper gastrointestinal endoscopy (N/A, 08/02/2019);  Upper gastrointestinal endoscopy (N/A, 11/19/2019); Upper gastrointestinal endoscopy (N/A, 11/19/2019); Upper gastrointestinal endoscopy (N/A, 07/15/2020); Upper gastrointestinal endoscopy (N/A, 07/15/2020); Upper gastrointestinal endoscopy (N/A, 07/16/2021); Upper gastrointestinal endoscopy (N/A, 07/16/2021); eye surgery; hernia repair; Sinus endoscopy (Bilateral, 05/04/2022); and Sinus endoscopy (Bilateral, 9/30/2022). His family history includes Cancer in his father. He reports that he quit smoking about 21 years ago. His smoking use included cigarettes. He has a 5.00 pack-year smoking history. He has never used smokeless tobacco. He reports that he does not currently use alcohol after a past usage of about 2.0 standard drinks per week. He reports that he does not use drugs. Medications     Previous Medications    ALBUTEROL (PROVENTIL) (2.5 MG/3ML) 0.083% NEBULIZER SOLUTION    Take 3 mLs by nebulization every 6 hours as needed for Wheezing    ALBUTEROL SULFATE HFA (VENTOLIN HFA) 108 (90 BASE) MCG/ACT INHALER    Inhale 2 puffs into the lungs every 6 hours as needed for Wheezing    ASPIRIN 81 MG CHEWABLE TABLET    Take 81 mg by mouth daily    ATORVASTATIN (LIPITOR) 10 MG TABLET    TAKE 1 TABLET DAILY    BUDESON-GLYCOPYRROL-FORMOTEROL 160-9-4.8 MCG/ACT AERO    Inhale 2 puffs into the lungs in the morning and at bedtime    CETIRIZINE (ZYRTEC) 10 MG TABLET    TAKE 1 TABLET DAILY    CHOLECALCIFEROL (VITAMIN D) 2000 UNITS CAPS CAPSULE    Take 1 capsule by mouth daily    FINASTERIDE (PROSCAR) 5 MG TABLET    Take 1 tablet by mouth daily    FLAXSEED, LINSEED, (FLAX SEED OIL) 1000 MG CAPS    Take 1,000 mg by mouth daily    FLUTICASONE (FLONASE) 50 MCG/ACT NASAL SPRAY    1 spray by Each Nostril route daily    GABAPENTIN (NEURONTIN) 100 MG CAPSULE    Take 1 capsule by mouth 4 times daily for 90 days.     MONTELUKAST (SINGULAIR) 10 MG TABLET    Take 1 tablet by mouth nightly    MULTIVITAMIN (THERAGRAN) PER TABLET    Take 1 tablet by mouth daily    OMEGA-3 FATTY ACIDS (FISH OIL) 1000 MG CAPS    Take 1,000 mg by mouth daily    PANTOPRAZOLE (PROTONIX) 40 MG TABLET    Take 40 mg by mouth daily    SERTRALINE (ZOLOFT) 50 MG TABLET    TAKE 1 AND 1/2 TABLET BY MOUTH EVERY MORNING    TAMSULOSIN (FLOMAX) 0.4 MG CAPSULE    Take 2 capsules by mouth daily       Allergies     He is allergic to lisinopril. Physical Exam     INITIAL VITALS: BP: (!) 161/115, Temp: 98.7 °F (37.1 °C), Heart Rate: (!) 103, Resp: 19, SpO2: 92 %  Physical Exam  Vitals reviewed. Constitutional:       General: He is not in acute distress. Appearance: He is well-developed. He is obese. HENT:      Head: Normocephalic and atraumatic. Mouth/Throat:      Mouth: Mucous membranes are moist.   Eyes:      Extraocular Movements: Extraocular movements intact. Conjunctiva/sclera: Conjunctivae normal.   Neck:      Trachea: Phonation normal.   Cardiovascular:      Rate and Rhythm: Regular rhythm. Tachycardia present. Heart sounds: No murmur heard. No friction rub. No gallop. Pulmonary:      Effort: Accessory muscle usage and respiratory distress (moderate, speaking in phrases) present. Breath sounds: Wheezing (diffuse, audible without stethoscope) present. No rhonchi or rales. Abdominal:      General: There is no distension. Palpations: Abdomen is soft. Tenderness: There is no abdominal tenderness. Musculoskeletal:      Cervical back: Normal range of motion and neck supple. Right lower leg: Edema (trace) present. Left lower leg: Edema (trace) present. Skin:     General: Skin is warm and dry. Findings: No petechiae or rash. Neurological:      Mental Status: He is alert and oriented to person, place, and time.    Psychiatric:         Mood and Affect: Mood and affect normal.         Behavior: Behavior normal.         Jaquelin Silva [place]  01/26/23 1132

## 2023-01-26 NOTE — ED NOTES
OK per Amada DESIR for pt to eat, provided with boxed lunch at this time     Lelia Blake, MARA  01/26/23 6926

## 2023-01-26 NOTE — ED PROVIDER NOTES
Date of evaluation: 1/26/2023    This patient was seen by the advance practice provider. I have seen and examined the patient, agree with the workup, evaluation, management and diagnosis. The care plan has been discussed. I have reviewed the ECG and concur with the BINDU's interpretation. My assessment reveals a past medical history of reactive airway disease possible COPD presenting today with shortness of breath for the past couple days. Here on evaluation initial saturations were 78% on room air with dyspnea however quickly climbed to 96% at rest on 2 L of oxygen. Here he speaking in about 5-6 word sentences notable dyspnea on exam but not retracting. He does have audible diffuse expiratory wheeze.     General: Well appearing in NAD  HEENT:  head is atraumatic, sclera are clear, oropharynx is nonerythematous, mucus membranes are moist  Neck: Trachea midline  Chest: Labored respirations speaking in 5-6 word sentences but no retractions with diffuse auditory wheezing throughout entire expiratory phase bilaterally  Cardiovascular: Regular rate and rhythm, 2+ radial pulses bilaterally  Abdominal: Nondistended abdomen, soft, nontender without rebound or guarding  Skin: Warm, dry well perfused, no rashes  Extremities: no obvious deformities, no tenderness to palpation diffusely  Neurologic:  Alert and oriented, speech is clear and intact without dysarthria, gait is intact  Psychologic: appropriate mood and affect       Freda Burr MD  01/26/23 1654

## 2023-01-26 NOTE — H&P
Hospital Medicine History & Physical      PCP: Aleksandra Roland DO    Date of Admission: 1/26/2023    Date of Service: Pt seen/examined on 01/26/23 and Admitted to Inpatient with expected LOS greater than two midnights due to medical therapy. Chief Complaint:  shortness of breath      History Of Present Illness:     71 y.o. male Gerry Wilson.  -Asthma, obesity, hypertension, hyperlipidemia presented to ED with complains of shortness of breath  Shortness of breath progressively worse in the last 4 to 5 days associate with significant audible wheezing, cough, white phlegm production, denies fever chills. He was started on nebulizers by one of his physicians yesterday which did make some improvement but he continues to have shortness of breath worse with exertion.   -He follows with ENT has had 2 sinus surgeries last 1 year, follows with Dr. Soledad Olivera  -He also noted that for the last 1 month he has had bilateral lower extremity edema   -Denies fever chills or sick contacts  -He was walking to the ER room from prior to knee dropped to 78% and noted to have accessory muscle use, needing 2 L oxygen to maintain oxygen saturation above 92%  -VBG did not show evidence of hypercarbia. Sodium was 134 chloride 98 proBNP 32 troponin less than 0.01, EKG with sinus tachycardia without acute ST changes. He does not have leukocytosis. Rapid flu/rapid COVID-negative. X-ray showed atelectasis,  shunt tubing was seen.     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     Chronic pansinusitis     COPD (chronic obstructive pulmonary disease) ([facility] 04/26/2022    Depression     Early onset Alzheimer's dementia [facility])     Erectile dysfunction     Hearing loss     Hyperlipidemia     Hypertension     Kidney failure 07/2018    Normal pressure hydrocephalus ([facility] [address] 07/2018    shunt placement    Screening for abdominal aortic aneurysm (AAA) performed 03/02/2018    [facility]    Tinnitus     Unspecified sleep apnea     Wears glasses     Wears hearing aid in both ears        Past Surgical History:          Procedure Laterality Date    COLONOSCOPY  12/14/2018    COLONOSCOPY WITH BIOPSY performed by Darin Kiser MD at [facility] 28    ENTEROSCOPY N/A 01/04/2019    ENTEROSCOPY PUSH BIOPSY performed by Darin Kiser MD at . [address] Left     lipoma    EYE SURGERY      as a child, weak eye muscles    HERNIA REPAIR      umbilical    JOINT REPLACEMENT Left 2015    PARTIAL KNEE REPLACMENT    LAPAROSCOPY N/A 07/18/2018    OPENING AND CLOSING FOR VENTRICULAR PERITONEAL SHUNT performed by Jin Del Cid MD at . [address] [address] 07/18/2018     VENTRICULAR PERITONEAL SHUNT INSERTION RIGHT SIDE; (N/A )    MI CRTJ SHUNT [id number] TERMINUS N/A 07/18/2018    VENTRICULAR PERITONEAL SHUNT INSERTION RIGHT SIDE; performed by Kate Sarabia MD at [address] [place] [address] East Bilateral 05/04/2022    BILATERAL INFERIOR TURBINATE REDUCTION, BILATERAL MAXILLARY ANTROSTOMY, BILATERAL TOTAL ETHMOIDECTOMY WITH SPEHNOIDECTOMY AND SEPTOPLASTY, BILATERAL FRONTAL SINUS EXPLORATION performed by Emma Cardona MD at [address] [place] [address] Bilateral 9/30/2022    BILATERAL FRONTAL SINUS EXPLORATION, BILATERAL INFERIOR TURBINATE REDUCTION, BILATERAL MAXILLARY ANTROSTOMY, BILATERAL TOTAL ETHMOIDECTOMY WITH SPHENOIDECTOMY AND IMAGE NAVIGATION, REVISION SEPTOPLASTY performed by Emma Cardona MD at [address] [place] [address] 12/14/2018    EGD BIOPSY performed by Darin Kiser MD at [address] [place] [address] N/A 03/01/2019    ESOPHAGOGASTRODUODENOSCOPY WITH RADIOFREQUENCY  ABLATION/ ABLATIONS X19 performed by Darin Kiser MD at [address] [place] [address] N/A 04/24/2019    ESOPHAGOGASTRODUODENOSCOPY WITH RADIOFREQUENCY ABLATION performed by Maria Dolores Torres Priti Jenkins MD at [facility] 04/24/2019    EGD BIOPSY performed by Nenita Abernathy MD at [facility] N/A 08/02/2019    ESOPHAGOGASTRODUODENOSCOPY RADIOFREQUENCY ABLATION performed by Nenita Abernathy MD at [address] [place] [address] 08/02/2019    EGD GASTRIC BIOPSY performed by Nenita Abernathy MD at [address] [place] [address] N/A 08/02/2019    EGD POLYP COLD SNARE performed by Nenita Abernathy MD at [address] [place] [address] N/A 11/19/2019    ESOPHAGOGASTRODUODENOSCOPY WITH RADIOFREQUENCY ABLATION performed by Nenita Abernathy MD at [address] [place] [address] N/A 11/19/2019    EGD POLYP SNARE performed by Nenita Abernathy MD at [address] [place] [address] N/A 07/15/2020    ESOPHAGOGASTRODUODENOSCOPY RADIOFREQUENCY ABLATION ON STANDBY performed by Nenita Abernathy MD at [address] [place] [address] 07/15/2020    EGD BIOPSY performed by Neinta Abernathy MD at [address] [place] [address] 07/16/2021    EGD BIOPSY performed by Nenita Abernathy MD at [address] [place] [address] N/A 07/16/2021    EGD POLYP SNARE performed by Nenita Abernathy MD at [place] [address]       Medications Prior to Admission:      Prior to Admission medications    Medication Sig Start Date End Date Taking?  Authorizing Provider   pantoprazole (PROTONIX) 40 MG tablet Take 40 mg by mouth daily   Yes Historical Provider, MD   albuterol sulfate HFA (VENTOLIN HFA) 108 (90 Base) MCG/ACT inhaler Inhale 2 puffs into the lungs every 6 hours as needed for Wheezing   Yes Historical Provider, MD   albuterol (PROVENTIL) (2.5 MG/3ML) 0.083% nebulizer solution Take 3 mLs by nebulization every 6 hours as needed for Wheezing 1/25/23   Christiano Oliveira MD   finasteride (PROSCAR) 5 MG tablet Take 1 tablet by mouth daily 1/17/23 Linda Parker DO   sertraline (ZOLOFT) 50 MG tablet TAKE 1 AND 1/2 TABLET BY MOUTH EVERY MORNING 1/17/23   Linda Parker DO   gabapentin (NEURONTIN) 100 MG capsule Take 1 capsule by mouth 4 times daily for 90 days. 1/4/23 4/4/23  Linda Parker DO   cetirizine (ZYRTEC) 10 MG tablet TAKE 1 TABLET DAILY 12/30/22   Linda Parker DO   atorvastatin (LIPITOR) 10 MG tablet TAKE 1 TABLET DAILY 12/30/22   Linda Parker DO   Budeson-Glycopyrrol-Formoterol 160-9-4.8 MCG/ACT AERO Inhale 2 puffs into the lungs in the morning and at bedtime 12/15/22   Christiano Oliveira MD   fluticasone (FLONASE) 50 MCG/ACT nasal spray 1 spray by Each Nostril route daily 7/7/22   Linda Parker DO   montelukast (SINGULAIR) 10 MG tablet Take 1 tablet by mouth nightly 2/3/22   Radha Hough MD   aspirin 81 MG chewable tablet Take 81 mg by mouth daily    Historical Provider, MD   tamsulosin (FLOMAX) 0.4 MG capsule Take 2 capsules by mouth daily 3/22/18   Rita Webber MD   Omega-3 Fatty Acids (FISH OIL) 1000 MG CAPS Take 1,000 mg by mouth daily    Historical Provider, MD   Flaxseed, Linseed, (FLAX SEED OIL) 1000 MG CAPS Take 1,000 mg by mouth daily    Historical Provider, MD   Cholecalciferol (VITAMIN D) 2000 UNITS CAPS capsule Take 1 capsule by mouth daily    Historical Provider, MD   multivitamin (THERAGRAN) per tablet Take 1 tablet by mouth daily    Historical Provider, MD       Allergies:  Lisinopril    Social History:      The patient currently lives at home with wife    TOBACCO:   reports that he quit smoking about 21 years ago. His smoking use included cigarettes. He has a 5.00 pack-year smoking history. He has never used smokeless tobacco.  ETOH:   reports that he does not currently use alcohol after a past usage of about 2.0 standard drinks per week.       Family History:      Reviewed        Problem Relation Age of Onset    Cancer Father         prostate       REVIEW OF SYSTEMS:   Pertinent positives as noted in the HPI. All other systems reviewed and negative. PHYSICAL EXAM PERFORMED:    BP (!) 127/93   Pulse 95   Temp 98.7 °F (37.1 °C) (Oral)   Resp 14   Ht 6' 2\" (1.88 m)   Wt 267 lb 8 oz (121.3 kg)   SpO2 96%   BMI 34.34 kg/m²     General appearance: No acute distress at the time of my evaluation  HEENT:  Normal cephalic, atraumatic without obvious deformity. Pupils equal, round, and reactive to light. Extra ocular muscles intact. Conjunctivae/corneas clear. Neck: Supple, with full range of motion. No jugular venous distention. Trachea midline. Respiratory: Diffuse wheezing present, decreased breath sound at the bases  Cardiovascular:  Regular rate and rhythm, distant breath sounds distant heart sounds  Abdomen: Soft, non-tender, non-distended with normal bowel sounds. Musculoskeletal: 2+ bilateral lower extremity edema  Skin: Skin color, texture, turgor normal.  No rashes or lesions. Neurologic:  Neurovascularly intact without any focal sensory/motor deficits. Cranial nerves: II-XII intact, grossly non-focal.  Psychiatric:  Alert and oriented, thought content appropriate, normal insight  Capillary Refill: Brisk,< 3 seconds   Peripheral Pulses: +2 palpable, equal bilaterally       Labs:     Recent Labs     01/26/23  0936   WBC 9.4   HGB 14.1   HCT 42.4        Recent Labs     01/26/23  0936   *   K 4.0   CL 98*   CO2 21   BUN 15   CREATININE 0.9   CALCIUM 9.6     No results for input(s): AST, ALT, BILIDIR, BILITOT, ALKPHOS in the last 72 hours. No results for input(s): INR in the last 72 hours.   Recent Labs     01/26/23  0936   TROPONINI <0.01       Urinalysis:      Lab Results   Component Value Date/Time    NITRU Negative 06/01/2020 08:00 AM    WBCUA 0 06/01/2020 08:00 AM    BACTERIA 3+ 07/15/2018 12:00 PM    RBCUA 0 06/01/2020 08:00 AM    BLOODU Negative 06/01/2020 08:00 AM    SPECGRAV 1.025 06/01/2020 08:00 AM    GLUCOSEU Negative 06/01/2020 08:00 AM       Radiology:     CXR: I have reviewed the CXR with the following interpretation: Reviewed, see HPI  EKG:  I have reviewed the EKG with the following interpretation: Reviewed, see HPI    XR CHEST PORTABLE   Final Result      Mild bibasilar atelectasis. Lungs otherwise clear. Mildly tortuous descending thoracic aorta. Top normal heart size. Right-sided ventriculoperitoneal shunt tubing noted. ASSESSMENT:    Active Hospital Problems    Diagnosis Date Noted    Asthma with acute exacerbation [J45.901] 01/26/2023     Priority: Medium     Acute respiratory failure with hypoxemia likely due to asthma exacerbation. Also concern of possible volume overload with bilateral lower extremity edema. proBNP is 32 which could be falsely low in the setting of obesity BMI 34.34 kg/m²  Scheduled breathing treatments scheduled every 4 hours  Continue montelukast  Solu-Medrol 40 every 8 hours and transition to prednisone once respiratory status better  Will give a dose of Lasix 20 mg IV 1x1  Check echocardiogram for evaluation of structural heart abnormalities  Input and output, daily standing weights    Lower extremity edema, as above, other differentials for edema could be due to steroid use/gabapentin use. Hypertension -continue home medications    GERD/Child's esophagus, continue Prilosec    Hyperlipidemia, continue low-dose statin    History of NPH, s/p  shunt placed    BPH, continue Flomax, finasteride  Depression, continue Zoloft    DVT Prophylaxis: Lovenox  Diet: Low-sodium diet  Code Status: Full code    PT/OT Eval Status: Evaluate daily order if needed    [address] [facility] as inpatient. I anticipate hospitalization spanning more than two midnights for investigation and treatment of the above medically necessary diagnoses. Kacie Shin MD  Internal medicine hospitalist    Thank you Anthony Kenney DO for the opportunity to be involved in this patient's care.  If you have any questions or concerns please feel free to contact me at [phone].

## 2023-01-27 LAB
ALBUMIN SERPL-MCNC: 4.6 G/DL (ref 3.4–5)
ANION GAP SERPL CALCULATED.3IONS-SCNC: 18 MMOL/L (ref 3–16)
BUN BLDV-MCNC: 20 MG/DL (ref 7–20)
CALCIUM SERPL-MCNC: 9.9 MG/DL (ref 8.3–10.6)
CHLORIDE BLD-SCNC: 97 MMOL/L (ref 99–110)
CO2: 20 MMOL/L (ref 21–32)
CREAT SERPL-MCNC: 1.1 MG/DL (ref 0.8–1.3)
GFR SERPL CREATININE-BSD FRML MDRD: >60 ML/MIN/{1.73_M2}
GLUCOSE BLD-MCNC: 206 MG/DL (ref 70–99)
LV EF: 63 %
LVEF MODALITY: NORMAL
PHOSPHORUS: 2.7 MG/DL (ref 2.5–4.9)
POTASSIUM SERPL-SCNC: 4.7 MMOL/L (ref 3.5–5.1)
SODIUM BLD-SCNC: 135 MMOL/L (ref 136–145)

## 2023-01-27 PROCEDURE — 6370000000 HC RX 637 (ALT 250 FOR IP): Performed by: INTERNAL MEDICINE

## 2023-01-27 PROCEDURE — 94640 AIRWAY INHALATION TREATMENT: CPT

## 2023-01-27 PROCEDURE — C8929 TTE W OR WO FOL WCON,DOPPLER: HCPCS

## 2023-01-27 PROCEDURE — 2700000000 HC OXYGEN THERAPY PER DAY

## 2023-01-27 PROCEDURE — 2060000000 HC ICU INTERMEDIATE R&B

## 2023-01-27 PROCEDURE — 80069 RENAL FUNCTION PANEL: CPT

## 2023-01-27 PROCEDURE — 2580000003 HC RX 258: Performed by: INTERNAL MEDICINE

## 2023-01-27 PROCEDURE — 99222 1ST HOSP IP/OBS MODERATE 55: CPT | Performed by: INTERNAL MEDICINE

## 2023-01-27 PROCEDURE — 36415 COLL VENOUS BLD VENIPUNCTURE: CPT

## 2023-01-27 PROCEDURE — 6360000002 HC RX W HCPCS: Performed by: INTERNAL MEDICINE

## 2023-01-27 PROCEDURE — 94761 N-INVAS EAR/PLS OXIMETRY MLT: CPT

## 2023-01-27 RX ORDER — ALBUTEROL SULFATE 2.5 MG/3ML
2.5 SOLUTION RESPIRATORY (INHALATION) EVERY 6 HOURS PRN
Status: DISCONTINUED | OUTPATIENT
Start: 2023-01-27 | End: 2023-01-29 | Stop reason: HOSPADM

## 2023-01-27 RX ORDER — FUROSEMIDE 10 MG/ML
20 INJECTION INTRAMUSCULAR; INTRAVENOUS ONCE
Status: COMPLETED | OUTPATIENT
Start: 2023-01-27 | End: 2023-01-27

## 2023-01-27 RX ORDER — IPRATROPIUM BROMIDE AND ALBUTEROL SULFATE 2.5; .5 MG/3ML; MG/3ML
1 SOLUTION RESPIRATORY (INHALATION) 3 TIMES DAILY
Status: DISCONTINUED | OUTPATIENT
Start: 2023-01-27 | End: 2023-01-29 | Stop reason: HOSPADM

## 2023-01-27 RX ORDER — PREDNISONE 20 MG/1
40 TABLET ORAL DAILY
Status: DISCONTINUED | OUTPATIENT
Start: 2023-01-29 | End: 2023-01-29 | Stop reason: HOSPADM

## 2023-01-27 RX ORDER — METHYLPREDNISOLONE SODIUM SUCCINATE 125 MG/2ML
60 INJECTION, POWDER, LYOPHILIZED, FOR SOLUTION INTRAMUSCULAR; INTRAVENOUS DAILY
Status: COMPLETED | OUTPATIENT
Start: 2023-01-28 | End: 2023-01-28

## 2023-01-27 RX ADMIN — CETIRIZINE HYDROCHLORIDE 10 MG: 10 TABLET, FILM COATED ORAL at 10:29

## 2023-01-27 RX ADMIN — ASPIRIN 81 MG: 81 TABLET, CHEWABLE ORAL at 10:30

## 2023-01-27 RX ADMIN — GABAPENTIN 100 MG: 100 CAPSULE ORAL at 10:29

## 2023-01-27 RX ADMIN — GABAPENTIN 100 MG: 100 CAPSULE ORAL at 14:56

## 2023-01-27 RX ADMIN — SODIUM CHLORIDE, PRESERVATIVE FREE 10 ML: 5 INJECTION INTRAVENOUS at 10:30

## 2023-01-27 RX ADMIN — FUROSEMIDE 20 MG: 10 INJECTION, SOLUTION INTRAMUSCULAR; INTRAVENOUS at 14:56

## 2023-01-27 RX ADMIN — ENOXAPARIN SODIUM 30 MG: 100 INJECTION SUBCUTANEOUS at 10:29

## 2023-01-27 RX ADMIN — GABAPENTIN 100 MG: 100 CAPSULE ORAL at 18:14

## 2023-01-27 RX ADMIN — GABAPENTIN 100 MG: 100 CAPSULE ORAL at 20:11

## 2023-01-27 RX ADMIN — IPRATROPIUM BROMIDE AND ALBUTEROL SULFATE 1 AMPULE: 2.5; .5 SOLUTION RESPIRATORY (INHALATION) at 18:24

## 2023-01-27 RX ADMIN — TAMSULOSIN HYDROCHLORIDE 0.8 MG: 0.4 CAPSULE ORAL at 10:33

## 2023-01-27 RX ADMIN — IPRATROPIUM BROMIDE AND ALBUTEROL SULFATE 1 AMPULE: 2.5; .5 SOLUTION RESPIRATORY (INHALATION) at 11:50

## 2023-01-27 RX ADMIN — SODIUM CHLORIDE, PRESERVATIVE FREE 10 ML: 5 INJECTION INTRAVENOUS at 20:12

## 2023-01-27 RX ADMIN — SERTRALINE HYDROCHLORIDE 75 MG: 50 TABLET ORAL at 10:29

## 2023-01-27 RX ADMIN — PANTOPRAZOLE SODIUM 40 MG: 40 TABLET, DELAYED RELEASE ORAL at 10:30

## 2023-01-27 RX ADMIN — METHYLPREDNISOLONE SODIUM SUCCINATE 40 MG: 40 INJECTION, POWDER, FOR SOLUTION INTRAMUSCULAR; INTRAVENOUS at 05:03

## 2023-01-27 RX ADMIN — FINASTERIDE 5 MG: 5 TABLET, FILM COATED ORAL at 10:29

## 2023-01-27 RX ADMIN — ENOXAPARIN SODIUM 30 MG: 100 INJECTION SUBCUTANEOUS at 20:12

## 2023-01-27 RX ADMIN — ATORVASTATIN CALCIUM 10 MG: 10 TABLET, FILM COATED ORAL at 20:11

## 2023-01-27 RX ADMIN — MONTELUKAST 10 MG: 10 TABLET, FILM COATED ORAL at 20:12

## 2023-01-27 ASSESSMENT — ENCOUNTER SYMPTOMS
TROUBLE SWALLOWING: 0
NAUSEA: 0
SINUS PAIN: 0
SORE THROAT: 0
DIARRHEA: 0
CHEST TIGHTNESS: 0
EYE DISCHARGE: 0
COUGH: 1
SINUS PRESSURE: 0
VOMITING: 0
WHEEZING: 1
SHORTNESS OF BREATH: 1

## 2023-01-27 NOTE — CONSULTS
Pulmonary Consult    Patient's PCP: Ramy Howe DO  Referred by: Johana Jimenez MD for reactive airway disease    HISTORY OF PRESENT ILLNESS:    This is a  71 y.o. male with PMH of reactive airway disease and others as listed below. He is known to me from my clinic. He was complaining of increased cough and wheezing over the past week. He reached to the clinic and was prescribed albuterol nebulizer. He used it twice without any incidence but on the day of admission early in the morning he took albuterol neb for the 3rd time and after he felt funny with more SOB for which he came to the ED. At this time he is still wheezy but overall feels fine. Of note he has cough with clear sputum and nasal congestion. Nasal congestion is good today. He has hx of Child's esophagus however he doesn't have symptoms of GERD. There is no fever or chills.       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     Chronic pansinusitis     COPD (chronic obstructive pulmonary disease) ([facility] [address] [address] 04/26/2022    Depression     Early onset Alzheimer's dementia [facility])     Erectile dysfunction     Hearing loss     Hyperlipidemia     Hypertension     Kidney failure 07/2018    Normal pressure hydrocephalus ([facility] [address] 07/2018    shunt placement    Screening for abdominal aortic aneurysm (AAA) performed 03/02/2018    [facility]    Tinnitus     Unspecified sleep apnea     Wears glasses     Wears hearing aid in both ears      Past Surgical History:   Procedure Laterality Date    COLONOSCOPY  12/14/2018    COLONOSCOPY WITH BIOPSY performed by Karon Ragland MD at [facility] 28    ENTEROSCOPY N/A 01/04/2019    ENTEROSCOPY PUSH BIOPSY performed by Karon Ragland MD at . [address] Left     lipoma    EYE SURGERY      as a child, weak eye muscles    HERNIA REPAIR      umbilical    JOINT REPLACEMENT Left 2015 PARTIAL KNEE REPLACMENT    LAPAROSCOPY N/A 07/18/2018    OPENING AND CLOSING FOR VENTRICULAR PERITONEAL SHUNT performed by Manish Tellez MD at [address] [facility] 07/18/2018     VENTRICULAR PERITONEAL SHUNT INSERTION RIGHT SIDE; (N/A )    OR CRTJ SHUNT [id number] TERMINUS N/A 07/18/2018    VENTRICULAR PERITONEAL SHUNT INSERTION RIGHT SIDE; performed by Barbra Raymond MD at [address] [place] [address] Bilateral 05/04/2022    BILATERAL INFERIOR TURBINATE REDUCTION, BILATERAL MAXILLARY ANTROSTOMY, BILATERAL TOTAL ETHMOIDECTOMY WITH SPEHNOIDECTOMY AND SEPTOPLASTY, BILATERAL FRONTAL SINUS EXPLORATION performed by Mukul Moore MD at [address] [place] [address] Bilateral 9/30/2022    BILATERAL FRONTAL SINUS EXPLORATION, BILATERAL INFERIOR TURBINATE REDUCTION, BILATERAL MAXILLARY ANTROSTOMY, BILATERAL TOTAL ETHMOIDECTOMY WITH SPHENOIDECTOMY AND IMAGE NAVIGATION, REVISION SEPTOPLASTY performed by Mukul Moore MD at [address] [place] [address] 12/14/2018    EGD BIOPSY performed by Justin Mckeon MD at [facility] N/A 03/01/2019    ESOPHAGOGASTRODUODENOSCOPY WITH RADIOFREQUENCY  ABLATION/ ABLATIONS X19 performed by Justin Mckeon MD at [facility] N/A 04/24/2019    ESOPHAGOGASTRODUODENOSCOPY WITH RADIOFREQUENCY ABLATION performed by Justin Mckeon MD at [facility] 13 04/24/2019    EGD BIOPSY performed by Justin Mckeon MD at [facility] N/A 08/02/2019    ESOPHAGOGASTRODUODENOSCOPY RADIOFREQUENCY ABLATION performed by Justin Mckeon MD at [facility] 08/02/2019    EGD GASTRIC BIOPSY performed by Justin Mckeon MD at [facility] 08/02/2019    EGD POLYP COLD SNARE performed by Justin Mckeon MD at [address] [place] [address] GASTROINTESTINAL ENDOSCOPY N/A 11/19/2019    ESOPHAGOGASTRODUODENOSCOPY WITH RADIOFREQUENCY ABLATION performed by Khalida Perea MD at [id number] [place] [address] N/A 11/19/2019    EGD POLYP SNARE performed by Khalida Perea MD at [id number] [place] [address] N/A 07/15/2020    ESOPHAGOGASTRODUODENOSCOPY RADIOFREQUENCY ABLATION ON STANDBY performed by Khalida Perea MD at [id number] [place] [address] 07/15/2020    EGD BIOPSY performed by Khalida Perea MD at [id number] [place] [address] 07/16/2021    EGD BIOPSY performed by Khalida Perea MD at [id number] [place] [address] N/A 07/16/2021    EGD POLYP SNARE performed by Khalida Perea MD at [facility] 28     Prior to Admission:    No current facility-administered medications on file prior to encounter. Current Outpatient Medications on File Prior to Encounter   Medication Sig Dispense Refill    pantoprazole (PROTONIX) 40 MG tablet Take 40 mg by mouth every morning      albuterol sulfate HFA (PROVENTIL;VENTOLIN;PROAIR) 108 (90 Base) MCG/ACT inhaler Inhale 2 puffs into the lungs every 6 hours as needed for Wheezing      gabapentin (NEURONTIN) 100 MG capsule Take 400 mg by mouth every morning.       tamsulosin (FLOMAX) 0.4 MG capsule Take 0.4 mg by mouth daily      albuterol (PROVENTIL) (2.5 MG/3ML) 0.083% nebulizer solution Take 3 mLs by nebulization every 6 hours as needed for Wheezing 120 each 3    finasteride (PROSCAR) 5 MG tablet Take 1 tablet by mouth daily 90 tablet 1    sertraline (ZOLOFT) 50 MG tablet TAKE 1 AND 1/2 TABLET BY MOUTH EVERY MORNING 135 tablet 1    cetirizine (ZYRTEC) 10 MG tablet TAKE 1 TABLET DAILY 90 tablet 3    atorvastatin (LIPITOR) 10 MG tablet TAKE 1 TABLET DAILY 90 tablet 3    Budeson-Glycopyrrol-Formoterol 160-9-4.8 MCG/ACT AERO Inhale 2 puffs into the lungs in the morning and at bedtime 32.1 g 3    fluticasone (FLONASE) 50 MCG/ACT nasal spray 1 spray by Each Nostril route daily 32 g 1    montelukast (SINGULAIR) 10 MG tablet Take 1 tablet by mouth nightly 90 tablet 3    aspirin 81 MG EC tablet Take 81 mg by mouth every morning      Omega-3 Fatty Acids (FISH OIL) 1000 MG CAPS Take 1,000 mg by mouth every morning      Flaxseed, Linseed, (FLAX SEED OIL) 1000 MG CAPS Take 1,000 mg by mouth daily      Cholecalciferol (VITAMIN D) 2000 UNITS CAPS capsule Take 1 capsule by mouth every morning      multivitamin (THERAGRAN) per tablet Take 1 tablet by mouth every morning         Allergies:  Lisinopril    Social History:   TOBACCO:   reports that he quit smoking about 21 years ago. His smoking use included cigarettes. He has a 5.00 pack-year smoking history. He has never used smokeless tobacco.     ETOH:   reports that he does not currently use alcohol after a past usage of about 2.0 standard drinks per week. Family History:       Problem Relation Age of Onset    Cancer Father         prostate    Hypertension Father     Coronary Art Dis Father          Review of Systems   Constitutional:  Negative for chills and fever. HENT:  Negative for congestion. Respiratory:  Positive for cough, shortness of breath and wheezing. Negative for chest tightness. Cardiovascular:  Negative for chest pain, palpitations and leg swelling. Neurological:  Negative for weakness. Psychiatric/Behavioral:  The patient is not nervous/anxious. All other systems reviewed and are negative. PHYSICAL EXAM:  /66   Pulse 82   Temp 97.6 °F (36.4 °C) (Oral)   Resp 18   Ht 6' 2\" (1.88 m)   Wt 260 lb 2.3 oz (118 kg)   SpO2 95%   BMI 33.40 kg/m²     Physical Exam  Vitals reviewed. Constitutional:       Appearance: He is well-developed. HENT:      Head: Normocephalic and atraumatic. Eyes:      Pupils: Pupils are equal, round, and reactive to light. Neck:      Vascular: No JVD. Cardiovascular:      Rate and Rhythm: Normal rate and regular rhythm. Heart sounds: No murmur heard. Pulmonary:      Effort: Pulmonary effort is normal. No respiratory distress. Breath sounds: No stridor. Wheezing present. No rales. Abdominal:      General: Bowel sounds are normal.      Palpations: Abdomen is soft. Musculoskeletal:         General: No deformity. Cervical back: Neck supple. Comments: Trace bilateral leg edema    Skin:     General: Skin is warm and dry. Neurological:      Mental Status: He is alert and oriented to person, place, and time. Psychiatric:         Behavior: Behavior normal.       LABS:  Recent Labs     01/26/23  0936   WBC 9.4   HGB 14.1   HCT 42.4                                                                     Recent Labs     01/26/23  0936 01/27/23  1037   * 135*   K 4.0 4.7   CL 98* 97*   CO2 21 20*   BUN 15 20   CREATININE 0.9 1.1   GLUCOSE 133* 206*     No results for input(s): AST, ALT, ALB, BILITOT, ALKPHOS in the last 72 hours. Recent Labs     01/26/23 0936   TROPONINI <0.01     No results for input(s): BNP in the last 72 hours. No results found for: PHART, JCK1QBD, PO2ART  No results for input(s): INR in the last 72 hours. No results for input(s): NITRITE, COLORU, PHUR, LABCAST, WBCUA, RBCUA, MUCUS, TRICHOMONAS, YEAST, BACTERIA, CLARITYU, SPECGRAV, LEUKOCYTESUR, UROBILINOGEN, BILIRUBINUR, BLOODU, GLUCOSEU, AMORPHOUS in the last 72 hours. Invalid input(s): KETONESU     DATA:  CXR  Mild bibasilar atelectasis. Lungs otherwise clear. Mildly tortuous descending thoracic aorta. Top normal heart size. Right-sided ventriculoperitoneal shunt tubing noted.      EKG   Sinus tachycardia with HR of 102    Assessment &Plan:    Patient Active Problem List:     Wrist arthritis     Hypertrophy of prostate without urinary obstruction and other lower urinary tract symptoms (LUTS)     Cardiac dysrhythmia     Onychomycosis     Obstructive sleep apnea     Personal history of other diseases of digestive system     Erectile dysfunction     Vitamin D deficiency     Mixed hyperlipidemia     Persistent depressive disorder     Dysthymia     Non-seasonal allergic rhinitis due to pollen     Pulmonary nodule     Back pain     NPH (normal pressure hydrocephalus) ([facility])-s/p shunt     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     Early onset Alzheimer's dementia ([facility])     Neuropathy     Pre-diabetes     Reactive airway disease without complication     Chronic pansinusitis     Deviated nasal septum     Hypertrophy, nasal, turbinate     Nasal obstruction     Nasal turbinate hypertrophy     Essential tremor     Asthma with acute exacerbation      Reactive airway disease probably precipitated by post nasal drip. He is wheezing but there is no chest tightness at this time. He has cough with clear sputum. There is no fever or other constitutional symptoms. There is no leukocytosis or left shift. Echo with grade I diastolic dysfunction   Hyperglycemia     Decrease dose of solu medrol to 60mg daily for two days then prednisone PO 40mg daily   Continue pulmicort and brovana   Continue breathing treatments  Wean off O2 as tolerated with target O2 sat >=90%    The patient and / or the family were informed of the results of any tests, a time was given to answer questions, a plan was proposed and they agreed with plan. Thank you Elijah Gill DO for the opportunity to be involved in this patients care.  If you have any questions or concerns please feel free to contact me  Full Code

## 2023-01-27 NOTE — PLAN OF CARE
Problem: Respiratory - Adult  Goal: Achieves optimal ventilation and oxygenation  Outcome: Progressing  Flowsheets (Taken 1/26/2023 2215)  Achieves optimal ventilation and oxygenation:   Assess for changes in respiratory status   Assess for changes in mentation and behavior   Position to facilitate oxygenation and minimize respiratory effort   Encourage broncho-pulmonary hygiene including cough, deep breathe, incentive spirometry   Assess and instruct to report shortness of breath or any respiratory difficulty   Respiratory therapy support as indicated  Note: Maintain HOB of 30 or higher. Problem: Discharge Planning  Goal: Discharge to home or other facility with appropriate resources  Outcome: Progressing  Flowsheets (Taken 1/26/2023 2215)  Discharge to home or other facility with appropriate resources:   Identify barriers to discharge with patient and caregiver   Identify discharge learning needs (meds, wound care, etc)   Arrange for needed discharge resources and transportation as appropriate   Refer to discharge planning if patient needs post-hospital services based on physician order or complex needs related to functional status, cognitive ability or social support system     Problem: Safety - Adult  Goal: Free from fall injury  Outcome: Progressing  Flowsheets (Taken 1/26/2023 2215)  Free From Fall Injury: Instruct family/caregiver on patient safety  Note: Bed in lowest position, wheels locked, gripper socks on, call light & patient belongings within reach.

## 2023-01-27 NOTE — PROGRESS NOTES
01/27/23 1158   RT Protocol   History Pulmonary Disease 2   Respiratory pattern 2   Breath sounds 2   Cough 1   Bronchodilator Assessment Score 7

## 2023-01-27 NOTE — PROGRESS NOTES
Hospitalist Progress Note      PCP: Chintan Smith DO    Date of Admission: 1/26/2023    Chief Complaint: shortness of breath    Hospital Course:    71 y.o. male May Rojas.  -Asthma, obesity, hypertension, hyperlipidemia presented to ED with complains of shortness of breath  Shortness of breath progressively worse in the last 4 to 5 days associate with significant audible wheezing, cough, white phlegm production, denies fever chills. He was started on nebulizers by one of his physicians yesterday which did make some improvement but he continues to have shortness of breath worse with exertion.   -He follows with ENT has had 2 sinus surgeries last 1 year, follows with Dr. Hildred Lefort  -He also noted that for the last 1 month he has had bilateral lower extremity edema   -Denies fever chills or sick contacts  -He was walking to the ER room from prior to knee dropped to 78% and noted to have accessory muscle use, needing 2 L oxygen to maintain oxygen saturation above 92%  -VBG did not show evidence of hypercarbia. Sodium was 134 chloride 98 proBNP 32 troponin less than 0.01, EKG with sinus tachycardia without acute ST changes. He does not have leukocytosis. Rapid flu/rapid COVID-negative. X-ray showed atelectasis,  shunt tubing was seen.     Subjective:  breathing improving but not back to baseline  Multiple family members at bedside      Medications:  Reviewed    Infusion Medications    sodium chloride       Scheduled Medications    ipratropium-albuterol  1 ampule Inhalation TID    [START ON 1/28/2023] methylPREDNISolone  60 mg IntraVENous Daily    Followed by    Connie Menezes ON 1/29/2023] predniSONE  40 mg Oral Daily    aspirin  81 mg Oral Daily    atorvastatin  10 mg Oral Nightly    cetirizine  10 mg Oral Daily    finasteride  5 mg Oral Daily    gabapentin  100 mg Oral 4x Daily    montelukast  10 mg Oral Nightly    pantoprazole  40 mg Oral Daily    sertraline  75 mg Oral Daily    tamsulosin  0.8 mg Oral Daily    sodium chloride flush  5-40 mL IntraVENous 2 times per day    enoxaparin  30 mg SubCUTAneous BID    budesonide  0.5 mg Nebulization BID    And    arformoterol tartrate  15 mcg Nebulization BID     PRN Meds: sodium chloride flush, sodium chloride, ondansetron **OR** ondansetron, polyethylene glycol, acetaminophen **OR** acetaminophen, perflutren lipid microspheres      Intake/Output Summary (Last 24 hours) at 1/27/2023 1547  Last data filed at 1/27/2023 1030  Gross per 24 hour   Intake 490 ml   Output 850 ml   Net -360 ml       Physical Exam Performed:    /66   Pulse 82   Temp 97.6 °F (36.4 °C) (Oral)   Resp 18   Ht 6' 2\" (1.88 m)   Wt 260 lb 2.3 oz (118 kg)   SpO2 95%   BMI 33.40 kg/m²     General appearance: No apparent distress, appears stated age and cooperative. HEENT: Pupils equal, round, and reactive to light. Conjunctivae/corneas clear. Neck: Supple, with full range of motion. No jugular venous distention. Trachea midline. Respiratory:  Normal respiratory effort. , still with wheezing but improving  Cardiovascular: Regular rate and rhythm with normal S1/S2 without murmurs, rubs or gallops. Abdomen: Soft, non-tender, non-distended with normal bowel sounds. Musculoskeletal: 1+ edema bilaterally  Skin: Skin color, texture, turgor normal.  No rashes or lesions. Neurologic:  Neurovascularly intact without any focal sensory/motor deficits.  Cranial nerves: II-XII intact, grossly non-focal.  Psychiatric: Alert and oriented, thought content appropriate, normal insight  Capillary Refill: Brisk,< 3 seconds   Peripheral Pulses: +2 palpable, equal bilaterally       Labs:   Recent Labs     01/26/23  0936   WBC 9.4   HGB 14.1   HCT 42.4        Recent Labs     01/26/23  0936 01/27/23  1037   * 135*   K 4.0 4.7   CL 98* 97*   CO2 21 20*   BUN 15 20   CREATININE 0.9 1.1   CALCIUM 9.6 9.9   PHOS  --  2.7     No results for input(s): AST, ALT, BILIDIR, BILITOT, ALKPHOS in the last 72 hours. No results for input(s): INR in the last 72 hours. Recent Labs     01/26/23  0936   TROPONINI <0.01       Urinalysis:      Lab Results   Component Value Date/Time    NITRU Negative 06/01/2020 08:00 AM    WBCUA 0 06/01/2020 08:00 AM    BACTERIA 3+ 07/15/2018 12:00 PM    RBCUA 0 06/01/2020 08:00 AM    BLOODU Negative 06/01/2020 08:00 AM    SPECGRAV 1.025 06/01/2020 08:00 AM    GLUCOSEU Negative 06/01/2020 08:00 AM       Radiology:  XR CHEST PORTABLE   Final Result      Mild bibasilar atelectasis. Lungs otherwise clear. Mildly tortuous descending thoracic aorta. Top normal heart size. Right-sided ventriculoperitoneal shunt tubing noted. Assessment/Plan:    Active Hospital Problems    Diagnosis Date Noted    Asthma with acute exacerbation [J45.901] 01/26/2023     Priority: Medium     Acute respiratory failure with hypoxemia likely due to asthma exacerbation. proBNP is 32 which could be falsely low in the setting of obesity BMI 34.34 kg/m². Echo with diastolic dysfunction  Scheduled breathing treatments scheduled every 4 hours  Continue montelukast  Drop solu-Medrol 60 mg on Saturday, and then continue prednisone thereafter  Will give another dose of Lasix 20 mg  Input and output, daily standing weights     Lower extremity edema, as above, other differentials for edema could be due to steroid use/gabapentin use. Give another dose of Lasix     Hypertension -continue home medications     GERD/Child's esophagus, continue Prilosec     Hyperlipidemia, continue low-dose statin    History of NPH, s/p  shunt placed     BPH, continue Flomax, finasteride  Depression, continue Zoloft    DVT Prophylaxis: Lovenox  Diet: ADULT DIET; Regular;  Low Sodium (2 gm)  Code Status: Full Code    PT/OT Eval Status: order once acute issues resolved    Dispo - continue inpatient care    Eliane Garrison MD  Internal Medicine Resident

## 2023-01-27 NOTE — PROGRESS NOTES
4 Eyes Admission Assessment     I agree as the admission nurse that 2 RN's have performed a thorough Head to Toe Skin Assessment on the patient. ALL assessment sites listed below have been assessed on admission. Areas assessed by both nurses:   [x]   Head, Face, and Ears   [x]   Shoulders, Back, and Chest  [x]   Arms, Elbows, and Hands   [x]   Coccyx, Sacrum, and Ischium  [x]   Legs, Feet, and Heels        Does the Patient have Skin Breakdown?   No         Ke Prevention initiated:  No   Wound Care Orders initiated:  No      [facility] nurse consulted for Pressure Injury (Stage 3,4, Unstageable, DTI, NWPT, and Complex wounds) or Ke score 18 or lower:  No      Nurse 1 eSignature: Electronically signed by Fabian Woodard RN on 1/26/23 at 10:43 PM EST    **SHARE this note so that the co-signing nurse is able to place an eSignature**    Nurse 2 eSignature: Electronically signed by Luz Maria Art RN on 1/93/02 at 9:17 PM EST

## 2023-01-27 NOTE — CONSULTS
Pulmonary Consult    Patient's PCP: Javan Fulton DO  Referred by: Dr. Madhav Mann:    This is a  71 y.o. male with a history of multiple medicalproblems presenting with increased wheezing and shortness of breath. Patient reports over the past two weeks he has had increasing wheezing and over the past week has had increased in shortness of breath. States that he has been using his prescribed inhalers with the spacer as ordered. States that he picked up a nebulizer and medication and had been using but after the last treatment he \"felt funny, like it left a coating in my mouth and lungs\" He decided to come to the ED at that point. Reports a productive cough of clear. Reports and increase in frequency of cough and a decreased appetite. Denies nausea, vomiting, diarrhea, chest pain, chest tightness, dizziness, light-headedness or headache. States that he does not use oxygen at home.      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     Chronic pansinusitis     COPD (chronic obstructive pulmonary disease) ([facility] [address] 04/26/2022    Depression     Early onset Alzheimer's dementia [facility])     Erectile dysfunction     Hearing loss     Hyperlipidemia     Hypertension     Kidney failure 07/2018    Normal pressure hydrocephalus ([facility] [address] 07/2018    shunt placement    Screening for abdominal aortic aneurysm (AAA) performed 03/02/2018    [facility]    Tinnitus     Unspecified sleep apnea     Wears glasses     Wears hearing aid in both ears      Past Surgical History:   Procedure Laterality Date    COLONOSCOPY  12/14/2018    COLONOSCOPY WITH BIOPSY performed by Darian Pace MD at [facility]    ENTEROSCOPY N/A 01/04/2019    ENTEROSCOPY PUSH BIOPSY performed by Darian Pace MD at . [address] Left     lipoma    EYE SURGERY      as a child, weak eye muscles    HERNIA REPAIR umbilical    JOINT REPLACEMENT Left 2015    PARTIAL KNEE REPLACMENT    LAPAROSCOPY N/A 07/18/2018    OPENING AND CLOSING FOR VENTRICULAR PERITONEAL SHUNT performed by Ana Braun MD at . [address] 07/18/2018     VENTRICULAR PERITONEAL SHUNT INSERTION RIGHT SIDE; (N/A )    IA CRTJ SHUNT [id number] TERMINUS N/A 07/18/2018    VENTRICULAR PERITONEAL SHUNT INSERTION RIGHT SIDE; performed by Opal Stone MD at [address] [place] [address] Bilateral 05/04/2022    BILATERAL INFERIOR TURBINATE REDUCTION, BILATERAL MAXILLARY ANTROSTOMY, BILATERAL TOTAL ETHMOIDECTOMY WITH SPEHNOIDECTOMY AND SEPTOPLASTY, BILATERAL FRONTAL SINUS EXPLORATION performed by Rich Nina MD at [address] [place] [address] Bilateral 9/30/2022    BILATERAL FRONTAL SINUS EXPLORATION, BILATERAL INFERIOR TURBINATE REDUCTION, BILATERAL MAXILLARY ANTROSTOMY, BILATERAL TOTAL ETHMOIDECTOMY WITH SPHENOIDECTOMY AND IMAGE NAVIGATION, REVISION SEPTOPLASTY performed by Rich Nina MD at [place] [address]. 12/14/2018    EGD BIOPSY performed by Rose Marie Mcgrath MD at [facility] 13 N/A 03/01/2019    ESOPHAGOGASTRODUODENOSCOPY WITH RADIOFREQUENCY  ABLATION/ ABLATIONS X19 performed by Rose Marie Mcgrath MD at [facility] 13 N/A 04/24/2019    ESOPHAGOGASTRODUODENOSCOPY WITH RADIOFREQUENCY ABLATION performed by Rose Marie Mcgrath MD at [facility] 13 04/24/2019    EGD BIOPSY performed by Rose Marie Mcgrath MD at [facility] 13 N/A 08/02/2019    ESOPHAGOGASTRODUODENOSCOPY RADIOFREQUENCY ABLATION performed by Rose Marie Mcgrath MD at [facility] 13 08/02/2019    EGD GASTRIC BIOPSY performed by Rose Marie Mcgrath MD at [facility] 13 08/02/2019    EGD POLYP COLD SNARE performed by Mynor Zaldivar MD at [address] [place] [address] N/A 11/19/2019    ESOPHAGOGASTRODUODENOSCOPY WITH RADIOFREQUENCY ABLATION performed by Mynor Zaldivar MD at [address] [place] [address] N/A 11/19/2019    EGD POLYP SNARE performed by Mynor Zaldivar MD at [address] [place] [address] N/A 07/15/2020    ESOPHAGOGASTRODUODENOSCOPY RADIOFREQUENCY ABLATION ON STANDBY performed by Mynor Zaldivar MD at [address] [place] [address] 07/15/2020    EGD BIOPSY performed by Mynor Zaldivar MD at [address] [place] [address] 07/16/2021    EGD BIOPSY performed by Mynor Zaldivar MD at [address] [place] [address] N/A 07/16/2021    EGD POLYP SNARE performed by Mynor Zaldivar MD at [facility]     Prior to Admission:    No current facility-administered medications on file prior to encounter. Current Outpatient Medications on File Prior to Encounter   Medication Sig Dispense Refill    pantoprazole (PROTONIX) 40 MG tablet Take 40 mg by mouth every morning      albuterol sulfate HFA (PROVENTIL;VENTOLIN;PROAIR) 108 (90 Base) MCG/ACT inhaler Inhale 2 puffs into the lungs every 6 hours as needed for Wheezing      gabapentin (NEURONTIN) 100 MG capsule Take 400 mg by mouth every morning.       tamsulosin (FLOMAX) 0.4 MG capsule Take 0.4 mg by mouth daily      albuterol (PROVENTIL) (2.5 MG/3ML) 0.083% nebulizer solution Take 3 mLs by nebulization every 6 hours as needed for Wheezing 120 each 3    finasteride (PROSCAR) 5 MG tablet Take 1 tablet by mouth daily 90 tablet 1    sertraline (ZOLOFT) 50 MG tablet TAKE 1 AND 1/2 TABLET BY MOUTH EVERY MORNING 135 tablet 1    cetirizine (ZYRTEC) 10 MG tablet TAKE 1 TABLET DAILY 90 tablet 3    atorvastatin (LIPITOR) 10 MG tablet TAKE 1 TABLET DAILY 90 tablet 3    Budeson-Glycopyrrol-Formoterol 160-9-4.8 MCG/ACT AERO Inhale 2 puffs into the lungs in the morning and at bedtime 32.1 g 3    fluticasone (FLONASE) 50 MCG/ACT nasal spray 1 spray by Each Nostril route daily 32 g 1    montelukast (SINGULAIR) 10 MG tablet Take 1 tablet by mouth nightly 90 tablet 3    aspirin 81 MG EC tablet Take 81 mg by mouth every morning      Omega-3 Fatty Acids (FISH OIL) 1000 MG CAPS Take 1,000 mg by mouth every morning      Flaxseed, Linseed, (FLAX SEED OIL) 1000 MG CAPS Take 1,000 mg by mouth daily      Cholecalciferol (VITAMIN D) 2000 UNITS CAPS capsule Take 1 capsule by mouth every morning      multivitamin (THERAGRAN) per tablet Take 1 tablet by mouth every morning         Allergies:  Lisinopril    Social History:   TOBACCO:   reports that he quit smoking about 21 years ago. His smoking use included cigarettes. He has a 5.00 pack-year smoking history. He has never used smokeless tobacco.     ETOH:   reports that he does not currently use alcohol after a past usage of about 2.0 standard drinks per week. Family History:       Problem Relation Age of Onset    Cancer Father         prostate    Hypertension Father     Coronary Art Dis Father          Review of Systems   Constitutional:  Positive for appetite change. Negative for chills, fatigue and fever. HENT:  Positive for congestion and postnasal drip. Negative for sinus pressure, sinus pain, sore throat and trouble swallowing. Eyes:  Negative for discharge. Respiratory:  Positive for cough, shortness of breath and wheezing. Negative for chest tightness. Cardiovascular:  Positive for leg swelling. Negative for chest pain. Gastrointestinal:  Negative for diarrhea, nausea and vomiting. Genitourinary: Negative. Musculoskeletal:  Negative for myalgias. Skin: Negative. Neurological:  Positive for weakness. Negative for dizziness, light-headedness and headaches.      PHYSICAL EXAM:  /87   Pulse 85   Temp 97.6 °F (36.4 °C) (Oral)   Resp 18   Ht 6' 2\" (1.88 m)   Wt 260 lb 2.3 oz (118 kg)   SpO2 94%   BMI 33.40 kg/m² Physical Exam  Vitals and nursing note reviewed. Constitutional:       Appearance: He is obese. Interventions: Nasal cannula in place. HENT:      Head: Normocephalic and atraumatic. Nose: Nose normal.      Mouth/Throat:      Mouth: Mucous membranes are dry. Eyes:      Conjunctiva/sclera: Conjunctivae normal.      Pupils: Pupils are equal, round, and reactive to light. Cardiovascular:      Rate and Rhythm: Normal rate and regular rhythm. Heart sounds: Normal heart sounds. Pulmonary:      Effort: Respiratory distress present. Breath sounds: Wheezing present. Abdominal:      General: Abdomen is protuberant. Bowel sounds are normal.      Tenderness: There is no abdominal tenderness. Musculoskeletal:      Cervical back: Normal range of motion. Right lower leg: Edema present. Left lower leg: Edema present. Skin:     General: Skin is warm and dry. Capillary Refill: Capillary refill takes less than 2 seconds. Neurological:      General: No focal deficit present. Mental Status: He is alert and oriented to person, place, and time. Psychiatric:         Attention and Perception: Attention normal.         Mood and Affect: Mood normal.         Behavior: Behavior normal. Behavior is cooperative. LABS:  Recent Labs     01/26/23  0936   WBC 9.4   HGB 14.1   HCT 42.4                                                                     Recent Labs     01/26/23  0936 01/27/23  1037   * 135*   K 4.0 4.7   CL 98* 97*   CO2 21 20*   BUN 15 20   CREATININE 0.9 1.1   GLUCOSE 133* 206*     No results for input(s): AST, ALT, ALB, BILITOT, ALKPHOS in the last 72 hours. Recent Labs     01/26/23  0936   TROPONINI <0.01     No results for input(s): BNP in the last 72 hours. No results found for: PHART, YLS8UIH, PO2ART  No results for input(s): INR in the last 72 hours.   No results for input(s): NITRITE, COLORU, PHUR, LABCAST, WBCUA, RBCUA, MUCUS, TRICHOMONAS, YEAST, BACTERIA, CLARITYU, SPECGRAV, LEUKOCYTESUR, UROBILINOGEN, BILIRUBINUR, BLOODU, GLUCOSEU, AMORPHOUS in the last 72 hours. Invalid input(s): Héctor Vasquez     DATA:  XR Chest Portable 1/26/23  Narrative   Chest portable 1003       HISTORY: Short of breath           Impression       Mild bibasilar atelectasis. Lungs otherwise clear. Mildly tortuous descending thoracic aorta. Top normal heart size. Right-sided ventriculoperitoneal shunt tubing noted. Echo 1/27/23  Conclusions  Summary  Definity contrast administered. Left ventricular cavity size is normal. There is mildly increased left ventricular wall thickness. Overall left ventricular systolic function appears normal. Ejection fraction is visually estimated to be 60-65%. No regional wall motion abnormalities are noted. Diastolic filling parameters suggest grade I diastolic dysfunction. There is a trivial pericardial effusion noted.     Assessment &Plan:    Patient Active Problem List:     Wrist arthritis     Hypertrophy of prostate without urinary obstruction and other lower urinary tract symptoms (LUTS)     Cardiac dysrhythmia     Onychomycosis     Obstructive sleep apnea     Personal history of other diseases of digestive system     Erectile dysfunction     Vitamin D deficiency     Mixed hyperlipidemia     Persistent depressive disorder     Dysthymia     Non-seasonal allergic rhinitis due to pollen     Pulmonary nodule     Back pain     NPH (normal pressure hydrocephalus) (HCC)-s/p shunt     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     Early onset Alzheimer's dementia (HCC)     Neuropathy     Pre-diabetes     Reactive airway disease without complication     Chronic pansinusitis     Deviated nasal septum     Hypertrophy, nasal, turbinate     Nasal obstruction     Nasal turbinate hypertrophy     Essential tremor     Asthma with acute exacerbation        Assessment/Plan  Reactive Airway Disease exacerbation-Patient with wheezing throughout, oxygen requirement of 3L decreased to 2L. Chest x-ray showed mild bibasilar atelectasis. Decrease Solu-medrol to 60 mg IV daily x 1 then change to prednisone 40 mg PO daily starting 1/29   Continue montelukast   Continue inhalers   Continue nebulizers   Wean oxygen for sats >90    HFpEF with Grade 1 DD-not in exacerbation. per Echo on 1/27. Bilateral lower leg edema. Received one dose lasix,   Will monitor edema and fluid status   Check I/O    Code Status: Full code  FEN: Low Sodium diet  DVT Prophylaxis: Lovenox    The patient and / or the family were informed of the results of any tests, a time was given to answer questions, a plan was proposed and they agreed with plan. Thank you Mat Osborn DO for the opportunity to be involved in this patients care.  If you have any questions or concerns please feel free to contact me       Malik BLOOD student

## 2023-01-28 LAB
ALBUMIN SERPL-MCNC: 4.2 G/DL (ref 3.4–5)
ANION GAP SERPL CALCULATED.3IONS-SCNC: 12 MMOL/L (ref 3–16)
BASOPHILS ABSOLUTE: 0.1 K/UL (ref 0–0.2)
BASOPHILS RELATIVE PERCENT: 0.8 %
BUN BLDV-MCNC: 29 MG/DL (ref 7–20)
CALCIUM SERPL-MCNC: 9.7 MG/DL (ref 8.3–10.6)
CHLORIDE BLD-SCNC: 102 MMOL/L (ref 99–110)
CO2: 24 MMOL/L (ref 21–32)
CREAT SERPL-MCNC: 1.1 MG/DL (ref 0.8–1.3)
EOSINOPHILS ABSOLUTE: 0.3 K/UL (ref 0–0.6)
EOSINOPHILS RELATIVE PERCENT: 2.6 %
GFR SERPL CREATININE-BSD FRML MDRD: >60 ML/MIN/{1.73_M2}
GLUCOSE BLD-MCNC: 114 MG/DL (ref 70–99)
HCT VFR BLD CALC: 40.5 % (ref 40.5–52.5)
HEMOGLOBIN: 13.6 G/DL (ref 13.5–17.5)
LYMPHOCYTES ABSOLUTE: 2.1 K/UL (ref 1–5.1)
LYMPHOCYTES RELATIVE PERCENT: 18.7 %
MCH RBC QN AUTO: 28 PG (ref 26–34)
MCHC RBC AUTO-ENTMCNC: 33.6 G/DL (ref 31–36)
MCV RBC AUTO: 83.4 FL (ref 80–100)
MONOCYTES ABSOLUTE: 1.1 K/UL (ref 0–1.3)
MONOCYTES RELATIVE PERCENT: 9.5 %
NEUTROPHILS ABSOLUTE: 7.8 K/UL (ref 1.7–7.7)
NEUTROPHILS RELATIVE PERCENT: 68.4 %
PDW BLD-RTO: 14.4 % (ref 12.4–15.4)
PHOSPHORUS: 3.7 MG/DL (ref 2.5–4.9)
PLATELET # BLD: 311 K/UL (ref 135–450)
PMV BLD AUTO: 6.6 FL (ref 5–10.5)
POTASSIUM SERPL-SCNC: 4.2 MMOL/L (ref 3.5–5.1)
RBC # BLD: 4.86 M/UL (ref 4.2–5.9)
SODIUM BLD-SCNC: 138 MMOL/L (ref 136–145)
WBC # BLD: 11.3 K/UL (ref 4–11)

## 2023-01-28 PROCEDURE — 94640 AIRWAY INHALATION TREATMENT: CPT

## 2023-01-28 PROCEDURE — 6370000000 HC RX 637 (ALT 250 FOR IP): Performed by: INTERNAL MEDICINE

## 2023-01-28 PROCEDURE — 85025 COMPLETE CBC W/AUTO DIFF WBC: CPT

## 2023-01-28 PROCEDURE — 36415 COLL VENOUS BLD VENIPUNCTURE: CPT

## 2023-01-28 PROCEDURE — 6360000002 HC RX W HCPCS: Performed by: INTERNAL MEDICINE

## 2023-01-28 PROCEDURE — 2060000000 HC ICU INTERMEDIATE R&B

## 2023-01-28 PROCEDURE — 94761 N-INVAS EAR/PLS OXIMETRY MLT: CPT

## 2023-01-28 PROCEDURE — 2580000003 HC RX 258: Performed by: INTERNAL MEDICINE

## 2023-01-28 PROCEDURE — 80069 RENAL FUNCTION PANEL: CPT

## 2023-01-28 RX ORDER — FUROSEMIDE 20 MG/1
20 TABLET ORAL ONCE
Status: COMPLETED | OUTPATIENT
Start: 2023-01-28 | End: 2023-01-28

## 2023-01-28 RX ORDER — FUROSEMIDE 20 MG/1
20 TABLET ORAL DAILY PRN
Qty: 30 TABLET | Refills: 1 | Status: CANCELLED | OUTPATIENT
Start: 2023-01-28

## 2023-01-28 RX ADMIN — SODIUM CHLORIDE, PRESERVATIVE FREE 5 ML: 5 INJECTION INTRAVENOUS at 08:52

## 2023-01-28 RX ADMIN — ATORVASTATIN CALCIUM 10 MG: 10 TABLET, FILM COATED ORAL at 21:43

## 2023-01-28 RX ADMIN — SERTRALINE HYDROCHLORIDE 75 MG: 50 TABLET ORAL at 08:51

## 2023-01-28 RX ADMIN — GABAPENTIN 100 MG: 100 CAPSULE ORAL at 21:43

## 2023-01-28 RX ADMIN — GABAPENTIN 100 MG: 100 CAPSULE ORAL at 08:51

## 2023-01-28 RX ADMIN — IPRATROPIUM BROMIDE AND ALBUTEROL SULFATE 1 AMPULE: 2.5; .5 SOLUTION RESPIRATORY (INHALATION) at 15:04

## 2023-01-28 RX ADMIN — MONTELUKAST 10 MG: 10 TABLET, FILM COATED ORAL at 21:43

## 2023-01-28 RX ADMIN — ALBUTEROL SULFATE 2.5 MG: 2.5 SOLUTION RESPIRATORY (INHALATION) at 20:13

## 2023-01-28 RX ADMIN — ARFORMOTEROL TARTRATE 15 MCG: 15 SOLUTION RESPIRATORY (INHALATION) at 09:03

## 2023-01-28 RX ADMIN — BUDESONIDE 500 MCG: 0.5 INHALANT RESPIRATORY (INHALATION) at 09:03

## 2023-01-28 RX ADMIN — GABAPENTIN 100 MG: 100 CAPSULE ORAL at 17:05

## 2023-01-28 RX ADMIN — CETIRIZINE HYDROCHLORIDE 10 MG: 10 TABLET, FILM COATED ORAL at 08:51

## 2023-01-28 RX ADMIN — TAMSULOSIN HYDROCHLORIDE 0.8 MG: 0.4 CAPSULE ORAL at 08:51

## 2023-01-28 RX ADMIN — ARFORMOTEROL TARTRATE 15 MCG: 15 SOLUTION RESPIRATORY (INHALATION) at 20:13

## 2023-01-28 RX ADMIN — FINASTERIDE 5 MG: 5 TABLET, FILM COATED ORAL at 08:51

## 2023-01-28 RX ADMIN — METHYLPREDNISOLONE SODIUM SUCCINATE 60 MG: 125 INJECTION, POWDER, FOR SOLUTION INTRAMUSCULAR; INTRAVENOUS at 08:51

## 2023-01-28 RX ADMIN — ENOXAPARIN SODIUM 30 MG: 100 INJECTION SUBCUTANEOUS at 21:42

## 2023-01-28 RX ADMIN — BUDESONIDE 500 MCG: 0.5 INHALANT RESPIRATORY (INHALATION) at 20:13

## 2023-01-28 RX ADMIN — GABAPENTIN 100 MG: 100 CAPSULE ORAL at 13:10

## 2023-01-28 RX ADMIN — PANTOPRAZOLE SODIUM 40 MG: 40 TABLET, DELAYED RELEASE ORAL at 08:51

## 2023-01-28 RX ADMIN — SODIUM CHLORIDE, PRESERVATIVE FREE 10 ML: 5 INJECTION INTRAVENOUS at 21:43

## 2023-01-28 RX ADMIN — ASPIRIN 81 MG: 81 TABLET, CHEWABLE ORAL at 08:51

## 2023-01-28 RX ADMIN — FUROSEMIDE 20 MG: 20 TABLET ORAL at 18:17

## 2023-01-28 RX ADMIN — ENOXAPARIN SODIUM 30 MG: 100 INJECTION SUBCUTANEOUS at 08:52

## 2023-01-28 RX ADMIN — IPRATROPIUM BROMIDE AND ALBUTEROL SULFATE 1 AMPULE: 2.5; .5 SOLUTION RESPIRATORY (INHALATION) at 09:03

## 2023-01-28 NOTE — PLAN OF CARE
Problem: Respiratory - Adult  Goal: Achieves optimal ventilation and oxygenation  Outcome: Progressing  Flowsheets (Taken 1/28/2023 1346)  Achieves optimal ventilation and oxygenation:   Assess for changes in respiratory status   Assess for changes in mentation and behavior   Oxygen supplementation based on oxygen saturation or arterial blood gases   Assess and instruct to report shortness of breath or any respiratory difficulty     Problem: Discharge Planning  Goal: Discharge to home or other facility with appropriate resources  Outcome: Progressing  Flowsheets (Taken 1/28/2023 1346)  Discharge to home or other facility with appropriate resources:   Identify barriers to discharge with patient and caregiver   Arrange for needed discharge resources and transportation as appropriate   Identify discharge learning needs (meds, wound care, etc)     Problem: Safety - Adult  Goal: Free from fall injury  Outcome: Progressing  Flowsheets (Taken 1/28/2023 1346)  Free From Fall Injury:   Instruct family/caregiver on patient safety   Based on caregiver fall risk screen, instruct family/caregiver to ask for assistance with transferring infant if caregiver noted to have fall risk factors

## 2023-01-28 NOTE — PLAN OF CARE
Problem: Respiratory - Adult  Goal: Achieves optimal ventilation and oxygenation  Outcome: Progressing  Flowsheets (Taken 1/27/2023 0815 by Geoff Araujo RN)  Achieves optimal ventilation and oxygenation: Assess for changes in respiratory status     Problem: Discharge Planning  Goal: Discharge to home or other facility with appropriate resources  Outcome: Progressing  Flowsheets (Taken 1/27/2023 0815 by Geoff Araujo RN)  Discharge to home or other facility with appropriate resources: Identify barriers to discharge with patient and caregiver     Problem: Safety - Adult  Goal: Free from fall injury  Outcome: Progressing  Flowsheets (Taken 1/27/2023 0732 by Geoff Araujo RN)  Free From Fall Injury: Instruct family/caregiver on patient safety

## 2023-01-28 NOTE — PLAN OF CARE
Aox4,weaned off from 02, sats above 90% on RA,VSS, denies pain and SOB, call light within reach, bed on lowest position,fall precaution observed, will continue to monitor.

## 2023-01-28 NOTE — PROGRESS NOTES
Hospitalist Progress Note      PCP: Aleksandra Roland DO    Date of Admission: 1/26/2023    Chief Complaint: shortness of breath    Hospital Course:    71 y.o. male Gerry Wilson.  -Asthma, obesity, hypertension, hyperlipidemia presented to ED with complains of shortness of breath  Shortness of breath progressively worse in the last 4 to 5 days associate with significant audible wheezing, cough, white phlegm production, denies fever chills. He was started on nebulizers by one of his physicians yesterday which did make some improvement but he continues to have shortness of breath worse with exertion.   -He follows with ENT has had 2 sinus surgeries last 1 year, follows with Dr. Soledad Olivera  -He also noted that for the last 1 month he has had bilateral lower extremity edema   -Denies fever chills or sick contacts  -He was walking to the ER room from prior to knee dropped to 78% and noted to have accessory muscle use, needing 2 L oxygen to maintain oxygen saturation above 92%  -VBG did not show evidence of hypercarbia. Sodium was 134 chloride 98 proBNP 32 troponin less than 0.01, EKG with sinus tachycardia without acute ST changes. He does not have leukocytosis. Rapid flu/rapid COVID-negative. X-ray showed atelectasis,  shunt tubing was seen.     Subjective:    Breathing has improvement breathing improving, is able able to get off oxygen and able to ambulate in the room  Had yellow-green phlegm output this morning      Medications:  Reviewed    Infusion Medications    sodium chloride       Scheduled Medications    ipratropium-albuterol  1 ampule Inhalation TID    [START ON 1/29/2023] predniSONE  40 mg Oral Daily    aspirin  81 mg Oral Daily    atorvastatin  10 mg Oral Nightly    cetirizine  10 mg Oral Daily    finasteride  5 mg Oral Daily    gabapentin  100 mg Oral 4x Daily    montelukast  10 mg Oral Nightly    pantoprazole  40 mg Oral Daily    sertraline  75 mg Oral Daily    tamsulosin  0.8 mg Oral Daily sodium chloride flush  5-40 mL IntraVENous 2 times per day    enoxaparin  30 mg SubCUTAneous BID    budesonide  0.5 mg Nebulization BID    And    arformoterol tartrate  15 mcg Nebulization BID     PRN Meds: albuterol, sodium chloride flush, sodium chloride, ondansetron **OR** ondansetron, polyethylene glycol, acetaminophen **OR** acetaminophen, perflutren lipid microspheres      Intake/Output Summary (Last 24 hours) at 1/28/2023 1713  Last data filed at 1/28/2023 1419  Gross per 24 hour   Intake 480 ml   Output 900 ml   Net -420 ml       Physical Exam Performed:    /71   Pulse (!) 102   Temp 98.2 °F (36.8 °C) (Oral)   Resp 20   Ht 6' 2\" (1.88 m)   Wt 262 lb (118.8 kg)   SpO2 93%   BMI 33.64 kg/m²     General appearance: No apparent distress, appears stated age and cooperative. HEENT: Pupils equal, round, and reactive to light. Conjunctivae/corneas clear. Neck: Supple, with full range of motion. No jugular venous distention. Trachea midline. Respiratory:  Normal respiratory effort. Left him in the room and he got dyspneic but did not drop his sats sats remained above 92%  still with wheezing but significantly improved since admission  Cardiovascular: Regular rate and rhythm with normal S1/S2 without murmurs, rubs or gallops. Abdomen: Soft, non-tender, non-distended with normal bowel sounds. Musculoskeletal: Trace edema on exam today  Skin: Skin color, texture, turgor normal.  No rashes or lesions. Neurologic:  Neurovascularly intact without any focal sensory/motor deficits.  Cranial nerves: II-XII intact, grossly non-focal.  Psychiatric: Alert and oriented, thought content appropriate, normal insight  Capillary Refill: Brisk,< 3 seconds   Peripheral Pulses: +2 palpable, equal bilaterally       Labs:   Recent Labs     01/26/23  0936 01/28/23  0518   WBC 9.4 11.3*   HGB 14.1 13.6   HCT 42.4 40.5    311     Recent Labs     01/26/23  0936 01/27/23  1037 01/28/23  0517   * 135* 138   K 4.0 4.7 4.2   CL 98* 97* 102   CO2 21 20* 24   BUN 15 20 29*   CREATININE 0.9 1.1 1.1   CALCIUM 9.6 9.9 9.7   PHOS  --  2.7 3.7     No results for input(s): AST, ALT, BILIDIR, BILITOT, ALKPHOS in the last 72 hours. No results for input(s): INR in the last 72 hours. Recent Labs     01/26/23  0936   TROPONINI <0.01       Urinalysis:      Lab Results   Component Value Date/Time    NITRU Negative 06/01/2020 08:00 AM    WBCUA 0 06/01/2020 08:00 AM    BACTERIA 3+ 07/15/2018 12:00 PM    RBCUA 0 06/01/2020 08:00 AM    BLOODU Negative 06/01/2020 08:00 AM    SPECGRAV 1.025 06/01/2020 08:00 AM    GLUCOSEU Negative 06/01/2020 08:00 AM       Radiology:  XR CHEST PORTABLE   Final Result      Mild bibasilar atelectasis. Lungs otherwise clear. Mildly tortuous descending thoracic aorta. Top normal heart size. Right-sided ventriculoperitoneal shunt tubing noted. Assessment/Plan:    Active Hospital Problems    Diagnosis Date Noted    Asthma with acute exacerbation [J45.901] 01/26/2023     Priority: Medium     Acute respiratory failure with hypoxemia likely due to asthma exacerbation. proBNP is 32 which could be falsely low in the setting of obesity BMI 34.34 kg/m².  Echo with diastolic dysfunction  Scheduled breathing treatments scheduled every 4 hours  Continue montelukast  S/p 60 mg Solu-Medrol this morning  Plan for prednisone 40 mg tomorrow morning  Continue breathing treatments  We will start him on Lasix 20 mg once daily oral  He has made significant improvement since admission, will monitor x24 hours and if stable he can be discharged on Sunday morning     Lower extremity edema, improving with Lasix  Need for intravenous dose today, will give oral dose 20 mg  On discharge will need 20 mg oral Lasix as needed    Hypertension -continue home medications     GERD/Child's esophagus, continue Prilosec     Hyperlipidemia, continue low-dose statin    History of NPH, s/p  shunt placed     BPH, continue Flomax, finasteride  Depression, continue Zoloft    DVT Prophylaxis: Lovenox  Diet: ADULT DIET; Regular;  Low Sodium (2 gm)  Code Status: Full Code    PT/OT Eval Status: order once acute issues resolved    Dispo - continue inpatient care, He has made significant improvement since admission, will monitor x24 hours and if stable he can be discharged on Sunday morning    Michael Sellers MD  Internal Medicine Resident

## 2023-01-29 VITALS
SYSTOLIC BLOOD PRESSURE: 142 MMHG | OXYGEN SATURATION: 94 % | WEIGHT: 259.04 LBS | BODY MASS INDEX: 33.24 KG/M2 | TEMPERATURE: 98.3 F | DIASTOLIC BLOOD PRESSURE: 85 MMHG | RESPIRATION RATE: 18 BRPM | HEART RATE: 108 BPM | HEIGHT: 74 IN

## 2023-01-29 LAB
ALBUMIN SERPL-MCNC: 4.1 G/DL (ref 3.4–5)
ANION GAP SERPL CALCULATED.3IONS-SCNC: 14 MMOL/L (ref 3–16)
BASOPHILS ABSOLUTE: 0 K/UL (ref 0–0.2)
BASOPHILS RELATIVE PERCENT: 0.2 %
BUN BLDV-MCNC: 26 MG/DL (ref 7–20)
CALCIUM SERPL-MCNC: 9.3 MG/DL (ref 8.3–10.6)
CHLORIDE BLD-SCNC: 99 MMOL/L (ref 99–110)
CO2: 24 MMOL/L (ref 21–32)
CREAT SERPL-MCNC: 1.1 MG/DL (ref 0.8–1.3)
EOSINOPHILS ABSOLUTE: 0.2 K/UL (ref 0–0.6)
EOSINOPHILS RELATIVE PERCENT: 2 %
GFR SERPL CREATININE-BSD FRML MDRD: >60 ML/MIN/{1.73_M2}
GLUCOSE BLD-MCNC: 114 MG/DL (ref 70–99)
HCT VFR BLD CALC: 37.6 % (ref 40.5–52.5)
HEMOGLOBIN: 12.8 G/DL (ref 13.5–17.5)
LYMPHOCYTES ABSOLUTE: 2 K/UL (ref 1–5.1)
LYMPHOCYTES RELATIVE PERCENT: 18.7 %
MCH RBC QN AUTO: 28.5 PG (ref 26–34)
MCHC RBC AUTO-ENTMCNC: 34.1 G/DL (ref 31–36)
MCV RBC AUTO: 83.6 FL (ref 80–100)
MONOCYTES ABSOLUTE: 1 K/UL (ref 0–1.3)
MONOCYTES RELATIVE PERCENT: 9.6 %
NEUTROPHILS ABSOLUTE: 7.4 K/UL (ref 1.7–7.7)
NEUTROPHILS RELATIVE PERCENT: 69.5 %
PDW BLD-RTO: 14.3 % (ref 12.4–15.4)
PHOSPHORUS: 4.1 MG/DL (ref 2.5–4.9)
PLATELET # BLD: 286 K/UL (ref 135–450)
PMV BLD AUTO: 6.6 FL (ref 5–10.5)
POTASSIUM SERPL-SCNC: 4.5 MMOL/L (ref 3.5–5.1)
RBC # BLD: 4.49 M/UL (ref 4.2–5.9)
SODIUM BLD-SCNC: 137 MMOL/L (ref 136–145)
WBC # BLD: 10.6 K/UL (ref 4–11)

## 2023-01-29 PROCEDURE — 80069 RENAL FUNCTION PANEL: CPT

## 2023-01-29 PROCEDURE — 6370000000 HC RX 637 (ALT 250 FOR IP): Performed by: INTERNAL MEDICINE

## 2023-01-29 PROCEDURE — 94640 AIRWAY INHALATION TREATMENT: CPT

## 2023-01-29 PROCEDURE — 36415 COLL VENOUS BLD VENIPUNCTURE: CPT

## 2023-01-29 PROCEDURE — 94761 N-INVAS EAR/PLS OXIMETRY MLT: CPT

## 2023-01-29 PROCEDURE — 85025 COMPLETE CBC W/AUTO DIFF WBC: CPT

## 2023-01-29 PROCEDURE — 2580000003 HC RX 258: Performed by: INTERNAL MEDICINE

## 2023-01-29 PROCEDURE — 6360000002 HC RX W HCPCS: Performed by: INTERNAL MEDICINE

## 2023-01-29 RX ORDER — PREDNISONE 20 MG/1
40 TABLET ORAL DAILY
Qty: 10 TABLET | Refills: 0 | Status: SHIPPED | OUTPATIENT
Start: 2023-01-29 | End: 2023-02-03

## 2023-01-29 RX ADMIN — SODIUM CHLORIDE, PRESERVATIVE FREE 5 ML: 5 INJECTION INTRAVENOUS at 08:06

## 2023-01-29 RX ADMIN — PANTOPRAZOLE SODIUM 40 MG: 40 TABLET, DELAYED RELEASE ORAL at 08:05

## 2023-01-29 RX ADMIN — BUDESONIDE 500 MCG: 0.5 INHALANT RESPIRATORY (INHALATION) at 08:18

## 2023-01-29 RX ADMIN — ENOXAPARIN SODIUM 30 MG: 100 INJECTION SUBCUTANEOUS at 08:04

## 2023-01-29 RX ADMIN — PREDNISONE 40 MG: 20 TABLET ORAL at 08:05

## 2023-01-29 RX ADMIN — IPRATROPIUM BROMIDE AND ALBUTEROL SULFATE 1 AMPULE: 2.5; .5 SOLUTION RESPIRATORY (INHALATION) at 08:18

## 2023-01-29 RX ADMIN — CETIRIZINE HYDROCHLORIDE 10 MG: 10 TABLET, FILM COATED ORAL at 08:05

## 2023-01-29 RX ADMIN — ARFORMOTEROL TARTRATE 15 MCG: 15 SOLUTION RESPIRATORY (INHALATION) at 08:18

## 2023-01-29 RX ADMIN — TAMSULOSIN HYDROCHLORIDE 0.8 MG: 0.4 CAPSULE ORAL at 08:05

## 2023-01-29 RX ADMIN — SERTRALINE HYDROCHLORIDE 75 MG: 50 TABLET ORAL at 08:05

## 2023-01-29 RX ADMIN — ASPIRIN 81 MG: 81 TABLET, CHEWABLE ORAL at 08:05

## 2023-01-29 RX ADMIN — FINASTERIDE 5 MG: 5 TABLET, FILM COATED ORAL at 08:05

## 2023-01-29 RX ADMIN — GABAPENTIN 100 MG: 100 CAPSULE ORAL at 08:05

## 2023-01-29 NOTE — DISCHARGE SUMMARY
Hospital Medicine Discharge Summary    Patient ID: Carmen Tesfaye. Patient's PCP: Elisa Mock DO    Admit Date: 1/26/2023     Discharge Date:   01/29/2023    Admitting Physician: Nataliya Oscar MD     Discharge Physician: Nataliya Oscar MD     Discharge Diagnoses: Active Hospital Problems    Diagnosis Date Noted    Asthma with acute exacerbation [J45.901] 01/26/2023     Priority: Medium       The patient was seen and examined on day of discharge and this discharge summary is in conjunction with any daily progress note from day of discharge. Hospital Course:   71 y.o. male Carmen Tesfaye.  -Asthma, obesity, hypertension, hyperlipidemia presented to ED with complains of shortness of breath  Shortness of breath progressively worse in the last 4 to 5 days associate with significant audible wheezing, cough, white phlegm production, denies fever chills. He was started on nebulizers by one of his physicians yesterday which did make some improvement but he continues to have shortness of breath worse with exertion.   -He follows with ENT has had 2 sinus surgeries last 1 year, follows with Dr. Nanci Hartman  -He also noted that for the last 1 month he has had bilateral lower extremity edema   -Denies fever chills or sick contacts  -He was walking to the ER room from prior to knee dropped to 78% and noted to have accessory muscle use, needing 2 L oxygen to maintain oxygen saturation above 92%  -VBG did not show evidence of hypercarbia. Sodium was 134 chloride 98 proBNP 32 troponin less than 0.01, EKG with sinus tachycardia without acute ST changes. He does not have leukocytosis. Rapid flu/rapid COVID-negative. X-ray showed atelectasis,  shunt tubing was seen. Problems addressed  Acute respiratory failure with hypoxemia likely due to asthma exacerbation. proBNP is 32 which could be falsely low in the setting of obesity BMI 34.34 kg/m².  Echo with diastolic dysfunction  Scheduled breathing treatments scheduled every 4 hours  Continue montelukast  S/p 60 mg Solu-Medrol this morning  Prednisone x 5 days  Continue breathing treatments  Lasix prn for leg swelling    Lower extremity edema, improving with Lasix  Continue lasix prn     Hypertension -continue home medications     GERD/Child's esophagus, continue Prilosec     Hyperlipidemia, continue low-dose statin    History of NPH, s/p  shunt placed     BPH, continue Flomax, finasteride  Depression, continue Zoloft    Physical Exam Performed:     BP (!) 142/85   Pulse 72   Temp 98.3 °F (36.8 °C) (Oral)   Resp 18   Ht 6' 2\" (1.88 m)   Wt 259 lb 0.7 oz (117.5 kg)   SpO2 94%   BMI 33.26 kg/m²       General appearance:  No apparent distress, appears stated age and cooperative. HEENT:  Normal cephalic, atraumatic without obvious deformity. Pupils equal, round, and reactive to light. Extra ocular muscles intact. Conjunctivae/corneas clear. Neck: Supple, with full range of motion. No jugular venous distention. Trachea midline. Respiratory:  Normal respiratory effort. Faint wheezing present, significant improved from admission. Cardiovascular:  Regular rate and rhythm with normal S1/S2 without murmurs, rubs or gallops. Abdomen: Soft, non-tender, non-distended with normal bowel sounds. Musculoskeletal:  No clubbing, cyanosis or edema bilaterally. Full range of motion without deformity. Skin: Skin color, texture, turgor normal.  No rashes or lesions. Neurologic:  Neurovascularly intact without any focal sensory/motor deficits. Cranial nerves: II-XII intact, grossly non-focal.  Psychiatric:  Alert and oriented, thought content appropriate, normal insight  Capillary Refill: Brisk,< 3 seconds   Peripheral Pulses: +2 palpable, equal bilaterally       Labs:  For convenience and continuity at follow-up the following most recent labs are provided:      CBC:    Lab Results   Component Value Date/Time    WBC 10.6 01/29/2023 04:21 AM    HGB 12.8 01/29/2023 04:21 AM    HCT 37.6 01/29/2023 04:21 AM     01/29/2023 04:21 AM       Renal:    Lab Results   Component Value Date/Time     01/29/2023 04:21 AM    K 4.5 01/29/2023 04:21 AM    K 4.0 01/26/2023 09:36 AM    CL 99 01/29/2023 04:21 AM    CO2 24 01/29/2023 04:21 AM    BUN 26 01/29/2023 04:21 AM    CREATININE 1.1 01/29/2023 04:21 AM    CALCIUM 9.3 01/29/2023 04:21 AM    PHOS 4.1 01/29/2023 04:21 AM         Significant Diagnostic Studies    Radiology:   XR CHEST PORTABLE   Final Result      Mild bibasilar atelectasis. Lungs otherwise clear. Mildly tortuous descending thoracic aorta. Top normal heart size. Right-sided ventriculoperitoneal shunt tubing noted. Consults:     IP CONSULT TO HOSPITALIST  IP CONSULT TO PULMONOLOGY    Disposition:  Home     Condition at Discharge: Stable    Discharge Instructions/Follow-up:    Follow up w. Pulmonary and PCP    Code Status:  Full Code     Activity: activity as tolerated    Diet: regular diet      Discharge Medications:     Current Discharge Medication List             Details   predniSONE (DELTASONE) 20 MG tablet Take 2 tablets by mouth daily for 5 days  Qty: 10 tablet, Refills: 0    Associated Diagnoses: Chronic pansinusitis                Details   pantoprazole (PROTONIX) 40 MG tablet Take 40 mg by mouth every morning      albuterol sulfate HFA (PROVENTIL;VENTOLIN;PROAIR) 108 (90 Base) MCG/ACT inhaler Inhale 2 puffs into the lungs every 6 hours as needed for Wheezing      gabapentin (NEURONTIN) 100 MG capsule Take 400 mg by mouth every morning.       tamsulosin (FLOMAX) 0.4 MG capsule Take 0.4 mg by mouth daily      albuterol (PROVENTIL) (2.5 MG/3ML) 0.083% nebulizer solution Take 3 mLs by nebulization every 6 hours as needed for Wheezing  Qty: 120 each, Refills: 3      finasteride (PROSCAR) 5 MG tablet Take 1 tablet by mouth daily  Qty: 90 tablet, Refills: 1      sertraline (ZOLOFT) 50 MG tablet TAKE 1 AND 1/2 TABLET BY MOUTH EVERY MORNING  Qty: 135 tablet, Refills: 1    Associated Diagnoses: Moderate episode of recurrent major depressive disorder (HCC)      cetirizine (ZYRTEC) 10 MG tablet TAKE 1 TABLET DAILY  Qty: 90 tablet, Refills: 3      atorvastatin (LIPITOR) 10 MG tablet TAKE 1 TABLET DAILY  Qty: 90 tablet, Refills: 3      Budeson-Glycopyrrol-Formoterol 160-9-4.8 MCG/ACT AERO Inhale 2 puffs into the lungs in the morning and at bedtime  Qty: 32.1 g, Refills: 3      fluticasone (FLONASE) 50 MCG/ACT nasal spray 1 spray by Each Nostril route daily  Qty: 32 g, Refills: 1    Associated Diagnoses: Acute recurrent sinusitis, unspecified location      montelukast (SINGULAIR) 10 MG tablet Take 1 tablet by mouth nightly  Qty: 90 tablet, Refills: 3    Associated Diagnoses: Chronic nonseasonal allergic rhinitis due to pollen      aspirin 81 MG EC tablet Take 81 mg by mouth every morning      Omega-3 Fatty Acids (FISH OIL) 1000 MG CAPS Take 1,000 mg by mouth every morning      Flaxseed, Linseed, (FLAX SEED OIL) 1000 MG CAPS Take 1,000 mg by mouth daily      Cholecalciferol (VITAMIN D) 2000 UNITS CAPS capsule Take 1 capsule by mouth every morning      multivitamin (THERAGRAN) per tablet Take 1 tablet by mouth every morning             Time Spent on discharge is more than 30 minutes in the examination, evaluation, counseling and review of medications and discharge plan. Signed:    Didi Dempsey MD   1/29/2023      Thank you Jennifer Rodriguez DO for the opportunity to be involved in this patient's care. If you have any questions or concerns please feel free to contact me at [phone].

## 2023-01-29 NOTE — PROGRESS NOTES
After Visit Summary reviewed with patient, questions answered to satisfaction. PIV removed. Telemetry removed and returned to . This nurse discharged patient to private transportation via wheelchair.

## 2023-01-29 NOTE — CARE COORDINATION
Discharge order noted, no CM needs. IMM signed.   Tamara Naranjo RN, BSN, [address]  Case Management Department  [phone]

## 2023-01-29 NOTE — PLAN OF CARE
Problem: Respiratory - Adult  Goal: Achieves optimal ventilation and oxygenation  1/29/2023 0233 by Galina Rodriges RN  Outcome: Progressing  Flowsheets (Taken 1/28/2023 1346 by Shefali Forrest RN)  Achieves optimal ventilation and oxygenation:   Assess for changes in respiratory status   Assess for changes in mentation and behavior   Oxygen supplementation based on oxygen saturation or arterial blood gases   Assess and instruct to report shortness of breath or any respiratory difficulty     Problem: Discharge Planning  Goal: Discharge to home or other facility with appropriate resources  1/29/2023 0233 by Galina Rodriges RN  Outcome: Progressing  Flowsheets (Taken 1/28/2023 1346 by Shefali Forrest RN)  Discharge to home or other facility with appropriate resources:   Identify barriers to discharge with patient and caregiver   Arrange for needed discharge resources and transportation as appropriate   Identify discharge learning needs (meds, wound care, etc)     Problem: Safety - Adult  Goal: Free from fall injury  1/29/2023 0233 by Galina Rodriges RN  Outcome: Progressing  Flowsheets (Taken 1/28/2023 1346 by Shefali Forrest RN)  Free From Fall Injury:   Instruct family/caregiver on patient safety   Based on caregiver fall risk screen, instruct family/caregiver to ask for assistance with transferring infant if caregiver noted to have fall risk factors

## 2023-01-30 ENCOUNTER — CARE COORDINATION (OUTPATIENT)
Dept: CASE MANAGEMENT | Age: 73
End: 2023-01-30

## 2023-01-30 DIAGNOSIS — J45.901 ASTHMA WITH ACUTE EXACERBATION, UNSPECIFIED ASTHMA SEVERITY, UNSPECIFIED WHETHER PERSISTENT: Primary | ICD-10-CM

## 2023-01-30 PROCEDURE — 1111F DSCHRG MED/CURRENT MED MERGE: CPT | Performed by: INTERNAL MEDICINE

## 2023-01-30 NOTE — CARE COORDINATION
[facility] Care Transitions Initial Follow Up Call    Call within 2 business days of discharge: Yes    LPN Care Coordinator contacted the patient by telephone to perform post hospital discharge assessment. Verified name and  with patient as identifiers. Provided introduction to self, and explanation of the LPN Care Coordinator role. Patient: Pineda Wagner. Patient : 1950   MRN: [id number]  Reason for Admission: Asthma with acute exacerbation 11% [facility] PCP   Discharge Date: 23 RARS: Readmission Risk Score: 11.1      Last Discharge  Street       Date Complaint Diagnosis Description Type Department Provider    23 Shortness of Breath Acute hypoxemic respiratory failure ([facility] [address] . .. ED to Hosp-Admission (Discharged) (ADMITTED) [place] [address] PCU Dom Bradley MD; Ricki Seals,... Was this an external facility discharge? No Discharge Facility: The [address] [place] [address] to be reviewed by the provider   Additional needs identified to be addressed with provider: No  none               Method of communication with provider: none. Spoke with Pineda Wagner. Who reported that everything was going good. Patient denied cp, sob, cough, dizziness, headache, n/v, diarrhea, abdominal pains, fever, or chills. Patient report that appetite and fluid intake is good and denied any problems with bowel or bladder. Patient reported that he is taking all medications as ordered. Writer and patient did a complete medication review and 1111f was completed. Patient is aware of his appointment with Dr Mely Dawkins on . Patient denied any other needs at this time. Patient instructed to continue to monitor s/s, reporting any that may present to MD immediately for early intervention. Patient is not agreeable to f/u calls. [facility] provided contact information for future needs. Reminded of COVID 19 precautions. Episode closed.     LPN Care Coordinator reviewed discharge instructions, medical action plan, and red flags with patient who verbalized understanding. The patient was given an opportunity to ask questions and does not have any further questions or concerns at this time. Were discharge instructions available to patient? Yes. Reviewed appropriate site of care based on symptoms and resources available to patient including: PCP  Specialist  When to call 911. The patient agrees to contact the PCP office for questions related to their healthcare. Advance Care Planning:   Does patient have an Advance Directive: not on file. Medication reconciliation was performed with patient, who verbalizes understanding of administration of home medications. Medications reviewed, 1111F entered: yes    Was patient discharged with a pulse oximeter? no    Non-face-to-face services provided:  Obtained and reviewed discharge summary and/or continuity of care documents  Education of patient/family/caregiver/guardian to support self-management-s/s of Covid and when to contact the doctor  Assessment and support for treatment adherence and medication management-. Offered patient enrollment in the Remote Patient Monitoring (RPM) program for in-home monitoring:  No Patient declined continued follow up .     Care Transitions 24 Hour Call    Do you have a copy of your discharge instructions?: Yes  Do you have all of your prescriptions and are they filled?: Yes  Have you been contacted by a [address]?: No  Have you scheduled your follow up appointment?: Yes  How are you going to get to your appointment?: Car - drive self  Post Acute Services: Home Health (Comment: alternate solutions )  Patient DME: Shower chair, Straight cane, Walker, Other  Other Patient DME: shower chair, handheld shower head, long handle shoe horn, cane (inside home), rolling walker (outside home)   Do you have support at home?: Partner/Spouse/SO  Do you feel like you have everything you need to keep you well at home?: Yes  Are you an active caregiver in your home?: No  Care Transitions Interventions  No Identified Needs         Follow Up  Future Appointments   Date Time Provider Cedric Morillo   2/1/2023  3:00 PM MD Estelle Mccray P/[facility]   2/3/2023  9:00 AM Nataliia [address] CT RM 1 [facility] [employer]   3/6/2023  2:00 PM DO DEBBIE Verma [address] Coordinator provided contact information. No further follow-up call indicated based on severity of symptoms and risk factors.   Plan for next call:  Harrison Hurley LPN

## 2023-01-31 ENCOUNTER — TELEPHONE (OUTPATIENT)
Dept: INTERNAL MEDICINE CLINIC | Age: 73
End: 2023-01-31

## 2023-01-31 NOTE — TELEPHONE ENCOUNTER
.Care Transitions Initial Follow Up Call    Outreach made within 2 business days of discharge: Yes    Patient: Fiona Nation Patient : 1950   MRN: [id number]  Reason for Admission: There are no discharge diagnoses documented for the most recent discharge. Discharge Date: 23       Spoke with: Patient's Wife    Discharge department/facility: [facility] Interactive Patient Contact:  Was patient able to fill all prescriptions: Yes  Was patient instructed to bring all medications to the follow-up visit: Yes  Is patient taking all medications as directed in the discharge summary?  Yes  Does patient understand their discharge instructions: Yes  Does patient have questions or concerns that need addressed prior to 7-14 day follow up office visit: no    Scheduled appointment with PCP within 7-14 days    Follow Up  Future Appointments   Date Time Provider Cedric Morillo   2023  3:00 PM MD Ken Hintonwood P/CC [facility]   2/3/2023  9:00 AM [facility] CT  1 [facility]   2023  3:30 PM DO DEBBIE Verma [place] [facility]   3/6/2023  2:00 PM Linda Parker DO [address], [address] [place] [address]

## 2023-02-01 ENCOUNTER — TELEPHONE (OUTPATIENT)
Dept: INTERNAL MEDICINE CLINIC | Age: 73
End: 2023-02-01

## 2023-02-01 ENCOUNTER — OFFICE VISIT (OUTPATIENT)
Dept: PULMONOLOGY | Age: 73
End: 2023-02-01
Payer: MEDICARE

## 2023-02-01 VITALS
HEIGHT: 74 IN | DIASTOLIC BLOOD PRESSURE: 87 MMHG | HEART RATE: 102 BPM | BODY MASS INDEX: 33.62 KG/M2 | WEIGHT: 262 LBS | OXYGEN SATURATION: 95 % | SYSTOLIC BLOOD PRESSURE: 136 MMHG

## 2023-02-01 DIAGNOSIS — J45.40 MODERATE PERSISTENT ASTHMA WITHOUT COMPLICATION: Primary | ICD-10-CM

## 2023-02-01 PROCEDURE — 3017F COLORECTAL CA SCREEN DOC REV: CPT | Performed by: INTERNAL MEDICINE

## 2023-02-01 PROCEDURE — 1036F TOBACCO NON-USER: CPT | Performed by: INTERNAL MEDICINE

## 2023-02-01 PROCEDURE — G8484 FLU IMMUNIZE NO ADMIN: HCPCS | Performed by: INTERNAL MEDICINE

## 2023-02-01 PROCEDURE — 99213 OFFICE O/P EST LOW 20 MIN: CPT | Performed by: INTERNAL MEDICINE

## 2023-02-01 PROCEDURE — 1111F DSCHRG MED/CURRENT MED MERGE: CPT | Performed by: INTERNAL MEDICINE

## 2023-02-01 PROCEDURE — G8427 DOCREV CUR MEDS BY ELIG CLIN: HCPCS | Performed by: INTERNAL MEDICINE

## 2023-02-01 PROCEDURE — 1123F ACP DISCUSS/DSCN MKR DOCD: CPT | Performed by: INTERNAL MEDICINE

## 2023-02-01 PROCEDURE — G8417 CALC BMI ABV UP PARAM F/U: HCPCS | Performed by: INTERNAL MEDICINE

## 2023-02-01 RX ORDER — ARFORMOTEROL TARTRATE 15 UG/2ML
1 SOLUTION RESPIRATORY (INHALATION) 2 TIMES DAILY
Qty: 120 ML | Refills: 3 | Status: SHIPPED | OUTPATIENT
Start: 2023-02-01

## 2023-02-01 RX ORDER — BUDESONIDE 0.5 MG/2ML
1 INHALANT ORAL 2 TIMES DAILY
Qty: 120 ML | Refills: 3 | Status: SHIPPED | OUTPATIENT
Start: 2023-02-01

## 2023-02-01 RX ORDER — IPRATROPIUM BROMIDE AND ALBUTEROL SULFATE 2.5; .5 MG/3ML; MG/3ML
1 SOLUTION RESPIRATORY (INHALATION) EVERY 6 HOURS PRN
Qty: 360 ML | Refills: 3 | Status: SHIPPED | OUTPATIENT
Start: 2023-02-01

## 2023-02-01 ASSESSMENT — ENCOUNTER SYMPTOMS
SHORTNESS OF BREATH: 0
COUGH: 1
CHEST TIGHTNESS: 0
WHEEZING: 1

## 2023-02-01 NOTE — PROGRESS NOTES
[facility] Pulmonary and Critical Care    Outpatient Note    Subjective:   CHIEF COMPLAINT:   Chief Complaint   Patient presents with    3 Month Follow-Up     Interval history on 2/1/23  He was admitted to the hospital on 1/6 for asthma exacerbation. He had nasal congestion and post nasal drip. There is no fever or chills. He is on nebulizer now and use it twice a day. He is also five days course of prednisone. Interval history on 12/15/22  He had 2 sinus surgeries since I saw him last.  He continues to have episodes of cough with SOB and wheezing. In thanksgiving he had to be treated with steroids and breathing treatment. He use CPAP regularly. There is no heartburn. He is going to see Dr. Fanny Delgado in Feb.  He is on PPI. He use albuterol 6 times a day. It helps clearing the lung. He is also on Dulera twice a day. He has leg swelling over the past 6 months to a year. Interval history on 2/9/22  He had bad wheezing for two days last week but on 2/3/22 at 4:00am he visited the ED and was treated with albuterol and steroids injection. CXR was clear. He was sent home. He was prescribed with ABX, prednisone, dulera and albuterol. Now he feels better. Over the past 1.5 years he had several episodes of cough. Still on protonix. There is no heart burn. He follows with Dr. Fanny Delgado. Interval history on 7/20/20  Cough and wheezing is much better with symbicort. He is using it twice a day. He did not have to use the rescue inhaler. His main complain is hoarseness of voice. He is rinsing his month most of the time. There is no oral thrush. No sore throat. Of note he has salas's esophagus and for which he had ablation recently. HPI:  6/22/20   The patient is 71 y.o. male who presents today for a new patient visit for evaluation of cough going on for over 6 months.   He was investigated extensively and was seen by ENT    Cough is mainly in the morning and night. He sleep sitting at night over the past month. There is SOB with coughing fits for which he use albuterol inhaler. He had GERD and salas's esophagus. He is on PPI BID    No prior hx of asthma. Sister has asthma. Quit smoking in . Used to smoke 1 PPD for 10 years. No functional limitation. However he wheeze with exercise. No prior hx of heart disease. He is on CPAP at night for CAM since . Last sleep study in .  Last time CPAP was checked last year at the [place]    Past Medical History:    Past Medical History:   Diagnosis Date    Allergic rhinitis     Arthritis     R thumb, low back    Asthma     Salas esophagus     Chronic pansinusitis     COPD (chronic obstructive pulmonary disease) ([facility] [address] 2022    Depression     Early onset Alzheimer's dementia [facility])     Erectile dysfunction     Hearing loss     Hyperlipidemia     Hypertension     Kidney failure 2018    Normal pressure hydrocephalus ([facility] [address] 2018    shunt placement    Screening for abdominal aortic aneurysm (AAA) performed 2018    [facility]    Tinnitus     Unspecified sleep apnea     Wears glasses     Wears hearing aid in both ears        Social History:    Social History     Tobacco Use   Smoking Status Former    Packs/day: 0.50    Years: 10.00    Pack years: 5.00    Types: Cigarettes    Quit date: 2002    Years since quittin.0   Smokeless Tobacco Never       Family History:  Family History   Problem Relation Age of Onset    Cancer Father         prostate    Hypertension Father     Coronary Art Dis Father        Current Medications:  Current Outpatient Medications on File Prior to Visit   Medication Sig Dispense Refill    predniSONE (DELTASONE) 20 MG tablet Take 2 tablets by mouth daily for 5 days 10 tablet 0    pantoprazole (PROTONIX) 40 MG tablet Take 40 mg by mouth every morning      albuterol sulfate HFA (PROVENTIL;VENTOLIN;PROAIR) 108 (90 Base) MCG/ACT inhaler Inhale 2 puffs into the lungs every 6 hours as needed for Wheezing      gabapentin (NEURONTIN) 100 MG capsule Take 400 mg by mouth every morning. tamsulosin (FLOMAX) 0.4 MG capsule Take 0.4 mg by mouth daily      finasteride (PROSCAR) 5 MG tablet Take 1 tablet by mouth daily 90 tablet 1    sertraline (ZOLOFT) 50 MG tablet TAKE 1 AND 1/2 TABLET BY MOUTH EVERY MORNING 135 tablet 1    cetirizine (ZYRTEC) 10 MG tablet TAKE 1 TABLET DAILY 90 tablet 3    atorvastatin (LIPITOR) 10 MG tablet TAKE 1 TABLET DAILY 90 tablet 3    fluticasone (FLONASE) 50 MCG/ACT nasal spray 1 spray by Each Nostril route daily 32 g 1    montelukast (SINGULAIR) 10 MG tablet Take 1 tablet by mouth nightly 90 tablet 3    aspirin 81 MG EC tablet Take 81 mg by mouth every morning      Omega-3 Fatty Acids (FISH OIL) 1000 MG CAPS Take 1,000 mg by mouth every morning      Flaxseed, Linseed, (FLAX SEED OIL) 1000 MG CAPS Take 1,000 mg by mouth daily      Cholecalciferol (VITAMIN D) 2000 UNITS CAPS capsule Take 1 capsule by mouth every morning      multivitamin (THERAGRAN) per tablet Take 1 tablet by mouth every morning       No current facility-administered medications on file prior to visit. REVIEW OF SYSTEMS:    Review of Systems   Constitutional:  Negative for chills, fatigue, fever and unexpected weight change. HENT:  Negative for congestion. Respiratory:  Positive for cough and wheezing. Negative for chest tightness and shortness of breath. Cardiovascular:  Negative for chest pain, palpitations and leg swelling. Neurological:  Negative for weakness. Psychiatric/Behavioral:  The patient is not nervous/anxious. All other systems reviewed and are negative. Objective:   PHYSICAL EXAM:        VITALS:  /87 (Site: Left Upper Arm, Position: Sitting, Cuff Size: Large Adult)   Pulse (!) 102   Ht 6' 2\" (1.88 m)   Wt 262 lb (118.8 kg)   SpO2 95%   BMI 33.64 kg/m²     Physical Exam  Vitals reviewed.    Constitutional:       Appearance: He is well-developed. HENT:      Head: Normocephalic and atraumatic. Eyes:      Pupils: Pupils are equal, round, and reactive to light. Neck:      Vascular: No JVD. Cardiovascular:      Rate and Rhythm: Normal rate and regular rhythm. Heart sounds: No murmur heard. Pulmonary:      Effort: Pulmonary effort is normal. No respiratory distress. Breath sounds: No stridor. No wheezing or rales. Abdominal:      General: Bowel sounds are normal.      Palpations: Abdomen is soft. Musculoskeletal:         General: No deformity. Cervical back: Neck supple. Right lower leg: Edema present. Left lower leg: Edema present. Skin:     General: Skin is warm and dry. Neurological:      Mental Status: He is alert and oriented to person, place, and time. Psychiatric:         Behavior: Behavior normal.       DATA:    PFT 11/29/2019  FINDINGS:  Spirometry on this patient shows an FEV1 of 2.74 which is 70%  of predicted and a forced vital capacity of 3.54 which is 67% of  predicted, giving a ratio of 77. There was borderline response to  bronchodilators. Lung volumes show a decreased total lung capacity at  79% of predicted and decreased diffusion prior to correction for  alveolar lung volumes to come back at 91% of predicted. CONCLUSION:  Moderate restrictive defect with borderline bronchodilator  response and decreased diffusion prior to correction for alveolar lung  volumes. Findings could be consistent with the patient's BMI of 35.5,  but could not also rule out interstitial lung disease or pulmonary  vascular disease. Clinical correlation is recommended. CT chest   DATE: 12/17/2019       EXAM: CT CHEST WO CONTRAST       INDICATION: Cough, short of breath, abnormal pulmonary function tests       COMPARISON: None       TECHNIQUE: Axial CT imaging of the chest was performed. Axial images, multiplanar reformatted images and axial maximum intensity projection were reviewed.    Individualized dose optimization technique was used in order to meet ALARA standards for    radiation dose reduction. In addition to vendor specific dose reduction algorithms, the dose reduction techniques vary based on the specific scanner utilized but frequently include automated exposure control, adjustment of the mA and/or kV according to    patient size, and use of iterative reconstruction technique. Additional high-resolution inspiratory and expiratory imaging obtained. CONTRAST: None       FINDINGS:       LUNGS AND AIRWAYS: Airways are patent. No focal pulmonary consolidation. No significant interstitial thickening to suggest fibrosis. PLEURA: No pleural effusions or significant pleural thickening. HEART / GREAT VESSELS: Heart size is normal.  Minimal arterial calcification. ADENOPATHY/MEDIASTINUM: No adenopathy. CHEST WALL / LOWER NECK: No significant abnormality. UPPER ABDOMEN: Liver is decreased in attenuation. Left lobe contour prominence unchanged. Peritoneal catheter entering right upper quadrant. BONES: No acute abnormality. Impression       1. Clear lungs. No evidence of interstitial fibrosis. Echo on 12/27/18  Summary   Technically difficult examination with Definity. Normal left ventricular size and wall thickness. Normal left ventricular systolic function with an estimate ejection fraction   of 55-60%. There are no regional wall motion abnormalities. Normal diastolic function. The left atrium appears dilated. Trace mitral and tricuspid regurgitation. Estimated pulmonary artery systolic pressure is 25 mmHg assuming a right   atrial pressure of 3 mmHg. No prior echo for comparison. Echo on 6/24/20  Normal left ventricular size and wall thickness. Normal left ventricular   systolic function with an estimate ejection fraction of 60%. There are no   regional wall motion abnormalities. Indeterminate diastolic function.    Mitral annular calcification is present. MItral leaflets are thickened. Cannot rule out thrombus or vegetation on the anterior mitral leaflet, but   it may be reverberation of calcium. Clinical correlation advised. Trace tricuspid regurgitation. Estimated pulmonary artery systolic pressure is 25 mmHg assuming a right   atrial pressure of 8 mmHg (IVC not visualized, however). Assessment:      Diagnosis Orders   1. Moderate persistent asthma without complication            Plan:   71year old male has recurrent episodes of cough and wheezing with cough and sputum production. He was admitted to the hospital on 1/26/23 with asthma exacerbation. His bronchospasm is likely precipitated by post nasal drip. He has hx of GERD with Child's esophagus. He also has recurrent sinus congestion and had two sinus surgeries last year. He follows with ENT. He has hoarseness of voice after starting symbicort - this has resolved. No issues with Breztri. I watched him using an inhaler and his technique is not effective. He has spacer and continue to have recurrent exacerbations.       (PFT does not show obstruction, due to low FVC as FEV1/SVC is 69.8 consistent with obstructive disease )     Plan  D/c breztri  Start pulmicort and brovana  D/c albuterol and start duoneb PRN   Continue singulair

## 2023-02-01 NOTE — TELEPHONE ENCOUNTER
----- Message from [address] [facility] sent at 2/1/2023  1:21 PM EST -----  Subject: Message to Provider    QUESTIONS  Information for Provider? Pt just calling back to confirm his appt change  ---------------------------------------------------------------------------  --------------  Luz Dietrich INFO  [phone];  Do not leave any message, patient will call back for answer  ---------------------------------------------------------------------------  --------------  SCRIPT ANSWERS  undefined

## 2023-02-03 ENCOUNTER — HOSPITAL ENCOUNTER (OUTPATIENT)
Dept: CT IMAGING | Age: 73
Discharge: HOME | End: 2023-02-03
Payer: MEDICARE

## 2023-02-03 DIAGNOSIS — G91.2 IDIOPATHIC NORMAL PRESSURE HYDROCEPHALUS (INPH) (HCC): ICD-10-CM

## 2023-02-03 PROCEDURE — 70450 CT HEAD/BRAIN W/O DYE: CPT

## 2023-02-03 NOTE — PROGRESS NOTES
Physician Progress Note      PATIENT:               Trae Hutton  CSN #:                  [id number]  :                       1950  ADMIT DATE:       2023 9:16 AM  DISCH DATE:        2023 12:10 PM  RESPONDING  PROVIDER #:        Eve Schlatter MD          QUERY TEXT:    Patient admitted with cough, SOB, and wheezing,  noted to have history of   COPD. If possible, please document in progress notes and discharge summary if   you are evaluating and /or treating any of the following: The medical record reflects the following:  Risk Factors: Asthma, h/o COPD  Clinical Indicators: Patient presented with cough SOB and wheezing. Patient   was documented to have an acute exacerbation of asthma with a h/o COPD noted. Treatment: IV steroids, bronchodilators and supplemental O2 2-3Ls with   improvement of presenting symptoms.   Options provided:  -- COPD exacerbation and asthma exacerbation  -- COPD exacerbation  -- Asthma exacerbation  -- Other - I will add my own diagnosis  -- Disagree - Not applicable / Not valid  -- Disagree - Clinically unable to determine / Unknown  -- Refer to Clinical Documentation Reviewer    PROVIDER RESPONSE TEXT:    Acute asthma exacerbation, no hx of COPD    Query created by: Eli Camejo on 2/3/2023 8:24 AM      Electronically signed by:  Eve Schlatter MD 2/3/2023 10:08 AM

## 2023-02-06 ENCOUNTER — TELEPHONE (OUTPATIENT)
Dept: INTERNAL MEDICINE CLINIC | Age: 73
End: 2023-02-06

## 2023-02-06 DIAGNOSIS — K22.70 BARRETT'S ESOPHAGUS WITHOUT DYSPLASIA: Primary | ICD-10-CM

## 2023-02-06 NOTE — TELEPHONE ENCOUNTER
Patient's wife is requesting that Dr. Kerline Dial generate a referral with today's date on it for Dr. Francisco Javier Yusuf. Patient and his wife were seen today and Dr. Shameka Guerra office informed them that they will need a referral due to their insurance. Please fax referral to:  Dr. Cecilia Quinones  Fax#: [phone]    Please contact patient's wife with any additional questions.

## 2023-02-13 ENCOUNTER — OFFICE VISIT (OUTPATIENT)
Dept: INTERNAL MEDICINE CLINIC | Age: 73
End: 2023-02-13
Payer: MEDICARE

## 2023-02-13 VITALS
DIASTOLIC BLOOD PRESSURE: 68 MMHG | WEIGHT: 269.2 LBS | HEART RATE: 94 BPM | OXYGEN SATURATION: 96 % | SYSTOLIC BLOOD PRESSURE: 118 MMHG | BODY MASS INDEX: 34.55 KG/M2 | HEIGHT: 74 IN

## 2023-02-13 DIAGNOSIS — F33.1 MODERATE EPISODE OF RECURRENT MAJOR DEPRESSIVE DISORDER (HCC): ICD-10-CM

## 2023-02-13 DIAGNOSIS — G25.0 ESSENTIAL TREMOR: Primary | ICD-10-CM

## 2023-02-13 DIAGNOSIS — J45.40 MODERATE PERSISTENT ASTHMA WITHOUT COMPLICATION: ICD-10-CM

## 2023-02-13 DIAGNOSIS — G91.2 NPH (NORMAL PRESSURE HYDROCEPHALUS) (HCC): ICD-10-CM

## 2023-02-13 DIAGNOSIS — R73.03 PRE-DIABETES: ICD-10-CM

## 2023-02-13 DIAGNOSIS — E78.2 MIXED HYPERLIPIDEMIA: ICD-10-CM

## 2023-02-13 PROCEDURE — 1123F ACP DISCUSS/DSCN MKR DOCD: CPT | Performed by: INTERNAL MEDICINE

## 2023-02-13 PROCEDURE — 1036F TOBACCO NON-USER: CPT | Performed by: INTERNAL MEDICINE

## 2023-02-13 PROCEDURE — G8427 DOCREV CUR MEDS BY ELIG CLIN: HCPCS | Performed by: INTERNAL MEDICINE

## 2023-02-13 PROCEDURE — 1111F DSCHRG MED/CURRENT MED MERGE: CPT | Performed by: INTERNAL MEDICINE

## 2023-02-13 PROCEDURE — 3017F COLORECTAL CA SCREEN DOC REV: CPT | Performed by: INTERNAL MEDICINE

## 2023-02-13 PROCEDURE — 99214 OFFICE O/P EST MOD 30 MIN: CPT | Performed by: INTERNAL MEDICINE

## 2023-02-13 PROCEDURE — G8417 CALC BMI ABV UP PARAM F/U: HCPCS | Performed by: INTERNAL MEDICINE

## 2023-02-13 PROCEDURE — G8484 FLU IMMUNIZE NO ADMIN: HCPCS | Performed by: INTERNAL MEDICINE

## 2023-02-13 SDOH — ECONOMIC STABILITY: FOOD INSECURITY: WITHIN THE PAST 12 MONTHS, THE FOOD YOU BOUGHT JUST DIDN'T LAST AND YOU DIDN'T HAVE MONEY TO GET MORE.: NEVER TRUE

## 2023-02-13 SDOH — ECONOMIC STABILITY: INCOME INSECURITY: HOW HARD IS IT FOR YOU TO PAY FOR THE VERY BASICS LIKE FOOD, HOUSING, MEDICAL CARE, AND HEATING?: NOT HARD AT ALL

## 2023-02-13 SDOH — ECONOMIC STABILITY: FOOD INSECURITY: WITHIN THE PAST 12 MONTHS, YOU WORRIED THAT YOUR FOOD WOULD RUN OUT BEFORE YOU GOT MONEY TO BUY MORE.: NEVER TRUE

## 2023-02-13 NOTE — PROGRESS NOTES
Jared Palafox Jr. (:  1950) is a 72 y.o. male,Established patient, here for evaluation of the following chief complaint(s):  Follow-Up from Hospital    ASSESSMENT/PLAN:  1. Essential tremor  Unchanged.  Now following with neurology.  Not currently requiring medication.  2. Mixed hyperlipidemia  Continue statin.  Check CMP and lipid panel.  -     Lipid Panel; Future  -     Comprehensive Metabolic Panel; Future  -     Hemoglobin A1C; Future  3. Pre-diabetes  History of prediabetes.  Check hemoglobin A1c  -     Hemoglobin A1C; Future  4. Moderate episode of recurrent major depressive disorder (HCC)  Well-controlled.  Continue Zoloft at this time.  5. NPH (normal pressure hydrocephalus) (HCC)  Stable at this time.  Patient continues to follow with neurosurgery.   shunt is in place.  6. Asthma without exacerbation    Improved from recent exacerbation with hospitalization.  Now patient is switched to all nebulized medication.  Patient to continue all of these medications.  Patient to continue to follow with pulmonology.  Return in about 6 months (around 2023) for AWV.    SUBJECTIVE/OBJECTIVE:  HPI    Patient is a 72-year-old male with past medical history of essential tremor, normal pressure hydrocephalus, depression, asthma, hyperlipidemia and prediabetes who presents secondary to follow-up for the above listed chronic diseases.    Since her last visit patient has seen neurology.  He was diagnosed with an essential tremor.  He has not had any worsening in tremor.  He also had recent memory testing which was similar to prior a few years ago.  It is thought that maybe he does not have early onset Alzheimer's.    Patient was hospitalized recently for asthma exacerbation.  Patient followed up with his pulmonologist and has been switched to all nebulized medications for his asthma.  This seems to be helping.    Patient overall feeling well today without any complaints.  Review of Systems ROS negative except  for those noted in the HPI above. Vitals:    02/13/23 1134   BP: 118/68   Pulse: 94   SpO2: 96%         Physical Exam  Constitutional:       General: He is not in acute distress. Appearance: Normal appearance. He is not ill-appearing. HENT:      Head: Normocephalic and atraumatic. Eyes:      General: No scleral icterus. Extraocular Movements: Extraocular movements intact. Conjunctiva/sclera: Conjunctivae normal.   Cardiovascular:      Rate and Rhythm: Normal rate and regular rhythm. Pulses: Normal pulses. Heart sounds: No murmur heard. No friction rub. No gallop. Pulmonary:      Effort: Pulmonary effort is normal. No respiratory distress. Breath sounds: No wheezing, rhonchi or rales. Musculoskeletal:      Cervical back: No muscular tenderness. Right lower leg: No edema. Left lower leg: No edema. Lymphadenopathy:      Cervical: No cervical adenopathy. Skin:     General: Skin is warm. Findings: No erythema or rash. Neurological:      General: No focal deficit present. Mental Status: He is alert and oriented to person, place, and time. Psychiatric:      Comments: Flat affect         An electronic signature was used to authenticate this note.     --Addie Crow DO

## 2023-02-14 PROBLEM — J45.40 MODERATE PERSISTENT ASTHMA WITHOUT COMPLICATION: Status: ACTIVE | Noted: 2023-01-26

## 2023-02-15 ENCOUNTER — ANESTHESIA (OUTPATIENT)
Dept: ENDOSCOPY | Age: 73
End: 2023-02-15
Payer: MEDICARE

## 2023-02-15 ENCOUNTER — HOSPITAL ENCOUNTER (OUTPATIENT)
Age: 73
Setting detail: OUTPATIENT SURGERY
Discharge: HOME | End: 2023-02-15
Attending: INTERNAL MEDICINE | Admitting: INTERNAL MEDICINE
Payer: MEDICARE

## 2023-02-15 ENCOUNTER — ANESTHESIA EVENT (OUTPATIENT)
Dept: ENDOSCOPY | Age: 73
End: 2023-02-15
Payer: MEDICARE

## 2023-02-15 VITALS
SYSTOLIC BLOOD PRESSURE: 123 MMHG | HEIGHT: 74 IN | DIASTOLIC BLOOD PRESSURE: 80 MMHG | BODY MASS INDEX: 32.73 KG/M2 | RESPIRATION RATE: 20 BRPM | HEART RATE: 65 BPM | WEIGHT: 255 LBS | OXYGEN SATURATION: 98 % | TEMPERATURE: 97 F

## 2023-02-15 DIAGNOSIS — K22.70 BARRETT'S ESOPHAGUS WITHOUT DYSPLASIA: ICD-10-CM

## 2023-02-15 PROCEDURE — 7100000010 HC PHASE II RECOVERY - FIRST 15 MIN: Performed by: INTERNAL MEDICINE

## 2023-02-15 PROCEDURE — 3609010600 HC COLONOSCOPY POLYPECTOMY SNARE/COLD BIOPSY: Performed by: INTERNAL MEDICINE

## 2023-02-15 PROCEDURE — 6360000002 HC RX W HCPCS: Performed by: NURSE ANESTHETIST, CERTIFIED REGISTERED

## 2023-02-15 PROCEDURE — 2709999900 HC NON-CHARGEABLE SUPPLY: Performed by: INTERNAL MEDICINE

## 2023-02-15 PROCEDURE — 3609013500 HC EGD REMOVAL TUMOR POLYP/OTHER LESION SNARE TECH: Performed by: INTERNAL MEDICINE

## 2023-02-15 PROCEDURE — 3700000001 HC ADD 15 MINUTES (ANESTHESIA): Performed by: INTERNAL MEDICINE

## 2023-02-15 PROCEDURE — 88305 TISSUE EXAM BY PATHOLOGIST: CPT

## 2023-02-15 PROCEDURE — 3609012400 HC EGD TRANSORAL BIOPSY SINGLE/MULTIPLE: Performed by: INTERNAL MEDICINE

## 2023-02-15 PROCEDURE — 3700000000 HC ANESTHESIA ATTENDED CARE: Performed by: INTERNAL MEDICINE

## 2023-02-15 PROCEDURE — 2580000003 HC RX 258: Performed by: ANESTHESIOLOGY

## 2023-02-15 PROCEDURE — 7100000011 HC PHASE II RECOVERY - ADDTL 15 MIN: Performed by: INTERNAL MEDICINE

## 2023-02-15 RX ORDER — PROPOFOL 10 MG/ML
INJECTION, EMULSION INTRAVENOUS PRN
Status: DISCONTINUED | OUTPATIENT
Start: 2023-02-15 | End: 2023-02-15 | Stop reason: SDUPTHER

## 2023-02-15 RX ORDER — PROPOFOL 10 MG/ML
INJECTION, EMULSION INTRAVENOUS CONTINUOUS PRN
Status: DISCONTINUED | OUTPATIENT
Start: 2023-02-15 | End: 2023-02-15 | Stop reason: SDUPTHER

## 2023-02-15 RX ORDER — SODIUM CHLORIDE, SODIUM LACTATE, POTASSIUM CHLORIDE, CALCIUM CHLORIDE 600; 310; 30; 20 MG/100ML; MG/100ML; MG/100ML; MG/100ML
INJECTION, SOLUTION INTRAVENOUS CONTINUOUS
Status: DISCONTINUED | OUTPATIENT
Start: 2023-02-15 | End: 2023-02-15 | Stop reason: HOSPADM

## 2023-02-15 RX ADMIN — PROPOFOL 100 MG: 10 INJECTION, EMULSION INTRAVENOUS at 09:43

## 2023-02-15 RX ADMIN — SODIUM CHLORIDE, POTASSIUM CHLORIDE, SODIUM LACTATE AND CALCIUM CHLORIDE: 600; 310; 30; 20 INJECTION, SOLUTION INTRAVENOUS at 08:20

## 2023-02-15 RX ADMIN — PROPOFOL 150 MCG/KG/MIN: 10 INJECTION, EMULSION INTRAVENOUS at 09:42

## 2023-02-15 RX ADMIN — Medication 100 MG: at 09:42

## 2023-02-15 ASSESSMENT — PAIN - FUNCTIONAL ASSESSMENT
PAIN_FUNCTIONAL_ASSESSMENT: 0-10
PAIN_FUNCTIONAL_ASSESSMENT: 0-10

## 2023-02-15 ASSESSMENT — LIFESTYLE VARIABLES: SMOKING_STATUS: 0

## 2023-02-15 ASSESSMENT — PAIN SCALES - GENERAL
PAINLEVEL_OUTOF10: 0
PAINLEVEL_OUTOF10: 0

## 2023-02-15 ASSESSMENT — PAIN SCALES - WONG BAKER: WONGBAKER_NUMERICALRESPONSE: 0

## 2023-02-15 NOTE — PROGRESS NOTES
Ambulatory Surgery/Procedure Discharge Note    Vitals:    02/15/23 1030   BP: 123/80   Pulse: 65   Resp: 20   Temp:    SpO2: 98%       No intake/output data recorded. Restroom use offered before discharge. Yes    Pain assessment:  level of pain (1-10, 10 severe)  Pain Level: 0  Pt to Endoscopy recovery post EGD, Colonoscopy. Pt denies pain at this time. Pt denies nausea at this time, pt tolerating PO fluids well. Discharge instructions given to pt's wife and she states understanding of these instructions. Pt and pt's wife state that pt is \"ready to go. \"         Patient discharged to home/self care.  Patient discharged via wheel chair by transporter to waiting family/S.O.       2/15/2023 11:29 AM

## 2023-02-15 NOTE — DISCHARGE INSTRUCTIONS
ENDOSCOPY DISCHARGE INSTRUCTIONS:    Call the physician that did your procedure for any questions or concerns:           DR. Lucía Suarez:  [phone]               ACTIVITY:    There are potential side effects from the medications used for sedation and anesthesia during your procedure. These include:  Dizziness or light-headedness, confusion or memory loss, delayed reaction times, loss of coordination, nausea and vomiting. Because of your increased risk for injury, we ask that you observe the following precautions: For the next 24 hours,  DO NOT operate an automobile, bicycle, motorcycle, , power tools or large equipment of any kind. Do not drink alcohol, sign any legal documents or make any legal decisions for 24 hours. Do not bend your head over lower than your heart. DO sit on the side of bed/couch awhile before getting up. Plan on bedrest or quiet relaxation today. You may resume normal activities in 24 hours. DIET:    Your first meal today should be light, avoiding spicy and fatty foods. If you tolerate this first meal, then you may advance to your regular diet unless otherwise advised by your physician. NORMAL SYMPTOMS:  -Mild sore throat if youve had an EGD   -Gaseous discomfort if you've had an EGD or Colonoscopy. NOTIFY YOUR PHYSICIAN IF THESE SYMPTOMS OCCUR:  1. Fever (greater than 100)  5. Increased abdominal bloating  2. Severe pain    6. Excessive bleeding  3. Nausea and vomiting  7. Chest pain                                                                    4. Chills    8. Shortness of breath      ADDITIONAL INSTRUCTIONS:    Biopsy results: To be discussed at your follow-up visit. Educational Information: Gastric Biopsy to rule out H- Pylori, Gastric Polyp, Transverse Colon Polyp   Follow up: Schedule an appointment with Dr. Tim Harper for two weeks from now if you have not already done so.   Resume Aspirin on 2023    Please review these discharge instructions this evening or tomorrow for  information you may have forgotten. We want to thank you for choosing the [facility] as your health care provider. We always strive to provide you with excellent care while you are here. You may receive a survey in the mail regarding your care. We would appreciate you taking a few minutes of your time to complete this survey. Again, thank you for choosing the [address] [place] [address] Street:    Call the physician that did your procedure for any questions or concerns:           DR. Moore Cast:  [phone]               ACTIVITY:    There are potential side effects from the medications used for sedation and anesthesia during your procedure. These include:  Dizziness or light-headedness, confusion or memory loss, delayed reaction times, loss of coordination, nausea and vomiting. Because of your increased risk for injury, we ask that you observe the following precautions: For the next 24 hours,  DO NOT operate an automobile, bicycle, motorcycle, , power tools or large equipment of any kind. Do not drink alcohol, sign any legal documents or make any legal decisions for 24 hours. Do not bend your head over lower than your heart. DO sit on the side of bed/couch awhile before getting up. Plan on bedrest or quiet relaxation today. You may resume normal activities in 24 hours. DIET:    Your first meal today should be light, avoiding spicy and fatty foods. If you tolerate this first meal, then you may advance to your regular diet unless otherwise advised by your physician. NORMAL SYMPTOMS:  -Mild sore throat if youve had an EGD   -Gaseous discomfort if you've had an EGD or Colonoscopy. NOTIFY YOUR PHYSICIAN IF THESE SYMPTOMS OCCUR:  1. Fever (greater than 100)  5. Increased abdominal bloating  2. Severe pain    6. Excessive bleeding  3. Nausea and vomiting  7.  Chest pain                                                                    4. Annie    8. Shortness of breath      ADDITIONAL INSTRUCTIONS:    Biopsy results: To be discussed at your follow-up visit. Educational Information:Gastric Biopsy to rule out H- Pylori, Gastric Polyp, Transverse Colon Polyp  Resume Aspirin on Feb 22, 2023  Follow up: Schedule an appointment with Dr. Judie Alvarez for two weeks from now if you have not already done so. Please review these discharge instructions this evening or tomorrow for  information you may have forgotten. We want to thank you for choosing the [facility] as your health care provider. We always strive to provide you with excellent care while you are here. You may receive a survey in the mail regarding your care. We would appreciate you taking a few minutes of your time to complete this survey. Again, thank you for choosing the [address] [place] [address] Polyps: Care Instructions  Your Care Instructions     Colon polyps are growths in the colon or the rectum. The cause of most colon polyps is not known, and most people who get them do not have any problems. But a certain kind can turn into cancer. For this reason, regular testing for colon polyps is important for people as they get older. It is also important for anyone who has an increased risk for colon cancer. Polyps are usually found through routine colon cancer screening tests. Although most colon polyps are not cancerous, they are usually removed and then tested for cancer. Screening for colon cancer saves lives because the cancer can usually be cured if it is caught early. If you have a polyp that is the type that can turn into cancer, you may need more tests to examine your entire colon. The doctor will remove any other polyps that are found, and you will be tested more often. Follow-up care is a key part of your treatment and safety. Be sure to make and go to all appointments, and call your doctor if you are having problems.  It's also a good idea to know your test results and keep a list of the medicines you take. How can you care for yourself at home? Regular exams to look for colon polyps are the best way to prevent polyps from turning into colon cancer. These can include stool tests, sigmoidoscopy, colonoscopy, and CT colonography. Talk with your doctor about a testing schedule that is right for you. To prevent polyps  There is no home treatment that can prevent colon polyps. But these steps may help lower your risk for cancer. Stay active. Being active can help you get to and stay at a healthy weight. Try to exercise on most days of the week. Walking is a good choice. Eat well. Choose a variety of vegetables, fruits, legumes (such as peas and beans), fish, poultry, and whole grains. Do not smoke. If you need help quitting, talk to your doctor about stop-smoking programs and medicines. These can increase your chances of quitting for good. If you drink alcohol, limit how much you drink. Limit alcohol to 2 drinks a day for men and 1 drink a day for women. When should you call for help? Call your doctor now or seek immediate medical care if:    You have severe belly pain. Your stools are maroon or very bloody. Watch closely for changes in your health, and be sure to contact your doctor if:    You have a fever. You have nausea or vomiting. You have a change in bowel habits (new constipation or diarrhea). Your symptoms get worse or are not improving as expected. Learning About Gastric Polyps  What are gastric polyps? Gastric polyps are growths in the stomach. Most people who get them don't have any problems. The cause of most gastric polyps is not known. But some polyps grow in people who use stomach acid reducers called proton pump inhibitors (PPIs). People who have gastritis, ulcers, or an H. pylori infection in the stomach can sometimes get polyps. People who have a parent, brother, or sister with gastric polyps might have them too.   Most gastric polyps aren't cancer. But a certain kind of polyp can turn into cancer. What are the symptoms? You may not know that you have gastric polyps. Most polyps don't lead to symptoms. Once in a while, larger polyps may cause bleeding, belly pain, or a blockage in the stomach. How are polyps diagnosed and treated? Most gastric polyps are found during an endoscopy (say \"kq-SWU-cbjp-pee\") that is done for another health problem. Endoscopy is a test that uses a thin flexible tube to allow a doctor to look at the inside of your stomach. Your doctor will treat your polyps based on what he or she sees during this test. If your doctor finds a polyp during the test, he or she may take it out. It will then be tested to make sure it isn't cancer. If your doctor finds H. pylori bacteria in your stomach, he or she will prescribe antibiotics. If you have been taking a PPI, the doctor may prescribe a different medicine instead. Sometimes the doctor may suggest another endoscopy to see how treatment is working. He or she may also suggest this to recheck certain kinds of polyps. The doctor may also suggest a colonoscopy to look for polyps in your colon. Follow-up care is a key part of your treatment and safety. Be sure to make and go to all appointments, and call your doctor if you are having problems. It's also a good idea to know your test results and keep a list of the medicines you take. H. Pylori: About This Test  What is it? H. pylori tests are used to check for a Helicobacter pylori bacteria infection in the stomach and upper part of the small intestine. H. pylori can cause peptic ulcers. But most people with this type of bacteria in their digestive systems do not get ulcers. Different tests may be used to check for an H. pylori infection. Blood antibody test. This checks to see if your body has made antibodies to fight H. pylori bacteria.   Urea breath test. It tests your breath to see if you have H. pylori bacteria in your stomach. Stool antigen test. This test looks for substances in your feces (stool) that trigger the immune system to fight an H. pylori infection. (These substances are called H. pylori antigens.)  Stomach biopsy. A small sample (biopsy) is taken from the lining of your stomach and small intestine. The samples are checked for H. pylori. Why is this test done? H. pylori tests are done to:  Find out if an infection with H. pylori bacteria may be causing an ulcer or irritation of the stomach lining (gastritis). Find out if treatment for the infection worked. How do you prepare for the test?  Blood antibody test  You do not need to do anything before you have this test.  Urea breath test, stool antigen test, or stomach biopsy  Medicines you take may change the results of these tests. Be sure to tell your doctor about all the prescription and over-the-counter medicines you take. Your doctor may advise you to stop taking some of your medicines. Urea breath test or stomach biopsy  You will be asked to not eat or drink anything for a certain amount of time before your breath test or stomach biopsy. Follow your doctor's instructions about how long you need to avoid eating and drinking before the test. If you are going to have a stomach biopsy, your doctor will give you instructions on how to prepare. How is the test done? Blood antibody test  A health professional uses a needle to take a blood sample, usually from the arm. Urea breath test  A breath sample is collected when you blow into a balloon or blow bubbles into a bottle of liquid. The health professional will:  Collect a sample of your breath before the test starts. Give you a capsule or some water to swallow that contains tagged or radioactive material.  Collect more samples of your breath.  The samples will be tested to see if they contain material formed when H. pylori comes into contact with the tagged or radioactive material.  Stool antigen test  For this test, you may be asked to collect the stool sample at home. To collect the sample, you need to:  Pass stool into a dry container. Either solid or liquid stools can be collected. Be careful not to get urine or toilet tissue in with the stool sample. Replace the container cap. Label the container with your name, your doctor's name, and the date the sample was collected. Wash your hands well after you collect the sample. Take the sealed container to your doctor's office or to the lab as soon as you can. Stomach biopsy  A procedure called endoscopy is used to collect samples of tissue from the stomach and the first part of the small intestine. The tissue samples are tested in a lab to see if they contain H. pylori. Follow-up care is a key part of your treatment and safety. Be sure to make and go to all appointments, and call your doctor if you are having problems. It's also a good idea to keep a list of the medicines you take. Ask your doctor when you can expect to have your test results.

## 2023-02-15 NOTE — H&P
History and Physical / Pre-Sedation Assessment    Patient:  Vivi Lugo. :   1950     Intended Procedure:  egd and colonoscopy    HPI: salas esophagus. H/o polyps. H/o anemia    Past Medical History:   has a past medical history of Allergic rhinitis, Arthritis, Asthma, Salas esophagus, Chronic pansinusitis, COPD (chronic obstructive pulmonary disease) ([address] Depression, Early onset Alzheimer's dementia ([address] Erectile dysfunction, Hearing loss, Hyperlipidemia, Hypertension, Kidney failure, Normal pressure hydrocephalus ([address] Screening for abdominal aortic aneurysm (AAA) performed, Tinnitus, Unspecified sleep apnea, Wears glasses, and Wears hearing aid in both ears. Past Surgical History:   has a past surgical history that includes other surgical history (N/A, 2018); pr crtj shunt xhtqsyiaxo-fkseauabo-irnpjpa terminus (N/A, 2018); laparoscopy (N/A, 2018); Eye surgery (Left); joint replacement (Left, ); Upper gastrointestinal endoscopy (N/A, 2018); Colonoscopy (2018); Enteroscopy (N/A, 2019); Upper gastrointestinal endoscopy (N/A, 2019); Upper gastrointestinal endoscopy (N/A, 2019); Upper gastrointestinal endoscopy (N/A, 2019); Upper gastrointestinal endoscopy (N/A, 2019); Upper gastrointestinal endoscopy (N/A, 2019); Upper gastrointestinal endoscopy (N/A, 2019); Upper gastrointestinal endoscopy (N/A, 2019); Upper gastrointestinal endoscopy (N/A, 2019); Upper gastrointestinal endoscopy (N/A, 07/15/2020); Upper gastrointestinal endoscopy (N/A, 07/15/2020); Upper gastrointestinal endoscopy (N/A, 2021); Upper gastrointestinal endoscopy (N/A, 2021); eye surgery; hernia repair; Sinus endoscopy (Bilateral, 2022); and Sinus endoscopy (Bilateral, 2022). Medications:  Prior to Admission medications    Medication Sig Start Date End Date Taking?  Authorizing Provider   budesonide (PULMICORT) 0.5 MG/2ML nebulizer suspension Take 2 mLs by nebulization 2 times daily 2/1/23   Christiano Oliveira MD   arformoterol tartrate (BROVANA) 15 MCG/2ML NEBU Take 1 ampule by nebulization 2 times daily 2/1/23   Christiano Oliveira MD   ipratropium-albuterol (DUONEB) 0.5-2.5 (3) MG/3ML SOLN nebulizer solution Inhale 3 mLs into the lungs every 6 hours as needed for Shortness of Breath 2/1/23   Christiano Oliveira MD   pantoprazole (PROTONIX) 40 MG tablet Take 40 mg by mouth every morning    Historical Provider, MD   albuterol sulfate HFA (PROVENTIL;VENTOLIN;PROAIR) 108 (90 Base) MCG/ACT inhaler Inhale 2 puffs into the lungs every 6 hours as needed for Wheezing    Historical Provider, MD   gabapentin (NEURONTIN) 100 MG capsule Take 400 mg by mouth every morning.     Historical Provider, MD   tamsulosin (FLOMAX) 0.4 MG capsule Take 0.4 mg by mouth daily    Historical Provider, MD   finasteride (PROSCAR) 5 MG tablet Take 1 tablet by mouth daily 1/17/23   Linda Parker DO   sertraline (ZOLOFT) 50 MG tablet TAKE 1 AND 1/2 TABLET BY MOUTH EVERY MORNING 1/17/23   Linda Parker DO   cetirizine (ZYRTEC) 10 MG tablet TAKE 1 TABLET DAILY 12/30/22   Linda Parker DO   atorvastatin (LIPITOR) 10 MG tablet TAKE 1 TABLET DAILY 12/30/22   Linda Parker DO   fluticasone (FLONASE) 50 MCG/ACT nasal spray 1 spray by Each Nostril route daily 7/7/22   Linda Parker DO   montelukast (SINGULAIR) 10 MG tablet Take 1 tablet by mouth nightly 2/3/22   Avila Driscoll MD   aspirin 81 MG EC tablet Take 81 mg by mouth every morning    Historical Provider, MD   Omega-3 Fatty Acids (FISH OIL) 1000 MG CAPS Take 1,000 mg by mouth every morning    Historical Provider, MD   Flaxseed, Linseed, (FLAX SEED OIL) 1000 MG CAPS Take 1,000 mg by mouth daily    Historical Provider, MD   Cholecalciferol (VITAMIN D) 2000 UNITS CAPS capsule Take 1 capsule by mouth every morning    Historical Provider, MD   multivitamin [facility]) per tablet Take 1 tablet by mouth every morning    Historical Provider, MD       Family History:  family history includes Cancer in his father; Coronary Art Dis in his father; Hypertension in his father. Social History:   reports that he quit smoking about 21 years ago. His smoking use included cigarettes. He has a 5.00 pack-year smoking history. He has never used smokeless tobacco. He reports that he does not currently use alcohol after a past usage of about 2.0 standard drinks per week. He reports that he does not use drugs. Allergies:  Lisinopril    ROS:  twelve point system review was unremarkable except for above noted history. Nurses notes reviewed and agreed. Medications reviewed    Physical Exam:  Vital Signs: /88   Pulse 77   Temp 97.8 °F (36.6 °C) (Temporal)   Resp 14   Ht 6' 2\" (1.88 m)   Wt 255 lb (115.7 kg)   SpO2 96%   BMI 32.74 kg/m²    Skin: normal  HEENT: normal  Neck: supple. No adenopathy. No thyromegaly. No JVD. Pulmonary:Normal  Cardiac:Normal  Abdomen:Normal  MS: normal  Neuro: normal  Ext: no edema. Pulses normal    Pre-Procedure Assessment / Plan:  ASA 2 - Patient with mild systemic disease with no functional limitations  Mallampati Airway Assessment:  Mallampati Class II - (soft palate, fauces & uvula are visible)  Level of Sedation Plan: Moderate sedation  Post Procedure plan: Return to same level of care    I assessed the patient and find that the patient is in satisfactory condition to proceed with the planned procedure and sedation plan. I have explained the risk, benefits, and alternatives to the procedure. The patient understands and agrees to proceed.   Yes    Azael Stafford MD  8:26 AM 2/15/2023

## 2023-02-15 NOTE — ADDENDUM NOTE
Addendum  created 02/15/23 1215 by JEREMI Ghotra CRNA    Attestation recorded in [facility], [address] [facility] accepted, [facility] Attestations filed

## 2023-02-15 NOTE — ANESTHESIA POSTPROCEDURE EVALUATION
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     Kait Phase I: Kait Score: 10    Kait Phase II: Kait Score: 9      Anesthesia Post Evaluation    Patient location during evaluation: PACU  Patient participation: complete - patient participated  Level of consciousness: awake  Pain score: 0  Airway patency: patent  Nausea & Vomiting: no nausea and no vomiting  Complications: no  Cardiovascular status: hemodynamically stable  Respiratory status: acceptable  Hydration status: stable

## 2023-02-15 NOTE — ANESTHESIA PRE PROCEDURE
Department of Anesthesiology  Preprocedure Note       Name:  Corrinne Starks. Age:  71 y.o.  :  1950                                          MRN:  [id number]         Date:  2/15/2023      Surgeon: Jonathan Lindo):  Kendal Meadows MD    Procedure: Procedure(s):  ESOPHAGOGASTRODUODENOSCOPY    Medications prior to admission:   Prior to Admission medications    Medication Sig Start Date End Date Taking? Authorizing Provider   budesonide (PULMICORT) 0.5 MG/2ML nebulizer suspension Take 2 mLs by nebulization 2 times daily 23   Christiano Oliveira MD   arformoterol tartrate (BROVANA) 15 MCG/2ML NEBU Take 1 ampule by nebulization 2 times daily 23   Christiano Oliveira MD   ipratropium-albuterol (DUONEB) 0.5-2.5 (3) MG/3ML SOLN nebulizer solution Inhale 3 mLs into the lungs every 6 hours as needed for Shortness of Breath 23   Christiano Oliveira MD   pantoprazole (PROTONIX) 40 MG tablet Take 40 mg by mouth every morning    Historical Provider, MD   albuterol sulfate HFA (PROVENTIL;VENTOLIN;PROAIR) 108 (90 Base) MCG/ACT inhaler Inhale 2 puffs into the lungs every 6 hours as needed for Wheezing    Historical Provider, MD   gabapentin (NEURONTIN) 100 MG capsule Take 400 mg by mouth every morning.     Historical Provider, MD   tamsulosin (FLOMAX) 0.4 MG capsule Take 0.4 mg by mouth daily    Historical Provider, MD   finasteride (PROSCAR) 5 MG tablet Take 1 tablet by mouth daily 23   Linda Parker DO   sertraline (ZOLOFT) 50 MG tablet TAKE 1 AND 1/2 TABLET BY MOUTH EVERY MORNING 23   Linda Parker DO   cetirizine (ZYRTEC) 10 MG tablet TAKE 1 TABLET DAILY 22   Linda Parker DO   atorvastatin (LIPITOR) 10 MG tablet TAKE 1 TABLET DAILY 22   Linda Parker DO   fluticasone (FLONASE) 50 MCG/ACT nasal spray 1 spray by Each Nostril route daily 22   Linda Parker DO   montelukast (SINGULAIR) 10 MG tablet Take 1 tablet by mouth nightly 2/3/22   Kassi Wyatt MD aspirin 81 MG EC tablet Take 81 mg by mouth every morning    Historical Provider, MD   Omega-3 Fatty Acids (FISH OIL) 1000 MG CAPS Take 1,000 mg by mouth every morning    Historical Provider, MD   Flaxseed, Linseed, (FLAX SEED OIL) 1000 MG CAPS Take 1,000 mg by mouth daily    Historical Provider, MD   Cholecalciferol (VITAMIN D) 2000 UNITS CAPS capsule Take 1 capsule by mouth every morning    Historical Provider, MD   multivitamin (THERAGRAN) per tablet Take 1 tablet by mouth every morning    Historical Provider, MD       Current medications:    No current facility-administered medications for this visit. No current outpatient medications on file. Facility-Administered Medications Ordered in Other Visits   Medication Dose Route Frequency Provider Last Rate Last Admin    lactated ringers IV soln infusion   IntraVENous Continuous Lorrin Room, DO           Allergies:     Allergies   Allergen Reactions    Lisinopril Cough       Problem List:    Patient Active Problem List   Diagnosis Code    Wrist arthritis M19.039    Hypertrophy of prostate without urinary obstruction and other lower urinary tract symptoms (LUTS) N40.0    Cardiac dysrhythmia I49.9    Onychomycosis B35.1    Obstructive sleep apnea G47.33    Personal history of other diseases of digestive system Z87.19    Erectile dysfunction N52.9    Vitamin D deficiency E55.9    Mixed hyperlipidemia E78.2    Persistent depressive disorder F34.1    Dysthymia F34.1    Non-seasonal allergic rhinitis due to pollen J30.1    Pulmonary nodule R91.1    Back pain M54.9    NPH (normal pressure hydrocephalus) (HCC)-s/p shunt G91.2    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 K22.70    Early onset Alzheimer's dementia ([facility] [address] G30.0, F02.80    Neuropathy G62.9    Pre-diabetes R73.03    Reactive airway disease without complication M86.331    Chronic pansinusitis J32.4    Deviated nasal septum J34.2    Hypertrophy, nasal, turbinate J34.3    Nasal obstruction J34.89    Nasal turbinate hypertrophy J34.3    Essential tremor G25.0    Moderate persistent asthma without complication C84.10    Moderate episode of recurrent major depressive disorder ([facility] [address] F33.1       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     Chronic pansinusitis     COPD (chronic obstructive pulmonary disease) ([facility] [address] 04/26/2022    Depression     Early onset Alzheimer's dementia ([facility]     Erectile dysfunction     Hearing loss     Hyperlipidemia     Hypertension     Kidney failure 07/2018    Normal pressure hydrocephalus ([facility] [address] 07/2018    shunt placement    Screening for abdominal aortic aneurysm (AAA) performed 03/02/2018    [facility]    Tinnitus     Unspecified sleep apnea     Wears glasses     Wears hearing aid in both ears        Past Surgical History:        Procedure Laterality Date    COLONOSCOPY  12/14/2018    COLONOSCOPY WITH BIOPSY performed by Tim Almanzar MD at [facility] ENTEROSCOPY N/A 01/04/2019    ENTEROSCOPY PUSH BIOPSY performed by Tim Almanzar MD at [facility] Left     lipoma    EYE SURGERY      as a child, weak eye muscles    HERNIA REPAIR      umbilical    JOINT REPLACEMENT Left 2015    PARTIAL KNEE REPLACMENT    LAPAROSCOPY N/A 07/18/2018    OPENING AND CLOSING FOR VENTRICULAR PERITONEAL SHUNT performed by Trace Johnson MD at [address] [place], [place] [address]y 20 07/18/2018    [address]; (N/A )    MT CRTJ SHUNT [id number] TERMINUS N/A 07/18/2018    VENTRICULAR PERITONEAL SHUNT INSERTION RIGHT SIDE; performed by Eleni Grover MD at [address] Bilateral 05/04/2022    BILATERAL INFERIOR TURBINATE REDUCTION, BILATERAL MAXILLARY ANTROSTOMY, BILATERAL TOTAL ETHMOIDECTOMY WITH SPEHNOIDECTOMY AND SEPTOPLASTY, BILATERAL FRONTAL SINUS EXPLORATION performed by Shakir Villa MD at [address] Bilateral 9/30/2022    BILATERAL FRONTAL SINUS EXPLORATION, BILATERAL INFERIOR TURBINATE REDUCTION, BILATERAL MAXILLARY ANTROSTOMY, BILATERAL TOTAL ETHMOIDECTOMY WITH SPHENOIDECTOMY AND IMAGE NAVIGATION, REVISION SEPTOPLASTY performed by Shakir Villa MD at [facility] [address] 12/14/2018    EGD BIOPSY performed by Naida Patel MD at [address] N/A 03/01/2019    ESOPHAGOGASTRODUODENOSCOPY WITH RADIOFREQUENCY  ABLATION/ ABLATIONS X19 performed by Naida Patel MD at [address] N/A 04/24/2019    ESOPHAGOGASTRODUODENOSCOPY WITH RADIOFREQUENCY ABLATION performed by Naida Patel MD at [address] N/A 04/24/2019    EGD BIOPSY performed by Naida Patel MD at [address] N/A 08/02/2019    ESOPHAGOGASTRODUODENOSCOPY RADIOFREQUENCY ABLATION performed by Naida Patel MD at [address] N/A 08/02/2019    EGD GASTRIC BIOPSY performed by Naida Patel MD at [address] N/A 08/02/2019    EGD POLYP COLD SNARE performed by Naida Patel MD at [address] N/A 11/19/2019    ESOPHAGOGASTRODUODENOSCOPY WITH RADIOFREQUENCY ABLATION performed by Naida Patel MD at [address] N/A 11/19/2019    EGD POLYP SNARE performed by Naida Patel MD at [address] N/A 07/15/2020    ESOPHAGOGASTRODUODENOSCOPY RADIOFREQUENCY ABLATION ON STANDBY performed by Naida Patel MD at [address] 07/15/2020    EGD BIOPSY performed by Naida Patel MD at [address] 07/16/2021    EGD BIOPSY performed by [facility]ida Patel MD at [facility] ENDOSCOPY    UPPER GASTROINTESTINAL ENDOSCOPY N/A 2021    EGD POLYP SNARE performed by Kristina Rolon MD at [address] History:    Social History     Tobacco Use    Smoking status: Former     Packs/day: 0.50     Years: 10.00     Pack years: 5.00     Types: Cigarettes     Quit date: 2002     Years since quittin.1    Smokeless tobacco: Never   Substance Use Topics    Alcohol use: Not Currently     Alcohol/week: 2.0 standard drinks     Types: 2 Standard drinks or equivalent per week     Comment: rarely -- once a year                                Counseling given: Not Answered      Vital Signs (Current): There were no vitals filed for this visit.                                            BP Readings from Last 3 Encounters:   02/15/23 135/88   23 118/68   23 136/87       NPO Status:                                                                                 BMI:   Wt Readings from Last 3 Encounters:   02/15/23 255 lb (115.7 kg)   23 269 lb 3.2 oz (122.1 kg)   23 262 lb (118.8 kg)     There is no height or weight on file to calculate BMI.    CBC:   Lab Results   Component Value Date/Time    WBC 10.6 2023 04:21 AM    RBC 4.49 2023 04:21 AM    HGB 12.8 2023 04:21 AM    HCT 37.6 2023 04:21 AM    MCV 83.6 2023 04:21 AM    RDW 14.3 2023 04:21 AM     2023 04:21 AM       CMP:   Lab Results   Component Value Date/Time     2023 04:21 AM    K 4.5 2023 04:21 AM    K 4.0 2023 09:36 AM    CL 99 2023 04:21 AM    CO2 24 2023 04:21 AM    BUN 26 2023 04:21 AM    CREATININE 1.1 2023 04:21 AM    GFRAA >60 2022 09:20 AM    GFRAA >60 2013 08:11 AM    AGRATIO 1.8 2022 09:20 AM    LABGLOM >60 2023 04:21 AM    GLUCOSE 114 2023 04:21 AM    PROT 7.0 2022 09:20 AM    PROT 7.0 2012 08:24 AM    CALCIUM 9.3 2023 04:21 AM    BILITOT 0.4 08/08/2022 09:20 AM    ALKPHOS 95 08/08/2022 09:20 AM    AST 22 08/08/2022 09:20 AM    ALT 28 08/08/2022 09:20 AM       POC Tests: No results for input(s): POCGLU, POCNA, POCK, POCCL, POCBUN, POCHEMO, POCHCT in the last 72 hours. Coags:   Lab Results   Component Value Date/Time    PROTIME 11.6 07/18/2018 07:53 AM    INR 1.02 07/18/2018 07:53 AM       HCG (If Applicable): No results found for: PREGTESTUR, PREGSERUM, HCG, HCGQUANT     ABGs: No results found for: PHART, PO2ART, FNH9DEE, VHF6DAA, BEART, I7ORPCYQ     Type & Screen (If Applicable):  No results found for: LABABO, LABRH    Drug/Infectious Status (If Applicable):  No results found for: HIV, HEPCAB    COVID-19 Screening (If Applicable):   Lab Results   Component Value Date/Time    COVID19 Not Detected 01/26/2023 09:36 AM    COVID19 NOT DETECTED 02/04/2021 02:14 PM           Anesthesia Evaluation  Patient summary reviewed and Nursing notes reviewed no history of anesthetic complications:   Airway: Mallampati: III  TM distance: >3 FB   Neck ROM: full  Mouth opening: > = 3 FB   Dental: normal exam         Pulmonary: breath sounds clear to auscultation  (+) COPD:  sleep apnea: on CPAP,  asthma:     (-) not a current smoker                          ROS comment: Recent asthma exacerbation-feeling better with steroid taper   Cardiovascular:  Exercise tolerance: good (>4 METS),   (+) hypertension:,     (-) dysrhythmias      Rhythm: regular  Rate: normal                    Neuro/Psych:   (+) psychiatric history: stable with treatmentdementia            GI/Hepatic/Renal:   (+) bowel prep,          ROS comment: Child's esophagus . Endo/Other:    (+) : arthritis: OA., .                 Abdominal:   (+) obese,           Vascular: Other Findings:             Anesthesia Plan      MAC     ASA 3       Induction: intravenous. Anesthetic plan and risks discussed with patient. Plan discussed with CRNA.     Attending anesthesiologist reviewed and agrees with Preprocedure content                Lyubov Moore, DO   2/15/2023

## 2023-02-16 NOTE — OP NOTE
[address]               [address] [place], [address]                                OPERATIVE REPORT    PATIENT NAME: Taylor Rogers                      :        1950  MED REC NO:   [id number]                          ROOM:  ACCOUNT NO:   [de-identified]                           ADMIT DATE: 02/15/2023  PROVIDER:     Marli Juan MD    DATE OF PROCEDURE:  02/15/2023    SURGEON:  Marli Juan MD    INDICATION FOR PROCEDURE:  A 73-year-old male with a history of  Child's esophagus, status post RFA, history of polyps and history of  anemia. EGD:  With the patient in the left lateral position, and after IV  Diprivan, the Olympus video endoscope was introduced into the esophagus  and advanced towards the GE junction. The esophagus was normal.  No  sign of recurrent Child's. Hiatus hernia was identified. Stomach was  carefully inspected. It was normal.  Biopsies were obtained for  Helicobacter pylori. There were three gastric polyps seen. We removed  them with the polypectomy snare technique. The duodenum was normal.   Again, biopsy from antrum was obtained for Helicobacter pylori. Scope  was then removed without complication. COLONOSCOPY:  The Olympus video colonoscope was then inserted into the  rectum and carefully advanced to the cecum. Nearly 2 cm polyp noted in  the transverse colon. We removed it with a polypectomy snare technique. Diverticulosis of the colon was seen. Careful inspection revealed no  other abnormality. Procedure was terminated without complication. IMPRESSION:  1. Hiatus hernia. 2.  Excellent ablation of Child's esophagus, no recurrence. 3.  Three gastric polyps - removed. 4.  Diverticulosis of the colon. 5.  Transverse colon polyp - removed with the polypectomy snare.     ESTIMATED BLOOD LOSS:  Shelli Camarillo MD    D: 02/15/2023 10:36:08       T: 02/15/2023 21:18:32 [id number]  Job#: [id number]     Doc#: [id number]    CC:   Azael Stafford MD

## 2023-02-17 DIAGNOSIS — R73.03 PRE-DIABETES: ICD-10-CM

## 2023-02-17 DIAGNOSIS — E78.2 MIXED HYPERLIPIDEMIA: ICD-10-CM

## 2023-02-17 LAB
A/G RATIO: 1.7 (ref 1.1–2.2)
ALBUMIN SERPL-MCNC: 4.2 G/DL (ref 3.4–5)
ALP BLD-CCNC: 64 U/L (ref 40–129)
ALT SERPL-CCNC: 26 U/L (ref 10–40)
ANION GAP SERPL CALCULATED.3IONS-SCNC: 14 MMOL/L (ref 3–16)
AST SERPL-CCNC: 24 U/L (ref 15–37)
BILIRUB SERPL-MCNC: 0.5 MG/DL (ref 0–1)
BUN BLDV-MCNC: 11 MG/DL (ref 7–20)
CALCIUM SERPL-MCNC: 9.2 MG/DL (ref 8.3–10.6)
CHLORIDE BLD-SCNC: 104 MMOL/L (ref 99–110)
CHOLESTEROL, TOTAL: 194 MG/DL (ref 0–199)
CO2: 23 MMOL/L (ref 21–32)
CREAT SERPL-MCNC: 1.1 MG/DL (ref 0.8–1.3)
GFR SERPL CREATININE-BSD FRML MDRD: >60 ML/MIN/{1.73_M2}
GLUCOSE BLD-MCNC: 115 MG/DL (ref 70–99)
HDLC SERPL-MCNC: 54 MG/DL (ref 40–60)
LDL CHOLESTEROL CALCULATED: 109 MG/DL
POTASSIUM SERPL-SCNC: 4.1 MMOL/L (ref 3.5–5.1)
SODIUM BLD-SCNC: 141 MMOL/L (ref 136–145)
TOTAL PROTEIN: 6.7 G/DL (ref 6.4–8.2)
TRIGL SERPL-MCNC: 153 MG/DL (ref 0–150)
VLDLC SERPL CALC-MCNC: 31 MG/DL

## 2023-02-18 LAB
ESTIMATED AVERAGE GLUCOSE: 137 MG/DL
HBA1C MFR BLD: 6.4 %

## 2023-03-06 ENCOUNTER — OFFICE VISIT (OUTPATIENT)
Dept: INTERNAL MEDICINE CLINIC | Age: 73
End: 2023-03-06
Payer: MEDICARE

## 2023-03-06 ENCOUNTER — TELEPHONE (OUTPATIENT)
Dept: PULMONOLOGY | Age: 73
End: 2023-03-06

## 2023-03-06 VITALS
HEIGHT: 74 IN | DIASTOLIC BLOOD PRESSURE: 72 MMHG | SYSTOLIC BLOOD PRESSURE: 130 MMHG | WEIGHT: 266 LBS | BODY MASS INDEX: 34.14 KG/M2 | HEART RATE: 102 BPM | OXYGEN SATURATION: 98 %

## 2023-03-06 DIAGNOSIS — J45.41 MODERATE PERSISTENT ASTHMA WITH EXACERBATION: Primary | ICD-10-CM

## 2023-03-06 PROCEDURE — G8484 FLU IMMUNIZE NO ADMIN: HCPCS | Performed by: INTERNAL MEDICINE

## 2023-03-06 PROCEDURE — 1123F ACP DISCUSS/DSCN MKR DOCD: CPT | Performed by: INTERNAL MEDICINE

## 2023-03-06 PROCEDURE — G8417 CALC BMI ABV UP PARAM F/U: HCPCS | Performed by: INTERNAL MEDICINE

## 2023-03-06 PROCEDURE — 1036F TOBACCO NON-USER: CPT | Performed by: INTERNAL MEDICINE

## 2023-03-06 PROCEDURE — 3017F COLORECTAL CA SCREEN DOC REV: CPT | Performed by: INTERNAL MEDICINE

## 2023-03-06 PROCEDURE — G8427 DOCREV CUR MEDS BY ELIG CLIN: HCPCS | Performed by: INTERNAL MEDICINE

## 2023-03-06 PROCEDURE — 99213 OFFICE O/P EST LOW 20 MIN: CPT | Performed by: INTERNAL MEDICINE

## 2023-03-06 RX ORDER — PREDNISONE 10 MG/1
TABLET ORAL
Qty: 25 TABLET | Refills: 0 | Status: SHIPPED | OUTPATIENT
Start: 2023-03-06 | End: 2023-03-18

## 2023-03-06 NOTE — TELEPHONE ENCOUNTER
Patient states his inhalers being changed to nebulizer solution has not helped his breathing. He has started wheezing more  What should he do?  His next appointment is in May   He uses [facility] on [address] # [phone]

## 2023-03-06 NOTE — PROGRESS NOTES
Ryder Lara. (:  1950) is a 71 y.o. male,Established patient, here for evaluation of the following chief complaint(s):  3 Month Follow-Up and Wheezing        ASSESSMENT/PLAN:  1. Moderate persistent asthma with exacerbation  Patient with likely acute exacerbation of his asthma. Patient with diffuse wheezes on exam.    -Patient to continue all of his nebulizers. -Start prednisone taper  -Patient awaiting callback from pulmonologist  -     predniSONE (DELTASONE) 10 MG tablet; Take 4 tablets by mouth daily for 3 days, THEN 2 tablets daily for 3 days, THEN 1 tablet daily for 3 days, THEN 0.5 tablets daily for 3 days. , Disp-25 tablet, R-0Normal    Return if symptoms worsen or fail to improve. SUBJECTIVE/OBJECTIVE:  HPI    Patient is a 67-year-old male with past medical history of asthma, hypertension, NPH who presents today secondary to new onset set of wheezing. Notes that he went to [place] and when he got back he started with nasal congestion cough and wheezing. He notes some shortness of breath. He did start a new nebulized medications per pulmonology. He notes that despite this his symptoms have been worsening. Denies any fevers. He has reached out to his pulmonologist however had not heard back    Review of Systems ROS negative except for those noted in the HPI above. Vitals:    23 1409   BP: 130/72   Pulse: (!) 102   SpO2: 98%     Physical Exam  Constitutional:       General: He is not in acute distress. Appearance: Normal appearance. He is not ill-appearing. HENT:      Head: Normocephalic and atraumatic. Eyes:      General: No scleral icterus. Extraocular Movements: Extraocular movements intact. Conjunctiva/sclera: Conjunctivae normal.   Cardiovascular:      Rate and Rhythm: Normal rate and regular rhythm. Pulses: Normal pulses. Heart sounds: No murmur heard. No friction rub. No gallop.    Pulmonary:      Effort: Pulmonary effort is normal. No respiratory distress. Breath sounds: Wheezing (wheezes noted on experation in all lung fields) present. No rhonchi or rales. Musculoskeletal:      Cervical back: No muscular tenderness. Skin:     General: Skin is warm. Findings: No erythema or rash. Neurological:      General: No focal deficit present. Mental Status: He is alert and oriented to person, place, and time. Psychiatric:         Mood and Affect: Mood normal.         Behavior: Behavior normal.       An electronic signature was used to authenticate this note.     --Susan Jensen, DO

## 2023-03-07 RX ORDER — DOXYCYCLINE HYCLATE 100 MG
100 TABLET ORAL 2 TIMES DAILY
Qty: 20 TABLET | Refills: 0 | Status: SHIPPED | OUTPATIENT
Start: 2023-03-07 | End: 2023-03-17

## 2023-03-07 NOTE — TELEPHONE ENCOUNTER
He developed sinus congestion after coming back from [facility] associated with colored nasal drainage and chest congestion. He started pulmicort and brovana but it didn't help. He visited his PCP yesterday and was prescribed prednisone. Doxycyline for 10 days added.

## 2023-04-04 RX ORDER — GABAPENTIN 100 MG/1
400 CAPSULE ORAL EVERY MORNING
Qty: 120 CAPSULE | Refills: 0 | Status: SHIPPED | OUTPATIENT
Start: 2023-04-04 | End: 2023-07-03

## 2023-04-04 NOTE — TELEPHONE ENCOUNTER
Patient is calling to request a refill on the following medication:  gabapentin (NEURONTIN) 100 MG capsule  Last Appointment: 03/06/2023  Next Appointment: 06/12/2023  Last Refill: historical     Please call in to:  [address]    Patient is aware Dr. Gee Palacios is not in office today.

## 2023-04-05 ENCOUNTER — OFFICE VISIT (OUTPATIENT)
Dept: PULMONOLOGY | Age: 73
End: 2023-04-05
Payer: MEDICARE

## 2023-04-05 ENCOUNTER — OFFICE VISIT (OUTPATIENT)
Dept: ENT CLINIC | Age: 73
End: 2023-04-05
Payer: MEDICARE

## 2023-04-05 VITALS
HEART RATE: 105 BPM | WEIGHT: 266.8 LBS | DIASTOLIC BLOOD PRESSURE: 75 MMHG | HEIGHT: 74 IN | TEMPERATURE: 97.9 F | BODY MASS INDEX: 34.24 KG/M2 | SYSTOLIC BLOOD PRESSURE: 114 MMHG

## 2023-04-05 VITALS
WEIGHT: 268 LBS | SYSTOLIC BLOOD PRESSURE: 124 MMHG | DIASTOLIC BLOOD PRESSURE: 78 MMHG | HEIGHT: 74 IN | BODY MASS INDEX: 34.39 KG/M2 | OXYGEN SATURATION: 96 %

## 2023-04-05 DIAGNOSIS — J32.4 CHRONIC PANSINUSITIS: Primary | ICD-10-CM

## 2023-04-05 DIAGNOSIS — J45.51 SEVERE PERSISTENT ASTHMA WITH ACUTE EXACERBATION: Primary | ICD-10-CM

## 2023-04-05 PROCEDURE — 99214 OFFICE O/P EST MOD 30 MIN: CPT | Performed by: INTERNAL MEDICINE

## 2023-04-05 PROCEDURE — 1036F TOBACCO NON-USER: CPT | Performed by: INTERNAL MEDICINE

## 2023-04-05 PROCEDURE — 1123F ACP DISCUSS/DSCN MKR DOCD: CPT | Performed by: INTERNAL MEDICINE

## 2023-04-05 PROCEDURE — G8417 CALC BMI ABV UP PARAM F/U: HCPCS | Performed by: INTERNAL MEDICINE

## 2023-04-05 PROCEDURE — 3017F COLORECTAL CA SCREEN DOC REV: CPT | Performed by: INTERNAL MEDICINE

## 2023-04-05 PROCEDURE — G8427 DOCREV CUR MEDS BY ELIG CLIN: HCPCS | Performed by: INTERNAL MEDICINE

## 2023-04-05 PROCEDURE — 31231 NASAL ENDOSCOPY DX: CPT | Performed by: OTOLARYNGOLOGY

## 2023-04-05 RX ORDER — PREDNISONE 20 MG/1
40 TABLET ORAL DAILY
Qty: 14 TABLET | Refills: 0 | Status: SHIPPED | OUTPATIENT
Start: 2023-04-05

## 2023-04-05 ASSESSMENT — ENCOUNTER SYMPTOMS
CHEST TIGHTNESS: 0
NAUSEA: 0
SHORTNESS OF BREATH: 0
SORE THROAT: 0
COUGH: 0
SHORTNESS OF BREATH: 0
FACIAL SWELLING: 0
EYE ITCHING: 0
VOICE CHANGE: 0
RHINORRHEA: 0
TROUBLE SWALLOWING: 0
EYE REDNESS: 0
CHOKING: 0
SINUS PRESSURE: 0
WHEEZING: 1
SINUS PAIN: 0
DIARRHEA: 0
COUGH: 1
EYE PAIN: 0

## 2023-04-05 NOTE — PROGRESS NOTES
Patient here for sinus evaluation. Had bad asthma flare requiring ER visit. Saw pulmonologist today. Starting prednisone and Nucala. Sinuses with some congestion and PND>     PE: Nasal cavity clear.     /    Due to the patients chronic sinus disease and/or history of sinonasal neoplasm for surveillance a nasal endoscopy with or without debridement will be performed to complete a significant physical examination of the patient which cannot be performed by anterior rhinoscopy alone (failure of complete examination of the paranasal sinuses). Failure to provide this procedure may lead to late detection of significant chronic benign disease, acute exacerbation, resolution or failure of early diagnosis of recurrent cancer. The procedure report is present in the body of the chart. Nasal Endoscopy    Pre OP: CRS  Post OP: Stable  Reason: Surveillance  Procedure: Nasal endoscopy  Surgeon: Alcides Orta  Anesthesia: Afrin with 2% lidocaine  Estimated Blood Loss: None      After obtaining verbal consent from the patient 1% lidocaine with afrin was sprayed into the nasal cavities. After allowing a time for anesthesia, a nasal endoscope was placed into the nostril. The septum, inferior, and middle turbinates were examined. The middle meatus, and sphenoethmoid recess was examined bilaterally. Cultures were not obtained from the sinuses. There were no complications. Pertinent positives included: There was not edema and purulence in the left middle meatus. There was not edema and purulence at the right middle meatus. Polyps were not identified in the  sinuses. Masses were not identified. Tolerated well without complication. I attest that I was present for and did the entire procedure myself. Continue sinus rinses. Follow up in 3 months.
COLD SNARE performed by Moise Workman MD at [address] N/A 2019    ESOPHAGOGASTRODUODENOSCOPY WITH RADIOFREQUENCY ABLATION performed by Moise Workman MD at [address] N/A 2019    EGD POLYP SNARE performed by Moise Workman MD at [address] N/A 07/15/2020    ESOPHAGOGASTRODUODENOSCOPY RADIOFREQUENCY ABLATION ON STANDBY performed by Moise Workman MD at [address] N/A 07/15/2020    EGD BIOPSY performed by Moise Workman MD at [address] N/A 2021    EGD BIOPSY performed by Moise Workman MD at [address] N/A 2021    EGD POLYP SNARE performed by Moise Workman MD at [address] N/A 2/15/2023    EGD BIOPSY performed by Moise Workman MD at [address] N/A 2/15/2023    EGD POLYP SNARE performed by Moise Workman MD at [address] History   Problem Relation Age of Onset    Cancer Father         prostate    Hypertension Father     Coronary Art Dis Father      Social History     Socioeconomic History    Marital status:      Spouse name: Not on file    Number of children: Not on file    Years of education: Not on file    Highest education level: Not on file   Occupational History    Not on file   Tobacco Use    Smoking status: Former     Packs/day: 0.50     Years: 10.00     Pack years: 5.00     Types: Cigarettes     Quit date: 2002     Years since quittin.2    Smokeless tobacco: Never   Vaping Use    Vaping Use: Never used   Substance and Sexual Activity    Alcohol use: Not Currently     Alcohol/week: 2.0 standard drinks     Types: 2 Standard drinks or equivalent per week     Comment: rarely -- once a year    Drug use: Never    Sexual activity: Not Currently   Other Topics

## 2023-04-05 NOTE — PROGRESS NOTES
[facility]    Outpatient Note    Subjective:   CHIEF COMPLAINT:   Chief Complaint   Patient presents with    Follow-up    Cough    Wheezing     Interval history on 4/5/23  Continue to have nasal congestion, wheezing and cough. He had prednisone and doxy for sinus congestion. He finished prednisone on 3/19. It helped initially but symptoms came back after finishing the course. He is on [place] and pulmicort twice a day and albuterol once a day. He use flonase once a day. He doesn't feel any difference. He also use saline nasal washing. He is also using mucinex in the past 5 days. He was supposed to go to a trip but he cancels it. There is no significant seasonal variation with his symptoms. Interval history on 2/1/23  He was admitted to the hospital on 1/6 for asthma exacerbation. He had nasal congestion and post nasal drip. There is no fever or chills. He is on nebulizer now and use it twice a day. He is also five days course of prednisone. Interval history on 12/15/22  He had 2 sinus surgeries since I saw him last.  He continues to have episodes of cough with SOB and wheezing. In thanksgiving he had to be treated with steroids and breathing treatment. He use CPAP regularly. There is no heartburn. He is going to see Dr. Yolanda Dudley in Feb.  He is on PPI. He use albuterol 6 times a day. It helps clearing the lung. He is also on Dulera twice a day. He has leg swelling over the past 6 months to a year. Interval history on 2/9/22  He had bad wheezing for two days last week but on 2/3/22 at 4:00am he visited the ED and was treated with albuterol and steroids injection. CXR was clear. He was sent home. He was prescribed with ABX, prednisone, dulera and albuterol. Now he feels better. Over the past 1.5 years he had several episodes of cough. Still on protonix. There is no heart burn. He follows with Dr. Yolanda Dudley.       Interval

## 2023-05-08 ENCOUNTER — PATIENT MESSAGE (OUTPATIENT)
Dept: INTERNAL MEDICINE CLINIC | Age: 73
End: 2023-05-08

## 2023-05-09 RX ORDER — GABAPENTIN 100 MG/1
400 CAPSULE ORAL EVERY MORNING
Qty: 360 CAPSULE | Refills: 0 | Status: SHIPPED | OUTPATIENT
Start: 2023-05-09 | End: 2023-08-07

## 2023-05-09 NOTE — TELEPHONE ENCOUNTER
From: Durga Hinojosa. To: Dr. Avelina Ferrer: 5/8/2023 12:37 PM EDT  Subject: Prescripton refill    Good Morning ! My Prescription for Gabapenton ,100mg caps, [id number]/ 1 mon, (no more refills left), needs to be called into [facility],([phone]) Thank you very much for your help! If you could make the Prescription for 3 months, then I won't have to bother you so often. My original prescription was for 3 months, but got messed-up when they had supply issues. I really appreciate all you do for me and I. Hope you have a good week!    Sincerely, Kim Garcia

## 2023-05-11 ENCOUNTER — OFFICE VISIT (OUTPATIENT)
Dept: ENT CLINIC | Age: 73
End: 2023-05-11
Payer: MEDICARE

## 2023-05-11 VITALS
WEIGHT: 265 LBS | DIASTOLIC BLOOD PRESSURE: 88 MMHG | HEIGHT: 74 IN | SYSTOLIC BLOOD PRESSURE: 118 MMHG | BODY MASS INDEX: 34.01 KG/M2 | HEART RATE: 101 BPM | TEMPERATURE: 98.1 F | OXYGEN SATURATION: 96 %

## 2023-05-11 DIAGNOSIS — J32.4 CHRONIC PANSINUSITIS: Primary | ICD-10-CM

## 2023-05-11 PROCEDURE — 99214 OFFICE O/P EST MOD 30 MIN: CPT | Performed by: OTOLARYNGOLOGY

## 2023-05-11 PROCEDURE — 1123F ACP DISCUSS/DSCN MKR DOCD: CPT | Performed by: OTOLARYNGOLOGY

## 2023-05-11 PROCEDURE — 3017F COLORECTAL CA SCREEN DOC REV: CPT | Performed by: OTOLARYNGOLOGY

## 2023-05-11 PROCEDURE — G8417 CALC BMI ABV UP PARAM F/U: HCPCS | Performed by: OTOLARYNGOLOGY

## 2023-05-11 PROCEDURE — G8427 DOCREV CUR MEDS BY ELIG CLIN: HCPCS | Performed by: OTOLARYNGOLOGY

## 2023-05-11 PROCEDURE — 1036F TOBACCO NON-USER: CPT | Performed by: OTOLARYNGOLOGY

## 2023-05-11 RX ORDER — PREDNISONE 10 MG/1
TABLET ORAL
Qty: 38 TABLET | Refills: 0 | Status: SHIPPED | OUTPATIENT
Start: 2023-05-11

## 2023-05-11 RX ORDER — SULFAMETHOXAZOLE AND TRIMETHOPRIM 400; 80 MG/1; MG/1
1 TABLET ORAL 2 TIMES DAILY
Qty: 28 TABLET | Refills: 0 | Status: SHIPPED | OUTPATIENT
Start: 2023-05-11 | End: 2023-05-25

## 2023-05-11 ASSESSMENT — ENCOUNTER SYMPTOMS
SORE THROAT: 0
FACIAL SWELLING: 0
SHORTNESS OF BREATH: 0
VOICE CHANGE: 0
SINUS PRESSURE: 0
SINUS PAIN: 0
EYE ITCHING: 0
RHINORRHEA: 0
COUGH: 0
EYE PAIN: 0
DIARRHEA: 0
EYE REDNESS: 0
CHOKING: 0
TROUBLE SWALLOWING: 0
NAUSEA: 0

## 2023-05-11 NOTE — PROGRESS NOTES
mouth daily 1/17/23  Yes Linda Parker DO   sertraline (ZOLOFT) 50 MG tablet TAKE 1 AND 1/2 TABLET BY MOUTH EVERY MORNING 1/17/23  Yes Linda Parker DO   cetirizine (ZYRTEC) 10 MG tablet TAKE 1 TABLET DAILY 12/30/22  Yes Linda Parker DO   atorvastatin (LIPITOR) 10 MG tablet TAKE 1 TABLET DAILY 12/30/22  Yes Linda Parker DO   fluticasone (FLONASE) 50 MCG/ACT nasal spray 1 spray by Each Nostril route daily 7/7/22  Yes Linda Parker DO   montelukast (SINGULAIR) 10 MG tablet Take 1 tablet by mouth nightly 2/3/22  Yes Mindy Omer MD   Omega-3 Fatty Acids (FISH OIL) 1000 MG CAPS Take 1 capsule by mouth every morning   Yes Historical Provider, MD   Flaxseed, Linseed, (FLAX SEED OIL) 1000 MG CAPS Take 1,000 mg by mouth daily   Yes Historical Provider, MD   Cholecalciferol (VITAMIN D) 2000 UNITS CAPS capsule Take 1 capsule by mouth every morning   Yes Historical Provider, MD   multivitamin (THERAGRAN) per tablet Take 1 tablet by mouth every morning   Yes Historical Provider, MD   predniSONE (DELTASONE) 20 MG tablet Take 2 tablets by mouth daily 4/5/23   Christiano Oliveira MD         Lab Studies:  Lab Results   Component Value Date    WBC 10.6 01/29/2023    HGB 12.8 (L) 01/29/2023    HCT 37.6 (L) 01/29/2023    MCV 83.6 01/29/2023     01/29/2023     Lab Results   Component Value Date    GLUCOSE 115 (H) 02/17/2023    BUN 11 02/17/2023    CREATININE 1.1 02/17/2023    K 4.1 02/17/2023     02/17/2023     02/17/2023    CALCIUM 9.2 02/17/2023     Lab Results   Component Value Date    MG 2.20 07/16/2018     Lab Results   Component Value Date    PHOS 4.1 01/29/2023     Lab Results   Component Value Date    ALKPHOS 64 02/17/2023    ALT 26 02/17/2023    AST 24 02/17/2023    BILITOT 0.5 02/17/2023    PROT 6.7 02/17/2023      Review of Systems   Constitutional:  Negative for activity change, appetite change, chills, fatigue and fever. HENT:  Positive for congestion.  Negative for ear

## 2023-05-30 RX ORDER — ARFORMOTEROL TARTRATE 15 UG/2ML
1 SOLUTION RESPIRATORY (INHALATION) 2 TIMES DAILY
Qty: 120 ML | Refills: 3 | Status: SHIPPED | OUTPATIENT
Start: 2023-05-30 | End: 2023-05-31

## 2023-05-31 ENCOUNTER — TELEPHONE (OUTPATIENT)
Dept: PULMONOLOGY | Age: 73
End: 2023-05-31

## 2023-05-31 RX ORDER — ARFORMOTEROL TARTRATE 15 UG/2ML
1 SOLUTION RESPIRATORY (INHALATION) 2 TIMES DAILY
Qty: 180 ML | Refills: 3 | Status: SHIPPED | OUTPATIENT
Start: 2023-05-31

## 2023-06-01 ENCOUNTER — TELEPHONE (OUTPATIENT)
Dept: PULMONOLOGY | Age: 73
End: 2023-06-01

## 2023-06-05 ENCOUNTER — TELEPHONE (OUTPATIENT)
Dept: PULMONOLOGY | Age: 73
End: 2023-06-05

## 2023-06-05 RX ORDER — ARFORMOTEROL TARTRATE 15 UG/2ML
1 SOLUTION RESPIRATORY (INHALATION) 2 TIMES DAILY
Qty: 360 ML | Refills: 3 | Status: SHIPPED | OUTPATIENT
Start: 2023-06-05

## 2023-06-05 NOTE — TELEPHONE ENCOUNTER
Dr Dai Wells patient,    Ed called in he has his Budesonide, and is waiting on his [place] [employer] wants to know if its ok to take the Budesonide by itself.

## 2023-06-12 DIAGNOSIS — R73.03 PRE-DIABETES: ICD-10-CM

## 2023-06-13 LAB
ANION GAP SERPL CALCULATED.3IONS-SCNC: 13 MMOL/L (ref 3–16)
BUN SERPL-MCNC: 14 MG/DL (ref 7–20)
CALCIUM SERPL-MCNC: 9.6 MG/DL (ref 8.3–10.6)
CHLORIDE SERPL-SCNC: 109 MMOL/L (ref 99–110)
CO2 SERPL-SCNC: 22 MMOL/L (ref 21–32)
CREAT SERPL-MCNC: 0.9 MG/DL (ref 0.8–1.3)
EST. AVERAGE GLUCOSE BLD GHB EST-MCNC: 134.1 MG/DL
GFR SERPLBLD CREATININE-BSD FMLA CKD-EPI: >60 ML/MIN/{1.73_M2}
GLUCOSE SERPL-MCNC: 195 MG/DL (ref 70–99)
HBA1C MFR BLD: 6.3 %
POTASSIUM SERPL-SCNC: 4.1 MMOL/L (ref 3.5–5.1)
SODIUM SERPL-SCNC: 144 MMOL/L (ref 136–145)

## 2023-06-22 ENCOUNTER — TELEPHONE (OUTPATIENT)
Dept: INTERNAL MEDICINE CLINIC | Age: 73
End: 2023-06-22

## 2023-06-22 NOTE — TELEPHONE ENCOUNTER
Patient is requesting a renewal letter for his disability placard for a period of 5 years. Please contact patient to advise when completed.

## 2023-06-26 ENCOUNTER — OFFICE VISIT (OUTPATIENT)
Dept: PULMONOLOGY | Age: 73
End: 2023-06-26
Payer: MEDICARE

## 2023-06-26 VITALS
DIASTOLIC BLOOD PRESSURE: 82 MMHG | HEIGHT: 74 IN | WEIGHT: 264 LBS | SYSTOLIC BLOOD PRESSURE: 123 MMHG | HEART RATE: 68 BPM | OXYGEN SATURATION: 97 % | BODY MASS INDEX: 33.88 KG/M2

## 2023-06-26 DIAGNOSIS — J45.50 SEVERE PERSISTENT ASTHMA WITHOUT COMPLICATION: Primary | ICD-10-CM

## 2023-06-26 DIAGNOSIS — J30.89 CHRONIC NONSEASONAL ALLERGIC RHINITIS DUE TO POLLEN: ICD-10-CM

## 2023-06-26 PROCEDURE — 1123F ACP DISCUSS/DSCN MKR DOCD: CPT | Performed by: INTERNAL MEDICINE

## 2023-06-26 PROCEDURE — 99214 OFFICE O/P EST MOD 30 MIN: CPT | Performed by: INTERNAL MEDICINE

## 2023-06-26 PROCEDURE — G8417 CALC BMI ABV UP PARAM F/U: HCPCS | Performed by: INTERNAL MEDICINE

## 2023-06-26 PROCEDURE — 3017F COLORECTAL CA SCREEN DOC REV: CPT | Performed by: INTERNAL MEDICINE

## 2023-06-26 PROCEDURE — G8427 DOCREV CUR MEDS BY ELIG CLIN: HCPCS | Performed by: INTERNAL MEDICINE

## 2023-06-26 PROCEDURE — 1036F TOBACCO NON-USER: CPT | Performed by: INTERNAL MEDICINE

## 2023-06-26 RX ORDER — MEPOLIZUMAB 100 MG/ML
INJECTION, SOLUTION SUBCUTANEOUS
Qty: 0.84 ML | Refills: 11 | Status: SHIPPED | OUTPATIENT
Start: 2023-06-26

## 2023-06-26 RX ORDER — IPRATROPIUM BROMIDE AND ALBUTEROL SULFATE 2.5; .5 MG/3ML; MG/3ML
1 SOLUTION RESPIRATORY (INHALATION) EVERY 6 HOURS PRN
Qty: 360 ML | Refills: 5 | Status: SHIPPED | OUTPATIENT
Start: 2023-06-26

## 2023-06-26 RX ORDER — MONTELUKAST SODIUM 10 MG/1
10 TABLET ORAL NIGHTLY
Qty: 90 TABLET | Refills: 3 | Status: SHIPPED | OUTPATIENT
Start: 2023-06-26

## 2023-06-26 RX ORDER — ARFORMOTEROL TARTRATE 15 UG/2ML
1 SOLUTION RESPIRATORY (INHALATION) 2 TIMES DAILY
Qty: 360 ML | Refills: 5 | Status: SHIPPED | OUTPATIENT
Start: 2023-06-26

## 2023-06-26 RX ORDER — BUDESONIDE 0.5 MG/2ML
1 INHALANT ORAL 2 TIMES DAILY
Qty: 120 ML | Refills: 5 | Status: SHIPPED | OUTPATIENT
Start: 2023-06-26

## 2023-06-26 ASSESSMENT — ENCOUNTER SYMPTOMS
COUGH: 1
CHEST TIGHTNESS: 0
SHORTNESS OF BREATH: 0
WHEEZING: 1

## 2023-07-20 RX ORDER — ALBUTEROL SULFATE 2.5 MG/3ML
2.5 SOLUTION RESPIRATORY (INHALATION) EVERY 6 HOURS PRN
Qty: 120 EACH | Refills: 3 | Status: SHIPPED | OUTPATIENT
Start: 2023-07-20

## 2023-07-20 NOTE — TELEPHONE ENCOUNTER
[facility] faxed over refill request for albuterol sulfate INH soln 3 ml  0.083%    with 3 refills  This is not on his med list.  Duoneb and Pulmicort are on list    Pharm: [facility]    Please check pended order for accuracy

## 2023-08-06 DIAGNOSIS — F33.1 MODERATE EPISODE OF RECURRENT MAJOR DEPRESSIVE DISORDER (HCC): ICD-10-CM

## 2023-08-07 ENCOUNTER — TELEPHONE (OUTPATIENT)
Dept: ENT CLINIC | Age: 73
End: 2023-08-07

## 2023-08-08 ENCOUNTER — TELEPHONE (OUTPATIENT)
Dept: ENT CLINIC | Age: 73
End: 2023-08-08

## 2023-08-09 RX ORDER — ALBUTEROL SULFATE 90 UG/1
2 AEROSOL, METERED RESPIRATORY (INHALATION) EVERY 6 HOURS PRN
Qty: 18 G | Refills: 3 | Status: SHIPPED | OUTPATIENT
Start: 2023-08-09

## 2023-08-14 ENCOUNTER — OFFICE VISIT (OUTPATIENT)
Dept: PULMONOLOGY | Age: 73
End: 2023-08-14
Payer: MEDICARE

## 2023-08-14 ENCOUNTER — TELEPHONE (OUTPATIENT)
Dept: PULMONOLOGY | Age: 73
End: 2023-08-14

## 2023-08-14 VITALS
HEIGHT: 74 IN | HEART RATE: 92 BPM | BODY MASS INDEX: 34.52 KG/M2 | OXYGEN SATURATION: 95 % | SYSTOLIC BLOOD PRESSURE: 118 MMHG | DIASTOLIC BLOOD PRESSURE: 84 MMHG | WEIGHT: 269 LBS

## 2023-08-14 DIAGNOSIS — J45.50 SEVERE PERSISTENT ASTHMA WITHOUT COMPLICATION: Primary | ICD-10-CM

## 2023-08-14 PROCEDURE — G8417 CALC BMI ABV UP PARAM F/U: HCPCS | Performed by: INTERNAL MEDICINE

## 2023-08-14 PROCEDURE — 1123F ACP DISCUSS/DSCN MKR DOCD: CPT | Performed by: INTERNAL MEDICINE

## 2023-08-14 PROCEDURE — 3017F COLORECTAL CA SCREEN DOC REV: CPT | Performed by: INTERNAL MEDICINE

## 2023-08-14 PROCEDURE — 99214 OFFICE O/P EST MOD 30 MIN: CPT | Performed by: INTERNAL MEDICINE

## 2023-08-14 PROCEDURE — 1036F TOBACCO NON-USER: CPT | Performed by: INTERNAL MEDICINE

## 2023-08-14 PROCEDURE — G8427 DOCREV CUR MEDS BY ELIG CLIN: HCPCS | Performed by: INTERNAL MEDICINE

## 2023-08-14 RX ORDER — AZELASTINE HYDROCHLORIDE, FLUTICASONE PROPIONATE 137; 50 UG/1; UG/1
2 SPRAY, METERED NASAL 2 TIMES DAILY
Qty: 23 G | Refills: 5 | Status: SHIPPED | OUTPATIENT
Start: 2023-08-14

## 2023-08-14 RX ORDER — PREDNISONE 20 MG/1
40 TABLET ORAL DAILY
Qty: 10 TABLET | Refills: 0 | Status: SHIPPED | OUTPATIENT
Start: 2023-08-14

## 2023-08-14 RX ORDER — PREDNISONE 20 MG/1
40 TABLET ORAL DAILY
Qty: 14 TABLET | Refills: 0 | Status: SHIPPED | OUTPATIENT
Start: 2023-08-14 | End: 2023-08-14

## 2023-08-14 RX ORDER — AZELASTINE HYDROCHLORIDE, FLUTICASONE PROPIONATE 137; 50 UG/1; UG/1
2 SPRAY, METERED NASAL 2 TIMES DAILY
Qty: 23 G | Refills: 5 | Status: SHIPPED | OUTPATIENT
Start: 2023-08-14 | End: 2023-08-14

## 2023-08-14 ASSESSMENT — ENCOUNTER SYMPTOMS
WHEEZING: 1
CHEST TIGHTNESS: 0
SHORTNESS OF BREATH: 0
COUGH: 1

## 2023-08-14 NOTE — PROGRESS NOTES
Breath sounds: No stridor. Wheezing present. No rales. Abdominal:      General: Bowel sounds are normal.      Palpations: Abdomen is soft. Musculoskeletal:      Cervical back: Neck supple. Right lower leg: No edema. Left lower leg: No edema. Skin:     General: Skin is warm and dry. Neurological:      Mental Status: He is alert and oriented to person, place, and time. Psychiatric:         Behavior: Behavior normal.       DATA:    PFT 11/29/2019  FINDINGS:  Spirometry on this patient shows an FEV1 of 2.74 which is 70%  of predicted and a forced vital capacity of 3.54 which is 67% of  predicted, giving a ratio of 77. There was borderline response to  bronchodilators. Lung volumes show a decreased total lung capacity at  79% of predicted and decreased diffusion prior to correction for  alveolar lung volumes to come back at 91% of predicted. CONCLUSION:  Moderate restrictive defect with borderline bronchodilator  response and decreased diffusion prior to correction for alveolar lung  volumes. Findings could be consistent with the patient's BMI of 35.5,  but could not also rule out interstitial lung disease or pulmonary  vascular disease. Clinical correlation is recommended. CT chest   DATE: 12/17/2019       EXAM: CT CHEST WO CONTRAST       INDICATION: Cough, short of breath, abnormal pulmonary function tests       COMPARISON: None       TECHNIQUE: Axial CT imaging of the chest was performed. Axial images, multiplanar reformatted images and axial maximum intensity projection were reviewed. Individualized dose optimization technique was used in order to meet ALARA standards for    radiation dose reduction.   In addition to vendor specific dose reduction algorithms, the dose reduction techniques vary based on the specific scanner utilized but frequently include automated exposure control, adjustment of the mA and/or kV according to    patient size, and use of iterative reconstruction

## 2023-08-14 NOTE — TELEPHONE ENCOUNTER
Submitted PA for Azelastine-Fluticasone 137-50MCG/ACT suspension  Via [facility]: [id number] STATUS: PENDING. Follow up done daily; if no response in three days we will refax for status check. If another three days goes by with no response we will call the insurance for status.

## 2023-08-15 ENCOUNTER — OFFICE VISIT (OUTPATIENT)
Dept: ENT CLINIC | Age: 73
End: 2023-08-15
Payer: MEDICARE

## 2023-08-15 VITALS
HEIGHT: 74 IN | HEART RATE: 106 BPM | TEMPERATURE: 98.6 F | WEIGHT: 266.8 LBS | BODY MASS INDEX: 34.24 KG/M2 | DIASTOLIC BLOOD PRESSURE: 81 MMHG | SYSTOLIC BLOOD PRESSURE: 123 MMHG

## 2023-08-15 DIAGNOSIS — J32.4 CHRONIC PANSINUSITIS: Primary | ICD-10-CM

## 2023-08-15 PROCEDURE — 31231 NASAL ENDOSCOPY DX: CPT | Performed by: OTOLARYNGOLOGY

## 2023-08-15 ASSESSMENT — ENCOUNTER SYMPTOMS
CHOKING: 0
SINUS PRESSURE: 0
EYE PAIN: 0
NAUSEA: 0
SORE THROAT: 0
DIARRHEA: 0
SINUS PAIN: 0
TROUBLE SWALLOWING: 0
EYE REDNESS: 0
FACIAL SWELLING: 0
SHORTNESS OF BREATH: 0
EYE ITCHING: 0
RHINORRHEA: 0
VOICE CHANGE: 0
COUGH: 0

## 2023-08-15 NOTE — PROGRESS NOTES
Subjective:      Patient ID: Raven Sandy is a 68 y.o. male. HPI  Chief Complaint   Patient presents with    Chronic sinusitis  History of Present Illness:Jared is a(n) 68 y.o. male who presents with a known history of chronic sinusitis. S/P FESS times two. Last fall of 2022. Saw pulm. Srated on flutic/azelas combo.    Nasal Discharge: clear      Patient Active Problem List   Diagnosis    Wrist arthritis    Hypertrophy of prostate without urinary obstruction and other lower urinary tract symptoms (LUTS)    Cardiac dysrhythmia    Onychomycosis    Obstructive sleep apnea    Personal history of other diseases of digestive system    Erectile dysfunction    Vitamin D deficiency    Mixed hyperlipidemia    Persistent depressive disorder    Dysthymia    Non-seasonal allergic rhinitis due to pollen    Pulmonary nodule    Back pain    NPH (normal pressure hydrocephalus) (HCC)-s/p shunt    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    Early onset Alzheimer's dementia (HCC)    Neuropathy    Pre-diabetes    Reactive airway disease without complication    Chronic pansinusitis    Deviated nasal septum    Hypertrophy, nasal, turbinate    Nasal obstruction    Nasal turbinate hypertrophy    Essential tremor    Severe persistent asthma without complication    Moderate episode of recurrent major depressive disorder [facility])     Past Surgical History:   Procedure Laterality Date    COLONOSCOPY  12/14/2018    COLONOSCOPY WITH BIOPSY performed by Dayday Lainez MD at [address] N/A 2/15/2023    COLONOSCOPY POLYPECTOMY SNARE/COLD BIOPSY performed by Dayday Lainez MD at [address] [facility]    ENTEROSCOPY N/A 01/04/2019    ENTEROSCOPY PUSH BIOPSY performed by Dayday Lainez MD at [facility] Left     lipoma    EYE SURGERY      as a child, weak eye muscles    HERNIA REPAIR      umbilical    JOINT REPLACEMENT Left 2015    PARTIAL KNEE REPLACMENT    LAPAROSCOPY N/A 07/18/2018    OPENING AND CLOSING FOR VENTRICULAR

## 2023-08-17 NOTE — TELEPHONE ENCOUNTER
Approvedtoday  AAIYIN:[id number];[id number]; Review Type:Prior Auth; Coverage Start Date:07/18/2023; Coverage End Date:12/31/2099;

## 2023-08-28 RX ORDER — GABAPENTIN 100 MG/1
CAPSULE ORAL
Qty: 360 CAPSULE | Refills: 0 | Status: SHIPPED | OUTPATIENT
Start: 2023-08-28 | End: 2023-11-28

## 2023-09-01 RX ORDER — AZELASTINE HYDROCHLORIDE, FLUTICASONE PROPIONATE 137; 50 UG/1; UG/1
2 SPRAY, METERED NASAL 2 TIMES DAILY
Qty: 3 EACH | Refills: 3 | Status: SHIPPED | OUTPATIENT
Start: 2023-09-01

## 2023-09-07 ENCOUNTER — OFFICE VISIT (OUTPATIENT)
Dept: INTERNAL MEDICINE CLINIC | Age: 73
End: 2023-09-07
Payer: MEDICARE

## 2023-09-07 VITALS
WEIGHT: 273.4 LBS | OXYGEN SATURATION: 94 % | HEART RATE: 89 BPM | BODY MASS INDEX: 35.1 KG/M2 | DIASTOLIC BLOOD PRESSURE: 78 MMHG | SYSTOLIC BLOOD PRESSURE: 126 MMHG

## 2023-09-07 DIAGNOSIS — R73.03 PRE-DIABETES: ICD-10-CM

## 2023-09-07 DIAGNOSIS — G91.2 NPH (NORMAL PRESSURE HYDROCEPHALUS) (HCC): ICD-10-CM

## 2023-09-07 DIAGNOSIS — E66.01 SEVERE OBESITY (BMI 35.0-39.9) WITH COMORBIDITY (HCC): ICD-10-CM

## 2023-09-07 DIAGNOSIS — G30.0 EARLY ONSET ALZHEIMER'S DEMENTIA WITHOUT BEHAVIORAL DISTURBANCE, PSYCHOTIC DISTURBANCE, MOOD DISTURBANCE, OR ANXIETY, UNSPECIFIED DEMENTIA SEVERITY (HCC): ICD-10-CM

## 2023-09-07 DIAGNOSIS — F02.80 EARLY ONSET ALZHEIMER'S DEMENTIA WITHOUT BEHAVIORAL DISTURBANCE, PSYCHOTIC DISTURBANCE, MOOD DISTURBANCE, OR ANXIETY, UNSPECIFIED DEMENTIA SEVERITY (HCC): ICD-10-CM

## 2023-09-07 DIAGNOSIS — F33.1 MODERATE EPISODE OF RECURRENT MAJOR DEPRESSIVE DISORDER (HCC): ICD-10-CM

## 2023-09-07 DIAGNOSIS — R07.9 CHEST PAIN, UNSPECIFIED TYPE: ICD-10-CM

## 2023-09-07 DIAGNOSIS — G25.0 ESSENTIAL TREMOR: Primary | ICD-10-CM

## 2023-09-07 LAB
ALBUMIN SERPL-MCNC: 4.5 G/DL (ref 3.4–5)
ALBUMIN/GLOB SERPL: 1.7 {RATIO} (ref 1.1–2.2)
ALP SERPL-CCNC: 81 U/L (ref 40–129)
ALT SERPL-CCNC: 28 U/L (ref 10–40)
ANION GAP SERPL CALCULATED.3IONS-SCNC: 12 MMOL/L (ref 3–16)
AST SERPL-CCNC: 21 U/L (ref 15–37)
BILIRUB SERPL-MCNC: 0.5 MG/DL (ref 0–1)
BUN SERPL-MCNC: 15 MG/DL (ref 7–20)
CALCIUM SERPL-MCNC: 9.6 MG/DL (ref 8.3–10.6)
CHLORIDE SERPL-SCNC: 104 MMOL/L (ref 99–110)
CO2 SERPL-SCNC: 23 MMOL/L (ref 21–32)
CREAT SERPL-MCNC: 1 MG/DL (ref 0.8–1.3)
EST. AVERAGE GLUCOSE BLD GHB EST-MCNC: 134.1 MG/DL
GFR SERPLBLD CREATININE-BSD FMLA CKD-EPI: >60 ML/MIN/{1.73_M2}
GLUCOSE SERPL-MCNC: 113 MG/DL (ref 70–99)
HBA1C MFR BLD: 6.3 %
POTASSIUM SERPL-SCNC: 4.6 MMOL/L (ref 3.5–5.1)
PROT SERPL-MCNC: 7.2 G/DL (ref 6.4–8.2)
SODIUM SERPL-SCNC: 139 MMOL/L (ref 136–145)

## 2023-09-07 PROCEDURE — 1123F ACP DISCUSS/DSCN MKR DOCD: CPT | Performed by: INTERNAL MEDICINE

## 2023-09-07 PROCEDURE — G8417 CALC BMI ABV UP PARAM F/U: HCPCS | Performed by: INTERNAL MEDICINE

## 2023-09-07 PROCEDURE — 3017F COLORECTAL CA SCREEN DOC REV: CPT | Performed by: INTERNAL MEDICINE

## 2023-09-07 PROCEDURE — 93000 ELECTROCARDIOGRAM COMPLETE: CPT | Performed by: INTERNAL MEDICINE

## 2023-09-07 PROCEDURE — 1036F TOBACCO NON-USER: CPT | Performed by: INTERNAL MEDICINE

## 2023-09-07 PROCEDURE — 99214 OFFICE O/P EST MOD 30 MIN: CPT | Performed by: INTERNAL MEDICINE

## 2023-09-07 PROCEDURE — G8427 DOCREV CUR MEDS BY ELIG CLIN: HCPCS | Performed by: INTERNAL MEDICINE

## 2023-09-07 RX ORDER — METHYLPREDNISOLONE 4 MG/1
TABLET ORAL
Qty: 1 KIT | Refills: 0 | Status: SHIPPED | OUTPATIENT
Start: 2023-09-07 | End: 2023-09-13

## 2023-09-07 NOTE — PROGRESS NOTES
Hailey Huber. (:  1950) is a 68 y.o. male,Established patient, here for evaluation of the following chief complaint(s):  Follow-up (Hs had a wheezing cough for a couple of weeks now.)      Vitals:    23 0902   BP: 126/78   Pulse: 89   SpO2: 94%        ASSESSMENT/PLAN:  1. Essential tremor  Continues to have this. No change. Not on medication. Following with neruology   2. NPH (normal pressure hydrocephalus) (HCC)-s/p shunt  Concerns with worsening memory and falls. Discussed seeing neurosurgery ASAP for discussion about possible need to adjust shunt. Wife notes she will reach out. Patient continues in PT for falls as well. 3. Early onset Alzheimer's dementia without behavioral disturbance, psychotic disturbance, mood disturbance, or anxiety, unspecified dementia severity ([address])  Continues to see neurology. 4. Pre-diabetes  Not on any medication. Recheck cmp and hba1c  -     Comprehensive Metabolic Panel; Future  -     Hemoglobin A1C; Future  5. Moderate episode of recurrent major depressive disorder [facility])  Patient and wife note currently well controlled. Continue current dose of zoloft. 6. Severe obesity (BMI 35.0-39. 9) with comorbidity ([address])  Bmi 35.   7. Chest pain, unspecified type  Patient with chest pain on exertion improving with rest. Ekg shows normal sinus rhythm with low voltage. No st-t wave changes. Will obtain stress test given symptoms. Patient is on statin. -     EKG 12 Lead  -     NM MYOCARDIAL SPECT REST EXERCISE OR RX; Future  -     Sissy Monique MD, Cardiology, [facility]      Return if symptoms worsen or fail to improve, for patient to schedule with new physician . SUBJECTIVE/OBJECTIVE:  HPI    Patient is a 67 yo m with pmhx NPH, alzheimers, depression, chronic sinusitis, essential tremor who presents for follow up of the above listed chronic diseases. Last couple weeks worsening in cough and congestion. Some in the sinuses as well.   Did talk to pulm

## 2023-09-14 ENCOUNTER — OFFICE VISIT (OUTPATIENT)
Dept: CARDIOLOGY CLINIC | Age: 73
End: 2023-09-14

## 2023-09-14 VITALS
HEART RATE: 106 BPM | SYSTOLIC BLOOD PRESSURE: 116 MMHG | DIASTOLIC BLOOD PRESSURE: 78 MMHG | WEIGHT: 270.4 LBS | BODY MASS INDEX: 34.72 KG/M2

## 2023-09-14 DIAGNOSIS — R07.9 CHEST PAIN, UNSPECIFIED TYPE: Primary | ICD-10-CM

## 2023-09-14 NOTE — PROGRESS NOTES
motor and sensory exam.  Cranial nerves intact        Labs:     Lab Results   Component Value Date    WBC 10.6 01/29/2023    HGB 12.8 (L) 01/29/2023    HCT 37.6 (L) 01/29/2023    MCV 83.6 01/29/2023     01/29/2023     Lab Results   Component Value Date     09/07/2023    K 4.6 09/07/2023     09/07/2023    CO2 23 09/07/2023    BUN 15 09/07/2023    CREATININE 1.0 09/07/2023    GLUCOSE 113 (H) 09/07/2023    CALCIUM 9.6 09/07/2023    PROT 7.2 09/07/2023    LABALBU 4.5 09/07/2023    BILITOT 0.5 09/07/2023    ALKPHOS 81 09/07/2023    AST 21 09/07/2023    ALT 28 09/07/2023    LABGLOM >60 09/07/2023    GFRAA >60 08/08/2022    AGRATIO 1.7 09/07/2023    GLOB 2.7 08/25/2021         Lab Results   Component Value Date    CHOL 194 02/17/2023    CHOL 158 12/01/2021    CHOL 173 11/11/2020     Lab Results   Component Value Date    TRIG 153 (H) 02/17/2023    TRIG 156 (H) 12/01/2021    TRIG 210 (H) 11/11/2020     Lab Results   Component Value Date    HDL 54 02/17/2023    HDL 48 12/01/2021    HDL 49 11/11/2020     Lab Results   Component Value Date    LDLCALC 109 (H) 02/17/2023    LDLCALC 79 12/01/2021    LDLCALC 82 11/11/2020     Lab Results   Component Value Date    LABVLDL 31 02/17/2023    LABVLDL 31 12/01/2021    LABVLDL 42 11/11/2020     No results found for: \"CHOLHDLRATIO\"    Lab Results   Component Value Date    INR 1.02 07/18/2018    INR 1.22 (H) 07/14/2018    INR 1.07 07/13/2018    PROTIME 11.6 07/18/2018    PROTIME 13.9 (H) 07/14/2018    PROTIME 12.2 07/13/2018       The 10-year ASCVD risk score (Pomeroy DK, et al., 2019) is: 16.9%    Values used to calculate the score:      Age: 68 years      Sex: Male      Is Non- : No      Diabetic: No      Tobacco smoker: No      Systolic Blood Pressure: 355 mmHg      Is BP treated: No      HDL Cholesterol: 54 mg/dL      Total Cholesterol: 194 mg/dL      Assessment / Plan:      Diagnosis Orders   1.  Chest pain, unspecified type             1.

## 2023-09-15 ENCOUNTER — HOSPITAL ENCOUNTER (OUTPATIENT)
Dept: NON INVASIVE DIAGNOSTICS | Age: 73
Discharge: HOME | End: 2023-09-15
Payer: MEDICARE

## 2023-09-15 DIAGNOSIS — R07.9 CHEST PAIN, UNSPECIFIED TYPE: ICD-10-CM

## 2023-09-15 PROCEDURE — 93017 CV STRESS TEST TRACING ONLY: CPT

## 2023-09-15 PROCEDURE — A9502 TC99M TETROFOSMIN: HCPCS | Performed by: NURSE PRACTITIONER

## 2023-09-15 PROCEDURE — 6360000002 HC RX W HCPCS: Performed by: NURSE PRACTITIONER

## 2023-09-15 PROCEDURE — 78452 HT MUSCLE IMAGE SPECT MULT: CPT

## 2023-09-15 PROCEDURE — 3430000000 HC RX DIAGNOSTIC RADIOPHARMACEUTICAL: Performed by: NURSE PRACTITIONER

## 2023-09-15 RX ORDER — REGADENOSON 0.08 MG/ML
0.4 INJECTION, SOLUTION INTRAVENOUS
Status: COMPLETED | OUTPATIENT
Start: 2023-09-15 | End: 2023-09-15

## 2023-09-15 RX ADMIN — TETROFOSMIN 30 MILLICURIE: 1.38 INJECTION, POWDER, LYOPHILIZED, FOR SOLUTION INTRAVENOUS at 09:54

## 2023-09-15 RX ADMIN — TETROFOSMIN 10 MILLICURIE: 1.38 INJECTION, POWDER, LYOPHILIZED, FOR SOLUTION INTRAVENOUS at 08:45

## 2023-09-15 RX ADMIN — REGADENOSON 0.4 MG: 0.08 INJECTION, SOLUTION INTRAVENOUS at 09:54

## 2023-09-19 NOTE — TELEPHONE ENCOUNTER
DR JONES PATIENT    Please sign pending rx for 90 day supply.  Thank you Problem: Patient Centered Care  Goal: Patient preferences are identified and integrated in the patient's plan of care  Description: Interventions:  - Provide timely, complete, and accurate information to patient/family  - Incorporate patient and family knowledge, values, beliefs, and cultural backgrounds into the planning and delivery of care  - Encourage patient/family to participate in care and decision-making at the level they choose  - Honor patient and family perspectives and choices  Outcome: Progressing     Problem: Patient/Family Goals  Goal: Patient/Family Long Term Goal  Description: Patient's Long Term Goal: Go home    Interventions:  - VS  - Neuro assessments  - Labs  -MRI cervical spine  - See additional Care Plan goals for specific interventions  Outcome: Progressing  Goal: Patient/Family Short Term Goal  Description: Patient's Short Term Goal: Feel better and control my blood pressure    Interventions:   - VS  - Follow care plan   - See additional Care Plan goals for specific interventions  Outcome: Progressing     Problem: PAIN - ADULT  Goal: Verbalizes/displays adequate comfort level or patient's stated pain goal  Description: INTERVENTIONS:  - Encourage pt to monitor pain and request assistance  - Assess pain using appropriate pain scale  - Administer analgesics based on type and severity of pain and evaluate response  - Implement non-pharmacological measures as appropriate and evaluate response  - Consider cultural and social influences on pain and pain management  - Manage/alleviate anxiety  - Utilize distraction and/or relaxation techniques  - Monitor for opioid side effects  - Notify MD/LIP if interventions unsuccessful or patient reports new pain  - Anticipate increased pain with activity and pre-medicate as appropriate  Outcome: Progressing     Problem: NEUROLOGICAL - ADULT  Goal: Achieves stable or improved neurological status  Description: INTERVENTIONS  - Assess for and report changes in neurological status  - Initiate measures to prevent increased intracranial pressure  - Maintain blood pressure and fluid volume within ordered parameters to optimize cerebral perfusion and minimize risk of hemorrhage  - Monitor temperature, glucose, and sodium. Initiate appropriate interventions as ordered  Outcome: Progressing  Goal: Absence of seizures  Description: INTERVENTIONS  - Monitor for seizure activity  - Administer anti-seizure medications as ordered  - Monitor neurological status  Outcome: Progressing  Goal: Remains free of injury related to seizure activity  Description: INTERVENTIONS:  - Maintain airway, patient safety  and administer oxygen as ordered  - Monitor patient for seizure activity, document and report duration and description of seizure to MD/LIP  - If seizure occurs, turn patient to side and suction secretions as needed  - Reorient patient post seizure  - Seizure pads on all 4 side rails  - Instruct patient/family to notify RN of any seizure activity  - Instruct patient/family to call for assistance with activity based on assessment  Outcome: Progressing  Goal: Achieves maximal functionality and self care  Description: INTERVENTIONS  - Monitor swallowing and airway patency with patient fatigue and changes in neurological status  - Encourage and assist patient to increase activity and self care with guidance from PT/OT  - Encourage visually impaired, hearing impaired and aphasic patients to use assistive/communication devices  Outcome: Progressing     Pt a/ox4. Room air. Complaints of pain to left arm improved with prn pain meds - see MAR. Seen by neurology -discontinued IV fluids per neurology. Started lyrica per orders. MRI of cervical spine completed, results pending.

## 2023-09-26 ENCOUNTER — TELEPHONE (OUTPATIENT)
Dept: INTERNAL MEDICINE CLINIC | Age: 73
End: 2023-09-26

## 2023-09-26 ENCOUNTER — TELEPHONE (OUTPATIENT)
Dept: CARDIOLOGY CLINIC | Age: 73
End: 2023-09-26

## 2023-09-26 DIAGNOSIS — R94.39 ABNORMAL STRESS TEST: ICD-10-CM

## 2023-09-26 DIAGNOSIS — R07.9 CHEST PAIN, UNSPECIFIED TYPE: Primary | ICD-10-CM

## 2023-09-26 NOTE — TELEPHONE ENCOUNTER
Patient is calling in reference to the MyChart note sent to him from Dr. Doroteo Gaona regarding his [facility].      Dr. Rock Pearce note reads as follows: I reviewed your stress test. Overall it looked pretty good. There was one very small area mentioned in the test that I would like the cardiologist to review and discuss with you further. Patient would like to know what \"Small Area\" it is that Dr. Doroteo Gaona is concerned about.

## 2023-09-26 NOTE — TELEPHONE ENCOUNTER
The patient called and would like Dr. Naveed Whipple to take a look at his stress test results and call him back with any recommendations. According to the patient's PCP    Phong Barksdale, DO   9/20/2023  8:29 AM EDT       Please call patient and let him know that I reviewed his stress test. Overall it looked pretty good. There was one very small area mentioned in the test that I would like the cardiologist to review and discuss with you further.

## 2023-09-27 ENCOUNTER — TELEPHONE (OUTPATIENT)
Dept: INTERNAL MEDICINE CLINIC | Age: 73
End: 2023-09-27

## 2023-09-27 NOTE — PROGRESS NOTES
Received call from Dr. Doran Mcardle who recommended based on patients stress test results and symptoms he would recommend CT heart structure and morphology. His office will order this test since I will not be able to follow results. He recommended patient take metoprolol tartrate 25mg  2 tablets day before test and 1 tablet day of test. I will call and discuss this recommendation with patient.

## 2023-09-27 NOTE — TELEPHONE ENCOUNTER
I was able to discuss results with patient over the phone as well as discuss the below plan with patient and his wife.

## 2023-09-27 NOTE — TELEPHONE ENCOUNTER
Received call from Dr. Rian Roman who recommended based on patients stress test results and symptoms he would recommend CT heart structure and morphology. His office will order this test since I will not be able to follow results. He recommended patient take metoprolol tartrate 25mg  2 tablets day before test and 1 tablet day of test.     I called to discuss recommendations with patient. Patients wife is unfortunately not there currently. Will call back in the afternoon to discuss with both patient and wife .

## 2023-09-27 NOTE — TELEPHONE ENCOUNTER
----- Message from Emiliano Delacruz MA sent at 9/27/2023  2:29 PM EDT -----  Subject: Message to Provider    QUESTIONS  Information for Provider? Calling for results. Wife will be home the rest of the day.   ---------------------------------------------------------------------------  --------------  Ziggy Hess SUKI  [phone]; OK to leave message on voicemail  ---------------------------------------------------------------------------  --------------  SCRIPT ANSWERS  Relationship to Patient? Spouse/Partner  Representative Name? Wei Padilla  Is the representative on the Communication Release of Information (RADHA)   form in Epic?  Yes

## 2023-09-28 ENCOUNTER — TELEPHONE (OUTPATIENT)
Dept: INTERVENTIONAL RADIOLOGY/VASCULAR | Age: 73
End: 2023-09-28

## 2023-10-02 ENCOUNTER — OFFICE VISIT (OUTPATIENT)
Dept: FAMILY MEDICINE CLINIC | Age: 73
End: 2023-10-02

## 2023-10-02 VITALS
BODY MASS INDEX: 35.16 KG/M2 | SYSTOLIC BLOOD PRESSURE: 126 MMHG | OXYGEN SATURATION: 96 % | HEART RATE: 77 BPM | WEIGHT: 274 LBS | HEIGHT: 74 IN | DIASTOLIC BLOOD PRESSURE: 90 MMHG

## 2023-10-02 DIAGNOSIS — F34.1 PERSISTENT DEPRESSIVE DISORDER: ICD-10-CM

## 2023-10-02 DIAGNOSIS — G30.0 EARLY ONSET ALZHEIMER'S DEMENTIA WITHOUT BEHAVIORAL DISTURBANCE, PSYCHOTIC DISTURBANCE, MOOD DISTURBANCE, OR ANXIETY, UNSPECIFIED DEMENTIA SEVERITY (HCC): ICD-10-CM

## 2023-10-02 DIAGNOSIS — N40.1 BENIGN PROSTATIC HYPERPLASIA WITH LOWER URINARY TRACT SYMPTOMS, SYMPTOM DETAILS UNSPECIFIED: ICD-10-CM

## 2023-10-02 DIAGNOSIS — G25.0 ESSENTIAL TREMOR: ICD-10-CM

## 2023-10-02 DIAGNOSIS — F02.80 EARLY ONSET ALZHEIMER'S DEMENTIA WITHOUT BEHAVIORAL DISTURBANCE, PSYCHOTIC DISTURBANCE, MOOD DISTURBANCE, OR ANXIETY, UNSPECIFIED DEMENTIA SEVERITY (HCC): ICD-10-CM

## 2023-10-02 DIAGNOSIS — R07.9 CHEST PAIN, UNSPECIFIED TYPE: ICD-10-CM

## 2023-10-02 DIAGNOSIS — R73.03 PRE-DIABETES: ICD-10-CM

## 2023-10-02 DIAGNOSIS — G91.2 NPH (NORMAL PRESSURE HYDROCEPHALUS) (HCC): Primary | ICD-10-CM

## 2023-10-02 RX ORDER — METFORMIN HYDROCHLORIDE 500 MG/1
500 TABLET, EXTENDED RELEASE ORAL
Qty: 30 TABLET | Refills: 3 | Status: SHIPPED | OUTPATIENT
Start: 2023-10-02

## 2023-10-02 NOTE — PROGRESS NOTES
SINUS EXPLORATION, BILATERAL INFERIOR TURBINATE REDUCTION, BILATERAL MAXILLARY ANTROSTOMY, BILATERAL TOTAL ETHMOIDECTOMY WITH SPHENOIDECTOMY AND IMAGE NAVIGATION, REVISION SEPTOPLASTY performed by Marcello Mora MD at [address] [place] [address] 12/14/2018    EGD BIOPSY performed by Robert Newman MD at [facility]/ 03/01/2019    ESOPHAGOGASTRODUODENOSCOPY WITH RADIOFREQUENCY  ABLATION/ ABLATIONS X19 performed by Robert Newman MD at [facility]/ 04/24/2019    ESOPHAGOGASTRODUODENOSCOPY WITH RADIOFREQUENCY ABLATION performed by Robert Newman MD at [facility] 04/24/2019    EGD BIOPSY performed by Robert Newman MD at [facility] 08/02/2019    ESOPHAGOGASTRODUODENOSCOPY RADIOFREQUENCY ABLATION performed by Robert Newman MD at [facility] 08/02/2019    EGD GASTRIC BIOPSY performed by Robert Newman MD at [facility] 08/02/2019    EGD POLYP COLD SNARE performed by Robert Newman MD at [facility] 11/19/2019    ESOPHAGOGASTRODUODENOSCOPY WITH RADIOFREQUENCY ABLATION performed by Robert Newman MD at [facility] 11/19/2019    EGD POLYP SNARE performed by Robert Newman MD at [facility] 07/15/2020    ESOPHAGOGASTRODUODENOSCOPY RADIOFREQUENCY ABLATION ON STANDBY performed by Robert Newman MD at [facility] 07/15/2020    EGD BIOPSY performed by Robert Newman MD at [facility] 07/16/2021    EGD BIOPSY performed by Robert Newman MD at [facility]/ 07/16/2021    EGD POLYP SNARE performed by Robert Newman MD at [facility]

## 2023-10-04 ENCOUNTER — HOSPITAL ENCOUNTER (OUTPATIENT)
Dept: CT IMAGING | Age: 73
Discharge: HOME | End: 2023-10-04
Payer: MEDICARE

## 2023-10-04 VITALS — HEART RATE: 71 BPM | SYSTOLIC BLOOD PRESSURE: 103 MMHG | DIASTOLIC BLOOD PRESSURE: 67 MMHG | OXYGEN SATURATION: 97 %

## 2023-10-04 DIAGNOSIS — R07.9 CHEST PAIN, UNSPECIFIED TYPE: ICD-10-CM

## 2023-10-04 DIAGNOSIS — R94.39 ABNORMAL STRESS TEST: ICD-10-CM

## 2023-10-04 PROCEDURE — 6370000000 HC RX 637 (ALT 250 FOR IP): Performed by: RADIOLOGY

## 2023-10-04 PROCEDURE — 2500000003 HC RX 250 WO HCPCS: Performed by: RADIOLOGY

## 2023-10-04 PROCEDURE — 75574 CT ANGIO HRT W/3D IMAGE: CPT

## 2023-10-04 PROCEDURE — 6360000004 HC RX CONTRAST MEDICATION: Performed by: INTERNAL MEDICINE

## 2023-10-04 RX ORDER — METOPROLOL TARTRATE 5 MG/5ML
5 INJECTION INTRAVENOUS EVERY 5 MIN PRN
Status: DISCONTINUED | OUTPATIENT
Start: 2023-10-04 | End: 2023-10-05 | Stop reason: HOSPADM

## 2023-10-04 RX ORDER — NITROGLYCERIN 0.4 MG/1
0.4 TABLET SUBLINGUAL ONCE
Status: COMPLETED | OUTPATIENT
Start: 2023-10-04 | End: 2023-10-04

## 2023-10-04 RX ADMIN — NITROGLYCERIN 0.4 MG: 0.4 TABLET SUBLINGUAL at 16:12

## 2023-10-04 RX ADMIN — IOPAMIDOL 80 ML: 755 INJECTION, SOLUTION INTRAVENOUS at 16:15

## 2023-10-04 RX ADMIN — METOPROLOL TARTRATE 5 MG: 1 INJECTION, SOLUTION INTRAVENOUS at 16:02

## 2023-10-04 NOTE — PROGRESS NOTES
Pt here for Cardiac CTA test.  Administered 5mg of metoprolol to achieve desired heart rate of 60 BPM.  Administered 0.4mg nitroglycerin sublingual for exam.  Pt tolerated well. VSS. See flow sheet. Pt transferred to cath lab [facility] .

## 2023-10-04 NOTE — DISCHARGE INSTRUCTIONS
Thank You for choosing [facility]. for your medical care. During your examination, you were given a Beta Blocker called Metopropol  to help slow down your heart rate. The dose of the medication you were given was 5mg and 1 sublingual nitroglycerin. Although there are few side effects from this medication when given in small amounts (doses) it is important you follow a few important instructions:    Notify the doctor that ordered your test or go to the nearest emergency room if you experience any of the following:  difficulty breathing  dizziness  extreme fatigue  pounding or irregular heart beat    Continue your usual diet, increase fluids today. (Four 8 ounce glasses of water). 4.You may return back to your normal activity and routine tomorrow. Test results will be sent to your Physician.     If you take Actoplus Met, Avandamet, Glucophage, Glucophage XR, Glucovance, Metformin, Metaglip, Riomet, or Fortmet hold medication for 48 hours after procedure and resum

## 2023-10-06 ENCOUNTER — OFFICE VISIT (OUTPATIENT)
Dept: CARDIOLOGY CLINIC | Age: 73
End: 2023-10-06
Payer: MEDICARE

## 2023-10-06 VITALS
SYSTOLIC BLOOD PRESSURE: 100 MMHG | BODY MASS INDEX: 35 KG/M2 | HEART RATE: 110 BPM | DIASTOLIC BLOOD PRESSURE: 78 MMHG | WEIGHT: 272.6 LBS

## 2023-10-06 DIAGNOSIS — I25.10 CORONARY ARTERY DISEASE INVOLVING NATIVE CORONARY ARTERY OF NATIVE HEART WITHOUT ANGINA PECTORIS: ICD-10-CM

## 2023-10-06 PROCEDURE — G8427 DOCREV CUR MEDS BY ELIG CLIN: HCPCS | Performed by: INTERNAL MEDICINE

## 2023-10-06 PROCEDURE — 3017F COLORECTAL CA SCREEN DOC REV: CPT | Performed by: INTERNAL MEDICINE

## 2023-10-06 PROCEDURE — 1123F ACP DISCUSS/DSCN MKR DOCD: CPT | Performed by: INTERNAL MEDICINE

## 2023-10-06 PROCEDURE — 99215 OFFICE O/P EST HI 40 MIN: CPT | Performed by: INTERNAL MEDICINE

## 2023-10-06 PROCEDURE — G8484 FLU IMMUNIZE NO ADMIN: HCPCS | Performed by: INTERNAL MEDICINE

## 2023-10-06 PROCEDURE — G8417 CALC BMI ABV UP PARAM F/U: HCPCS | Performed by: INTERNAL MEDICINE

## 2023-10-06 PROCEDURE — 1036F TOBACCO NON-USER: CPT | Performed by: INTERNAL MEDICINE

## 2023-10-06 RX ORDER — FUROSEMIDE 20 MG/1
20 TABLET ORAL DAILY
Qty: 90 TABLET | Refills: 3 | Status: SHIPPED | OUTPATIENT
Start: 2023-10-06

## 2023-10-06 RX ORDER — ASPIRIN 81 MG/1
81 TABLET ORAL DAILY
Qty: 90 TABLET | Refills: 1
Start: 2023-10-06

## 2023-10-06 RX ORDER — ATORVASTATIN CALCIUM 40 MG/1
40 TABLET, FILM COATED ORAL DAILY
Qty: 90 TABLET | Refills: 3 | Status: SHIPPED | OUTPATIENT
Start: 2023-10-06

## 2023-10-06 NOTE — PROGRESS NOTES
(N/A, 04/24/2019); Upper gastrointestinal endoscopy (N/A, 04/24/2019); Upper gastrointestinal endoscopy (N/A, 08/02/2019); Upper gastrointestinal endoscopy (N/A, 08/02/2019); Upper gastrointestinal endoscopy (N/A, 08/02/2019); Upper gastrointestinal endoscopy (N/A, 11/19/2019); Upper gastrointestinal endoscopy (N/A, 11/19/2019); Upper gastrointestinal endoscopy (N/A, 07/15/2020); Upper gastrointestinal endoscopy (N/A, 07/15/2020); Upper gastrointestinal endoscopy (N/A, 07/16/2021); Upper gastrointestinal endoscopy (N/A, 07/16/2021); eye surgery; hernia repair; Sinus endoscopy (Bilateral, 05/04/2022); Sinus endoscopy (Bilateral, 9/30/2022); Upper gastrointestinal endoscopy (N/A, 2/15/2023); Colonoscopy (N/A, 2/15/2023); and Upper gastrointestinal endoscopy (N/A, 2/15/2023). Social History:   reports that he quit smoking about 21 years ago. His smoking use included cigarettes. He has a 5.00 pack-year smoking history. He has never used smokeless tobacco. He reports that he does not currently use alcohol after a past usage of about 2.0 standard drinks of alcohol per week. He reports that he does not use drugs. Family History:  No evidence for sudden cardiac death or premature CAD      Medications:     Home medications were reviewed and are listed below    Prior to Admission medications    Medication Sig Start Date End Date Taking?  Authorizing Provider   atorvastatin (LIPITOR) 40 MG tablet Take 1 tablet by mouth daily 10/6/23  Yes Eda Lees MD   furosemide (LASIX) 20 MG tablet Take 1 tablet by mouth daily 10/6/23  Yes Eda Lees MD   aspirin 81 MG EC tablet Take 1 tablet by mouth daily 10/6/23  Yes Eda Lees MD   metFORMIN (GLUCOPHAGE-XR) 500 MG extended release tablet Take 1 tablet by mouth daily (with breakfast) 10/2/23  Yes Carlita Carson MD   metoprolol tartrate (LOPRESSOR) 25 MG tablet Take two tablets day before test and one tablet day of test. 9/27/23  Yes Joellen Ponce DO

## 2023-10-10 RX ORDER — BUDESONIDE 0.5 MG/2ML
1 INHALANT ORAL 2 TIMES DAILY
Qty: 120 ML | Refills: 5 | Status: SHIPPED | OUTPATIENT
Start: 2023-10-10

## 2023-10-10 NOTE — TELEPHONE ENCOUNTER
Pt stated he just wanted 14 pills until his scrip comes in through the mail. His prescription is scheduled to come in a week and he's out now.

## 2023-10-11 ENCOUNTER — TELEPHONE (OUTPATIENT)
Dept: PULMONOLOGY | Age: 73
End: 2023-10-11

## 2023-10-11 NOTE — TELEPHONE ENCOUNTER
Ed called initially to ask if small amount of budesonide (Pulmicort) neb sol'n could be sent to local pharmacy until [facility] gets his meds sent. However he had already asked PCP Dr. Rajat Rivas for this yesterday and it was refilled by him. Per chart, DuoNeb neb sol'n had been ordered. Patient states that he has received this and keeps it in closet without using it. Should he being using Pulmicort instead? Is DuoNeb to be used?     He can be reached at [phone]

## 2023-10-12 ENCOUNTER — TELEPHONE (OUTPATIENT)
Dept: PULMONOLOGY | Age: 73
End: 2023-10-12

## 2023-10-12 NOTE — TELEPHONE ENCOUNTER
Patient is calling today with sob and wheezing. He said his breathing is not getting any better and he is using his inhalers/nebulizer. He does not want to wait for available appt in November. Are you able to see him next week?

## 2023-10-18 ENCOUNTER — OFFICE VISIT (OUTPATIENT)
Dept: PULMONOLOGY | Age: 73
End: 2023-10-18
Payer: MEDICARE

## 2023-10-18 ENCOUNTER — TELEPHONE (OUTPATIENT)
Dept: PULMONOLOGY | Age: 73
End: 2023-10-18

## 2023-10-18 ENCOUNTER — HOSPITAL ENCOUNTER (OUTPATIENT)
Dept: CT IMAGING | Age: 73
Discharge: HOME | End: 2023-10-18
Payer: MEDICARE

## 2023-10-18 VITALS
BODY MASS INDEX: 37.38 KG/M2 | OXYGEN SATURATION: 94 % | HEART RATE: 99 BPM | WEIGHT: 267 LBS | SYSTOLIC BLOOD PRESSURE: 114 MMHG | DIASTOLIC BLOOD PRESSURE: 78 MMHG | HEIGHT: 71 IN

## 2023-10-18 DIAGNOSIS — R05.2 SUBACUTE COUGH: Primary | ICD-10-CM

## 2023-10-18 DIAGNOSIS — G91.2 NPH (NORMAL PRESSURE HYDROCEPHALUS) (HCC): ICD-10-CM

## 2023-10-18 DIAGNOSIS — J45.50 SEVERE PERSISTENT ASTHMA WITHOUT COMPLICATION: ICD-10-CM

## 2023-10-18 DIAGNOSIS — J45.50 SEVERE PERSISTENT ASTHMA, UNSPECIFIED WHETHER COMPLICATED: Primary | ICD-10-CM

## 2023-10-18 PROCEDURE — G8427 DOCREV CUR MEDS BY ELIG CLIN: HCPCS | Performed by: INTERNAL MEDICINE

## 2023-10-18 PROCEDURE — 1036F TOBACCO NON-USER: CPT | Performed by: INTERNAL MEDICINE

## 2023-10-18 PROCEDURE — G8417 CALC BMI ABV UP PARAM F/U: HCPCS | Performed by: INTERNAL MEDICINE

## 2023-10-18 PROCEDURE — G8484 FLU IMMUNIZE NO ADMIN: HCPCS | Performed by: INTERNAL MEDICINE

## 2023-10-18 PROCEDURE — 70450 CT HEAD/BRAIN W/O DYE: CPT

## 2023-10-18 PROCEDURE — 1123F ACP DISCUSS/DSCN MKR DOCD: CPT | Performed by: INTERNAL MEDICINE

## 2023-10-18 PROCEDURE — 99214 OFFICE O/P EST MOD 30 MIN: CPT | Performed by: INTERNAL MEDICINE

## 2023-10-18 PROCEDURE — 3017F COLORECTAL CA SCREEN DOC REV: CPT | Performed by: INTERNAL MEDICINE

## 2023-10-18 RX ORDER — PANTOPRAZOLE SODIUM 40 MG/1
40 TABLET, DELAYED RELEASE ORAL EVERY MORNING
Qty: 60 TABLET | Refills: 0 | Status: SHIPPED | OUTPATIENT
Start: 2023-10-18

## 2023-10-18 RX ORDER — GUAIFENESIN AND CODEINE PHOSPHATE 100; 10 MG/5ML; MG/5ML
5 SOLUTION ORAL 3 TIMES DAILY PRN
Qty: 237 ML | Refills: 0 | Status: SHIPPED | OUTPATIENT
Start: 2023-10-18 | End: 2023-11-17

## 2023-10-18 ASSESSMENT — ENCOUNTER SYMPTOMS
SHORTNESS OF BREATH: 0
WHEEZING: 1
CHEST TIGHTNESS: 0
COUGH: 1

## 2023-10-18 NOTE — PROGRESS NOTES
[facility] Pulmonary and Critical Care    Outpatient Note    Subjective:   CHIEF COMPLAINT:   Chief Complaint   Patient presents with    Cough     Interval history on 10/18/23  He is having coughing spells. Last Thursday it was severe. It lasted for a minute or so. Last time he was here he had increased coughing and nasal discharge. He was treated with prednisone and azelastin/flonase. It didn't help much. He is still on pulmicort and brovana. He is also on Nucala. No GERD. He saw Dr. Kristopher Santoyo yesterday. He had upper and lower endoscopies in Feb.    He has hx of choking when drinking     Interval history on 8/14/23  He has cough and sneezing over the past 2 weeks. No fever. He is using albuterol neb or inhaler. He is still on brovana, pulmicort and nucala. He has some post nasal.    He is on CPAP. Interval history on 6/26/23  Overall he is doing good. He ois using nebs twice a day with brovana and pulmicort. He use albuterol once a week or less. He is on Nucala over the past 3 months since I saw him last.    He is also on singulair. Interval history on 4/5/23  Continue to have nasal congestion, wheezing and cough. He had prednisone and doxy for sinus congestion. He finished prednisone on 3/19. It helped initially but symptoms came back after finishing the course. He is on [place] and pulmicort twice a day and albuterol once a day. He use flonase once a day. He doesn't feel any difference. He also use saline nasal washing. He is also using mucinex in the past 5 days. He was supposed to go to a trip but he cancels it. There is no significant seasonal variation with his symptoms. Interval history on 2/1/23  He was admitted to the hospital on 1/6 for asthma exacerbation. He had nasal congestion and post nasal drip. There is no fever or chills. He is on nebulizer now and use it twice a day. He is also five days course of prednisone.

## 2023-10-18 NOTE — TELEPHONE ENCOUNTER
I will need you to place an order for Dupixent & send to [facility] electronically. Prior Molina Coffey will be indicated at that time.

## 2023-10-23 ENCOUNTER — HOSPITAL ENCOUNTER (OUTPATIENT)
Dept: SPEECH THERAPY | Age: 73
Setting detail: THERAPIES SERIES
Discharge: HOME | End: 2023-10-23
Attending: INTERNAL MEDICINE

## 2023-10-23 ENCOUNTER — HOSPITAL ENCOUNTER (OUTPATIENT)
Dept: GENERAL RADIOLOGY | Age: 73
Discharge: HOME | End: 2023-10-23
Attending: INTERNAL MEDICINE
Payer: MEDICARE

## 2023-10-23 DIAGNOSIS — R05.2 SUBACUTE COUGH: ICD-10-CM

## 2023-10-23 PROCEDURE — 74230 X-RAY XM SWLNG FUNCJ C+: CPT

## 2023-10-23 PROCEDURE — 92611 MOTION FLUOROSCOPY/SWALLOW: CPT

## 2023-10-23 RX ORDER — BUDESONIDE 0.5 MG/2ML
1 INHALANT ORAL 2 TIMES DAILY
Qty: 120 ML | Refills: 5 | Status: SHIPPED | OUTPATIENT
Start: 2023-10-23

## 2023-10-23 NOTE — PROCEDURES
Upper gastrointestinal endoscopy (N/A, 2/15/2023). CXR: no recent imaging completed    CT head 10/18/23  Stable appearing CT of the head with  shunt catheter with the tip in the third  ventricle and stable appearing mild ventricular dilation. Newly developed hyperdense extensive sinus disease and prior sinus surgery,  extensively increased hyperdense fluid collections throughout the ethmoid air  cells maxillary sinuses frontal sinuses and minimal in the sphenoid sinuses. Prior MBS Results: none    Patient Complaints/Reason for Referral:  Claudia Flores was referred for a MBS to assess the efficiency of his/her swallow function, assess for aspiration, and to make recommendations regarding safe dietary consistencies, effective compensatory strategies, and safe eating environment. - Pt reports coughing at times when eating and drinking, more so with drinking. Onset of problem:   Pt reports cough has been ongoing over a year. Behavior/Cognition: pleasant and cooperative  Vision/Hearing: wears glasses: pt wears bilateral hearing aides per chart review    Impressions:  Oral Phase  WFL  Pharyngeal Phase  WFL's. Intermittent very shallow flash penetration/(fully clears) of thin liquids noted, which is a normal finding of pt of older age. No aspiration was identified. Swallow was efficient as well with no pharyngeal residue noted  Upper Esophageal Screen  No evidence of backflow noted    Treatment Dx and ICD 10: dysphagia  Position: Lateral and upright  Consistencies administered: puree, thin and solids    Penetration Aspiration Scale (PAS)  [x] 1 Material does not enter the airway    Dysphagia Outcome Severity Scale  [x] Level 7 - Normal in all situations    Recommendations/Treatment  If nature of cough is not determined, consider follow up by ana Putnam pt SLP at [facility] [place] office, to address chronic cough.      Recommended Exercises:   N/a    Education: Images and recommendations were

## 2023-10-24 ENCOUNTER — TELEPHONE (OUTPATIENT)
Dept: PULMONOLOGY | Age: 73
End: 2023-10-24

## 2023-10-24 NOTE — TELEPHONE ENCOUNTER
Submitted PA for DUPIXENT  [facility]ia [facility] Key: [id number] STATUS: PENDING. Follow up done daily; if no response in three days we will refax for status check. If another three days goes by with no response we will call the insurance for status.

## 2023-10-25 ENCOUNTER — E-VISIT (OUTPATIENT)
Dept: PRIMARY CARE CLINIC | Age: 73
End: 2023-10-25
Payer: MEDICARE

## 2023-10-25 ENCOUNTER — TELEPHONE (OUTPATIENT)
Dept: PULMONOLOGY | Age: 73
End: 2023-10-25

## 2023-10-25 DIAGNOSIS — B34.9 VIRAL ILLNESS: Primary | ICD-10-CM

## 2023-10-25 PROCEDURE — 99421 OL DIG E/M SVC 5-10 MIN: CPT | Performed by: NURSE PRACTITIONER

## 2023-10-25 ASSESSMENT — LIFESTYLE VARIABLES: SMOKING_STATUS: NO, I HAVE NEVER SMOKED

## 2023-10-25 NOTE — TELEPHONE ENCOUNTER
[facility]   I have printed out the denial letter & submitted office notes & 1/2023 labs showing the need for Dupixent & faxed to [facility] at [phone]

## 2023-10-25 NOTE — TELEPHONE ENCOUNTER
[facility] DENIED.   Letter scanned into the chart

## 2023-10-25 NOTE — PROGRESS NOTES
Reviewed questionnaire    Reviewed meds/allergies    Dx Viral illness    Plan Symptoms x 1 day.  Messaged patient for results of home covid test. Awaiting response    Time spent on visit  6 min

## 2023-10-26 ENCOUNTER — TELEPHONE (OUTPATIENT)
Dept: FAMILY MEDICINE CLINIC | Age: 73
End: 2023-10-26

## 2023-10-26 DIAGNOSIS — U07.1 COVID-19: Primary | ICD-10-CM

## 2023-10-26 NOTE — TELEPHONE ENCOUNTER
That medication was supposed to go to [facility], [address]

## 2023-10-26 NOTE — TELEPHONE ENCOUNTER
[facility] requires Consent in order to process this information. Scanned into media. If this requires a response please respond to the pool ( P [facility] [address]). Thank you please advise patient.

## 2023-10-26 NOTE — TELEPHONE ENCOUNTER
----- Message from Audrey Jarrell sent at 10/26/2023  3:25 PM EDT -----  Subject: Refill Request    QUESTIONS  Name of Medication? Other - Covid medication  Patient-reported dosage and instructions? unknown  How many days do you have left? 0  Preferred Pharmacy? [id number]  Pharmacy phone number (if available)? [phone]  Additional Information for Provider? Patients wife was given a series of   pills for her covid. Patient is hoping the same medication can be called   in for him as he tested positive for covid today. Please call patient and   let him know if the same medication can be called in for him.  ---------------------------------------------------------------------------  --------------  CALL BACK INFO  What is the best way for the office to contact you? OK to leave message on   Koru,OK to respond with electronic message via [employer] portal (only   for patients who have registered [employer] account)  Preferred Call Back Phone Number? [phone]  ---------------------------------------------------------------------------  --------------  SCRIPT ANSWERS  Relationship to Patient?  Self

## 2023-10-27 DIAGNOSIS — U07.1 COVID: Primary | ICD-10-CM

## 2023-11-01 RX ORDER — ALBUTEROL SULFATE 90 UG/1
2 AEROSOL, METERED RESPIRATORY (INHALATION) EVERY 6 HOURS PRN
Qty: 18 G | Refills: 3 | Status: SHIPPED | OUTPATIENT
Start: 2023-11-01

## 2023-11-08 RX ORDER — FINASTERIDE 5 MG/1
5 TABLET, FILM COATED ORAL DAILY
Qty: 90 TABLET | Refills: 3 | OUTPATIENT
Start: 2023-11-08

## 2023-11-17 RX ORDER — FINASTERIDE 5 MG/1
5 TABLET, FILM COATED ORAL DAILY
Qty: 90 TABLET | Refills: 1 | Status: SHIPPED | OUTPATIENT
Start: 2023-11-17

## 2023-11-17 RX ORDER — GABAPENTIN 100 MG/1
CAPSULE ORAL
Qty: 360 CAPSULE | Refills: 3 | OUTPATIENT
Start: 2023-11-17

## 2023-11-17 RX ORDER — GABAPENTIN 100 MG/1
CAPSULE ORAL
Qty: 360 CAPSULE | Refills: 2 | Status: SHIPPED | OUTPATIENT
Start: 2023-11-17 | End: 2024-11-17

## 2023-11-17 NOTE — TELEPHONE ENCOUNTER
Requested Prescriptions     Pending Prescriptions Disp Refills    finasteride (PROSCAR) 5 MG tablet 90 tablet 1     Sig: Take 1 tablet by mouth daily          Last Office Visit: 10/6/2023     Next Office Visit: 4/4/2024

## 2023-11-22 ENCOUNTER — TELEPHONE (OUTPATIENT)
Dept: PULMONOLOGY | Age: 73
End: 2023-11-22

## 2023-11-22 NOTE — TELEPHONE ENCOUNTER
I spoke to pa dept about appeal. After 30 minutes on the phone, I was told there should be a consent for the pt to sign. I found the consent that was sent to the wrong office. I have sent that to the pt and he will sign and send back. Once signed I will fax appeal again.

## 2023-12-04 ENCOUNTER — OFFICE VISIT (OUTPATIENT)
Dept: PULMONOLOGY | Age: 73
End: 2023-12-04
Payer: MEDICARE

## 2023-12-04 VITALS
WEIGHT: 273 LBS | SYSTOLIC BLOOD PRESSURE: 136 MMHG | HEART RATE: 107 BPM | OXYGEN SATURATION: 95 % | BODY MASS INDEX: 38.22 KG/M2 | DIASTOLIC BLOOD PRESSURE: 84 MMHG | HEIGHT: 71 IN

## 2023-12-04 DIAGNOSIS — J45.50 SEVERE PERSISTENT ASTHMA WITHOUT COMPLICATION: Primary | ICD-10-CM

## 2023-12-04 PROCEDURE — 1036F TOBACCO NON-USER: CPT | Performed by: INTERNAL MEDICINE

## 2023-12-04 PROCEDURE — 3017F COLORECTAL CA SCREEN DOC REV: CPT | Performed by: INTERNAL MEDICINE

## 2023-12-04 PROCEDURE — G8484 FLU IMMUNIZE NO ADMIN: HCPCS | Performed by: INTERNAL MEDICINE

## 2023-12-04 PROCEDURE — 1123F ACP DISCUSS/DSCN MKR DOCD: CPT | Performed by: INTERNAL MEDICINE

## 2023-12-04 PROCEDURE — 99214 OFFICE O/P EST MOD 30 MIN: CPT | Performed by: INTERNAL MEDICINE

## 2023-12-04 PROCEDURE — G8417 CALC BMI ABV UP PARAM F/U: HCPCS | Performed by: INTERNAL MEDICINE

## 2023-12-04 PROCEDURE — G8427 DOCREV CUR MEDS BY ELIG CLIN: HCPCS | Performed by: INTERNAL MEDICINE

## 2023-12-04 ASSESSMENT — ENCOUNTER SYMPTOMS
SHORTNESS OF BREATH: 0
CHEST TIGHTNESS: 0
WHEEZING: 1
COUGH: 1

## 2023-12-06 NOTE — TELEPHONE ENCOUNTER
I received a questionnaire for the appeals, I was answering the questions, got to a question asking if this was being prescribed by dermatologist, allergist, or immunologist?  If not then this will not be approved. Since dr. Myesha Sanchez is a pulmonologist this will not be approved. If this requires a response please respond to the pool ( P [facility] [phone]). Thank you please advise patient.

## 2023-12-07 NOTE — TELEPHONE ENCOUNTER
Please fill pending Rx for Dupixent and send to [facility]. Medication was approved and pt is aware.  Thank you

## 2023-12-08 RX ORDER — DUPILUMAB 200 MG/1.14ML
200 INJECTION, SOLUTION SUBCUTANEOUS ONCE
Qty: 1.14 ML | Refills: 0 | Status: SHIPPED | OUTPATIENT
Start: 2023-12-08 | End: 2023-12-08

## 2023-12-12 ENCOUNTER — TELEPHONE (OUTPATIENT)
Dept: PULMONOLOGY | Age: 73
End: 2023-12-12

## 2023-12-12 NOTE — TELEPHONE ENCOUNTER
Spoke with DOM at [facility]. Rx for Dupixent was clarified. Dupilumab (DUPIXENT) injection 200 mg 200 mg EVERY 14 DAYS 10/18/2023    Admin Instructions: Do not shake. Notes to Pharmacy: Please give 400mg on the 1st dose then 200mg every other week. Route: SubCUTAneous   They come in boxes of 2 pens. I ordered a 90 day supply with 3 refills.

## 2023-12-12 NOTE — TELEPHONE ENCOUNTER
Josette Junior at [facility] needs clarification:  Is loading dose needed,? frequency of injection, total amount dispensed. There is usually loading dose, maintenance dose. Need frequency if applicable. Reffllss if applicable. Clarifications can be called verbally to phone # and ref #. Can leave msg including NPI, office phone, callers name.

## 2023-12-28 ENCOUNTER — HOSPITAL ENCOUNTER (EMERGENCY)
Age: 73
Discharge: HOME | End: 2023-12-28
Attending: EMERGENCY MEDICINE
Payer: MEDICARE

## 2023-12-28 ENCOUNTER — APPOINTMENT (OUTPATIENT)
Dept: CT IMAGING | Age: 73
End: 2023-12-28
Payer: MEDICARE

## 2023-12-28 VITALS
HEIGHT: 74 IN | DIASTOLIC BLOOD PRESSURE: 93 MMHG | TEMPERATURE: 98.3 F | WEIGHT: 282.85 LBS | SYSTOLIC BLOOD PRESSURE: 164 MMHG | RESPIRATION RATE: 16 BRPM | OXYGEN SATURATION: 96 % | BODY MASS INDEX: 36.3 KG/M2 | HEART RATE: 95 BPM
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
## 2023-12-28 DIAGNOSIS — S00.81XA ABRASION OF FOREHEAD, INITIAL ENCOUNTER: ICD-10-CM

## 2023-12-28 PROCEDURE — 70450 CT HEAD/BRAIN W/O DYE: CPT

## 2023-12-28 PROCEDURE — 6370000000 HC RX 637 (ALT 250 FOR IP): Performed by: PHYSICIAN ASSISTANT

## 2023-12-28 PROCEDURE — 99284 EMERGENCY DEPT VISIT MOD MDM: CPT

## 2023-12-28 RX ORDER — ACETAMINOPHEN 325 MG/1
650 TABLET ORAL ONCE
Status: COMPLETED | OUTPATIENT
Start: 2023-12-28 | End: 2023-12-28

## 2023-12-28 RX ADMIN — ACETAMINOPHEN 650 MG: 325 TABLET ORAL at 15:48

## 2023-12-28 NOTE — DISCHARGE INSTRUCTIONS
Your CT scan showed no evidence of brain bleed. After a head injury, mild headache, mild nausea, difficulty focusing may be expected. At home, you may take 650 mg of acetaminophen or Tylenol every 6 hours as needed for pain. Keep your forehead wound clean and dry. You may cleanse with soap and water. Return to the emergency department with new or worsening symptoms, including severe headache, intractable vomiting, lethargy, confusion, weakness or numbness in one body part or other concerns.

## 2023-12-28 NOTE — ED TRIAGE NOTES
Patient arrived to ED with complaints of headache pain after mechanical fall at home today. Patient denies LOC or blood thinner use. Patient has abrasion to forehead. Patient requesting to have sinuses checked out as well as it has been ongoing x3-4 days.

## 2024-01-02 ENCOUNTER — OFFICE VISIT (OUTPATIENT)
Dept: ENT CLINIC | Age: 74
End: 2024-01-02
Payer: MEDICARE

## 2024-01-02 VITALS
SYSTOLIC BLOOD PRESSURE: 131 MMHG | DIASTOLIC BLOOD PRESSURE: 80 MMHG | BODY MASS INDEX: 35.04 KG/M2 | WEIGHT: 273 LBS | HEIGHT: 74 IN | TEMPERATURE: 97.9 F | HEART RATE: 111 BPM

## 2024-01-02 DIAGNOSIS — J32.4 CHRONIC PANSINUSITIS: Primary | ICD-10-CM

## 2024-01-02 PROCEDURE — 99214 OFFICE O/P EST MOD 30 MIN: CPT | Performed by: OTOLARYNGOLOGY

## 2024-01-02 PROCEDURE — G8484 FLU IMMUNIZE NO ADMIN: HCPCS | Performed by: OTOLARYNGOLOGY

## 2024-01-02 PROCEDURE — G8428 CUR MEDS NOT DOCUMENT: HCPCS | Performed by: OTOLARYNGOLOGY

## 2024-01-02 PROCEDURE — 1123F ACP DISCUSS/DSCN MKR DOCD: CPT | Performed by: OTOLARYNGOLOGY

## 2024-01-02 PROCEDURE — G8417 CALC BMI ABV UP PARAM F/U: HCPCS | Performed by: OTOLARYNGOLOGY

## 2024-01-02 PROCEDURE — 3017F COLORECTAL CA SCREEN DOC REV: CPT | Performed by: OTOLARYNGOLOGY

## 2024-01-02 PROCEDURE — 1036F TOBACCO NON-USER: CPT | Performed by: OTOLARYNGOLOGY

## 2024-01-02 RX ORDER — SULFAMETHOXAZOLE AND TRIMETHOPRIM 400; 80 MG/1; MG/1
1 TABLET ORAL 2 TIMES DAILY
Qty: 20 TABLET | Refills: 0 | Status: SHIPPED | OUTPATIENT
Start: 2024-01-02 | End: 2024-01-12

## 2024-01-02 RX ORDER — PREDNISONE 10 MG/1
TABLET ORAL
Qty: 38 TABLET | Refills: 0 | Status: SHIPPED | OUTPATIENT
Start: 2024-01-02

## 2024-01-02 ASSESSMENT — ENCOUNTER SYMPTOMS
CHOKING: 0
FACIAL SWELLING: 0
NAUSEA: 0
SINUS PRESSURE: 0
DIARRHEA: 0
SHORTNESS OF BREATH: 0
VOICE CHANGE: 0
SINUS PAIN: 0
EYE PAIN: 0
RHINORRHEA: 0
EYE ITCHING: 0
COUGH: 0
EYE REDNESS: 0
SORE THROAT: 0
TROUBLE SWALLOWING: 0

## 2024-01-02 NOTE — PROGRESS NOTES
Objective:   Physical Exam  Constitutional:       Appearance: He is well-developed.   HENT:      Head: Not macrocephalic and not microcephalic. No Esqueda's sign, abrasion, right periorbital erythema, left periorbital erythema or laceration.      Nose: No nasal deformity, septal deviation, laceration, mucosal edema or rhinorrhea.      Right Nostril: No epistaxis or occlusion.      Left Nostril: No epistaxis or occlusion.      Right Turbinates: Not enlarged, swollen or pale.      Left Turbinates: Not enlarged, swollen or pale.      Right Sinus: No maxillary sinus tenderness or frontal sinus tenderness.      Left Sinus: No maxillary sinus tenderness or frontal sinus tenderness.      Mouth/Throat:      Lips: No lesions.      Mouth: Mucous membranes are moist.      Tongue: No lesions.      Palate: No mass.      Pharynx: Uvula midline. No oropharyngeal exudate or posterior oropharyngeal erythema.      Tonsils: No tonsillar abscesses.   Eyes:      General:         Right eye: No discharge.         Left eye: No discharge.      Extraocular Movements:      Right eye: Normal extraocular motion.      Left eye: Normal extraocular motion.      Conjunctiva/sclera:      Right eye: Right conjunctiva is not injected. No chemosis or exudate.     Left eye: Left conjunctiva is not injected. No chemosis or exudate.  Neck:      Thyroid: No thyroid mass or thyromegaly.   Cardiovascular:      Rate and Rhythm: Normal rate and regular rhythm.   Pulmonary:      Effort: No tachypnea or respiratory distress.   Lymphadenopathy:      Head:      Right side of head: No preauricular or posterior auricular adenopathy.      Left side of head: No preauricular or posterior auricular adenopathy.      Cervical:      Right cervical: No superficial, deep or posterior cervical adenopathy.     Left cervical: No superficial, deep or posterior cervical adenopathy.   Neurological:      Mental Status: He is alert and oriented to person, place, and time.

## 2024-01-08 NOTE — PLAN OF CARE
[facility]     NICU/SCN NUTRITION ASSESSMENT    Girl Ronnie and [id number]/218-A    Reason for admission/diagnosis: Prematurity        Interventions:   Continue on feedings of Enfacare 22 or EBM 20 at 45 ml q 3 hrs and advance as tolerated to goal of > 150 ml/kg/d.   Recommend fortifying EBM w/ Enfacare to goal of 22kcal/oz.   Recommend attempt breast/PO only when showing cues. Advance to PO ad radha once taking >80% of feedings PO.  Continue/Recommend PVS 0.5ml BID.   Goal weight gain velocity for the next week = regain birth weight by DOL 14.     Gestational Age: 34w1d     BW: 2.52 kg (5 lb 8.9 oz) CGA: 34w 3d     Current Wt: 2405g  ( -115 g/24hr)      Stool x 7 over the past 24hrs    Pertinent Labs: noted     Medications reviewed.        Growth     Trends     Weight       (gms)   Wt. For Age         %tile         Z-score   Change in Z-     score from          birth      Weekly       weight     Changes    (gms/day)     Goal Wt.    Gain for next          week     (gms/day)      1/8/2024      34w 3d 2405 g 66  0.42 -0.43 -4.5% from birth weight Regain birth weight by DOL 14.         Current Status: Infant is on room air and is currently tolerating  ng/po feedings of Enfacare 22 and EBM 20 at 45ml q 3hrs.  Infant took 5% of feedings po.  Infant started on MVI and caffeine.  Infant is down 4.5% from birth weight.  Intake is adequate for DOL 2.        Estimated Energy Needs: 100-125kcal/kg, 2-4 g/kg protein,  ml/kg      Nutrition: On 1/7 pt received 3ml of EBM20 and 277ml of Enfacare 22   This provided 85 kcals/kg/day, 2.4 g/kg/day, 116 ml/kg/day      Pt meeting % of needs: 85% of calorie needs and 100% of protein needs.          Nutrition Diagnosis: Increased nutrient needs related to calorie and protein as evidenced by prematurity.     Monitor/Evaluation: Weight changes; growth parameters; nutrition intake; feeding tolerance    Goal:        1. Pt to meet % of calorie and protein requirements Not Met,  Problem: Falls - Risk of:  Goal: Will remain free from falls  Will remain free from falls   Pt has remained free of falls. No attempts to get out of bed. Pt hs non skid socks on, 2/4 side rails up, bed alarm is on. Will cont to monitor. Problem: Pain:  Goal: Pain level will decrease  Pain level will decrease   No complaints of pain at this time. Problem: Swallowing - Impaired:  Goal: Able to swallow without choking  Able to swallow without choking   Pt having difficulty swallowing pills earlier in the day. Pt is taking pills with applesauce. Tolerating well. Swallowing nectar thick liquids without trouble. Will cont to monitor. Continued       2. Pt to regain birth weight by DOL 14 and thereafter appropriately gain weight to maintain growth curve Ongoing       3. Linear growth after the first week of life: 0.8-1cm/wk Ongoing       4. HC growth after first week of life: 0.8-1cm/wk Ongoing    Plan/Follow up: Continue to monitor progress and follow up via rounds and per policy.     Pt is at moderate nutritional risk    Isis Tavarez MS RD LDN  Office #- [phone]

## 2024-01-15 ENCOUNTER — OFFICE VISIT (OUTPATIENT)
Dept: FAMILY MEDICINE CLINIC | Age: 74
End: 2024-01-15
Payer: MEDICARE

## 2024-01-15 VITALS
SYSTOLIC BLOOD PRESSURE: 102 MMHG | HEART RATE: 75 BPM | BODY MASS INDEX: 33.92 KG/M2 | DIASTOLIC BLOOD PRESSURE: 68 MMHG | WEIGHT: 264.2 LBS

## 2024-01-15 DIAGNOSIS — J32.4 CHRONIC PANSINUSITIS: ICD-10-CM

## 2024-01-15 DIAGNOSIS — G91.2 NPH (NORMAL PRESSURE HYDROCEPHALUS) (HCC): Primary | ICD-10-CM

## 2024-01-15 PROCEDURE — G8427 DOCREV CUR MEDS BY ELIG CLIN: HCPCS | Performed by: STUDENT IN AN ORGANIZED HEALTH CARE EDUCATION/TRAINING PROGRAM

## 2024-01-15 PROCEDURE — 1123F ACP DISCUSS/DSCN MKR DOCD: CPT | Performed by: STUDENT IN AN ORGANIZED HEALTH CARE EDUCATION/TRAINING PROGRAM

## 2024-01-15 PROCEDURE — G8484 FLU IMMUNIZE NO ADMIN: HCPCS | Performed by: STUDENT IN AN ORGANIZED HEALTH CARE EDUCATION/TRAINING PROGRAM

## 2024-01-15 PROCEDURE — G8417 CALC BMI ABV UP PARAM F/U: HCPCS | Performed by: STUDENT IN AN ORGANIZED HEALTH CARE EDUCATION/TRAINING PROGRAM

## 2024-01-15 PROCEDURE — 3017F COLORECTAL CA SCREEN DOC REV: CPT | Performed by: STUDENT IN AN ORGANIZED HEALTH CARE EDUCATION/TRAINING PROGRAM

## 2024-01-15 PROCEDURE — 99214 OFFICE O/P EST MOD 30 MIN: CPT | Performed by: STUDENT IN AN ORGANIZED HEALTH CARE EDUCATION/TRAINING PROGRAM

## 2024-01-15 PROCEDURE — 1036F TOBACCO NON-USER: CPT | Performed by: STUDENT IN AN ORGANIZED HEALTH CARE EDUCATION/TRAINING PROGRAM

## 2024-01-15 ASSESSMENT — PATIENT HEALTH QUESTIONNAIRE - PHQ9
SUM OF ALL RESPONSES TO PHQ9 QUESTIONS 1 & 2: 0
5. POOR APPETITE OR OVEREATING: 0
9. THOUGHTS THAT YOU WOULD BE BETTER OFF DEAD, OR OF HURTING YOURSELF: 0
4. FEELING TIRED OR HAVING LITTLE ENERGY: 0
6. FEELING BAD ABOUT YOURSELF - OR THAT YOU ARE A FAILURE OR HAVE LET YOURSELF OR YOUR FAMILY DOWN: 0
7. TROUBLE CONCENTRATING ON THINGS, SUCH AS READING THE NEWSPAPER OR WATCHING TELEVISION: 0
SUM OF ALL RESPONSES TO PHQ QUESTIONS 1-9: 0
SUM OF ALL RESPONSES TO PHQ QUESTIONS 1-9: 0
8. MOVING OR SPEAKING SO SLOWLY THAT OTHER PEOPLE COULD HAVE NOTICED. OR THE OPPOSITE, BEING SO FIGETY OR RESTLESS THAT YOU HAVE BEEN MOVING AROUND A LOT MORE THAN USUAL: 0
2. FEELING DOWN, DEPRESSED OR HOPELESS: 0
10. IF YOU CHECKED OFF ANY PROBLEMS, HOW DIFFICULT HAVE THESE PROBLEMS MADE IT FOR YOU TO DO YOUR WORK, TAKE CARE OF THINGS AT HOME, OR GET ALONG WITH OTHER PEOPLE: 0
SUM OF ALL RESPONSES TO PHQ QUESTIONS 1-9: 0
1. LITTLE INTEREST OR PLEASURE IN DOING THINGS: 0
3. TROUBLE FALLING OR STAYING ASLEEP: 0
SUM OF ALL RESPONSES TO PHQ QUESTIONS 1-9: 0

## 2024-01-15 NOTE — PROGRESS NOTES
the Last Year: No            Review of Systems     Patient denies any current chest pain, shortness of breath, nausea, vomiting, headaches, fevers, chills, falls    Vitals:    01/15/24 0821   BP: 102/68   Pulse: 75   Weight: 119.8 kg (264 lb 3.2 oz)   PF: 97 L/min      Body mass index is 33.92 kg/m².   Physical Exam       General: Alert and oriented, cooperative and in no acute distress  Eyes: Clear sclera bl  HEENT: MMM  Cardio: S1, S2 heard, RRR, no murmurs appreciated  Resp: No acte respiratory distress, symmetric chest expansion,  CTAB  Abdomen: Soft, non-tender to palpation, non-distended   Neuro: Grossly intact, no focal deficits  MSK: Moves all extremities spontaneously, no acute joint swelling  Skin: Warm and dry, no acute lesions  Psych: Calm, able to answer questions and follow commands     1. NPH (normal pressure hydrocephalus) (HCC)-s/p shunt  -Has not had falls at home since last ER visit, planning to follow-up with neurology, additional referral placed to help patient get into see his soon as possible, continue current management, vitally stable today,asymptomatic  - ANDREZ - Lissy Gifford MD, Neurology, [place]    2. Chronic pansinusitis  -Appears to be improving with therapy of antibiotics/steroids from ENT, advised to continue current therapy       Return in about 3 months (around 4/15/2024) for Follow up .       This note was at least partially created using voice recognition software and may contain speech and/or medical errors.  For any questions please contact me directly  Gurvinder Askew MD

## 2024-01-24 ENCOUNTER — TELEPHONE (OUTPATIENT)
Dept: FAMILY MEDICINE CLINIC | Age: 74
End: 2024-01-24

## 2024-01-24 NOTE — TELEPHONE ENCOUNTER
Pharmacy called stating pt has a script of atorvastatin 10 mg prescribed by Dr Askew and 40 mg atorvastatin prescribed by another doctor and the pharmacy wanted to know which one the pt should use.     [phone]  Reference [id number]

## 2024-02-13 ENCOUNTER — OFFICE VISIT (OUTPATIENT)
Dept: ENT CLINIC | Age: 74
End: 2024-02-13
Payer: MEDICARE

## 2024-02-13 VITALS
WEIGHT: 272 LBS | SYSTOLIC BLOOD PRESSURE: 128 MMHG | BODY MASS INDEX: 34.91 KG/M2 | HEIGHT: 74 IN | DIASTOLIC BLOOD PRESSURE: 77 MMHG | HEART RATE: 94 BPM | TEMPERATURE: 97.3 F

## 2024-02-13 DIAGNOSIS — J32.4 CHRONIC PANSINUSITIS: Primary | ICD-10-CM

## 2024-02-13 PROCEDURE — 31231 NASAL ENDOSCOPY DX: CPT | Performed by: OTOLARYNGOLOGY

## 2024-02-13 NOTE — PROGRESS NOTES
Patient here for 6 week follow up. Started Dupixent for asthma. Patient states Asthma controlled. Sinuses imrpoved.     PE: Nasal cavity clear.         Due to the patients chronic sinus disease and/or history of sinonasal neoplasm for surveillance a nasal endoscopy with or without debridement will be performed to complete a significant physical examination of the patient which cannot be performed by anterior rhinoscopy alone (failure of complete examination of the paranasal sinuses). Failure to provide this procedure may lead to late detection of significant chronic benign disease, acute exacerbation, resolution or failure of early diagnosis of recurrent cancer. The procedure report is present in the body of the chart.       Nasal Endoscopy    Pre OP: CRS  Post OP: Same  Reason: Surveillance of CRS on Dupixent  Procedure: Nasal endoscopy  Surgeon: Jese Packer  Anesthesia: Afrin with 2% lidocaine  Estimated Blood Loss: None      After obtaining verbal consent from the patient 1% lidocaine with afrin was sprayed into the nasal cavities.  After allowing a time for anesthesia, a nasal endoscope was placed into the nostril.  The septum, inferior, and middle turbinates were examined.  The middle meatus, and sphenoethmoid recess was examined bilaterally.  Cultures were not obtained from the sinuses. There were no complications.     Pertinent positives included: There was not edema and purulence in the left middle meatus. There was not edema and purulence at the right middle meatus. Polyps were identified in the ethmoid sinuses, Decreased in burden with imrpoved visualization of sinus cavities. . Masses were not identified.     Tolerated well without complication. I attest that I was present for and did the entire procedure myself.      A/P: Follow up in 6 months.

## 2024-02-29 ENCOUNTER — HOSPITAL ENCOUNTER (OUTPATIENT)
Dept: NUCLEAR MEDICINE | Age: 74
Discharge: HOME | End: 2024-02-29
Payer: MEDICARE

## 2024-02-29 DIAGNOSIS — G91.2 NORMAL PRESSURE HYDROCEPHALUS (HCC): ICD-10-CM

## 2024-02-29 PROCEDURE — 78645 CSF SHUNT EVALUATION: CPT

## 2024-02-29 PROCEDURE — 3430000000 HC RX DIAGNOSTIC RADIOPHARMACEUTICAL: Performed by: NURSE PRACTITIONER

## 2024-02-29 PROCEDURE — A9548 IN111 PENTETATE: HCPCS | Performed by: NURSE PRACTITIONER

## 2024-02-29 RX ORDER — INDIUM IN-111 PENTETATE DISODIUM 1 MCI/ML
565 SOLUTION INTRATHECAL
Status: COMPLETED | OUTPATIENT
Start: 2024-02-29 | End: 2024-02-29

## 2024-02-29 RX ADMIN — INDIUM IN-111 PENTETATE DISODIUM 565 MICRO CURIE: 1 SOLUTION INTRATHECAL at 11:55

## 2024-02-29 NOTE — PROCEDURES
Neurosurgery Procedure Note     Name of Procedure: Ventriculoperitoneal Shuntogram     Date Performed: 2/29/2024     Procedure performed by: Electronically signed by JEREMI Reveles CNP on 2/29/2024 at 12:08 PM       Indications: Normal Pressure Hydrocephalus with worsening symptoms     Consent: Consent obtained verbally.      Procedure Technique: Patient and procedure verified. Patient and procedure verified. The patient was draped and site prepped. Nurse Practitioner performing the procedure adhered to universal precaution protocol and used mask, sterile gown and sterile gloves. Procedure was performed in sterile manner.     The 25 gauge needle was inserted through the reservoir of the ventricular peritoneal shunt system without difficulty. CSF flowed easily. The previously prepared sterile isotope solution was injected into the reservoir and syringe tubing was flushed with sterile no preservative saline. Syringe, tubing and needle were properly disposed into radioactive waste container. Patient tolerated procedure well.     Estimated Procedure Blood Loss: None     Specimen(s) Removed: 0 mL     Medications used during procedure: None     Post-Operative Diagnosis: Normal Pressure Hydrocephalus

## 2024-03-01 ENCOUNTER — HOSPITAL ENCOUNTER (OUTPATIENT)
Dept: NUCLEAR MEDICINE | Age: 74
Discharge: HOME | End: 2024-03-01
Payer: MEDICARE

## 2024-03-02 ENCOUNTER — HOSPITAL ENCOUNTER (OUTPATIENT)
Dept: CT IMAGING | Age: 74
Discharge: HOME | End: 2024-03-02
Payer: MEDICARE

## 2024-03-02 DIAGNOSIS — G91.2 NORMAL PRESSURE HYDROCEPHALUS (HCC): ICD-10-CM

## 2024-03-02 PROCEDURE — 70450 CT HEAD/BRAIN W/O DYE: CPT

## 2024-03-04 ENCOUNTER — OFFICE VISIT (OUTPATIENT)
Dept: PULMONOLOGY | Age: 74
End: 2024-03-04
Payer: MEDICARE

## 2024-03-04 VITALS
HEART RATE: 84 BPM | OXYGEN SATURATION: 96 % | DIASTOLIC BLOOD PRESSURE: 84 MMHG | WEIGHT: 266 LBS | SYSTOLIC BLOOD PRESSURE: 128 MMHG | HEIGHT: 74 IN | BODY MASS INDEX: 34.14 KG/M2

## 2024-03-04 DIAGNOSIS — J45.50 SEVERE PERSISTENT ASTHMA WITHOUT COMPLICATION: Primary | ICD-10-CM

## 2024-03-04 PROCEDURE — 99213 OFFICE O/P EST LOW 20 MIN: CPT | Performed by: INTERNAL MEDICINE

## 2024-03-04 PROCEDURE — 1036F TOBACCO NON-USER: CPT | Performed by: INTERNAL MEDICINE

## 2024-03-04 PROCEDURE — G8484 FLU IMMUNIZE NO ADMIN: HCPCS | Performed by: INTERNAL MEDICINE

## 2024-03-04 PROCEDURE — 3017F COLORECTAL CA SCREEN DOC REV: CPT | Performed by: INTERNAL MEDICINE

## 2024-03-04 PROCEDURE — 1123F ACP DISCUSS/DSCN MKR DOCD: CPT | Performed by: INTERNAL MEDICINE

## 2024-03-04 PROCEDURE — G8427 DOCREV CUR MEDS BY ELIG CLIN: HCPCS | Performed by: INTERNAL MEDICINE

## 2024-03-04 PROCEDURE — G8417 CALC BMI ABV UP PARAM F/U: HCPCS | Performed by: INTERNAL MEDICINE

## 2024-03-04 ASSESSMENT — ENCOUNTER SYMPTOMS
SHORTNESS OF BREATH: 0
WHEEZING: 1
CHEST TIGHTNESS: 0
COUGH: 1

## 2024-03-15 SDOH — ECONOMIC STABILITY: FOOD INSECURITY: WITHIN THE PAST 12 MONTHS, THE FOOD YOU BOUGHT JUST DIDN'T LAST AND YOU DIDN'T HAVE MONEY TO GET MORE.: NEVER TRUE

## 2024-03-15 SDOH — ECONOMIC STABILITY: FOOD INSECURITY: WITHIN THE PAST 12 MONTHS, YOU WORRIED THAT YOUR FOOD WOULD RUN OUT BEFORE YOU GOT MONEY TO BUY MORE.: NEVER TRUE

## 2024-03-15 SDOH — ECONOMIC STABILITY: INCOME INSECURITY: HOW HARD IS IT FOR YOU TO PAY FOR THE VERY BASICS LIKE FOOD, HOUSING, MEDICAL CARE, AND HEATING?: NOT HARD AT ALL

## 2024-03-18 ENCOUNTER — HOSPITAL ENCOUNTER (OUTPATIENT)
Dept: CT IMAGING | Age: 74
Discharge: HOME | End: 2024-03-18
Payer: MEDICARE

## 2024-03-18 ENCOUNTER — OFFICE VISIT (OUTPATIENT)
Dept: FAMILY MEDICINE CLINIC | Age: 74
End: 2024-03-18
Payer: MEDICARE

## 2024-03-18 VITALS
WEIGHT: 270.4 LBS | BODY MASS INDEX: 34.7 KG/M2 | SYSTOLIC BLOOD PRESSURE: 104 MMHG | OXYGEN SATURATION: 95 % | DIASTOLIC BLOOD PRESSURE: 62 MMHG | HEART RATE: 91 BPM

## 2024-03-18 DIAGNOSIS — G91.2 NPH (NORMAL PRESSURE HYDROCEPHALUS) (HCC): ICD-10-CM

## 2024-03-18 DIAGNOSIS — M25.862 CYST OF KNEE JOINT, LEFT: Primary | ICD-10-CM
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
## 2024-03-18 PROCEDURE — 3017F COLORECTAL CA SCREEN DOC REV: CPT | Performed by: STUDENT IN AN ORGANIZED HEALTH CARE EDUCATION/TRAINING PROGRAM

## 2024-03-18 PROCEDURE — G8484 FLU IMMUNIZE NO ADMIN: HCPCS | Performed by: STUDENT IN AN ORGANIZED HEALTH CARE EDUCATION/TRAINING PROGRAM

## 2024-03-18 PROCEDURE — 1036F TOBACCO NON-USER: CPT | Performed by: STUDENT IN AN ORGANIZED HEALTH CARE EDUCATION/TRAINING PROGRAM

## 2024-03-18 PROCEDURE — G8417 CALC BMI ABV UP PARAM F/U: HCPCS | Performed by: STUDENT IN AN ORGANIZED HEALTH CARE EDUCATION/TRAINING PROGRAM

## 2024-03-18 PROCEDURE — G8427 DOCREV CUR MEDS BY ELIG CLIN: HCPCS | Performed by: STUDENT IN AN ORGANIZED HEALTH CARE EDUCATION/TRAINING PROGRAM

## 2024-03-18 PROCEDURE — 99214 OFFICE O/P EST MOD 30 MIN: CPT | Performed by: STUDENT IN AN ORGANIZED HEALTH CARE EDUCATION/TRAINING PROGRAM

## 2024-03-18 PROCEDURE — 74176 CT ABD & PELVIS W/O CONTRAST: CPT

## 2024-03-18 PROCEDURE — 1123F ACP DISCUSS/DSCN MKR DOCD: CPT | Performed by: STUDENT IN AN ORGANIZED HEALTH CARE EDUCATION/TRAINING PROGRAM

## 2024-03-18 RX ORDER — SULFAMETHOXAZOLE AND TRIMETHOPRIM 800; 160 MG/1; MG/1
1 TABLET ORAL 2 TIMES DAILY
Qty: 20 TABLET | Refills: 0 | Status: SHIPPED | OUTPATIENT
Start: 2024-03-18 | End: 2024-03-28

## 2024-03-18 SDOH — ECONOMIC STABILITY: FOOD INSECURITY: WITHIN THE PAST 12 MONTHS, THE FOOD YOU BOUGHT JUST DIDN'T LAST AND YOU DIDN'T HAVE MONEY TO GET MORE.: NEVER TRUE

## 2024-03-18 SDOH — ECONOMIC STABILITY: INCOME INSECURITY: HOW HARD IS IT FOR YOU TO PAY FOR THE VERY BASICS LIKE FOOD, HOUSING, MEDICAL CARE, AND HEATING?: NOT HARD AT ALL

## 2024-03-18 SDOH — ECONOMIC STABILITY: FOOD INSECURITY: WITHIN THE PAST 12 MONTHS, YOU WORRIED THAT YOUR FOOD WOULD RUN OUT BEFORE YOU GOT MONEY TO BUY MORE.: NEVER TRUE

## 2024-03-18 NOTE — PROGRESS NOTES
ESOPHAGOGASTRODUODENOSCOPY WITH RADIOFREQUENCY ABLATION performed by Francine Blackburn MD at [facility] ENDOSCOPY    UPPER GASTROINTESTINAL ENDOSCOPY N/A 11/19/2019    EGD POLYP SNARE performed by Francine Blackburn MD at [facility] ENDOSCOPY    UPPER GASTROINTESTINAL ENDOSCOPY N/A 07/15/2020    ESOPHAGOGASTRODUODENOSCOPY RADIOFREQUENCY ABLATION ON STANDBY performed by Francine Blackburn MD at [facility] ENDOSCOPY    UPPER GASTROINTESTINAL ENDOSCOPY N/A 07/15/2020    EGD BIOPSY performed by Francine Blackburn MD at [facility] ENDOSCOPY    UPPER GASTROINTESTINAL ENDOSCOPY N/A 07/16/2021    EGD BIOPSY performed by Francine Blackburn MD at [facility] ENDOSCOPY    UPPER GASTROINTESTINAL ENDOSCOPY N/A 07/16/2021    EGD POLYP SNARE performed by Francine Blackburn MD at [facility] ENDOSCOPY    UPPER GASTROINTESTINAL ENDOSCOPY N/A 2/15/2023    EGD BIOPSY performed by Francine Blackburn MD at [facility] ENDOSCOPY    UPPER GASTROINTESTINAL ENDOSCOPY N/A 2/15/2023    EGD POLYP SNARE performed by Francine Blackburn MD at [facility] ENDOSCOPY      Current Outpatient Medications   Medication Sig Dispense Refill    sulfamethoxazole-trimethoprim (BACTRIM DS;SEPTRA DS) 800-160 MG per tablet Take 1 tablet by mouth 2 times daily for 10 days 20 tablet 0    gabapentin (NEURONTIN) 100 MG capsule TAKE 4 CAPSULES EVERY MORNING 360 capsule 2    finasteride (PROSCAR) 5 MG tablet Take 1 tablet by mouth daily 90 tablet 1    budesonide (PULMICORT) 0.5 MG/2ML nebulizer suspension Take 2 mLs by nebulization 2 times daily 120 mL 5    pantoprazole (PROTONIX) 40 MG tablet Take 1 tablet by mouth every morning 60 tablet 0    atorvastatin (LIPITOR) 40 MG tablet Take 1 tablet by mouth daily 90 tablet 3    furosemide (LASIX) 20 MG tablet Take 1 tablet by mouth daily 90 tablet 3    aspirin 81 MG EC tablet Take 1 tablet by mouth daily 90 tablet 1    metFORMIN (GLUCOPHAGE-XR) 500 MG extended release tablet Take 1 tablet by mouth daily (with breakfast) 30 tablet 3    sertraline (ZOLOFT) 50 MG tablet TAKE ONE AND ONE-HALF TABLETS EVERY MORNING 135

## 2024-03-26 ENCOUNTER — OFFICE VISIT (OUTPATIENT)
Dept: FAMILY MEDICINE CLINIC | Age: 74
End: 2024-03-26
Payer: MEDICARE

## 2024-03-26 ENCOUNTER — TELEPHONE (OUTPATIENT)
Dept: PULMONOLOGY | Age: 74
End: 2024-03-26

## 2024-03-26 VITALS
HEART RATE: 103 BPM | OXYGEN SATURATION: 94 % | SYSTOLIC BLOOD PRESSURE: 118 MMHG | BODY MASS INDEX: 34.65 KG/M2 | WEIGHT: 270 LBS | DIASTOLIC BLOOD PRESSURE: 70 MMHG

## 2024-03-26 DIAGNOSIS — G30.0 EARLY ONSET ALZHEIMER'S DEMENTIA WITHOUT BEHAVIORAL DISTURBANCE, PSYCHOTIC DISTURBANCE, MOOD DISTURBANCE, OR ANXIETY, UNSPECIFIED DEMENTIA SEVERITY (HCC): ICD-10-CM

## 2024-03-26 DIAGNOSIS — G91.2 NPH (NORMAL PRESSURE HYDROCEPHALUS) (HCC): Primary | ICD-10-CM

## 2024-03-26 DIAGNOSIS — F02.80 EARLY ONSET ALZHEIMER'S DEMENTIA WITHOUT BEHAVIORAL DISTURBANCE, PSYCHOTIC DISTURBANCE, MOOD DISTURBANCE, OR ANXIETY, UNSPECIFIED DEMENTIA SEVERITY (HCC): ICD-10-CM

## 2024-03-26 DIAGNOSIS — G47.33 OBSTRUCTIVE SLEEP APNEA: ICD-10-CM

## 2024-03-26 DIAGNOSIS — R79.1 ABNORMAL COAGULATION PROFILE: ICD-10-CM

## 2024-03-26 DIAGNOSIS — I25.10 CORONARY ARTERY DISEASE INVOLVING NATIVE CORONARY ARTERY OF NATIVE HEART WITHOUT ANGINA PECTORIS: ICD-10-CM

## 2024-03-26 DIAGNOSIS — Z01.818 PREOP EXAMINATION: ICD-10-CM

## 2024-03-26 DIAGNOSIS — G91.2 NPH (NORMAL PRESSURE HYDROCEPHALUS) (HCC): ICD-10-CM

## 2024-03-26 LAB
ALBUMIN SERPL-MCNC: 4.5 G/DL (ref 3.4–5)
ALBUMIN/GLOB SERPL: 1.8 {RATIO} (ref 1.1–2.2)
ALP SERPL-CCNC: 93 U/L (ref 40–129)
ALT SERPL-CCNC: 25 U/L (ref 10–40)
ANION GAP SERPL CALCULATED.3IONS-SCNC: 13 MMOL/L (ref 3–16)
APTT BLD: 27 SEC (ref 22.7–35.9)
AST SERPL-CCNC: 20 U/L (ref 15–37)
BASOPHILS # BLD: 0 K/UL (ref 0–0.2)
BASOPHILS NFR BLD: 0 %
BILIRUB SERPL-MCNC: 0.4 MG/DL (ref 0–1)
BUN SERPL-MCNC: 18 MG/DL (ref 7–20)
CALCIUM SERPL-MCNC: 9.5 MG/DL (ref 8.3–10.6)
CHLORIDE SERPL-SCNC: 101 MMOL/L (ref 99–110)
CO2 SERPL-SCNC: 24 MMOL/L (ref 21–32)
CREAT SERPL-MCNC: 1 MG/DL (ref 0.8–1.3)
DEPRECATED RDW RBC AUTO: 15.4 % (ref 12.4–15.4)
EOSINOPHIL # BLD: 4.1 K/UL (ref 0–0.6)
EOSINOPHIL NFR BLD: 33 %
GFR SERPLBLD CREATININE-BSD FMLA CKD-EPI: 79 ML/MIN/{1.73_M2}
GLUCOSE SERPL-MCNC: 139 MG/DL (ref 70–99)
HCT VFR BLD AUTO: 36.7 % (ref 40.5–52.5)
HGB BLD-MCNC: 12.8 G/DL (ref 13.5–17.5)
INR PPP: 1.05 (ref 0.84–1.16)
LYMPHOCYTES # BLD: 1.5 K/UL (ref 1–5.1)
LYMPHOCYTES NFR BLD: 12 %
MCH RBC QN AUTO: 28.6 PG (ref 26–34)
MCHC RBC AUTO-ENTMCNC: 35 G/DL (ref 31–36)
MCV RBC AUTO: 81.9 FL (ref 80–100)
MONOCYTES # BLD: 0.5 K/UL (ref 0–1.3)
MONOCYTES NFR BLD: 4 %
NEUTROPHILS # BLD: 6.3 K/UL (ref 1.7–7.7)
NEUTROPHILS NFR BLD: 51 %
PATH INTERP BLD-IMP: YES
PLATELET # BLD AUTO: 295 K/UL (ref 135–450)
PLATELET BLD QL SMEAR: ADEQUATE
PMV BLD AUTO: 6.6 FL (ref 5–10.5)
POTASSIUM SERPL-SCNC: 4 MMOL/L (ref 3.5–5.1)
PROT SERPL-MCNC: 7 G/DL (ref 6.4–8.2)
PROTHROMBIN TIME: 13.7 SEC (ref 11.5–14.8)
RBC # BLD AUTO: 4.49 M/UL (ref 4.2–5.9)
RBC MORPH BLD: NORMAL
SLIDE REVIEW: ABNORMAL
SODIUM SERPL-SCNC: 138 MMOL/L (ref 136–145)
T4 FREE SERPL-MCNC: 1 NG/DL (ref 0.9–1.8)
TSH SERPL DL<=0.005 MIU/L-ACNC: 4.61 UIU/ML (ref 0.27–4.2)
WBC # BLD AUTO: 12.3 K/UL (ref 4–11)

## 2024-03-26 PROCEDURE — 99214 OFFICE O/P EST MOD 30 MIN: CPT | Performed by: STUDENT IN AN ORGANIZED HEALTH CARE EDUCATION/TRAINING PROGRAM

## 2024-03-26 PROCEDURE — 1036F TOBACCO NON-USER: CPT | Performed by: STUDENT IN AN ORGANIZED HEALTH CARE EDUCATION/TRAINING PROGRAM

## 2024-03-26 PROCEDURE — G8427 DOCREV CUR MEDS BY ELIG CLIN: HCPCS | Performed by: STUDENT IN AN ORGANIZED HEALTH CARE EDUCATION/TRAINING PROGRAM

## 2024-03-26 PROCEDURE — 1123F ACP DISCUSS/DSCN MKR DOCD: CPT | Performed by: STUDENT IN AN ORGANIZED HEALTH CARE EDUCATION/TRAINING PROGRAM

## 2024-03-26 PROCEDURE — G8484 FLU IMMUNIZE NO ADMIN: HCPCS | Performed by: STUDENT IN AN ORGANIZED HEALTH CARE EDUCATION/TRAINING PROGRAM

## 2024-03-26 PROCEDURE — 3017F COLORECTAL CA SCREEN DOC REV: CPT | Performed by: STUDENT IN AN ORGANIZED HEALTH CARE EDUCATION/TRAINING PROGRAM

## 2024-03-26 PROCEDURE — 93000 ELECTROCARDIOGRAM COMPLETE: CPT | Performed by: STUDENT IN AN ORGANIZED HEALTH CARE EDUCATION/TRAINING PROGRAM

## 2024-03-26 PROCEDURE — G8417 CALC BMI ABV UP PARAM F/U: HCPCS | Performed by: STUDENT IN AN ORGANIZED HEALTH CARE EDUCATION/TRAINING PROGRAM

## 2024-03-26 NOTE — TELEPHONE ENCOUNTER
[facility] is requesting pulmonary clearance for pt.     Procedure- Ventriculoperitoneal revision of laparoscopic assistance. 1.5 hours. General anesthesia. Hospital Inpatient    Last office visit 3/2024  Next office visit 10/2024  Please advise. Thank you

## 2024-03-26 NOTE — PROGRESS NOTES
tablet by mouth daily 90 tablet 1    budesonide (PULMICORT) 0.5 MG/2ML nebulizer suspension Take 2 mLs by nebulization 2 times daily 120 mL 5    pantoprazole (PROTONIX) 40 MG tablet Take 1 tablet by mouth every morning 60 tablet 0    atorvastatin (LIPITOR) 40 MG tablet Take 1 tablet by mouth daily 90 tablet 3    furosemide (LASIX) 20 MG tablet Take 1 tablet by mouth daily 90 tablet 3    aspirin 81 MG EC tablet Take 1 tablet by mouth daily 90 tablet 1    metFORMIN (GLUCOPHAGE-XR) 500 MG extended release tablet Take 1 tablet by mouth daily (with breakfast) 30 tablet 3    sertraline (ZOLOFT) 50 MG tablet TAKE ONE AND ONE-HALF TABLETS EVERY MORNING 135 tablet 3    albuterol (PROVENTIL) (2.5 MG/3ML) 0.083% nebulizer solution Take 3 mLs by nebulization every 6 hours as needed for Wheezing 120 each 3    arformoterol tartrate (BROVANA) 15 MCG/2ML NEBU Take 1 ampule by nebulization 2 times daily 360 mL 5    ipratropium 0.5 mg-albuterol 2.5 mg (DUONEB) 0.5-2.5 (3) MG/3ML SOLN nebulizer solution Inhale 3 mLs into the lungs every 6 hours as needed for Shortness of Breath 360 mL 5    tamsulosin (FLOMAX) 0.4 MG capsule Take 1 capsule by mouth daily      cetirizine (ZYRTEC) 10 MG tablet TAKE 1 TABLET DAILY 90 tablet 3    Omega-3 Fatty Acids (FISH OIL) 1000 MG CAPS Take 1 capsule by mouth every morning      Flaxseed, Linseed, (FLAX SEED OIL) 1000 MG CAPS Take 1,000 mg by mouth daily      Cholecalciferol (VITAMIN D) 2000 UNITS CAPS capsule Take 1 capsule by mouth every morning      multivitamin (THERAGRAN) per tablet Take 1 tablet by mouth every morning      Dupilumab (DUPIXENT) 200 MG/1.14ML SOPN injection Inject 1.14 mLs into the skin once for 1 dose 1.14 mL 0     Current Facility-Administered Medications on File Prior to Visit   Medication Dose Route Frequency Provider Last Rate Last Admin    Dupilumab (DUPIXENT) injection 200 mg  200 mg SubCUTAneous Q14 Days Christiano Oliveira MD           Assessment:       1. NPH (normal

## 2024-03-27 ENCOUNTER — HOSPITAL ENCOUNTER (OUTPATIENT)
Dept: ULTRASOUND IMAGING | Age: 74
Discharge: HOME | End: 2024-03-27
Attending: STUDENT IN AN ORGANIZED HEALTH CARE EDUCATION/TRAINING PROGRAM
Payer: MEDICARE

## 2024-03-27 DIAGNOSIS — M25.862 CYST OF KNEE JOINT, LEFT: ICD-10-CM

## 2024-03-27 LAB — PATH INTERP BLD-IMP: NORMAL

## 2024-03-27 PROCEDURE — 76999 ECHO EXAMINATION PROCEDURE: CPT

## 2024-04-01 RX ORDER — ALBUTEROL SULFATE 90 UG/1
2 AEROSOL, METERED RESPIRATORY (INHALATION) EVERY 6 HOURS PRN
COMMUNITY

## 2024-04-02 ENCOUNTER — OFFICE VISIT (OUTPATIENT)
Dept: CARDIOLOGY CLINIC | Age: 74
End: 2024-04-02
Payer: MEDICARE

## 2024-04-02 VITALS
DIASTOLIC BLOOD PRESSURE: 74 MMHG | WEIGHT: 268.4 LBS | SYSTOLIC BLOOD PRESSURE: 110 MMHG | BODY MASS INDEX: 34.46 KG/M2 | HEART RATE: 75 BPM

## 2024-04-02 DIAGNOSIS — I25.10 CORONARY ARTERY DISEASE INVOLVING NATIVE CORONARY ARTERY OF NATIVE HEART WITHOUT ANGINA PECTORIS: Primary | ICD-10-CM

## 2024-04-02 PROCEDURE — 99214 OFFICE O/P EST MOD 30 MIN: CPT | Performed by: INTERNAL MEDICINE

## 2024-04-02 PROCEDURE — 3017F COLORECTAL CA SCREEN DOC REV: CPT | Performed by: INTERNAL MEDICINE

## 2024-04-02 PROCEDURE — 1036F TOBACCO NON-USER: CPT | Performed by: INTERNAL MEDICINE

## 2024-04-02 PROCEDURE — 1123F ACP DISCUSS/DSCN MKR DOCD: CPT | Performed by: INTERNAL MEDICINE

## 2024-04-02 PROCEDURE — G8417 CALC BMI ABV UP PARAM F/U: HCPCS | Performed by: INTERNAL MEDICINE

## 2024-04-02 PROCEDURE — G8427 DOCREV CUR MEDS BY ELIG CLIN: HCPCS | Performed by: INTERNAL MEDICINE

## 2024-04-02 NOTE — PROGRESS NOTES
Cc: chest pain    HPI:     Patient is 73-year-old man, past smoker with pre-DM, neuropathy, essential tremor, NPH s/p  shunt, severe asthma, early Alzheimer's disease.    Echo 01/2023: Normal LV, EF 60%, grade 1 diastolic dysfunction, normal RV and valves.    Lipid profile 02/2023: , HDL 54, ,  off statins.    Patient complaining of 1 episode of exertional chest discomfort about 2 weeks ago while he was walking, resolved with rest.  He denies any recurrent episodes.    ECG 9/7/2023: NSR, no ischemia.    Nuclear stress 09/2023: Small area of reversible defect in the mid inferior lateral wall.    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    Patient is scheduled for  shunt revision by Dr. Luis Miguel Garcia with the [facility] on 4/10/2024.  Patient reports no ischemic or congestive symptoms.  He needs cardiac clearance.    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    Surgical History:   has a past surgical history that includes other surgical history (N/A, 07/18/2018); pr crtj shunt pnqjkzsmub-wopojdegh-rcrhcao terminus (N/A, 07/18/2018); laparoscopy (N/A, 07/18/2018); Eye surgery (Left); joint replacement (Left, 2015); Upper gastrointestinal endoscopy (N/A, 12/14/2018); Colonoscopy (12/14/2018); Enteroscopy (N/A, 01/04/2019); Upper gastrointestinal endoscopy (N/A, 03/01/2019); Upper gastrointestinal endoscopy (N/A,

## 2024-04-10 ENCOUNTER — HOSPITAL ENCOUNTER (INPATIENT)
Age: 74
LOS: 1 days | Discharge: HOME | DRG: 032 | End: 2024-04-11
Attending: NEUROLOGICAL SURGERY | Admitting: NEUROLOGICAL SURGERY
Payer: MEDICARE

## 2024-04-10 ENCOUNTER — ANESTHESIA EVENT (OUTPATIENT)
Dept: OPERATING ROOM | Age: 74
DRG: 032 | End: 2024-04-10
Payer: MEDICARE

## 2024-04-10 ENCOUNTER — ANESTHESIA (OUTPATIENT)
Dept: OPERATING ROOM | Age: 74
DRG: 032 | End: 2024-04-10
Payer: MEDICARE

## 2024-04-10 PROBLEM — G91.2 NORMAL PRESSURE HYDROCEPHALUS (HCC): Status: ACTIVE | Noted: 2024-04-10

## 2024-04-10 LAB
ABO + RH BLD: NORMAL
BLD GP AB SCN SERPL QL: NORMAL

## 2024-04-10 PROCEDURE — 00W60JZ REVISION OF SYNTHETIC SUBSTITUTE IN CEREBRAL VENTRICLE, OPEN APPROACH: ICD-10-PCS | Performed by: SURGERY

## 2024-04-10 PROCEDURE — 2500000003 HC RX 250 WO HCPCS: Performed by: NEUROLOGICAL SURGERY

## 2024-04-10 PROCEDURE — 3700000001 HC ADD 15 MINUTES (ANESTHESIA): Performed by: NEUROLOGICAL SURGERY

## 2024-04-10 PROCEDURE — 2580000003 HC RX 258: Performed by: NEUROLOGICAL SURGERY

## 2024-04-10 PROCEDURE — 7100000000 HC PACU RECOVERY - FIRST 15 MIN: Performed by: NEUROLOGICAL SURGERY

## 2024-04-10 PROCEDURE — 2709999900 HC NON-CHARGEABLE SUPPLY: Performed by: NEUROLOGICAL SURGERY

## 2024-04-10 PROCEDURE — 2580000003 HC RX 258: Performed by: ANESTHESIOLOGY

## 2024-04-10 PROCEDURE — 7100000001 HC PACU RECOVERY - ADDTL 15 MIN: Performed by: NEUROLOGICAL SURGERY

## 2024-04-10 PROCEDURE — 6360000002 HC RX W HCPCS: Performed by: NURSE ANESTHETIST, CERTIFIED REGISTERED

## 2024-04-10 PROCEDURE — C1894 INTRO/SHEATH, NON-LASER: HCPCS | Performed by: NEUROLOGICAL SURGERY

## 2024-04-10 PROCEDURE — 2500000003 HC RX 250 WO HCPCS: Performed by: NURSE ANESTHETIST, CERTIFIED REGISTERED

## 2024-04-10 PROCEDURE — 6360000002 HC RX W HCPCS: Performed by: NEUROLOGICAL SURGERY

## 2024-04-10 PROCEDURE — 86850 RBC ANTIBODY SCREEN: CPT

## 2024-04-10 PROCEDURE — G0378 HOSPITAL OBSERVATION PER HR: HCPCS

## 2024-04-10 PROCEDURE — 86900 BLOOD TYPING SEROLOGIC ABO: CPT

## 2024-04-10 PROCEDURE — 2580000003 HC RX 258: Performed by: NURSE PRACTITIONER

## 2024-04-10 PROCEDURE — 87641 MR-STAPH DNA AMP PROBE: CPT

## 2024-04-10 PROCEDURE — 3700000000 HC ANESTHESIA ATTENDED CARE: Performed by: NEUROLOGICAL SURGERY

## 2024-04-10 PROCEDURE — 86901 BLOOD TYPING SEROLOGIC RH(D): CPT

## 2024-04-10 PROCEDURE — 94640 AIRWAY INHALATION TREATMENT: CPT

## 2024-04-10 PROCEDURE — 6370000000 HC RX 637 (ALT 250 FOR IP): Performed by: NURSE PRACTITIONER

## 2024-04-10 PROCEDURE — 6360000002 HC RX W HCPCS: Performed by: NURSE PRACTITIONER

## 2024-04-10 PROCEDURE — 2780000010 HC IMPLANT OTHER: Performed by: NEUROLOGICAL SURGERY

## 2024-04-10 PROCEDURE — 3600000004 HC SURGERY LEVEL 4 BASE: Performed by: NEUROLOGICAL SURGERY

## 2024-04-10 PROCEDURE — 2580000003 HC RX 258: Performed by: NURSE ANESTHETIST, CERTIFIED REGISTERED

## 2024-04-10 PROCEDURE — 62230 REPLACE/REVISE BRAIN SHUNT: CPT | Performed by: SURGERY

## 2024-04-10 PROCEDURE — 3600000014 HC SURGERY LEVEL 4 ADDTL 15MIN: Performed by: NEUROLOGICAL SURGERY

## 2024-04-10 PROCEDURE — A4217 STERILE WATER/SALINE, 500 ML: HCPCS | Performed by: NEUROLOGICAL SURGERY

## 2024-04-10 DEVICE — CODMAN® HAKIM® PROGRAMMABLE VALVE IN-LINE VALVE WITH SIPHONGUARD® DEVICE PROGRAMMABLE IN STEPS OF 10MM H2O (98 PA): 30MM H2O TO 200MM H2O (294 PA - 1960 PA)
Type: IMPLANTABLE DEVICE | Status: FUNCTIONAL
Brand: CODMAN® HAKIM®

## 2024-04-10 RX ORDER — ALBUTEROL SULFATE 2.5 MG/3ML
2.5 SOLUTION RESPIRATORY (INHALATION) EVERY 6 HOURS PRN
Status: DISCONTINUED | OUTPATIENT
Start: 2024-04-10 | End: 2024-04-11 | Stop reason: HOSPADM

## 2024-04-10 RX ORDER — ONDANSETRON 2 MG/ML
4 INJECTION INTRAMUSCULAR; INTRAVENOUS
Status: DISCONTINUED | OUTPATIENT
Start: 2024-04-10 | End: 2024-04-10 | Stop reason: HOSPADM

## 2024-04-10 RX ORDER — HYDRALAZINE HYDROCHLORIDE 20 MG/ML
10 INJECTION INTRAMUSCULAR; INTRAVENOUS
Status: DISCONTINUED | OUTPATIENT
Start: 2024-04-10 | End: 2024-04-10 | Stop reason: HOSPADM

## 2024-04-10 RX ORDER — DIPHENHYDRAMINE HYDROCHLORIDE 50 MG/ML
25 INJECTION INTRAMUSCULAR; INTRAVENOUS EVERY 6 HOURS PRN
Status: DISCONTINUED | OUTPATIENT
Start: 2024-04-10 | End: 2024-04-11 | Stop reason: HOSPADM

## 2024-04-10 RX ORDER — ENOXAPARIN SODIUM 100 MG/ML
30 INJECTION SUBCUTANEOUS 2 TIMES DAILY
Status: DISCONTINUED | OUTPATIENT
Start: 2024-04-11 | End: 2024-04-11 | Stop reason: HOSPADM

## 2024-04-10 RX ORDER — GABAPENTIN 400 MG/1
400 CAPSULE ORAL DAILY
Status: DISCONTINUED | OUTPATIENT
Start: 2024-04-10 | End: 2024-04-11 | Stop reason: HOSPADM

## 2024-04-10 RX ORDER — ATORVASTATIN CALCIUM 40 MG/1
40 TABLET, FILM COATED ORAL DAILY
Status: DISCONTINUED | OUTPATIENT
Start: 2024-04-10 | End: 2024-04-11 | Stop reason: HOSPADM

## 2024-04-10 RX ORDER — ONDANSETRON 2 MG/ML
INJECTION INTRAMUSCULAR; INTRAVENOUS PRN
Status: DISCONTINUED | OUTPATIENT
Start: 2024-04-10 | End: 2024-04-10 | Stop reason: SDUPTHER

## 2024-04-10 RX ORDER — SENNA AND DOCUSATE SODIUM 50; 8.6 MG/1; MG/1
1 TABLET, FILM COATED ORAL 2 TIMES DAILY
Status: DISCONTINUED | OUTPATIENT
Start: 2024-04-10 | End: 2024-04-11 | Stop reason: HOSPADM

## 2024-04-10 RX ORDER — FUROSEMIDE 20 MG/1
20 TABLET ORAL DAILY
Status: DISCONTINUED | OUTPATIENT
Start: 2024-04-10 | End: 2024-04-11 | Stop reason: HOSPADM

## 2024-04-10 RX ORDER — OXYCODONE HYDROCHLORIDE 5 MG/1
5 TABLET ORAL EVERY 4 HOURS PRN
Status: DISCONTINUED | OUTPATIENT
Start: 2024-04-10 | End: 2024-04-11 | Stop reason: HOSPADM

## 2024-04-10 RX ORDER — LIDOCAINE HYDROCHLORIDE AND EPINEPHRINE 10; 10 MG/ML; UG/ML
INJECTION, SOLUTION INFILTRATION; PERINEURAL PRN
Status: DISCONTINUED | OUTPATIENT
Start: 2024-04-10 | End: 2024-04-10 | Stop reason: ALTCHOICE

## 2024-04-10 RX ORDER — IPRATROPIUM BROMIDE AND ALBUTEROL SULFATE 2.5; .5 MG/3ML; MG/3ML
1 SOLUTION RESPIRATORY (INHALATION)
Status: DISCONTINUED | OUTPATIENT
Start: 2024-04-10 | End: 2024-04-10 | Stop reason: HOSPADM

## 2024-04-10 RX ORDER — SODIUM CHLORIDE 9 MG/ML
INJECTION, SOLUTION INTRAVENOUS PRN
Status: DISCONTINUED | OUTPATIENT
Start: 2024-04-10 | End: 2024-04-10 | Stop reason: HOSPADM

## 2024-04-10 RX ORDER — OXYCODONE HYDROCHLORIDE 5 MG/1
5 TABLET ORAL PRN
Status: DISCONTINUED | OUTPATIENT
Start: 2024-04-10 | End: 2024-04-10 | Stop reason: HOSPADM

## 2024-04-10 RX ORDER — CETIRIZINE HYDROCHLORIDE 10 MG/1
10 TABLET ORAL DAILY
Status: DISCONTINUED | OUTPATIENT
Start: 2024-04-10 | End: 2024-04-11 | Stop reason: HOSPADM

## 2024-04-10 RX ORDER — PANTOPRAZOLE SODIUM 40 MG/1
40 TABLET, DELAYED RELEASE ORAL EVERY MORNING
Status: DISCONTINUED | OUTPATIENT
Start: 2024-04-10 | End: 2024-04-11 | Stop reason: HOSPADM

## 2024-04-10 RX ORDER — FENTANYL CITRATE 50 UG/ML
INJECTION, SOLUTION INTRAMUSCULAR; INTRAVENOUS PRN
Status: DISCONTINUED | OUTPATIENT
Start: 2024-04-10 | End: 2024-04-10 | Stop reason: SDUPTHER

## 2024-04-10 RX ORDER — ALBUTEROL SULFATE 90 UG/1
2 AEROSOL, METERED RESPIRATORY (INHALATION) EVERY 6 HOURS PRN
Status: DISCONTINUED | OUTPATIENT
Start: 2024-04-10 | End: 2024-04-10 | Stop reason: SDUPTHER

## 2024-04-10 RX ORDER — SODIUM CHLORIDE 0.9 % (FLUSH) 0.9 %
5-40 SYRINGE (ML) INJECTION EVERY 12 HOURS SCHEDULED
Status: DISCONTINUED | OUTPATIENT
Start: 2024-04-10 | End: 2024-04-11 | Stop reason: HOSPADM

## 2024-04-10 RX ORDER — PROPOFOL 10 MG/ML
INJECTION, EMULSION INTRAVENOUS PRN
Status: DISCONTINUED | OUTPATIENT
Start: 2024-04-10 | End: 2024-04-10 | Stop reason: SDUPTHER

## 2024-04-10 RX ORDER — SODIUM CHLORIDE 9 MG/ML
INJECTION, SOLUTION INTRAVENOUS PRN
Status: DISCONTINUED | OUTPATIENT
Start: 2024-04-10 | End: 2024-04-11 | Stop reason: HOSPADM

## 2024-04-10 RX ORDER — BUDESONIDE 0.5 MG/2ML
500 INHALANT ORAL 2 TIMES DAILY
Status: DISCONTINUED | OUTPATIENT
Start: 2024-04-10 | End: 2024-04-11 | Stop reason: HOSPADM

## 2024-04-10 RX ORDER — ACETAMINOPHEN 325 MG/1
650 TABLET ORAL
Status: DISCONTINUED | OUTPATIENT
Start: 2024-04-10 | End: 2024-04-10 | Stop reason: HOSPADM

## 2024-04-10 RX ORDER — SODIUM CHLORIDE 0.9 % (FLUSH) 0.9 %
5-40 SYRINGE (ML) INJECTION PRN
Status: DISCONTINUED | OUTPATIENT
Start: 2024-04-10 | End: 2024-04-11 | Stop reason: HOSPADM

## 2024-04-10 RX ORDER — SODIUM CHLORIDE 0.9 % (FLUSH) 0.9 %
5-40 SYRINGE (ML) INJECTION PRN
Status: DISCONTINUED | OUTPATIENT
Start: 2024-04-10 | End: 2024-04-10 | Stop reason: HOSPADM

## 2024-04-10 RX ORDER — ROCURONIUM BROMIDE 10 MG/ML
INJECTION, SOLUTION INTRAVENOUS PRN
Status: DISCONTINUED | OUTPATIENT
Start: 2024-04-10 | End: 2024-04-10 | Stop reason: SDUPTHER

## 2024-04-10 RX ORDER — ONDANSETRON 4 MG/1
4 TABLET, ORALLY DISINTEGRATING ORAL EVERY 8 HOURS PRN
Status: DISCONTINUED | OUTPATIENT
Start: 2024-04-10 | End: 2024-04-11 | Stop reason: HOSPADM

## 2024-04-10 RX ORDER — LABETALOL HYDROCHLORIDE 5 MG/ML
10 INJECTION, SOLUTION INTRAVENOUS
Status: DISCONTINUED | OUTPATIENT
Start: 2024-04-10 | End: 2024-04-10 | Stop reason: HOSPADM

## 2024-04-10 RX ORDER — ESMOLOL HYDROCHLORIDE 10 MG/ML
INJECTION INTRAVENOUS PRN
Status: DISCONTINUED | OUTPATIENT
Start: 2024-04-10 | End: 2024-04-10 | Stop reason: SDUPTHER

## 2024-04-10 RX ORDER — VITAMIN B COMPLEX
2000 TABLET ORAL EVERY MORNING
Status: DISCONTINUED | OUTPATIENT
Start: 2024-04-10 | End: 2024-04-11 | Stop reason: HOSPADM

## 2024-04-10 RX ORDER — ONDANSETRON 2 MG/ML
4 INJECTION INTRAMUSCULAR; INTRAVENOUS EVERY 6 HOURS PRN
Status: DISCONTINUED | OUTPATIENT
Start: 2024-04-10 | End: 2024-04-11 | Stop reason: HOSPADM

## 2024-04-10 RX ORDER — TAMSULOSIN HYDROCHLORIDE 0.4 MG/1
0.4 CAPSULE ORAL DAILY
Status: DISCONTINUED | OUTPATIENT
Start: 2024-04-10 | End: 2024-04-11 | Stop reason: HOSPADM

## 2024-04-10 RX ORDER — FINASTERIDE 5 MG/1
5 TABLET, FILM COATED ORAL DAILY
Status: DISCONTINUED | OUTPATIENT
Start: 2024-04-10 | End: 2024-04-11 | Stop reason: HOSPADM

## 2024-04-10 RX ORDER — PROCHLORPERAZINE EDISYLATE 5 MG/ML
5 INJECTION INTRAMUSCULAR; INTRAVENOUS
Status: DISCONTINUED | OUTPATIENT
Start: 2024-04-10 | End: 2024-04-10 | Stop reason: HOSPADM

## 2024-04-10 RX ORDER — FENTANYL CITRATE 50 UG/ML
25 INJECTION, SOLUTION INTRAMUSCULAR; INTRAVENOUS EVERY 5 MIN PRN
Status: DISCONTINUED | OUTPATIENT
Start: 2024-04-10 | End: 2024-04-10 | Stop reason: HOSPADM

## 2024-04-10 RX ORDER — OXYCODONE HYDROCHLORIDE 5 MG/1
10 TABLET ORAL EVERY 4 HOURS PRN
Status: DISCONTINUED | OUTPATIENT
Start: 2024-04-10 | End: 2024-04-11 | Stop reason: HOSPADM

## 2024-04-10 RX ORDER — ACETAMINOPHEN 325 MG/1
650 TABLET ORAL EVERY 6 HOURS
Status: DISCONTINUED | OUTPATIENT
Start: 2024-04-10 | End: 2024-04-11 | Stop reason: HOSPADM

## 2024-04-10 RX ORDER — SODIUM CHLORIDE 0.9 % (FLUSH) 0.9 %
5-40 SYRINGE (ML) INJECTION EVERY 12 HOURS SCHEDULED
Status: DISCONTINUED | OUTPATIENT
Start: 2024-04-10 | End: 2024-04-10 | Stop reason: HOSPADM

## 2024-04-10 RX ORDER — MORPHINE SULFATE 2 MG/ML
4 INJECTION, SOLUTION INTRAMUSCULAR; INTRAVENOUS
Status: DISCONTINUED | OUTPATIENT
Start: 2024-04-10 | End: 2024-04-11 | Stop reason: HOSPADM

## 2024-04-10 RX ORDER — SODIUM CHLORIDE 9 MG/ML
INJECTION, SOLUTION INTRAVENOUS CONTINUOUS
Status: DISCONTINUED | OUTPATIENT
Start: 2024-04-10 | End: 2024-04-11

## 2024-04-10 RX ORDER — OXYCODONE HYDROCHLORIDE 5 MG/1
10 TABLET ORAL PRN
Status: DISCONTINUED | OUTPATIENT
Start: 2024-04-10 | End: 2024-04-10 | Stop reason: HOSPADM

## 2024-04-10 RX ORDER — HYDROMORPHONE HYDROCHLORIDE 1 MG/ML
0.5 INJECTION, SOLUTION INTRAMUSCULAR; INTRAVENOUS; SUBCUTANEOUS EVERY 5 MIN PRN
Status: DISCONTINUED | OUTPATIENT
Start: 2024-04-10 | End: 2024-04-10 | Stop reason: HOSPADM

## 2024-04-10 RX ORDER — ARFORMOTEROL TARTRATE 15 UG/2ML
15 SOLUTION RESPIRATORY (INHALATION) 2 TIMES DAILY
Status: DISCONTINUED | OUTPATIENT
Start: 2024-04-10 | End: 2024-04-11 | Stop reason: HOSPADM

## 2024-04-10 RX ORDER — MORPHINE SULFATE 2 MG/ML
2 INJECTION, SOLUTION INTRAMUSCULAR; INTRAVENOUS
Status: DISCONTINUED | OUTPATIENT
Start: 2024-04-10 | End: 2024-04-11 | Stop reason: HOSPADM

## 2024-04-10 RX ORDER — SODIUM CHLORIDE, SODIUM LACTATE, POTASSIUM CHLORIDE, CALCIUM CHLORIDE 600; 310; 30; 20 MG/100ML; MG/100ML; MG/100ML; MG/100ML
INJECTION, SOLUTION INTRAVENOUS CONTINUOUS
Status: DISCONTINUED | OUTPATIENT
Start: 2024-04-10 | End: 2024-04-10 | Stop reason: HOSPADM

## 2024-04-10 RX ORDER — DEXAMETHASONE SODIUM PHOSPHATE 4 MG/ML
INJECTION, SOLUTION INTRA-ARTICULAR; INTRALESIONAL; INTRAMUSCULAR; INTRAVENOUS; SOFT TISSUE PRN
Status: DISCONTINUED | OUTPATIENT
Start: 2024-04-10 | End: 2024-04-10 | Stop reason: SDUPTHER

## 2024-04-10 RX ORDER — NALOXONE HYDROCHLORIDE 0.4 MG/ML
INJECTION, SOLUTION INTRAMUSCULAR; INTRAVENOUS; SUBCUTANEOUS PRN
Status: DISCONTINUED | OUTPATIENT
Start: 2024-04-10 | End: 2024-04-10 | Stop reason: HOSPADM

## 2024-04-10 RX ORDER — MEPERIDINE HYDROCHLORIDE 25 MG/ML
12.5 INJECTION INTRAMUSCULAR; INTRAVENOUS; SUBCUTANEOUS EVERY 5 MIN PRN
Status: DISCONTINUED | OUTPATIENT
Start: 2024-04-10 | End: 2024-04-10 | Stop reason: HOSPADM

## 2024-04-10 RX ORDER — LIDOCAINE HYDROCHLORIDE 20 MG/ML
INJECTION, SOLUTION INFILTRATION; PERINEURAL PRN
Status: DISCONTINUED | OUTPATIENT
Start: 2024-04-10 | End: 2024-04-10 | Stop reason: SDUPTHER

## 2024-04-10 RX ORDER — DIPHENHYDRAMINE HCL 25 MG
25 TABLET ORAL EVERY 6 HOURS PRN
Status: DISCONTINUED | OUTPATIENT
Start: 2024-04-10 | End: 2024-04-11 | Stop reason: HOSPADM

## 2024-04-10 RX ADMIN — METHOCARBAMOL 1 G: 100 INJECTION, SOLUTION INTRAMUSCULAR; INTRAVENOUS at 10:45

## 2024-04-10 RX ADMIN — PROPOFOL 150 MG: 10 INJECTION, EMULSION INTRAVENOUS at 10:23

## 2024-04-10 RX ADMIN — SUGAMMADEX 200 MG: 100 INJECTION, SOLUTION INTRAVENOUS at 11:16

## 2024-04-10 RX ADMIN — ATORVASTATIN CALCIUM 40 MG: 40 TABLET, FILM COATED ORAL at 16:32

## 2024-04-10 RX ADMIN — SODIUM CHLORIDE 3000 MG: 900 INJECTION INTRAVENOUS at 10:21

## 2024-04-10 RX ADMIN — SODIUM CHLORIDE, POTASSIUM CHLORIDE, SODIUM LACTATE AND CALCIUM CHLORIDE: 600; 310; 30; 20 INJECTION, SOLUTION INTRAVENOUS at 10:11

## 2024-04-10 RX ADMIN — ESMOLOL HYDROCHLORIDE 10 MG: 10 INJECTION, SOLUTION INTRAVENOUS at 11:16

## 2024-04-10 RX ADMIN — ARFORMOTEROL TARTRATE 15 MCG: 15 SOLUTION RESPIRATORY (INHALATION) at 21:00

## 2024-04-10 RX ADMIN — BUDESONIDE INHALATION 500 MCG: 0.5 SUSPENSION RESPIRATORY (INHALATION) at 21:00

## 2024-04-10 RX ADMIN — SODIUM CHLORIDE, PRESERVATIVE FREE 10 ML: 5 INJECTION INTRAVENOUS at 22:02

## 2024-04-10 RX ADMIN — ACETAMINOPHEN 650 MG: 325 TABLET ORAL at 20:30

## 2024-04-10 RX ADMIN — ACETAMINOPHEN 650 MG: 325 TABLET ORAL at 14:16

## 2024-04-10 RX ADMIN — ROCURONIUM BROMIDE 10 MG: 10 INJECTION, SOLUTION INTRAVENOUS at 10:51

## 2024-04-10 RX ADMIN — SODIUM CHLORIDE: 9 INJECTION, SOLUTION INTRAVENOUS at 13:13

## 2024-04-10 RX ADMIN — PANTOPRAZOLE SODIUM 40 MG: 40 TABLET, DELAYED RELEASE ORAL at 16:32

## 2024-04-10 RX ADMIN — OXYCODONE 5 MG: 5 TABLET ORAL at 16:39

## 2024-04-10 RX ADMIN — FENTANYL CITRATE 50 MCG: 50 INJECTION, SOLUTION INTRAMUSCULAR; INTRAVENOUS at 10:23

## 2024-04-10 RX ADMIN — ONDANSETRON 4 MG: 2 INJECTION INTRAMUSCULAR; INTRAVENOUS at 10:31

## 2024-04-10 RX ADMIN — DOCUSATE SODIUM 50 MG AND SENNOSIDES 8.6 MG 1 TABLET: 8.6; 5 TABLET, FILM COATED ORAL at 22:01

## 2024-04-10 RX ADMIN — SODIUM CHLORIDE 3000 MG: 900 INJECTION INTRAVENOUS at 18:08

## 2024-04-10 RX ADMIN — OXYCODONE 10 MG: 5 TABLET ORAL at 22:01

## 2024-04-10 RX ADMIN — LIDOCAINE HYDROCHLORIDE 100 MG: 20 INJECTION, SOLUTION INFILTRATION; PERINEURAL at 10:23

## 2024-04-10 RX ADMIN — ROCURONIUM BROMIDE 50 MG: 10 INJECTION, SOLUTION INTRAVENOUS at 10:23

## 2024-04-10 RX ADMIN — FENTANYL CITRATE 50 MCG: 50 INJECTION, SOLUTION INTRAMUSCULAR; INTRAVENOUS at 10:28

## 2024-04-10 RX ADMIN — TAMSULOSIN HYDROCHLORIDE 0.4 MG: 0.4 CAPSULE ORAL at 15:00

## 2024-04-10 RX ADMIN — ESMOLOL HYDROCHLORIDE 10 MG: 10 INJECTION, SOLUTION INTRAVENOUS at 10:58

## 2024-04-10 RX ADMIN — GABAPENTIN 400 MG: 400 CAPSULE ORAL at 14:16

## 2024-04-10 RX ADMIN — FUROSEMIDE 20 MG: 20 TABLET ORAL at 14:15

## 2024-04-10 RX ADMIN — DEXAMETHASONE SODIUM PHOSPHATE 8 MG: 4 INJECTION INTRA-ARTICULAR; INTRALESIONAL; INTRAMUSCULAR; INTRAVENOUS; SOFT TISSUE at 10:30

## 2024-04-10 RX ADMIN — DOCUSATE SODIUM 50 MG AND SENNOSIDES 8.6 MG 1 TABLET: 8.6; 5 TABLET, FILM COATED ORAL at 14:15

## 2024-04-10 RX ADMIN — FINASTERIDE 5 MG: 5 TABLET, FILM COATED ORAL at 14:16

## 2024-04-10 RX ADMIN — Medication 2000 UNITS: at 14:15

## 2024-04-10 RX ADMIN — PHENYLEPHRINE HYDROCHLORIDE 100 MCG: 10 INJECTION INTRAVENOUS at 10:40

## 2024-04-10 ASSESSMENT — PAIN SCALES - GENERAL
PAINLEVEL_OUTOF10: 2
PAINLEVEL_OUTOF10: 0
PAINLEVEL_OUTOF10: 7
PAINLEVEL_OUTOF10: 6
PAINLEVEL_OUTOF10: 0
PAINLEVEL_OUTOF10: 4
PAINLEVEL_OUTOF10: 0
PAINLEVEL_OUTOF10: 4
PAINLEVEL_OUTOF10: 7

## 2024-04-10 ASSESSMENT — PAIN DESCRIPTION - ONSET
ONSET: ON-GOING
ONSET: ON-GOING
ONSET: GRADUAL

## 2024-04-10 ASSESSMENT — PAIN DESCRIPTION - ORIENTATION
ORIENTATION: RIGHT

## 2024-04-10 ASSESSMENT — PAIN DESCRIPTION - LOCATION
LOCATION: HEAD

## 2024-04-10 ASSESSMENT — PAIN DESCRIPTION - FREQUENCY
FREQUENCY: CONTINUOUS

## 2024-04-10 ASSESSMENT — PAIN - FUNCTIONAL ASSESSMENT
PAIN_FUNCTIONAL_ASSESSMENT: PREVENTS OR INTERFERES SOME ACTIVE ACTIVITIES AND ADLS
PAIN_FUNCTIONAL_ASSESSMENT: PREVENTS OR INTERFERES SOME ACTIVE ACTIVITIES AND ADLS
PAIN_FUNCTIONAL_ASSESSMENT: ACTIVITIES ARE NOT PREVENTED

## 2024-04-10 ASSESSMENT — PAIN DESCRIPTION - PAIN TYPE
TYPE: SURGICAL PAIN
TYPE: ACUTE PAIN;SURGICAL PAIN
TYPE: ACUTE PAIN;SURGICAL PAIN

## 2024-04-10 ASSESSMENT — LIFESTYLE VARIABLES: SMOKING_STATUS: 0

## 2024-04-10 ASSESSMENT — PAIN DESCRIPTION - DESCRIPTORS
DESCRIPTORS: ACHING

## 2024-04-10 ASSESSMENT — PAIN DESCRIPTION - DIRECTION
RADIATING_TOWARDS: NO
RADIATING_TOWARDS: NO

## 2024-04-10 NOTE — OP NOTE
OPERATIVE NOTE   Jared Palafox Jr.  1950  [facility]     DATE OF PROCEDURE: 04/10/24     PRE-OPERATIVE DIAGNOSIS: Hydrocephalus     POST-OPERATIVE DIAGNOSIS: same     PROCEDURE: Laparoscopic Ventriculoperitoneal shunt revision     SURGEON: SLAVA NAVA MD     CO-SURGEON: Luis Alfredo Garcia MD     ANESTHESIA: General endotracheal     EBL: 5 cc     SPECIMEN: none     INDICATIONS:  73 year old male with hydrocephalus. I was asked to assist in the laparoscopic intraperitoneal portion of the procedure. Risks of surgery explained to patient pre-operatively by Dr Garcia, and were reinforced and confirmed in the preoperative area by myself and/or resident team.     PROCEDURE DETAILS:    After general anesthesia induction, patient was prepped and draped in usual fashion. Dr Garcia started by performing his portion of the procedure. This will be dictated separately by him.      After local anesthetic, 5 mm incision made in left upper quadrant at Dela Cruz's point. 5 mm 0 degree laparoscope used to enter the abdomen in direct optiview fashion under visualization. Abdomen entered without incident and insufflated to 15 mm Hg. A second 5 mm trocar was placed in the periumbilical region.    Dr Garcia revised the valve and reattached the distal tubing. Excess tubing slack removed. It was flushed and noted to be free flowing in the abdomen. Catheter directed over the liver.     Inspection was performed to ensure good hemostasis and no other abnormalities noted grossly. Trocars were then removed and the abdomen was desufflated. Skin incisions were irrigated and closed using 4-0 Monocryl and skin glue.     Patient tolerated the procedure well and was extubated and brought to PACU in stable condition. No complications.

## 2024-04-10 NOTE — PLAN OF CARE
Problem: Safety - Adult  Goal: Free from fall injury  Note: Alert oriented times 4 , gait steady with walker 2 person assist to bathroom , voiding , clear yellow urine , 5 of 10 head ace , .

## 2024-04-10 NOTE — BRIEF OP NOTE
Brief Postoperative Note      Patient: Jared Palafox Jr.  YOB: 1950  MRN: [id number]    Date of Procedure: 4/10/2024    Pre-Op Diagnosis Codes:     * Mechanical breakdown of internal fixation device of bone of extremity, initial encounter ([facility]) [T84.119A]    Post-Op Diagnosis: Same       Procedure(s):  VENTRICULAR PERITONEAL SHUNT REVISION WITH LAPAROSCOPIC ASSISTANCE  .    Surgeon(s):  Otis Simental MD Curt, Bradford, MD    Assistant:  Resident: Jonna Fletcher DO    Anesthesia: General    Estimated Blood Loss (mL): less than 50     Complications: None    Specimens:   * No specimens in log *    Implants:  * No implants in log *      Drains: * No LDAs found *    Findings:  Infection Present At Time Of Surgery (PATOS) (choose all levels that have infection present):  No infection present  Other Findings: Valve non-functional    Electronically signed by Luis Miguel Garica MD on 4/10/2024 at 11:19 AM

## 2024-04-10 NOTE — OP NOTE
Operative Report    PATIENT NAME: Jared Palafox Jr.  YOB: 1950  MEDICAL RECORD# [id number]  SURGERY DATE: 4/10/2024  SURGEON:  Luis Alfredo Garcia MD,   CO-SURGEON: Scottie Simental MD              PREOPERATIVE DIAGNOSIS(ES):       1.  Hydrocephalus - NPH    2.   Shunt Malfunction       POSTOPERATIVE DIAGNOSIS(ES):       1.  Same       PROCEDURE(S) PERFORMED:      1.   Re-Placement of right ventriculoperitoneal shunt, this portion was performed by the neurosurgical team  2.   Laparoscopic assisted evaluation of peritoneal catheter, this portion was performed by general surgery team       ANESTHESIA:  General    IMPLANTS:  Codmann Hakim programmable shunt valve set to 120      INDICATIONS FOR SURGERY:   Jared Palafox Jr. Was diagnosed with NPH and had a shunt placed in 2018.  He had return of symptoms and a shunt evaluation showed no flow at the valve. He and his family consent for shunt revision.  The patient was aware of the potential benefits and the possible risks including (but not limited to):  infection, bleeding, seizures, intraparenchymal hemorrhage, wound healing problems, stroke, coma or even death.  The patient understood that these risks and wished to proceed with re-placement of right ventriculoperitoneal shunt.      DETAILS OF PROCEDURE:  The patient was brought to the operating room, placed on the table in supine position and underwent endotracheal intubation and general anesthesia. A time out was performed. A bump was placed across the shoulders.  The head was placed on a gel doughnut and the incision at Kocher's point was shaved and planned. The skin was shaved, prepped and draped in the usual fashion.     A #15 blade was used to incise the skin full thickness in J shape to expose the prior donna hole and shunt and peritoneal catheter. The pericranium was incised with the Bovie and the prior shunt was exposed.   The shunt to peritoneal catheter connection was cut and there was no flow from the

## 2024-04-10 NOTE — FLOWSHEET NOTE
Call placed to floor RNTimothy to come for report.  
Dr. Noble by to see.  
Patient received from the OR to PACU #10 post VENTRICULAR PERITONEAL SHUNT REVISION WITH LAPAROSCOPIC ASSISTANCE  of Dr. Garcia and Hola. Placed on PACU monitoring equipment. Report given  per CRNA and OR RN. Per report, patient was stable during the procedure. On arrival, patient is arouseable, rincon and denies pain.   
Sitting up, taking ice chips with no nausea. Call placed to patient's wife for update and to make aware of room assignment.  
3 = A little assistance

## 2024-04-10 NOTE — ANESTHESIA PRE PROCEDURE
at [facility] OR   • SINUS ENDOSCOPY Bilateral 9/30/2022    BILATERAL FRONTAL SINUS EXPLORATION, BILATERAL INFERIOR TURBINATE REDUCTION, BILATERAL MAXILLARY ANTROSTOMY, BILATERAL TOTAL ETHMOIDECTOMY WITH SPHENOIDECTOMY AND IMAGE NAVIGATION, REVISION SEPTOPLASTY performed by Jese Packer MD at [facility] OR   • UPPER GASTROINTESTINAL ENDOSCOPY N/A 12/14/2018    EGD BIOPSY performed by Francine Blackburn MD at [facility] ENDOSCOPY   • UPPER GASTROINTESTINAL ENDOSCOPY N/A 03/01/2019    ESOPHAGOGASTRODUODENOSCOPY WITH RADIOFREQUENCY  ABLATION/ ABLATIONS X19 performed by Francine Blackburn MD at [facility] ENDOSCOPY   • UPPER GASTROINTESTINAL ENDOSCOPY N/A 04/24/2019    ESOPHAGOGASTRODUODENOSCOPY WITH RADIOFREQUENCY ABLATION performed by Francine Blackburn MD at [facility] ENDOSCOPY   • UPPER GASTROINTESTINAL ENDOSCOPY N/A 04/24/2019    EGD BIOPSY performed by Francine Blackburn MD at [facility] ENDOSCOPY   • UPPER GASTROINTESTINAL ENDOSCOPY N/A 08/02/2019    ESOPHAGOGASTRODUODENOSCOPY RADIOFREQUENCY ABLATION performed by Francine Blackburn MD at [facility] ENDOSCOPY   • UPPER GASTROINTESTINAL ENDOSCOPY N/A 08/02/2019    EGD GASTRIC BIOPSY performed by Francine Blackburn MD at [facility] ENDOSCOPY   • UPPER GASTROINTESTINAL ENDOSCOPY N/A 08/02/2019    EGD POLYP COLD SNARE performed by Francine Blackburn MD at [facility] ENDOSCOPY   • UPPER GASTROINTESTINAL ENDOSCOPY N/A 11/19/2019    ESOPHAGOGASTRODUODENOSCOPY WITH RADIOFREQUENCY ABLATION performed by Francine Blackburn MD at [facility] ENDOSCOPY   • UPPER GASTROINTESTINAL ENDOSCOPY N/A 11/19/2019    EGD POLYP SNARE performed by Francine Blackburn MD at [facility] ENDOSCOPY   • UPPER GASTROINTESTINAL ENDOSCOPY N/A 07/15/2020    ESOPHAGOGASTRODUODENOSCOPY RADIOFREQUENCY ABLATION ON STANDBY performed by Francine Blackburn MD at [facility] ENDOSCOPY   • UPPER GASTROINTESTINAL ENDOSCOPY N/A 07/15/2020    EGD BIOPSY performed by Francine Blackburn MD at [facility] ENDOSCOPY   • UPPER GASTROINTESTINAL ENDOSCOPY N/A 07/16/2021    EGD BIOPSY performed by Francine Blackburn MD at [facility] ENDOSCOPY   • UPPER

## 2024-04-10 NOTE — ANESTHESIA POSTPROCEDURE EVALUATION
Department of Anesthesiology  Postprocedure Note    Patient: Jared Palafox Jr.  MRN: [id number]  YOB: 1950  Date of evaluation: 4/10/2024    Procedure Summary       Date: 04/10/24 Room / Location: [address]    Anesthesia Start: 1018 Anesthesia Stop: 1130    Procedures:       VENTRICULAR PERITONEAL SHUNT REVISION WITH LAPAROSCOPIC ASSISTANCE (Head)      . (Abdomen) Diagnosis:       Mechanical breakdown of internal fixation device of bone of extremity, initial encounter ([facility])      (Mechanical breakdown of internal fixation device of bone of extremity, initial encounter ([facility]) [T84.119A])    Surgeons: Luis Miguel Garcia MD Responsible Provider: Joseph Noble DO    Anesthesia Type: general ASA Status: 4            Anesthesia Type: No value filed.    Kait Phase I: Kait Score: 8    Kait Phase II:      Anesthesia Post Evaluation    Patient location during evaluation: PACU  Patient participation: complete - patient participated  Level of consciousness: awake  Pain score: 3  Airway patency: patent  Nausea & Vomiting: no nausea and no vomiting  Cardiovascular status: hemodynamically stable  Respiratory status: acceptable  Hydration status: stable  Multimodal analgesia pain management approach  Pain management: adequate    No notable events documented.

## 2024-04-11 ENCOUNTER — APPOINTMENT (OUTPATIENT)
Dept: CT IMAGING | Age: 74
DRG: 032 | End: 2024-04-11
Attending: NEUROLOGICAL SURGERY
Payer: MEDICARE

## 2024-04-11 VITALS
OXYGEN SATURATION: 96 % | WEIGHT: 268.6 LBS | HEART RATE: 76 BPM | RESPIRATION RATE: 16 BRPM | DIASTOLIC BLOOD PRESSURE: 71 MMHG | BODY MASS INDEX: 34.47 KG/M2 | TEMPERATURE: 97.9 F | HEIGHT: 74 IN | SYSTOLIC BLOOD PRESSURE: 119 MMHG

## 2024-04-11 PROBLEM — Z98.2 S/P VP SHUNT: Status: ACTIVE | Noted: 2024-04-10

## 2024-04-11 PROBLEM — T85.618A SHUNT MALFUNCTION, INITIAL ENCOUNTER: Status: ACTIVE | Noted: 2024-04-11

## 2024-04-11 LAB — MRSA DNA SPEC QL NAA+PROBE: NORMAL

## 2024-04-11 PROCEDURE — 6360000002 HC RX W HCPCS: Performed by: NURSE PRACTITIONER

## 2024-04-11 PROCEDURE — 97116 GAIT TRAINING THERAPY: CPT

## 2024-04-11 PROCEDURE — 70450 CT HEAD/BRAIN W/O DYE: CPT

## 2024-04-11 PROCEDURE — 94761 N-INVAS EAR/PLS OXIMETRY MLT: CPT

## 2024-04-11 PROCEDURE — 97530 THERAPEUTIC ACTIVITIES: CPT

## 2024-04-11 PROCEDURE — 1200000000 HC SEMI PRIVATE

## 2024-04-11 PROCEDURE — 97166 OT EVAL MOD COMPLEX 45 MIN: CPT

## 2024-04-11 PROCEDURE — 97535 SELF CARE MNGMENT TRAINING: CPT

## 2024-04-11 PROCEDURE — 96372 THER/PROPH/DIAG INJ SC/IM: CPT

## 2024-04-11 PROCEDURE — 6370000000 HC RX 637 (ALT 250 FOR IP): Performed by: NURSE PRACTITIONER

## 2024-04-11 PROCEDURE — 97162 PT EVAL MOD COMPLEX 30 MIN: CPT

## 2024-04-11 PROCEDURE — 99239 HOSP IP/OBS DSCHRG MGMT >30: CPT | Performed by: NURSE PRACTITIONER

## 2024-04-11 PROCEDURE — 2580000003 HC RX 258: Performed by: NURSE PRACTITIONER

## 2024-04-11 PROCEDURE — 94640 AIRWAY INHALATION TREATMENT: CPT

## 2024-04-11 RX ORDER — SENNA AND DOCUSATE SODIUM 50; 8.6 MG/1; MG/1
1 TABLET, FILM COATED ORAL 2 TIMES DAILY PRN
COMMUNITY
Start: 2024-04-11

## 2024-04-11 RX ADMIN — ACETAMINOPHEN 650 MG: 325 TABLET ORAL at 08:36

## 2024-04-11 RX ADMIN — DOCUSATE SODIUM 50 MG AND SENNOSIDES 8.6 MG 1 TABLET: 8.6; 5 TABLET, FILM COATED ORAL at 08:35

## 2024-04-11 RX ADMIN — GABAPENTIN 400 MG: 400 CAPSULE ORAL at 08:38

## 2024-04-11 RX ADMIN — ATORVASTATIN CALCIUM 40 MG: 40 TABLET, FILM COATED ORAL at 08:39

## 2024-04-11 RX ADMIN — SODIUM CHLORIDE 3000 MG: 900 INJECTION INTRAVENOUS at 02:23

## 2024-04-11 RX ADMIN — FINASTERIDE 5 MG: 5 TABLET, FILM COATED ORAL at 08:39

## 2024-04-11 RX ADMIN — ARFORMOTEROL TARTRATE 15 MCG: 15 SOLUTION RESPIRATORY (INHALATION) at 10:11

## 2024-04-11 RX ADMIN — PANTOPRAZOLE SODIUM 40 MG: 40 TABLET, DELAYED RELEASE ORAL at 08:36

## 2024-04-11 RX ADMIN — CETIRIZINE HYDROCHLORIDE 10 MG: 10 TABLET, FILM COATED ORAL at 08:39

## 2024-04-11 RX ADMIN — Medication 2000 UNITS: at 08:39

## 2024-04-11 RX ADMIN — ACETAMINOPHEN 650 MG: 325 TABLET ORAL at 02:15

## 2024-04-11 RX ADMIN — BUDESONIDE INHALATION 500 MCG: 0.5 SUSPENSION RESPIRATORY (INHALATION) at 10:11

## 2024-04-11 RX ADMIN — FUROSEMIDE 20 MG: 20 TABLET ORAL at 08:39

## 2024-04-11 RX ADMIN — TAMSULOSIN HYDROCHLORIDE 0.4 MG: 0.4 CAPSULE ORAL at 08:36

## 2024-04-11 RX ADMIN — ENOXAPARIN SODIUM 30 MG: 100 INJECTION SUBCUTANEOUS at 08:40

## 2024-04-11 ASSESSMENT — PAIN SCALES - GENERAL
PAINLEVEL_OUTOF10: 0
PAINLEVEL_OUTOF10: 1
PAINLEVEL_OUTOF10: 0

## 2024-04-11 ASSESSMENT — PAIN - FUNCTIONAL ASSESSMENT: PAIN_FUNCTIONAL_ASSESSMENT: ACTIVITIES ARE NOT PREVENTED

## 2024-04-11 ASSESSMENT — PAIN DESCRIPTION - DESCRIPTORS: DESCRIPTORS: ACHING

## 2024-04-11 ASSESSMENT — PAIN DESCRIPTION - LOCATION: LOCATION: HEAD

## 2024-04-11 ASSESSMENT — PAIN DESCRIPTION - ORIENTATION: ORIENTATION: RIGHT

## 2024-04-11 NOTE — PLAN OF CARE
Problem: Safety - Adult  Goal: Free from fall injury  4/11/2024 0658 by Olegario Vizcarra RN  Outcome: Progressing     Problem: ABCDS Injury Assessment  Goal: Absence of physical injury  4/11/2024 0658 by Olegario Vizcarra RN  Outcome: Progressing     Problem: Pain  Goal: Verbalizes/displays adequate comfort level or baseline comfort level  Outcome: Progressing

## 2024-04-11 NOTE — PLAN OF CARE
Problem: Discharge Planning  Goal: Discharge to home or other facility with appropriate resources  Outcome: Progressing   Case management is consulted for discharge purposes.      Problem: Safety - Adult  Goal: Free from fall injury  4/11/2024 0834 by Sondra Bailey RN  Outcome: Progressing   Patient has all standard fall precautions in place.      Problem: Pain  Goal: Verbalizes/displays adequate comfort level or baseline comfort level  4/11/2024 0834 by Sondra Bailey, RN  Outcome: Progressing   Pain medications are being managed by the MAR.

## 2024-04-11 NOTE — PLAN OF CARE
Problem: Safety - Adult  Goal: Free from fall injury  4/10/2024 2000 by Gurvinder Peter, RN  Note: No changhes , tolerated diet , and liquids , iv to saline lock , np aware

## 2024-04-11 NOTE — PROGRESS NOTES
[facility] PRE-SURGICAL TESTING INSTRUCTIONS                      PRIOR TO PROCEDURE DATE:    1. PLEASE FOLLOW ANY INSTRUCTIONS GIVEN TO YOU PER YOUR SURGEON.      2. Arrange for someone to drive you home and be with you for the first 24 hours after discharge for your safety after your procedure for which you received sedation. Ensure it is someone we can share information with regarding your discharge.     NOTE: At this time ONLY 2 ADULTS may accompany you   One person ENCOURAGED to stay at hospital entire time if outpatient surgery      3. You must contact your surgeon for instructions IF:  You are taking any blood thinners, aspirin, anti-inflammatory or vitamins.  There is a change in your physical condition such as a cold, fever, rash, cuts, sores, or any other infection, especially near your surgical site.    4. Do not drink alcohol the day before or day of your procedure.  Do not use any recreational marijuana at least 24 hours or street drugs (heroin, cocaine) at minimum 5 days prior to your procedure.     5. A Pre-Surgical History and Physical MUST be completed WITHIN 30 DAYS OR LESS prior to your procedure.by your Physician or an Urgent Care        THE DAY OF YOUR PROCEDURE:  1.  Follow instructions for ARRIVAL TIME as DIRECTED BY YOUR SURGEON.     2. Enter the MAIN entrance from [facility] and follow the signs to the free Parking Garage or  Parking (offered free of charge 7 am-5pm).      3. Enter the Main Entrance of the hospital (do not enter from the lower level of the parking garage). Upon entrance, check in with the  at the surgical information desk on your LEFT.   Bring your insurance card and photo ID to register      4. DO NOT EAT ANYTHING 8 hours prior to arrival for surgery.  You may have up to 8 ounces of water 4 hours prior to your arrival for surgery.   NOTE: ALL Gastric, Bariatric & Bowel surgery patients - you MUST follow your surgeon's instructions regarding 
4 Eyes Skin Assessment     NAME:  Jared Palafox Jr.  YOB: 1950  MEDICAL RECORD NUMBER:  [id number]    The patient is being assessed for  Admission    I agree that at least one RN has performed a thorough Head to Toe Skin Assessment on the patient. ALL assessment sites listed below have been assessed.      Areas assessed by both nurses:    Head, Face, Ears, Shoulders, Back, Chest, Arms, Elbows, Hands, Sacrum. Buttock, Coccyx, Ischium, Legs. Feet and Heels, and Under Medical Devices     -blanchable redness to buttocks       Does the Patient have a Wound? No noted wound(s)       Ke Prevention initiated by RN: No  Wound Care Orders initiated by RN: No    Pressure Injury (Stage 3,4, Unstageable, DTI, NWPT, and Complex wounds) if present, place Wound referral order by RN under : No    New Ostomies, if present place, Ostomy referral order under : No     Nurse 1 eSignature: Electronically signed by Oliva Maxwell RN on 4/10/24 at 1:17 PM EDT    **SHARE this note so that the co-signing nurse can place an eSignature**    Nurse 2 eSignature: Electronically signed by Gurvinder Peter RN on 4/10/24 at 8:06 PM EDT    
Occupational Therapy  Facility/Department: [facility] 5T ORTHO/NEURO  Occupational Therapy Initial Assessment    Name: Jared Palafox Jr.  : 1950  MRN: [id number]  Date of Service: 2024    Discharge Recommendations:  24 hour supervision or assist, Home with Home health OT  OT Equipment Recommendations  Equipment Needed: No       Patient Diagnosis(es): There were no encounter diagnoses.  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  Past Surgical History:  has a past surgical history that includes other surgical history (N/A, 2018); pr crtj shunt wnpqwjumxl-waxrdeptb-lyahaqv terminus (N/A, 2018); laparoscopy (N/A, 2018); Eye surgery (Left); joint replacement (Left, ); Upper gastrointestinal endoscopy (N/A, 2018); Colonoscopy (2018); Enteroscopy (N/A, 2019); Upper gastrointestinal endoscopy (N/A, 2019); Upper gastrointestinal endoscopy (N/A, 2019); Upper gastrointestinal endoscopy (N/A, 2019); Upper gastrointestinal endoscopy (N/A, 2019); Upper gastrointestinal endoscopy (N/A, 2019); Upper gastrointestinal endoscopy (N/A, 2019); Upper gastrointestinal endoscopy (N/A, 2019); Upper gastrointestinal endoscopy (N/A, 2019); Upper gastrointestinal endoscopy (N/A, 07/15/2020); Upper gastrointestinal endoscopy (N/A, 07/15/2020); Upper gastrointestinal endoscopy (N/A, 2021); Upper gastrointestinal endoscopy (N/A, 2021); eye surgery; hernia repair; Sinus endoscopy (Bilateral, 
PACU Transfer Note    Vitals:    04/10/24 1230   BP: 113/83   Pulse: 83   Resp: 10   Temp: 97.8 °F (36.6 °C)   SpO2: 100%       In: 175 [P.O.:25; I.V.:150]  Out: 0     Pain assessment:  none  Pain Level: 0    Report given to Receiving unit Timothy TERRY here at bedside in the PACU. Transferred with all belongings to ready room in bed per PACU Breanne TERRY.    4/10/2024 12:45 PM      
Patient is alert and orient X4, VSS, patient is voiding adequately per BRP patient is ambulating per assist X1 gait belt walker, patient was up in the chair most of the shift, patient did work with PT/OT this afternoon, the patient refused a bath because he was going to be discharge, the patient did receive a walker and all LDA's have been removed and the RN went over all discharge paperwork with the patient and the spouse, patient was wheeled out in a w/c and picked by his wife in the lobby.  
Physical Therapy  Facility/Department: [facility] 5T ORTHO/NEURO  Physical Therapy Initial Assessment / Treatment    Name: Jared Palafox Jr.  : 1950  MRN: [id number]  Date of Service: 2024    Discharge Recommendations:  24 hour supervision or assist, Home with Home health PT   PT Equipment Recommendations  Equipment Needed: Yes  Mobility Devices: Walker  Walker: Rolling      Patient Diagnosis(es): There were no encounter diagnoses.  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  Past Surgical History:  has a past surgical history that includes other surgical history (N/A, 2018); pr crtj shunt mhqqxkvykv-xwxwqfjjb-myfdikm terminus (N/A, 2018); laparoscopy (N/A, 2018); Eye surgery (Left); joint replacement (Left, ); Upper gastrointestinal endoscopy (N/A, 2018); Colonoscopy (2018); Enteroscopy (N/A, 2019); Upper gastrointestinal endoscopy (N/A, 2019); Upper gastrointestinal endoscopy (N/A, 2019); Upper gastrointestinal endoscopy (N/A, 2019); Upper gastrointestinal endoscopy (N/A, 2019); Upper gastrointestinal endoscopy (N/A, 2019); Upper gastrointestinal endoscopy (N/A, 2019); Upper gastrointestinal endoscopy (N/A, 2019); Upper gastrointestinal endoscopy (N/A, 2019); Upper gastrointestinal endoscopy (N/A, 07/15/2020); Upper gastrointestinal endoscopy (N/A, 07/15/2020); Upper gastrointestinal endoscopy (N/A, 2021); Upper gastrointestinal endoscopy (N/A, 2021); eye surgery; 
Pt unable to wear CPAP due to incision.  
abnormal cbc faxed to Dr Garcia's office   hs  
Local wound care    Rest of care per primary  Please re-contact with questions      Jonna Fletcher DO, Elyssa  PGY-2, General Surgery  04/10/24  3:23 PM  [place]

## 2024-04-11 NOTE — DISCHARGE INSTRUCTIONS
Incisional Care: Open to air. Wash incision daily with warm soapy water or shower daily, and pat dry with clean dry towel.    Do not take any blood thinners (ie Aspirin, Coumadin, Plavix, etc), NSAID's (Advil, Aleve, Mobic, etc), or vitamin/herbal supplements prior to your follow up appointment. You can ask the doctor at your follow up appointment when it is OK to start taking these medications, if applicable.

## 2024-04-11 NOTE — DISCHARGE SUMMARY
Discharge Summary    Date of Admission: 4/10/2024  8:50 AM  Date of Discharge: 4/11/2024  Admission Diagnosis: Normal pressure hydrocephalus (HCC)  Discharge Diagnosis: Same   Condition on Discharge: good  Attending for Admission: Luis Miguel Garcia MD  Procedures: Procedure(s) (LRB):  VENTRICULAR PERITONEAL SHUNT REVISION WITH LAPAROSCOPIC ASSISTANCE (N/A)  . (N/A)  Consults: IP CONSULT TO HOME CARE NEEDS    Reason for Admission:  Jared Palafox Jr. is a 73 y.o. male patient who was admitted to the hospital for complaints of worsening memory issues, urinary incontinence and trouble walking. He underwent the procedure listed above on 4/10/2024.     Hospital Course:  After surgery, His pre-operative worsening memory issues, urinary incontinence and trouble walking were improved. He complained of mild post-op pain. The pain was well-controlled on oral medications.  The incision was clean, dry and intact. There was no erythema or edema around the surgical site. Prior to discharge He was eating well, urinating and ambulating with a steady gait.    Discharge Vitals/Labs:  /71   Pulse 76   Temp 97.9 °F (36.6 °C) (Oral)   Resp 16   Ht 1.88 m (6' 2\")   Wt 121.8 kg (268 lb 9.6 oz)   SpO2 96%   BMI 34.49 kg/m²     CBC:   Lab Results   Component Value Date/Time    WBC 12.3 03/26/2024 11:21 AM    RBC 4.49 03/26/2024 11:21 AM    HGB 12.8 03/26/2024 11:21 AM    HCT 36.7 03/26/2024 11:21 AM    MCV 81.9 03/26/2024 11:21 AM    MCH 28.6 03/26/2024 11:21 AM    MCHC 35.0 03/26/2024 11:21 AM    RDW 15.4 03/26/2024 11:21 AM     03/26/2024 11:21 AM    MPV 6.6 03/26/2024 11:21 AM     BMP:    Lab Results   Component Value Date/Time     03/26/2024 11:21 AM    K 4.0 03/26/2024 11:21 AM    K 4.0 01/26/2023 09:36 AM     03/26/2024 11:21 AM    CO2 24 03/26/2024 11:21 AM    BUN 18 03/26/2024 11:21 AM    LABALBU 4.5 03/26/2024 11:21 AM    CREATININE 1.0 03/26/2024 11:21 AM    CALCIUM 9.5 03/26/2024 11:21 AM    GFRAA

## 2024-04-11 NOTE — CARE COORDINATION
contact unit  or ;  or  CANNOT provide pharmacy voucher for patients co-pays  5. Patients can then  the prescription on their way out of the hospital at discharge, or pharmacy can deliver to the bedside if staff is available. (payment due at time of pick-up or delivery - cash, check, or card accepted)     Able to afford home medications/ co-pay costs: Yes    ADLS:  Current PT AM-PAC Score: 20 /24  Current OT AM-PAC Score: 18 /24    DISCHARGE Disposition: Home with Home Health Care: [facility]     LOC at discharge: Not Applicable  KULDIP Completed: Yes    Notification completed in HENS/PAS?:  Not Applicable    IMM Completed:   Yes, Case management has presented and reviewed IMM letter #2 to the patient and/or family/ POA. Patient and/or family/POA verbalized understanding of their medicare rights and appeal process if needed. Patient and/or family/POA has signed and placed today's date (4/11) and time (1400) on letter #2 on the the appropriate lines. Patient and/or family/POA, provided copy of signed letter and they are aware that this original copy of IMM letter #2 is available prior to discharge from the paper chart on the unit.  Electronic documentation has been entered into epic for IMM letter #2 and original paper copy has been added to the paper chart at the nurses station.     Presentation of IMM to be given within 4 hours of discharge. Patient understands and agrees with discharge plan.        Transportation:  Transportation PLAN for discharge: family   Mode of Transport: Private Car    Home Care:  Home Care ordered at discharge: Yes  Home Care Agency: [facility]  Phone: [phone]  Fax: [phone]  Orders faxed: Yes    Durable Medical Equipment:  DME Provider: Walker  Equipment obtained during hospitalization: walker      Additional CM Notes: Patient cleared for discharge.  Patient admitted with normal pressure hydrocephalus.  POD 1  shunt

## 2024-04-12 ENCOUNTER — CARE COORDINATION (OUTPATIENT)
Dept: CASE MANAGEMENT | Age: 74
End: 2024-04-12

## 2024-04-12 NOTE — CARE COORDINATION
[facility] Transitions Initial Follow Up Call    Call within 2 business days of discharge: Yes    Patient:  VIDAL HAMMOND JR. Patient :  1950  MRN:  [id number]   Reason for Admission:  SHUNT MALFUNCTION  Discharge Date:  24  RARS: 7      Transitions of Care Initial Call    Was this an external facility discharge? NO    Discharge Facility: [facility]      Challenges to be reviewed by the provider   Additional needs identified to be addressed with provider:    no    AMB CC Provider Discharge Needs: none            Non-face-to-face services provided:  NONE       1ST  CTC attempt to reach Pt regarding recent hospital discharge /  unable to reach.         Follow up appointments:    Future Appointments   Date Time Provider Department Center   2024  9:30 AM Jese Packer MD [facility]   10/1/2024 10:40 AM Christiano Oliveira MD [place] P/CC [facility]   2025 10:00 AM Leroy Hooker MD [place] [facility]       LEELA Samson, RN  Care Transition Coordinator  Contact Number:  [phone]

## 2024-04-15 ENCOUNTER — CARE COORDINATION (OUTPATIENT)
Dept: CASE MANAGEMENT | Age: 74
End: 2024-04-15

## 2024-04-15 NOTE — CARE COORDINATION
[facility] Transitions Initial Follow Up Call    Call within 2 business days of discharge: Yes    Patient:  VIDAL HAMMOND JR. Patient :  1950  MRN:  [id number]   Reason for Admission:  HYDRONEPHROSIS - SHUNT MALFUNCTION  Discharge Date:  24  RARS: 7      Transitions of Care Initial Call    Was this an external facility discharge? NO    Discharge Facility: [facility]      Challenges to be reviewed by the provider   Additional needs identified to be addressed with provider:    YES - HFU NEEDED     AMB CC Provider Discharge Needs: none            Non-face-to-face services provided:  Communication with home health agencies or other community services the patient is currently using-CTN spoke with Jessie with [facility] who confirms receipt of hc order and SOC was yesterday.       2ND CTC attempt to reach Pt regarding recent hospital discharge /  unable to reach / CT episode closed / CTN signed off.         Follow up appointments:    Future Appointments   Date Time Provider Department Center   2024  9:30 AM Jese Packer MD [facility]   10/1/2024 10:40 AM Christiano Oliveira MD [place] P/CC [facility]   2025 10:00 AM Leroy Hooker MD [place] Card [facility]       LEELA Samson, RN  Care Transition Coordinator  Contact Number:  [phone]

## 2024-04-17 ENCOUNTER — OFFICE VISIT (OUTPATIENT)
Dept: FAMILY MEDICINE CLINIC | Age: 74
End: 2024-04-17

## 2024-04-17 VITALS
WEIGHT: 271 LBS | DIASTOLIC BLOOD PRESSURE: 78 MMHG | BODY MASS INDEX: 34.79 KG/M2 | HEART RATE: 77 BPM | OXYGEN SATURATION: 94 % | SYSTOLIC BLOOD PRESSURE: 118 MMHG

## 2024-04-17 DIAGNOSIS — Z98.2 S/P VP SHUNT: Primary | ICD-10-CM

## 2024-04-17 NOTE — PROGRESS NOTES
Jared Palafox Jr. is a 74 y.o.male who presents with   Chief Complaint   Patient presents with    Follow-Up from [facility] 4-10/4-11-24 shunt revision   Portions of this chart may have been created with voice recognition software. Occasional wrong-word or \"sound-alike\" substitutions may have occurred due to the inherent limitations of voice recognition software. Read the chart carefully and recognize, using context, where these substitutions have occurred      Patient presents for hospital follow-up.      Patient was admitted for a shunt revision for his history of normal pressure hydrocephalus .  Patient was developing symptoms of memory issues, urinary incontinence and trouble walking.  Patient tolerated procedure well and symptoms have improved.  Currently the only symptom present is incontinence but he reports that this has been improving.  Patient was advised to follow-up with Luis Miguel Garcia MD in the office in 2 weeks   Patient currently has staples in his scalp.  Incision appears clean and is healing well.        Review of Systems   Constitutional:  Negative for fatigue.   Eyes:  Negative for visual disturbance.   Respiratory:  Negative for shortness of breath.    Cardiovascular:  Negative for chest pain.   Gastrointestinal:  Negative for abdominal pain.   Genitourinary:         Incontinence   Skin:  Negative for rash.   Neurological:  Negative for dizziness, light-headedness and headaches.        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

## 2024-04-22 DIAGNOSIS — R73.03 PRE-DIABETES: ICD-10-CM

## 2024-04-22 RX ORDER — METFORMIN HYDROCHLORIDE 500 MG/1
500 TABLET, EXTENDED RELEASE ORAL
Qty: 30 TABLET | Refills: 3 | Status: SHIPPED | OUTPATIENT
Start: 2024-04-22 | End: 2024-04-24 | Stop reason: SDUPTHER

## 2024-04-24 DIAGNOSIS — R73.03 PRE-DIABETES: ICD-10-CM

## 2024-04-24 RX ORDER — METFORMIN HYDROCHLORIDE 500 MG/1
500 TABLET, EXTENDED RELEASE ORAL
Qty: 30 TABLET | Refills: 3 | Status: SHIPPED | OUTPATIENT
Start: 2024-04-24

## 2024-04-29 RX ORDER — ARFORMOTEROL TARTRATE 15 UG/2ML
1 SOLUTION RESPIRATORY (INHALATION) 2 TIMES DAILY
Qty: 360 ML | Refills: 5 | Status: SHIPPED | OUTPATIENT
Start: 2024-04-29

## 2024-04-30 ENCOUNTER — TELEPHONE (OUTPATIENT)
Dept: PULMONOLOGY | Age: 74
End: 2024-04-30

## 2024-04-30 RX ORDER — FORMOTEROL FUMARATE DIHYDRATE 20 UG/2ML
20 SOLUTION RESPIRATORY (INHALATION) EVERY 12 HOURS
Qty: 120 ML | Refills: 0 | Status: SHIPPED | OUTPATIENT
Start: 2024-04-30 | End: 2024-05-30

## 2024-05-13 ENCOUNTER — HOSPITAL ENCOUNTER (OUTPATIENT)
Dept: CT IMAGING | Age: 74
Discharge: HOME | End: 2024-05-13
Attending: NEUROLOGICAL SURGERY
Payer: MEDICARE

## 2024-05-13 DIAGNOSIS — Z48.811 AFTERCARE FOLLOWING SURGERY OF THE NERVOUS SYSTEM: ICD-10-CM

## 2024-05-13 DIAGNOSIS — G91.2 NORMAL PRESSURE HYDROCEPHALUS (HCC): ICD-10-CM

## 2024-05-13 PROCEDURE — 70450 CT HEAD/BRAIN W/O DYE: CPT

## 2024-06-04 DIAGNOSIS — F33.1 MODERATE EPISODE OF RECURRENT MAJOR DEPRESSIVE DISORDER (HCC): ICD-10-CM

## 2024-06-10 RX ORDER — ARFORMOTEROL TARTRATE 15 UG/2ML
1 SOLUTION RESPIRATORY (INHALATION) 2 TIMES DAILY
Qty: 360 ML | Refills: 5 | Status: SHIPPED | OUTPATIENT
Start: 2024-06-10

## 2024-06-10 NOTE — TELEPHONE ENCOUNTER
Please refill pending medication   Patient rather have Brovana instead of Perforomist due to the cost   [facility]   Thanks

## 2024-06-11 NOTE — TELEPHONE ENCOUNTER
Mr Palafox called today checking on Brovana Rx.  I called [facility] and spoke to Fortino, he states they have received rx, it will have to be for Generic, brand is not covered under pts insurance.  I gave verbal for the generic.  Pt was called and told, he gave a verbal understanding.

## 2024-06-16 SDOH — HEALTH STABILITY: PHYSICAL HEALTH: ON AVERAGE, HOW MANY MINUTES DO YOU ENGAGE IN EXERCISE AT THIS LEVEL?: 20 MIN

## 2024-06-16 SDOH — HEALTH STABILITY: PHYSICAL HEALTH: ON AVERAGE, HOW MANY DAYS PER WEEK DO YOU ENGAGE IN MODERATE TO STRENUOUS EXERCISE (LIKE A BRISK WALK)?: 2 DAYS

## 2024-06-16 ASSESSMENT — LIFESTYLE VARIABLES
HOW MANY STANDARD DRINKS CONTAINING ALCOHOL DO YOU HAVE ON A TYPICAL DAY: 1 OR 2
HOW MANY STANDARD DRINKS CONTAINING ALCOHOL DO YOU HAVE ON A TYPICAL DAY: 1
HOW OFTEN DO YOU HAVE SIX OR MORE DRINKS ON ONE OCCASION: 1

## 2024-06-16 ASSESSMENT — PATIENT HEALTH QUESTIONNAIRE - PHQ9
1. LITTLE INTEREST OR PLEASURE IN DOING THINGS: SEVERAL DAYS
SUM OF ALL RESPONSES TO PHQ QUESTIONS 1-9: 2
SUM OF ALL RESPONSES TO PHQ QUESTIONS 1-9: 2
2. FEELING DOWN, DEPRESSED OR HOPELESS: SEVERAL DAYS
SUM OF ALL RESPONSES TO PHQ9 QUESTIONS 1 & 2: 2
SUM OF ALL RESPONSES TO PHQ QUESTIONS 1-9: 2
SUM OF ALL RESPONSES TO PHQ QUESTIONS 1-9: 2

## 2024-06-18 ENCOUNTER — OFFICE VISIT (OUTPATIENT)
Dept: FAMILY MEDICINE CLINIC | Age: 74
End: 2024-06-18
Payer: MEDICARE

## 2024-06-18 VITALS
HEIGHT: 74 IN | BODY MASS INDEX: 34.55 KG/M2 | SYSTOLIC BLOOD PRESSURE: 104 MMHG | DIASTOLIC BLOOD PRESSURE: 66 MMHG | OXYGEN SATURATION: 96 % | HEART RATE: 83 BPM | WEIGHT: 269.2 LBS

## 2024-06-18 DIAGNOSIS — Z00.00 MEDICARE ANNUAL WELLNESS VISIT, SUBSEQUENT: ICD-10-CM

## 2024-06-18 DIAGNOSIS — F33.1 MODERATE EPISODE OF RECURRENT MAJOR DEPRESSIVE DISORDER (HCC): ICD-10-CM

## 2024-06-18 DIAGNOSIS — R73.03 PRE-DIABETES: ICD-10-CM

## 2024-06-18 DIAGNOSIS — Z00.00 HEALTHCARE MAINTENANCE: Primary | ICD-10-CM

## 2024-06-18 DIAGNOSIS — E78.5 HYPERLIPIDEMIA, UNSPECIFIED HYPERLIPIDEMIA TYPE: ICD-10-CM

## 2024-06-18 PROCEDURE — 3017F COLORECTAL CA SCREEN DOC REV: CPT | Performed by: STUDENT IN AN ORGANIZED HEALTH CARE EDUCATION/TRAINING PROGRAM

## 2024-06-18 PROCEDURE — G0439 PPPS, SUBSEQ VISIT: HCPCS | Performed by: STUDENT IN AN ORGANIZED HEALTH CARE EDUCATION/TRAINING PROGRAM

## 2024-06-18 PROCEDURE — 1124F ACP DISCUSS-NO DSCNMKR DOCD: CPT | Performed by: STUDENT IN AN ORGANIZED HEALTH CARE EDUCATION/TRAINING PROGRAM

## 2024-06-18 RX ORDER — SERTRALINE HYDROCHLORIDE 100 MG/1
TABLET, FILM COATED ORAL
Qty: 30 TABLET | Refills: 2 | Status: SHIPPED | OUTPATIENT
Start: 2024-06-18 | End: 2024-06-18 | Stop reason: SDUPTHER

## 2024-06-18 RX ORDER — SERTRALINE HYDROCHLORIDE 100 MG/1
TABLET, FILM COATED ORAL
Qty: 30 TABLET | Refills: 2 | Status: SHIPPED | OUTPATIENT
Start: 2024-06-18 | End: 2024-06-19 | Stop reason: SDUPTHER

## 2024-06-18 ASSESSMENT — PATIENT HEALTH QUESTIONNAIRE - PHQ9
8. MOVING OR SPEAKING SO SLOWLY THAT OTHER PEOPLE COULD HAVE NOTICED. OR THE OPPOSITE, BEING SO FIGETY OR RESTLESS THAT YOU HAVE BEEN MOVING AROUND A LOT MORE THAN USUAL: NOT AT ALL
SUM OF ALL RESPONSES TO PHQ QUESTIONS 1-9: 0
4. FEELING TIRED OR HAVING LITTLE ENERGY: NOT AT ALL
3. TROUBLE FALLING OR STAYING ASLEEP: NOT AT ALL
7. TROUBLE CONCENTRATING ON THINGS, SUCH AS READING THE NEWSPAPER OR WATCHING TELEVISION: NOT AT ALL
SUM OF ALL RESPONSES TO PHQ QUESTIONS 1-9: 0
10. IF YOU CHECKED OFF ANY PROBLEMS, HOW DIFFICULT HAVE THESE PROBLEMS MADE IT FOR YOU TO DO YOUR WORK, TAKE CARE OF THINGS AT HOME, OR GET ALONG WITH OTHER PEOPLE: NOT DIFFICULT AT ALL
5. POOR APPETITE OR OVEREATING: NOT AT ALL
SUM OF ALL RESPONSES TO PHQ QUESTIONS 1-9: 0
1. LITTLE INTEREST OR PLEASURE IN DOING THINGS: NOT AT ALL
SUM OF ALL RESPONSES TO PHQ9 QUESTIONS 1 & 2: 0
2. FEELING DOWN, DEPRESSED OR HOPELESS: NOT AT ALL
SUM OF ALL RESPONSES TO PHQ QUESTIONS 1-9: 0
9. THOUGHTS THAT YOU WOULD BE BETTER OFF DEAD, OR OF HURTING YOURSELF: NOT AT ALL
6. FEELING BAD ABOUT YOURSELF - OR THAT YOU ARE A FAILURE OR HAVE LET YOURSELF OR YOUR FAMILY DOWN: NOT AT ALL

## 2024-06-18 NOTE — PROGRESS NOTES
No acte respiratory distress, symmetric chest expansion,  CTAB  Abdomen: Soft, non-tender to palpation, non-distended   Neuro: Grossly intact, no focal deficits  MSK: Moves all extremities spontaneously, no acute joint swelling, uses cane to ambulate  Skin: Warm and dry, no acute lesions  Psych: Calm, able to answer questions and follow commands        Allergies   Allergen Reactions    Lisinopril Cough     Prior to Visit Medications    Medication Sig Taking? Authorizing Provider   sertraline (ZOLOFT) 100 MG tablet TAKE ONE AND ONE-HALF TABLETS EVERY MORNING Yes Gurvinder Askew MD   Lift Chair MISC by Does not apply route Yes Gurvinder Askew MD   Misc. Devices (ROLLER WALKER) MISC 1 each by Does not apply route daily Yes Gurvinder Askew MD   arformoterol tartrate (BROVANA) 15 MCG/2ML NEBU Take 1 ampule by nebulization 2 times daily Yes Christiano Oliveira MD   metFORMIN (GLUCOPHAGE-XR) 500 MG extended release tablet Take 1 tablet by mouth daily (with breakfast) Yes Gurvinder Askew MD   sennosides-docusate sodium (SENOKOT-S) 8.6-50 MG tablet Take 1 tablet by mouth 2 times daily as needed for Constipation Yes Rodrigo Barnes S, APRN - CNP   albuterol sulfate HFA (PROVENTIL HFA) 108 (90 Base) MCG/ACT inhaler Inhale 2 puffs into the lungs every 6 hours as needed for Wheezing Yes ProviderAissatou MD   gabapentin (NEURONTIN) 100 MG capsule TAKE 4 CAPSULES EVERY MORNING Yes Gurvinder Askew MD   finasteride (PROSCAR) 5 MG tablet Take 1 tablet by mouth daily Yes Leroy Hooker MD   budesonide (PULMICORT) 0.5 MG/2ML nebulizer suspension Take 2 mLs by nebulization 2 times daily Yes Gurvinder Askew MD   pantoprazole (PROTONIX) 40 MG tablet Take 1 tablet by mouth every morning Yes Christiano Oliveira MD   atorvastatin (LIPITOR) 40 MG tablet Take 1 tablet by mouth daily Yes Leroy Hooker MD   furosemide (LASIX) 20 MG tablet Take 1 tablet by mouth daily Yes Leroy Hooker MD   aspirin 81 MG EC

## 2024-06-19 DIAGNOSIS — F33.1 MODERATE EPISODE OF RECURRENT MAJOR DEPRESSIVE DISORDER (HCC): ICD-10-CM

## 2024-06-19 RX ORDER — SERTRALINE HYDROCHLORIDE 100 MG/1
TABLET, FILM COATED ORAL
Qty: 30 TABLET | Refills: 2 | Status: SHIPPED | OUTPATIENT
Start: 2024-06-19

## 2024-06-25 DIAGNOSIS — E78.5 HYPERLIPIDEMIA, UNSPECIFIED HYPERLIPIDEMIA TYPE: ICD-10-CM

## 2024-06-25 DIAGNOSIS — Z00.00 HEALTHCARE MAINTENANCE: ICD-10-CM

## 2024-06-25 DIAGNOSIS — F33.1 MODERATE EPISODE OF RECURRENT MAJOR DEPRESSIVE DISORDER (HCC): ICD-10-CM

## 2024-06-25 DIAGNOSIS — R73.03 PRE-DIABETES: ICD-10-CM

## 2024-06-26 DIAGNOSIS — R73.03 PRE-DIABETES: ICD-10-CM

## 2024-06-26 LAB
CHOLEST SERPL-MCNC: 149 MG/DL (ref 0–199)
EST. AVERAGE GLUCOSE BLD GHB EST-MCNC: 142.7 MG/DL
HBA1C MFR BLD: 6.6 %
HDLC SERPL-MCNC: 52 MG/DL (ref 40–60)
LDLC SERPL CALC-MCNC: 71 MG/DL
PSA SERPL DL<=0.01 NG/ML-MCNC: 0.79 NG/ML (ref 0–4)
T4 FREE SERPL-MCNC: 1.2 NG/DL (ref 0.9–1.8)
TRIGL SERPL-MCNC: 132 MG/DL (ref 0–150)
TSH SERPL DL<=0.005 MIU/L-ACNC: 5.4 UIU/ML (ref 0.27–4.2)
VLDLC SERPL CALC-MCNC: 26 MG/DL

## 2024-06-26 RX ORDER — METFORMIN HYDROCHLORIDE 500 MG/1
500 TABLET, EXTENDED RELEASE ORAL 2 TIMES DAILY
Qty: 30 TABLET | Refills: 3 | Status: SHIPPED | OUTPATIENT
Start: 2024-06-26

## 2024-06-27 ENCOUNTER — TELEPHONE (OUTPATIENT)
Dept: FAMILY MEDICINE CLINIC | Age: 74
End: 2024-06-27

## 2024-06-27 NOTE — TELEPHONE ENCOUNTER
Attempted to reach out to pt's spouse as requested to go over results. Unable to LMOM due to call not being able to be completed at this time. Mailbox advised to try call again later.

## 2024-06-27 NOTE — TELEPHONE ENCOUNTER
----- Message from Kemal Tran sent at 6/26/2024  2:46 PM EDT -----  Regarding: ECC Results Request  ECC Results Request    Which lab or imaging result is the patient calling about: Blood Work    Which provider ordered the test? Gurvinder Pedrozaadeline    Was this a Non-[facility] Provider: No    Date the test was preformed 06/25/24    Additional Information: The patient received a message from their provider and they missed the call. However, the practice leave a message in their voice mail in regards with the result of the blood work of the patient that was done yesterday. And the patient's wife would like to request for the result of it.  --------------------------------------------------------------------------------------------------------------------------    Relationship to Patient: Spouse/Partner wife     Call Back Info: OK to leave message on voicemail  Preferred Call Back Number: Phone [phone]

## 2024-07-15 RX ORDER — BUDESONIDE 0.5 MG/2ML
1 INHALANT ORAL 2 TIMES DAILY
Qty: 120 ML | Refills: 5 | Status: SHIPPED | OUTPATIENT
Start: 2024-07-15

## 2024-07-16 DIAGNOSIS — R73.03 PRE-DIABETES: ICD-10-CM

## 2024-07-16 RX ORDER — METFORMIN HYDROCHLORIDE 500 MG/1
500 TABLET, EXTENDED RELEASE ORAL 2 TIMES DAILY
Qty: 180 TABLET | Refills: 0 | Status: SHIPPED | OUTPATIENT
Start: 2024-07-16 | End: 2024-07-16 | Stop reason: SDUPTHER

## 2024-07-16 RX ORDER — METFORMIN HYDROCHLORIDE 500 MG/1
500 TABLET, EXTENDED RELEASE ORAL 2 TIMES DAILY
Qty: 180 TABLET | Refills: 0 | Status: SHIPPED | OUTPATIENT
Start: 2024-07-16 | End: 2024-10-14

## 2024-07-16 NOTE — TELEPHONE ENCOUNTER
[facility] and he is asking [facility]

## 2024-07-17 ENCOUNTER — HOSPITAL ENCOUNTER (OUTPATIENT)
Dept: NUCLEAR MEDICINE | Age: 74
Discharge: HOME | End: 2024-07-17
Attending: NEUROLOGICAL SURGERY
Payer: MEDICARE

## 2024-07-17 ENCOUNTER — HOSPITAL ENCOUNTER (OUTPATIENT)
Dept: CT IMAGING | Age: 74
Discharge: HOME | End: 2024-07-17
Attending: NEUROLOGICAL SURGERY
Payer: MEDICARE

## 2024-07-17 DIAGNOSIS — G91.2 NORMAL PRESSURE HYDROCEPHALUS (HCC): ICD-10-CM

## 2024-07-17 DIAGNOSIS — Z48.811 ENCOUNTER FOR SURGICAL AFTERCARE FOLLOWING SURGERY OF NERVOUS SYSTEM: ICD-10-CM

## 2024-07-17 PROCEDURE — 70450 CT HEAD/BRAIN W/O DYE: CPT

## 2024-07-17 PROCEDURE — A9548 IN111 PENTETATE: HCPCS | Performed by: NEUROLOGICAL SURGERY

## 2024-07-17 PROCEDURE — 78645 CSF SHUNT EVALUATION: CPT

## 2024-07-17 PROCEDURE — 3430000000 HC RX DIAGNOSTIC RADIOPHARMACEUTICAL: Performed by: NEUROLOGICAL SURGERY

## 2024-07-17 RX ORDER — INDIUM IN-111 PENTETATE DISODIUM 1 MCI/ML
0.56 SOLUTION INTRATHECAL
Status: COMPLETED | OUTPATIENT
Start: 2024-07-17 | End: 2024-07-17

## 2024-07-17 RX ADMIN — INDIUM IN-111 PENTETATE DISODIUM 0.6 MICRO CURIE: 1 SOLUTION INTRATHECAL at 11:46

## 2024-07-17 NOTE — PROCEDURES
Neurosurgery Procedure Note     Name of Procedure: Shuntogram     Date Performed: 7/17/24  Procedure performed by: GISEL Shaw     Indications: Patient with known 3 months out from a  shunt revision  and concerning for shunt malfunction.      Consent: Consent obtained verbally.      Procedure Technique: Patient and procedure verified. With the patient supine, the head was turned to the left for optimal display of the shunt. The shunt valve was located and scalp hair was clipped from the valve region. The skin overlying the valve was thoroughly cleansed with chlorhexidine and a sterile drape was placed over the field. By palpation the valve was located, reservoir was punctured by using a 25-gauge butterfly needle, which was connected to extension tubing. Free backflow of CSF confirmed proper placement within the valve. A 5-mL syringe was connected to the butterfly system and  (2 mL) of CSF was gently withdrawn. (.3 ml) of nonionic contrast material was injected into the valve while pressure was held at distal end of the valve. The reservoir was flushed with patient's own CSF and sterile saline. Needle removed without complication. Pressure held x 1 min. Pt tolerated well.         Estimated Procedure Blood Loss: None     Specimen(s) Removed: none     Medications used during procedure: None     Post-Operative Diagnosis: NPH

## 2024-07-18 ENCOUNTER — HOSPITAL ENCOUNTER (OUTPATIENT)
Dept: NUCLEAR MEDICINE | Age: 74
Discharge: HOME | End: 2024-07-18
Attending: NEUROLOGICAL SURGERY
Payer: MEDICARE

## 2024-07-31 ENCOUNTER — OFFICE VISIT (OUTPATIENT)
Dept: FAMILY MEDICINE CLINIC | Age: 74
End: 2024-07-31

## 2024-07-31 VITALS
WEIGHT: 263 LBS | DIASTOLIC BLOOD PRESSURE: 78 MMHG | HEART RATE: 86 BPM | OXYGEN SATURATION: 94 % | SYSTOLIC BLOOD PRESSURE: 124 MMHG | BODY MASS INDEX: 33.77 KG/M2

## 2024-07-31 DIAGNOSIS — E78.5 HYPERLIPIDEMIA, UNSPECIFIED HYPERLIPIDEMIA TYPE: ICD-10-CM

## 2024-07-31 DIAGNOSIS — F33.1 MODERATE EPISODE OF RECURRENT MAJOR DEPRESSIVE DISORDER (HCC): Primary | ICD-10-CM

## 2024-07-31 DIAGNOSIS — E11.69 TYPE 2 DIABETES MELLITUS WITH OTHER SPECIFIED COMPLICATION, UNSPECIFIED WHETHER LONG TERM INSULIN USE (HCC): ICD-10-CM

## 2024-07-31 DIAGNOSIS — E03.8 SUBCLINICAL HYPOTHYROIDISM: ICD-10-CM

## 2024-07-31 NOTE — PROGRESS NOTES
Take 2 mLs by nebulization 2 times daily 120 mL 5    Lift Chair MISC by Does not apply route 1 each 0    Misc. Devices (ROLLER WALKER) MISC 1 each by Does not apply route daily 1 each 0    arformoterol tartrate (BROVANA) 15 MCG/2ML NEBU Take 1 ampule by nebulization 2 times daily 360 mL 5    sennosides-docusate sodium (SENOKOT-S) 8.6-50 MG tablet Take 1 tablet by mouth 2 times daily as needed for Constipation      albuterol sulfate HFA (PROVENTIL HFA) 108 (90 Base) MCG/ACT inhaler Inhale 2 puffs into the lungs every 6 hours as needed for Wheezing      gabapentin (NEURONTIN) 100 MG capsule TAKE 4 CAPSULES EVERY MORNING 360 capsule 2    finasteride (PROSCAR) 5 MG tablet Take 1 tablet by mouth daily 90 tablet 1    pantoprazole (PROTONIX) 40 MG tablet Take 1 tablet by mouth every morning 60 tablet 0    atorvastatin (LIPITOR) 40 MG tablet Take 1 tablet by mouth daily 90 tablet 3    furosemide (LASIX) 20 MG tablet Take 1 tablet by mouth daily 90 tablet 3    aspirin 81 MG EC tablet Take 1 tablet by mouth daily 90 tablet 1    albuterol (PROVENTIL) (2.5 MG/3ML) 0.083% nebulizer solution Take 3 mLs by nebulization every 6 hours as needed for Wheezing 120 each 3    tamsulosin (FLOMAX) 0.4 MG capsule Take 1 capsule by mouth daily      cetirizine (ZYRTEC) 10 MG tablet TAKE 1 TABLET DAILY 90 tablet 3    Cholecalciferol (VITAMIN D) 2000 UNITS CAPS capsule Take 1 capsule by mouth every morning      formoterol (PERFOROMIST) 20 MCG/2ML nebulizer solution Take 2 mLs by nebulization in the morning and 2 mLs in the evening. 120 mL 0     Current Facility-Administered Medications   Medication Dose Route Frequency Provider Last Rate Last Admin    Dupilumab (DUPIXENT) injection 200 mg  200 mg SubCUTAneous Q14 Days Christiano Oliveira MD          Family History   Problem Relation Age of Onset    Cancer Father         prostate    Hypertension Father     Coronary Art Dis Father     Early Death Sister         unexpected 2009      Allergies

## 2024-08-13 ENCOUNTER — OFFICE VISIT (OUTPATIENT)
Dept: ENT CLINIC | Age: 74
End: 2024-08-13
Payer: MEDICARE

## 2024-08-13 VITALS
DIASTOLIC BLOOD PRESSURE: 78 MMHG | TEMPERATURE: 97.7 F | HEIGHT: 74 IN | SYSTOLIC BLOOD PRESSURE: 123 MMHG | BODY MASS INDEX: 33.37 KG/M2 | WEIGHT: 260 LBS | HEART RATE: 92 BPM

## 2024-08-13 DIAGNOSIS — J32.4 CHRONIC PANSINUSITIS: Primary | ICD-10-CM

## 2024-08-13 PROCEDURE — 31231 NASAL ENDOSCOPY DX: CPT | Performed by: OTOLARYNGOLOGY

## 2024-08-13 NOTE — PROGRESS NOTES
Patient here for 6 month follow up sinuses. History of CRS. Surgery 9-2022. On Dupixent. Doing great.     PE: Nasal cavity clear.         Due to the patients chronic sinus disease and/or history of sinonasal neoplasm for surveillance a nasal endoscopy with or without debridement will be performed to complete a significant physical examination of the patient which cannot be performed by anterior rhinoscopy alone (failure of complete examination of the paranasal sinuses). Failure to provide this procedure may lead to late detection of significant chronic benign disease, acute exacerbation, resolution or failure of early diagnosis of recurrent cancer. The procedure report is present in the body of the chart.       Nasal Endoscopy    Pre OP: CRS  Post OP: STable  Reason: SUrveillance  Procedure: Nasal endoscopy  Surgeon: Jese Packer  Anesthesia: Afrin with 2% lidocaine  Estimated Blood Loss: None      After obtaining verbal consent from the patient 1% lidocaine with afrin was sprayed into the nasal cavities.  After allowing a time for anesthesia, a nasal endoscope was placed into the nostril.  The septum, inferior, and middle turbinates were examined.  The middle meatus, and sphenoethmoid recess was examined bilaterally.  Cultures were not obtained from the sinuses. There were no complications.     Pertinent positives included: There was not edema and purulence in the left middle meatus. There was not edema and purulence at the right middle meatus. Polyps were not identified in the sinuses. Masses were not identified.     Tolerated well without complication. I attest that I was present for and did the entire procedure myself.    A/P: Follow up in one uyear,

## 2024-08-23 RX ORDER — GABAPENTIN 100 MG/1
CAPSULE ORAL
Qty: 360 CAPSULE | Refills: 2 | Status: SHIPPED | OUTPATIENT
Start: 2024-08-23 | End: 2025-08-24

## 2024-09-11 ENCOUNTER — OFFICE VISIT (OUTPATIENT)
Dept: FAMILY MEDICINE CLINIC | Age: 74
End: 2024-09-11

## 2024-09-11 VITALS
DIASTOLIC BLOOD PRESSURE: 68 MMHG | HEART RATE: 88 BPM | OXYGEN SATURATION: 95 % | BODY MASS INDEX: 33.62 KG/M2 | HEIGHT: 74 IN | SYSTOLIC BLOOD PRESSURE: 116 MMHG | WEIGHT: 262 LBS

## 2024-09-11 DIAGNOSIS — F33.1 MODERATE EPISODE OF RECURRENT MAJOR DEPRESSIVE DISORDER (HCC): ICD-10-CM

## 2024-09-11 DIAGNOSIS — E11.69 TYPE 2 DIABETES MELLITUS WITH OTHER SPECIFIED COMPLICATION, UNSPECIFIED WHETHER LONG TERM INSULIN USE (HCC): Primary | ICD-10-CM

## 2024-09-19 DIAGNOSIS — E11.69 TYPE 2 DIABETES MELLITUS WITH OTHER SPECIFIED COMPLICATION, UNSPECIFIED WHETHER LONG TERM INSULIN USE (HCC): ICD-10-CM

## 2024-09-20 LAB
CREAT UR-MCNC: 113 MG/DL (ref 39–259)
MICROALBUMIN UR DL<=1MG/L-MCNC: <1.2 MG/DL
MICROALBUMIN/CREAT UR: NORMAL MG/G (ref 0–30)

## 2024-09-25 RX ORDER — FUROSEMIDE 20 MG
20 TABLET ORAL DAILY
Qty: 90 TABLET | Refills: 1 | Status: SHIPPED | OUTPATIENT
Start: 2024-09-25

## 2024-10-01 ENCOUNTER — OFFICE VISIT (OUTPATIENT)
Dept: PULMONOLOGY | Age: 74
End: 2024-10-01
Payer: MEDICARE

## 2024-10-01 VITALS
HEART RATE: 84 BPM | RESPIRATION RATE: 16 BRPM | DIASTOLIC BLOOD PRESSURE: 62 MMHG | BODY MASS INDEX: 33.62 KG/M2 | OXYGEN SATURATION: 94 % | SYSTOLIC BLOOD PRESSURE: 114 MMHG | HEIGHT: 74 IN | WEIGHT: 262 LBS

## 2024-10-01 DIAGNOSIS — J45.50 SEVERE PERSISTENT ASTHMA WITHOUT COMPLICATION: Primary | ICD-10-CM

## 2024-10-01 PROCEDURE — 1124F ACP DISCUSS-NO DSCNMKR DOCD: CPT | Performed by: INTERNAL MEDICINE

## 2024-10-01 PROCEDURE — 99214 OFFICE O/P EST MOD 30 MIN: CPT | Performed by: INTERNAL MEDICINE

## 2024-10-01 PROCEDURE — G8484 FLU IMMUNIZE NO ADMIN: HCPCS | Performed by: INTERNAL MEDICINE

## 2024-10-01 PROCEDURE — 3017F COLORECTAL CA SCREEN DOC REV: CPT | Performed by: INTERNAL MEDICINE

## 2024-10-01 PROCEDURE — G8427 DOCREV CUR MEDS BY ELIG CLIN: HCPCS | Performed by: INTERNAL MEDICINE

## 2024-10-01 PROCEDURE — G8417 CALC BMI ABV UP PARAM F/U: HCPCS | Performed by: INTERNAL MEDICINE

## 2024-10-01 PROCEDURE — 1036F TOBACCO NON-USER: CPT | Performed by: INTERNAL MEDICINE

## 2024-10-01 ASSESSMENT — ENCOUNTER SYMPTOMS
SHORTNESS OF BREATH: 0
CHEST TIGHTNESS: 0
WHEEZING: 1
COUGH: 1

## 2024-10-01 NOTE — PROGRESS NOTES
[facility] Pulmonary and Critical Care    Outpatient Note    Subjective:   CHIEF COMPLAINT:   Chief Complaint   Patient presents with    Asthma    Cough     Interval history on 10/1/24  Overall he is doing fine.  No issues related to breathing.  He takes brovana and pulmicort regularly.  He is taking Dupixent regularly it is also helping the sinuses.  He is rarely suing albuterol.  No skin rash.  No visual issues.  He has chronic knees and shoulder pain even before dupixent.  No muscles aches.      Interval history on 3/4/24  Breathing is good with Dupixent  which he is on for the past two months.    He is not requiring albuterol much.    Sinuses are also better.      Interval history on 12/4/23  Sinuses and breathing is good over the past two weeks.    He is taking protonix twice a day.    Continue to use brovana and pulmicort.  Has not had dupixent yet, pending approval.      Interval history on 10/18/23  He is having coughing spells.  Last Thursday it was severe.  It lasted for a minute or so.    Last time he was here he had increased coughing and nasal discharge.  He was treated with prednisone and azelastin/flonase.  It didn't help much.  He is still on pulmicort and brovana.    He is also on Nucala.    No GERD.  He saw Dr. Blackburn yesterday.   He had upper and lower endoscopies in Feb.    He has hx of choking when drinking     Interval history on 8/14/23  He has cough and sneezing over the past 2 weeks.  No fever.    He is using albuterol neb or inhaler.    He is still on brovana, pulmicort and nucala.  He has some post nasal.    He is on CPAP.        Interval history on 6/26/23  Overall he is doing good.  He ois using nebs twice a day with brovana and pulmicort.    He use albuterol once a week or less.    He is on Nucala over the past 3 months since I saw him last.    He is also on singulair.      Interval history on 4/5/23  Continue to have nasal congestion, wheezing and cough.  He had prednisone and

## 2024-10-07 DIAGNOSIS — J45.50 SEVERE PERSISTENT ASTHMA WITHOUT COMPLICATION: Primary | ICD-10-CM

## 2024-10-07 RX ORDER — DUPILUMAB 200 MG/1.14ML
INJECTION, SOLUTION SUBCUTANEOUS
Qty: 4.56 ML | Refills: 5 | Status: SHIPPED | OUTPATIENT
Start: 2024-10-07

## 2024-10-16 DIAGNOSIS — R73.03 PRE-DIABETES: ICD-10-CM

## 2024-10-16 RX ORDER — METFORMIN HCL 500 MG
500 TABLET, EXTENDED RELEASE 24 HR ORAL 2 TIMES DAILY
Qty: 180 TABLET | Refills: 0 | Status: SHIPPED | OUTPATIENT
Start: 2024-10-16 | End: 2025-01-14

## 2024-10-21 ENCOUNTER — OFFICE VISIT (OUTPATIENT)
Dept: FAMILY MEDICINE CLINIC | Age: 74
End: 2024-10-21

## 2024-10-21 VITALS
BODY MASS INDEX: 33.62 KG/M2 | HEART RATE: 78 BPM | HEIGHT: 74 IN | DIASTOLIC BLOOD PRESSURE: 70 MMHG | TEMPERATURE: 97.6 F | OXYGEN SATURATION: 96 % | SYSTOLIC BLOOD PRESSURE: 118 MMHG | WEIGHT: 262 LBS

## 2024-10-21 DIAGNOSIS — E03.8 SUBCLINICAL HYPOTHYROIDISM: Primary | ICD-10-CM

## 2024-10-21 DIAGNOSIS — E03.9 HYPOTHYROIDISM, UNSPECIFIED TYPE: Primary | ICD-10-CM

## 2024-10-21 DIAGNOSIS — R35.0 INCREASED URINARY FREQUENCY: ICD-10-CM

## 2024-10-21 DIAGNOSIS — E03.8 SUBCLINICAL HYPOTHYROIDISM: ICD-10-CM

## 2024-10-21 LAB
ALBUMIN SERPL-MCNC: 4.5 G/DL (ref 3.4–5)
ALBUMIN/GLOB SERPL: 2 {RATIO} (ref 1.1–2.2)
ALP SERPL-CCNC: 93 U/L (ref 40–129)
ALT SERPL-CCNC: 29 U/L (ref 10–40)
ANION GAP SERPL CALCULATED.3IONS-SCNC: 10 MMOL/L (ref 3–16)
AST SERPL-CCNC: 27 U/L (ref 15–37)
BILIRUB SERPL-MCNC: 0.4 MG/DL (ref 0–1)
BILIRUBIN, POC: NORMAL
BLOOD URINE, POC: NORMAL
BUN SERPL-MCNC: 18 MG/DL (ref 7–20)
CALCIUM SERPL-MCNC: 9.8 MG/DL (ref 8.3–10.6)
CHLORIDE SERPL-SCNC: 103 MMOL/L (ref 99–110)
CLARITY, POC: CLEAR
CO2 SERPL-SCNC: 25 MMOL/L (ref 21–32)
COLOR, POC: YELLOW
CREAT SERPL-MCNC: 1.1 MG/DL (ref 0.8–1.3)
GFR SERPLBLD CREATININE-BSD FMLA CKD-EPI: 71 ML/MIN/{1.73_M2}
GLUCOSE SERPL-MCNC: 104 MG/DL (ref 70–99)
GLUCOSE URINE, POC: NORMAL MG/DL
KETONES, POC: NORMAL MG/DL
LEUKOCYTE EST, POC: NORMAL
NITRITE, POC: NORMAL
PH, POC: 6
POTASSIUM SERPL-SCNC: 4.3 MMOL/L (ref 3.5–5.1)
PROT SERPL-MCNC: 6.7 G/DL (ref 6.4–8.2)
PROTEIN, POC: NORMAL MG/DL
SODIUM SERPL-SCNC: 138 MMOL/L (ref 136–145)
SPECIFIC GRAVITY, POC: 1.01
T4 FREE SERPL-MCNC: 0.8 NG/DL (ref 0.9–1.8)
TSH SERPL DL<=0.005 MIU/L-ACNC: 4.69 UIU/ML (ref 0.27–4.2)
UROBILINOGEN, POC: 0.2 MG/DL

## 2024-10-21 RX ORDER — LEVOTHYROXINE SODIUM 25 UG/1
25 TABLET ORAL DAILY
Qty: 90 TABLET | Refills: 0 | Status: SHIPPED | OUTPATIENT
Start: 2024-10-21

## 2024-10-21 NOTE — PROGRESS NOTES
by Francine Balckburn MD at [facility] ENDOSCOPY    EYE SURGERY Left     lipoma    EYE SURGERY      as a child, weak eye muscles    HERNIA REPAIR      umbilical    JOINT REPLACEMENT Left 2015    PARTIAL KNEE REPLACMENT    LAPAROSCOPY N/A 07/18/2018    OPENING AND CLOSING FOR VENTRICULAR PERITONEAL SHUNT performed by Otis Simental MD at [facility] OR    OTHER SURGICAL HISTORY N/A 07/18/2018     VENTRICULAR PERITONEAL SHUNT INSERTION RIGHT SIDE; (N/A )    SD CRTJ SHUNT [id number] TERMINUS N/A 07/18/2018    VENTRICULAR PERITONEAL SHUNT INSERTION RIGHT SIDE; performed by Luis Miguel Garcia MD at [facility] OR    SINUS ENDOSCOPY Bilateral 05/04/2022    BILATERAL INFERIOR TURBINATE REDUCTION, BILATERAL MAXILLARY ANTROSTOMY, BILATERAL TOTAL ETHMOIDECTOMY WITH SPEHNOIDECTOMY AND SEPTOPLASTY, BILATERAL FRONTAL SINUS EXPLORATION performed by Jese Packer MD at [facility] OR    SINUS ENDOSCOPY Bilateral 9/30/2022    BILATERAL FRONTAL SINUS EXPLORATION, BILATERAL INFERIOR TURBINATE REDUCTION, BILATERAL MAXILLARY ANTROSTOMY, BILATERAL TOTAL ETHMOIDECTOMY WITH SPHENOIDECTOMY AND IMAGE NAVIGATION, REVISION SEPTOPLASTY performed by Jese Packer MD at [facility] OR    UPPER GASTROINTESTINAL ENDOSCOPY N/A 12/14/2018    EGD BIOPSY performed by Francine Blackburn MD at [facility] ENDOSCOPY    UPPER GASTROINTESTINAL ENDOSCOPY N/A 03/01/2019    ESOPHAGOGASTRODUODENOSCOPY WITH RADIOFREQUENCY  ABLATION/ ABLATIONS X19 performed by Francine Blackburn MD at [facility] ENDOSCOPY    UPPER GASTROINTESTINAL ENDOSCOPY N/A 04/24/2019    ESOPHAGOGASTRODUODENOSCOPY WITH RADIOFREQUENCY ABLATION performed by Francine Blackburn MD at [facility] ENDOSCOPY    UPPER GASTROINTESTINAL ENDOSCOPY N/A 04/24/2019    EGD BIOPSY performed by Francine Blackburn MD at [facility] ENDOSCOPY    UPPER GASTROINTESTINAL ENDOSCOPY N/A 08/02/2019    ESOPHAGOGASTRODUODENOSCOPY RADIOFREQUENCY ABLATION performed by Francine Blackburn MD at [facility] ENDOSCOPY    UPPER GASTROINTESTINAL ENDOSCOPY N/A 08/02/2019    EGD GASTRIC BIOPSY

## 2024-10-22 LAB — BACTERIA UR CULT: NORMAL

## 2024-10-22 RX ORDER — ATORVASTATIN CALCIUM 40 MG/1
40 TABLET, FILM COATED ORAL DAILY
Qty: 90 TABLET | Refills: 3 | Status: SHIPPED | OUTPATIENT
Start: 2024-10-22

## 2024-11-11 ENCOUNTER — OFFICE VISIT (OUTPATIENT)
Dept: FAMILY MEDICINE CLINIC | Age: 74
End: 2024-11-11

## 2024-11-11 VITALS
HEIGHT: 74 IN | WEIGHT: 262.2 LBS | SYSTOLIC BLOOD PRESSURE: 130 MMHG | HEART RATE: 94 BPM | DIASTOLIC BLOOD PRESSURE: 78 MMHG | OXYGEN SATURATION: 95 % | BODY MASS INDEX: 33.65 KG/M2

## 2024-11-11 DIAGNOSIS — E03.9 HYPOTHYROIDISM, UNSPECIFIED TYPE: Primary | ICD-10-CM

## 2024-11-11 DIAGNOSIS — E11.69 TYPE 2 DIABETES MELLITUS WITH OTHER SPECIFIED COMPLICATION, UNSPECIFIED WHETHER LONG TERM INSULIN USE (HCC): ICD-10-CM

## 2024-11-11 NOTE — PROGRESS NOTES
Chief Complaint   Patient presents with    Follow-up     Follow up           HPI: Jared Palafox Jr. is a 74 y.o. male who presents for Follow up    #Hypothyroidism  -See prior notes for details, had decreased thyroid hormone at last visit, started on Synthroid 25 mcg daily, denies palpitations, heat intolerance or other side effects, taking med without issue, not sweating as much as night  - BP today 130/78 , also due for A1c repeat, is on metformin 500 mg twice daily, last A1c below    Hemoglobin A1C   Date Value Ref Range Status   06/25/2024 6.6 See comment % Final     Comment:     Comment:  Diagnosis of Diabetes: > or = 6.5%  Increased risk of diabetes (Prediabetes): 5.7-6.4%  Glycemic Control: Nonpregnant Adults: <7.0%                    Pregnant: <6.0%            Past Medical History:   Diagnosis Date    Allergic rhinitis     Arthritis     R thumb, low back    Asthma     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     Chronic pansinusitis     COPD (chronic obstructive pulmonary disease) (HCC) 04/26/2022    Depression     Early onset Alzheimer's dementia ([facility])     Erectile dysfunction     Hearing loss     Hyperlipidemia     Hypertension     Kidney failure 07/2018    Normal pressure hydrocephalus (HCC) 07/2018    shunt placement    Screening for abdominal aortic aneurysm (AAA) performed 03/02/2018    [facility]    Tinnitus     Type 2 diabetes mellitus with other specified complication ([facility]) 7/31/2024    Unspecified sleep apnea     wears CPAP    Wears glasses     Wears hearing aid in both ears        Past Surgical History:   Procedure Laterality Date    COLONOSCOPY  12/14/2018    COLONOSCOPY WITH BIOPSY performed by Francine Blackburn MD at [facility] ENDOSCOPY    COLONOSCOPY N/A 2/15/2023    COLONOSCOPY POLYPECTOMY SNARE/COLD BIOPSY performed by Francine Blackburn MD at [facility] ENDOSCOPY    ENTEROSCOPY N/A 01/04/2019    ENTEROSCOPY PUSH BIOPSY performed by Francine Blackburn MD at [facility] ENDOSCOPY    EYE SURGERY Left     lipoma    EYE SURGERY      as a

## 2024-11-25 DIAGNOSIS — E03.9 HYPOTHYROIDISM, UNSPECIFIED TYPE: ICD-10-CM

## 2024-11-25 DIAGNOSIS — E11.69 TYPE 2 DIABETES MELLITUS WITH OTHER SPECIFIED COMPLICATION, UNSPECIFIED WHETHER LONG TERM INSULIN USE (HCC): ICD-10-CM

## 2024-11-26 LAB
EST. AVERAGE GLUCOSE BLD GHB EST-MCNC: 131.2 MG/DL
HBA1C MFR BLD: 6.2 %
T4 FREE SERPL-MCNC: 1 NG/DL (ref 0.9–1.8)
TSH SERPL DL<=0.005 MIU/L-ACNC: 3.94 UIU/ML (ref 0.27–4.2)

## 2024-12-16 ENCOUNTER — TELEPHONE (OUTPATIENT)
Dept: PULMONOLOGY | Age: 74
End: 2024-12-16

## 2024-12-16 NOTE — TELEPHONE ENCOUNTER
Sumaya french Prior Auth Rep from [facility], called to ask a few questions for the prior auth for Dupixent.  Case ID # [id number].  She states we should hear from them in a few days.

## 2024-12-23 DIAGNOSIS — E03.9 HYPOTHYROIDISM, UNSPECIFIED TYPE: ICD-10-CM

## 2024-12-23 RX ORDER — LEVOTHYROXINE SODIUM 25 UG/1
25 TABLET ORAL DAILY
Qty: 90 TABLET | Refills: 0 | Status: SHIPPED | OUTPATIENT
Start: 2024-12-23

## 2025-01-03 DIAGNOSIS — F33.1 MODERATE EPISODE OF RECURRENT MAJOR DEPRESSIVE DISORDER (HCC): ICD-10-CM

## 2025-01-07 DIAGNOSIS — R73.03 PRE-DIABETES: ICD-10-CM

## 2025-01-07 RX ORDER — METFORMIN HYDROCHLORIDE 500 MG/1
500 TABLET, EXTENDED RELEASE ORAL 2 TIMES DAILY
Qty: 180 TABLET | Refills: 0 | Status: SHIPPED | OUTPATIENT
Start: 2025-01-07 | End: 2025-04-07

## 2025-01-15 ENCOUNTER — TELEPHONE (OUTPATIENT)
Dept: PULMONOLOGY | Age: 75
End: 2025-01-15

## 2025-01-15 RX ORDER — ALBUTEROL SULFATE 90 UG/1
2 INHALANT RESPIRATORY (INHALATION) EVERY 6 HOURS PRN
Qty: 18 G | Refills: 3 | Status: SHIPPED | OUTPATIENT
Start: 2025-01-15

## 2025-01-15 NOTE — TELEPHONE ENCOUNTER
Ed's wife called,     Dupixent was denied by insurance, wants to know if he should try and go without it or try a different medication..

## 2025-01-15 NOTE — TELEPHONE ENCOUNTER
Requested Prescriptions     Pending Prescriptions Disp Refills    albuterol sulfate HFA (PROVENTIL;VENTOLIN;PROAIR) 108 (90 Base) MCG/ACT inhaler 18 g 3     Sig: Inhale 2 puffs into the lungs every 6 hours as needed for Wheezing      Last Appt  10/1/2024

## 2025-01-22 RX ORDER — BUDESONIDE 0.5 MG/2ML
INHALANT ORAL
Qty: 120 ML | Refills: 11 | Status: SHIPPED | OUTPATIENT
Start: 2025-01-22

## 2025-01-22 NOTE — TELEPHONE ENCOUNTER
Requested Prescriptions     Pending Prescriptions Disp Refills    budesonide (PULMICORT) 0.5 MG/2ML nebulizer suspension [Pharmacy Med Name: BUDESONIDE INH SUSP 30'S 0.5/2ML] 120 mL 11     Sig: INHALE THE CONTENTS OF 1 VIAL (2 ML) BY NEBULIZATION TWICE A DAY      Last Appt  10/1/2024

## 2025-01-31 RX ORDER — ATORVASTATIN CALCIUM 40 MG/1
40 TABLET, FILM COATED ORAL DAILY
Qty: 90 TABLET | Refills: 0 | Status: SHIPPED | OUTPATIENT
Start: 2025-01-31

## 2025-01-31 NOTE — TELEPHONE ENCOUNTER
[phone] (home)     Patient called for a refill for this medication please.    atorvastatin (LIPITOR) 40 MG tablet  TAKE 1 TABLET DAILY, Disp-90 tablet, R-3  Normal, Last Dose: Not Recorded  Refills: 3 ordered      Pharmacy: [facility] - [address] [phone] -  [phone]

## 2025-02-13 ENCOUNTER — OFFICE VISIT (OUTPATIENT)
Dept: FAMILY MEDICINE CLINIC | Age: 75
End: 2025-02-13
Payer: MEDICARE

## 2025-02-13 VITALS
WEIGHT: 264.4 LBS | BODY MASS INDEX: 33.93 KG/M2 | SYSTOLIC BLOOD PRESSURE: 102 MMHG | HEIGHT: 74 IN | HEART RATE: 98 BPM | OXYGEN SATURATION: 94 % | DIASTOLIC BLOOD PRESSURE: 62 MMHG

## 2025-02-13 DIAGNOSIS — J45.50 SEVERE PERSISTENT ASTHMA WITHOUT COMPLICATION: ICD-10-CM

## 2025-02-13 DIAGNOSIS — F33.1 MODERATE EPISODE OF RECURRENT MAJOR DEPRESSIVE DISORDER (HCC): ICD-10-CM

## 2025-02-13 DIAGNOSIS — R09.81 NASAL CONGESTION: ICD-10-CM

## 2025-02-13 DIAGNOSIS — E03.9 HYPOTHYROIDISM, UNSPECIFIED TYPE: ICD-10-CM

## 2025-02-13 DIAGNOSIS — G91.2 NORMAL PRESSURE HYDROCEPHALUS (HCC): ICD-10-CM

## 2025-02-13 DIAGNOSIS — E11.69 TYPE 2 DIABETES MELLITUS WITH OTHER SPECIFIED COMPLICATION, UNSPECIFIED WHETHER LONG TERM INSULIN USE (HCC): Primary | ICD-10-CM

## 2025-02-13 PROCEDURE — 1124F ACP DISCUSS-NO DSCNMKR DOCD: CPT | Performed by: STUDENT IN AN ORGANIZED HEALTH CARE EDUCATION/TRAINING PROGRAM

## 2025-02-13 PROCEDURE — 3017F COLORECTAL CA SCREEN DOC REV: CPT | Performed by: STUDENT IN AN ORGANIZED HEALTH CARE EDUCATION/TRAINING PROGRAM

## 2025-02-13 PROCEDURE — 1036F TOBACCO NON-USER: CPT | Performed by: STUDENT IN AN ORGANIZED HEALTH CARE EDUCATION/TRAINING PROGRAM

## 2025-02-13 PROCEDURE — 3046F HEMOGLOBIN A1C LEVEL >9.0%: CPT | Performed by: STUDENT IN AN ORGANIZED HEALTH CARE EDUCATION/TRAINING PROGRAM

## 2025-02-13 PROCEDURE — 1159F MED LIST DOCD IN RCRD: CPT | Performed by: STUDENT IN AN ORGANIZED HEALTH CARE EDUCATION/TRAINING PROGRAM

## 2025-02-13 PROCEDURE — G2211 COMPLEX E/M VISIT ADD ON: HCPCS | Performed by: STUDENT IN AN ORGANIZED HEALTH CARE EDUCATION/TRAINING PROGRAM

## 2025-02-13 PROCEDURE — G8427 DOCREV CUR MEDS BY ELIG CLIN: HCPCS | Performed by: STUDENT IN AN ORGANIZED HEALTH CARE EDUCATION/TRAINING PROGRAM

## 2025-02-13 PROCEDURE — 2022F DILAT RTA XM EVC RTNOPTHY: CPT | Performed by: STUDENT IN AN ORGANIZED HEALTH CARE EDUCATION/TRAINING PROGRAM

## 2025-02-13 PROCEDURE — G8417 CALC BMI ABV UP PARAM F/U: HCPCS | Performed by: STUDENT IN AN ORGANIZED HEALTH CARE EDUCATION/TRAINING PROGRAM

## 2025-02-13 PROCEDURE — 99214 OFFICE O/P EST MOD 30 MIN: CPT | Performed by: STUDENT IN AN ORGANIZED HEALTH CARE EDUCATION/TRAINING PROGRAM

## 2025-02-13 RX ORDER — LORATADINE 10 MG/1
10 TABLET ORAL DAILY
Qty: 90 TABLET | Refills: 0 | Status: SHIPPED | OUTPATIENT
Start: 2025-02-13 | End: 2025-05-14

## 2025-02-13 RX ORDER — DULOXETIN HYDROCHLORIDE 20 MG/1
20 CAPSULE, DELAYED RELEASE ORAL DAILY
COMMUNITY

## 2025-02-13 RX ORDER — FLUTICASONE PROPIONATE 50 MCG
2 SPRAY, SUSPENSION (ML) NASAL DAILY
Qty: 48 G | Refills: 1 | Status: SHIPPED | OUTPATIENT
Start: 2025-02-13 | End: 2025-02-13

## 2025-02-13 RX ORDER — FLUTICASONE PROPIONATE 50 MCG
2 SPRAY, SUSPENSION (ML) NASAL DAILY
Qty: 48 G | Refills: 1 | Status: SHIPPED | OUTPATIENT
Start: 2025-02-13

## 2025-02-13 RX ORDER — LORATADINE 10 MG/1
10 TABLET ORAL DAILY
Qty: 90 TABLET | Refills: 0 | Status: SHIPPED | OUTPATIENT
Start: 2025-02-13 | End: 2025-02-13

## 2025-02-13 SDOH — ECONOMIC STABILITY: FOOD INSECURITY: WITHIN THE PAST 12 MONTHS, YOU WORRIED THAT YOUR FOOD WOULD RUN OUT BEFORE YOU GOT MONEY TO BUY MORE.: NEVER TRUE

## 2025-02-13 SDOH — ECONOMIC STABILITY: FOOD INSECURITY: WITHIN THE PAST 12 MONTHS, THE FOOD YOU BOUGHT JUST DIDN'T LAST AND YOU DIDN'T HAVE MONEY TO GET MORE.: NEVER TRUE

## 2025-02-13 ASSESSMENT — PATIENT HEALTH QUESTIONNAIRE - PHQ9
3. TROUBLE FALLING OR STAYING ASLEEP: NOT AT ALL
7. TROUBLE CONCENTRATING ON THINGS, SUCH AS READING THE NEWSPAPER OR WATCHING TELEVISION: NOT AT ALL
9. THOUGHTS THAT YOU WOULD BE BETTER OFF DEAD, OR OF HURTING YOURSELF: NOT AT ALL
1. LITTLE INTEREST OR PLEASURE IN DOING THINGS: NOT AT ALL
SUM OF ALL RESPONSES TO PHQ QUESTIONS 1-9: 0
5. POOR APPETITE OR OVEREATING: NOT AT ALL
8. MOVING OR SPEAKING SO SLOWLY THAT OTHER PEOPLE COULD HAVE NOTICED. OR THE OPPOSITE, BEING SO FIGETY OR RESTLESS THAT YOU HAVE BEEN MOVING AROUND A LOT MORE THAN USUAL: NOT AT ALL
10. IF YOU CHECKED OFF ANY PROBLEMS, HOW DIFFICULT HAVE THESE PROBLEMS MADE IT FOR YOU TO DO YOUR WORK, TAKE CARE OF THINGS AT HOME, OR GET ALONG WITH OTHER PEOPLE: NOT DIFFICULT AT ALL
SUM OF ALL RESPONSES TO PHQ QUESTIONS 1-9: 0
6. FEELING BAD ABOUT YOURSELF - OR THAT YOU ARE A FAILURE OR HAVE LET YOURSELF OR YOUR FAMILY DOWN: NOT AT ALL
SUM OF ALL RESPONSES TO PHQ9 QUESTIONS 1 & 2: 0
4. FEELING TIRED OR HAVING LITTLE ENERGY: NOT AT ALL
2. FEELING DOWN, DEPRESSED OR HOPELESS: NOT AT ALL

## 2025-02-13 NOTE — PROGRESS NOTES
0.5 MG/2ML nebulizer suspension INHALE THE CONTENTS OF 1 VIAL (2 ML) BY NEBULIZATION TWICE A  mL 11    albuterol sulfate HFA (PROVENTIL;VENTOLIN;PROAIR) 108 (90 Base) MCG/ACT inhaler Inhale 2 puffs into the lungs every 6 hours as needed for Wheezing 18 g 3    metFORMIN (GLUCOPHAGE-XR) 500 MG extended release tablet Take 1 tablet by mouth in the morning and at bedtime 180 tablet 0    levothyroxine (SYNTHROID) 25 MCG tablet Take 1 tablet by mouth daily 90 tablet 0    DUPIXENT 200 MG/1.14ML SOSY injection INJECT THE CONTENTS OF 1 SYRINGE (200 MG) UNDER THE SKIN EVERY 14 DAYS 4.56 mL 5    furosemide (LASIX) 20 MG tablet TAKE 1 TABLET BY MOUTH EVERY DAY 90 tablet 1    gabapentin (NEURONTIN) 100 MG capsule TAKE 4 CAPSULES EVERY MORNING 360 capsule 2    Lift Chair MISC by Does not apply route 1 each 0    Misc. Devices (ROLLER WALKER) MISC 1 each by Does not apply route daily 1 each 0    arformoterol tartrate (BROVANA) 15 MCG/2ML NEBU Take 1 ampule by nebulization 2 times daily 360 mL 5    formoterol (PERFOROMIST) 20 MCG/2ML nebulizer solution Take 2 mLs by nebulization in the morning and 2 mLs in the evening. 120 mL 0    sennosides-docusate sodium (SENOKOT-S) 8.6-50 MG tablet Take 1 tablet by mouth 2 times daily as needed for Constipation      finasteride (PROSCAR) 5 MG tablet Take 1 tablet by mouth daily 90 tablet 1    pantoprazole (PROTONIX) 40 MG tablet Take 1 tablet by mouth every morning 60 tablet 0    aspirin 81 MG EC tablet Take 1 tablet by mouth daily 90 tablet 1    albuterol (PROVENTIL) (2.5 MG/3ML) 0.083% nebulizer solution Take 3 mLs by nebulization every 6 hours as needed for Wheezing 120 each 3    tamsulosin (FLOMAX) 0.4 MG capsule Take 1 capsule by mouth daily      Cholecalciferol (VITAMIN D) 2000 UNITS CAPS capsule Take 1 capsule by mouth every morning       Current Facility-Administered Medications   Medication Dose Route Frequency Provider Last Rate Last Admin    Dupilumab (DUPIXENT) injection 200

## 2025-03-20 DIAGNOSIS — E03.9 HYPOTHYROIDISM, UNSPECIFIED TYPE: ICD-10-CM

## 2025-03-20 RX ORDER — LEVOTHYROXINE SODIUM 25 UG/1
25 TABLET ORAL DAILY
Qty: 90 TABLET | Refills: 0 | Status: SHIPPED | OUTPATIENT
Start: 2025-03-20

## 2025-03-21 RX ORDER — ALBUTEROL SULFATE 90 UG/1
2 INHALANT RESPIRATORY (INHALATION) EVERY 6 HOURS PRN
Qty: 18 G | Refills: 5 | Status: SHIPPED | OUTPATIENT
Start: 2025-03-21

## 2025-03-24 RX ORDER — FUROSEMIDE 20 MG/1
20 TABLET ORAL DAILY
Qty: 90 TABLET | Refills: 3 | Status: SHIPPED | OUTPATIENT
Start: 2025-03-24

## 2025-03-24 NOTE — TELEPHONE ENCOUNTER
Requested Prescriptions     Pending Prescriptions Disp Refills    furosemide (LASIX) 20 MG tablet [Pharmacy Med Name: FUROSEMIDE 20 MG TABLET] 90 tablet 3     Sig: TAKE 1 TABLET BY MOUTH EVERY DAY            Checked Correct Pharmacy: Yes    Any changes since last refill? No     Number: 90  Refills: 3    Last OV: 4/2/2024 Provider: DOMINGUEZ    Next OV: 4/8/2025 Provider: DOMINGUEZ    Last Labs:   CMP:   Lab Results   Component Value Date     10/21/2024    K 4.3 10/21/2024     10/21/2024    CO2 25 10/21/2024    BUN 18 10/21/2024    CREATININE 1.1 10/21/2024    GLUCOSE 104 (H) 10/21/2024    CALCIUM 9.8 10/21/2024    BILITOT 0.4 10/21/2024    ALKPHOS 93 10/21/2024    AST 27 10/21/2024    ALT 29 10/21/2024    LABGLOM 71 10/21/2024    GFRAA >60 08/08/2022    AGRATIO 2.0 10/21/2024    GLOB 2.7 08/25/2021

## 2025-03-25 DIAGNOSIS — E11.69 TYPE 2 DIABETES MELLITUS WITH OTHER SPECIFIED COMPLICATION, UNSPECIFIED WHETHER LONG TERM INSULIN USE (HCC): ICD-10-CM

## 2025-03-25 DIAGNOSIS — E03.9 HYPOTHYROIDISM, UNSPECIFIED TYPE: ICD-10-CM

## 2025-03-25 LAB
EST. AVERAGE GLUCOSE BLD GHB EST-MCNC: 137 MG/DL
HBA1C MFR BLD: 6.4 %
T4 FREE SERPL-MCNC: 1 NG/DL (ref 0.9–1.8)
TSH SERPL DL<=0.005 MIU/L-ACNC: 3.54 UIU/ML (ref 0.27–4.2)

## 2025-03-26 ENCOUNTER — RESULTS FOLLOW-UP (OUTPATIENT)
Dept: FAMILY MEDICINE CLINIC | Age: 75
End: 2025-03-26

## 2025-04-02 DIAGNOSIS — R73.03 PRE-DIABETES: ICD-10-CM

## 2025-04-02 RX ORDER — METFORMIN HYDROCHLORIDE 500 MG/1
TABLET, EXTENDED RELEASE ORAL
Qty: 180 TABLET | Refills: 0 | Status: SHIPPED | OUTPATIENT
Start: 2025-04-02

## 2025-04-07 ENCOUNTER — RESULTS FOLLOW-UP (OUTPATIENT)
Dept: FAMILY MEDICINE CLINIC | Age: 75
End: 2025-04-07

## 2025-04-07 ENCOUNTER — OFFICE VISIT (OUTPATIENT)
Dept: FAMILY MEDICINE CLINIC | Age: 75
End: 2025-04-07
Payer: MEDICARE

## 2025-04-07 VITALS
BODY MASS INDEX: 34.55 KG/M2 | HEIGHT: 74 IN | SYSTOLIC BLOOD PRESSURE: 108 MMHG | DIASTOLIC BLOOD PRESSURE: 68 MMHG | WEIGHT: 269.2 LBS | HEART RATE: 96 BPM | OXYGEN SATURATION: 95 % | TEMPERATURE: 98.6 F

## 2025-04-07 DIAGNOSIS — R35.89 POLYURIA: Primary | ICD-10-CM

## 2025-04-07 LAB
BILIRUBIN, POC: NORMAL
BLOOD URINE, POC: NORMAL
CLARITY, POC: CLEAR
COLOR, POC: NORMAL
GLUCOSE URINE, POC: NORMAL MG/DL
KETONES, POC: NORMAL MG/DL
LEUKOCYTE EST, POC: NORMAL
NITRITE, POC: NORMAL
PH, POC: 5.5
PROTEIN, POC: NORMAL MG/DL
SPECIFIC GRAVITY, POC: 1.01
UROBILINOGEN, POC: 0.2 MG/DL

## 2025-04-07 PROCEDURE — 81002 URINALYSIS NONAUTO W/O SCOPE: CPT | Performed by: STUDENT IN AN ORGANIZED HEALTH CARE EDUCATION/TRAINING PROGRAM

## 2025-04-07 PROCEDURE — 99213 OFFICE O/P EST LOW 20 MIN: CPT | Performed by: STUDENT IN AN ORGANIZED HEALTH CARE EDUCATION/TRAINING PROGRAM

## 2025-04-07 PROCEDURE — G8417 CALC BMI ABV UP PARAM F/U: HCPCS | Performed by: STUDENT IN AN ORGANIZED HEALTH CARE EDUCATION/TRAINING PROGRAM

## 2025-04-07 PROCEDURE — G2211 COMPLEX E/M VISIT ADD ON: HCPCS | Performed by: STUDENT IN AN ORGANIZED HEALTH CARE EDUCATION/TRAINING PROGRAM

## 2025-04-07 PROCEDURE — 1036F TOBACCO NON-USER: CPT | Performed by: STUDENT IN AN ORGANIZED HEALTH CARE EDUCATION/TRAINING PROGRAM

## 2025-04-07 PROCEDURE — 1124F ACP DISCUSS-NO DSCNMKR DOCD: CPT | Performed by: STUDENT IN AN ORGANIZED HEALTH CARE EDUCATION/TRAINING PROGRAM

## 2025-04-07 PROCEDURE — 3017F COLORECTAL CA SCREEN DOC REV: CPT | Performed by: STUDENT IN AN ORGANIZED HEALTH CARE EDUCATION/TRAINING PROGRAM

## 2025-04-07 PROCEDURE — G8427 DOCREV CUR MEDS BY ELIG CLIN: HCPCS | Performed by: STUDENT IN AN ORGANIZED HEALTH CARE EDUCATION/TRAINING PROGRAM

## 2025-04-07 PROCEDURE — 1159F MED LIST DOCD IN RCRD: CPT | Performed by: STUDENT IN AN ORGANIZED HEALTH CARE EDUCATION/TRAINING PROGRAM

## 2025-04-07 NOTE — PROGRESS NOTES
Chief Complaint   Patient presents with    Urinary Tract Infection     Patient c/o frequent urination, lower back pain, abdominal pain for 1 week.           HPI: Jared Palafox Jr. is a 74 y.o. 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     Chronic pansinusitis     COPD (chronic obstructive pulmonary disease) ([facility]) 04/26/2022    Depression     Early onset Alzheimer's dementia ([facility])     Erectile dysfunction     Hearing loss     Hyperlipidemia     Hypertension     Kidney failure 07/2018    Normal pressure hydrocephalus ([facility]) 07/2018    shunt placement    Screening for abdominal aortic aneurysm (AAA) performed 03/02/2018    [facility]    Tinnitus     Type 2 diabetes mellitus with other specified complication 7/31/2024    Unspecified sleep apnea     wears CPAP    Wears glasses     Wears hearing aid in both ears        Past Surgical History:   Procedure Laterality Date    COLONOSCOPY  12/14/2018    COLONOSCOPY WITH BIOPSY performed by Francine Blackburn MD at [facility] ENDOSCOPY    COLONOSCOPY N/A 2/15/2023    COLONOSCOPY POLYPECTOMY SNARE/COLD BIOPSY performed by Francine Blackburn MD at [facility] ENDOSCOPY    ENTEROSCOPY N/A 01/04/2019    ENTEROSCOPY PUSH BIOPSY performed by Francine Blackburn MD at [facility] ENDOSCOPY    EYE SURGERY Left     lipoma    EYE SURGERY      as a child, weak eye muscles    HERNIA REPAIR      umbilical    JOINT REPLACEMENT Left 2015    PARTIAL KNEE REPLACMENT    LAPAROSCOPY N/A 07/18/2018    OPENING AND CLOSING FOR VENTRICULAR PERITONEAL SHUNT performed by Otis Simental MD at [facility] OR    OTHER SURGICAL HISTORY N/A 07/18/2018

## 2025-04-08 ENCOUNTER — OFFICE VISIT (OUTPATIENT)
Dept: CARDIOLOGY CLINIC | Age: 75
End: 2025-04-08
Payer: MEDICARE

## 2025-04-08 VITALS
DIASTOLIC BLOOD PRESSURE: 78 MMHG | HEART RATE: 85 BPM | BODY MASS INDEX: 34.41 KG/M2 | SYSTOLIC BLOOD PRESSURE: 118 MMHG | WEIGHT: 268 LBS

## 2025-04-08 DIAGNOSIS — I25.10 CORONARY ARTERY DISEASE INVOLVING NATIVE CORONARY ARTERY OF NATIVE HEART WITHOUT ANGINA PECTORIS: Primary | ICD-10-CM

## 2025-04-08 DIAGNOSIS — I49.9 CARDIAC ARRHYTHMIA, UNSPECIFIED CARDIAC ARRHYTHMIA TYPE: Primary | ICD-10-CM

## 2025-04-08 PROCEDURE — G2211 COMPLEX E/M VISIT ADD ON: HCPCS | Performed by: INTERNAL MEDICINE

## 2025-04-08 PROCEDURE — 1124F ACP DISCUSS-NO DSCNMKR DOCD: CPT | Performed by: INTERNAL MEDICINE

## 2025-04-08 PROCEDURE — G8417 CALC BMI ABV UP PARAM F/U: HCPCS | Performed by: INTERNAL MEDICINE

## 2025-04-08 PROCEDURE — 3017F COLORECTAL CA SCREEN DOC REV: CPT | Performed by: INTERNAL MEDICINE

## 2025-04-08 PROCEDURE — G8428 CUR MEDS NOT DOCUMENT: HCPCS | Performed by: INTERNAL MEDICINE

## 2025-04-08 PROCEDURE — 1036F TOBACCO NON-USER: CPT | Performed by: INTERNAL MEDICINE

## 2025-04-08 PROCEDURE — 99214 OFFICE O/P EST MOD 30 MIN: CPT | Performed by: INTERNAL MEDICINE

## 2025-04-08 RX ORDER — FUROSEMIDE 20 MG/1
20 TABLET ORAL DAILY
Qty: 90 TABLET | Refills: 3 | Status: SHIPPED | OUTPATIENT
Start: 2025-04-08

## 2025-04-09 LAB — BACTERIA UR CULT: NORMAL

## 2025-04-24 RX ORDER — ATORVASTATIN CALCIUM 40 MG/1
40 TABLET, FILM COATED ORAL DAILY
Qty: 90 TABLET | Refills: 0 | Status: SHIPPED | OUTPATIENT
Start: 2025-04-24

## 2025-04-24 NOTE — TELEPHONE ENCOUNTER
"Continue metformin 1000mg XR in the morning time.   Start jardiance 10mg tablet daily.     Cut out/limit cokes, sprites, concentrated sweets (icecream, cookies, snowballs) -     Scan/use your free style richmond 2 -  Scan as often as possible.   The more you scan more before and after you eat -     Low Carb Snacks & Diabetes friendly foods   Aim for 30 - 40 grams of carbs for 3 meals per day (or 2 meals and  1 - 2 snacks - however you prefer)  Snacks can be 15 - 20 grams of carbs.     Eating small, frequent meals will help you to feel more satisfied, and more full so that you don't over eat or eat the wrong foods later.   Also, caroline meals/snacks allow you to spread carbohydrates evenly, which may stabilize blood sugars.   Below you will find a list of ideas of foods that I like/prefer.   Feel free to give me your ideas and tips if you find good ones too!   Overall - please remember to limit refined sugars such as soft drinks, juices, rices, pastas, breads, cakes.   Look at the back of the label - look at the amount of "carbohydrates", then look at the amount of "fiber" - subtract the amount of fiber from the amount of carbs - this is your "net carb intake".  The more fiber a food has, the better generally, as you get to subtract this from the net carbs.     0-5 gm carb  Crystal Light flavoring (I like fruit punch flavor!)  Vitamin Water Zero  Donna antioxidant drinks.   Sparkling ice (long skinny flavored water bottles for $1 that are sugar free).   Preeti water, WaterLoo - carbonated flavored marie, various flavors.  Diet coke, diet barqs, sprite zero.  Diet sunkist (orange drink)  Sugar free powerade  Herbal tea, unsweetened  2 tsp peanut butter on celery or carrots  Ryan's original Candies - (the Sugar Free ones!)  1/2 cup sugar-free jell-o  1 sugar-free popsicle  ¼ cup blueberries or strawberries  8oz Blue Kamryn unsweetened almond milk (or there is sugar free vanilla flavored as well).  5 baby carrots & " Lov 4-7-25  Fov 6-20-25   "celery sticks, cucumbers, bell peppers dipped in ¼ cup salsa, 2Tbsp light ranch dressing or 2Tbsp plain Greek yogurt  10 Goldfish crackers  ½ oz low-fat cheese or string cheese  1 closed handful of nuts, unsalted (example-->almonds, pistachios, cashews, peanuts, etc).  1 Tbsp of sunflower seeds, unsalted  1 cup Smart Pop popcorn  1 whole grain brown rice cake   "Think Thin" protein bars - my personal favorite is Creamy Peanut butter, chocolate brownie, or oreo flavors.  "Pinedo" bars  - can be found at [employer] grocery store - they have 5 grams of net carbohydrates.  Quest bars (my favorite is birthday cake, and also cinnamon roll is good melted for 10 seconds in the microwave - please remove the wrapper first!)  Premier protein shakes - sold at [employer], or other brand alternatives - usually 1 - 2 grams of carbs (strawberry, vanilla, chocolate flavors) Coffee flavor is my new morning favorite!   Scrambled eggs! Or a fried egg or boiled eggs - add sliced tomatoes, cilantro or some chopped green onions.   Eggs are good with any and all veggies! You can even eat them for dinner or in a low carb tortilla, or served with your favorite grilled meat/sausage or da silva is my favorite.   Check out pre-made egg scrambles in the egg grocery store section - Ore Kadi "just crack an egg" is premixed cheese, da silva and small amount of pototes, small cup size portions for your breakfast - very convenient!   Patti fairbanks - zucchini - can buy in the frozen foods section. Birds eye brand is usually cheap. Tip - saute with oil/onions or italian seasoning and use this as a pasta substitution.  Milk - is usually high in carbs/sugar - use DriveHQ milk brand instead - it is "filtered" milk - with half the amount of carbs of regular milk. (next to the milk in milk aisle).   Smuckers sugar free Breakfast syrup (or 1 tablespoon of low sugar breakfast syrup) instead of regular syrup.        15 gm carb  1 small piece of fruit or ½ banana or 1/2 " "cup lite canned fruit  3 rosario cracker squares  3 cups Smart Pop popcorn, top spray butter, Flores lite salt or cinnamon and Truvia  5 Vanilla Wafers  ½ cup low fat, no added sugar ice cream or frozen yogurt (Blue bell, Blue Bunny, Weight Watchers, Skinny Cow)  1/2 - 1 cup Light n' fit Vanilla yogurt (has added protein in it to make you feel full).   ½ turkey, ham, or chicken sandwich  ½ c fruit with ½ c Cottage cheese  4-6 unsalted wheat crackers with 1 oz low fat cheese or 1 tbsp peanut butter   30-45 goldfish crackers (depending on flavor)   7-8 Latter day mini brown rice cakes (caramel, apple cinnamon, chocolate)   12 Latter day mini brown rice cakes (cheddar, bbq, ranch)   1/3 cup hummus dip with raw veg  1/2 whole wheat vasu, 1Tbsp hummus  Mini Pizza (1/2 whole wheat English muffin, low-fat  cheese, tomato sauce)  100 calorie snack pack (Oreo, Chips Ahoy, Ritz Mix, Baked Cheetos)  4-6 oz. light or Greek Style yogurt (Chobani, Yoplait, Okios, [place]oneyfield)  ½ cup sugar-free pudding    6 in. wheat tortilla or vasu oven toasted chips (topped with spray butter flavoring, cinnamon, Truvia OR spray butter, garlic powder, chili powder)   18 BBQ Popchips (available at [employer], Whole Foods, [employer] Market)  Mini bagel (small size) toasted - add fat free cream cheese or avocado and sliced tomato on top - yum!   1/2 cup Halo top icecream - birthday cake is my go-to flavor.   Truth Bars - can be found at Fresh market - Net carbs is 11 - 12 grams depending on the flavor.   Kind bars = mostly nuts and dried berries - find at most local groceries stores, drug stores, whole foods, fresh market. Net carbs is around 10 grams.     Smoothie Juan J - I'm often asked "what smoothie is healthy for me". Get the 20 oz. Size.   Do not be decieved to think that all smoothies are "healthy". In fact, most are loaded with hidden sugars.   Here are some from the menu that you are allowed to have being diabetic:   The gladiator - any flavor. This is the " "lowest in carbs (3 - 5 grams only)  Keto champ (berry, chocolate or coffee - all have 14 - 19 grams of carbs in 20 oz size).   The Lean 1 smoothies - vanilla, strawberry and chocolate have under 30 grams of carbs, so this is slightly more. But would be ok for a meal substitute or snack.   The Shredder in Vanilla or chocolate only.  17 grams of carbs - (the strawberry has more sugar considerably)    Tips and Tricks:     Eat an extra vegetable once per day - green veggies (such as broccoli, cauliflower, green beans) - make you feel full and are low in sugar.     My favorite "secret" seasoning to make things extra yummy is cinnamon for sweet taste   For an added secret "salty" taste - add "everything but the bagel" seasoning (you can get on amazon.com or at  joes. I have seen at local groceries such as [employer] too!).     Dark colored fruits are your friend -- blueberries, strawberries, raspberries, blackberries. They have a lower sugar content. So will be the best choice for you.   Light colored fruits are NOT your friend - they tend to be higher in sugar and are not the best options for an ADA diet - examples are oranges, bananas, peaches, watermelon, -- you can eat these, but carefully and in moderation.     WATER. I cannot emphasize drinking water enough - it will hydrate you, make you full. Your body needs it - it's a natural appetite suppressant.   Sometimes hunger and thirst can feel the same. Try drinking some water first, then eat if you are still hungry.     Other snack choices   Celery with peanut butter  Celery with tuna salad  Dill pickles and cheddar cheese (no kidding, it's a great combo)  Nuts (keep raw ones in the freezer if you think you'll overeat them)  Sunflower seeds (get them in the shell so it will take longer to eat them)  Other seeds (How to Toast Pumpkin or Squash Seeds)   Pistachios or almonds - will fill you up and are tasty!   Low-Carb Trail Mix  Jerky (beef or turkey -- try to find " "low-sugar varieties)  Salami slices (you can find in the deli section)  Cheese sticks, such as string cheese  Sugar-free Jello, alone or with cottage cheese and a sprinkling of nuts. Make sugar-free lime Jello with part coconut milk -- For a large package, dissolve the powder in a cup of boiling water, add a can of coconut milk, and then add the rest of the water. Stir well.  Pepperoni "chips" -- Zap the slices in the microwave  Cheese with a few apple slices  4-ounce plain or sugar-free yogurt with berries and flax seed meal  Smoked salmon and cream cheese on cucumber slices  Lettuce Roll-ups -- Roll luncheon meat, egg salad, tuna or other filling and veggies in lettuce leaves  Lunch Meat Roll-ups -- Roll cheese or veggies in lunch meat (read the labels for carbs on the lunch meat)  Spread bean dip, spinach dip, or other low-carb dip or spread on the lunch meat or lettuce and then roll it up  Raw veggies and spinach dip, or other low-carb dip  Pork rinds (Chicharrón), with or without dip  Ricotta cheese with fruit and/or nuts and/or flax seed meal  Mushrooms with cheese spread inside (or other spreads or dips)  Low-carb snack bars (watch out for sugar alcohols, especially maltitol)  Product Review: Atkins Advantage Bars  Pepperoni Chips -- Microwave pepperoni slices until crisp. Great with cheeses and dips  Garlic Parmesan Flax Seed Crackers  Parmesan Crisps -- Good when you want a crunchy snack.  Peanut Butter Protein Balls     "

## 2025-05-27 RX ORDER — GABAPENTIN 100 MG/1
CAPSULE ORAL
Qty: 360 CAPSULE | Refills: 0 | Status: SHIPPED | OUTPATIENT
Start: 2025-05-27 | End: 2025-08-27

## 2025-05-29 DIAGNOSIS — I49.9 CARDIAC ARRHYTHMIA, UNSPECIFIED CARDIAC ARRHYTHMIA TYPE: ICD-10-CM

## 2025-05-29 RX ORDER — FUROSEMIDE 20 MG/1
20 TABLET ORAL DAILY
Qty: 90 TABLET | Refills: 1 | Status: SHIPPED | OUTPATIENT
Start: 2025-05-29

## 2025-06-02 DIAGNOSIS — E03.9 HYPOTHYROIDISM, UNSPECIFIED TYPE: ICD-10-CM

## 2025-06-02 RX ORDER — LEVOTHYROXINE SODIUM 25 UG/1
25 TABLET ORAL DAILY
Qty: 90 TABLET | Refills: 0 | Status: SHIPPED | OUTPATIENT
Start: 2025-06-02

## 2025-06-06 NOTE — TELEPHONE ENCOUNTER
[phone]    Pooja Borrero called to request a refill of this medication for the patient to be sent to the pharmacy below.     However, they are trying to reduce the amount of pills the patient is taking per day and they're requesting a dose change to 400 mg in one tablet instead of the 100 mg 4 tablets please.

## 2025-06-09 RX ORDER — GABAPENTIN 100 MG/1
CAPSULE ORAL
Qty: 360 CAPSULE | Refills: 0 | Status: SHIPPED | OUTPATIENT
Start: 2025-06-09 | End: 2025-09-06

## 2025-06-23 RX ORDER — ARFORMOTEROL TARTRATE 15 UG/2ML
SOLUTION RESPIRATORY (INHALATION)
Qty: 360 ML | Refills: 3 | Status: SHIPPED | OUTPATIENT
Start: 2025-06-23

## 2025-06-26 ENCOUNTER — TRANSCRIBE ORDERS (OUTPATIENT)
Dept: ADMINISTRATIVE | Age: 75
End: 2025-06-26

## 2025-06-26 DIAGNOSIS — G91.2 NORMAL PRESSURE HYDROCEPHALUS (HCC): Primary | ICD-10-CM

## 2025-07-01 DIAGNOSIS — R73.03 PRE-DIABETES: ICD-10-CM

## 2025-07-01 RX ORDER — METFORMIN HYDROCHLORIDE 500 MG/1
TABLET, EXTENDED RELEASE ORAL
Qty: 180 TABLET | Refills: 0 | Status: SHIPPED | OUTPATIENT
Start: 2025-07-01

## 2025-07-03 ENCOUNTER — HOSPITAL ENCOUNTER (OUTPATIENT)
Age: 75
Discharge: HOME | End: 2025-07-03
Payer: MEDICARE

## 2025-07-03 DIAGNOSIS — G91.2 NORMAL PRESSURE HYDROCEPHALUS (HCC): ICD-10-CM

## 2025-07-03 PROCEDURE — 70450 CT HEAD/BRAIN W/O DYE: CPT

## 2025-07-24 ENCOUNTER — OFFICE VISIT (OUTPATIENT)
Dept: FAMILY MEDICINE CLINIC | Age: 75
End: 2025-07-24

## 2025-07-24 VITALS
BODY MASS INDEX: 34.7 KG/M2 | DIASTOLIC BLOOD PRESSURE: 60 MMHG | HEIGHT: 74 IN | SYSTOLIC BLOOD PRESSURE: 100 MMHG | OXYGEN SATURATION: 94 % | WEIGHT: 270.4 LBS | HEART RATE: 110 BPM | TEMPERATURE: 98.2 F

## 2025-07-24 DIAGNOSIS — Z00.00 MEDICARE ANNUAL WELLNESS VISIT, SUBSEQUENT: Primary | ICD-10-CM

## 2025-07-24 DIAGNOSIS — Z91.81 AT MODERATE RISK FOR FALL: ICD-10-CM

## 2025-07-24 DIAGNOSIS — E11.69 TYPE 2 DIABETES MELLITUS WITH OTHER SPECIFIED COMPLICATION, UNSPECIFIED WHETHER LONG TERM INSULIN USE (HCC): ICD-10-CM

## 2025-07-24 ASSESSMENT — PATIENT HEALTH QUESTIONNAIRE - PHQ9
9. THOUGHTS THAT YOU WOULD BE BETTER OFF DEAD, OR OF HURTING YOURSELF: NOT AT ALL
SUM OF ALL RESPONSES TO PHQ QUESTIONS 1-9: 4
1. LITTLE INTEREST OR PLEASURE IN DOING THINGS: SEVERAL DAYS
6. FEELING BAD ABOUT YOURSELF - OR THAT YOU ARE A FAILURE OR HAVE LET YOURSELF OR YOUR FAMILY DOWN: NOT AT ALL
2. FEELING DOWN, DEPRESSED OR HOPELESS: NOT AT ALL
SUM OF ALL RESPONSES TO PHQ QUESTIONS 1-9: 4
5. POOR APPETITE OR OVEREATING: SEVERAL DAYS
SUM OF ALL RESPONSES TO PHQ QUESTIONS 1-9: 4
10. IF YOU CHECKED OFF ANY PROBLEMS, HOW DIFFICULT HAVE THESE PROBLEMS MADE IT FOR YOU TO DO YOUR WORK, TAKE CARE OF THINGS AT HOME, OR GET ALONG WITH OTHER PEOPLE: NOT DIFFICULT AT ALL
8. MOVING OR SPEAKING SO SLOWLY THAT OTHER PEOPLE COULD HAVE NOTICED. OR THE OPPOSITE, BEING SO FIGETY OR RESTLESS THAT YOU HAVE BEEN MOVING AROUND A LOT MORE THAN USUAL: NOT AT ALL
4. FEELING TIRED OR HAVING LITTLE ENERGY: MORE THAN HALF THE DAYS
3. TROUBLE FALLING OR STAYING ASLEEP: NOT AT ALL
SUM OF ALL RESPONSES TO PHQ QUESTIONS 1-9: 4
7. TROUBLE CONCENTRATING ON THINGS, SUCH AS READING THE NEWSPAPER OR WATCHING TELEVISION: NOT AT ALL

## 2025-07-24 ASSESSMENT — LIFESTYLE VARIABLES
HOW MANY STANDARD DRINKS CONTAINING ALCOHOL DO YOU HAVE ON A TYPICAL DAY: 1 OR 2
HOW OFTEN DO YOU HAVE A DRINK CONTAINING ALCOHOL: MONTHLY OR LESS

## 2025-07-24 NOTE — PROGRESS NOTES
Score Interpretation: Abnormal Mini-Cog           General HRA Questions:  Select all that apply: (!) New or Increased Fatigue  Inactivity:  On average, how many days per week do you engage in moderate to strenuous exercise (like a brisk walk)?: 2 days (!) Abnormal        Abnormal BMI (obese):  Body mass index is 34.72 kg/m². (!) Abnormal  Interventions:  Diet and exercise , PT referral , patient has dementia diagnoses              ADL's:   Patient reports needing help with:  Select all that apply: (!) Walking/Balance  Select all that apply: (!) Taking Medications        ROS:    Patient denies any current chest pain, shortness of breath, abdominal pain, bleeding, but has had recent falls,           Objective   Vitals:    07/24/25 1356 07/24/25 1409   BP: 100/60    BP Site: Left Upper Arm    Patient Position: Sitting    BP Cuff Size: Large Adult    Pulse: (!) 110 (!) 110   Temp: 98.2 °F (36.8 °C)    TempSrc: Temporal    SpO2: 94%    Weight: 122.7 kg (270 lb 6.4 oz)    Height: 1.88 m (6' 2\")       Body mass index is 34.72 kg/m².        General: Alert and oriented, cooperative and in no acute distress  Eyes: Clear sclera bl  HEENT: MMM  Cardio: S1, S2 heard, RRR, no murmurs appreciated  Resp: No acte respiratory distress, symmetric chest expansion,  CTAB  Abdomen: Soft, non-tender to palpation, non-distended   Neuro: Grossly intact, no focal deficits  MSK: Moves all extremities spontaneously, no acute joint swelling  Skin: Warm and dry, no acute lesions  Psych: Calm, able to answer questions and follow commands             Allergies   Allergen Reactions    Lisinopril Cough     Prior to Visit Medications    Medication Sig Taking? Authorizing Provider   metFORMIN (GLUCOPHAGE-XR) 500 MG extended release tablet TAKE 1 TABLET BY MOUTH TWICE A DAY IN THE MORNING AND IN THE EVENING Yes Gurvinder Askew MD   arformoterol tartrate (BROVANA) 15 MCG/2ML NEBU USE 1 VIAL VIA NEBULIZATION TWICE A DAY Yes Christiano Oliveira MD

## 2025-07-30 DIAGNOSIS — R73.03 PRE-DIABETES: ICD-10-CM

## 2025-07-30 RX ORDER — LORATADINE 10 MG/1
10 TABLET ORAL DAILY
Qty: 90 TABLET | Refills: 0 | Status: SHIPPED | OUTPATIENT
Start: 2025-07-30

## 2025-07-30 RX ORDER — ATORVASTATIN CALCIUM 40 MG/1
40 TABLET, FILM COATED ORAL DAILY
Qty: 90 TABLET | Refills: 0 | Status: SHIPPED | OUTPATIENT
Start: 2025-07-30

## 2025-07-30 RX ORDER — PANTOPRAZOLE SODIUM 40 MG/1
40 TABLET, DELAYED RELEASE ORAL DAILY
Qty: 90 TABLET | Refills: 0 | Status: SHIPPED | OUTPATIENT
Start: 2025-07-30

## 2025-07-30 RX ORDER — METFORMIN HYDROCHLORIDE 500 MG/1
500 TABLET, EXTENDED RELEASE ORAL
Qty: 90 TABLET | Refills: 0 | Status: SHIPPED | OUTPATIENT
Start: 2025-07-30

## 2025-07-30 RX ORDER — TAMSULOSIN HYDROCHLORIDE 0.4 MG/1
0.4 CAPSULE ORAL DAILY
Qty: 90 CAPSULE | Refills: 0 | Status: SHIPPED | OUTPATIENT
Start: 2025-07-30

## 2025-09-01 DIAGNOSIS — E03.9 HYPOTHYROIDISM, UNSPECIFIED TYPE: ICD-10-CM

## 2025-09-02 RX ORDER — LEVOTHYROXINE SODIUM 25 UG/1
25 TABLET ORAL DAILY
Qty: 90 TABLET | Refills: 3 | Status: SHIPPED | OUTPATIENT
Start: 2025-09-02

## (undated) DEVICE — CATHETER THERMOABLAT DIA18-31MM BLLN L8CM ELECTRD L4CM 16

## (undated) DEVICE — DISCONTINUED USE 405792 GLOVE SURG SENSICARE ALOE LT LF PF ST GRN SZ 7

## (undated) DEVICE — JEWISH CRANI PACK: Brand: MEDLINE INDUSTRIES, INC.

## (undated) DEVICE — INTENDED FOR TISSUE SEPARATION, AND OTHER PROCEDURES THAT REQUIRE A SHARP SURGICAL BLADE TO PUNCTURE OR CUT.: Brand: BARD-PARKER ® CARBON RIB-BACK BLADES

## (undated) DEVICE — TOOL 10BA50-MN LEGEND 10CM 5MM BA: Brand: MIDAS REX™

## (undated) DEVICE — [HIGH FLOW INSUFFLATOR,  DO NOT USE IF PACKAGE IS DAMAGED,  KEEP DRY,  KEEP AWAY FROM SUNLIGHT,  PROTECT FROM HEAT AND RADIOACTIVE SOURCES.]: Brand: PNEUMOSURE

## (undated) DEVICE — SUTURE BOOT: Brand: DEROYAL

## (undated) DEVICE — NO USE 18 MONTHS PACKS ORTHO TOTAL 120198A

## (undated) DEVICE — MICRODISSECTION NEEDLE STRAIGHT SLEEVE: Brand: COLORADO

## (undated) DEVICE — GOWN,SIRUS,POLYRNF,BRTHSLV,XLN/XXL,18/CS: Brand: MEDLINE

## (undated) DEVICE — NO USE 18 MONTHS PACKS BASIC 120194C

## (undated) DEVICE — FIRM 8CM: Brand: NASOPORE

## (undated) DEVICE — FORCEPS BX L240CM DIA2.4MM L NDL RAD JAW 4 133340

## (undated) DEVICE — LINER,SEMI-RIGID,3000CC,50EA/CS: Brand: MEDLINE

## (undated) DEVICE — 3L THIN WALL CAN: Brand: CRD

## (undated) DEVICE — PRESSURE TUBING: Brand: TRUWAVE

## (undated) DEVICE — TOWEL,OR,DSP,ST,BLUE,DLX,8/PK,10PK/CS: Brand: MEDLINE

## (undated) DEVICE — NASAL FESS: Brand: MEDLINE INDUSTRIES, INC.

## (undated) DEVICE — SPECIMEN SOCK - FEMALE: Brand: MEDI-VAC

## (undated) DEVICE — NEEDLE SPNL 25GA L3.5IN BLU HUB S STL REG WALL FIT STYL W/

## (undated) DEVICE — PASSER 48407 CATHETER 38CM DISP.: Brand: CATHTER PASSER, DISPOSABLE

## (undated) DEVICE — CODMAN® SURGICAL PATTIES 1/2" X 1/2" (1.27CM X 1.27CM): Brand: CODMAN®

## (undated) DEVICE — ELECTRODE PT RET AD L9FT HI MOIST COND ADH HYDRGEL CORDED

## (undated) DEVICE — GAUZE,SPONGE,4"X4",16PLY,XRAY,STRL,LF: Brand: MEDLINE

## (undated) DEVICE — MEDI-VAC NON-CONDUCTIVE SUCTION TUBING: Brand: CARDINAL HEALTH

## (undated) DEVICE — DECANTER BAG 9": Brand: MEDLINE INDUSTRIES, INC.

## (undated) DEVICE — CATHETER ENDOSCP L135CM W7.5XL15.7MM DIA2.8MM FLX RFA

## (undated) DEVICE — LAPAROSCOPIC SCISSORS: Brand: EPIX LAPAROSCOPIC SCISSORS

## (undated) DEVICE — SOLUTION IV 50ML 0.9% SOD CHL PLAS CONT USP VIAFLX

## (undated) DEVICE — UNDERGLOVE SURG SZ 8 BLU LTX FREE SYN POLYISOPRENE POLYMER

## (undated) DEVICE — DRAPE,UTILTY,TAPE,15X26, 4EA/PK: Brand: MEDLINE

## (undated) DEVICE — PASSER 48409 60CM DISP. CATHETER

## (undated) DEVICE — TOTAL TRAY, 16FR 10ML SIL FOLEY, URN: Brand: MEDLINE

## (undated) DEVICE — SPONGE GZ W4XL4IN COT 12 PLY TYP VII WVN C FLD DSGN

## (undated) DEVICE — BLADE CLIPPER SURG SENSICLIP

## (undated) DEVICE — TOWEL SURG SM W12XL18IN CLR PLAS TEAR RESIST REINF ADH FRST

## (undated) DEVICE — LIQUIBAND RAPID ADHESIVE 36/CS 0.8ML: Brand: MEDLINE

## (undated) DEVICE — TURNOVER KIT RM INF CTRL TECH

## (undated) DEVICE — GLOVE SURG SZ 85 L12IN FNGR THK87MIL DK GRN LTX FREE ISOLEX

## (undated) DEVICE — GLOVE SURG SZ 6 L12IN FNGR THK75MIL WHT LTX POLYMER BEAD

## (undated) DEVICE — PROPEL MINI SINUS IMPLANT
Type: IMPLANTABLE DEVICE | Site: SINUS | Status: NON-FUNCTIONAL
Brand: PROPEL MINI

## (undated) DEVICE — SKIN AFFIX SURG ADHESIVE 72/CS 0.55ML: Brand: MEDLINE

## (undated) DEVICE — DRAPE IRRIG FLD WRM W44XL44IN W/ AORN STD PRTBL INTRATEMP

## (undated) DEVICE — SUTURE VICRYL SZ 2-0 L18IN ABSRB UD CT-1 L36MM 1/2 CIR J839D

## (undated) DEVICE — CODMAN® HAKIM® PROGRAMMABLE VALVE IN-LINE VALVE PROGRAMMABLE IN STEPS OF 10MM H2O (98 PA): 30MM H2O TO 200MM H2O (294 PA - 1960 PA)
Type: IMPLANTABLE DEVICE | Status: NON-FUNCTIONAL
Brand: CODMAN® HAKIM®

## (undated) DEVICE — SHEET, ORTHO, SPLIT, STERILE: Brand: MEDLINE

## (undated) DEVICE — BLADE 1882940HR 5PK M4 INF TURB 2.9MM: Brand: STRAIGHTSHOT

## (undated) DEVICE — DRAPE,LAP,CHOLE,W/TROUGHS,STERILE: Brand: MEDLINE

## (undated) DEVICE — BLADE 1884004HR TRICUT 5PK M4 4MM ROTATE: Brand: TRICUT

## (undated) DEVICE — FORCEPS BX L240CM JAW DIA2.8MM L CAP W/ NDL MIC MESH TOOTH

## (undated) DEVICE — PATIENT TRACKER 9734887XOM NON-INVASIVE

## (undated) DEVICE — GLOVE SURG SZ 75 L12IN FNGR THK94MIL TRNSLUC YEL LTX

## (undated) DEVICE — INSTRUMENT TRACKER 9733533XOM ENT 1PK

## (undated) DEVICE — SHEATH 1912000 5PK 4MM/0DEG STORZ XOMED: Brand: ENDO-SCRUB®

## (undated) DEVICE — TROCAR: Brand: KII FIOS FIRST ENTRY

## (undated) DEVICE — SYRINGE MED 10ML TRNSLUC BRL PLUNG BLK MRK POLYPR CTRL

## (undated) DEVICE — SUTURE PERMAHAND SZ 3-0 L18IN NONABSORBABLE BLK L26MM SH C013D

## (undated) DEVICE — CRANI: Brand: MEDLINE INDUSTRIES, INC.

## (undated) DEVICE — GLOVE SURG SZ 7 L12IN FNGR THK87MIL WHT LTX FREE

## (undated) DEVICE — SINGLE-USE BIOPSY FORCEPS: Brand: RADIAL JAW 4

## (undated) DEVICE — SUTURE PERMAHAND SZ 3-0 L30IN NONABSORBABLE BLK SH L26MM K832H

## (undated) DEVICE — DRAPE,INSTRUMENT,MAGNETIC,10X16: Brand: MEDLINE

## (undated) DEVICE — SYRINGE IRRIG 60ML SFT PLIABLE BLB EZ TO GRP 1 HND USE W/

## (undated) DEVICE — 3M™ IOBAN™ 2 ANTIMICROBIAL INCISE DRAPE 6651EZ: Brand: IOBAN™ 2

## (undated) DEVICE — SNARES COLD OVAL 10MM THIN

## (undated) DEVICE — BLADE ES ELASTOMERIC COAT INSUL DURABLE BEND UPTO 90DEG

## (undated) DEVICE — BOOT,SUTURE,STANDARD,YELLOW-IN-BLUE: Brand: MEDLINE

## (undated) DEVICE — SOLUTION IV 250ML 0.9% SOD CHL PH 5 INJ USP VIAFLX PLAS

## (undated) DEVICE — SOLUTION ANTIFOG VIS SYS CLEARIFY LAPSCP

## (undated) DEVICE — TRAP SPEC RETRV CLR PLAS POLYP IN LN SUCT QUIK CTCH

## (undated) DEVICE — GLOVE SURG SZ 7 L12IN FNGR THK79MIL GRN LTX FREE

## (undated) DEVICE — SUTURE MCRYL SZ 4-0 L27IN ABSRB UD L19MM PS-2 1/2 CIR PRIM Y426H

## (undated) DEVICE — CHLORAPREP 26ML ORANGE

## (undated) DEVICE — TOWEL,STOP FLAG GOLD N-W: Brand: MEDLINE

## (undated) DEVICE — HOLDER SCALP PLAS G STD

## (undated) DEVICE — SUTURE PERMAHAND SZ 2-0 L12X18IN NONABSORBABLE BLK SILK A185H

## (undated) DEVICE — BLADE 1884006EM RAD40 4MM M4 ROTATE ROHS: Brand: FUSION®

## (undated) DEVICE — ELECTROSURGICAL PENCIL ROCKER SWITCH NON COATED BLADE ELECTRODE 10 FT (3 M) CORD HOLSTER: Brand: MEGADYNE

## (undated) DEVICE — SURGICAL SET UP - SURE SET: Brand: MEDLINE INDUSTRIES, INC.

## (undated) DEVICE — COVER LT HNDL CAM BLU DISP W/ SURG CTRL

## (undated) DEVICE — SOLUTION IV 1000ML 0.9% SOD CHL

## (undated) DEVICE — SPONGE,LAP,18"X18",DLX,XR,ST,5/PK,40/PK: Brand: MEDLINE

## (undated) DEVICE — SUTURE VICRYL + SZ 3-0 L18IN ABSRB UD SH 1/2 CIR TAPERCUT NDL VCP864D

## (undated) DEVICE — 3M™ WARMING BLANKET, LOWER BODY, 10 PER CASE, 42568: Brand: BAIR HUGGER™

## (undated) DEVICE — SINGLE-USE POLYPECTOMY SNARE: Brand: CAPTIVATOR

## (undated) DEVICE — SUTURE VCRL SZ 3-0 L18IN ABSRB UD L26MM SH 1/2 CIR J864D

## (undated) DEVICE — CANNULA NSL AD TBNG L7FT PVC STR NONFLARED PRNG O2 DEL W STD

## (undated) DEVICE — SUTURE NRLN SZ 4-0 L18IN NONABSORBABLE BLK L13MM TF 1/2 CIR C584D

## (undated) DEVICE — GOWN,SIRUS,POLYRNF,BRTHSLV,LG,30/CS: Brand: MEDLINE

## (undated) DEVICE — SUTURE MONOCRYL + SZ 4-0 L27IN ABSRB UD L19MM PS-2 3/8 CIR MCP426H

## (undated) DEVICE — GLOVE ORANGE PI 7 1/2   MSG9075

## (undated) DEVICE — TUBING 1895522 5PK STRAIGHTSHOT TO XPS: Brand: STRAIGHTSHOT®

## (undated) DEVICE — BLADE,CARBON-STEEL,11,STRL,DISPOSABLE,TB: Brand: MEDLINE

## (undated) DEVICE — BLADE 1884080EM TRICUT 4MMX13CM M4 ROHS: Brand: FUSION®

## (undated) DEVICE — MASK CAPNOGRAPHY AD W35IN DIA58IN SAMP LN L10FT O2 LN

## (undated) DEVICE — Device

## (undated) DEVICE — SUTURE ABSORBABLE MONOFILAMENT 4-0 SC-1 18 IN PLN GUT 1824H

## (undated) DEVICE — GLOVE SURG SZ 7 CRM LTX FREE POLYISOPRENE POLYMER BEAD ANTI

## (undated) DEVICE — STAPLER SKIN H3.9MM WIRE DIA0.58MM CRWN 6.9MM 35 STPL ROT

## (undated) DEVICE — GLOVE SURG SZ 85 L12IN FNGR THK87MIL WHT LTX FREE

## (undated) DEVICE — GLOVE SURG SZ 65 CRM LTX FREE POLYISOPRENE POLYMER BEAD ANTI

## (undated) DEVICE — FIAPC® PROBE W/ FILTER 2200 SC OD 2.3MM/6.9FR; L 2.2M/7.2FT: Brand: ERBE

## (undated) DEVICE — 10 FR. PTFE PEEL-APART PERCUTANEOUS INTRODUCER KIT: Brand: PEEL-APART PERCUTANEOUS INTRODUCER KIT

## (undated) DEVICE — MARKER,SKIN,WI/RULER AND LABELS: Brand: MEDLINE

## (undated) DEVICE — SNARE COLD DIAMOND 10MM THIN

## (undated) DEVICE — SPLINT 1524055 DOYLE II AIRWAY SET: Brand: DOYLE II ™

## (undated) DEVICE — TUBING, SUCTION, 1/4" X 12', STRAIGHT: Brand: MEDLINE

## (undated) DEVICE — KIT,ANTI FOG,W/SPONGE & FLUID,SOFT PACK: Brand: MEDLINE

## (undated) DEVICE — STANDARD HYPODERMIC NEEDLE,POLYPROPYLENE HUB: Brand: MONOJECT

## (undated) DEVICE — SURE SET-DOUBLE BASIN-LF: Brand: MEDLINE INDUSTRIES, INC.

## (undated) DEVICE — PENCIL ES L3M ROCK SWCH S STL HEX LOK BLDE ELECTRD HOLSTER

## (undated) DEVICE — ELECTRODE ELECSURG NDL 2.8 INX7.2 CM COAT INSUL EDGE

## (undated) DEVICE — SUTURE CHROMIC GUT SZ 5-0 L18IN ABSRB BRN P-3 L13MM 3/8 CIR 687G

## (undated) DEVICE — SOLUTION SURG PREP 26 CC PURPREP